# Patient Record
Sex: MALE | Race: WHITE | NOT HISPANIC OR LATINO | Employment: PART TIME | URBAN - NONMETROPOLITAN AREA
[De-identification: names, ages, dates, MRNs, and addresses within clinical notes are randomized per-mention and may not be internally consistent; named-entity substitution may affect disease eponyms.]

---

## 2020-02-10 ENCOUNTER — HOSPITAL ENCOUNTER (INPATIENT)
Facility: HOSPITAL | Age: 75
LOS: 2 days | Discharge: HOME | End: 2020-02-12
Attending: EMERGENCY MEDICINE | Admitting: INTERNAL MEDICINE

## 2020-02-10 ENCOUNTER — APPOINTMENT (OUTPATIENT)
Dept: GENERAL RADIOLOGY | Facility: HOSPITAL | Age: 75
End: 2020-02-10

## 2020-02-10 ENCOUNTER — APPOINTMENT (OUTPATIENT)
Dept: CT IMAGING | Facility: HOSPITAL | Age: 75
End: 2020-02-10

## 2020-02-10 DIAGNOSIS — R09.02 HYPOXIC: ICD-10-CM

## 2020-02-10 DIAGNOSIS — J18.9 PNEUMONIA OF RIGHT UPPER LOBE DUE TO INFECTIOUS ORGANISM: Primary | ICD-10-CM

## 2020-02-10 PROBLEM — J96.01 ACUTE RESPIRATORY FAILURE WITH HYPOXIA (HCC): Status: ACTIVE | Noted: 2020-02-10

## 2020-02-10 PROBLEM — Z87.891 FORMER TOBACCO USE: Status: ACTIVE | Noted: 2020-02-10

## 2020-02-10 PROBLEM — I10 ESSENTIAL HYPERTENSION: Status: ACTIVE | Noted: 2020-02-10

## 2020-02-10 PROBLEM — I73.9 PVD (PERIPHERAL VASCULAR DISEASE): Status: ACTIVE | Noted: 2020-02-10

## 2020-02-10 PROBLEM — R79.89 POSITIVE D DIMER: Status: ACTIVE | Noted: 2020-02-10

## 2020-02-10 LAB
ALBUMIN SERPL-MCNC: 4.4 G/DL (ref 3.5–5.2)
ALBUMIN/GLOB SERPL: 1.2 G/DL
ALP SERPL-CCNC: 132 U/L (ref 39–117)
ALT SERPL W P-5'-P-CCNC: 21 U/L (ref 1–41)
ANION GAP SERPL CALCULATED.3IONS-SCNC: 14 MMOL/L (ref 5–15)
APTT PPP: 60.6 SECONDS (ref 24.1–35)
ARTERIAL PATENCY WRIST A: POSITIVE
AST SERPL-CCNC: 24 U/L (ref 1–40)
ATMOSPHERIC PRESS: 752 MMHG
BASE EXCESS BLDA CALC-SCNC: -0.6 MMOL/L (ref 0–2)
BASOPHILS # BLD AUTO: 0.05 10*3/MM3 (ref 0–0.2)
BASOPHILS NFR BLD AUTO: 0.5 % (ref 0–1.5)
BDY SITE: ABNORMAL
BILIRUB SERPL-MCNC: 0.9 MG/DL (ref 0.2–1.2)
BODY TEMPERATURE: 37 C
BUN BLD-MCNC: 18 MG/DL (ref 8–23)
BUN/CREAT SERPL: 23.7 (ref 7–25)
CALCIUM SPEC-SCNC: 9.8 MG/DL (ref 8.6–10.5)
CHLORIDE SERPL-SCNC: 99 MMOL/L (ref 98–107)
CO2 SERPL-SCNC: 25 MMOL/L (ref 22–29)
CREAT BLD-MCNC: 0.76 MG/DL (ref 0.76–1.27)
D DIMER PPP FEU-MCNC: 0.86 MG/L (FEU) (ref 0–0.5)
D-LACTATE SERPL-SCNC: 1.2 MMOL/L (ref 0.5–2)
DEPRECATED RDW RBC AUTO: 49.7 FL (ref 37–54)
EOSINOPHIL # BLD AUTO: 0.01 10*3/MM3 (ref 0–0.4)
EOSINOPHIL NFR BLD AUTO: 0.1 % (ref 0.3–6.2)
ERYTHROCYTE [DISTWIDTH] IN BLOOD BY AUTOMATED COUNT: 14.3 % (ref 12.3–15.4)
FLUAV AG NPH QL: NEGATIVE
FLUBV AG NPH QL IA: NEGATIVE
GFR SERPL CREATININE-BSD FRML MDRD: 101 ML/MIN/1.73
GLOBULIN UR ELPH-MCNC: 3.7 GM/DL
GLUCOSE BLD-MCNC: 96 MG/DL (ref 65–99)
HBA1C MFR BLD: 5.8 % (ref 4.8–5.6)
HCO3 BLDA-SCNC: 22.1 MMOL/L (ref 20–26)
HCT VFR BLD AUTO: 54.2 % (ref 37.5–51)
HGB BLD-MCNC: 18.7 G/DL (ref 13–17.7)
IMM GRANULOCYTES # BLD AUTO: 0.05 10*3/MM3 (ref 0–0.05)
IMM GRANULOCYTES NFR BLD AUTO: 0.5 % (ref 0–0.5)
INR PPP: 0.98 (ref 0.91–1.09)
LYMPHOCYTES # BLD AUTO: 0.79 10*3/MM3 (ref 0.7–3.1)
LYMPHOCYTES NFR BLD AUTO: 8.3 % (ref 19.6–45.3)
Lab: ABNORMAL
Lab: ABNORMAL
MCH RBC QN AUTO: 33.1 PG (ref 26.6–33)
MCHC RBC AUTO-ENTMCNC: 34.5 G/DL (ref 31.5–35.7)
MCV RBC AUTO: 95.9 FL (ref 79–97)
MODALITY: ABNORMAL
MONOCYTES # BLD AUTO: 1.25 10*3/MM3 (ref 0.1–0.9)
MONOCYTES NFR BLD AUTO: 13.1 % (ref 5–12)
NEUTROPHILS # BLD AUTO: 7.41 10*3/MM3 (ref 1.7–7)
NEUTROPHILS NFR BLD AUTO: 77.5 % (ref 42.7–76)
NOTIFIED BY: ABNORMAL
NOTIFIED WHO: ABNORMAL
NRBC BLD AUTO-RTO: 0 /100 WBC (ref 0–0.2)
NT-PROBNP SERPL-MCNC: 365 PG/ML (ref 5–900)
PCO2 BLDA: 31.3 MM HG (ref 35–45)
PH BLDA: 7.46 PH UNITS (ref 7.35–7.45)
PLATELET # BLD AUTO: 204 10*3/MM3 (ref 140–450)
PMV BLD AUTO: 9.1 FL (ref 6–12)
PO2 BLDA: 52.5 MM HG (ref 83–108)
POTASSIUM BLD-SCNC: 3.9 MMOL/L (ref 3.5–5.2)
PROCALCITONIN SERPL-MCNC: 0.05 NG/ML (ref 0.1–0.25)
PROT SERPL-MCNC: 8.1 G/DL (ref 6–8.5)
PROTHROMBIN TIME: 13.3 SECONDS (ref 11.9–14.6)
RBC # BLD AUTO: 5.65 10*6/MM3 (ref 4.14–5.8)
SAO2 % BLDCOA: 88.2 % (ref 94–99)
SODIUM BLD-SCNC: 138 MMOL/L (ref 136–145)
TROPONIN T SERPL-MCNC: <0.01 NG/ML (ref 0–0.03)
VENTILATOR MODE: ABNORMAL
WBC NRBC COR # BLD: 9.56 10*3/MM3 (ref 3.4–10.8)

## 2020-02-10 PROCEDURE — 85379 FIBRIN DEGRADATION QUANT: CPT | Performed by: EMERGENCY MEDICINE

## 2020-02-10 PROCEDURE — 25010000002 CEFTRIAXONE PER 250 MG: Performed by: EMERGENCY MEDICINE

## 2020-02-10 PROCEDURE — 36600 WITHDRAWAL OF ARTERIAL BLOOD: CPT

## 2020-02-10 PROCEDURE — 71045 X-RAY EXAM CHEST 1 VIEW: CPT

## 2020-02-10 PROCEDURE — 85610 PROTHROMBIN TIME: CPT | Performed by: EMERGENCY MEDICINE

## 2020-02-10 PROCEDURE — 94799 UNLISTED PULMONARY SVC/PX: CPT

## 2020-02-10 PROCEDURE — 94640 AIRWAY INHALATION TREATMENT: CPT

## 2020-02-10 PROCEDURE — 83605 ASSAY OF LACTIC ACID: CPT | Performed by: EMERGENCY MEDICINE

## 2020-02-10 PROCEDURE — 25010000002 AZITHROMYCIN PER 500 MG: Performed by: EMERGENCY MEDICINE

## 2020-02-10 PROCEDURE — 87205 SMEAR GRAM STAIN: CPT | Performed by: NURSE PRACTITIONER

## 2020-02-10 PROCEDURE — 0 IOPAMIDOL PER 1 ML: Performed by: EMERGENCY MEDICINE

## 2020-02-10 PROCEDURE — 87070 CULTURE OTHR SPECIMN AEROBIC: CPT | Performed by: NURSE PRACTITIONER

## 2020-02-10 PROCEDURE — 93010 ELECTROCARDIOGRAM REPORT: CPT | Performed by: INTERNAL MEDICINE

## 2020-02-10 PROCEDURE — 83880 ASSAY OF NATRIURETIC PEPTIDE: CPT | Performed by: EMERGENCY MEDICINE

## 2020-02-10 PROCEDURE — 93005 ELECTROCARDIOGRAM TRACING: CPT | Performed by: EMERGENCY MEDICINE

## 2020-02-10 PROCEDURE — 99284 EMERGENCY DEPT VISIT MOD MDM: CPT

## 2020-02-10 PROCEDURE — 85730 THROMBOPLASTIN TIME PARTIAL: CPT | Performed by: EMERGENCY MEDICINE

## 2020-02-10 PROCEDURE — 87804 INFLUENZA ASSAY W/OPTIC: CPT | Performed by: EMERGENCY MEDICINE

## 2020-02-10 PROCEDURE — 84145 PROCALCITONIN (PCT): CPT | Performed by: EMERGENCY MEDICINE

## 2020-02-10 PROCEDURE — 84484 ASSAY OF TROPONIN QUANT: CPT | Performed by: EMERGENCY MEDICINE

## 2020-02-10 PROCEDURE — 83036 HEMOGLOBIN GLYCOSYLATED A1C: CPT | Performed by: NURSE PRACTITIONER

## 2020-02-10 PROCEDURE — 87040 BLOOD CULTURE FOR BACTERIA: CPT | Performed by: EMERGENCY MEDICINE

## 2020-02-10 PROCEDURE — 71275 CT ANGIOGRAPHY CHEST: CPT

## 2020-02-10 PROCEDURE — 85025 COMPLETE CBC W/AUTO DIFF WBC: CPT | Performed by: EMERGENCY MEDICINE

## 2020-02-10 PROCEDURE — 82803 BLOOD GASES ANY COMBINATION: CPT

## 2020-02-10 PROCEDURE — 25010000002 LORAZEPAM PER 2 MG: Performed by: INTERNAL MEDICINE

## 2020-02-10 PROCEDURE — 80053 COMPREHEN METABOLIC PANEL: CPT | Performed by: EMERGENCY MEDICINE

## 2020-02-10 RX ORDER — AMOXICILLIN 250 MG
2 CAPSULE ORAL 2 TIMES DAILY
Status: DISCONTINUED | OUTPATIENT
Start: 2020-02-10 | End: 2020-02-12 | Stop reason: HOSPADM

## 2020-02-10 RX ORDER — ACETAMINOPHEN 325 MG/1
650 TABLET ORAL EVERY 4 HOURS PRN
Status: DISCONTINUED | OUTPATIENT
Start: 2020-02-10 | End: 2020-02-12 | Stop reason: HOSPADM

## 2020-02-10 RX ORDER — IPRATROPIUM BROMIDE AND ALBUTEROL SULFATE 2.5; .5 MG/3ML; MG/3ML
3 SOLUTION RESPIRATORY (INHALATION)
Status: DISCONTINUED | OUTPATIENT
Start: 2020-02-10 | End: 2020-02-11

## 2020-02-10 RX ORDER — IBUPROFEN 800 MG/1
800 TABLET ORAL ONCE
Status: COMPLETED | OUTPATIENT
Start: 2020-02-10 | End: 2020-02-10

## 2020-02-10 RX ORDER — ALBUTEROL SULFATE 2.5 MG/3ML
2.5 SOLUTION RESPIRATORY (INHALATION)
Status: COMPLETED | OUTPATIENT
Start: 2020-02-10 | End: 2020-02-10

## 2020-02-10 RX ORDER — SODIUM CHLORIDE 0.9 % (FLUSH) 0.9 %
10 SYRINGE (ML) INJECTION AS NEEDED
Status: DISCONTINUED | OUTPATIENT
Start: 2020-02-10 | End: 2020-02-12 | Stop reason: HOSPADM

## 2020-02-10 RX ORDER — SODIUM CHLORIDE 0.9 % (FLUSH) 0.9 %
10 SYRINGE (ML) INJECTION EVERY 12 HOURS SCHEDULED
Status: DISCONTINUED | OUTPATIENT
Start: 2020-02-10 | End: 2020-02-12 | Stop reason: HOSPADM

## 2020-02-10 RX ORDER — IPRATROPIUM BROMIDE AND ALBUTEROL SULFATE 2.5; .5 MG/3ML; MG/3ML
3 SOLUTION RESPIRATORY (INHALATION) ONCE
Status: COMPLETED | OUTPATIENT
Start: 2020-02-10 | End: 2020-02-10

## 2020-02-10 RX ORDER — BENZONATATE 100 MG/1
100 CAPSULE ORAL 3 TIMES DAILY PRN
Status: DISCONTINUED | OUTPATIENT
Start: 2020-02-10 | End: 2020-02-12 | Stop reason: HOSPADM

## 2020-02-10 RX ORDER — GUAIFENESIN 600 MG/1
1200 TABLET, EXTENDED RELEASE ORAL EVERY 12 HOURS SCHEDULED
Status: DISCONTINUED | OUTPATIENT
Start: 2020-02-10 | End: 2020-02-12 | Stop reason: HOSPADM

## 2020-02-10 RX ORDER — IBUPROFEN 800 MG/1
800 TABLET ORAL EVERY 8 HOURS PRN
Status: DISCONTINUED | OUTPATIENT
Start: 2020-02-10 | End: 2020-02-12 | Stop reason: HOSPADM

## 2020-02-10 RX ORDER — ONDANSETRON 4 MG/1
4 TABLET, FILM COATED ORAL EVERY 6 HOURS PRN
Status: DISCONTINUED | OUTPATIENT
Start: 2020-02-10 | End: 2020-02-12 | Stop reason: HOSPADM

## 2020-02-10 RX ORDER — ONDANSETRON 2 MG/ML
4 INJECTION INTRAMUSCULAR; INTRAVENOUS EVERY 6 HOURS PRN
Status: DISCONTINUED | OUTPATIENT
Start: 2020-02-10 | End: 2020-02-12 | Stop reason: HOSPADM

## 2020-02-10 RX ORDER — LORAZEPAM 2 MG/ML
1 INJECTION INTRAMUSCULAR EVERY 4 HOURS PRN
Status: DISCONTINUED | OUTPATIENT
Start: 2020-02-10 | End: 2020-02-12 | Stop reason: HOSPADM

## 2020-02-10 RX ADMIN — IBUPROFEN 800 MG: 800 TABLET, FILM COATED ORAL at 15:57

## 2020-02-10 RX ADMIN — LORAZEPAM 1 MG: 2 INJECTION INTRAMUSCULAR; INTRAVENOUS at 23:20

## 2020-02-10 RX ADMIN — IOPAMIDOL 100 ML: 755 INJECTION, SOLUTION INTRAVENOUS at 14:45

## 2020-02-10 RX ADMIN — GUAIFENESIN 1200 MG: 600 TABLET, EXTENDED RELEASE ORAL at 20:23

## 2020-02-10 RX ADMIN — AZITHROMYCIN MONOHYDRATE 500 MG: 500 INJECTION, POWDER, LYOPHILIZED, FOR SOLUTION INTRAVENOUS at 16:27

## 2020-02-10 RX ADMIN — SODIUM CHLORIDE, PRESERVATIVE FREE 10 ML: 5 INJECTION INTRAVENOUS at 22:23

## 2020-02-10 RX ADMIN — IPRATROPIUM BROMIDE AND ALBUTEROL SULFATE 3 ML: 2.5; .5 SOLUTION RESPIRATORY (INHALATION) at 19:32

## 2020-02-10 RX ADMIN — ALBUTEROL SULFATE 2.5 MG: 2.5 SOLUTION RESPIRATORY (INHALATION) at 12:07

## 2020-02-10 RX ADMIN — IPRATROPIUM BROMIDE AND ALBUTEROL SULFATE 3 ML: 2.5; .5 SOLUTION RESPIRATORY (INHALATION) at 12:06

## 2020-02-10 RX ADMIN — ACETAMINOPHEN 650 MG: 325 TABLET, FILM COATED ORAL at 20:27

## 2020-02-10 RX ADMIN — IBUPROFEN 800 MG: 800 TABLET ORAL at 23:08

## 2020-02-10 RX ADMIN — ALBUTEROL SULFATE 2.5 MG: 2.5 SOLUTION RESPIRATORY (INHALATION) at 12:06

## 2020-02-10 RX ADMIN — CEFTRIAXONE SODIUM 1 G: 1 INJECTION, POWDER, FOR SOLUTION INTRAMUSCULAR; INTRAVENOUS at 15:48

## 2020-02-10 NOTE — ED PROVIDER NOTES
Subjective   Patient is  short of breath cough congestion for the past couple of days got worse subsequently came the ER for evaluation      Shortness of Breath   Severity:  Moderate  Onset quality:  Gradual  Timing:  Constant  Progression:  Worsening  Chronicity:  New  Context: not activity, not animal exposure, not emotional upset, not occupational exposure, not pollens and not URI    Relieved by:  Nothing  Worsened by:  Exertion  Ineffective treatments:  None tried  Associated symptoms: cough, diaphoresis and wheezing    Associated symptoms: no abdominal pain, no chest pain, no claudication, no ear pain, no fever, no headaches, no hemoptysis, no neck pain, no PND, no rash, no sore throat, no sputum production, no swollen glands and no vomiting    Risk factors: no recent alcohol use, no family hx of DVT, no hx of PE/DVT, no obesity, no recent surgery and no tobacco use        Review of Systems   Constitutional: Positive for diaphoresis. Negative for fever.   HENT: Negative.  Negative for ear pain and sore throat.    Respiratory: Positive for cough, shortness of breath and wheezing. Negative for hemoptysis and sputum production.    Cardiovascular: Negative for chest pain, claudication and PND.   Gastrointestinal: Negative.  Negative for abdominal distention, abdominal pain, nausea and vomiting.   Endocrine: Negative.    Genitourinary: Negative.    Musculoskeletal: Negative.  Negative for back pain and neck pain.   Skin: Negative for color change, pallor and rash.   Neurological: Negative.  Negative for syncope, weakness, light-headedness, numbness and headaches.   Hematological: Negative.  Does not bruise/bleed easily.   All other systems reviewed and are negative.      Past Medical History:   Diagnosis Date   • Arthritis    • Hypertension    • PVD (peripheral vascular disease) (CMS/HCC)        No Known Allergies    No past surgical history on file.    No family history on file.    Social History     Socioeconomic  History   • Marital status:      Spouse name: Not on file   • Number of children: Not on file   • Years of education: Not on file   • Highest education level: Not on file   Tobacco Use   • Smoking status: Former Smoker   • Smokeless tobacco: Never Used   • Tobacco comment: quit on Friday    Substance and Sexual Activity   • Alcohol use: Yes     Frequency: Never           Objective   Physical Exam   Constitutional: He is oriented to person, place, and time. He appears well-developed.  Non-toxic appearance.   HENT:   Head: Normocephalic and atraumatic.   Mouth/Throat: Uvula is midline and mucous membranes are normal.   Eyes: Pupils are equal, round, and reactive to light. Conjunctivae and lids are normal. Lids are everted and swept, no foreign bodies found.   Neck: Trachea normal, normal range of motion, full passive range of motion without pain and phonation normal. Neck supple. Normal carotid pulses and no JVD present. Carotid bruit is not present. No neck rigidity. No tracheal deviation present.   Cardiovascular: Regular rhythm, normal heart sounds, intact distal pulses and normal pulses. Tachycardia present. PMI is not displaced.   Pulmonary/Chest: No accessory muscle usage or stridor. Tachypnea noted. No apnea. He is in respiratory distress. He has decreased breath sounds in the right lower field and the left lower field. He has wheezes in the right middle field, the right lower field, the left middle field and the left lower field. He has no rhonchi. He has no rales.   Abdominal: Soft. Normal appearance, normal aorta and bowel sounds are normal. There is no hepatosplenomegaly. There is no tenderness.   Musculoskeletal: Normal range of motion.   Lower extremity exam bilaterally is unremarkable.  There is no right or left calf tenderness .  There is no palpable venous cord.  No obvious difference in the size of the legs.  No pitting edema.  The dorsalis pedis and posterior tibial femoral and popliteal  pulses are palpable and +2 bilaterally.  Homans sign is negative   Neurological: He is alert and oriented to person, place, and time. He has normal strength and normal reflexes. He displays normal reflexes. No cranial nerve deficit or sensory deficit. Gait normal. GCS eye subscore is 4. GCS verbal subscore is 5. GCS motor subscore is 6.   Skin: Skin is warm, dry and intact. No cyanosis. No pallor. Nails show no clubbing.   Psychiatric: He has a normal mood and affect. His speech is normal and behavior is normal.   Nursing note and vitals reviewed.      Procedures           ED Course  ED Course as of Feb 10 1512   Mon Feb 10, 2020   1509 Patient is tachypneic hypoxic CT of the chest is negative for PE has got a right upper lobe pneumonia will be admitted to the hospital service he feels better with the Vapotherm and IV antibiotics    [TS]      ED Course User Index  [TS] Randy Preston MD                                               MDM  Number of Diagnoses or Management Options  Diagnosis management comments: Differential Diagnosis:  I considered pulmonary etiology, asthma, chronic obstructive pulmonary disease, pneumonia, pulmonary embolism, adult respiratory distress syndrome, pneumothorax, pleural effusion, pulmonary fibrosis, cardiac etiology, congestive heart failure, myocardial infarction, metabolic etiology, diabetic ketoacidosis, uremia, acidosis, sepsis, anemia, drug related etiology, hyperventilation and CNS disease as a possible cause of dyspnea in this patient. This is a partial list of diagnoses considered.            Amount and/or Complexity of Data Reviewed  Clinical lab tests: ordered and reviewed  Tests in the radiology section of CPT®: ordered and reviewed  Tests in the medicine section of CPT®: reviewed and ordered    Risk of Complications, Morbidity, and/or Mortality  Presenting problems: moderate  Diagnostic procedures: moderate  Management options: moderate        Final diagnoses:   Pneumonia  of right upper lobe due to infectious organism (CMS/HCC)   Hypoxic            Randy Preston MD  02/10/20 1833

## 2020-02-10 NOTE — PROGRESS NOTES
Discharge Planning Assessment   [facility]     Patient Name: Kingsley Lerma  MRN: [id number]  Today's Date: 2/10/2020    Admit Date: 2/10/2020    Discharge Needs Assessment    No documentation.       Discharge Plan     Row Name 02/10/20 1249       Plan    Plan  Spoke with [facility] about [facility].  He does not want to go to [facility].  Will notify [facility] NOD upon admission.        Fatuma Hammond RN

## 2020-02-10 NOTE — PROGRESS NOTES
Discharge Planning Assessment   [facility]     Patient Name: Kingsley Lerma  MRN: [id number]  Today's Date: 2/10/2020    Admit Date: 2/10/2020    Discharge Needs Assessment    No documentation.       Discharge Plan     Row Name 02/10/20 5511       Plan    Plan  Faxed clinical to [facility] with note saying pt does not want to transfer there.      Row Name 02/10/20 1249        Fatuma Hammond RN

## 2020-02-10 NOTE — H&P
[facility] Medicine Services  HISTORY AND PHYSICAL    Date of Admission: 2/10/2020  Primary Care Physician: Provider, No Known    Subjective     Chief Complaint: shortness of breath     History of Present Illness  The patient is a 74 year old  male who presented to [facility] emergency department complaining of shortness of breath, cough, congestion since Saturday night.  The patient states that he has been short of breath for a couple days progressively getting worse.  The patient states that he has not had a fever that he knows of.  He states that quit smoking three days ago.  He states that he has more short of breath with laying down, exertion and coughing.  The patient does not have a primary care provider.  The patient states that he has a productive cough with brownish sputum.  The patient does not wear oxygen at home but is currently requiring vapotherm at this time in the emergency department.  The patient was taken for a CT scan of his chest and found to have right upper lobe pneumonia.  The patient will be admitted for further workup.     Review of Systems   Constitutional: Positive for activity change, chills and fatigue. Negative for appetite change and fever.   HENT: Negative for hearing loss, nosebleeds, tinnitus and trouble swallowing.    Eyes: Negative for visual disturbance.   Respiratory: Positive for cough and shortness of breath. Negative for chest tightness and wheezing.    Cardiovascular: Negative for chest pain, palpitations and leg swelling.   Gastrointestinal: Negative for abdominal distention, abdominal pain, blood in stool, constipation, diarrhea, nausea and vomiting.   Endocrine: Negative for cold intolerance, heat intolerance, polydipsia, polyphagia and polyuria.   Genitourinary: Negative for decreased urine volume, difficulty urinating, dysuria, flank pain, frequency and hematuria.   Musculoskeletal: Negative for arthralgias,  "joint swelling and myalgias.   Skin: Negative for rash.   Allergic/Immunologic: Negative for immunocompromised state.   Neurological: Positive for weakness. Negative for dizziness, syncope, light-headedness and headaches.   Hematological: Negative for adenopathy. Does not bruise/bleed easily.   Psychiatric/Behavioral: Negative for confusion and sleep disturbance. The patient is not nervous/anxious.       Otherwise complete ROS reviewed and negative except as mentioned in the HPI.    Past Medical History:   Past Medical History:   Diagnosis Date   • Arthritis    • Hypertension    • PVD (peripheral vascular disease) (CMS/HCC)      Past Surgical History:No past surgical history on file.  Social History:  reports that he has quit smoking. He has never used smokeless tobacco. He reports that he drinks alcohol.    Family History: family history includes Cancer in his father; Dementia in his mother; Pancreatic cancer in his father.      Allergies:  No Known Allergies  Medications:  Prior to Admission medications    Medication Sig Start Date End Date Taking? Authorizing Provider   amLODIPine Besylate (NORVASC PO) Take  by mouth.    Emergency, Nurse ANAND Vázquez   ATENOLOL PO Take  by mouth.    Emergency, Nurse Kathie RN   doxycycline (VIBRAMYCIN) 100 MG capsule Take 100 mg by mouth 2 (Two) Times a Day.    Emergency, Nurse Kathie RN   hydroCHLOROthiazide (HYDRODIURIL) 25 MG tablet Take 25 mg by mouth Daily.    Emergency, Nurse Epic, RN     Objective     Vital Signs: /81 (BP Location: Right arm, Patient Position: Sitting)   Pulse 84   Temp 97.3 °F (36.3 °C) (Oral)   Resp 24   Ht 172.7 cm (68\")   Wt 83.9 kg (185 lb)   SpO2 96%   BMI 28.13 kg/m²      Physical Exam   Constitutional: He is oriented to person, place, and time. He appears well-developed and well-nourished.   Sitting up in bed.  NAD.    HENT:   Head: Normocephalic and atraumatic.   Eyes: Pupils are equal, round, and reactive to light. Conjunctivae and EOM are " normal.   Neck: Neck supple. No JVD present.   Cardiovascular: Normal rate, regular rhythm, normal heart sounds and intact distal pulses. Exam reveals no gallop and no friction rub.   No murmur heard.  Sinus 90   Pulmonary/Chest: Effort normal and breath sounds normal. No respiratory distress. He has no wheezes. He has no rales. He exhibits no tenderness.   Abdominal: Soft. Bowel sounds are normal. He exhibits no distension. There is no tenderness. There is no rebound and no guarding.   Genitourinary:   Genitourinary Comments: Voiding.    Musculoskeletal: Normal range of motion. He exhibits no edema, tenderness or deformity.   Neurological: He is alert and oriented to person, place, and time.   Skin: Skin is warm and dry. No rash noted.   Psychiatric: He has a normal mood and affect. His behavior is normal. Judgment and thought content normal.   Nursing note and vitals reviewed.    Results Reviewed:  Lab Results (last 24 hours)     Procedure Component Value Units Date/Time    Blood Culture - Blood, Arm, Right [[id number]] Collected:  02/10/20 1210    Specimen:  Blood from Arm, Right Updated:  02/10/20 1245    Blood Culture - Blood, Arm, Left [[id number]] Collected:  02/10/20 1204    Specimen:  Blood from Arm, Left Updated:  02/10/20 1245    Procalcitonin [[id number]]  (Abnormal) Collected:  02/10/20 1204    Specimen:  Blood Updated:  02/10/20 1245     Procalcitonin 0.05 ng/mL     Narrative:       As a Marker for Sepsis (Non-Neonates):   1. <0.5 ng/mL represents a low risk of severe sepsis and/or septic shock.  1. >2 ng/mL represents a high risk of severe sepsis and/or septic shock.    As a Marker for Lower Respiratory Tract Infections that require antibiotic therapy:  PCT on Admission     Antibiotic Therapy             6-12 Hrs later  > 0.5                Strongly Recommended            >0.25 - <0.5         Recommended  0.1 - 0.25           Discouraged                   Remeasure/reassess PCT  <0.1                  "Strongly Discouraged          Remeasure/reassess PCT      As 28 day mortality risk marker: \"Change in Procalcitonin Result\" (> 80 % or <=80 %) if Day 0 (or Day 1) and Day 4 values are available. Refer to http://[email or web address]-pct-[email or web address]/   Change in PCT <=80 %   A decrease of PCT levels below or equal to 80 % defines a positive change in PCT test result representing a higher risk for 28-day all-cause mortality of patients diagnosed with severe sepsis or septic shock.  Change in PCT > 80 %   A decrease of PCT levels of more than 80 % defines a negative change in PCT result representing a lower risk for 28-day all-cause mortality of patients diagnosed with severe sepsis or septic shock.                Results may be falsely decreased if patient taking Biotin.     Influenza Antigen, Rapid - Swab, Nasopharynx [[id number]]  (Normal) Collected:  02/10/20 1211    Specimen:  Swab from Nasopharynx Updated:  02/10/20 1244     Influenza A Ag, EIA Negative     Influenza B Ag, EIA Negative    Narrative:       Recommend confirmation of negative results by viral culture or molecular assay.    Comprehensive Metabolic Panel [[id number]]  (Abnormal) Collected:  02/10/20 1204    Specimen:  Blood Updated:  02/10/20 1240     Glucose 96 mg/dL      BUN 18 mg/dL      Creatinine 0.76 mg/dL      Sodium 138 mmol/L      Potassium 3.9 mmol/L      Chloride 99 mmol/L      CO2 25.0 mmol/L      Calcium 9.8 mg/dL      Total Protein 8.1 g/dL      Albumin 4.40 g/dL      ALT (SGPT) 21 U/L      AST (SGOT) 24 U/L      Alkaline Phosphatase 132 U/L      Total Bilirubin 0.9 mg/dL      eGFR Non African Amer 101 mL/min/1.73      Globulin 3.7 gm/dL      A/G Ratio 1.2 g/dL      BUN/Creatinine Ratio 23.7     Anion Gap 14.0 mmol/L     Narrative:       GFR Normal >60  Chronic Kidney Disease <60  Kidney Failure <15      BNP [[id number]]  (Normal) Collected:  02/10/20 1204    Specimen:  Blood Updated:  02/10/20 1240     proBNP 365.0 pg/mL     Narrative:       Among " patients with dyspnea, NT-proBNP is highly sensitive for the detection of acute congestive heart failure. In addition NT-proBNP of <300 pg/ml effectively rules out acute congestive heart failure with 99% negative predictive value.    Results may be falsely decreased if patient taking Biotin.      Troponin [[id number]]  (Normal) Collected:  02/10/20 1204    Specimen:  Blood Updated:  02/10/20 1240     Troponin T <0.010 ng/mL     Narrative:       Troponin T Reference Range:  <= 0.03 ng/mL-   Negative for AMI  >0.03 ng/mL-     Abnormal for myocardial necrosis.  Clinicians would have to utilize clinical acumen, EKG, Troponin and serial changes to determine if it is an Acute Myocardial Infarction or myocardial injury due to an underlying chronic condition.       Results may be falsely decreased if patient taking Biotin.      Lactic Acid, Plasma [[id number]]  (Normal) Collected:  02/10/20 1204    Specimen:  Blood Updated:  02/10/20 1237     Lactate 1.2 mmol/L     Protime-INR [[id number]]  (Normal) Collected:  02/10/20 1204    Specimen:  Blood Updated:  02/10/20 1231     Protime 13.3 Seconds      INR 0.98    aPTT [[id number]]  (Abnormal) Collected:  02/10/20 1204    Specimen:  Blood Updated:  02/10/20 1231     PTT 60.6 seconds     D-dimer, Quantitative [[id number]]  (Abnormal) Collected:  02/10/20 1204    Specimen:  Blood Updated:  02/10/20 1231     D-Dimer, Quantitative 0.86 mg/L (FEU)     Narrative:       Reference Range is 0-0.50 mg/L FEU. However, results <0.50 mg/L FEU tends to rule out DVT or PE. Results >0.50 mg/L FEU are not useful in predicting absence or presence of DVT or PE.      CBC & Differential [[id number]] Collected:  02/10/20 1204    Specimen:  Blood Updated:  02/10/20 1221    Narrative:       The following orders were created for panel order CBC & Differential.  Procedure                               Abnormality         Status                     ---------                               -----------          ------                     CBC Auto Differential[[id number]]        Abnormal            Final result                 Please view results for these tests on the individual orders.    CBC Auto Differential [[id number]]  (Abnormal) Collected:  02/10/20 1204    Specimen:  Blood Updated:  02/10/20 1221     WBC 9.56 10*3/mm3      RBC 5.65 10*6/mm3      Hemoglobin 18.7 g/dL      Hematocrit 54.2 %      MCV 95.9 fL      MCH 33.1 pg      MCHC 34.5 g/dL      RDW 14.3 %      RDW-SD 49.7 fl      MPV 9.1 fL      Platelets 204 10*3/mm3      Neutrophil % 77.5 %      Lymphocyte % 8.3 %      Monocyte % 13.1 %      Eosinophil % 0.1 %      Basophil % 0.5 %      Immature Grans % 0.5 %      Neutrophils, Absolute 7.41 10*3/mm3      Lymphocytes, Absolute 0.79 10*3/mm3      Monocytes, Absolute 1.25 10*3/mm3      Eosinophils, Absolute 0.01 10*3/mm3      Basophils, Absolute 0.05 10*3/mm3      Immature Grans, Absolute 0.05 10*3/mm3      nRBC 0.0 /100 WBC     Blood Gas, Arterial [[id number]]  (Abnormal) Collected:  02/10/20 1207    Specimen:  Arterial Blood Updated:  02/10/20 1211     Site Right Radial     Nathaniel's Test Positive     pH, Arterial 7.457 pH units      Comment: 83 Value above reference range        pCO2, Arterial 31.3 mm Hg      Comment: 84 Value below reference range        pO2, Arterial 52.5 mm Hg      Comment: 85 Value below critical limit        HCO3, Arterial 22.1 mmol/L      Base Excess, Arterial -0.6 mmol/L      Comment: 84 Value below reference range        O2 Saturation, Arterial 88.2 %      Comment: 84 Value below reference range        Temperature 37.0 C      Barometric Pressure for Blood Gas 752 mmHg      Modality Room Air     Ventilator Mode NA     Notified Who DR AZUL     Notified By [id number]     Notified Time 02/10/2020 12:13     Collected by [id number]     Comment: Meter: [id number]     :  [id number]           Imaging Results (Last 24 Hours)     Procedure Component Value Units Date/Time    CT Angiogram Chest  [[id number]] Collected:  02/10/20 1449     Updated:  02/10/20 1453    Narrative:       EXAMINATION: CT ANGIOGRAM CHEST-      2/10/2020 2:31 PM CST     HISTORY: Chest pain, acute, PE suspected, intermed prob, negative  D-dimer     In order to have a CT radiation dose as low as reasonably achievable  Automated Exposure Control was utilized for adjustment of the mA and/or  KV according to patient size.     DLP in mGycm= 505.     CT angiography protocol.   CT imaging with bolus IV contrast injection.   Under concurrent supervision axial, sagittal, coronal, and  three-dimensional data sets were constructed.     Normal heart size.  Coronary artery calcification.  No thoracic aortic aneurysm or dissection.     Symmetric pulmonary arteries.  No pulmonary embolism.     There is a linear band of acute pneumonia within the base of the right  upper lobe.     No pneumothorax or pleural effusion.     The lungs are otherwise fully expanded and clear.     Summary:  1. Right upper lobe pneumonia.  2. No pulmonary embolism.                                   This report was finalized on 02/10/2020 14:50 by Dr. Alfred Hernández MD.    XR Chest 1 View [[id number]] Collected:  02/10/20 1214     Updated:  02/10/20 1219    Narrative:       EXAMINATION: XR CHEST 1 VW- 2/10/2020 12:14 PM CST     HISTORY: Shortness of breath.     REPORT:  One-view chest: Frontal view of the chest was obtained. The lungs are  clear and normally expanded. The cardiac and mediastinal silhouettes are  within normal limits. The visualized bony thorax and upper abdomen are  unremarkable.                                                                                                                                                       Impression:       No acute cardiopulmonary abnormality.     This report was finalized on 02/10/2020 12:16 by Dr. Napoleon Ortiz MD.        I have personally reviewed and interpreted the radiology studies and ECG obtained at time of  admission.     Assessment / Plan     Assessment:   Active Hospital Problems    Diagnosis   • **Pneumonia of right upper lobe due to infectious organism (CMS/HCC)   • Acute respiratory failure with hypoxia (CMS/HCC)   • Positive D dimer   • Essential hypertension   • Former tobacco use   • PVD (peripheral vascular disease) (CMS/[facility])     Plan:   We will admit the patient to the hospitalist service for work up and management.  The patient will be continued on IV antibiotics with Rocephin and Azithromycin.  The patient will also be placed on duo nebs, mucinex and incentive spirometry.      We will check urinary antigens (strep/legionella).  Sputum culture ordered.     Wean oxygen as tolerated.      2D echocardiogram    CBC and BMP in AM    Check hemoglobin A1C    Work up ongoing     Code Status: full     I discussed the patient's findings and my recommendations with the patient and Dr. Horan.     Estimated length of stay 2-4 days    Patient seen and examined by me on 2/10/2020 at 3:00 pm.    TAJ Guallpa   02/10/20   4:20 PM      I personally evaluated and examined the patient in conjunction with TAJ Brasher and agree with the assessment, treatment plan, and disposition of the patient as recorded by her. My history, exam, and further recommendations are:     Patient was seen and examined at bedside.  The patient has decreased breath sounds on the right side, and rhonchi of the right upper lobe.  The patient is on Vapotherm with an O2 sat in the mid to upper 90s.  The patient appears tachypneic.  But he is not in overt distress.  Patient indicates that he stopped smoking approximately 3 days ago, but until then he was smoking up to a pack to pack and a half a day.  Patient reports that he has had lower extremity edema for the past month.  He has 1+ pitting edema up to his knees.  Patient has not had an echocardiogram to my knowledge.    Jeff Horan MD  02/10/20  5:06 PM

## 2020-02-11 ENCOUNTER — APPOINTMENT (OUTPATIENT)
Dept: CARDIOLOGY | Facility: HOSPITAL | Age: 75
End: 2020-02-11

## 2020-02-11 LAB
ANION GAP SERPL CALCULATED.3IONS-SCNC: 12 MMOL/L (ref 5–15)
BASOPHILS # BLD AUTO: 0.03 10*3/MM3 (ref 0–0.2)
BASOPHILS NFR BLD AUTO: 0.5 % (ref 0–1.5)
BH CV ECHO MEAS - AO MAX PG (FULL): 6.2 MMHG
BH CV ECHO MEAS - AO MAX PG: 10.8 MMHG
BH CV ECHO MEAS - AO MEAN PG (FULL): 4 MMHG
BH CV ECHO MEAS - AO MEAN PG: 6 MMHG
BH CV ECHO MEAS - AO ROOT AREA (BSA CORRECTED): 1.4
BH CV ECHO MEAS - AO ROOT AREA: 5.7 CM^2
BH CV ECHO MEAS - AO ROOT DIAM: 2.7 CM
BH CV ECHO MEAS - AO V2 MAX: 164 CM/SEC
BH CV ECHO MEAS - AO V2 MEAN: 106 CM/SEC
BH CV ECHO MEAS - AO V2 VTI: 26.5 CM
BH CV ECHO MEAS - AVA(I,A): 2.7 CM^2
BH CV ECHO MEAS - AVA(I,D): 2.7 CM^2
BH CV ECHO MEAS - AVA(V,A): 2.5 CM^2
BH CV ECHO MEAS - AVA(V,D): 2.5 CM^2
BH CV ECHO MEAS - BSA(HAYCOCK): 2 M^2
BH CV ECHO MEAS - BSA: 2 M^2
BH CV ECHO MEAS - BZI_BMI: 28.1 KILOGRAMS/M^2
BH CV ECHO MEAS - BZI_METRIC_HEIGHT: 172.7 CM
BH CV ECHO MEAS - BZI_METRIC_WEIGHT: 83.9 KG
BH CV ECHO MEAS - EDV(CUBED): 99.9 ML
BH CV ECHO MEAS - EDV(MOD-SP4): 49.5 ML
BH CV ECHO MEAS - EDV(TEICH): 99.3 ML
BH CV ECHO MEAS - EF(CUBED): 66.9 %
BH CV ECHO MEAS - EF(MOD-SP4): 54.9 %
BH CV ECHO MEAS - EF(TEICH): 58.5 %
BH CV ECHO MEAS - ESV(CUBED): 33.1 ML
BH CV ECHO MEAS - ESV(MOD-SP4): 22.3 ML
BH CV ECHO MEAS - ESV(TEICH): 41.3 ML
BH CV ECHO MEAS - FS: 30.8 %
BH CV ECHO MEAS - IVS/LVPW: 0.78
BH CV ECHO MEAS - IVSD: 0.67 CM
BH CV ECHO MEAS - LA DIMENSION: 4.2 CM
BH CV ECHO MEAS - LA/AO: 1.6
BH CV ECHO MEAS - LAT PEAK E' VEL: 11.6 CM/SEC
BH CV ECHO MEAS - LV DIASTOLIC VOL/BSA (35-75): 25 ML/M^2
BH CV ECHO MEAS - LV MASS(C)D: 112.7 GRAMS
BH CV ECHO MEAS - LV MASS(C)DI: 57 GRAMS/M^2
BH CV ECHO MEAS - LV MAX PG: 4.6 MMHG
BH CV ECHO MEAS - LV MEAN PG: 2 MMHG
BH CV ECHO MEAS - LV SYSTOLIC VOL/BSA (12-30): 11.3 ML/M^2
BH CV ECHO MEAS - LV V1 MAX: 107 CM/SEC
BH CV ECHO MEAS - LV V1 MEAN: 71.1 CM/SEC
BH CV ECHO MEAS - LV V1 VTI: 19 CM
BH CV ECHO MEAS - LVIDD: 4.6 CM
BH CV ECHO MEAS - LVIDS: 3.2 CM
BH CV ECHO MEAS - LVLD AP4: 7.1 CM
BH CV ECHO MEAS - LVLS AP4: 5.7 CM
BH CV ECHO MEAS - LVOT AREA (M): 3.8 CM^2
BH CV ECHO MEAS - LVOT AREA: 3.8 CM^2
BH CV ECHO MEAS - LVOT DIAM: 2.2 CM
BH CV ECHO MEAS - LVPWD: 0.86 CM
BH CV ECHO MEAS - MED PEAK E' VEL: 9.79 CM/SEC
BH CV ECHO MEAS - MV DEC TIME: 0.17 SEC
BH CV ECHO MEAS - MV E MAX VEL: 124 CM/SEC
BH CV ECHO MEAS - SI(AO): 76.7 ML/M^2
BH CV ECHO MEAS - SI(CUBED): 33.8 ML/M^2
BH CV ECHO MEAS - SI(LVOT): 36.5 ML/M^2
BH CV ECHO MEAS - SI(MOD-SP4): 13.8 ML/M^2
BH CV ECHO MEAS - SI(TEICH): 29.4 ML/M^2
BH CV ECHO MEAS - SV(AO): 151.7 ML
BH CV ECHO MEAS - SV(CUBED): 66.8 ML
BH CV ECHO MEAS - SV(LVOT): 72.2 ML
BH CV ECHO MEAS - SV(MOD-SP4): 27.2 ML
BH CV ECHO MEAS - SV(TEICH): 58.1 ML
BH CV ECHO MEASUREMENTS AVERAGE E/E' RATIO: 11.59
BUN BLD-MCNC: 16 MG/DL (ref 8–23)
BUN/CREAT SERPL: 23.9 (ref 7–25)
CALCIUM SPEC-SCNC: 9.1 MG/DL (ref 8.6–10.5)
CHLORIDE SERPL-SCNC: 105 MMOL/L (ref 98–107)
CO2 SERPL-SCNC: 24 MMOL/L (ref 22–29)
CREAT BLD-MCNC: 0.67 MG/DL (ref 0.76–1.27)
DEPRECATED RDW RBC AUTO: 48.9 FL (ref 37–54)
EOSINOPHIL # BLD AUTO: 0.02 10*3/MM3 (ref 0–0.4)
EOSINOPHIL NFR BLD AUTO: 0.3 % (ref 0.3–6.2)
ERYTHROCYTE [DISTWIDTH] IN BLOOD BY AUTOMATED COUNT: 14 % (ref 12.3–15.4)
GFR SERPL CREATININE-BSD FRML MDRD: 117 ML/MIN/1.73
GLUCOSE BLD-MCNC: 88 MG/DL (ref 65–99)
HCT VFR BLD AUTO: 48.4 % (ref 37.5–51)
HGB BLD-MCNC: 17 G/DL (ref 13–17.7)
IMM GRANULOCYTES # BLD AUTO: 0.02 10*3/MM3 (ref 0–0.05)
IMM GRANULOCYTES NFR BLD AUTO: 0.3 % (ref 0–0.5)
L PNEUMO1 AG UR QL IA: NEGATIVE
LEFT ATRIUM VOLUME INDEX: 30.5 ML/M2
LEFT ATRIUM VOLUME: 60.3 CM3
LYMPHOCYTES # BLD AUTO: 0.92 10*3/MM3 (ref 0.7–3.1)
LYMPHOCYTES NFR BLD AUTO: 14.3 % (ref 19.6–45.3)
MAGNESIUM SERPL-MCNC: 2.1 MG/DL (ref 1.6–2.4)
MAXIMAL PREDICTED HEART RATE: 148 BPM
MCH RBC QN AUTO: 33.3 PG (ref 26.6–33)
MCHC RBC AUTO-ENTMCNC: 35.1 G/DL (ref 31.5–35.7)
MCV RBC AUTO: 94.7 FL (ref 79–97)
MONOCYTES # BLD AUTO: 0.91 10*3/MM3 (ref 0.1–0.9)
MONOCYTES NFR BLD AUTO: 14.1 % (ref 5–12)
NEUTROPHILS # BLD AUTO: 4.55 10*3/MM3 (ref 1.7–7)
NEUTROPHILS NFR BLD AUTO: 70.5 % (ref 42.7–76)
NRBC BLD AUTO-RTO: 0 /100 WBC (ref 0–0.2)
PLATELET # BLD AUTO: 163 10*3/MM3 (ref 140–450)
PMV BLD AUTO: 9 FL (ref 6–12)
POTASSIUM BLD-SCNC: 3.5 MMOL/L (ref 3.5–5.2)
RBC # BLD AUTO: 5.11 10*6/MM3 (ref 4.14–5.8)
S PNEUM AG SPEC QL LA: NEGATIVE
SODIUM BLD-SCNC: 141 MMOL/L (ref 136–145)
STRESS TARGET HR: 126 BPM
TROPONIN T SERPL-MCNC: <0.01 NG/ML (ref 0–0.03)
WBC NRBC COR # BLD: 6.45 10*3/MM3 (ref 3.4–10.8)

## 2020-02-11 PROCEDURE — 94799 UNLISTED PULMONARY SVC/PX: CPT

## 2020-02-11 PROCEDURE — 83735 ASSAY OF MAGNESIUM: CPT | Performed by: INTERNAL MEDICINE

## 2020-02-11 PROCEDURE — 93306 TTE W/DOPPLER COMPLETE: CPT | Performed by: INTERNAL MEDICINE

## 2020-02-11 PROCEDURE — 93306 TTE W/DOPPLER COMPLETE: CPT

## 2020-02-11 PROCEDURE — 93010 ELECTROCARDIOGRAM REPORT: CPT | Performed by: INTERNAL MEDICINE

## 2020-02-11 PROCEDURE — 84484 ASSAY OF TROPONIN QUANT: CPT | Performed by: INTERNAL MEDICINE

## 2020-02-11 PROCEDURE — 25010000002 AZITHROMYCIN PER 500 MG: Performed by: NURSE PRACTITIONER

## 2020-02-11 PROCEDURE — 87899 AGENT NOS ASSAY W/OPTIC: CPT | Performed by: NURSE PRACTITIONER

## 2020-02-11 PROCEDURE — 25010000002 CEFTRIAXONE PER 250 MG: Performed by: NURSE PRACTITIONER

## 2020-02-11 PROCEDURE — 85025 COMPLETE CBC W/AUTO DIFF WBC: CPT | Performed by: NURSE PRACTITIONER

## 2020-02-11 PROCEDURE — 25010000002 LORAZEPAM PER 2 MG: Performed by: INTERNAL MEDICINE

## 2020-02-11 PROCEDURE — 25010000002 METHYLPREDNISOLONE PER 40 MG: Performed by: NURSE PRACTITIONER

## 2020-02-11 PROCEDURE — 93005 ELECTROCARDIOGRAM TRACING: CPT | Performed by: INTERNAL MEDICINE

## 2020-02-11 PROCEDURE — 80048 BASIC METABOLIC PNL TOTAL CA: CPT | Performed by: NURSE PRACTITIONER

## 2020-02-11 RX ORDER — METHYLPREDNISOLONE SODIUM SUCCINATE 40 MG/ML
40 INJECTION, POWDER, LYOPHILIZED, FOR SOLUTION INTRAMUSCULAR; INTRAVENOUS EVERY 8 HOURS SCHEDULED
Status: DISCONTINUED | OUTPATIENT
Start: 2020-02-11 | End: 2020-02-12 | Stop reason: HOSPADM

## 2020-02-11 RX ORDER — SILDENAFIL 50 MG/1
50 TABLET, FILM COATED ORAL DAILY PRN
COMMUNITY
End: 2020-06-22 | Stop reason: HOSPADM

## 2020-02-11 RX ORDER — ASPIRIN 81 MG/1
81 TABLET ORAL DAILY
COMMUNITY
End: 2020-02-12 | Stop reason: HOSPADM

## 2020-02-11 RX ORDER — CILOSTAZOL 100 MG/1
100 TABLET ORAL 2 TIMES DAILY
COMMUNITY
End: 2020-05-14 | Stop reason: SDUPTHER

## 2020-02-11 RX ORDER — MELOXICAM 7.5 MG/1
7.5 TABLET ORAL DAILY
COMMUNITY
End: 2020-02-12 | Stop reason: HOSPADM

## 2020-02-11 RX ORDER — MULTIPLE VITAMINS W/ MINERALS TAB 9MG-400MCG
1 TAB ORAL DAILY
Status: ON HOLD | COMMUNITY
End: 2020-03-07 | Stop reason: SDDI

## 2020-02-11 RX ORDER — LEVALBUTEROL INHALATION SOLUTION 1.25 MG/3ML
SOLUTION RESPIRATORY (INHALATION) 3 TIMES DAILY
Status: DISCONTINUED | OUTPATIENT
Start: 2020-02-11 | End: 2020-02-12 | Stop reason: HOSPADM

## 2020-02-11 RX ADMIN — METOPROLOL TARTRATE 12.5 MG: 25 TABLET, FILM COATED ORAL at 07:58

## 2020-02-11 RX ADMIN — IPRATROPIUM BROMIDE AND ALBUTEROL SULFATE 3 ML: 2.5; .5 SOLUTION RESPIRATORY (INHALATION) at 10:33

## 2020-02-11 RX ADMIN — CEFTRIAXONE 1 G: 1 INJECTION, POWDER, FOR SOLUTION INTRAMUSCULAR; INTRAVENOUS at 17:31

## 2020-02-11 RX ADMIN — METHYLPREDNISOLONE SODIUM SUCCINATE 40 MG: 40 INJECTION, POWDER, FOR SOLUTION INTRAMUSCULAR; INTRAVENOUS at 13:09

## 2020-02-11 RX ADMIN — GUAIFENESIN 1200 MG: 600 TABLET, EXTENDED RELEASE ORAL at 10:31

## 2020-02-11 RX ADMIN — RIVAROXABAN 20 MG: 20 TABLET, FILM COATED ORAL at 17:33

## 2020-02-11 RX ADMIN — IBUPROFEN 800 MG: 800 TABLET ORAL at 18:11

## 2020-02-11 RX ADMIN — DILTIAZEM HYDROCHLORIDE 5 MG/HR: 5 INJECTION INTRAVENOUS at 03:38

## 2020-02-11 RX ADMIN — METOPROLOL TARTRATE 25 MG: 25 TABLET, FILM COATED ORAL at 21:36

## 2020-02-11 RX ADMIN — GUAIFENESIN 1200 MG: 600 TABLET, EXTENDED RELEASE ORAL at 21:36

## 2020-02-11 RX ADMIN — METHYLPREDNISOLONE SODIUM SUCCINATE 40 MG: 40 INJECTION, POWDER, FOR SOLUTION INTRAMUSCULAR; INTRAVENOUS at 21:36

## 2020-02-11 RX ADMIN — SENNOSIDES AND DOCUSATE SODIUM 2 TABLET: 8.6; 5 TABLET ORAL at 21:36

## 2020-02-11 RX ADMIN — AZITHROMYCIN MONOHYDRATE 500 MG: 500 INJECTION, POWDER, LYOPHILIZED, FOR SOLUTION INTRAVENOUS at 17:33

## 2020-02-11 RX ADMIN — IPRATROPIUM BROMIDE 0.5 MG: 0.5 SOLUTION RESPIRATORY (INHALATION) at 19:00

## 2020-02-11 RX ADMIN — DILTIAZEM HYDROCHLORIDE 5 MG/HR: 5 INJECTION INTRAVENOUS at 22:46

## 2020-02-11 RX ADMIN — SODIUM CHLORIDE, PRESERVATIVE FREE 10 ML: 5 INJECTION INTRAVENOUS at 21:36

## 2020-02-11 RX ADMIN — IPRATROPIUM BROMIDE AND ALBUTEROL SULFATE 3 ML: 2.5; .5 SOLUTION RESPIRATORY (INHALATION) at 07:05

## 2020-02-11 RX ADMIN — IBUPROFEN 800 MG: 800 TABLET ORAL at 10:32

## 2020-02-11 RX ADMIN — LORAZEPAM 1 MG: 2 INJECTION INTRAMUSCULAR; INTRAVENOUS at 22:35

## 2020-02-11 NOTE — PROGRESS NOTES
Discharge Planning Assessment  [facility]     Patient Name: Kingsley Lerma  MRN: [id number]  Today's Date: 2/11/2020    Admit Date: 2/10/2020    Discharge Needs Assessment     Row Name 02/11/20 1406       Living Environment    Lives With  alone    Current Living Arrangements  home/apartment/condo    Primary Care Provided by  self    Provides Primary Care For  no one    Family Caregiver if Needed  none    Quality of Family Relationships  supportive;involved;helpful    Able to Return to Prior Arrangements  yes       Resource/Environmental Concerns    Resource/Environmental Concerns  none    Transportation Concerns  car, none       Transition Planning    Patient/Family Anticipates Transition to  home    Patient/Family Anticipated Services at Transition  none    Transportation Anticipated  family or friend will provide       Discharge Needs Assessment    Readmission Within the Last 30 Days  no previous admission in last 30 days    Concerns to be Addressed  denies needs/concerns at this time    Equipment Currently Used at Home  cane, straight    Anticipated Changes Related to Illness  none    Equipment Needed After Discharge  none    Current Discharge Risk  lives alone        Discharge Plan     Row Name 02/11/20 1408       Plan    Plan  Home    Patient/Family in Agreement with Plan  yes    Plan Comments  Spoke with pt to assess for home needs. Pt lives at home alone and plans same. Pt says he still works full-time.  He says he is independent and denies home needs. He does attend the [employer] and gets meds through [facility].  He is not interested in being followed by [facility] at home.  Pt has RX coverage/PCP, will have a ride home at discharge. Will follow.         Destination      Coordination has not been started for this encounter.      Durable Medical Equipment      Coordination has not been started for this encounter.      Dialysis/Infusion      Coordination has not been started for this encounter.      Home Medical Care       Coordination has not been started for this encounter.      Therapy      Coordination has not been started for this encounter.      Community Resources      Coordination has not been started for this encounter.          Demographic Summary    No documentation.       Functional Status    No documentation.       Psychosocial    No documentation.       Abuse/Neglect    No documentation.       Legal    No documentation.       Substance Abuse    No documentation.       Patient Forms    No documentation.           SHREYAS Esparza

## 2020-02-11 NOTE — PLAN OF CARE
Problem: Patient Care Overview  Goal: Plan of Care Review  Outcome: Ongoing (interventions implemented as appropriate)  Flowsheets (Taken 2/10/2020 8693)  Progress: no change  Plan of Care Reviewed With: patient  Outcome Summary: recieved from ed on vapotherm, see assessment  Goal: Individualization and Mutuality  Outcome: Ongoing (interventions implemented as appropriate)  Goal: Discharge Needs Assessment  Outcome: Ongoing (interventions implemented as appropriate)  Goal: Interprofessional Rounds/Family Conf  Outcome: Ongoing (interventions implemented as appropriate)     Problem: Pain, Chronic (Adult)  Goal: Identify Related Risk Factors and Signs and Symptoms  Outcome: Ongoing (interventions implemented as appropriate)  Goal: Acceptable Pain/Comfort Level and Functional Ability  Outcome: Ongoing (interventions implemented as appropriate)

## 2020-02-11 NOTE — PLAN OF CARE
Problem: Patient Care Overview  Goal: Plan of Care Review  Flowsheets  Taken 2/11/2020 0411  Progress: no change  Outcome Summary: pt c/o pain, prn ibuprofen given. pt c/o anxiety, hx daily alchohol consumption, CIWA 12, prn IV ativan given, pt tolerating well. 02 2L nc, pt encouraged several times to keep on. tele nsr then switched to aflutter, ekg ordered, troponin, mag, and bmp drawn. trop and mag normal. IV cardizem initiated. vss. pt resting comfortably  Taken 2/10/2020 2015  Plan of Care Reviewed With: patient     Problem: Pain, Chronic (Adult)  Goal: Identify Related Risk Factors and Signs and Symptoms  Flowsheets (Taken 2/11/2020 0411)  Related Risk Factors (Chronic Pain): disease process  Signs and Symptoms (Chronic Pain): verbalization of pain descriptors     Problem: Pneumonia (Adult)  Goal: Signs and Symptoms of Listed Potential Problems Will be Absent, Minimized or Managed (Pneumonia)  Flowsheets (Taken 2/11/2020 0411)  Problems Assessed (Pneumonia): infection progression; respiratory compromise  Problems Present (Pneumonia): infection progression; respiratory compromise     Problem: Arrhythmia/Dysrhythmia (Symptomatic) (Adult)  Goal: Signs and Symptoms of Listed Potential Problems Will be Absent, Minimized or Managed (Arrhythmia/Dysrhythmia)  Flowsheets (Taken 2/11/2020 0411)  Problems Assessed (Arrhythmia/Dysrhythmia): electrophysiologic conduction defect  Problems Present (Dysrhythmia): electrophysiologic conduction defect

## 2020-02-11 NOTE — PLAN OF CARE
Problem: Patient Care Overview  Goal: Plan of Care Review  Outcome: Ongoing (interventions implemented as appropriate)  Flowsheets (Taken 2/11/2020 2666)  Progress: improving  Plan of Care Reviewed With: patient  Note:   Pt reports a good appetite, PO intake 88% avg of 2 meals. Pt states at home he typically eats 2 good meals daily and a larger snack. Eats out for lunch most days. No recent wt loss noted, will continue to follow.

## 2020-02-11 NOTE — PROGRESS NOTES
RT Nebulizer Protocol    Assessment tool to be used for patients with existing breathing treatments ordered by hospitalists                                                                  0  1  2  3  4      Respiratory History   No Smoking      Smoking History   x   1 Pack/Day      Pulmonary Disease      Exacerbation        Respiratory Rate   Normal      20-25   x   Dyspneic      Accessory Muscles      Severe Dyspnea        Breath Sounds   Clear      Crackles      Crackles/ Rhonchi      Wheezing   x   Absent/ Severe Wheezing        Chest   X-ray   Clear      1 Lobe Infiltration/ Consolidation/ PE   x   2 Lobe Same Lung Infiltration/ Consolidation/ PE       2 Lobe Infiltration/ Both Lungs/ Consolidation/ PE      Both Lungs/ More Than 1 Lobe/ Atelectasis/ Consolidation/  PE        Cough   Strong Non- Productive   x   Excessive Secretions/ Strong Cough      Excessive Secretions/ Weak Cough      Thick Bronchial Secretions/ Weak Cough      Thick Bronchial Secretions/ No Cough        Total Patient Score =   6  0-4=Q4 PRN  5-9=TID and Q4 PRN  10-14=QID and Q3 PRN  15-19=Q4 and Q2 PRN  20=Q3 and Q2 PRN    Bronchopulmonary Hygiene (CPT)   Q4 ATC Copious secretions, dyspnea, unable to sleep, mucus plug    QID & Q4 PRN Moderate secretions    TID Small amounts of secretions w/ poor cough and history of secretions    BID Unable to deep breathe and cough spontaneously       Lung Expansion Therapy (PEP)   Q4 & PRN at night Severe atelectasis, poor oxygenation    QID  High risk for persistent atelectasis, existence of same    TID At risk for developing atelectasis    BID Unable to deep breathe and cough spontaneously     Instruct, 1 follow up Patients able to perform well on their own          Change Nebs to TID with Q4 PRN.  Reevaluate in 24 hours.

## 2020-02-11 NOTE — PLAN OF CARE
Has a lot of pain in his heel with activity.  He uses a cane and has difficulty at work climbing stair.  He has a quite of anxiety about needles and shots. Refused Lovenox for VTE.  Currently has gtt for elevated HR with afib.

## 2020-02-11 NOTE — NURSING NOTE
"Pt given teaching on the risks of not taking lovenox four separate occasions, two times with other nurses present supplementing the teaching and offering confirmation of the risks. Pt given teaching emphasizing the risk with his current aflutter heart rhythm. Pt responded that he understood the risks well but still \"felt like he didn't need it\". MD aware of pt refusing lovenox.   "

## 2020-02-11 NOTE — PROGRESS NOTES
[facility] Medicine Services  INPATIENT PROGRESS NOTE    Patient Name: Kingsley Lerma  Date of Admission: 2/10/2020  Today's Date: 02/11/20  Length of Stay: 1  Primary Care Physician: Provider, No Known    Subjective   Chief Complaint: weakness    HPI   The patient is currently laying in bed.  He states that he is wheezing a lot today but is not as short of breath as he was when he came in yesterday to the ER.  No family currently present.  He is not having any issues with chest pain, fever or chills.  Discussed need for anticoagulation and stroke risk.     Review of Systems   Constitutional: Positive for activity change and fatigue. Negative for appetite change, chills and fever.   HENT: Negative for hearing loss, nosebleeds, tinnitus and trouble swallowing.    Eyes: Negative for visual disturbance.   Respiratory: Positive for cough, shortness of breath and wheezing. Negative for chest tightness.    Cardiovascular: Positive for palpitations. Negative for chest pain and leg swelling.   Gastrointestinal: Negative for abdominal distention, abdominal pain, blood in stool, constipation, diarrhea, nausea and vomiting.   Endocrine: Negative for cold intolerance, heat intolerance, polydipsia, polyphagia and polyuria.   Genitourinary: Negative for decreased urine volume, difficulty urinating, dysuria, flank pain, frequency and hematuria.   Musculoskeletal: Negative for arthralgias, joint swelling and myalgias.   Skin: Negative for rash.   Allergic/Immunologic: Negative for immunocompromised state.   Neurological: Positive for weakness. Negative for dizziness, syncope, light-headedness and headaches.   Hematological: Negative for adenopathy. Does not bruise/bleed easily.   Psychiatric/Behavioral: Negative for confusion and sleep disturbance. The patient is not nervous/anxious.       All pertinent negatives and positives are as above. All other systems have been reviewed and are negative  unless otherwise stated.     Objective    Temp:  [97.2 °F (36.2 °C)-98.3 °F (36.8 °C)] 97.2 °F (36.2 °C)  Heart Rate:  [] 96  Resp:  [16-26] 20  BP: (130-155)/(68-85) 151/85     Intake/Output Summary (Last 24 hours) at 2/11/2020 1123  Last data filed at 2/11/2020 1120  Gross per 24 hour   Intake 460 ml   Output --   Net 460 ml     Physical Exam   Constitutional: He is oriented to person, place, and time. He appears well-developed and well-nourished.   Sitting up in bed.  NAD.     HENT:   Head: Normocephalic and atraumatic.   Eyes: Pupils are equal, round, and reactive to light. Conjunctivae and EOM are normal.   Neck: Neck supple. No JVD present.   Cardiovascular: Normal heart sounds and intact distal pulses. An irregularly irregular rhythm present. Tachycardia present. Exam reveals no gallop and no friction rub.   No murmur heard.  Atrial flutter/NSR in and out currently flutter 71, has been up to 120-130's.   Pulmonary/Chest: Effort normal. No respiratory distress. He has wheezes. He has no rales. He exhibits no tenderness.   4 L nasal cannula    Abdominal: Soft. Bowel sounds are normal. He exhibits no distension. There is no tenderness. There is no rebound and no guarding.   Genitourinary:   Genitourinary Comments: Voiding.   Musculoskeletal: Normal range of motion. He exhibits no edema, tenderness or deformity.   Neurological: He is alert and oriented to person, place, and time.   Skin: Skin is warm and dry. No rash noted.   Psychiatric: He has a normal mood and affect. His behavior is normal. Judgment and thought content normal.   Nursing note and vitals reviewed.    Results Review:  I have reviewed the labs, radiology results, and diagnostic studies.    Laboratory Data:   Results from last 7 days   Lab Units 02/11/20  0249 02/10/20  1204   WBC 10*3/mm3 6.45 9.56   HEMOGLOBIN g/dL 17.0 18.7*   HEMATOCRIT % 48.4 54.2*   PLATELETS 10*3/mm3 163 204      Results from last 7 days   Lab Units 02/11/20  0249  02/10/20  1204   SODIUM mmol/L 141 138   POTASSIUM mmol/L 3.5 3.9   CHLORIDE mmol/L 105 99   CO2 mmol/L 24.0 25.0   BUN mg/dL 16 18   CREATININE mg/dL 0.67* 0.76   CALCIUM mg/dL 9.1 9.8   BILIRUBIN mg/dL  --  0.9   ALK PHOS U/L  --  132*   ALT (SGPT) U/L  --  21   AST (SGOT) U/L  --  24   GLUCOSE mg/dL 88 96     Culture Data:   Blood Culture   Date Value Ref Range Status   02/10/2020 No growth at less than 24 hours  Preliminary   02/10/2020 No growth at less than 24 hours  Preliminary     Radiology Data:   Imaging Results (Last 24 Hours)     Procedure Component Value Units Date/Time    CT Angiogram Chest [[id number]] Collected:  02/10/20 1449     Updated:  02/10/20 1453    Narrative:       EXAMINATION: CT ANGIOGRAM CHEST-      2/10/2020 2:31 PM CST     HISTORY: Chest pain, acute, PE suspected, intermed prob, negative  D-dimer     In order to have a CT radiation dose as low as reasonably achievable  Automated Exposure Control was utilized for adjustment of the mA and/or  KV according to patient size.     DLP in mGycm= 505.     CT angiography protocol.   CT imaging with bolus IV contrast injection.   Under concurrent supervision axial, sagittal, coronal, and  three-dimensional data sets were constructed.     Normal heart size.  Coronary artery calcification.  No thoracic aortic aneurysm or dissection.     Symmetric pulmonary arteries.  No pulmonary embolism.     There is a linear band of acute pneumonia within the base of the right  upper lobe.     No pneumothorax or pleural effusion.     The lungs are otherwise fully expanded and clear.     Summary:  1. Right upper lobe pneumonia.  2. No pulmonary embolism.                                   This report was finalized on 02/10/2020 14:50 by Dr. Alfred Hernández MD.    XR Chest 1 View [[id number]] Collected:  02/10/20 1214     Updated:  02/10/20 1219    Narrative:       EXAMINATION: XR CHEST 1 VW- 2/10/2020 12:14 PM CST     HISTORY: Shortness of breath.     REPORT:  One-view  chest: Frontal view of the chest was obtained. The lungs are  clear and normally expanded. The cardiac and mediastinal silhouettes are  within normal limits. The visualized bony thorax and upper abdomen are  unremarkable.                                                                                                                                                       Impression:       No acute cardiopulmonary abnormality.     This report was finalized on 02/10/2020 12:16 by Dr. Napoleon Ortiz MD.        I have reviewed the patient's current medications.     Assessment/Plan     Active Hospital Problems    Diagnosis   • **Pneumonia of right upper lobe due to infectious organism (CMS/[facility])   • Acute respiratory failure with hypoxia (CMS/[facility])   • Positive D dimer   • Essential hypertension   • Former tobacco use   • PVD (peripheral vascular disease) (CMS/[facility])     Plan:  The patient admitted with acute respiratory failure with hypoxia initially requiring Vapotherm now on 4 L nasal cannula.  Patient does not wear oxygen at home currently.      Continue antibiotics Azithromycin and Rocephin.    Went into atrial flutter last night, no history noted.  The patient states that he has not ever had anything wrong with his heart before.  The patient has been receiving duo nebs, will discontinue and change to Xopenex and atrovent.  Currently on Cardizem drip and started on oral beta blocker therapy.  Will continued to monitor telemetry and symptoms.     Continue to wean oxygen as tolerated.  Ambulate.     Echocardiogram done this morning, pending read.      CBC and BMP in am    Start solumedrol 40 mg IV every 8 hours.      Continue Mucinex, IS, OPEP.      Discontinue Lovenox and start Xarelto for anticoagulation.  [id number] 3.  (Age, HTN and vascular disease)    Results from last 7 days   Lab Units 02/10/20  1204   HEMOGLOBIN A1C % 5.80*     Urinary antigens pending.  Influenza negative.  Blood cultures no growth at less than  24 hours.  Sputum culture in process.     Discharge Planning: I expect the patient to be discharged to home in 1-3 days.    TAJ Guallpa   02/11/20   11:21 AM      I personally evaluated and examined the patient in conjunction with TAJ Brasher and agree with the assessment, treatment plan, and disposition of the patient as recorded by her. My history, exam, and further recommendations are:     Patient was seen and examined at bedside.  Patient overall doing well.  Patient is on nasal cannula, however when I enter the room, the nasal cannula is sitting on top of his forehead, and his O2 sat was 97%.  Patient is indicating that he feels much better.  Overnight, the patient went into atrial flutter, we have started him on anticoagulation and rate controlling medication.  His current sinus rhythm with frequent PVCs.    Jeff Horan MD  02/11/20  5:30 PM

## 2020-02-12 ENCOUNTER — NURSE TRIAGE (OUTPATIENT)
Dept: CALL CENTER | Facility: HOSPITAL | Age: 75
End: 2020-02-12

## 2020-02-12 VITALS
HEART RATE: 72 BPM | HEIGHT: 68 IN | DIASTOLIC BLOOD PRESSURE: 59 MMHG | WEIGHT: 184.97 LBS | RESPIRATION RATE: 18 BRPM | BODY MASS INDEX: 28.03 KG/M2 | SYSTOLIC BLOOD PRESSURE: 123 MMHG | TEMPERATURE: 98.3 F | OXYGEN SATURATION: 92 %

## 2020-02-12 LAB
ANION GAP SERPL CALCULATED.3IONS-SCNC: 13 MMOL/L (ref 5–15)
BASOPHILS # BLD AUTO: 0.01 10*3/MM3 (ref 0–0.2)
BASOPHILS NFR BLD AUTO: 0.2 % (ref 0–1.5)
BUN BLD-MCNC: 16 MG/DL (ref 8–23)
BUN/CREAT SERPL: 23.9 (ref 7–25)
CALCIUM SPEC-SCNC: 9.4 MG/DL (ref 8.6–10.5)
CHLORIDE SERPL-SCNC: 106 MMOL/L (ref 98–107)
CO2 SERPL-SCNC: 23 MMOL/L (ref 22–29)
CREAT BLD-MCNC: 0.67 MG/DL (ref 0.76–1.27)
DEPRECATED RDW RBC AUTO: 48.5 FL (ref 37–54)
EOSINOPHIL # BLD AUTO: 0 10*3/MM3 (ref 0–0.4)
EOSINOPHIL NFR BLD AUTO: 0 % (ref 0.3–6.2)
ERYTHROCYTE [DISTWIDTH] IN BLOOD BY AUTOMATED COUNT: 14 % (ref 12.3–15.4)
GFR SERPL CREATININE-BSD FRML MDRD: 117 ML/MIN/1.73
GLUCOSE BLD-MCNC: 233 MG/DL (ref 65–99)
HCT VFR BLD AUTO: 47.7 % (ref 37.5–51)
HGB BLD-MCNC: 16.4 G/DL (ref 13–17.7)
IMM GRANULOCYTES # BLD AUTO: 0.04 10*3/MM3 (ref 0–0.05)
IMM GRANULOCYTES NFR BLD AUTO: 0.7 % (ref 0–0.5)
LYMPHOCYTES # BLD AUTO: 0.45 10*3/MM3 (ref 0.7–3.1)
LYMPHOCYTES NFR BLD AUTO: 7.9 % (ref 19.6–45.3)
MCH RBC QN AUTO: 32.5 PG (ref 26.6–33)
MCHC RBC AUTO-ENTMCNC: 34.4 G/DL (ref 31.5–35.7)
MCV RBC AUTO: 94.5 FL (ref 79–97)
MONOCYTES # BLD AUTO: 0.13 10*3/MM3 (ref 0.1–0.9)
MONOCYTES NFR BLD AUTO: 2.3 % (ref 5–12)
NEUTROPHILS # BLD AUTO: 5.06 10*3/MM3 (ref 1.7–7)
NEUTROPHILS NFR BLD AUTO: 88.9 % (ref 42.7–76)
NRBC BLD AUTO-RTO: 0 /100 WBC (ref 0–0.2)
PLATELET # BLD AUTO: 178 10*3/MM3 (ref 140–450)
PMV BLD AUTO: 8.9 FL (ref 6–12)
POTASSIUM BLD-SCNC: 3.8 MMOL/L (ref 3.5–5.2)
RBC # BLD AUTO: 5.05 10*6/MM3 (ref 4.14–5.8)
SODIUM BLD-SCNC: 142 MMOL/L (ref 136–145)
WBC NRBC COR # BLD: 5.69 10*3/MM3 (ref 3.4–10.8)

## 2020-02-12 PROCEDURE — 80048 BASIC METABOLIC PNL TOTAL CA: CPT | Performed by: NURSE PRACTITIONER

## 2020-02-12 PROCEDURE — 85025 COMPLETE CBC W/AUTO DIFF WBC: CPT | Performed by: NURSE PRACTITIONER

## 2020-02-12 PROCEDURE — 25010000002 METHYLPREDNISOLONE PER 40 MG: Performed by: NURSE PRACTITIONER

## 2020-02-12 PROCEDURE — 99406 BEHAV CHNG SMOKING 3-10 MIN: CPT

## 2020-02-12 PROCEDURE — 94664 DEMO&/EVAL PT USE INHALER: CPT

## 2020-02-12 PROCEDURE — 25010000002 LORAZEPAM PER 2 MG: Performed by: INTERNAL MEDICINE

## 2020-02-12 PROCEDURE — 94799 UNLISTED PULMONARY SVC/PX: CPT

## 2020-02-12 RX ORDER — AZITHROMYCIN 500 MG/1
500 TABLET, FILM COATED ORAL DAILY
Qty: 3 TABLET | Refills: 0 | Status: ON HOLD | OUTPATIENT
Start: 2020-02-12 | End: 2020-02-16

## 2020-02-12 RX ORDER — DILTIAZEM HYDROCHLORIDE 120 MG/1
120 CAPSULE, COATED, EXTENDED RELEASE ORAL
Status: DISCONTINUED | OUTPATIENT
Start: 2020-02-12 | End: 2020-02-12

## 2020-02-12 RX ORDER — METHYLPREDNISOLONE 4 MG/1
TABLET ORAL
Qty: 21 EACH | Refills: 0 | Status: ON HOLD | OUTPATIENT
Start: 2020-02-12 | End: 2020-02-16

## 2020-02-12 RX ORDER — CEFDINIR 300 MG/1
300 CAPSULE ORAL 2 TIMES DAILY
Qty: 4 CAPSULE | Refills: 0 | Status: ON HOLD | OUTPATIENT
Start: 2020-02-12 | End: 2020-02-16

## 2020-02-12 RX ORDER — METOPROLOL TARTRATE 37.5 MG/1
37.5 TABLET, FILM COATED ORAL EVERY 12 HOURS SCHEDULED
Qty: 60 TABLET | Refills: 1 | Status: ON HOLD | OUTPATIENT
Start: 2020-02-12 | End: 2020-02-16

## 2020-02-12 RX ORDER — AZITHROMYCIN 250 MG/1
500 TABLET, FILM COATED ORAL DAILY
Qty: 4 TABLET | Refills: 0 | Status: SHIPPED | OUTPATIENT
Start: 2020-02-12 | End: 2020-02-16

## 2020-02-12 RX ORDER — CEFDINIR 300 MG/1
300 CAPSULE ORAL 2 TIMES DAILY
Qty: 14 CAPSULE | Refills: 0 | Status: ON HOLD | OUTPATIENT
Start: 2020-02-12 | End: 2020-02-16

## 2020-02-12 RX ORDER — GUAIFENESIN 600 MG/1
1200 TABLET, EXTENDED RELEASE ORAL EVERY 12 HOURS SCHEDULED
Status: ON HOLD
Start: 2020-02-12 | End: 2020-06-07

## 2020-02-12 RX ADMIN — LORAZEPAM 1 MG: 2 INJECTION INTRAMUSCULAR; INTRAVENOUS at 03:07

## 2020-02-12 RX ADMIN — SODIUM CHLORIDE, PRESERVATIVE FREE 10 ML: 5 INJECTION INTRAVENOUS at 08:12

## 2020-02-12 RX ADMIN — IBUPROFEN 800 MG: 800 TABLET ORAL at 11:01

## 2020-02-12 RX ADMIN — METHYLPREDNISOLONE SODIUM SUCCINATE 40 MG: 40 INJECTION, POWDER, FOR SOLUTION INTRAMUSCULAR; INTRAVENOUS at 06:12

## 2020-02-12 RX ADMIN — IBUPROFEN 800 MG: 800 TABLET ORAL at 03:08

## 2020-02-12 RX ADMIN — METOPROLOL TARTRATE 37.5 MG: 25 TABLET, FILM COATED ORAL at 08:11

## 2020-02-12 RX ADMIN — GUAIFENESIN 1200 MG: 600 TABLET, EXTENDED RELEASE ORAL at 08:11

## 2020-02-12 NOTE — TELEPHONE ENCOUNTER
"I told patient do not take doxycycline, as he is now on zithromax and omnicef. As for his amlodipine, it was marked off the list and d/c. Advised his other BP meds are on hold and he is now taking metoprolol, so he does have medication for his BP.      Reason for Disposition  • Caller requesting a NON-URGENT new prescription or refill and triager unable to refill per unit policy    Additional Information  • Negative: Drug overdose and nurse unable to answer question  • Negative: Caller requesting information not related to medicine  • Negative: Caller requesting a prescription for Strep throat and has a positive culture result  • Negative: Rash while taking a medication or within 3 days of stopping it  • Negative: Immunization reaction suspected  • Negative: [1] Asthma and [2] having symptoms of asthma (cough, wheezing, etc)  • Negative: MORE THAN A DOUBLE DOSE of a prescription or over-the-counter (OTC) drug  • Negative: [1] DOUBLE DOSE (an extra dose or lesser amount) of over-the-counter (OTC) drug AND [2] any symptoms (e.g., dizziness, nausea, pain, sleepiness)  • Negative: [1] DOUBLE DOSE (an extra dose or lesser amount) of prescription drug AND [2] any symptoms (e.g., dizziness, nausea, pain, sleepiness)  • Negative: Took another person's prescription drug  • Negative: [1] DOUBLE DOSE (an extra dose or lesser amount) of prescription drug AND [2] NO symptoms (Exception: a double dose of antibiotics)  • Negative: Diabetes drug error or overdose (e.g., insulin or extra dose)  • Negative: [1] Request for URGENT new prescription or refill of \"essential\" medication (i.e., likelihood of harm to patient if not taken) AND [2] triager unable to fill per unit policy  • Negative: [1] Prescription not at pharmacy AND [2] was prescribed today by PCP  • Negative: Pharmacy calling with prescription questions and triager unable to answer question  • Negative: Caller has URGENT medication question about med that PCP prescribed " "and triager unable to answer question  • Negative: Caller has NON-URGENT medication question about med that PCP prescribed and triager unable to answer question    Answer Assessment - Initial Assessment Questions  1. SYMPTOMS: \"Do you have any symptoms?\"      Discharge questions, has pneumonia  2. SEVERITY: If symptoms are present, ask \"Are they mild, moderate or severe?\"      NA    Protocols used: MEDICATION QUESTION CALL-ADULT-      "

## 2020-02-12 NOTE — DISCHARGE PLACEMENT REQUEST
"Kady Villasenor [phone]  Kingsley Servin (74 y.o. Male)     Date of Birth Social Security Number Address Home Phone MRN    1945  [address] [facility] [address] [address]  [id number]    Congregational Marital Status          Confucianism        Admission Date Admission Type Admitting Provider Attending Provider Department, Room/Bed    2/10/20 Emergency Jeff Horan MD Fleming, John Eric, MD [address], 479/1    Discharge Date Discharge Disposition Discharge Destination         Home or Self Care              Attending Provider:  Jeff Horan MD    Allergies:  No Known Allergies    Isolation:  None   Infection:  None   Code Status:  CPR    Ht:  172.7 cm (67.99\")   Wt:  83.9 kg (184 lb 15.5 oz)    Admission Cmt:  None   Principal Problem:  Pneumonia of right upper lobe due to infectious organism (CMS/Formerly [facility]) [J18.1]                 Active Insurance as of 2/10/2020     Primary Coverage     Payor Plan Insurance Group Employer/Plan Group    [facility] DEPT [id number]      Payor Plan Address Payor Plan Phone Number Payor Plan Fax Number Effective Dates    [employer] [phone]  2/10/2020 - None Entered    [address] [id number]       Subscriber Name Subscriber Birth Date Member ID       KINGSLEY SERVIN 1945 [id number]                 Emergency Contacts      (Rel.) Home Phone Work Phone Mobile Phone    LUIS DANIEL SERVIN (Son) -- -- [phone]    nattyboone bhakta (Daughter) -- -- [phone]               Discharge Summary      Soham Parker APRN at 02/12/20 0956                [facility] Medicine Services  DISCHARGE SUMMARY       Date of Admission: 2/10/2020  Date of Discharge:  2/12/2020  Primary Care Physician: Provider, No Known    Presenting Problem/History of Present Illness:  Shortness of breath     Final Discharge Diagnoses:  Patient Active Problem List   Diagnosis   • Pneumonia of right upper lobe due " to infectious organism (CMS/HCC)   • Essential hypertension   • Former tobacco use   • PVD (peripheral vascular disease) (CMS/HCC)   • Acute respiratory failure with hypoxia (CMS/HCC)   • Positive D dimer     Consults: None    Procedures Performed: None    Pertinent Test Results:   Imaging Results (Last 7 Days)     Procedure Component Value Units Date/Time    CT Angiogram Chest [[id number]] Collected:  02/10/20 1449     Updated:  02/10/20 1453    Narrative:       EXAMINATION: CT ANGIOGRAM CHEST-      2/10/2020 2:31 PM CST     HISTORY: Chest pain, acute, PE suspected, intermed prob, negative  D-dimer     In order to have a CT radiation dose as low as reasonably achievable  Automated Exposure Control was utilized for adjustment of the mA and/or  KV according to patient size.     DLP in mGycm= 505.     CT angiography protocol.   CT imaging with bolus IV contrast injection.   Under concurrent supervision axial, sagittal, coronal, and  three-dimensional data sets were constructed.     Normal heart size.  Coronary artery calcification.  No thoracic aortic aneurysm or dissection.     Symmetric pulmonary arteries.  No pulmonary embolism.     There is a linear band of acute pneumonia within the base of the right  upper lobe.     No pneumothorax or pleural effusion.     The lungs are otherwise fully expanded and clear.     Summary:  1. Right upper lobe pneumonia.  2. No pulmonary embolism.                                   This report was finalized on 02/10/2020 14:50 by Dr. Alfred Hernández MD.    XR Chest 1 View [[id number]] Collected:  02/10/20 1214     Updated:  02/10/20 1219    Narrative:       EXAMINATION: XR CHEST 1 VW- 2/10/2020 12:14 PM CST     HISTORY: Shortness of breath.     REPORT:  One-view chest: Frontal view of the chest was obtained. The lungs are  clear and normally expanded. The cardiac and mediastinal silhouettes are  within normal limits. The visualized bony thorax and upper abdomen are  unremarkable.                                                                                                                                                        Impression:       No acute cardiopulmonary abnormality.     This report was finalized on 02/10/2020 12:16 by Dr. Napoleon Ortiz MD.        Lab Results (last 7 days)     Procedure Component Value Units Date/Time    Basic Metabolic Panel [[id number]]  (Abnormal) Collected:  02/12/20 0425    Specimen:  Blood Updated:  02/12/20 0451     Glucose 233 mg/dL      BUN 16 mg/dL      Creatinine 0.67 mg/dL      Sodium 142 mmol/L      Potassium 3.8 mmol/L      Chloride 106 mmol/L      CO2 23.0 mmol/L      Calcium 9.4 mg/dL      eGFR Non African Amer 117 mL/min/1.73      BUN/Creatinine Ratio 23.9     Anion Gap 13.0 mmol/L     Narrative:       GFR Normal >60  Chronic Kidney Disease <60  Kidney Failure <15      CBC & Differential [[id number]] Collected:  02/12/20 0425    Specimen:  Blood Updated:  02/12/20 0434    Narrative:       The following orders were created for panel order CBC & Differential.  Procedure                               Abnormality         Status                     ---------                               -----------         ------                     CBC Auto Differential[[id number]]        Abnormal            Final result                 Please view results for these tests on the individual orders.    CBC Auto Differential [[id number]]  (Abnormal) Collected:  02/12/20 0425    Specimen:  Blood Updated:  02/12/20 0434     WBC 5.69 10*3/mm3      RBC 5.05 10*6/mm3      Hemoglobin 16.4 g/dL      Hematocrit 47.7 %      MCV 94.5 fL      MCH 32.5 pg      MCHC 34.4 g/dL      RDW 14.0 %      RDW-SD 48.5 fl      MPV 8.9 fL      Platelets 178 10*3/mm3      Neutrophil % 88.9 %      Lymphocyte % 7.9 %      Monocyte % 2.3 %      Eosinophil % 0.0 %      Basophil % 0.2 %      Immature Grans % 0.7 %      Neutrophils, Absolute 5.06 10*3/mm3      Lymphocytes, Absolute 0.45 10*3/mm3       Monocytes, Absolute 0.13 10*3/mm3      Eosinophils, Absolute 0.00 10*3/mm3      Basophils, Absolute 0.01 10*3/mm3      Immature Grans, Absolute 0.04 10*3/mm3      nRBC 0.0 /100 WBC     Legionella Antigen, Urine - Urine, Urine, Clean Catch [[id number]]  (Normal) Collected:  02/11/20 1318    Specimen:  Urine, Clean Catch Updated:  02/11/20 1419     LEGIONELLA ANTIGEN, URINE Negative    S. Pneumo Ag Urine or CSF - Urine, Urine, Clean Catch [[id number]]  (Normal) Collected:  02/11/20 1318    Specimen:  Urine, Clean Catch Updated:  02/11/20 1418     Strep Pneumo Ag Negative    Blood Culture - Blood, Arm, Right [[id number]] Collected:  02/10/20 1210    Specimen:  Blood from Arm, Right Updated:  02/11/20 1301     Blood Culture No growth at 24 hours    Blood Culture - Blood, Arm, Left [[id number]] Collected:  02/10/20 1204    Specimen:  Blood from Arm, Left Updated:  02/11/20 1301     Blood Culture No growth at 24 hours    Respiratory Culture - Sputum, Cough [[id number]] Collected:  02/10/20 2026    Specimen:  Sputum from Cough Updated:  02/11/20 0658     Gram Stain Greater than 25 WBCs per low power field      Few (2+) Epithelial cells seen      Few (2+) Mixed gram positive maria alejandra    Basic Metabolic Panel [[id number]]  (Abnormal) Collected:  02/11/20 0249    Specimen:  Blood Updated:  02/11/20 0330     Glucose 88 mg/dL      BUN 16 mg/dL      Creatinine 0.67 mg/dL      Sodium 141 mmol/L      Potassium 3.5 mmol/L      Chloride 105 mmol/L      CO2 24.0 mmol/L      Calcium 9.1 mg/dL      eGFR Non African Amer 117 mL/min/1.73      BUN/Creatinine Ratio 23.9     Anion Gap 12.0 mmol/L     Narrative:       GFR Normal >60  Chronic Kidney Disease <60  Kidney Failure <15      Troponin [[id number]]  (Normal) Collected:  02/11/20 0249    Specimen:  Blood Updated:  02/11/20 0330     Troponin T <0.010 ng/mL     Narrative:       Troponin T Reference Range:  <= 0.03 ng/mL-   Negative for AMI  >0.03 ng/mL-     Abnormal for myocardial necrosis.   Clinicians would have to utilize clinical acumen, EKG, Troponin and serial changes to determine if it is an Acute Myocardial Infarction or myocardial injury due to an underlying chronic condition.       Results may be falsely decreased if patient taking Biotin.      Magnesium [[id number]]  (Normal) Collected:  02/11/20 0249    Specimen:  Blood Updated:  02/11/20 0330     Magnesium 2.1 mg/dL     CBC & Differential [[id number]] Collected:  02/11/20 0249    Specimen:  Blood Updated:  02/11/20 0316    Narrative:       The following orders were created for panel order CBC & Differential.  Procedure                               Abnormality         Status                     ---------                               -----------         ------                     CBC Auto Differential[[id number]]        Abnormal            Final result                 Please view results for these tests on the individual orders.    CBC Auto Differential [[id number]]  (Abnormal) Collected:  02/11/20 0249    Specimen:  Blood Updated:  02/11/20 0316     WBC 6.45 10*3/mm3      RBC 5.11 10*6/mm3      Hemoglobin 17.0 g/dL      Hematocrit 48.4 %      MCV 94.7 fL      MCH 33.3 pg      MCHC 35.1 g/dL      RDW 14.0 %      RDW-SD 48.9 fl      MPV 9.0 fL      Platelets 163 10*3/mm3      Neutrophil % 70.5 %      Lymphocyte % 14.3 %      Monocyte % 14.1 %      Eosinophil % 0.3 %      Basophil % 0.5 %      Immature Grans % 0.3 %      Neutrophils, Absolute 4.55 10*3/mm3      Lymphocytes, Absolute 0.92 10*3/mm3      Monocytes, Absolute 0.91 10*3/mm3      Eosinophils, Absolute 0.02 10*3/mm3      Basophils, Absolute 0.03 10*3/mm3      Immature Grans, Absolute 0.02 10*3/mm3      nRBC 0.0 /100 WBC     Hemoglobin A1c [[id number]]  (Abnormal) Collected:  02/10/20 1204    Specimen:  Blood Updated:  02/10/20 1741     Hemoglobin A1C 5.80 %     Narrative:       Hemoglobin A1C Ranges:    Increased Risk for Diabetes  5.7% to 6.4%  Diabetes                     >=  "6.5%  Diabetic Goal                < 7.0%    Procalcitonin [[id number]]  (Abnormal) Collected:  02/10/20 1204    Specimen:  Blood Updated:  02/10/20 1245     Procalcitonin 0.05 ng/mL     Narrative:       As a Marker for Sepsis (Non-Neonates):   1. <0.5 ng/mL represents a low risk of severe sepsis and/or septic shock.  1. >2 ng/mL represents a high risk of severe sepsis and/or septic shock.    As a Marker for Lower Respiratory Tract Infections that require antibiotic therapy:  PCT on Admission     Antibiotic Therapy             6-12 Hrs later  > 0.5                Strongly Recommended            >0.25 - <0.5         Recommended  0.1 - 0.25           Discouraged                   Remeasure/reassess PCT  <0.1                 Strongly Discouraged          Remeasure/reassess PCT      As 28 day mortality risk marker: \"Change in Procalcitonin Result\" (> 80 % or <=80 %) if Day 0 (or Day 1) and Day 4 values are available. Refer to [email or web address]-pct-calculator.com/   Change in PCT <=80 %   A decrease of PCT levels below or equal to 80 % defines a positive change in PCT test result representing a higher risk for 28-day all-cause mortality of patients diagnosed with severe sepsis or septic shock.  Change in PCT > 80 %   A decrease of PCT levels of more than 80 % defines a negative change in PCT result representing a lower risk for 28-day all-cause mortality of patients diagnosed with severe sepsis or septic shock.                Results may be falsely decreased if patient taking Biotin.     Influenza Antigen, Rapid - Swab, Nasopharynx [[id number]]  (Normal) Collected:  02/10/20 1211    Specimen:  Swab from Nasopharynx Updated:  02/10/20 1244     Influenza A Ag, EIA Negative     Influenza B Ag, EIA Negative    Narrative:       Recommend confirmation of negative results by viral culture or molecular assay.    Comprehensive Metabolic Panel [[id number]]  (Abnormal) Collected:  02/10/20 1204    Specimen:  Blood Updated:  02/10/20 " 1240     Glucose 96 mg/dL      BUN 18 mg/dL      Creatinine 0.76 mg/dL      Sodium 138 mmol/L      Potassium 3.9 mmol/L      Chloride 99 mmol/L      CO2 25.0 mmol/L      Calcium 9.8 mg/dL      Total Protein 8.1 g/dL      Albumin 4.40 g/dL      ALT (SGPT) 21 U/L      AST (SGOT) 24 U/L      Alkaline Phosphatase 132 U/L      Total Bilirubin 0.9 mg/dL      eGFR Non African Amer 101 mL/min/1.73      Globulin 3.7 gm/dL      A/G Ratio 1.2 g/dL      BUN/Creatinine Ratio 23.7     Anion Gap 14.0 mmol/L     Narrative:       GFR Normal >60  Chronic Kidney Disease <60  Kidney Failure <15      BNP [[id number]]  (Normal) Collected:  02/10/20 1204    Specimen:  Blood Updated:  02/10/20 1240     proBNP 365.0 pg/mL     Narrative:       Among patients with dyspnea, NT-proBNP is highly sensitive for the detection of acute congestive heart failure. In addition NT-proBNP of <300 pg/ml effectively rules out acute congestive heart failure with 99% negative predictive value.    Results may be falsely decreased if patient taking Biotin.      Troponin [[id number]]  (Normal) Collected:  02/10/20 1204    Specimen:  Blood Updated:  02/10/20 1240     Troponin T <0.010 ng/mL     Narrative:       Troponin T Reference Range:  <= 0.03 ng/mL-   Negative for AMI  >0.03 ng/mL-     Abnormal for myocardial necrosis.  Clinicians would have to utilize clinical acumen, EKG, Troponin and serial changes to determine if it is an Acute Myocardial Infarction or myocardial injury due to an underlying chronic condition.       Results may be falsely decreased if patient taking Biotin.      Lactic Acid, Plasma [[id number]]  (Normal) Collected:  02/10/20 1204    Specimen:  Blood Updated:  02/10/20 1237     Lactate 1.2 mmol/L     Protime-INR [[id number]]  (Normal) Collected:  02/10/20 1204    Specimen:  Blood Updated:  02/10/20 1231     Protime 13.3 Seconds      INR 0.98    aPTT [[id number]]  (Abnormal) Collected:  02/10/20 1204    Specimen:  Blood Updated:  02/10/20 1231      PTT 60.6 seconds     D-dimer, Quantitative [[id number]]  (Abnormal) Collected:  02/10/20 1204    Specimen:  Blood Updated:  02/10/20 1231     D-Dimer, Quantitative 0.86 mg/L (FEU)     Narrative:       Reference Range is 0-0.50 mg/L FEU. However, results <0.50 mg/L FEU tends to rule out DVT or PE. Results >0.50 mg/L FEU are not useful in predicting absence or presence of DVT or PE.      CBC & Differential [[id number]] Collected:  02/10/20 1204    Specimen:  Blood Updated:  02/10/20 1221    Narrative:       The following orders were created for panel order CBC & Differential.  Procedure                               Abnormality         Status                     ---------                               -----------         ------                     CBC Auto Differential[[id number]]        Abnormal            Final result                 Please view results for these tests on the individual orders.    CBC Auto Differential [[id number]]  (Abnormal) Collected:  02/10/20 1204    Specimen:  Blood Updated:  02/10/20 1221     WBC 9.56 10*3/mm3      RBC 5.65 10*6/mm3      Hemoglobin 18.7 g/dL      Hematocrit 54.2 %      MCV 95.9 fL      MCH 33.1 pg      MCHC 34.5 g/dL      RDW 14.3 %      RDW-SD 49.7 fl      MPV 9.1 fL      Platelets 204 10*3/mm3      Neutrophil % 77.5 %      Lymphocyte % 8.3 %      Monocyte % 13.1 %      Eosinophil % 0.1 %      Basophil % 0.5 %      Immature Grans % 0.5 %      Neutrophils, Absolute 7.41 10*3/mm3      Lymphocytes, Absolute 0.79 10*3/mm3      Monocytes, Absolute 1.25 10*3/mm3      Eosinophils, Absolute 0.01 10*3/mm3      Basophils, Absolute 0.05 10*3/mm3      Immature Grans, Absolute 0.05 10*3/mm3      nRBC 0.0 /100 WBC     Blood Gas, Arterial [[id number]]  (Abnormal) Collected:  02/10/20 1207    Specimen:  Arterial Blood Updated:  02/10/20 1211     Site Right Radial     Nathaniel's Test Positive     pH, Arterial 7.457 pH units      Comment: 83 Value above reference range        pCO2, Arterial 31.3  mm Hg      Comment: 84 Value below reference range        pO2, Arterial 52.5 mm Hg      Comment: 85 Value below critical limit        HCO3, Arterial 22.1 mmol/L      Base Excess, Arterial -0.6 mmol/L      Comment: 84 Value below reference range        O2 Saturation, Arterial 88.2 %      Comment: 84 Value below reference range        Temperature 37.0 C      Barometric Pressure for Blood Gas 752 mmHg      Modality Room Air     Ventilator Mode NA     Notified Who DR AZUL     Notified By [id number]     Notified Time 02/10/2020 12:13     Collected by [id number]     Comment: Meter: [id number]     :  [id number]           Hospital Course:  The patient is a 74 y.o. male who presented to [facility] with shortness of breath.  The patient has a past medical history significant for hypertension, arthritis and peripheral vascular disease.  The patient presented to the emergency room complaining of shortness of breath, cough, congestion for approximately 2-3 days.  He denied any fever but was having enough issues that he had quit smoking for 3 days due to his symptoms and not feeling well.  The patient also complained of a productive cough with brown sputum.  The patient was evaluated in the emergency department and found to have a pO2 of 52.5 on room air.  He was placed on Vapotherm initially and was admitted for further evaluation and treatment.  The patient was found to have a right upper lobe pneumonia.  He was started on IV antibiotics with Azithromycin and Rocephin.  He was also placed on Duonebs, IS, mucinex.  However during the night the patient had went into atrial flutter.  The patient was started on Cardizem drip.  The patient had good response to the drip and it was able to be discontinued.  He was then started on Metoprolol.  He continues to have paroxysmal atrial flutter.  He is currently normal sinus at 74.  The patient is doing well.  He has had some confusion but has been reorietned easily.  He  "is a nightly drinker and it is felt that some of this is alcohol related.  He is also 74 and may just be having some issues with confusion secondary to being in an unfamiliar place with lots of sleep interruptions.  He is able to tell me everything I ask him in the room today.  The patient initially refused anticoagulation with Lovenox and was ok with the Xarelto.  The patient will be discharged home today in stable condition.  The patient will have prescriptions for antibiotics to complete his therapy for his pneumonia.  He will also have prescriptions for his metoprolol as well as Xarelto.  The patient will need [facility] to a cardiologist about his atrial flutter.  The patient understands this.  He is eager to be discharged home today.  Stable and cleared for discharge.     Physical Exam on Discharge:  /59   Pulse 72   Temp 98.3 °F (36.8 °C) (Oral)   Resp 18   Ht 172.7 cm (67.99\")   Wt 83.9 kg (184 lb 15.5 oz)   SpO2 92%   BMI 28.13 kg/m²    Physical Exam   Constitutional: He is oriented to person, place, and time. He appears well-developed and well-nourished.   Sitting up on the side of the bed.  NAD.   HENT:   Head: Normocephalic and atraumatic.   Eyes: Pupils are equal, round, and reactive to light. Conjunctivae and EOM are normal.   Neck: Neck supple. No JVD present.   Cardiovascular: Normal rate, regular rhythm, normal heart sounds and intact distal pulses. Exam reveals no gallop and no friction rub.   No murmur heard.  Pulmonary/Chest: Effort normal and breath sounds normal. No respiratory distress. He has no wheezes. He has no rales. He exhibits no tenderness.   Room air.  Diminished bilateral lower lobes.    Abdominal: Soft. Bowel sounds are normal. He exhibits no distension. There is no tenderness. There is no rebound and no guarding.   Musculoskeletal: Normal range of motion. He exhibits no edema, tenderness or deformity. "   Neurological: He is alert and oriented to person, place, and time.   Answers my questions appropriately    Skin: Skin is warm and dry. No rash noted.   Psychiatric: He has a normal mood and affect. His behavior is normal. Judgment and thought content normal.   Nursing note and vitals reviewed.    Condition on Discharge: Stable     Discharge Disposition:  Home or Self Care    Discharge Medications:     Discharge Medications      New Medications      Instructions Start Date   guaiFENesin 600 MG 12 hr tablet  Commonly known as:  MUCINEX   1,200 mg, Oral, Every 12 Hours Scheduled      methylPREDNISolone 4 MG tablet  Commonly known as:  MEDROL (BHARGAVI)   Take as directed on package instructions.      Metoprolol Tartrate 37.5 MG tablet   37.5 mg, Oral, Every 12 Hours Scheduled      rivaroxaban 20 MG tablet  Commonly known as:  XARELTO   20 mg, Oral, Daily With Dinner         Continue These Medications      Instructions Start Date   cilostazol 100 MG tablet  Commonly known as:  PLETAL   100 mg, Oral, 2 Times Daily      multivitamin with minerals tablet tablet   1 tablet, Oral, Daily      sildenafil 50 MG tablet  Commonly known as:  VIAGRA   50 mg, Oral, Daily PRN         Stop These Medications    aspirin 81 MG EC tablet     atenolol 100 MG tablet  Commonly known as:  TENORMIN     hydroCHLOROthiazide 25 MG tablet  Commonly known as:  HYDRODIURIL     meloxicam 7.5 MG tablet  Commonly known as:  MOBIC     NORVASC 10 MG tablet  Generic drug:  amLODIPine          Discharge Diet:   Diet Instructions     Diet: Regular      Discharge Diet:  Regular        Activity at Discharge:   Activity Instructions     Activity as Tolerated          Follow-up Appointments:   1.  Follow up with PCP in one week   No future appointments.    Test Results Pending at Discharge: None    TAJ Guallpa  02/12/20  10:43 AM    Time: 35 minutes.           Electronically signed by Soham Parker APRN at 02/12/20 [id number]

## 2020-02-12 NOTE — DISCHARGE SUMMARY
[facility] Medicine [facility]  DISCHARGE SUMMARY       Date of Admission: 2/10/2020  Date of Discharge:  2/12/2020  Primary Care Physician: Provider, No Known    Presenting Problem/History of Present Illness:  Shortness of breath     Final Discharge Diagnoses:  Patient Active Problem List   Diagnosis   • Pneumonia of right upper lobe due to infectious organism (CMS/HCC)   • Essential hypertension   • Former tobacco use   • PVD (peripheral vascular disease) (CMS/HCC)   • Acute respiratory failure with hypoxia (CMS/HCC)   • Positive D dimer     Consults: None    Procedures Performed: None    Pertinent Test Results:   Imaging Results (Last 7 Days)     Procedure Component Value Units Date/Time    CT Angiogram Chest [[id number]] Collected:  02/10/20 1449     Updated:  02/10/20 1453    Narrative:       EXAMINATION: CT ANGIOGRAM CHEST-      2/10/2020 2:31 PM CST     HISTORY: Chest pain, acute, PE suspected, intermed prob, negative  D-dimer     In order to have a CT radiation dose as low as reasonably achievable  Automated Exposure Control was utilized for adjustment of the mA and/or  KV according to patient size.     DLP in mGycm= 505.     CT angiography protocol.   CT imaging with bolus IV contrast injection.   Under concurrent supervision axial, sagittal, coronal, and  three-dimensional data sets were constructed.     Normal heart size.  Coronary artery calcification.  No thoracic aortic aneurysm or dissection.     Symmetric pulmonary arteries.  No pulmonary embolism.     There is a linear band of acute pneumonia within the base of the right  upper lobe.     No pneumothorax or pleural effusion.     The lungs are otherwise fully expanded and clear.     Summary:  1. Right upper lobe pneumonia.  2. No pulmonary embolism.                                   This report was finalized on 02/10/2020 14:50 by Dr. Alfred Hernández MD.    XR Chest 1 View [[id number]] Collected:  02/10/20 1214      Updated:  02/10/20 1219    Narrative:       EXAMINATION: XR CHEST 1 VW- 2/10/2020 12:14 PM CST     HISTORY: Shortness of breath.     REPORT:  One-view chest: Frontal view of the chest was obtained. The lungs are  clear and normally expanded. The cardiac and mediastinal silhouettes are  within normal limits. The visualized bony thorax and upper abdomen are  unremarkable.                                                                                                                                                       Impression:       No acute cardiopulmonary abnormality.     This report was finalized on 02/10/2020 12:16 by Dr. Napoleon Ortiz MD.        Lab Results (last 7 days)     Procedure Component Value Units Date/Time    Basic Metabolic Panel [[id number]]  (Abnormal) Collected:  02/12/20 0425    Specimen:  Blood Updated:  02/12/20 0451     Glucose 233 mg/dL      BUN 16 mg/dL      Creatinine 0.67 mg/dL      Sodium 142 mmol/L      Potassium 3.8 mmol/L      Chloride 106 mmol/L      CO2 23.0 mmol/L      Calcium 9.4 mg/dL      eGFR Non African Amer 117 mL/min/1.73      BUN/Creatinine Ratio 23.9     Anion Gap 13.0 mmol/L     Narrative:       GFR Normal >60  Chronic Kidney Disease <60  Kidney Failure <15      CBC & Differential [[id number]] Collected:  02/12/20 0425    Specimen:  Blood Updated:  02/12/20 0434    Narrative:       The following orders were created for panel order CBC & Differential.  Procedure                               Abnormality         Status                     ---------                               -----------         ------                     CBC Auto Differential[[id number]]        Abnormal            Final result                 Please view results for these tests on the individual orders.    CBC Auto Differential [[id number]]  (Abnormal) Collected:  02/12/20 0425    Specimen:  Blood Updated:  02/12/20 0434     WBC 5.69 10*3/mm3      RBC 5.05 10*6/mm3      Hemoglobin 16.4 g/dL       Hematocrit 47.7 %      MCV 94.5 fL      MCH 32.5 pg      MCHC 34.4 g/dL      RDW 14.0 %      RDW-SD 48.5 fl      MPV 8.9 fL      Platelets 178 10*3/mm3      Neutrophil % 88.9 %      Lymphocyte % 7.9 %      Monocyte % 2.3 %      Eosinophil % 0.0 %      Basophil % 0.2 %      Immature Grans % 0.7 %      Neutrophils, Absolute 5.06 10*3/mm3      Lymphocytes, Absolute 0.45 10*3/mm3      Monocytes, Absolute 0.13 10*3/mm3      Eosinophils, Absolute 0.00 10*3/mm3      Basophils, Absolute 0.01 10*3/mm3      Immature Grans, Absolute 0.04 10*3/mm3      nRBC 0.0 /100 WBC     Legionella Antigen, Urine - Urine, Urine, Clean Catch [[id number]]  (Normal) Collected:  02/11/20 1318    Specimen:  Urine, Clean Catch Updated:  02/11/20 1419     LEGIONELLA ANTIGEN, URINE Negative    S. Pneumo Ag Urine or CSF - Urine, Urine, Clean Catch [[id number]]  (Normal) Collected:  02/11/20 1318    Specimen:  Urine, Clean Catch Updated:  02/11/20 1418     Strep Pneumo Ag Negative    Blood Culture - Blood, Arm, Right [[id number]] Collected:  02/10/20 1210    Specimen:  Blood from Arm, Right Updated:  02/11/20 1301     Blood Culture No growth at 24 hours    Blood Culture - Blood, Arm, Left [[id number]] Collected:  02/10/20 1204    Specimen:  Blood from Arm, Left Updated:  02/11/20 1301     Blood Culture No growth at 24 hours    Respiratory Culture - Sputum, Cough [[id number]] Collected:  02/10/20 2026    Specimen:  Sputum from Cough Updated:  02/11/20 0658     Gram Stain Greater than 25 WBCs per low power field      Few (2+) Epithelial cells seen      Few (2+) Mixed gram positive maria alejandra    Basic Metabolic Panel [[id number]]  (Abnormal) Collected:  02/11/20 0249    Specimen:  Blood Updated:  02/11/20 0330     Glucose 88 mg/dL      BUN 16 mg/dL      Creatinine 0.67 mg/dL      Sodium 141 mmol/L      Potassium 3.5 mmol/L      Chloride 105 mmol/L      CO2 24.0 mmol/L      Calcium 9.1 mg/dL      eGFR Non African Amer 117 mL/min/1.73      BUN/Creatinine Ratio 23.9      Anion Gap 12.0 mmol/L     Narrative:       GFR Normal >60  Chronic Kidney Disease <60  Kidney Failure <15      Troponin [[id number]]  (Normal) Collected:  02/11/20 0249    Specimen:  Blood Updated:  02/11/20 0330     Troponin T <0.010 ng/mL     Narrative:       Troponin T Reference Range:  <= 0.03 ng/mL-   Negative for AMI  >0.03 ng/mL-     Abnormal for myocardial necrosis.  Clinicians would have to utilize clinical acumen, EKG, Troponin and serial changes to determine if it is an Acute Myocardial Infarction or myocardial injury due to an underlying chronic condition.       Results may be falsely decreased if patient taking Biotin.      Magnesium [[id number]]  (Normal) Collected:  02/11/20 0249    Specimen:  Blood Updated:  02/11/20 0330     Magnesium 2.1 mg/dL     CBC & Differential [[id number]] Collected:  02/11/20 0249    Specimen:  Blood Updated:  02/11/20 0316    Narrative:       The following orders were created for panel order CBC & Differential.  Procedure                               Abnormality         Status                     ---------                               -----------         ------                     CBC Auto Differential[[id number]]        Abnormal            Final result                 Please view results for these tests on the individual orders.    CBC Auto Differential [[id number]]  (Abnormal) Collected:  02/11/20 0249    Specimen:  Blood Updated:  02/11/20 0316     WBC 6.45 10*3/mm3      RBC 5.11 10*6/mm3      Hemoglobin 17.0 g/dL      Hematocrit 48.4 %      MCV 94.7 fL      MCH 33.3 pg      MCHC 35.1 g/dL      RDW 14.0 %      RDW-SD 48.9 fl      MPV 9.0 fL      Platelets 163 10*3/mm3      Neutrophil % 70.5 %      Lymphocyte % 14.3 %      Monocyte % 14.1 %      Eosinophil % 0.3 %      Basophil % 0.5 %      Immature Grans % 0.3 %      Neutrophils, Absolute 4.55 10*3/mm3      Lymphocytes, Absolute 0.92 10*3/mm3      Monocytes, Absolute 0.91 10*3/mm3      Eosinophils, Absolute 0.02  "10*3/mm3      Basophils, Absolute 0.03 10*3/mm3      Immature Grans, Absolute 0.02 10*3/mm3      nRBC 0.0 /100 WBC     Hemoglobin A1c [[id number]]  (Abnormal) Collected:  02/10/20 1204    Specimen:  Blood Updated:  02/10/20 1741     Hemoglobin A1C 5.80 %     Narrative:       Hemoglobin A1C Ranges:    Increased Risk for Diabetes  5.7% to 6.4%  Diabetes                     >= 6.5%  Diabetic Goal                < 7.0%    Procalcitonin [[id number]]  (Abnormal) Collected:  02/10/20 1204    Specimen:  Blood Updated:  02/10/20 1245     Procalcitonin 0.05 ng/mL     Narrative:       As a Marker for Sepsis (Non-Neonates):   1. <0.5 ng/mL represents a low risk of severe sepsis and/or septic shock.  1. >2 ng/mL represents a high risk of severe sepsis and/or septic shock.    As a Marker for Lower Respiratory Tract Infections that require antibiotic therapy:  PCT on Admission     Antibiotic Therapy             6-12 Hrs later  > 0.5                Strongly Recommended            >0.25 - <0.5         Recommended  0.1 - 0.25           Discouraged                   Remeasure/reassess PCT  <0.1                 Strongly Discouraged          Remeasure/reassess PCT      As 28 day mortality risk marker: \"Change in Procalcitonin Result\" (> 80 % or <=80 %) if Day 0 (or Day 1) and Day 4 values are available. Refer to http://[email or web address]-pct-[email or web address]/   Change in PCT <=80 %   A decrease of PCT levels below or equal to 80 % defines a positive change in PCT test result representing a higher risk for 28-day all-cause mortality of patients diagnosed with severe sepsis or septic shock.  Change in PCT > 80 %   A decrease of PCT levels of more than 80 % defines a negative change in PCT result representing a lower risk for 28-day all-cause mortality of patients diagnosed with severe sepsis or septic shock.                Results may be falsely decreased if patient taking Biotin.     Influenza Antigen, Rapid - Swab, Nasopharynx [[id number]]  (Normal) " Collected:  02/10/20 1211    Specimen:  Swab from Nasopharynx Updated:  02/10/20 1244     Influenza A Ag, EIA Negative     Influenza B Ag, EIA Negative    Narrative:       Recommend confirmation of negative results by viral culture or molecular assay.    Comprehensive Metabolic Panel [[id number]]  (Abnormal) Collected:  02/10/20 1204    Specimen:  Blood Updated:  02/10/20 1240     Glucose 96 mg/dL      BUN 18 mg/dL      Creatinine 0.76 mg/dL      Sodium 138 mmol/L      Potassium 3.9 mmol/L      Chloride 99 mmol/L      CO2 25.0 mmol/L      Calcium 9.8 mg/dL      Total Protein 8.1 g/dL      Albumin 4.40 g/dL      ALT (SGPT) 21 U/L      AST (SGOT) 24 U/L      Alkaline Phosphatase 132 U/L      Total Bilirubin 0.9 mg/dL      eGFR Non African Amer 101 mL/min/1.73      Globulin 3.7 gm/dL      A/G Ratio 1.2 g/dL      BUN/Creatinine Ratio 23.7     Anion Gap 14.0 mmol/L     Narrative:       GFR Normal >60  Chronic Kidney Disease <60  Kidney Failure <15      BNP [[id number]]  (Normal) Collected:  02/10/20 1204    Specimen:  Blood Updated:  02/10/20 1240     proBNP 365.0 pg/mL     Narrative:       Among patients with dyspnea, NT-proBNP is highly sensitive for the detection of acute congestive heart failure. In addition NT-proBNP of <300 pg/ml effectively rules out acute congestive heart failure with 99% negative predictive value.    Results may be falsely decreased if patient taking Biotin.      Troponin [[id number]]  (Normal) Collected:  02/10/20 1204    Specimen:  Blood Updated:  02/10/20 1240     Troponin T <0.010 ng/mL     Narrative:       Troponin T Reference Range:  <= 0.03 ng/mL-   Negative for AMI  >0.03 ng/mL-     Abnormal for myocardial necrosis.  Clinicians would have to utilize clinical acumen, EKG, Troponin and serial changes to determine if it is an Acute Myocardial Infarction or myocardial injury due to an underlying chronic condition.       Results may be falsely decreased if patient taking Biotin.      Lactic  Acid, Plasma [[id number]]  (Normal) Collected:  02/10/20 1204    Specimen:  Blood Updated:  02/10/20 1237     Lactate 1.2 mmol/L     Protime-INR [[id number]]  (Normal) Collected:  02/10/20 1204    Specimen:  Blood Updated:  02/10/20 1231     Protime 13.3 Seconds      INR 0.98    aPTT [[id number]]  (Abnormal) Collected:  02/10/20 1204    Specimen:  Blood Updated:  02/10/20 1231     PTT 60.6 seconds     D-dimer, Quantitative [[id number]]  (Abnormal) Collected:  02/10/20 1204    Specimen:  Blood Updated:  02/10/20 1231     D-Dimer, Quantitative 0.86 mg/L (FEU)     Narrative:       Reference Range is 0-0.50 mg/L FEU. However, results <0.50 mg/L FEU tends to rule out DVT or PE. Results >0.50 mg/L FEU are not useful in predicting absence or presence of DVT or PE.      CBC & Differential [[id number]] Collected:  02/10/20 1204    Specimen:  Blood Updated:  02/10/20 1221    Narrative:       The following orders were created for panel order CBC & Differential.  Procedure                               Abnormality         Status                     ---------                               -----------         ------                     CBC Auto Differential[[id number]]        Abnormal            Final result                 Please view results for these tests on the individual orders.    CBC Auto Differential [[id number]]  (Abnormal) Collected:  02/10/20 1204    Specimen:  Blood Updated:  02/10/20 1221     WBC 9.56 10*3/mm3      RBC 5.65 10*6/mm3      Hemoglobin 18.7 g/dL      Hematocrit 54.2 %      MCV 95.9 fL      MCH 33.1 pg      MCHC 34.5 g/dL      RDW 14.3 %      RDW-SD 49.7 fl      MPV 9.1 fL      Platelets 204 10*3/mm3      Neutrophil % 77.5 %      Lymphocyte % 8.3 %      Monocyte % 13.1 %      Eosinophil % 0.1 %      Basophil % 0.5 %      Immature Grans % 0.5 %      Neutrophils, Absolute 7.41 10*3/mm3      Lymphocytes, Absolute 0.79 10*3/mm3      Monocytes, Absolute 1.25 10*3/mm3      Eosinophils, Absolute 0.01 10*3/mm3       Basophils, Absolute 0.05 10*3/mm3      Immature Grans, Absolute 0.05 10*3/mm3      nRBC 0.0 /100 WBC     Blood Gas, Arterial [[id number]]  (Abnormal) Collected:  02/10/20 1207    Specimen:  Arterial Blood Updated:  02/10/20 1211     Site Right Radial     Nathaniel's Test Positive     pH, Arterial 7.457 pH units      Comment: 83 Value above reference range        pCO2, Arterial 31.3 mm Hg      Comment: 84 Value below reference range        pO2, Arterial 52.5 mm Hg      Comment: 85 Value below critical limit        HCO3, Arterial 22.1 mmol/L      Base Excess, Arterial -0.6 mmol/L      Comment: 84 Value below reference range        O2 Saturation, Arterial 88.2 %      Comment: 84 Value below reference range        Temperature 37.0 C      Barometric Pressure for Blood Gas 752 mmHg      Modality Room Air     Ventilator Mode NA     Notified Who DR AZUL     Notified By [id number]     Notified Time 02/10/2020 12:13     Collected by [id number]     Comment: Meter: [id number]     :  [id number]           Hospital Course:  The patient is a 74 y.o. male who presented to [facility] with shortness of breath.  The patient has a past medical history significant for hypertension, arthritis and peripheral vascular disease.  The patient presented to the emergency room complaining of shortness of breath, cough, congestion for approximately 2-3 days.  He denied any fever but was having enough issues that he had quit smoking for 3 days due to his symptoms and not feeling well.  The patient also complained of a productive cough with brown sputum.  The patient was evaluated in the emergency department and found to have a pO2 of 52.5 on room air.  He was placed on Vapotherm initially and was admitted for further evaluation and treatment.  The patient was found to have a right upper lobe pneumonia.  He was started on IV antibiotics with Azithromycin and Rocephin.  He was also placed on Duonebs, IS, mucinex.  However during the  "night the patient had went into atrial flutter.  The patient was started on Cardizem drip.  The patient had good response to the drip and it was able to be discontinued.  He was then started on Metoprolol.  He continues to have paroxysmal atrial flutter.  He is currently normal sinus at 74.  The patient is doing well.  He has had some confusion but has been reorietned easily.  He is a nightly drinker and it is felt that some of this is alcohol related.  He is also 74 and may just be having some issues with confusion secondary to being in an unfamiliar place with lots of sleep interruptions.  He is able to tell me everything I ask him in the room today.  The patient initially refused anticoagulation with Lovenox and was ok with the Xarelto.  The patient will be discharged home today in stable condition.  The patient will have prescriptions for antibiotics to complete his therapy for his pneumonia.  He will also have prescriptions for his metoprolol as well as Xarelto.  The patient will need to [facility] to a cardiologist about his atrial flutter.  The patient understands this.  He is eager to be discharged home today.  Stable and cleared for discharge.     Physical Exam on Discharge:  /59   Pulse 72   Temp 98.3 °F (36.8 °C) (Oral)   Resp 18   Ht 172.7 cm (67.99\")   Wt 83.9 kg (184 lb 15.5 oz)   SpO2 92%   BMI 28.13 kg/m²   Physical Exam   Constitutional: He is oriented to person, place, and time. He appears well-developed and well-nourished.   Sitting up on the side of the bed.  NAD.   HENT:   Head: Normocephalic and atraumatic.   Eyes: Pupils are equal, round, and reactive to light. Conjunctivae and EOM are normal.   Neck: Neck supple. No JVD present.   Cardiovascular: Normal rate, regular rhythm, normal heart sounds and intact distal pulses. Exam reveals no gallop and no friction rub.   No murmur heard.  Pulmonary/Chest: Effort normal and " breath sounds normal. No respiratory distress. He has no wheezes. He has no rales. He exhibits no tenderness.   Room air.  Diminished bilateral lower lobes.    Abdominal: Soft. Bowel sounds are normal. He exhibits no distension. There is no tenderness. There is no rebound and no guarding.   Musculoskeletal: Normal range of motion. He exhibits no edema, tenderness or deformity.   Neurological: He is alert and oriented to person, place, and time.   Answers my questions appropriately    Skin: Skin is warm and dry. No rash noted.   Psychiatric: He has a normal mood and affect. His behavior is normal. Judgment and thought content normal.   Nursing note and vitals reviewed.    Condition on Discharge: Stable     Discharge Disposition:  Home or Self Care    Discharge Medications:     Discharge Medications      New Medications      Instructions Start Date   azithromycin 500 MG tablet  Commonly known as:  ZITHROMAX   500 mg, Oral, Daily      cefdinir 300 MG capsule  Commonly known as:  OMNICEF   300 mg, Oral, 2 Times Daily      guaiFENesin 600 MG 12 hr tablet  Commonly known as:  MUCINEX   1,200 mg, Oral, Every 12 Hours Scheduled      methylPREDNISolone 4 MG tablet  Commonly known as:  MEDROL (BHARGAVI)   Take as directed on package instructions.      Metoprolol Tartrate 37.5 MG tablet   37.5 mg, Oral, Every 12 Hours Scheduled      rivaroxaban 20 MG tablet  Commonly known as:  XARELTO   20 mg, Oral, Daily With Dinner         Continue These Medications      Instructions Start Date   cilostazol 100 MG tablet  Commonly known as:  PLETAL   100 mg, Oral, 2 Times Daily      multivitamin with minerals tablet tablet   1 tablet, Oral, Daily      sildenafil 50 MG tablet  Commonly known as:  VIAGRA   50 mg, Oral, Daily PRN         Stop These Medications    aspirin 81 MG EC tablet     atenolol 100 MG tablet  Commonly known as:  TENORMIN     hydroCHLOROthiazide 25 MG tablet  Commonly known as:  HYDRODIURIL     meloxicam 7.5 MG tablet  Commonly  known as:  MOBIC     NORVASC 10 MG tablet  Generic drug:  amLODIPine          Discharge Diet:   Diet Instructions     Diet: Regular      Discharge Diet:  Regular        Activity at Discharge:   Activity Instructions     Activity as Tolerated          Follow-up Appointments:   1.  Follow up with PCP in one week   No future appointments.    Test Results Pending at Discharge: None    TAJ Guallpa  02/12/20  10:43 AM    Time: 35 minutes.       I personally evaluated and examined the patient in conjunction with TAJ Brasher and agree with the assessment, treatment plan, and disposition of the patient as recorded by her. My history, exam, and further recommendations are:     Patient seen and examined.  Appropriate for discharge.  Rate control.  Anticoagulation. Antibiotics for pneumonia.      Jeff Horan MD  02/12/20  12:14 PM

## 2020-02-12 NOTE — PLAN OF CARE
Problem: Patient Care Overview  Goal: Plan of Care Review  2/12/2020 0539 by Tammy Lyons RN  Outcome: Ongoing (interventions implemented as appropriate)  Flowsheets  Taken 2/12/2020 0539  Plan of Care Reviewed With: patient  Taken 2/12/2020 0530  Outcome Summary: pt c/o pain in left foot; pain med given with relief noted; pt spent most of night shift being confused; this is not the patient's baseline; reorienting the pt was fairly easy; however, his orientation did not last long; thought it may have been ETOH related and gave ativan. This had no effect at all; pt heart rate was up and down through out shift (50s to 140s); pt heart rhythm was also irregular; cardizem gtt still hanging this morning; other VSS; safety discussed and maintained; will continue to monitor

## 2020-02-12 NOTE — PROGRESS NOTES
Continued Stay Note  [facility]     Patient Name: Kingsley Lerma  MRN: [id number]  Today's Date: 2/12/2020    Admit Date: 2/10/2020    Discharge Plan     Row Name 02/12/20 1124       Plan    Plan  Home    Patient/Family in Agreement with Plan  yes    Final Discharge Disposition Code  01 - home or self-care    Final Note  Pt is being dishcarged home today. Faxed d/c summary to [facility] [phone].    Pt was provided 2 days worth of medications from retail pharmacy as he waits for shipment to get to his home from [facility].  This covered via Patient Assistance Fund.          Discharge Codes    No documentation.       Expected Discharge Date and Time     Expected Discharge Date Expected Discharge Time    Feb 12, 2020             SHREYAS Esparza

## 2020-02-13 ENCOUNTER — NURSE TRIAGE (OUTPATIENT)
Dept: CALL CENTER | Facility: HOSPITAL | Age: 75
End: 2020-02-13

## 2020-02-13 LAB
BACTERIA SPEC RESP CULT: NORMAL
GRAM STN SPEC: NORMAL

## 2020-02-13 NOTE — TELEPHONE ENCOUNTER
"  Reason for Disposition  • Health Information question, no triage required and triager able to answer question    Additional Information  • Negative: [1] Caller is not with the adult (patient) AND [2] reporting urgent symptoms  • Negative: Lab result questions  • Negative: Medication questions  • Negative: Caller can't be reached by phone  • Negative: Caller has already spoken to PCP or another triager  • Negative: RN needs further essential information from caller in order to complete triage  • Negative: Requesting regular office appointment  • Negative: [1] Caller requesting NON-URGENT health information AND [2] PCP's office is the best resource    Answer Assessment - Initial Assessment Questions  1. REASON FOR CALL or QUESTION: \"What is your reason for calling today?\" or \"How can I best help you?\" or \"What question do you have that I can help answer?\"      He states discharge papers say take one azithromycin tablet daily and RX bottle states to take 2 tablets daily. He read the bottle and those are 250mg each and he was instructed that taking 2 was correct. He then states that [facility] and [facility]. He will be out tomorrow. Will send message to case management for follow up.    Protocols used: INFORMATION ONLY CALL-ADULT-    "

## 2020-02-13 NOTE — TELEPHONE ENCOUNTER
"    Reason for Disposition  • Caller has medication question, adult has minor symptoms, caller declines triage, and triager answers question    Additional Information  • Negative: Drug overdose and nurse unable to answer question  • Negative: Caller requesting information not related to medicine  • Negative: Caller requesting a prescription for Strep throat and has a positive culture result  • Negative: Rash while taking a medication or within 3 days of stopping it  • Negative: Immunization reaction suspected  • Negative: [1] Asthma and [2] having symptoms of asthma (cough, wheezing, etc)  • Negative: MORE THAN A DOUBLE DOSE of a prescription or over-the-counter (OTC) drug  • Negative: [1] DOUBLE DOSE (an extra dose or lesser amount) of over-the-counter (OTC) drug AND [2] any symptoms (e.g., dizziness, nausea, pain, sleepiness)  • Negative: [1] DOUBLE DOSE (an extra dose or lesser amount) of prescription drug AND [2] any symptoms (e.g., dizziness, nausea, pain, sleepiness)  • Negative: Took another person's prescription drug  • Negative: [1] DOUBLE DOSE (an extra dose or lesser amount) of prescription drug AND [2] NO symptoms (Exception: a double dose of antibiotics)  • Negative: Diabetes drug error or overdose (e.g., insulin or extra dose)  • Negative: [1] Request for URGENT new prescription or refill of \"essential\" medication (i.e., likelihood of harm to patient if not taken) AND [2] triager unable to fill per unit policy  • Negative: [1] Prescription not at pharmacy AND [2] was prescribed today by PCP  • Negative: Pharmacy calling with prescription questions and triager unable to answer question  • Negative: Caller has URGENT medication question about med that PCP prescribed and triager unable to answer question  • Negative: Caller has NON-URGENT medication question about med that PCP prescribed and triager unable to answer question  • Negative: Caller requesting a NON-URGENT new prescription or refill and " "triager unable to refill per unit policy  • Negative: Caller has medication question about med not prescribed by PCP and triager unable to answer question (e.g., compatibility with other med, storage)  • Negative: [1] DOUBLE DOSE (an extra dose or lesser amount) of over-the-counter (OTC) drug AND [2] NO symptoms  • Negative: [1] DOUBLE DOSE (an extra dose or lesser amount) of antibiotic drug AND [2] NO symptoms  • Negative: Caller has medication question only, adult not sick, and triager answers question    Answer Assessment - Initial Assessment Questions  1. SYMPTOMS: \"Do you have any symptoms?\"      Clarify which medications to take today. Patient has AVS copy and is able to read off to me the list of medications. He has all meds filled and available to him.   2. SEVERITY: If symptoms are present, ask \"Are they mild, moderate or severe?\"      NA    Protocols used: MEDICATION QUESTION CALL-ADULT-      "

## 2020-02-13 NOTE — PAYOR COMM NOTE
"[place] [facility] 2-12-20      Kingsley Servin (74 y.o. Male)     Date of Birth Social Security Number Address Home Phone MRN    1945  [id number] [address]  [id number]    [place] Marital Status          Hinduism        Admission Date Admission Type Admitting Provider Attending Provider Department, Room/Bed    2/10/20 Emergency Jeff Horan MD  [facility] 4C, 479/1    Discharge Date Discharge Disposition Discharge Destination        2/12/2020 Home or Self Care Home             Attending Provider:  (none)   Allergies:  No Known Allergies    Isolation:  None   Infection:  None   Code Status:  Prior    Ht:  172.7 cm (67.99\")   Wt:  83.9 kg (184 lb 15.5 oz)    Admission Cmt:  None   Principal Problem:  Pneumonia of right upper lobe due to infectious organism (CMS/HCC) [J18.1]                 Active Insurance as of 2/10/2020     Primary Coverage     Payor Plan Insurance Group Employer/Plan Group    [facility] DEPT [id number]      Payor Plan Address Payor Plan Phone Number Payor Plan Fax Number Effective Dates    [employer] 04 [phone]  2/10/2020 - None Entered    [address] [facility] [id number]       Subscriber Name Subscriber Birth Date Member ID       KINGSLEY SERVIN [id number]                 Emergency Contacts      (Rel.) Home Phone Work Phone Mobile Phone    LUIS DANIEL SERVIN (Son) -- -- [phone]    nattyboone bhakta (Daughter) -- -- [phone]               Discharge Summary      Jeff Horan MD at 02/12/20 0956                [facility] Medicine Services  DISCHARGE SUMMARY       Date of Admission: 2/10/2020  Date of Discharge:  2/12/2020  Primary Care Physician: Provider, No Known    Presenting Problem/History of Present Illness:  Shortness of breath     Final Discharge Diagnoses:  Patient Active Problem List   Diagnosis   • Pneumonia of right upper lobe due to infectious organism (CMS/HCC) "   • Essential hypertension   • Former tobacco use   • PVD (peripheral vascular disease) (CMS/HCC)   • Acute respiratory failure with hypoxia (CMS/HCC)   • Positive D dimer     Consults: None    Procedures Performed: None    Pertinent Test Results:   Imaging Results (Last 7 Days)     Procedure Component Value Units Date/Time    CT Angiogram Chest [[id number]] Collected:  02/10/20 1449     Updated:  02/10/20 1453    Narrative:       EXAMINATION: CT ANGIOGRAM CHEST-      2/10/2020 2:31 PM CST     HISTORY: Chest pain, acute, PE suspected, intermed prob, negative  D-dimer     In order to have a CT radiation dose as low as reasonably achievable  Automated Exposure Control was utilized for adjustment of the mA and/or  KV according to patient size.     DLP in mGycm= 505.     CT angiography protocol.   CT imaging with bolus IV contrast injection.   Under concurrent supervision axial, sagittal, coronal, and  three-dimensional data sets were constructed.     Normal heart size.  Coronary artery calcification.  No thoracic aortic aneurysm or dissection.     Symmetric pulmonary arteries.  No pulmonary embolism.     There is a linear band of acute pneumonia within the base of the right  upper lobe.     No pneumothorax or pleural effusion.     The lungs are otherwise fully expanded and clear.     Summary:  1. Right upper lobe pneumonia.  2. No pulmonary embolism.                                   This report was finalized on 02/10/2020 14:50 by Dr. Alfred Hernández MD.    XR Chest 1 View [[id number]] Collected:  02/10/20 1214     Updated:  02/10/20 1219    Narrative:       EXAMINATION: XR CHEST 1 VW- 2/10/2020 12:14 PM CST     HISTORY: Shortness of breath.     REPORT:  One-view chest: Frontal view of the chest was obtained. The lungs are  clear and normally expanded. The cardiac and mediastinal silhouettes are  within normal limits. The visualized bony thorax and upper abdomen are  unremarkable.                                                                                                                                                        Impression:       No acute cardiopulmonary abnormality.     This report was finalized on 02/10/2020 12:16 by Dr. Napoleon Ortiz MD.        Lab Results (last 7 days)     Procedure Component Value Units Date/Time    Basic Metabolic Panel [[id number]]  (Abnormal) Collected:  02/12/20 0425    Specimen:  Blood Updated:  02/12/20 0451     Glucose 233 mg/dL      BUN 16 mg/dL      Creatinine 0.67 mg/dL      Sodium 142 mmol/L      Potassium 3.8 mmol/L      Chloride 106 mmol/L      CO2 23.0 mmol/L      Calcium 9.4 mg/dL      eGFR Non African Amer 117 mL/min/1.73      BUN/Creatinine Ratio 23.9     Anion Gap 13.0 mmol/L     Narrative:       GFR Normal >60  Chronic Kidney Disease <60  Kidney Failure <15      CBC & Differential [[id number]] Collected:  02/12/20 0425    Specimen:  Blood Updated:  02/12/20 0434    Narrative:       The following orders were created for panel order CBC & Differential.  Procedure                               Abnormality         Status                     ---------                               -----------         ------                     CBC Auto Differential[[id number]]        Abnormal            Final result                 Please view results for these tests on the individual orders.    CBC Auto Differential [[id number]]  (Abnormal) Collected:  02/12/20 0425    Specimen:  Blood Updated:  02/12/20 0434     WBC 5.69 10*3/mm3      RBC 5.05 10*6/mm3      Hemoglobin 16.4 g/dL      Hematocrit 47.7 %      MCV 94.5 fL      MCH 32.5 pg      MCHC 34.4 g/dL      RDW 14.0 %      RDW-SD 48.5 fl      MPV 8.9 fL      Platelets 178 10*3/mm3      Neutrophil % 88.9 %      Lymphocyte % 7.9 %      Monocyte % 2.3 %      Eosinophil % 0.0 %      Basophil % 0.2 %      Immature Grans % 0.7 %      Neutrophils, Absolute 5.06 10*3/mm3      Lymphocytes, Absolute 0.45 10*3/mm3      Monocytes, Absolute 0.13 10*3/mm3       Eosinophils, Absolute 0.00 10*3/mm3      Basophils, Absolute 0.01 10*3/mm3      Immature Grans, Absolute 0.04 10*3/mm3      nRBC 0.0 /100 WBC     Legionella Antigen, Urine - Urine, Urine, Clean Catch [[id number]]  (Normal) Collected:  02/11/20 1318    Specimen:  Urine, Clean Catch Updated:  02/11/20 1419     LEGIONELLA ANTIGEN, URINE Negative    S. Pneumo Ag Urine or CSF - Urine, Urine, Clean Catch [[id number]]  (Normal) Collected:  02/11/20 1318    Specimen:  Urine, Clean Catch Updated:  02/11/20 1418     Strep Pneumo Ag Negative    Blood Culture - Blood, Arm, Right [[id number]] Collected:  02/10/20 1210    Specimen:  Blood from Arm, Right Updated:  02/11/20 1301     Blood Culture No growth at 24 hours    Blood Culture - Blood, Arm, Left [[id number]] Collected:  02/10/20 1204    Specimen:  Blood from Arm, Left Updated:  02/11/20 1301     Blood Culture No growth at 24 hours    Respiratory Culture - Sputum, Cough [[id number]] Collected:  02/10/20 2026    Specimen:  Sputum from Cough Updated:  02/11/20 0658     Gram Stain Greater than 25 WBCs per low power field      Few (2+) Epithelial cells seen      Few (2+) Mixed gram positive maria alejandra    Basic Metabolic Panel [[id number]]  (Abnormal) Collected:  02/11/20 0249    Specimen:  Blood Updated:  02/11/20 0330     Glucose 88 mg/dL      BUN 16 mg/dL      Creatinine 0.67 mg/dL      Sodium 141 mmol/L      Potassium 3.5 mmol/L      Chloride 105 mmol/L      CO2 24.0 mmol/L      Calcium 9.1 mg/dL      eGFR Non African Amer 117 mL/min/1.73      BUN/Creatinine Ratio 23.9     Anion Gap 12.0 mmol/L     Narrative:       GFR Normal >60  Chronic Kidney Disease <60  Kidney Failure <15      Troponin [[id number]]  (Normal) Collected:  02/11/20 0249    Specimen:  Blood Updated:  02/11/20 0330     Troponin T <0.010 ng/mL     Narrative:       Troponin T Reference Range:  <= 0.03 ng/mL-   Negative for AMI  >0.03 ng/mL-     Abnormal for myocardial necrosis.  Clinicians would have to utilize clinical  acumen, EKG, Troponin and serial changes to determine if it is an Acute Myocardial Infarction or myocardial injury due to an underlying chronic condition.       Results may be falsely decreased if patient taking Biotin.      Magnesium [[id number]]  (Normal) Collected:  02/11/20 0249    Specimen:  Blood Updated:  02/11/20 0330     Magnesium 2.1 mg/dL     CBC & Differential [[id number]] Collected:  02/11/20 0249    Specimen:  Blood Updated:  02/11/20 0316    Narrative:       The following orders were created for panel order CBC & Differential.  Procedure                               Abnormality         Status                     ---------                               -----------         ------                     CBC Auto Differential[[id number]]        Abnormal            Final result                 Please view results for these tests on the individual orders.    CBC Auto Differential [[id number]]  (Abnormal) Collected:  02/11/20 0249    Specimen:  Blood Updated:  02/11/20 0316     WBC 6.45 10*3/mm3      RBC 5.11 10*6/mm3      Hemoglobin 17.0 g/dL      Hematocrit 48.4 %      MCV 94.7 fL      MCH 33.3 pg      MCHC 35.1 g/dL      RDW 14.0 %      RDW-SD 48.9 fl      MPV 9.0 fL      Platelets 163 10*3/mm3      Neutrophil % 70.5 %      Lymphocyte % 14.3 %      Monocyte % 14.1 %      Eosinophil % 0.3 %      Basophil % 0.5 %      Immature Grans % 0.3 %      Neutrophils, Absolute 4.55 10*3/mm3      Lymphocytes, Absolute 0.92 10*3/mm3      Monocytes, Absolute 0.91 10*3/mm3      Eosinophils, Absolute 0.02 10*3/mm3      Basophils, Absolute 0.03 10*3/mm3      Immature Grans, Absolute 0.02 10*3/mm3      nRBC 0.0 /100 WBC     Hemoglobin A1c [[id number]]  (Abnormal) Collected:  02/10/20 1204    Specimen:  Blood Updated:  02/10/20 1741     Hemoglobin A1C 5.80 %     Narrative:       Hemoglobin A1C Ranges:    Increased Risk for Diabetes  5.7% to 6.4%  Diabetes                     >= 6.5%  Diabetic Goal                < 7.0%     "Procalcitonin [[id number]]  (Abnormal) Collected:  02/10/20 1204    Specimen:  Blood Updated:  02/10/20 1245     Procalcitonin 0.05 ng/mL     Narrative:       As a Marker for Sepsis (Non-Neonates):   1. <0.5 ng/mL represents a low risk of severe sepsis and/or septic shock.  1. >2 ng/mL represents a high risk of severe sepsis and/or septic shock.    As a Marker for Lower Respiratory Tract Infections that require antibiotic therapy:  PCT on Admission     Antibiotic Therapy             6-12 Hrs later  > 0.5                Strongly Recommended            >0.25 - <0.5         Recommended  0.1 - 0.25           Discouraged                   Remeasure/reassess PCT  <0.1                 Strongly Discouraged          Remeasure/reassess PCT      As 28 day mortality risk marker: \"Change in Procalcitonin Result\" (> 80 % or <=80 %) if Day 0 (or Day 1) and Day 4 values are available. Refer to [email or web address]   Change in PCT <=80 %   A decrease of PCT levels below or equal to 80 % defines a positive change in PCT test result representing a higher risk for 28-day all-cause mortality of patients diagnosed with severe sepsis or septic shock.  Change in PCT > 80 %   A decrease of PCT levels of more than 80 % defines a negative change in PCT result representing a lower risk for 28-day all-cause mortality of patients diagnosed with severe sepsis or septic shock.                Results may be falsely decreased if patient taking Biotin.     Influenza Antigen, Rapid - Swab, Nasopharynx [[id number]]  (Normal) Collected:  02/10/20 1211    Specimen:  Swab from Nasopharynx Updated:  02/10/20 1244     Influenza A Ag, EIA Negative     Influenza B Ag, EIA Negative    Narrative:       Recommend confirmation of negative results by viral culture or molecular assay.    Comprehensive Metabolic Panel [[id number]]  (Abnormal) Collected:  02/10/20 1204    Specimen:  Blood Updated:  02/10/20 1240     Glucose 96 mg/dL      BUN 18 mg/dL  "     Creatinine 0.76 mg/dL      Sodium 138 mmol/L      Potassium 3.9 mmol/L      Chloride 99 mmol/L      CO2 25.0 mmol/L      Calcium 9.8 mg/dL      Total Protein 8.1 g/dL      Albumin 4.40 g/dL      ALT (SGPT) 21 U/L      AST (SGOT) 24 U/L      Alkaline Phosphatase 132 U/L      Total Bilirubin 0.9 mg/dL      eGFR Non African Amer 101 mL/min/1.73      Globulin 3.7 gm/dL      A/G Ratio 1.2 g/dL      BUN/Creatinine Ratio 23.7     Anion Gap 14.0 mmol/L     Narrative:       GFR Normal >60  Chronic Kidney Disease <60  Kidney Failure <15      BNP [[id number]]  (Normal) Collected:  02/10/20 1204    Specimen:  Blood Updated:  02/10/20 1240     proBNP 365.0 pg/mL     Narrative:       Among patients with dyspnea, NT-proBNP is highly sensitive for the detection of acute congestive heart failure. In addition NT-proBNP of <300 pg/ml effectively rules out acute congestive heart failure with 99% negative predictive value.    Results may be falsely decreased if patient taking Biotin.      Troponin [[id number]]  (Normal) Collected:  02/10/20 1204    Specimen:  Blood Updated:  02/10/20 1240     Troponin T <0.010 ng/mL     Narrative:       Troponin T Reference Range:  <= 0.03 ng/mL-   Negative for AMI  >0.03 ng/mL-     Abnormal for myocardial necrosis.  Clinicians would have to utilize clinical acumen, EKG, Troponin and serial changes to determine if it is an Acute Myocardial Infarction or myocardial injury due to an underlying chronic condition.       Results may be falsely decreased if patient taking Biotin.      Lactic Acid, Plasma [[id number]]  (Normal) Collected:  02/10/20 1204    Specimen:  Blood Updated:  02/10/20 1237     Lactate 1.2 mmol/L     Protime-INR [[id number]]  (Normal) Collected:  02/10/20 1204    Specimen:  Blood Updated:  02/10/20 1231     Protime 13.3 Seconds      INR 0.98    aPTT [[id number]]  (Abnormal) Collected:  02/10/20 1204    Specimen:  Blood Updated:  02/10/20 1231     PTT 60.6 seconds     D-dimer, Quantitative  [[id number]]  (Abnormal) Collected:  02/10/20 1204    Specimen:  Blood Updated:  02/10/20 1231     D-Dimer, Quantitative 0.86 mg/L (FEU)     Narrative:       Reference Range is 0-0.50 mg/L FEU. However, results <0.50 mg/L FEU tends to rule out DVT or PE. Results >0.50 mg/L FEU are not useful in predicting absence or presence of DVT or PE.      CBC & Differential [[id number]] Collected:  02/10/20 1204    Specimen:  Blood Updated:  02/10/20 1221    Narrative:       The following orders were created for panel order CBC & Differential.  Procedure                               Abnormality         Status                     ---------                               -----------         ------                     CBC Auto Differential[[id number]]        Abnormal            Final result                 Please view results for these tests on the individual orders.    CBC Auto Differential [[id number]]  (Abnormal) Collected:  02/10/20 1204    Specimen:  Blood Updated:  02/10/20 1221     WBC 9.56 10*3/mm3      RBC 5.65 10*6/mm3      Hemoglobin 18.7 g/dL      Hematocrit 54.2 %      MCV 95.9 fL      MCH 33.1 pg      MCHC 34.5 g/dL      RDW 14.3 %      RDW-SD 49.7 fl      MPV 9.1 fL      Platelets 204 10*3/mm3      Neutrophil % 77.5 %      Lymphocyte % 8.3 %      Monocyte % 13.1 %      Eosinophil % 0.1 %      Basophil % 0.5 %      Immature Grans % 0.5 %      Neutrophils, Absolute 7.41 10*3/mm3      Lymphocytes, Absolute 0.79 10*3/mm3      Monocytes, Absolute 1.25 10*3/mm3      Eosinophils, Absolute 0.01 10*3/mm3      Basophils, Absolute 0.05 10*3/mm3      Immature Grans, Absolute 0.05 10*3/mm3      nRBC 0.0 /100 WBC     Blood Gas, Arterial [[id number]]  (Abnormal) Collected:  02/10/20 1207    Specimen:  Arterial Blood Updated:  02/10/20 1211     Site Right Radial     Nathaniel's Test Positive     pH, Arterial 7.457 pH units      Comment: 83 Value above reference range        pCO2, Arterial 31.3 mm Hg      Comment: 84 Value below reference  range        pO2, Arterial 52.5 mm Hg      Comment: 85 Value below critical limit        HCO3, Arterial 22.1 mmol/L      Base Excess, Arterial -0.6 mmol/L      Comment: 84 Value below reference range        O2 Saturation, Arterial 88.2 %      Comment: 84 Value below reference range        Temperature 37.0 C      Barometric Pressure for Blood Gas 752 mmHg      Modality Room Air     Ventilator Mode NA     Notified Who DR AZUL     Notified By [id number]     Notified Time 02/10/2020 12:13     Collected by [id number]     Comment: Meter: [id number]     :  [id number]           Hospital Course:  The patient is a 74 y.o. male who presented to [facility] with shortness of breath.  The patient has a past medical history significant for hypertension, arthritis and peripheral vascular disease.  The patient presented to the emergency room complaining of shortness of breath, cough, congestion for approximately 2-3 days.  He denied any fever but was having enough issues that he had quit smoking for 3 days due to his symptoms and not feeling well.  The patient also complained of a productive cough with brown sputum.  The patient was evaluated in the emergency department and found to have a pO2 of 52.5 on room air.  He was placed on Vapotherm initially and was admitted for further evaluation and treatment.  The patient was found to have a right upper lobe pneumonia.  He was started on IV antibiotics with Azithromycin and Rocephin.  He was also placed on Duonebs, IS, mucinex.  However during the night the patient had went into atrial flutter.  The patient was started on Cardizem drip.  The patient had good response to the drip and it was able to be discontinued.  He was then started on Metoprolol.  He continues to have paroxysmal atrial flutter.  He is currently normal sinus at 74.  The patient is doing well.  He has had some confusion but has been reorietned easily.  He is a nightly drinker and it is felt that some  "of this is alcohol related.  He is also 74 and may just be having some issues with confusion secondary to being in an unfamiliar place with lots of sleep interruptions.  He is able to tell me everything I ask him in the room today.  The patient initially refused anticoagulation with Lovenox and was ok with the Xarelto.  The patient will be discharged home today in stable condition.  The patient will have prescriptions for antibiotics to complete his therapy for his pneumonia.  He will also have prescriptions for his metoprolol as well as Xarelto.  The patient will need [facility] to a cardiologist about his atrial flutter.  The patient understands this.  He is eager to be discharged home today.  Stable and cleared for discharge.     Physical Exam on Discharge:  /59   Pulse 72   Temp 98.3 °F (36.8 °C) (Oral)   Resp 18   Ht 172.7 cm (67.99\")   Wt 83.9 kg (184 lb 15.5 oz)   SpO2 92%   BMI 28.13 kg/m²    Physical Exam   Constitutional: He is oriented to person, place, and time. He appears well-developed and well-nourished.   Sitting up on the side of the bed.  NAD.   HENT:   Head: Normocephalic and atraumatic.   Eyes: Pupils are equal, round, and reactive to light. Conjunctivae and EOM are normal.   Neck: Neck supple. No JVD present.   Cardiovascular: Normal rate, regular rhythm, normal heart sounds and intact distal pulses. Exam reveals no gallop and no friction rub.   No murmur heard.  Pulmonary/Chest: Effort normal and breath sounds normal. No respiratory distress. He has no wheezes. He has no rales. He exhibits no tenderness.   Room air.  Diminished bilateral lower lobes.    Abdominal: Soft. Bowel sounds are normal. He exhibits no distension. There is no tenderness. There is no rebound and no guarding.   Musculoskeletal: Normal range of motion. He exhibits no edema, tenderness or deformity.   Neurological: He is alert and oriented to person, " place, and time.   Answers my questions appropriately    Skin: Skin is warm and dry. No rash noted.   Psychiatric: He has a normal mood and affect. His behavior is normal. Judgment and thought content normal.   Nursing note and vitals reviewed.    Condition on Discharge: Stable     Discharge Disposition:  Home or Self Care    Discharge Medications:     Discharge Medications      New Medications      Instructions Start Date   azithromycin 500 MG tablet  Commonly known as:  ZITHROMAX   500 mg, Oral, Daily      cefdinir 300 MG capsule  Commonly known as:  OMNICEF   300 mg, Oral, 2 Times Daily      guaiFENesin 600 MG 12 hr tablet  Commonly known as:  MUCINEX   1,200 mg, Oral, Every 12 Hours Scheduled      methylPREDNISolone 4 MG tablet  Commonly known as:  MEDROL (BHARGAVI)   Take as directed on package instructions.      Metoprolol Tartrate 37.5 MG tablet   37.5 mg, Oral, Every 12 Hours Scheduled      rivaroxaban 20 MG tablet  Commonly known as:  XARELTO   20 mg, Oral, Daily With Dinner         Continue These Medications      Instructions Start Date   cilostazol 100 MG tablet  Commonly known as:  PLETAL   100 mg, Oral, 2 Times Daily      multivitamin with minerals tablet tablet   1 tablet, Oral, Daily      sildenafil 50 MG tablet  Commonly known as:  VIAGRA   50 mg, Oral, Daily PRN         Stop These Medications    aspirin 81 MG EC tablet     atenolol 100 MG tablet  Commonly known as:  TENORMIN     hydroCHLOROthiazide 25 MG tablet  Commonly known as:  HYDRODIURIL     meloxicam 7.5 MG tablet  Commonly known as:  MOBIC     NORVASC 10 MG tablet  Generic drug:  amLODIPine          Discharge Diet:   Diet Instructions     Diet: Regular      Discharge Diet:  Regular        Activity at Discharge:   Activity Instructions     Activity as Tolerated          Follow-up Appointments:   1.  Follow up with PCP in one week   No future appointments.    Test Results Pending at Discharge: None    TAJ Guallpa  02/12/20  10:43  AM    Time: 35 minutes.       I personally evaluated and examined the patient in conjunction with TAJ Brasher and agree with the assessment, treatment plan, and disposition of the patient as recorded by her. My history, exam, and further recommendations are:     Patient seen and examined.  Appropriate for discharge.  Rate control.  Anticoagulation. Antibiotics for pneumonia.      Jeff Horan MD  02/12/20  12:14 PM        Electronically signed by Jeff Horan MD at 02/12/20 6384

## 2020-02-15 ENCOUNTER — APPOINTMENT (OUTPATIENT)
Dept: GENERAL RADIOLOGY | Facility: HOSPITAL | Age: 75
End: 2020-02-15

## 2020-02-15 ENCOUNTER — HOSPITAL ENCOUNTER (INPATIENT)
Facility: HOSPITAL | Age: 75
LOS: 2 days | Discharge: HOME | End: 2020-02-17
Attending: INTERNAL MEDICINE | Admitting: INTERNAL MEDICINE

## 2020-02-15 DIAGNOSIS — I48.92 ATRIAL FLUTTER, UNSPECIFIED TYPE (HCC): Primary | ICD-10-CM

## 2020-02-15 LAB
ALBUMIN SERPL-MCNC: 4.2 G/DL (ref 3.5–5.2)
ALBUMIN/GLOB SERPL: 1.3 G/DL
ALP SERPL-CCNC: 113 U/L (ref 39–117)
ALT SERPL W P-5'-P-CCNC: 33 U/L (ref 1–41)
ANION GAP SERPL CALCULATED.3IONS-SCNC: 16 MMOL/L (ref 5–15)
ARTERIAL PATENCY WRIST A: POSITIVE
AST SERPL-CCNC: 22 U/L (ref 1–40)
ATMOSPHERIC PRESS: 755 MMHG
BACTERIA SPEC AEROBE CULT: NORMAL
BACTERIA SPEC AEROBE CULT: NORMAL
BASE EXCESS BLDA CALC-SCNC: 2.2 MMOL/L (ref 0–2)
BASOPHILS # BLD AUTO: 0.03 10*3/MM3 (ref 0–0.2)
BASOPHILS NFR BLD AUTO: 0.3 % (ref 0–1.5)
BDY SITE: ABNORMAL
BILIRUB SERPL-MCNC: 1.2 MG/DL (ref 0.2–1.2)
BODY TEMPERATURE: 37 C
BUN BLD-MCNC: 25 MG/DL (ref 8–23)
BUN/CREAT SERPL: 32.1 (ref 7–25)
CALCIUM SPEC-SCNC: 9.7 MG/DL (ref 8.6–10.5)
CHLORIDE SERPL-SCNC: 103 MMOL/L (ref 98–107)
CO2 SERPL-SCNC: 26 MMOL/L (ref 22–29)
CREAT BLD-MCNC: 0.78 MG/DL (ref 0.76–1.27)
DEPRECATED RDW RBC AUTO: 49.1 FL (ref 37–54)
EOSINOPHIL # BLD AUTO: 0 10*3/MM3 (ref 0–0.4)
EOSINOPHIL NFR BLD AUTO: 0 % (ref 0.3–6.2)
ERYTHROCYTE [DISTWIDTH] IN BLOOD BY AUTOMATED COUNT: 13.7 % (ref 12.3–15.4)
GAS FLOW AIRWAY: 2 LPM
GFR SERPL CREATININE-BSD FRML MDRD: 98 ML/MIN/1.73
GLOBULIN UR ELPH-MCNC: 3.3 GM/DL
GLUCOSE BLD-MCNC: 108 MG/DL (ref 65–99)
HCO3 BLDA-SCNC: 24.4 MMOL/L (ref 20–26)
HCT VFR BLD AUTO: 56.4 % (ref 37.5–51)
HGB BLD-MCNC: 19.1 G/DL (ref 13–17.7)
HOLD SPECIMEN: NORMAL
IMM GRANULOCYTES # BLD AUTO: 0.04 10*3/MM3 (ref 0–0.05)
IMM GRANULOCYTES NFR BLD AUTO: 0.4 % (ref 0–0.5)
LYMPHOCYTES # BLD AUTO: 1.39 10*3/MM3 (ref 0.7–3.1)
LYMPHOCYTES NFR BLD AUTO: 15.1 % (ref 19.6–45.3)
Lab: ABNORMAL
MCH RBC QN AUTO: 32.9 PG (ref 26.6–33)
MCHC RBC AUTO-ENTMCNC: 33.9 G/DL (ref 31.5–35.7)
MCV RBC AUTO: 97.1 FL (ref 79–97)
MODALITY: ABNORMAL
MONOCYTES # BLD AUTO: 0.55 10*3/MM3 (ref 0.1–0.9)
MONOCYTES NFR BLD AUTO: 6 % (ref 5–12)
NEUTROPHILS # BLD AUTO: 7.21 10*3/MM3 (ref 1.7–7)
NEUTROPHILS NFR BLD AUTO: 78.2 % (ref 42.7–76)
NRBC BLD AUTO-RTO: 0 /100 WBC (ref 0–0.2)
NT-PROBNP SERPL-MCNC: 2062 PG/ML (ref 5–900)
PCO2 BLDA: 31.2 MM HG (ref 35–45)
PH BLDA: 7.5 PH UNITS (ref 7.35–7.45)
PLATELET # BLD AUTO: 215 10*3/MM3 (ref 140–450)
PMV BLD AUTO: 9 FL (ref 6–12)
PO2 BLDA: 68.2 MM HG (ref 83–108)
POTASSIUM BLD-SCNC: 3.8 MMOL/L (ref 3.5–5.2)
PROT SERPL-MCNC: 7.5 G/DL (ref 6–8.5)
RBC # BLD AUTO: 5.81 10*6/MM3 (ref 4.14–5.8)
SAO2 % BLDCOA: 94.6 % (ref 94–99)
SODIUM BLD-SCNC: 145 MMOL/L (ref 136–145)
T4 FREE SERPL-MCNC: 1.46 NG/DL (ref 0.93–1.7)
TROPONIN T SERPL-MCNC: <0.01 NG/ML (ref 0–0.03)
TSH SERPL DL<=0.05 MIU/L-ACNC: 1.95 UIU/ML (ref 0.27–4.2)
VENTILATOR MODE: ABNORMAL
WBC NRBC COR # BLD: 9.22 10*3/MM3 (ref 3.4–10.8)
WHOLE BLOOD HOLD SPECIMEN: NORMAL
WHOLE BLOOD HOLD SPECIMEN: NORMAL

## 2020-02-15 PROCEDURE — 85025 COMPLETE CBC W/AUTO DIFF WBC: CPT | Performed by: PHYSICIAN ASSISTANT

## 2020-02-15 PROCEDURE — 84439 ASSAY OF FREE THYROXINE: CPT | Performed by: PHYSICIAN ASSISTANT

## 2020-02-15 PROCEDURE — 82803 BLOOD GASES ANY COMBINATION: CPT

## 2020-02-15 PROCEDURE — 84481 FREE ASSAY (FT-3): CPT | Performed by: PHYSICIAN ASSISTANT

## 2020-02-15 PROCEDURE — 84484 ASSAY OF TROPONIN QUANT: CPT | Performed by: PHYSICIAN ASSISTANT

## 2020-02-15 PROCEDURE — 99284 EMERGENCY DEPT VISIT MOD MDM: CPT

## 2020-02-15 PROCEDURE — 83880 ASSAY OF NATRIURETIC PEPTIDE: CPT | Performed by: PHYSICIAN ASSISTANT

## 2020-02-15 PROCEDURE — 93005 ELECTROCARDIOGRAM TRACING: CPT | Performed by: PHYSICIAN ASSISTANT

## 2020-02-15 PROCEDURE — 93005 ELECTROCARDIOGRAM TRACING: CPT

## 2020-02-15 PROCEDURE — 80053 COMPREHEN METABOLIC PANEL: CPT | Performed by: PHYSICIAN ASSISTANT

## 2020-02-15 PROCEDURE — 25010000002 FUROSEMIDE PER 20 MG: Performed by: PHYSICIAN ASSISTANT

## 2020-02-15 PROCEDURE — 71045 X-RAY EXAM CHEST 1 VIEW: CPT

## 2020-02-15 PROCEDURE — 36600 WITHDRAWAL OF ARTERIAL BLOOD: CPT

## 2020-02-15 PROCEDURE — 93010 ELECTROCARDIOGRAM REPORT: CPT | Performed by: INTERNAL MEDICINE

## 2020-02-15 PROCEDURE — 84443 ASSAY THYROID STIM HORMONE: CPT | Performed by: PHYSICIAN ASSISTANT

## 2020-02-15 PROCEDURE — 93005 ELECTROCARDIOGRAM TRACING: CPT | Performed by: INTERNAL MEDICINE

## 2020-02-15 PROCEDURE — 25010000002 AMIODARONE PER 30 MG: Performed by: PHYSICIAN ASSISTANT

## 2020-02-15 RX ORDER — DILTIAZEM HYDROCHLORIDE 5 MG/ML
10 INJECTION INTRAVENOUS ONCE
Status: COMPLETED | OUTPATIENT
Start: 2020-02-15 | End: 2020-02-15

## 2020-02-15 RX ORDER — OXYCODONE AND ACETAMINOPHEN 7.5; 325 MG/1; MG/1
1 TABLET ORAL ONCE
Status: COMPLETED | OUTPATIENT
Start: 2020-02-15 | End: 2020-02-15

## 2020-02-15 RX ORDER — SODIUM CHLORIDE 0.9 % (FLUSH) 0.9 %
10 SYRINGE (ML) INJECTION AS NEEDED
Status: DISCONTINUED | OUTPATIENT
Start: 2020-02-15 | End: 2020-02-17 | Stop reason: HOSPADM

## 2020-02-15 RX ORDER — AMIODARONE HYDROCHLORIDE 50 MG/ML
150 INJECTION, SOLUTION INTRAVENOUS ONCE
Status: DISCONTINUED | OUTPATIENT
Start: 2020-02-15 | End: 2020-02-15

## 2020-02-15 RX ORDER — AMIODARONE HYDROCHLORIDE 50 MG/ML
150 INJECTION, SOLUTION INTRAVENOUS ONCE
Status: COMPLETED | OUTPATIENT
Start: 2020-02-15 | End: 2020-02-15

## 2020-02-15 RX ORDER — FUROSEMIDE 10 MG/ML
40 INJECTION INTRAMUSCULAR; INTRAVENOUS ONCE
Status: COMPLETED | OUTPATIENT
Start: 2020-02-15 | End: 2020-02-15

## 2020-02-15 RX ADMIN — AMIODARONE HYDROCHLORIDE 150 MG: 50 INJECTION, SOLUTION INTRAVENOUS at 21:45

## 2020-02-15 RX ADMIN — DILTIAZEM HYDROCHLORIDE 5 MG/HR: 5 INJECTION INTRAVENOUS at 20:12

## 2020-02-15 RX ADMIN — OXYCODONE HYDROCHLORIDE AND ACETAMINOPHEN 1 TABLET: 7.5; 325 TABLET ORAL at 22:02

## 2020-02-15 RX ADMIN — FUROSEMIDE 40 MG: 10 INJECTION, SOLUTION INTRAMUSCULAR; INTRAVENOUS at 22:03

## 2020-02-15 RX ADMIN — DILTIAZEM HYDROCHLORIDE 10 MG: 5 INJECTION INTRAVENOUS at 20:04

## 2020-02-16 PROBLEM — IMO0001 ALCOHOLISM /ALCOHOL ABUSE: Status: ACTIVE | Noted: 2020-02-16

## 2020-02-16 LAB
ALBUMIN SERPL-MCNC: 3.5 G/DL (ref 3.5–5.2)
ALBUMIN/GLOB SERPL: 1.4 G/DL
ALP SERPL-CCNC: 87 U/L (ref 39–117)
ALT SERPL W P-5'-P-CCNC: 25 U/L (ref 1–41)
ANION GAP SERPL CALCULATED.3IONS-SCNC: 11 MMOL/L (ref 5–15)
AST SERPL-CCNC: 17 U/L (ref 1–40)
BASOPHILS # BLD AUTO: 0.03 10*3/MM3 (ref 0–0.2)
BASOPHILS NFR BLD AUTO: 0.4 % (ref 0–1.5)
BILIRUB SERPL-MCNC: 1.1 MG/DL (ref 0.2–1.2)
BUN BLD-MCNC: 24 MG/DL (ref 8–23)
BUN/CREAT SERPL: 26.1 (ref 7–25)
CALCIUM SPEC-SCNC: 8.8 MG/DL (ref 8.6–10.5)
CHLORIDE SERPL-SCNC: 107 MMOL/L (ref 98–107)
CO2 SERPL-SCNC: 29 MMOL/L (ref 22–29)
CREAT BLD-MCNC: 0.92 MG/DL (ref 0.76–1.27)
DEPRECATED RDW RBC AUTO: 49 FL (ref 37–54)
EOSINOPHIL # BLD AUTO: 0.06 10*3/MM3 (ref 0–0.4)
EOSINOPHIL NFR BLD AUTO: 0.7 % (ref 0.3–6.2)
ERYTHROCYTE [DISTWIDTH] IN BLOOD BY AUTOMATED COUNT: 13.6 % (ref 12.3–15.4)
GFR SERPL CREATININE-BSD FRML MDRD: 81 ML/MIN/1.73
GLOBULIN UR ELPH-MCNC: 2.5 GM/DL
GLUCOSE BLD-MCNC: 96 MG/DL (ref 65–99)
HCT VFR BLD AUTO: 48.1 % (ref 37.5–51)
HGB BLD-MCNC: 16.2 G/DL (ref 13–17.7)
IMM GRANULOCYTES # BLD AUTO: 0.05 10*3/MM3 (ref 0–0.05)
IMM GRANULOCYTES NFR BLD AUTO: 0.6 % (ref 0–0.5)
LYMPHOCYTES # BLD AUTO: 2.08 10*3/MM3 (ref 0.7–3.1)
LYMPHOCYTES NFR BLD AUTO: 25.8 % (ref 19.6–45.3)
MAGNESIUM SERPL-MCNC: 2.3 MG/DL (ref 1.6–2.4)
MCH RBC QN AUTO: 32.7 PG (ref 26.6–33)
MCHC RBC AUTO-ENTMCNC: 33.7 G/DL (ref 31.5–35.7)
MCV RBC AUTO: 97.2 FL (ref 79–97)
MONOCYTES # BLD AUTO: 0.8 10*3/MM3 (ref 0.1–0.9)
MONOCYTES NFR BLD AUTO: 9.9 % (ref 5–12)
NEUTROPHILS # BLD AUTO: 5.05 10*3/MM3 (ref 1.7–7)
NEUTROPHILS NFR BLD AUTO: 62.6 % (ref 42.7–76)
NRBC BLD AUTO-RTO: 0 /100 WBC (ref 0–0.2)
PHOSPHATE SERPL-MCNC: 3.6 MG/DL (ref 2.5–4.5)
PLATELET # BLD AUTO: 198 10*3/MM3 (ref 140–450)
PMV BLD AUTO: 9.2 FL (ref 6–12)
POTASSIUM BLD-SCNC: 3.5 MMOL/L (ref 3.5–5.2)
PROT SERPL-MCNC: 6 G/DL (ref 6–8.5)
RBC # BLD AUTO: 4.95 10*6/MM3 (ref 4.14–5.8)
SODIUM BLD-SCNC: 147 MMOL/L (ref 136–145)
T3FREE SERPL-MCNC: 2.79 PG/ML (ref 2–4.4)
WBC NRBC COR # BLD: 8.07 10*3/MM3 (ref 3.4–10.8)

## 2020-02-16 PROCEDURE — 83735 ASSAY OF MAGNESIUM: CPT | Performed by: INTERNAL MEDICINE

## 2020-02-16 PROCEDURE — 85025 COMPLETE CBC W/AUTO DIFF WBC: CPT | Performed by: INTERNAL MEDICINE

## 2020-02-16 PROCEDURE — 94760 N-INVAS EAR/PLS OXIMETRY 1: CPT

## 2020-02-16 PROCEDURE — 80053 COMPREHEN METABOLIC PANEL: CPT | Performed by: INTERNAL MEDICINE

## 2020-02-16 PROCEDURE — 84100 ASSAY OF PHOSPHORUS: CPT | Performed by: INTERNAL MEDICINE

## 2020-02-16 PROCEDURE — 94799 UNLISTED PULMONARY SVC/PX: CPT

## 2020-02-16 PROCEDURE — 25010000002 PIPERACILLIN SOD-TAZOBACTAM PER 1 G: Performed by: INTERNAL MEDICINE

## 2020-02-16 PROCEDURE — 94640 AIRWAY INHALATION TREATMENT: CPT

## 2020-02-16 RX ORDER — HYDROXYZINE HYDROCHLORIDE 25 MG/1
25 TABLET, FILM COATED ORAL 3 TIMES DAILY PRN
Status: DISCONTINUED | OUTPATIENT
Start: 2020-02-16 | End: 2020-02-17 | Stop reason: HOSPADM

## 2020-02-16 RX ORDER — ACETAMINOPHEN 325 MG/1
650 TABLET ORAL EVERY 4 HOURS PRN
Status: DISCONTINUED | OUTPATIENT
Start: 2020-02-16 | End: 2020-02-17 | Stop reason: HOSPADM

## 2020-02-16 RX ORDER — ONDANSETRON 2 MG/ML
4 INJECTION INTRAMUSCULAR; INTRAVENOUS EVERY 6 HOURS PRN
Status: DISCONTINUED | OUTPATIENT
Start: 2020-02-16 | End: 2020-02-17 | Stop reason: HOSPADM

## 2020-02-16 RX ORDER — AMLODIPINE BESYLATE 10 MG/1
10 TABLET ORAL DAILY
COMMUNITY
End: 2020-02-17 | Stop reason: HOSPADM

## 2020-02-16 RX ORDER — CILOSTAZOL 100 MG/1
100 TABLET ORAL 2 TIMES DAILY
Status: DISCONTINUED | OUTPATIENT
Start: 2020-02-16 | End: 2020-02-17 | Stop reason: HOSPADM

## 2020-02-16 RX ORDER — ATENOLOL 100 MG/1
100 TABLET ORAL DAILY
COMMUNITY
End: 2020-02-17 | Stop reason: HOSPADM

## 2020-02-16 RX ORDER — CEPHALEXIN 500 MG/1
500 CAPSULE ORAL 4 TIMES DAILY
COMMUNITY
End: 2020-02-17 | Stop reason: HOSPADM

## 2020-02-16 RX ORDER — MULTIVIT,CALC,MINS/IRON/FOLIC 9MG-400MCG
1 TABLET ORAL DAILY
Status: DISCONTINUED | OUTPATIENT
Start: 2020-02-16 | End: 2020-02-17 | Stop reason: HOSPADM

## 2020-02-16 RX ORDER — AZITHROMYCIN 250 MG/1
250 TABLET, FILM COATED ORAL DAILY
COMMUNITY
Start: 2020-02-15 | End: 2020-02-17 | Stop reason: HOSPADM

## 2020-02-16 RX ORDER — HYDROXYZINE HYDROCHLORIDE 25 MG/1
25 TABLET, FILM COATED ORAL 3 TIMES DAILY PRN
Qty: 20 TABLET | Refills: 0 | Status: SHIPPED | OUTPATIENT
Start: 2020-02-16 | End: 2020-02-17 | Stop reason: SDUPTHER

## 2020-02-16 RX ORDER — GUAIFENESIN 600 MG/1
1200 TABLET, EXTENDED RELEASE ORAL EVERY 12 HOURS SCHEDULED
Status: DISCONTINUED | OUTPATIENT
Start: 2020-02-16 | End: 2020-02-17 | Stop reason: HOSPADM

## 2020-02-16 RX ORDER — HYDROCHLOROTHIAZIDE 25 MG/1
25 TABLET ORAL DAILY
COMMUNITY
End: 2020-02-17 | Stop reason: HOSPADM

## 2020-02-16 RX ORDER — SODIUM CHLORIDE 0.9 % (FLUSH) 0.9 %
10 SYRINGE (ML) INJECTION EVERY 12 HOURS SCHEDULED
Status: DISCONTINUED | OUTPATIENT
Start: 2020-02-16 | End: 2020-02-17 | Stop reason: HOSPADM

## 2020-02-16 RX ORDER — SODIUM CHLORIDE 0.9 % (FLUSH) 0.9 %
10 SYRINGE (ML) INJECTION AS NEEDED
Status: DISCONTINUED | OUTPATIENT
Start: 2020-02-16 | End: 2020-02-17 | Stop reason: HOSPADM

## 2020-02-16 RX ORDER — BUDESONIDE AND FORMOTEROL FUMARATE DIHYDRATE 160; 4.5 UG/1; UG/1
2 AEROSOL RESPIRATORY (INHALATION)
Status: DISCONTINUED | OUTPATIENT
Start: 2020-02-16 | End: 2020-02-17 | Stop reason: HOSPADM

## 2020-02-16 RX ADMIN — TAZOBACTAM SODIUM AND PIPERACILLIN SODIUM 3.38 G: 375; 3 INJECTION, SOLUTION INTRAVENOUS at 09:05

## 2020-02-16 RX ADMIN — TAZOBACTAM SODIUM AND PIPERACILLIN SODIUM 3.38 G: 375; 3 INJECTION, SOLUTION INTRAVENOUS at 02:27

## 2020-02-16 RX ADMIN — ACETAMINOPHEN 650 MG: 325 TABLET, FILM COATED ORAL at 13:43

## 2020-02-16 RX ADMIN — ALBUTEROL SULFATE 1.25 MG: 2.5 SOLUTION RESPIRATORY (INHALATION) at 20:01

## 2020-02-16 RX ADMIN — IPRATROPIUM BROMIDE 0.5 MG: 0.5 SOLUTION RESPIRATORY (INHALATION) at 10:46

## 2020-02-16 RX ADMIN — RIVAROXABAN 20 MG: 20 TABLET, FILM COATED ORAL at 17:24

## 2020-02-16 RX ADMIN — BUDESONIDE AND FORMOTEROL FUMARATE DIHYDRATE 2 PUFF: 160; 4.5 AEROSOL RESPIRATORY (INHALATION) at 20:01

## 2020-02-16 RX ADMIN — SODIUM CHLORIDE, PRESERVATIVE FREE 10 ML: 5 INJECTION INTRAVENOUS at 09:07

## 2020-02-16 RX ADMIN — METOPROLOL TARTRATE 25 MG: 25 TABLET, FILM COATED ORAL at 21:43

## 2020-02-16 RX ADMIN — SODIUM CHLORIDE, PRESERVATIVE FREE 10 ML: 5 INJECTION INTRAVENOUS at 02:27

## 2020-02-16 RX ADMIN — IPRATROPIUM BROMIDE 0.5 MG: 0.5 SOLUTION RESPIRATORY (INHALATION) at 20:01

## 2020-02-16 RX ADMIN — CILOSTAZOL 100 MG: 100 TABLET ORAL at 09:07

## 2020-02-16 RX ADMIN — IPRATROPIUM BROMIDE 0.5 MG: 0.5 SOLUTION RESPIRATORY (INHALATION) at 07:31

## 2020-02-16 RX ADMIN — Medication 1 TABLET: at 09:06

## 2020-02-16 RX ADMIN — ACETAMINOPHEN 650 MG: 325 TABLET, FILM COATED ORAL at 19:14

## 2020-02-16 RX ADMIN — HYDROXYZINE HYDROCHLORIDE 25 MG: 25 TABLET ORAL at 21:43

## 2020-02-16 RX ADMIN — IPRATROPIUM BROMIDE 0.5 MG: 0.5 SOLUTION RESPIRATORY (INHALATION) at 15:06

## 2020-02-16 RX ADMIN — ACETAMINOPHEN 650 MG: 325 TABLET, FILM COATED ORAL at 02:27

## 2020-02-16 RX ADMIN — ALBUTEROL SULFATE 1.25 MG: 2.5 SOLUTION RESPIRATORY (INHALATION) at 10:46

## 2020-02-16 RX ADMIN — CILOSTAZOL 100 MG: 100 TABLET ORAL at 21:43

## 2020-02-16 RX ADMIN — ALBUTEROL SULFATE 1.25 MG: 2.5 SOLUTION RESPIRATORY (INHALATION) at 15:07

## 2020-02-16 RX ADMIN — METOPROLOL TARTRATE 25 MG: 25 TABLET, FILM COATED ORAL at 09:06

## 2020-02-16 RX ADMIN — SODIUM CHLORIDE, PRESERVATIVE FREE 10 ML: 5 INJECTION INTRAVENOUS at 21:43

## 2020-02-16 RX ADMIN — ALBUTEROL SULFATE 1.25 MG: 2.5 SOLUTION RESPIRATORY (INHALATION) at 07:31

## 2020-02-16 RX ADMIN — GUAIFENESIN 1200 MG: 600 TABLET, EXTENDED RELEASE ORAL at 21:43

## 2020-02-16 RX ADMIN — GUAIFENESIN 1200 MG: 600 TABLET, EXTENDED RELEASE ORAL at 09:07

## 2020-02-16 NOTE — PLAN OF CARE
Problem: Patient Care Overview  Goal: Plan of Care Review  Outcome: Ongoing (interventions implemented as appropriate)  Flowsheets (Taken 2/16/2020 0538)  Progress: no change  Plan of Care Reviewed With: patient  Outcome Summary: Admitted to  from ER for Aflutter. Pt. recently discharged on 2/12 for PNA and also had some afib/flutter during that admission. Pt. has been sinus since arrival to floor. VSS. PRN Tylenol given for mild pain in L foot from wound. Plans for cardiology consult today. Sinus susie/Sinus 57-69 on tele. Will continue to monitor and await further MD orders.  Goal: Individualization and Mutuality  Outcome: Ongoing (interventions implemented as appropriate)  Goal: Discharge Needs Assessment  Outcome: Ongoing (interventions implemented as appropriate)  Goal: Interprofessional Rounds/Family Conf  Outcome: Ongoing (interventions implemented as appropriate)     Problem: Fall Risk (Adult)  Goal: Identify Related Risk Factors and Signs and Symptoms  Outcome: Ongoing (interventions implemented as appropriate)  Flowsheets (Taken 2/16/2020 0538)  Related Risk Factors (Fall Risk): gait/mobility problems; environment unfamiliar  Signs and Symptoms (Fall Risk): presence of risk factors  Goal: Absence of Fall  Outcome: Ongoing (interventions implemented as appropriate)  Flowsheets (Taken 2/16/2020 0538)  Absence of Fall: achieves outcome     Problem: Arrhythmia/Dysrhythmia (Symptomatic) (Adult)  Goal: Signs and Symptoms of Listed Potential Problems Will be Absent, Minimized or Managed (Arrhythmia/Dysrhythmia)  Outcome: Ongoing (interventions implemented as appropriate)  Flowsheets (Taken 2/16/2020 0538)  Problems Assessed (Arrhythmia/Dysrhythmia): all  Problems Present (Dysrhythmia): none

## 2020-02-16 NOTE — PLAN OF CARE
Problem: Patient Care Overview  Goal: Plan of Care Review  Outcome: Ongoing (interventions implemented as appropriate)  Flowsheets (Taken 2/16/2020 0647)  Progress: no change  Plan of Care Reviewed With: patient  Outcome Summary: VSS. NSR/SB 54-80 on tele. C/o left foot pain, medicated with PRN Tylenol with relief obtained. IV abx DC'd. Plans for pt to go home today but pt very anxious about it all. Atarax TID PRN started today. Left posterior heel has wounds x2. New pics uploaded to chart. Cleansed with sterile NS and gauze and Kerlix used to wrap it. Plans for poss DC tomorrow. Safety maintained. Will cont to monitor and call MD with any changes.

## 2020-02-16 NOTE — PROGRESS NOTES
[facility]  INPATIENT PROGRESS NOTE    Patient Name: Kingsley Lerma  Date of Admission: 2/15/2020  Today's Date: 02/16/20  Length of Stay: 1  Primary Care Physician: Provider, No Known    Subjective   Chief Complaint: anxious  HPI     Patient seen and examined at bedside.   [facility] and did not send him the rest of his antibiotic.  They switched him to atenolol.  When I discussed with the patient he is unsure why they changed the medications.  They also did not send him an appropriate supply of the medications.  Patient had incorrect medications as well as the wrong medications.  He is unsure the reasoning.    Was planning to discharge the patient today but he requested to stay another day as he feels very anxious about going home and would like to stay a night and try hydroxyzine and make sure his rate stays in rhythm.        Review of Systems   Constitutional: Negative for activity change, appetite change, chills, diaphoresis, fatigue and fever.   Respiratory: Negative for cough and shortness of breath.    Cardiovascular: Positive for palpitations. Negative for chest pain.   Gastrointestinal: Negative for abdominal distention, abdominal pain, constipation, diarrhea, nausea and vomiting.   Psychiatric/Behavioral: Positive for dysphoric mood. The patient is nervous/anxious.         All pertinent negatives and positives are as above. All other systems have been reviewed and are negative unless otherwise stated.     Objective    Temp:  [97.5 °F (36.4 °C)-98.4 °F (36.9 °C)] 98.4 °F (36.9 °C)  Heart Rate:  [] 67  Resp:  [14-18] 16  BP: (112-147)/(44-89) 147/59  Physical Exam   Constitutional: He is oriented to person, place, and time. No distress.   HENT:   Head: Normocephalic and atraumatic.   Eyes: Conjunctivae are normal. No scleral icterus.   Neck: Neck supple. No JVD present.   Cardiovascular:  Normal rate and regular rhythm.   Murmur heard.  Pulmonary/Chest: Effort normal and breath sounds normal. No stridor. No respiratory distress.   Abdominal: Soft. Bowel sounds are normal. He exhibits no distension. There is no tenderness. There is no guarding.   Musculoskeletal: He exhibits no edema.   Neurological: He is alert and oriented to person, place, and time.   Skin: Skin is warm and dry. He is not diaphoretic. No erythema.   Psychiatric: His behavior is normal. Thought content normal. His mood appears anxious.   Nursing note and vitals reviewed.          Results Review:  I have reviewed the labs, radiology results, and diagnostic studies.    Laboratory Data:   Results from last 7 days   Lab Units 02/16/20  0501 02/15/20  1948 02/12/20  0425   WBC 10*3/mm3 8.07 9.22 5.69   HEMOGLOBIN g/dL 16.2 19.1* 16.4   HEMATOCRIT % 48.1 56.4* 47.7   PLATELETS 10*3/mm3 198 215 178        Results from last 7 days   Lab Units 02/16/20  0501 02/15/20  1948 02/12/20  0425  02/10/20  1204   SODIUM mmol/L 147* 145 142   < > 138   POTASSIUM mmol/L 3.5 3.8 3.8   < > 3.9   CHLORIDE mmol/L 107 103 106   < > 99   CO2 mmol/L 29.0 26.0 23.0   < > 25.0   BUN mg/dL 24* 25* 16   < > 18   CREATININE mg/dL 0.92 0.78 0.67*   < > 0.76   CALCIUM mg/dL 8.8 9.7 9.4   < > 9.8   BILIRUBIN mg/dL 1.1 1.2  --   --  0.9   ALK PHOS U/L 87 113  --   --  132*   ALT (SGPT) U/L 25 33  --   --  21   AST (SGOT) U/L 17 22  --   --  24   GLUCOSE mg/dL 96 108* 233*   < > 96    < > = values in this interval not displayed.       Culture Data:   Blood Culture   Date Value Ref Range Status   02/10/2020 No growth at 5 days  Final   02/10/2020 No growth at 5 days  Final     Respiratory Culture   Date Value Ref Range Status   02/10/2020 Light growth (2+) Normal Respiratory Yudi  Final       Radiology Data:   Imaging Results (Last 24 Hours)     Procedure Component Value Units Date/Time    XR Chest 1 View [[id number]] Collected:  02/15/20 2036     Updated:  02/15/20  2040    Narrative:       EXAMINATION: XR CHEST 1 VW-     2/15/2020 8:00 PM CST     HISTORY: Tachycardia.     One view chest x-ray compared with 02/10/2020.     Heart size is normal.  The mediastinum is within normal limits.      The lungs are normally expanded with no pneumonia or pneumothorax.     No congestive failure changes.                                                                       Impression:       1. No acute disease.           This report was finalized on 02/15/2020 20:36 by Dr. Alfred Hernández MD.          I have reviewed the patient's current medications.     Assessment/Plan     Active Hospital Problems    Diagnosis   • Alcoholism /alcohol abuse (CMS/HCC)   • Atrial flutter (CMS/HCC)   • Essential hypertension   • Former tobacco use   • PVD (peripheral vascular disease) (CMS/HCC)       Plan:  1.  No indication for antibiotics currently  2.  Continue metoprolol   3.  Continue anticoagulation  4.  Recent echo reviewed  5.  Hydroxyzine PRN for anxiety  6.  Recommended patient consider psychotherapy  7.  Provided paper scripts for medications for family to get filled at [facility]               Discharge Planning: I expect the patient to be discharged to home in 1 days.    Jeff Horan MD   02/16/20   1:25 PM

## 2020-02-16 NOTE — ED NOTES
Spoke with Dr. Ruffin regarding pt's NSR hr. She states that I am able to downgrade him to a tele bed on the 4th floor and to stop the cardizem.      Nuris Erwin, RN  02/15/20 3342

## 2020-02-16 NOTE — ED NOTES
Pt clinicals faxed to [facility]. Spoke to Manuel LOVE, RN who gives okay for pt to be admitted to this facility per the pt's request.     Fide Kumar  02/15/20 2666

## 2020-02-16 NOTE — ED PROVIDER NOTES
Subjective   History of Present Illness    Patient is a pleasant 74-year-old male who presents to ED with family.  Chief complaint is tachycardia.  The patient reports that he was recently hospitalized due to pneumonia.  He was discharged with azithromycin, cefdinir, and a steroid pack.  The patient reports taking prednisone today and he felt an anxious feeling soon thereafter.  That had resolved he had taken his vitals measuring at 150.  The patient denies any known arrhythmias in the past although he is on amlodipine.  The patient denies any associated chest pain, pressure, or tightness.  He denies any shortness of breath.  He denies wearing oxygen at home.  He denies any fever.  He feels like his pneumonia is improving.  He admits to drinking nightly where he drinks bourbon.  He has not drank this evening.  He denies any change in leg pain.  He does have chronic left leg pain secondary to a rib fracture in the remote past as well as known wounds taken care of by his podiatrist.  This remains unchanged.  He is also on Xarelto.  Per epic, the patient did develop an episode of atrial flutter while in the hospital.  He does not recall this.  He denies any other arrhythmias prior to this.  Ports he feels fine otherwise.  Also of note, the patient's O2 sat was currently 90% on room air while in triage so he was placed on oxygen.  The patient does not feel short of breath.  He denies any worsening cough.    Review of Systems   Constitutional: Negative for activity change and appetite change.   HENT: Negative.    Eyes: Negative.    Respiratory: Positive for cough. Negative for chest tightness, shortness of breath, wheezing and stridor.    Cardiovascular: Negative.  Negative for chest pain, palpitations and leg swelling.   Gastrointestinal: Negative.    Genitourinary: Negative.    Musculoskeletal: Negative.    Skin: Negative.    Neurological: Negative.    Psychiatric/Behavioral: The patient is nervous/anxious.    All  other systems reviewed and are negative.      Past Medical History:   Diagnosis Date   • Arthritis    • Hypertension    • PVD (peripheral vascular disease) (CMS/HCC)        No Known Allergies    No past surgical history on file.    Family History   Problem Relation Age of Onset   • Dementia Mother    • Cancer Father    • Pancreatic cancer Father        Social History     Socioeconomic History   • Marital status:      Spouse name: Not on file   • Number of children: Not on file   • Years of education: Not on file   • Highest education level: Not on file   Tobacco Use   • Smoking status: Former Smoker   • Smokeless tobacco: Never Used   • Tobacco comment: quit on Friday    Substance and Sexual Activity   • Alcohol use: Yes     Frequency: Never       Prior to Admission medications    Medication Sig Start Date End Date Taking? Authorizing Provider   azithromycin (ZITHROMAX) 500 MG tablet Take 1 tablet by mouth Daily. 2/12/20   Soham Parker APRN   azithromycin (ZITHROMAX) 250 MG tablet Take 2 tablets by mouth for 2 days 2/12/20 2/14/20  Soham Parker APRN   cefdinir (OMNICEF) 300 MG capsule Take 1 capsule by mouth 2 (Two) Times a Day. 2/12/20   Soham Parker APRN   cefdinir (OMNICEF) 300 MG capsule Take 1 capsule by mouth 2 (Two) Times a Day. 2/12/20   Soham Parker APRN   cilostazol (PLETAL) 100 MG tablet Take 100 mg by mouth 2 (Two) Times a Day.    Provider, MD Jose   guaiFENesin (MUCINEX) 600 MG 12 hr tablet Take 2 tablets by mouth Every 12 (Twelve) Hours. 2/12/20   Soham Parker APRN   methylPREDNISolone (MEDROL, BHARGAVI,) 4 MG tablet Take as directed on package instructions. 2/12/20   Soham Parker APRN   metoprolol tartrate 37.5 MG tablet Take 37.5 mg by mouth Every 12 (Twelve) Hours for 30 days. 2/12/20 3/13/20  Soham Parker APRN   metoprolol tartrate (LOPRESSOR) 25 MG tablet Take 1 & 1/2 tablet by mouth 2 (Two) Times a Day. 2/12/20   Soham Parker APRN  "  Multiple Vitamins-Minerals (MULTIVITAMIN WITH MINERALS) tablet tablet Take 1 tablet by mouth Daily.    Provider, MD Jose   rivaroxaban (XARELTO) 20 MG tablet Take 1 tablet by mouth Daily With Dinner. Indications: Atrial Fibrillation 2/12/20   Soham Parker APRN   rivaroxaban (XARELTO) 20 MG tablet Take 1 tablet by mouth Daily. 2/12/20   Soham Parker APRN   sildenafil (VIAGRA) 50 MG tablet Take 50 mg by mouth Daily As Needed (Sexual Activity).    Provider, MD Jose       Medications   dilTIAZem (CARDIZEM) 125 mg in sodium chloride 0.9 % 125 mL (1 mg/mL) infusion (10 mg/hr Intravenous Rate/Dose Change 2/15/20 2049)   sodium chloride 0.9 % flush 10 mL (has no administration in time range)   furosemide (LASIX) injection 40 mg (has no administration in time range)   oxyCODONE-acetaminophen (PERCOCET) 7.5-325 MG per tablet 1 tablet (has no administration in time range)   dilTIAZem (CARDIZEM) injection 10 mg (10 mg Intravenous Given 2/15/20 2004)   amiodarone (CORDARONE) injection 150 mg (150 mg Intravenous Given 2/15/20 2145)       /89 (BP Location: Right arm, Patient Position: Sitting)   Pulse (!) 150   Temp 97.8 °F (36.6 °C) (Oral)   Resp 17   Ht 170.2 cm (67\")   Wt 78 kg (172 lb)   SpO2 92%   BMI 26.94 kg/m²       Objective   Physical Exam   Constitutional: He is oriented to person, place, and time. He appears well-developed and well-nourished.   HENT:   Head: Normocephalic and atraumatic.   Right Ear: External ear normal.   Left Ear: External ear normal.   Nose: Nose normal.   Mouth/Throat: Oropharynx is clear and moist.   Eyes: Pupils are equal, round, and reactive to light. Conjunctivae and EOM are normal.   Neck: Normal range of motion. Neck supple. No tracheal deviation present.   Cardiovascular: Normal rate, regular rhythm, normal heart sounds and intact distal pulses. Exam reveals no gallop and no friction rub.   No murmur heard.  Pulmonary/Chest: Effort normal and breath " sounds normal. No respiratory distress. He has no wheezes. He has no rales. He exhibits no tenderness.   Abdominal: Soft. Bowel sounds are normal. He exhibits no distension and no mass. There is no tenderness. There is no rebound and no guarding.   Musculoskeletal: Normal range of motion. He exhibits no edema or deformity.        Left lower leg: He exhibits tenderness.   Patient does have wounds to the left posterior heel inferior to the Achilles.  This is chronic.   Neurological: He is alert and oriented to person, place, and time. He has normal reflexes.   Skin: Skin is warm and dry. Capillary refill takes less than 2 seconds. No rash noted. No erythema. No pallor.   Psychiatric: He has a normal mood and affect. His behavior is normal. Judgment and thought content normal.   Vitals reviewed.      Procedures         Lab Results (last 24 hours)     Procedure Component Value Units Date/Time    [place] Blood Culture Bottle Set [[id number]] Collected:  02/15/20 1948    Specimen:  Blood from Arm, Right Updated:  02/15/20 2100     Extra Tube Hold for add-ons.     Comment: Auto resulted.       CBC & Differential [[id number]] Collected:  02/15/20 1948    Specimen:  Blood Updated:  02/15/20 2009    Narrative:       The following orders were created for panel order CBC & Differential.  Procedure                               Abnormality         Status                     ---------                               -----------         ------                     CBC Auto Differential[[id number]]        Abnormal            Final result                 Please view results for these tests on the individual orders.    Comprehensive Metabolic Panel [[id number]]  (Abnormal) Collected:  02/15/20 1948    Specimen:  Blood Updated:  02/15/20 2032     Glucose 108 mg/dL      BUN 25 mg/dL      Creatinine 0.78 mg/dL      Sodium 145 mmol/L      Potassium 3.8 mmol/L      Chloride 103 mmol/L      CO2 26.0 mmol/L      Calcium 9.7 mg/dL      Total  Protein 7.5 g/dL      Albumin 4.20 g/dL      ALT (SGPT) 33 U/L      AST (SGOT) 22 U/L      Alkaline Phosphatase 113 U/L      Total Bilirubin 1.2 mg/dL      eGFR Non African Amer 98 mL/min/1.73      Globulin 3.3 gm/dL      A/G Ratio 1.3 g/dL      BUN/Creatinine Ratio 32.1     Anion Gap 16.0 mmol/L     Narrative:       GFR Normal >60  Chronic Kidney Disease <60  Kidney Failure <15      Troponin [[id number]]  (Normal) Collected:  02/15/20 1948    Specimen:  Blood Updated:  02/15/20 2031     Troponin T <0.010 ng/mL     Narrative:       Troponin T Reference Range:  <= 0.03 ng/mL-   Negative for AMI  >0.03 ng/mL-     Abnormal for myocardial necrosis.  Clinicians would have to utilize clinical acumen, EKG, Troponin and serial changes to determine if it is an Acute Myocardial Infarction or myocardial injury due to an underlying chronic condition.       Results may be falsely decreased if patient taking Biotin.      BNP [[id number]]  (Abnormal) Collected:  02/15/20 1948    Specimen:  Blood Updated:  02/15/20 2031     proBNP 2,062.0 pg/mL     Narrative:       Among patients with dyspnea, NT-proBNP is highly sensitive for the detection of acute congestive heart failure. In addition NT-proBNP of <300 pg/ml effectively rules out acute congestive heart failure with 99% negative predictive value.    Results may be falsely decreased if patient taking Biotin.      TSH [[id number]]  (Normal) Collected:  02/15/20 1948    Specimen:  Blood Updated:  02/15/20 2031     TSH 1.950 uIU/mL     T4, Free [[id number]]  (Normal) Collected:  02/15/20 1948    Specimen:  Blood Updated:  02/15/20 2031     Free T4 1.46 ng/dL     Narrative:       Results may be falsely increased if patient taking Biotin.      CBC Auto Differential [[id number]]  (Abnormal) Collected:  02/15/20 1948    Specimen:  Blood Updated:  02/15/20 2009     WBC 9.22 10*3/mm3      RBC 5.81 10*6/mm3      Hemoglobin 19.1 g/dL      Hematocrit 56.4 %      MCV 97.1 fL      MCH 32.9 pg       MCHC 33.9 g/dL      RDW 13.7 %      RDW-SD 49.1 fl      MPV 9.0 fL      Platelets 215 10*3/mm3      Neutrophil % 78.2 %      Lymphocyte % 15.1 %      Monocyte % 6.0 %      Eosinophil % 0.0 %      Basophil % 0.3 %      Immature Grans % 0.4 %      Neutrophils, Absolute 7.21 10*3/mm3      Lymphocytes, Absolute 1.39 10*3/mm3      Monocytes, Absolute 0.55 10*3/mm3      Eosinophils, Absolute 0.00 10*3/mm3      Basophils, Absolute 0.03 10*3/mm3      Immature Grans, Absolute 0.04 10*3/mm3      nRBC 0.0 /100 WBC     Blood Gas, Arterial [[id number]]  (Abnormal) Collected:  02/15/20 2025    Specimen:  Arterial Blood Updated:  02/15/20 2031     Site Right Radial     Nathaniel's Test Positive     pH, Arterial 7.501 pH units      Comment: 83 Value above reference range        pCO2, Arterial 31.2 mm Hg      Comment: 84 Value below reference range        pO2, Arterial 68.2 mm Hg      Comment: 84 Value below reference range        HCO3, Arterial 24.4 mmol/L      Base Excess, Arterial 2.2 mmol/L      Comment: 83 Value above reference range        O2 Saturation, Arterial 94.6 %      Temperature 37.0 C      Barometric Pressure for Blood Gas 755 mmHg      Modality Nasal Cannula     Flow Rate 2.0 lpm      Ventilator Mode NA     Collected by [id number]     Comment: Meter: [id number]     :  [id number]             Xr Chest 1 View    Result Date: 2/15/2020  Narrative: EXAMINATION: XR CHEST 1 -  2/15/2020 8:00 PM CST  HISTORY: Tachycardia.  One view chest x-ray compared with 02/10/2020.  Heart size is normal. The mediastinum is within normal limits.  The lungs are normally expanded with no pneumonia or pneumothorax.  No congestive failure changes.                                                                      Impression: 1. No acute disease.    This report was finalized on 02/15/2020 20:36 by Dr. Alfred Hernández MD.    Xr Chest 1 View    Result Date: 2/10/2020  Narrative: EXAMINATION: XR CHEST 1 - 2/10/2020 12:14 PM CST  HISTORY:  Shortness of breath.  REPORT: One-view chest: Frontal view of the chest was obtained. The lungs are clear and normally expanded. The cardiac and mediastinal silhouettes are within normal limits. The visualized bony thorax and upper abdomen are unremarkable.                                                                                                                                                 Impression: No acute cardiopulmonary abnormality.  This report was finalized on 02/10/2020 12:16 by Dr. Napoleon Ortiz MD.    Ct Angiogram Chest    Result Date: 2/10/2020  Narrative: EXAMINATION: CT ANGIOGRAM CHEST-   2/10/2020 2:31 PM CST  HISTORY: Chest pain, acute, PE suspected, intermed prob, negative D-dimer  In order to have a CT radiation dose as low as reasonably achievable Automated Exposure Control was utilized for adjustment of the mA and/or KV according to patient size.  DLP in mGycm= 505.  CT angiography protocol. CT imaging with bolus IV contrast injection. Under concurrent supervision axial, sagittal, coronal, and three-dimensional data sets were constructed.  Normal heart size. Coronary artery calcification. No thoracic aortic aneurysm or dissection.  Symmetric pulmonary arteries. No pulmonary embolism.  There is a linear band of acute pneumonia within the base of the right upper lobe.  No pneumothorax or pleural effusion.  The lungs are otherwise fully expanded and clear.  Summary: 1. Right upper lobe pneumonia. 2. No pulmonary embolism.            This report was finalized on 02/10/2020 14:50 by Dr. Alfred Hernández MD.      ED Course  ED Course as of Feb 15 2155   Sat Feb 15, 2020   2122 Eventually after 10 mics of Cardizem, the patient's heart rate did improve to  but still in a flutter.  [facility].    [TK]   2142 [facility]  I have a call out to hospitalist for admission.     [TK]   2155  Patient's heart rate did improve to the 120s but done back up to 130s to 140s with amiodarone.  Patient does request some pain medicine and wound dressing for his left heel wound.  I spoken with Dr. Escalera, hospitalist on-call.  She would like the patient admitted to the unit.    [TK]      ED Course User Index  [TK] Carla Owen PA          [facility]    Final diagnoses:   Atrial flutter, unspecified type (CMS/HCC)          Carla Owen PA  02/15/20 7465

## 2020-02-16 NOTE — PROGRESS NOTES
Discharge Planning Assessment  [facility]     Patient Name: Kingsley Lerma  MRN: [id number]  Today's Date: 2/16/2020    Admit Date: 2/15/2020    Discharge Needs Assessment     Row Name 02/16/20 0940       Living Environment    Lives With  alone    Current Living Arrangements  home/apartment/condo    Primary Care Provided by  self    Provides Primary Care For  no one    Family Caregiver if Needed  child(velvet), adult    Quality of Family Relationships  helpful;involved;supportive    Able to Return to Prior Arrangements  yes       Resource/Environmental Concerns    Resource/Environmental Concerns  none    Transportation Concerns  car, none       Transition Planning    Patient/Family Anticipates Transition to  home with family    Patient/Family Anticipated Services at Transition  none    Transportation Anticipated  family or friend will provide       Discharge Needs Assessment    Readmission Within the Last 30 Days  no previous admission in last 30 days    Concerns to be Addressed  no discharge needs identified;denies needs/concerns at this time    Anticipated Changes Related to Illness  none    Equipment Needed After Discharge  none    Discharge Coordination/Progress  Pt has RX coverage and a PCP through [facility]. Pt gets his meds through [facility]. Pt was discharged on 02/12 and was admitted again 02/15. Pt is independent and still works full time. Pt denies any needs at this time. Discharge summary will need faxed to [facility] [phone] SW will follow and assist with any discharge needs that may arise.         Discharge Plan    No documentation.       Destination      Coordination has not been started for this encounter.      Durable Medical Equipment      Coordination has not been started for this encounter.      Dialysis/Infusion      Coordination has not been started for this encounter.      Home Medical Care      Coordination has not been started for this encounter.      Therapy      Coordination  has not been started for this encounter.      Community Resources      Coordination has not been started for this encounter.          Demographic Summary    No documentation.       Functional Status    No documentation.       Psychosocial    No documentation.       Abuse/Neglect    No documentation.       Legal    No documentation.       Substance Abuse    No documentation.       Patient Forms    No documentation.           Ev Bog[place]

## 2020-02-16 NOTE — H&P
[facility] Medicine Services  HISTORY AND PHYSICAL    Date of Admission: 2/15/2020  Primary Care Physician: Provider, No Known    Subjective     Chief Complaint: Rapid heartbeat    History of Present Illness  74-year-old male who presents to the emergency department for rapid heartbeat.  He states he has a vigilant daughter-in-law who got a monitor and checked him today.  For quite some time his heart rate stayed in the 150s.  He came to the emergency department, but states that he feels fine.  He has no chest pain, no shortness of breath.  He states that he has been worried about his heart and his foot.  He has a foot wound.  The left foot is bandaged.  He sees Dr. Laughlin in [place].  He states this is a combination of arthritis and sores on the back of his foot that might have to do with circulation.  He is also having Achilles tendon issues.  The patient was on a Cardizem drip on my evaluation, but ED provider stated that amiodarone was going to be attempted.  The [facility] and [facility].  The patient feels that the reason that he started having difficulty with his heart is due to the prednisone.  The patient was just discharged from our facility on 2/12/2020 for diagnosis of pneumonia of the right upper lobe.      Review of Systems   Rapid heartbeat    Otherwise complete ROS reviewed and negative except as mentioned in the HPI.    Past Medical History:   Past Medical History:   Diagnosis Date   • Arthritis    • Hypertension    • PVD (peripheral vascular disease) (CMS/Formerly [facility])      Past Surgical History:No past surgical history on file.  Social History:  reports that he has quit smoking. He has never used smokeless tobacco. He reports that he drinks alcohol.    Family History: family history includes Cancer in his father; Dementia in his mother; Pancreatic cancer in his father.       Allergies:  No Known  "Allergies  Medications:  Prior to Admission medications    Medication Sig Start Date End Date Taking? Authorizing Provider   azithromycin (ZITHROMAX) 500 MG tablet Take 1 tablet by mouth Daily. 2/12/20   Soham Parker APRN   azithromycin (ZITHROMAX) 250 MG tablet Take 2 tablets by mouth for 2 days 2/12/20 2/14/20  Soham Parker APRN   cefdinir (OMNICEF) 300 MG capsule Take 1 capsule by mouth 2 (Two) Times a Day. 2/12/20   Soham Parker APRN   cefdinir (OMNICEF) 300 MG capsule Take 1 capsule by mouth 2 (Two) Times a Day. 2/12/20   Soham Parker APRN   cilostazol (PLETAL) 100 MG tablet Take 100 mg by mouth 2 (Two) Times a Day.    ProviderJose MD   guaiFENesin (MUCINEX) 600 MG 12 hr tablet Take 2 tablets by mouth Every 12 (Twelve) Hours. 2/12/20   Soham Parker APRN   methylPREDNISolone (MEDROL, BHARGAVI,) 4 MG tablet Take as directed on package instructions. 2/12/20   Soham Parker APRN   metoprolol tartrate 37.5 MG tablet Take 37.5 mg by mouth Every 12 (Twelve) Hours for 30 days. 2/12/20 3/13/20  Soham Parker APRN   metoprolol tartrate (LOPRESSOR) 25 MG tablet Take 1 & 1/2 tablet by mouth 2 (Two) Times a Day. 2/12/20   Soham Parker APRN   Multiple Vitamins-Minerals (MULTIVITAMIN WITH MINERALS) tablet tablet Take 1 tablet by mouth Daily.    ProviderJose MD   rivaroxaban (XARELTO) 20 MG tablet Take 1 tablet by mouth Daily With Dinner. Indications: Atrial Fibrillation 2/12/20   Soham Parker APRN   rivaroxaban (XARELTO) 20 MG tablet Take 1 tablet by mouth Daily. 2/12/20   Soham Parker APRN   sildenafil (VIAGRA) 50 MG tablet Take 50 mg by mouth Daily As Needed (Sexual Activity).    ProviderJose MD     Objective     Vital Signs: /89 (BP Location: Right arm, Patient Position: Sitting)   Pulse (!) 150   Temp 97.8 °F (36.6 °C) (Oral)   Resp 17   Ht 170.2 cm (67\")   Wt 78 kg (172 lb)   SpO2 92%   BMI 26.94 kg/m²   Physical Exam   HENT: "   Head: Normocephalic and atraumatic.   Nose: Nose normal.   Mouth/Throat: Oropharynx is clear and moist.   Left nasal cyst   Eyes: Conjunctivae and EOM are normal.   Neck: Normal range of motion. Neck supple.   Cardiovascular: Normal heart sounds.   Irregularly irregular and tachycardic   Pulmonary/Chest: Effort normal. He has wheezes. He has rales.   Abdominal: Soft. Bowel sounds are normal.   Musculoskeletal: He exhibits no edema or tenderness.   Neurological: He is alert. No cranial nerve deficit.   Skin: Skin is warm and dry.   Left foot bandaged   Psychiatric: He has a normal mood and affect.   Vitals reviewed.          Results Reviewed:  Lab Results (last 24 hours)     Procedure Component Value Units Date/Time    T3, Free [[id number]] Collected:  02/15/20 1948    Specimen:  Blood Updated:  02/15/20 2213    [place] Draw [[id number]] Collected:  02/15/20 1948    Specimen:  Blood Updated:  02/15/20 2100    Narrative:       The following orders were created for panel order [place] Draw.  Procedure                               Abnormality         Status                     ---------                               -----------         ------                     Light Blue Top[[id number]]                                   Final result               Green Top (Gel)[[id number]]                                  Final result               Lavender Top[[id number]]                                     Final result               Red Top[[id number]]                                          Final result               [place] Blood Culture [place]...[[id number]]                      Final result                 Please view results for these tests on the individual orders.    [place] Blood Culture Bottle Set [[id number]] Collected:  02/15/20 1948    Specimen:  Blood from Arm, Right Updated:  02/15/20 2100     Extra Tube Hold for add-ons.     Comment: Auto resulted.       Light Blue Top [[id number]] Collected:  02/15/20 1948    Specimen:   Blood Updated:  02/15/20 2100     Extra Tube hold for add-on     Comment: Auto resulted       Green Top (Gel) [[id number]] Collected:  02/15/20 1948    Specimen:  Blood Updated:  02/15/20 2100     Extra Tube Hold for add-ons.     Comment: Auto resulted.       Lavender Top [[id number]] Collected:  02/15/20 1948    Specimen:  Blood Updated:  02/15/20 2100     Extra Tube hold for add-on     Comment: Auto resulted       Red Top [[id number]] Collected:  02/15/20 1948    Specimen:  Blood Updated:  02/15/20 2100     Extra Tube Hold for add-ons.     Comment: Auto resulted.       Comprehensive Metabolic Panel [[id number]]  (Abnormal) Collected:  02/15/20 1948    Specimen:  Blood Updated:  02/15/20 2032     Glucose 108 mg/dL      BUN 25 mg/dL      Creatinine 0.78 mg/dL      Sodium 145 mmol/L      Potassium 3.8 mmol/L      Chloride 103 mmol/L      CO2 26.0 mmol/L      Calcium 9.7 mg/dL      Total Protein 7.5 g/dL      Albumin 4.20 g/dL      ALT (SGPT) 33 U/L      AST (SGOT) 22 U/L      Alkaline Phosphatase 113 U/L      Total Bilirubin 1.2 mg/dL      eGFR Non African Amer 98 mL/min/1.73      Globulin 3.3 gm/dL      A/G Ratio 1.3 g/dL      BUN/Creatinine Ratio 32.1     Anion Gap 16.0 mmol/L     Narrative:       GFR Normal >60  Chronic Kidney Disease <60  Kidney Failure <15      Blood Gas, Arterial [[id number]]  (Abnormal) Collected:  02/15/20 2025    Specimen:  Arterial Blood Updated:  02/15/20 2031     Site Right Radial     Nathaniel's Test Positive     pH, Arterial 7.501 pH units      Comment: 83 Value above reference range        pCO2, Arterial 31.2 mm Hg      Comment: 84 Value below reference range        pO2, Arterial 68.2 mm Hg      Comment: 84 Value below reference range        HCO3, Arterial 24.4 mmol/L      Base Excess, Arterial 2.2 mmol/L      Comment: 83 Value above reference range        O2 Saturation, Arterial 94.6 %      Temperature 37.0 C      Barometric Pressure for Blood Gas 755 mmHg      Modality Nasal Cannula     Flow  Rate 2.0 lpm      Ventilator Mode NA     Collected by [id number]     Comment: Meter: [id number]     :  [id number]       Troponin [[id number]]  (Normal) Collected:  02/15/20 1948    Specimen:  Blood Updated:  02/15/20 2031     Troponin T <0.010 ng/mL     Narrative:       Troponin T Reference Range:  <= 0.03 ng/mL-   Negative for AMI  >0.03 ng/mL-     Abnormal for myocardial necrosis.  Clinicians would have to utilize clinical acumen, EKG, Troponin and serial changes to determine if it is an Acute Myocardial Infarction or myocardial injury due to an underlying chronic condition.       Results may be falsely decreased if patient taking Biotin.      BNP [[id number]]  (Abnormal) Collected:  02/15/20 1948    Specimen:  Blood Updated:  02/15/20 2031     proBNP 2,062.0 pg/mL     Narrative:       Among patients with dyspnea, NT-proBNP is highly sensitive for the detection of acute congestive heart failure. In addition NT-proBNP of <300 pg/ml effectively rules out acute congestive heart failure with 99% negative predictive value.    Results may be falsely decreased if patient taking Biotin.      TSH [[id number]]  (Normal) Collected:  02/15/20 1948    Specimen:  Blood Updated:  02/15/20 2031     TSH 1.950 uIU/mL     T4, Free [[id number]]  (Normal) Collected:  02/15/20 1948    Specimen:  Blood Updated:  02/15/20 2031     Free T4 1.46 ng/dL     Narrative:       Results may be falsely increased if patient taking Biotin.      CBC & Differential [[id number]] Collected:  02/15/20 1948    Specimen:  Blood Updated:  02/15/20 2009    Narrative:       The following orders were created for panel order CBC & Differential.  Procedure                               Abnormality         Status                     ---------                               -----------         ------                     CBC Auto Differential[[id number]]        Abnormal            Final result                 Please view results for these tests on the individual  orders.    CBC Auto Differential [[id number]]  (Abnormal) Collected:  02/15/20 1948    Specimen:  Blood Updated:  02/15/20 2009     WBC 9.22 10*3/mm3      RBC 5.81 10*6/mm3      Hemoglobin 19.1 g/dL      Hematocrit 56.4 %      MCV 97.1 fL      MCH 32.9 pg      MCHC 33.9 g/dL      RDW 13.7 %      RDW-SD 49.1 fl      MPV 9.0 fL      Platelets 215 10*3/mm3      Neutrophil % 78.2 %      Lymphocyte % 15.1 %      Monocyte % 6.0 %      Eosinophil % 0.0 %      Basophil % 0.3 %      Immature Grans % 0.4 %      Neutrophils, Absolute 7.21 10*3/mm3      Lymphocytes, Absolute 1.39 10*3/mm3      Monocytes, Absolute 0.55 10*3/mm3      Eosinophils, Absolute 0.00 10*3/mm3      Basophils, Absolute 0.03 10*3/mm3      Immature Grans, Absolute 0.04 10*3/mm3      nRBC 0.0 /100 WBC         Imaging Results (Last 24 Hours)     Procedure Component Value Units Date/Time    XR Chest 1 View [[id number]] Collected:  02/15/20 2036     Updated:  02/15/20 2040    Narrative:       EXAMINATION: XR CHEST 1 VW-     2/15/2020 8:00 PM CST     HISTORY: Tachycardia.     One view chest x-ray compared with 02/10/2020.     Heart size is normal.  The mediastinum is within normal limits.      The lungs are normally expanded with no pneumonia or pneumothorax.     No congestive failure changes.                                                                       Impression:       1. No acute disease.           This report was finalized on 02/15/2020 20:36 by Dr. Alfred Hernández MD.        I have personally reviewed and interpreted the radiology studies and ECG obtained at time of admission.     Assessment / Plan     Assessment:   Active Hospital Problems    Diagnosis   • Atrial flutter (CMS/HCC)     Impression:  1.  A. fib RVR  2.  Right upper lobe pneumonia  3.  Left foot wound  4.  Hypertension  5.  Peripheral vascular disease     Plan:   1.  Telemetry  2.  Cardizem  3.  Zosyn  4.  Half-strength duo nebs  5.  Symbicort   6.  Resume home medications   7.   Cardiology consult  8.  Wound care   9.  Labs in the morning      Code Status: Full     I discussed the patient's findings and my recommendations with the patient and family at bedside    Estimated length of stay 1 to 2 days    Patient seen and examined by me on 2/15/2020    Alexsander Rubio DO   02/15/20   10:50 PM

## 2020-02-17 VITALS
SYSTOLIC BLOOD PRESSURE: 161 MMHG | DIASTOLIC BLOOD PRESSURE: 50 MMHG | RESPIRATION RATE: 16 BRPM | TEMPERATURE: 97.5 F | OXYGEN SATURATION: 93 % | WEIGHT: 167.13 LBS | BODY MASS INDEX: 26.23 KG/M2 | HEART RATE: 85 BPM | HEIGHT: 67 IN

## 2020-02-17 PROCEDURE — 94799 UNLISTED PULMONARY SVC/PX: CPT

## 2020-02-17 RX ORDER — HYDROXYZINE HYDROCHLORIDE 25 MG/1
25 TABLET, FILM COATED ORAL 3 TIMES DAILY PRN
Qty: 20 TABLET | Refills: 0 | Status: SHIPPED | OUTPATIENT
Start: 2020-02-17 | End: 2020-04-16 | Stop reason: SDUPTHER

## 2020-02-17 RX ADMIN — ALBUTEROL SULFATE 1.25 MG: 2.5 SOLUTION RESPIRATORY (INHALATION) at 07:26

## 2020-02-17 RX ADMIN — SODIUM CHLORIDE, PRESERVATIVE FREE 10 ML: 5 INJECTION INTRAVENOUS at 08:35

## 2020-02-17 RX ADMIN — CILOSTAZOL 100 MG: 100 TABLET ORAL at 08:35

## 2020-02-17 RX ADMIN — IPRATROPIUM BROMIDE 0.5 MG: 0.5 SOLUTION RESPIRATORY (INHALATION) at 07:26

## 2020-02-17 RX ADMIN — GUAIFENESIN 1200 MG: 600 TABLET, EXTENDED RELEASE ORAL at 08:35

## 2020-02-17 RX ADMIN — Medication 1 TABLET: at 08:35

## 2020-02-17 RX ADMIN — METOPROLOL TARTRATE 25 MG: 25 TABLET, FILM COATED ORAL at 08:35

## 2020-02-17 RX ADMIN — BUDESONIDE AND FORMOTEROL FUMARATE DIHYDRATE 2 PUFF: 160; 4.5 AEROSOL RESPIRATORY (INHALATION) at 07:26

## 2020-02-17 NOTE — PLAN OF CARE
Problem: Patient Care Overview  Goal: Plan of Care Review  Outcome: Ongoing (interventions implemented as appropriate)  Flowsheets (Taken 2/17/2020 0621)  Progress: improving  Plan of Care Reviewed With: patient  Outcome Summary: VSS. No c/o pain. PRN Atarax given last night for anxiety. Plans to d/c home today. Sinus/Sinus susie 56-77 with PVC and couplets on tele. Will continue to monitor.  Goal: Individualization and Mutuality  Outcome: Ongoing (interventions implemented as appropriate)  Goal: Discharge Needs Assessment  Outcome: Ongoing (interventions implemented as appropriate)  Goal: Interprofessional Rounds/Family Conf  Outcome: Ongoing (interventions implemented as appropriate)     Problem: Fall Risk (Adult)  Goal: Identify Related Risk Factors and Signs and Symptoms  Outcome: Ongoing (interventions implemented as appropriate)  Goal: Absence of Fall  Outcome: Ongoing (interventions implemented as appropriate)     Problem: Arrhythmia/Dysrhythmia (Symptomatic) (Adult)  Goal: Signs and Symptoms of Listed Potential Problems Will be Absent, Minimized or Managed (Arrhythmia/Dysrhythmia)  Outcome: Ongoing (interventions implemented as appropriate)     Problem: Wound (Includes Pressure Injury) (Adult)  Goal: Signs and Symptoms of Listed Potential Problems Will be Absent, Minimized or Managed (Wound)  Outcome: Ongoing (interventions implemented as appropriate)

## 2020-02-17 NOTE — PROGRESS NOTES
Continued Stay Note  [facility]     Patient Name: Kingsley Lerma  MRN: [id number]  Today's Date: 2/17/2020    Admit Date: 2/15/2020    Discharge Plan     Row Name 02/17/20 1007       Plan    Plan  Home    Patient/Family in Agreement with Plan  yes    Final Discharge Disposition Code  01 - home or self-care    Final Note  Pt has [facility].  Pt needs mediations today so is filling at retail pharmacy.  RX totals will be $2 (using Xarelto coupon card savings).  BRIANA Bolaños.    Row Name 02/17/20 0915       Plan    Patient/Family in Agreement with Plan  yes    Final Discharge Disposition Code  01 - home or self-care        Discharge Codes    No documentation.       Expected Discharge Date and Time     Expected Discharge Date Expected Discharge Time    Feb 17, 2020             BRIANA Gómez

## 2020-02-17 NOTE — PAYOR COMM NOTE
"2/17/20 [facility] [phone]  [phone]    PATIENT ADMITTED FROM ER TO INPATIENT ADMISSION ATRIAL FLUTTER. ON 2/15/20.  FAXING CLINICAL.      Kingsley Servin (74 y.o. Male)     Date of Birth Social Security Number Address Home Phone MRN    1945  [address] [phone] [id number]    Yarsani Marital Status          Oriental orthodox        Admission Date Admission Type Admitting Provider Attending Provider Department, Room/Bed    2/15/20 Emergency Faizan Girard MD Thompson, Benjamin H, MD [facility] 4B, 442/1    Discharge Date Discharge Disposition Discharge Destination         Home or Self Care              Attending Provider:  Faizan Girard MD    Allergies:  No Known Allergies    Isolation:  None   Infection:  None   Code Status:  CPR    Ht:  170.2 cm (67\")   Wt:  75.8 kg (167 lb 2 oz)    Admission Cmt:  None   Principal Problem:  None                Active Insurance as of 2/15/2020     Primary Coverage     Payor Plan Insurance Group Employer/Plan Group    [employer] [id number]      Payor Plan Address Payor Plan Phone Number Payor Plan Fax Number Effective Dates    [employer] [phone]  2/10/2020 - None Entered    [address] [address]       Subscriber Name Subscriber Birth Date Member ID       KINGSLEY SERVIN 1945 [id number]                 Emergency Contacts      (Rel.) Home Phone Work Phone Mobile Phone    LUIS DANIEL SERVIN (Son) -- -- [phone]    [email or web address] (Daughter) -- -- [phone]        Alexsander Rubio DO   Physician   Medicine   H&P   Signed   Date of Service:  02/15/20 2250   Creation Time:  02/15/20 2250            Signed        Expand All Collapse All      Show:Clear all  [x]Manual[x]Template[]Copied    Added by:  [x]Alexsander Rubio DO    []Cristina for details       [facility] Medicine Services  HISTORY AND " PHYSICAL     Date of Admission: 2/15/2020  Primary Care Physician: Provider, No Known     Subjective      Chief Complaint: Rapid heartbeat     History of Present Illness  74-year-old male who presents to the emergency department for rapid heartbeat.  He states he has a vigilant daughter-in-law who got a monitor and checked him today.  For quite some time his heart rate stayed in the 150s.  He came to the emergency department, but states that he feels fine.  He has no chest pain, no shortness of breath.  He states that he has been worried about his heart and his foot.  He has a foot wound.  The left foot is bandaged.  He sees Dr. Laughlin in [place].  He states this is a combination of arthritis and sores on the back of his foot that might have to do with circulation.  He is also having Achilles tendon issues.  The patient was on a Cardizem drip on my evaluation, but ED provider stated that amiodarone was going to be attempted.  The [facility] and [facility].  The patient feels that the reason that he started having difficulty with his heart is due to the prednisone.  The patient was just discharged from our facility on 2/12/2020 for diagnosis of pneumonia of the right upper lobe.      Review of Systems   Rapid heartbeat     Otherwise complete ROS reviewed and negative except as mentioned in the HPI.     Past Medical History:   Medical History        Past Medical History:   Diagnosis Date   • Arthritis     • Hypertension     • PVD (peripheral vascular disease) (CMS/[facility])           Past Surgical History:  Surgical History   No past surgical history on file.     Social History:  reports that he has quit smoking. He has never used smokeless tobacco. He reports that he drinks alcohol.     Family History: family history includes Cancer in his father; Dementia in his mother; Pancreatic cancer in his father.        Allergies:  No Known Allergi  Vital Signs: BP  "142/89 (BP Location: Right arm, Patient Position: Sitting)   Pulse (!) 150   Temp 97.8 °F (36.6 °C) (Oral)   Resp 17   Ht 170.2 cm (67\")   Wt 78 kg (172 lb)   SpO2 92%   BMI 26.94 kg/m²   Physical Exam   HENT:   Head: Normocephalic and atraumatic.   Nose: Nose normal.   Mouth/Throat: Oropharynx is clear and moist.   Left nasal cyst   Eyes: Conjunctivae and EOM are normal.   Neck: Normal range of motion. Neck supple.   Cardiovascular: Normal heart sounds.   Irregularly irregular and tachycardic   Pulmonary/Chest: Effort normal. He has wheezes. He has rales.   Abdominal: Soft. Bowel sounds are normal.   Musculoskeletal: He exhibits no edema or tenderness.   Neurological: He is alert. No cranial nerve deficit.   Skin: Skin is warm and dry.   Left foot bandaged   Psychiatric: He has a normal mood and affect.   Vitals reviewed.     Metabolic Panel [[id number]]  (Abnormal) Collected:  02/15/20 1948      Specimen:  Blood Updated:  02/15/20 2032       Glucose 108 mg/dL         BUN 25 mg/dL         Creatinine 0.78 mg/dL         Sodium 145 mmol/L         Potassium 3.8 mmol/L         Chloride 103 mmol/L         CO2 26.0 mmol/L         Calcium 9.7 mg/dL         Total Protein 7.5 g/dL         Albumin 4.20 g/dL         ALT (SGPT) 33 U/L         AST (SGOT) 22 U/L         Alkaline Phosphatase 113 U/L         Total Bilirubin 1.2 mg/dL         eGFR Non African Amer 98 mL/min/1.73         Globulin 3.3 gm/dL         A/G Ratio 1.3 g/dL         BUN/Creatinine Ratio 32.1       Anion Gap 16.0 mmol/L       Narrative:        GFR Normal >60  Chronic Kidney Disease           Blood Gas, Arterial [[id number]]  (Abnormal) Collected:  02/15/20 2025     Specimen:  Arterial Blood Updated:  02/15/20 2031       Site Right Radial       Nathaniel's Test Positive       pH, Arterial 7.501 pH units         Comment: 83 Value above reference range          pCO2, Arterial 31.2 mm Hg         Comment: 84 Value below reference range          pO2, Arterial 68.2 " mm Hg         Comment: 84 Value below reference range          HCO3, Arterial 24.4 mmol/L         Base Excess, Arterial 2.2 mmol/L         Comment: 83 Value above reference range          O2 Saturation, Arterial 94.6 %         Temperature 37.0 C         Barometric Pressure for Blood Gas 755 mmHg         Modality Nasal Cannula       Flow Rate 2.0 lpm         Ventilator Mode NA       Collected by            BNP [[id number]]  (Abnormal) Collected:  02/15/20 1948     Specimen:  Blood Updated:  02/15/20 2031       proBNP 2,062.0 pg/mL       Narrative:         Updated:  02/15/20 2009        WBC 9.22 10*3/mm3         RBC 5.81 10*6/mm3         Hemoglobin 19.1 g/dL         Hematocrit 56.4 %         MCV 97.1 fL         MCH 32.9 pg         MCHC 33.9 g/dL         RDW 13.7 %         RDW-SD 49.1 fl         MPV 9.0 fL         Platelets 215 10*3/mm3         Neutrophil % 78.2 %         Lymphocyte % 15.1 %         Monocyte % 6.0 %         Eosinophil % 0.0 %         Basophil % 0.3 %         Immature Grans % 0.4 %         Neutrophils, Absolute 7.21 10*3/mm3         Lymphocytes, Absolute 1.39 10*3/mm3         Monocytes, Absolute 0.55 10*3/mm3         Eosinophils, Absolute 0.00 10*3/mm3         Basophils, Absolute 0.03 10*3/mm3         Immature Grans, Absolute 0.04 10*3/mm3         nRBC        Hospital Problems     Diagnosis   • Atrial flutter (CMS/HCC)      Impression:  1.  A. fib RVR  2.  Right upper lobe pneumonia  3.  Left foot wound  4.  Hypertension  5.  Peripheral vascular disease      Plan:   1.  Telemetry  2.  Cardizem  3.  Zosyn  4.  Half-strength duo nebs  5.  Symbicort   6.  Resume home medications   7.  Cardiology consult  8.  Wound care   9.  Labs in the morning        Code Status: Full     I discussed the patient's findings and my recommendations with the patient and family at bedside     Estimated length of stay 1 to 2 days     Patient seen and examined by me on 2/15/2020     Alexsander Rubio DO   02/15/20   10:50  PM     Jeff Horan MD   Physician   Medicine   Progress Notes   Signed   Date of Service:  02/16/20 1325   Creation Time:  02/16/20 1325            Signed             Show:Clear all  [x]Manual[x]Template[]Copied    Added by:  [x]Jeff Horan MD    []Cristina for details       [facility] Medicine Services  INPATIENT PROGRESS NOTE     Patient Name: Kinsgley Lerma  Date of Admission: 2/15/2020  Today's Date: 02/16/20  Length of Stay: 1  Primary Care Physician: Provider, No Known     Subjective   Chief Complaint: anxious  HPI      Patient seen and examined at bedside.   [facility] and did not send him the rest of his antibiotic.  They switched him to atenolol.  When I discussed with the patient he is unsure why they changed the medications.  They also did not send him an appropriate supply of the medications.  Patient had incorrect medications as well as the wrong medications.  He is unsure the reasoning.     Was planning to discharge the patient today but he requested to stay another day as he feels very anxious about going home and would like to stay a night and try hydroxyzine and make sure his rate stays in rhythm.      Review of Systems   Constitutional: Negative for activity change, appetite change, chills, diaphoresis, fatigue and fever.   Respiratory: Negative for cough and shortness of breath.    Cardiovascular: Positive for palpitations. Negative for chest pain.   Gastrointestinal: Negative for abdominal distention, abdominal pain, constipation, diarrhea, nausea and vomiting.   Psychiatric/Behavioral: Positive for dysphoric mood. The patient is nervous/anxious.          All pertinent negatives and positives are as above. All other systems have been reviewed and are negative unless otherwise stated.      Objective    Temp:  [97.5 °F (36.4 °C)-98.4 °F (36.9 °C)] 98.4 °F (36.9 °C)  Heart Rate:  []  67  Resp:  [14-18] 16  BP: (112-147)/(44-89) 147/59  Physical Exam   Constitutional: He is oriented to person, place, and time. No distress.   HENT:   Head: Normocephalic and atraumatic.   Eyes: Conjunctivae are normal. No scleral icterus.   Neck: Neck supple. No JVD present.   Cardiovascular: Normal rate and regular rhythm.   Murmur heard.  Pulmonary/Chest: Effort normal and breath sounds normal. No stridor. No respiratory distress.   Abdominal: Soft. Bowel sounds are normal. He exhibits no distension. There is no tenderness. There is no guarding.   Musculoskeletal: He exhibits no edema.   Neurological: He is alert and oriented to person, place, and time.   Skin: Skin is warm and dry. He is not diaphoretic. No erythema.   Psychiatric: His behavior is normal. Thought content normal. His mood appears anxious.   Nursing note and vitals reviewed.   Results Review:  I have reviewed the labs, radiology results, and diagnostic studies.     Laboratory Data:          Results from last 7 days   Lab Units 02/16/20  0501 02/15/20  1948 02/12/20  0425   WBC 10*3/mm3 8.07 9.22 5.69   HEMOGLOBIN g/dL 16.2 19.1* 16.4   HEMATOCRIT % 48.1 56.4* 47.7   PLATELETS 10*3/mm3 198 215 178                  Results from last 7 days   Lab Units 02/16/20  0501 02/15/20  1948 02/12/20  0425   02/10/20  1204   SODIUM mmol/L 147* 145 142   < > 138   POTASSIUM mmol/L 3.5 3.8 3.8   < > 3.9   CHLORIDE mmol/L 107 103 106   < > 99   CO2 mmol/L 29.0 26.0 23.0   < > 25.0   BUN mg/dL 24* 25* 16   < > 18   CREATININE mg/dL 0.92 0.78 0.67*   < > 0.76   CALCIUM mg/dL 8.8 9.7 9.4   < > 9.8   BILIRUBIN mg/dL 1.1 1.2  --   --  0.9   ALK PHOS U/L 87 113  --   --  132*   ALT (SGPT) U/L 25 33  --   --  21   AST (SGOT) U/L 17 22  --   --  24   GLUCOSE mg/dL 96 108* 233*   < > 96    < > = values in this interval not displayed.         Culture Data:         Blood Culture   Date Value Ref Range Status   02/10/2020 No growth at 5 days   Final   02/10/2020 No growth at  5 days   Final            Respiratory Culture       Active Hospital Problems     Diagnosis   • Alcoholism /alcohol abuse (CMS/HCC)   • Atrial flutter (CMS/HCC)   • Essential hypertension   • Former tobacco use   • PVD (peripheral vascular disease) (CMS/[facility])         Plan:  1.  No indication for antibiotics currently  2.  Continue metoprolol   3.  Continue anticoagulation  4.  Recent echo reviewed  5.  Hydroxyzine PRN for anxiety  6.  Recommended patient consider psychotherapy  7.  Provided paper scripts for medications for family to get filled at [facility]                     Discharge Planning: I expect the patient to be discharged to home in 1 days.     Jeff Horan MD   02/16/20   1:25 PM                                                  Current Facility-Administered Medications   Medication Dose Route Frequency Provider Last Rate Last Dose   • acetaminophen (TYLENOL) tablet 650 mg  650 mg Oral Q4H PRN Alexsander Rubio DO   650 mg at 02/16/20 1914   • albuterol (PROVENTIL) nebulizer solution 0.5% 2.5 mg/0.5mL  1.25 mg Nebulization 4x Daily - RT Alexsander Rubio DO   1.25 mg at 02/17/20 0726    And   • ipratropium (ATROVENT) nebulizer solution 0.5 mg  0.5 mg Nebulization 4x Daily - RT Alexsander Rubio DO   0.5 mg at 02/17/20 0726   • budesonide-formoterol (SYMBICORT) 160-4.5 MCG/ACT inhaler 2 puff  2 puff Inhalation BID - RT Alexsander Rubio DO   2 puff at 02/17/20 0726   • cilostazol (PLETAL) tablet 100 mg  100 mg Oral BID Alexsander Rubio DO   100 mg at 02/17/20 0835   • guaiFENesin (MUCINEX) 12 hr tablet 1,200 mg  1,200 mg Oral Q12H Alexsander Rubio DO   1,200 mg at 02/17/20 0835   • hydrOXYzine (ATARAX) tablet 25 mg  25 mg Oral TID PRN Jeff Horan MD   25 mg at 02/16/20 2143   • metoprolol tartrate (LOPRESSOR) tablet 25 mg  25 mg Oral BID Alexsander Rubio DO   25 mg at 02/17/20 0835   • multivitamin w/minerals tablet 1 tablet  1 tablet Oral Daily Alexsander Rubio,    1 tablet at  02/17/20 0835   • ondansetron (ZOFRAN) injection 4 mg  4 mg Intravenous Q6H PRN Alexsander Rubio DO       • rivaroxaban (XARELTO) tablet 20 mg  20 mg Oral Daily With Dinner Alexsander Rubio DO   20 mg at 02/16/20 1724   • sodium chloride 0.9 % flush 10 mL  10 mL Intravenous PRN Carla Owen PA       • sodium chloride 0.9 % flush 10 mL  10 mL Intravenous Q12H Alexsander Rubio DO   10 mL at 02/17/20 0835   • sodium chloride 0.9 % flush 10 mL  10 mL Intravenous PRN Alexsander Rubio DO

## 2020-02-17 NOTE — DISCHARGE SUMMARY
[facility] Medicine Services  DISCHARGE SUMMARY       Date of Admission: 2/15/2020  Date of Discharge:  2/17/2020  Primary Care Physician: Provider, No Known    Presenting Problem/History of Present Illness:  Rapid heartbeat    Final Discharge Diagnoses:  Patient Active Problem List   Diagnosis   • Pneumonia of right upper lobe due to infectious organism (CMS/HCC)   • Essential hypertension   • Former tobacco use   • PVD (peripheral vascular disease) (CMS/HCC)   • Acute respiratory failure with hypoxia (CMS/HCC)   • Positive D dimer   • Atrial flutter (CMS/HCC)   • Alcoholism /alcohol abuse (CMS/HCC)     Consults: None    Procedures Performed: None    Pertinent Test Results:   Imaging Results (Last 7 Days)     Procedure Component Value Units Date/Time    XR Chest 1 View [[id number]] Collected:  02/15/20 2036     Updated:  02/15/20 2040    Narrative:       EXAMINATION: XR CHEST 1 VW-     2/15/2020 8:00 PM CST     HISTORY: Tachycardia.     One view chest x-ray compared with 02/10/2020.     Heart size is normal.  The mediastinum is within normal limits.      The lungs are normally expanded with no pneumonia or pneumothorax.     No congestive failure changes.                                                                       Impression:       1. No acute disease.           This report was finalized on 02/15/2020 20:36 by Dr. Alfred Hernández MD.        Lab Results (last 7 days)     Procedure Component Value Units Date/Time    T3, Free [[id number]]  (Normal) Collected:  02/15/20 1948    Specimen:  Blood Updated:  02/16/20 1200     T3, Free 2.79 pg/mL     Narrative:       Results may be falsely increased if patient taking Biotin.      Comprehensive Metabolic Panel [[id number]]  (Abnormal) Collected:  02/16/20 0501    Specimen:  Blood Updated:  02/16/20 0544     Glucose 96 mg/dL      BUN 24 mg/dL      Creatinine 0.92 mg/dL      Sodium 147 mmol/L      Potassium 3.5 mmol/L      Chloride  107 mmol/L      CO2 29.0 mmol/L      Calcium 8.8 mg/dL      Total Protein 6.0 g/dL      Albumin 3.50 g/dL      ALT (SGPT) 25 U/L      AST (SGOT) 17 U/L      Alkaline Phosphatase 87 U/L      Total Bilirubin 1.1 mg/dL      eGFR Non African Amer 81 mL/min/1.73      Globulin 2.5 gm/dL      A/G Ratio 1.4 g/dL      BUN/Creatinine Ratio 26.1     Anion Gap 11.0 mmol/L     Narrative:       GFR Normal >60  Chronic Kidney Disease <60  Kidney Failure <15      Magnesium [[id number]]  (Normal) Collected:  02/16/20 0501    Specimen:  Blood Updated:  02/16/20 0543     Magnesium 2.3 mg/dL     Phosphorus [[id number]]  (Normal) Collected:  02/16/20 0501    Specimen:  Blood Updated:  02/16/20 0543     Phosphorus 3.6 mg/dL     CBC Auto Differential [[id number]]  (Abnormal) Collected:  02/16/20 0501    Specimen:  Blood Updated:  02/16/20 0533     WBC 8.07 10*3/mm3      RBC 4.95 10*6/mm3      Hemoglobin 16.2 g/dL      Hematocrit 48.1 %      MCV 97.2 fL      MCH 32.7 pg      MCHC 33.7 g/dL      RDW 13.6 %      RDW-SD 49.0 fl      MPV 9.2 fL      Platelets 198 10*3/mm3      Neutrophil % 62.6 %      Lymphocyte % 25.8 %      Monocyte % 9.9 %      Eosinophil % 0.7 %      Basophil % 0.4 %      Immature Grans % 0.6 %      Neutrophils, Absolute 5.05 10*3/mm3      Lymphocytes, Absolute 2.08 10*3/mm3      Monocytes, Absolute 0.80 10*3/mm3      Eosinophils, Absolute 0.06 10*3/mm3      Basophils, Absolute 0.03 10*3/mm3      Immature Grans, Absolute 0.05 10*3/mm3      nRBC 0.0 /100 WBC     [place] Draw [[id number]] Collected:  02/15/20 1948    Specimen:  Blood Updated:  02/15/20 2100    Narrative:       The following orders were created for panel order [place] Draw.  Procedure                               Abnormality         Status                     ---------                               -----------         ------                     Light Blue Top[[id number]]                                   Final result               Green Top (Gel)[[id number]]                                   Final result               Lavender Top[[id number]]                                     Final result               Red Top[[id number]]                                          Final result               [place] Blood Culture [place]...[[id number]]                      Final result                 Please view results for these tests on the individual orders.    [place] Blood Culture Bottle Set [[id number]] Collected:  02/15/20 1948    Specimen:  Blood from Arm, Right Updated:  02/15/20 2100     Extra Tube Hold for add-ons.     Comment: Auto resulted.       Light Blue Top [[id number]] Collected:  02/15/20 1948    Specimen:  Blood Updated:  02/15/20 2100     Extra Tube hold for add-on     Comment: Auto resulted       Green Top (Gel) [[id number]] Collected:  02/15/20 1948    Specimen:  Blood Updated:  02/15/20 2100     Extra Tube Hold for add-ons.     Comment: Auto resulted.       Lavender Top [[id number]] Collected:  02/15/20 1948    Specimen:  Blood Updated:  02/15/20 2100     Extra Tube hold for add-on     Comment: Auto resulted       Red Top [[id number]] Collected:  02/15/20 1948    Specimen:  Blood Updated:  02/15/20 2100     Extra Tube Hold for add-ons.     Comment: Auto resulted.       Comprehensive Metabolic Panel [[id number]]  (Abnormal) Collected:  02/15/20 1948    Specimen:  Blood Updated:  02/15/20 2032     Glucose 108 mg/dL      BUN 25 mg/dL      Creatinine 0.78 mg/dL      Sodium 145 mmol/L      Potassium 3.8 mmol/L      Chloride 103 mmol/L      CO2 26.0 mmol/L      Calcium 9.7 mg/dL      Total Protein 7.5 g/dL      Albumin 4.20 g/dL      ALT (SGPT) 33 U/L      AST (SGOT) 22 U/L      Alkaline Phosphatase 113 U/L      Total Bilirubin 1.2 mg/dL      eGFR Non African Amer 98 mL/min/1.73      Globulin 3.3 gm/dL      A/G Ratio 1.3 g/dL      BUN/Creatinine Ratio 32.1     Anion Gap 16.0 mmol/L     Narrative:       GFR Normal >60  Chronic Kidney Disease <60  Kidney Failure <15      Blood Gas, Arterial  [[id number]]  (Abnormal) Collected:  02/15/20 2025    Specimen:  Arterial Blood Updated:  02/15/20 2031     Site Right Radial     Nathaniel's Test Positive     pH, Arterial 7.501 pH units      Comment: 83 Value above reference range        pCO2, Arterial 31.2 mm Hg      Comment: 84 Value below reference range        pO2, Arterial 68.2 mm Hg      Comment: 84 Value below reference range        HCO3, Arterial 24.4 mmol/L      Base Excess, Arterial 2.2 mmol/L      Comment: 83 Value above reference range        O2 Saturation, Arterial 94.6 %      Temperature 37.0 C      Barometric Pressure for Blood Gas 755 mmHg      Modality Nasal Cannula     Flow Rate 2.0 lpm      Ventilator Mode NA     Collected by [id number]     Comment: Meter: [id number]     :  [id number]       Troponin [[id number]]  (Normal) Collected:  02/15/20 1948    Specimen:  Blood Updated:  02/15/20 2031     Troponin T <0.010 ng/mL     Narrative:       Troponin T Reference Range:  <= 0.03 ng/mL-   Negative for AMI  >0.03 ng/mL-     Abnormal for myocardial necrosis.  Clinicians would have to utilize clinical acumen, EKG, Troponin and serial changes to determine if it is an Acute Myocardial Infarction or myocardial injury due to an underlying chronic condition.       Results may be falsely decreased if patient taking Biotin.      BNP [[id number]]  (Abnormal) Collected:  02/15/20 1948    Specimen:  Blood Updated:  02/15/20 2031     proBNP 2,062.0 pg/mL     Narrative:       Among patients with dyspnea, NT-proBNP is highly sensitive for the detection of acute congestive heart failure. In addition NT-proBNP of <300 pg/ml effectively rules out acute congestive heart failure with 99% negative predictive value.    Results may be falsely decreased if patient taking Biotin.      TSH [[id number]]  (Normal) Collected:  02/15/20 1948    Specimen:  Blood Updated:  02/15/20 2031     TSH 1.950 uIU/mL     T4, Free [[id number]]  (Normal) Collected:  02/15/20 1948    Specimen:   Blood Updated:  02/15/20 2031     Free T4 1.46 ng/dL     Narrative:       Results may be falsely increased if patient taking Biotin.      CBC & Differential [[id number]] Collected:  02/15/20 1948    Specimen:  Blood Updated:  02/15/20 2009    Narrative:       The following orders were created for panel order CBC & Differential.  Procedure                               Abnormality         Status                     ---------                               -----------         ------                     CBC Auto Differential[[id number]]        Abnormal            Final result                 Please view results for these tests on the individual orders.    CBC Auto Differential [[id number]]  (Abnormal) Collected:  02/15/20 1948    Specimen:  Blood Updated:  02/15/20 2009     WBC 9.22 10*3/mm3      RBC 5.81 10*6/mm3      Hemoglobin 19.1 g/dL      Hematocrit 56.4 %      MCV 97.1 fL      MCH 32.9 pg      MCHC 33.9 g/dL      RDW 13.7 %      RDW-SD 49.1 fl      MPV 9.0 fL      Platelets 215 10*3/mm3      Neutrophil % 78.2 %      Lymphocyte % 15.1 %      Monocyte % 6.0 %      Eosinophil % 0.0 %      Basophil % 0.3 %      Immature Grans % 0.4 %      Neutrophils, Absolute 7.21 10*3/mm3      Lymphocytes, Absolute 1.39 10*3/mm3      Monocytes, Absolute 0.55 10*3/mm3      Eosinophils, Absolute 0.00 10*3/mm3      Basophils, Absolute 0.03 10*3/mm3      Immature Grans, Absolute 0.04 10*3/mm3      nRBC 0.0 /100 WBC         Hospital Course:  The patient is a 74 y.o. male who presented to [facility] with rapid heartbeat.  The patient has a past medical history significant for hypertension, peripheral vascular disease and paroxysmal atrial flutter.  Patient was recently admitted to our hospital on 2/10/2022-2/12/2020 for right upper lobe pneumonia.  During that hospitalization he converted into atrial flutter rapid ventricular response.  He was placed on metoprolol.  He then subsequently converted back into sinus rhythm.  He was  "discharged home on metoprolol.  However [facility] to atenolol.  They also gave him a prednisone taper as well.  The patient is also seeing Dr. Laughlin for arthritis and issues with his foot.  The patient was evaluated in the emergency department initially on a Cardizem drip.  He was admitted for further evaluation and treatment.  He was placed back on his metoprolol and Xarelto.  He was converted back into sinus rhythm.  He has had no issues since.  The patient has some alcohol abuse issues and psychotherapy has been recommended for this.  The patient is also been started on hydroxyzine as needed and has seemed to help.  The patient will get his metoprolol, Xarelto and hydroxyzine through our retail pharmacy today.  I have discussed this with the  to ensure that the patient could afford this.  Turns out that it is only going to be $2.  The patient will be discharged home today in stable condition [facility] in [facility] week or so.    Physical Exam on Discharge:  /50 (BP Location: Right arm, Patient Position: Sitting)   Pulse 85   Temp 97.5 °F (36.4 °C) (Oral)   Resp 16   Ht 170.2 cm (67\")   Wt 75.8 kg (167 lb 2 oz)   SpO2 93%   BMI 26.18 kg/m²   Physical Exam   Constitutional: He is oriented to person, place, and time. He appears well-developed and well-nourished.   Laying in bed.  Family at bedside.   HENT:   Head: Normocephalic and atraumatic.   Eyes: Pupils are equal, round, and reactive to light. Conjunctivae and EOM are normal.   Neck: Neck supple. No JVD present.   Cardiovascular: Normal rate, regular rhythm, normal heart sounds and intact distal pulses. Exam reveals no gallop and no friction rub.   No murmur heard.  Sinus 56-78   Pulmonary/Chest: Effort normal and breath sounds normal. No respiratory distress. He has no wheezes. He has no rales. He exhibits no tenderness.   Abdominal: Soft. Bowel sounds are " normal. He exhibits no distension. There is no tenderness. There is no rebound and no guarding.   Musculoskeletal: Normal range of motion. He exhibits no edema, tenderness or deformity.   Neurological: He is alert and oriented to person, place, and time.   Skin: Skin is warm and dry. No rash noted.   Psychiatric: He has a normal mood and affect. His behavior is normal. Judgment and thought content normal.   Nursing note and vitals reviewed.    Condition on Discharge: Stable    Discharge Disposition:  Home or Self Care    Discharge Medications:     Discharge Medications      New Medications      Instructions Start Date   hydrOXYzine 25 MG tablet  Commonly known as:  ATARAX   25 mg, Oral, 3 Times Daily PRN         Changes to Medications      Instructions Start Date   metoprolol tartrate 25 MG tablet  Commonly known as:  LOPRESSOR  What changed:  Another medication with the same name was removed. Continue taking this medication, and follow the directions you see here.   25 mg, Oral, 2 Times Daily         Continue These Medications      Instructions Start Date   cilostazol 100 MG tablet  Commonly known as:  PLETAL   100 mg, Oral, 2 Times Daily      guaiFENesin 600 MG 12 hr tablet  Commonly known as:  MUCINEX   1,200 mg, Oral, Every 12 Hours Scheduled      multivitamin with minerals tablet tablet   1 tablet, Oral, Daily      rivaroxaban 20 MG tablet  Commonly known as:  XARELTO   20 mg, Oral, Daily With Dinner      sildenafil 50 MG tablet  Commonly known as:  VIAGRA   50 mg, Oral, Daily PRN         Stop These Medications    amLODIPine 10 MG tablet  Commonly known as:  NORVASC     atenolol 100 MG tablet  Commonly known as:  TENORMIN     azithromycin 250 MG tablet  Commonly known as:  ZITHROMAX     azithromycin 500 MG tablet  Commonly known as:  ZITHROMAX     cefdinir 300 MG capsule  Commonly known as:  OMNICEF     cephalexin 500 MG capsule  Commonly known as:  KEFLEX     hydroCHLOROthiazide 25 MG tablet  Commonly known  as:  HYDRODIURIL     methylPREDNISolone 4 MG tablet  Commonly known as:  MEDROL (BHARGAVI)          Discharge Diet:   Diet Instructions     Diet: Regular      Discharge Diet:  Regular    Diet: Regular; Thin      Discharge Diet:  Regular    Fluid Consistency:  Thin        Activity at Discharge:   Activity Instructions     Activity as Tolerated      Activity as Tolerated          Follow-up Appointments:   No future appointments.    Test Results Pending at Discharge: None    TAJ Guallpa  02/17/20  9:09 AM    Time: 32 minutes.

## 2020-02-17 NOTE — PAYOR COMM NOTE
"2/17/20 [facility] [phone]  [phone]      Kingsley Servin (74 y.o. Male)     Date of Birth Social Security Number Address Home Phone MRN    1945  [address] [address] [phone] [id number]    Rastafarian Marital Status          Buddhism        Admission Date Admission Type Admitting Provider Attending Provider Department, Room/Bed    2/15/20 Emergency Faizan Girard MD  [facility] 4B, 442/1    Discharge Date Discharge Disposition Discharge Destination        2/17/2020 Home or Self Care Home             Attending Provider:  (none)   Allergies:  No Known Allergies    Isolation:  None   Infection:  None   Code Status:  Prior    Ht:  170.2 cm (67\")   Wt:  75.8 kg (167 lb 2 oz)    Admission Cmt:  None   Principal Problem:  None                Active Insurance as of 2/15/2020     Primary Coverage     Payor Plan Insurance Group Employer/Plan Group    [facility] [id number]      Payor Plan Address Payor Plan Phone Number Payor Plan Fax Number Effective Dates    [employer] [phone]  2/10/2020 - None Entered    [address] [id number]       Subscriber Name Subscriber Birth Date Member ID       KINGSLEY SERVIN 1945 [id number]                 Emergency Contacts      (Rel.) Home Phone Work Phone Mobile Phone    LUIS DANIEL SERVIN (Son) -- -- [phone]    nattyboone bhakta (Daughter) -- -- [phone]        Soham Parker APRN   Nurse Practitioner   Hospitalist   Discharge Summary   Cosign Needed   Date of Service:  02/17/20 0909   Creation Time:  02/17/20 0909            Cosign Needed             Show:Clear all  [x]Manual[x]Template[]Copied    Added by:  [x]Soham Parker APRN    []Cristina for details          [facility] Medicine Services  DISCHARGE SUMMARY         Date of Admission: 2/15/2020  Date of Discharge:  2/17/2020  Primary Care Physician: " Provider, No Known     Presenting Problem/History of Present Illness:  Rapid heartbeat     Final Discharge Diagnoses:      Patient Active Problem List   Diagnosis   • Pneumonia of right upper lobe due to infectious organism (CMS/HCC)   • Essential hypertension   • Former tobacco use   • PVD (peripheral vascular disease) (CMS/HCC)   • Acute respiratory failure with hypoxia (CMS/HCC)   • Positive D dimer   • Atrial flutter (CMS/HCC)   • Alcoholism /alcohol abuse (CMS/HCC)      Consults: None                 may be falsely increased if patient taking Biotin.         Comprehensive Metabolic Panel [[id number]]  (Abnormal) Collected:  02/16/20 0501     Specimen:  Blood Updated:  02/16/20 0544       Glucose 96 mg/dL         BUN 24 mg/dL         Creatinine 0.92 mg/dL         Sodium 147 mmol/L         Potassium 3.5 mmol/L         Chloride 107 mmol/L         CO2 29.0 mmol/L         Calcium 8.8 mg/dL         Total Protein 6.0 g/dL         Albumin 3.50 g/dL         ALT (SGPT) 25 U/L         AST (SGOT) 17 U/L         Alkaline Phosphatase 87 U/L         Total Bilirubin 1.1 mg/dL         eGFR Non African Amer 81 mL/min/1.73         Globulin 2.5 gm/dL         A/G Ratio 1.4 g/dL         BUN/Creatinine Ratio 26.1       Anion Gap 11.0 mmol/L       Narrative:        GFR Normal >60  Chronic Kidney Disease <60  Kidney Failure <15        Magnesium [[id number]]  (Normal) Collected:  02/16/20 0501     Specimen:  Blood Updated:  02/16/20 0543       Magnesium 2.3 mg/dL       Phosphorus [[id number]]  (Normal) Collected:  02/16/20 0501     Specimen:  Blood Updated:  02/16/20 0543       Phosphorus 3.6 mg/dL       CBC Auto Differential [[id number]]  (Abnormal) Collected:  02/16/20 0501     Specimen:  Blood Updated:  02/16/20 0533       WBC 8.07 10*3/mm3         RBC 4.95 10*6/mm3         Hemoglobin 16.2 g/dL         Hematocrit 48.1 %         MCV 97.2 fL         MCH 32.7 pg         MCHC 33.7 g/dL         RDW 13.6 %         RDW-SD 49.0 fl         MPV  9.2 fL         Platelets 198 10*3/mm3         Neutrophil % 62.6 %         Lymphocyte % 25.8 %         Monocyte % 9.9 %         Eosinophil % 0.7 %         Basophil % 0.4 %         Immature Grans % 0.6 %         Neutrophils, Absolute 5.05 10*3/mm3         Lymphocytes, Absolute 2.08 10*3/mm3         Monocytes, Absolute 0.80 10*3/mm3         Eosinophils, Absolute 0.06 10*3/mm3         Basophils, Absolute 0.03 10*3/mm3         Immature Grans, Absolute 0.05 10*3/mm3         nRBC 0.0 /100 WBC       [place] Draw [[id number]] Collected:  02/15/20 1948     Specimen:  Blood Updated:  02/15/20 2100     Narrative:        The following orders were created for panel order [place] Draw.  Procedure                               Abnormality         Status                     ---------                               -----------         ------                     Light Blue Top[[id number]]                                   Final result               Green Top (Gel)[[id number]]                                  Final result               Lavender Top[[id number]]                                     Final result               Red Top[[id number]]                                          Final result               [place] Blood Culture Osmani...[[id number]]                      Final result                  Please view results for these tests on the individual orders.     [place] Blood Culture Bottle Set [[id number]] Collected:  02/15/20 1948     Specimen:  Blood from Arm, Right Updated:  02/15/20 2100       Extra Tube Hold for add-ons.       Comment: Auto resulted.        Light Blue Top [[id number]]         Collected:  02/15/20 1948      Specimen:  Blood Updated:  02/15/20 2032       Glucose 108 mg/dL         BUN 25 mg/dL         Creatinine 0.78 mg/dL         Sodium 145 mmol/L         Potassium 3.8 mmol/L         Chloride 103 mmol/L         CO2 26.0 mmol/L         Calcium 9.7 mg/dL         Total Protein 7.5 g/dL         Albumin 4.20 g/dL         ALT  (SGPT) 33 U/L         AST (SGOT) 22 U/L         Alkaline Phosphatase 113 U/L         Total Bilirubin 1.2 mg/dL         eGFR Non African Amer 98 mL/min/1.73         Globulin 3.3 gm/dL         A/G Ratio 1.3 g/dL         BUN/Creatinine Ratio 32.1       Anion Gap 16.0 mmol/L       Narrative:        GFR Normal >60     Hospital Course:  The patient is a 74 y.o. male who presented to [facility] with rapid heartbeat.  The patient has a past medical history significant for hypertension, peripheral vascular disease and paroxysmal atrial flutter.  Patient was recently admitted to our hospital on 2/10/2022-2/12/2020 for right upper lobe pneumonia.  During that hospitalization he converted into atrial flutter rapid ventricular response.  He was placed on metoprolol.  He then subsequently converted back into sinus rhythm.  He was discharged home on metoprolol.  However [facility] to atenolol.  They also gave him a prednisone taper as well.  The patient is also seeing Dr. Laughlin for arthritis and issues with his foot.  The patient was evaluated in the emergency department initially on a Cardizem drip.  He was admitted for further evaluation and treatment.  He was placed back on his metoprolol and Xarelto.  He was converted back into sinus rhythm.  He has had no issues since.  The patient has some alcohol abuse issues and psychotherapy has been recommended for this.  The patient is also been started on hydroxyzine as needed and has seemed to help.  The patient will get his metoprolol, Xarelto and hydroxyzine through our retail pharmacy today.  I have discussed this with the  to ensure that the patient could afford this.  Turns out that it is only going to be $2.  The patient will be discharged home today in stable condition [facility] or so.     Physical Exam on Discharge:  /50 (BP Location: Right arm, Patient  "Position: Sitting)   Pulse 85   Temp 97.5 °F (36.4 °C) (Oral)   Resp 16   Ht 170.2 cm (67\")   Wt 75.8 kg (167 lb 2 oz)   SpO2 93%   BMI 26.18 kg/m²   Physical Exam   Constitutional: He is oriented to person, place, and time. He appears well-developed and well-nourished.   Laying in bed.  Family at bedside.   HENT:   Head: Normocephalic and atraumatic.   Eyes: Pupils are equal, round, and reactive to light. Conjunctivae and EOM are normal.   Neck: Neck supple. No JVD present.   Cardiovascular: Normal rate, regular rhythm, normal heart sounds and intact distal pulses. Exam reveals no gallop and no friction rub.   No murmur heard.  Sinus 56-78   Pulmonary/Chest: Effort normal and breath sounds normal. No respiratory distress. He has no wheezes. He has no rales. He exhibits no tenderness.   Abdominal: Soft. Bowel sounds are normal. He exhibits no distension. There is no tenderness. There is no rebound and no guarding.   Musculoskeletal: Normal range of motion. He exhibits no edema, tenderness or deformity.   Neurological: He is alert and oriented to person, place, and time.   Skin: Skin is warm and dry. No rash noted.   Psychiatric: He has a normal mood and affect. His behavior is normal. Judgment and thought content normal.   Nursing note and vitals reviewed.     Condition on Discharge: Stable     Discharge Disposition:  Home or Self Care     Discharge Medications:   Instructions Start Date   metoprolol tartrate 25 MG tablet  Commonly known as:  LOPRESSOR  What changed:  Another medication with the same name was removed. Continue taking this medication, and follow the directions you see here.    25 mg, Oral, 2 Times Daily                      Continue These Medications      Instructions Start Date   cilostazol 100 MG tablet  Commonly known as:  PLETAL    100 mg, Oral, 2 Times Daily        guaiFENesin 600 MG 12 hr tablet  Commonly known as:  MUCINEX    1,200 mg, Oral, Every 12 Hours Scheduled        multivitamin " with minerals tablet tablet    1 tablet, Oral, Daily        rivaroxaban 20 MG tablet  Commonly known as:  XARELTO    20 mg, Oral, Daily With Dinner        sildenafil 50 MG tablet  Commonly known as:  VIAGRA    50 mg, Oral, Daily PRN            Stop These Medications    amLODIPine 10 MG tablet  Commonly known as:  NORVASC      atenolol 100 MG tablet  Commonly known as:  TENORMIN      azithromycin 250 MG tablet  Commonly known as:  ZITHROMAX      azithromycin 500 MG tablet  Commonly known as:  ZITHROMAX      cefdinir 300 MG capsule  Commonly known as:  OMNICEF      cephalexin 500 MG capsule  Commonly known as:  KEFLEX      hydroCHLOROthiazide 25 MG tablet  Commonly known as:  HYDRODIURIL      methylPREDNISolone 4 MG tablet  Commonly known as:  MEDROL (BHARGAVI)             Discharge Diet:       Diet Instructions      Diet: Regular       Discharge Diet:  Regular     Diet: Regular; Thin       Discharge Diet:  Regular     Fluid Consistency:  Thin          Activity at Discharge:       Activity Instructions      Activity as Tolerated       Activity as Tolerated            Follow-up Appointments:   No future appointments.     Test Results Pending at Discharge: None     Soham Parker, TAJ  02/17/20  9:09 AM     Time: 32 minutes.                          ED to Hosp-Admission (Discharged) on 2/15/2020       Discharge Order (From admission, onward)     Start     Ordered    02/17/20 0905  Discharge patient  Once     Expected Discharge Date:  02/17/20    Discharge Disposition:  Home or Self Care    Physician of Record for Attribution - Please select from Treatment Team:  GUEVARA WEAVER [[id number]]    Review needed by CMO to determine Physician of Record:  No       Question Answer Comment   Physician of Record for Attribution - Please select from Treatment Team GUEVARA WEAVER    Review needed by CMO to determine Physician of Record No        02/17/20 0908    02/16/20 1036  Discharge patient  Once,   Status:  Canceled      Expected Discharge Date:  02/16/20    Discharge Disposition:  Home or Self Care    Physician of Record for Attribution - Please select from Treatment Team:  GUEVARA WEAVER [[id number]]    Review needed by CMO to determine Physician of Record:  No       Question Answer Comment   Physician of Record for Attribution - Please select from Treatment Team GUEVARA WEAVER    Review needed by CMO to determine Physician of Record No        02/16/20 1038

## 2020-02-18 ENCOUNTER — READMISSION MANAGEMENT (OUTPATIENT)
Dept: CALL CENTER | Facility: HOSPITAL | Age: 75
End: 2020-02-18

## 2020-02-18 NOTE — PAYOR COMM NOTE
"DC HOME 2-17-20      Kingsley Servin (74 y.o. Male)     Date of Birth Social Security Number Address Home Phone MRN    1945  [address] [place] [address] [phone] [id number]    Anabaptist Marital Status          Sabianist        Admission Date Admission Type Admitting Provider Attending Provider Department, Room/Bed    2/15/20 Emergency Faizan Girard MD  [facility] 4B, 442/1    Discharge Date Discharge Disposition Discharge Destination        2/17/2020 Home or Self Care Home             Attending Provider:  (none)   Allergies:  No Known Allergies    Isolation:  None   Infection:  None   Code Status:  Prior    Ht:  170.2 cm (67\")   Wt:  75.8 kg (167 lb 2 oz)    Admission Cmt:  None   Principal Problem:  None                Active Insurance as of 2/15/2020     Primary Coverage     Payor Plan Insurance Group Employer/Plan Group    [facility] [id number]      Payor Plan Address Payor Plan Phone Number Payor Plan Fax Number Effective Dates    [employer] [phone]  2/10/2020 - None Entered    [address] [facility] [id number]       Subscriber Name Subscriber Birth Date Member ID       KINGSLEY SERVIN 1945 [id number]                 Emergency Contacts      (Rel.) Home Phone Work Phone Mobile Phone    LUIS DANIEL SERVIN (Son) -- -- [phone]    [email or web address] [email or web address] (Daughter) -- -- [phone]               Discharge Summary      Soham Parker APRN at 02/17/20 0909     Attestation signed by Faizan Girard MD at 02/17/20 1421    I agree with the assessment, treatment plan, and disposition of the patient as recorded by Soham Parker.        Faizan Girard MD  02/17/20  2:21 PM                         [facility]  DISCHARGE SUMMARY       Date of Admission: 2/15/2020  Date of Discharge:  2/17/2020  Primary Care Physician: Provider, No Known    Presenting Problem/History of Present " Illness:  Rapid heartbeat    Final Discharge Diagnoses:  Patient Active Problem List   Diagnosis   • Pneumonia of right upper lobe due to infectious organism (CMS/HCC)   • Essential hypertension   • Former tobacco use   • PVD (peripheral vascular disease) (CMS/HCC)   • Acute respiratory failure with hypoxia (CMS/HCC)   • Positive D dimer   • Atrial flutter (CMS/HCC)   • Alcoholism /alcohol abuse (CMS/HCC)     Consults: None    Procedures Performed: None    Pertinent Test Results:   Imaging Results (Last 7 Days)     Procedure Component Value Units Date/Time    XR Chest 1 View [[id number]] Collected:  02/15/20 2036     Updated:  02/15/20 2040    Narrative:       EXAMINATION: XR CHEST 1 VW-     2/15/2020 8:00 PM CST     HISTORY: Tachycardia.     One view chest x-ray compared with 02/10/2020.     Heart size is normal.  The mediastinum is within normal limits.      The lungs are normally expanded with no pneumonia or pneumothorax.     No congestive failure changes.                                                                       Impression:       1. No acute disease.           This report was finalized on 02/15/2020 20:36 by Dr. Alfred Hernández MD.        Lab Results (last 7 days)     Procedure Component Value Units Date/Time    T3, Free [[id number]]  (Normal) Collected:  02/15/20 1948    Specimen:  Blood Updated:  02/16/20 1200     T3, Free 2.79 pg/mL     Narrative:       Results may be falsely increased if patient taking Biotin.      Comprehensive Metabolic Panel [[id number]]  (Abnormal) Collected:  02/16/20 0501    Specimen:  Blood Updated:  02/16/20 0544     Glucose 96 mg/dL      BUN 24 mg/dL      Creatinine 0.92 mg/dL      Sodium 147 mmol/L      Potassium 3.5 mmol/L      Chloride 107 mmol/L      CO2 29.0 mmol/L      Calcium 8.8 mg/dL      Total Protein 6.0 g/dL      Albumin 3.50 g/dL      ALT (SGPT) 25 U/L      AST (SGOT) 17 U/L      Alkaline Phosphatase 87 U/L      Total Bilirubin 1.1 mg/dL      eGFR Non   Amer 81 mL/min/1.73      Globulin 2.5 gm/dL      A/G Ratio 1.4 g/dL      BUN/Creatinine Ratio 26.1     Anion Gap 11.0 mmol/L     Narrative:       GFR Normal >60  Chronic Kidney Disease <60  Kidney Failure <15      Magnesium [[id number]]  (Normal) Collected:  02/16/20 0501    Specimen:  Blood Updated:  02/16/20 0543     Magnesium 2.3 mg/dL     Phosphorus [[id number]]  (Normal) Collected:  02/16/20 0501    Specimen:  Blood Updated:  02/16/20 0543     Phosphorus 3.6 mg/dL     CBC Auto Differential [[id number]]  (Abnormal) Collected:  02/16/20 0501    Specimen:  Blood Updated:  02/16/20 0533     WBC 8.07 10*3/mm3      RBC 4.95 10*6/mm3      Hemoglobin 16.2 g/dL      Hematocrit 48.1 %      MCV 97.2 fL      MCH 32.7 pg      MCHC 33.7 g/dL      RDW 13.6 %      RDW-SD 49.0 fl      MPV 9.2 fL      Platelets 198 10*3/mm3      Neutrophil % 62.6 %      Lymphocyte % 25.8 %      Monocyte % 9.9 %      Eosinophil % 0.7 %      Basophil % 0.4 %      Immature Grans % 0.6 %      Neutrophils, Absolute 5.05 10*3/mm3      Lymphocytes, Absolute 2.08 10*3/mm3      Monocytes, Absolute 0.80 10*3/mm3      Eosinophils, Absolute 0.06 10*3/mm3      Basophils, Absolute 0.03 10*3/mm3      Immature Grans, Absolute 0.05 10*3/mm3      nRBC 0.0 /100 WBC     [place] Draw [[id number]] Collected:  02/15/20 1948    Specimen:  Blood Updated:  02/15/20 2100    Narrative:       The following orders were created for panel order [place] Draw.  Procedure                               Abnormality         Status                     ---------                               -----------         ------                     Light Blue Top[[id number]]                                   Final result               Green Top (Gel)[[id number]]                                  Final result               Lavender Top[[id number]]                                     Final result               Red Top[[id number]]                                          Final result               [place]  Blood Culture [place]...[[id number]]                      Final result                 Please view results for these tests on the individual orders.    [place] Blood Culture Bottle Set [[id number]] Collected:  02/15/20 1948    Specimen:  Blood from Arm, Right Updated:  02/15/20 2100     Extra Tube Hold for add-ons.     Comment: Auto resulted.       Light Blue Top [[id number]] Collected:  02/15/20 1948    Specimen:  Blood Updated:  02/15/20 2100     Extra Tube hold for add-on     Comment: Auto resulted       Green Top (Gel) [[id number]] Collected:  02/15/20 1948    Specimen:  Blood Updated:  02/15/20 2100     Extra Tube Hold for add-ons.     Comment: Auto resulted.       Lavender Top [[id number]] Collected:  02/15/20 1948    Specimen:  Blood Updated:  02/15/20 2100     Extra Tube hold for add-on     Comment: Auto resulted       Red Top [[id number]] Collected:  02/15/20 1948    Specimen:  Blood Updated:  02/15/20 2100     Extra Tube Hold for add-ons.     Comment: Auto resulted.       Comprehensive Metabolic Panel [[id number]]  (Abnormal) Collected:  02/15/20 1948    Specimen:  Blood Updated:  02/15/20 2032     Glucose 108 mg/dL      BUN 25 mg/dL      Creatinine 0.78 mg/dL      Sodium 145 mmol/L      Potassium 3.8 mmol/L      Chloride 103 mmol/L      CO2 26.0 mmol/L      Calcium 9.7 mg/dL      Total Protein 7.5 g/dL      Albumin 4.20 g/dL      ALT (SGPT) 33 U/L      AST (SGOT) 22 U/L      Alkaline Phosphatase 113 U/L      Total Bilirubin 1.2 mg/dL      eGFR Non African Amer 98 mL/min/1.73      Globulin 3.3 gm/dL      A/G Ratio 1.3 g/dL      BUN/Creatinine Ratio 32.1     Anion Gap 16.0 mmol/L     Narrative:       GFR Normal >60  Chronic Kidney Disease <60  Kidney Failure <15      Blood Gas, Arterial [[id number]]  (Abnormal) Collected:  02/15/20 2025    Specimen:  Arterial Blood Updated:  02/15/20 2031     Site Right Radial     Nathaniel's Test Positive     pH, Arterial 7.501 pH units      Comment: 83 Value above reference range         pCO2, Arterial 31.2 mm Hg      Comment: 84 Value below reference range        pO2, Arterial 68.2 mm Hg      Comment: 84 Value below reference range        HCO3, Arterial 24.4 mmol/L      Base Excess, Arterial 2.2 mmol/L      Comment: 83 Value above reference range        O2 Saturation, Arterial 94.6 %      Temperature 37.0 C      Barometric Pressure for Blood Gas 755 mmHg      Modality Nasal Cannula     Flow Rate 2.0 lpm      Ventilator Mode NA     Collected by [id number]     Comment: Meter: [id number]     :  [id number]       Troponin [[id number]]  (Normal) Collected:  02/15/20 1948    Specimen:  Blood Updated:  02/15/20 2031     Troponin T <0.010 ng/mL     Narrative:       Troponin T Reference Range:  <= 0.03 ng/mL-   Negative for AMI  >0.03 ng/mL-     Abnormal for myocardial necrosis.  Clinicians would have to utilize clinical acumen, EKG, Troponin and serial changes to determine if it is an Acute Myocardial Infarction or myocardial injury due to an underlying chronic condition.       Results may be falsely decreased if patient taking Biotin.      BNP [[id number]]  (Abnormal) Collected:  02/15/20 1948    Specimen:  Blood Updated:  02/15/20 2031     proBNP 2,062.0 pg/mL     Narrative:       Among patients with dyspnea, NT-proBNP is highly sensitive for the detection of acute congestive heart failure. In addition NT-proBNP of <300 pg/ml effectively rules out acute congestive heart failure with 99% negative predictive value.    Results may be falsely decreased if patient taking Biotin.      TSH [[id number]]  (Normal) Collected:  02/15/20 1948    Specimen:  Blood Updated:  02/15/20 2031     TSH 1.950 uIU/mL     T4, Free [[id number]]  (Normal) Collected:  02/15/20 1948    Specimen:  Blood Updated:  02/15/20 2031     Free T4 1.46 ng/dL     Narrative:       Results may be falsely increased if patient taking Biotin.      CBC & Differential [[id number]] Collected:  02/15/20 1948    Specimen:  Blood Updated:  02/15/20  2009    Narrative:       The following orders were created for panel order CBC & Differential.  Procedure                               Abnormality         Status                     ---------                               -----------         ------                     CBC Auto Differential[[id number]]        Abnormal            Final result                 Please view results for these tests on the individual orders.    CBC Auto Differential [[id number]]  (Abnormal) Collected:  02/15/20 1948    Specimen:  Blood Updated:  02/15/20 2009     WBC 9.22 10*3/mm3      RBC 5.81 10*6/mm3      Hemoglobin 19.1 g/dL      Hematocrit 56.4 %      MCV 97.1 fL      MCH 32.9 pg      MCHC 33.9 g/dL      RDW 13.7 %      RDW-SD 49.1 fl      MPV 9.0 fL      Platelets 215 10*3/mm3      Neutrophil % 78.2 %      Lymphocyte % 15.1 %      Monocyte % 6.0 %      Eosinophil % 0.0 %      Basophil % 0.3 %      Immature Grans % 0.4 %      Neutrophils, Absolute 7.21 10*3/mm3      Lymphocytes, Absolute 1.39 10*3/mm3      Monocytes, Absolute 0.55 10*3/mm3      Eosinophils, Absolute 0.00 10*3/mm3      Basophils, Absolute 0.03 10*3/mm3      Immature Grans, Absolute 0.04 10*3/mm3      nRBC 0.0 /100 WBC         Hospital Course:  The patient is a 74 y.o. male who presented to [facility] with rapid heartbeat.  The patient has a past medical history significant for hypertension, peripheral vascular disease and paroxysmal atrial flutter.  Patient was recently admitted to our hospital on 2/10/2022-2/12/2020 for right upper lobe pneumonia.  During that hospitalization he converted into atrial flutter rapid ventricular response.  He was placed on metoprolol.  He then subsequently converted back into sinus rhythm.  He was discharged home on metoprolol.  However [facility] to atenolol.  They also gave him a prednisone taper as well.  The patient is also seeing Dr. Laughlin for arthritis and  "issues with his foot.  The patient was evaluated in the emergency department initially on a Cardizem drip.  He was admitted for further evaluation and treatment.  He was placed back on his metoprolol and Xarelto.  He was converted back into sinus rhythm.  He has had no issues since.  The patient has some alcohol abuse issues and psychotherapy has been recommended for this.  The patient is also been started on hydroxyzine as needed and has seemed to help.  The patient will get his metoprolol, Xarelto and hydroxyzine through our retail pharmacy today.  I have discussed this with the  to ensure that the patient could afford this.  Turns out that it is only going to be $2.  The patient will be discharged home today in stable condition to [facility] in the next week or so.    Physical Exam on Discharge:  /50 (BP Location: Right arm, Patient Position: Sitting)   Pulse 85   Temp 97.5 °F (36.4 °C) (Oral)   Resp 16   Ht 170.2 cm (67\")   Wt 75.8 kg (167 lb 2 oz)   SpO2 93%   BMI 26.18 kg/m²    Physical Exam   Constitutional: He is oriented to person, place, and time. He appears well-developed and well-nourished.   Laying in bed.  Family at bedside.   HENT:   Head: Normocephalic and atraumatic.   Eyes: Pupils are equal, round, and reactive to light. Conjunctivae and EOM are normal.   Neck: Neck supple. No JVD present.   Cardiovascular: Normal rate, regular rhythm, normal heart sounds and intact distal pulses. Exam reveals no gallop and no friction rub.   No murmur heard.  Sinus 56-78   Pulmonary/Chest: Effort normal and breath sounds normal. No respiratory distress. He has no wheezes. He has no rales. He exhibits no tenderness.   Abdominal: Soft. Bowel sounds are normal. He exhibits no distension. There is no tenderness. There is no rebound and no guarding.   Musculoskeletal: Normal range of motion. He exhibits no edema, tenderness or deformity.   Neurological: He is alert and oriented to " person, place, and time.   Skin: Skin is warm and dry. No rash noted.   Psychiatric: He has a normal mood and affect. His behavior is normal. Judgment and thought content normal.   Nursing note and vitals reviewed.    Condition on Discharge: Stable    Discharge Disposition:  Home or Self Care    Discharge Medications:     Discharge Medications      New Medications      Instructions Start Date   hydrOXYzine 25 MG tablet  Commonly known as:  ATARAX   25 mg, Oral, 3 Times Daily PRN         Changes to Medications      Instructions Start Date   metoprolol tartrate 25 MG tablet  Commonly known as:  LOPRESSOR  What changed:  Another medication with the same name was removed. Continue taking this medication, and follow the directions you see here.   25 mg, Oral, 2 Times Daily         Continue These Medications      Instructions Start Date   cilostazol 100 MG tablet  Commonly known as:  PLETAL   100 mg, Oral, 2 Times Daily      guaiFENesin 600 MG 12 hr tablet  Commonly known as:  MUCINEX   1,200 mg, Oral, Every 12 Hours Scheduled      multivitamin with minerals tablet tablet   1 tablet, Oral, Daily      rivaroxaban 20 MG tablet  Commonly known as:  XARELTO   20 mg, Oral, Daily With Dinner      sildenafil 50 MG tablet  Commonly known as:  VIAGRA   50 mg, Oral, Daily PRN         Stop These Medications    amLODIPine 10 MG tablet  Commonly known as:  NORVASC     atenolol 100 MG tablet  Commonly known as:  TENORMIN     azithromycin 250 MG tablet  Commonly known as:  ZITHROMAX     azithromycin 500 MG tablet  Commonly known as:  ZITHROMAX     cefdinir 300 MG capsule  Commonly known as:  OMNICEF     cephalexin 500 MG capsule  Commonly known as:  KEFLEX     hydroCHLOROthiazide 25 MG tablet  Commonly known as:  HYDRODIURIL     methylPREDNISolone 4 MG tablet  Commonly known as:  MEDROL (BHARGAVI)          Discharge Diet:   Diet Instructions     Diet: Regular      Discharge Diet:  Regular    Diet: Regular; Thin      Discharge Diet:  Regular     Fluid Consistency:  Thin        Activity at Discharge:   Activity Instructions     Activity as Tolerated      Activity as Tolerated          Follow-up Appointments:   No future appointments.    Test Results Pending at Discharge: None    TAJ Guallpa  02/17/20  9:09 AM    Time: 32 minutes.           Electronically signed by Faizan Girard MD at 02/17/20 6572

## 2020-02-18 NOTE — OUTREACH NOTE
Case Management Call Center Follow-up      Responses   [facility] Call Center Tracking Reason?  Medication Clarification   Has the Call Center Case Management Follow-up issue been resolved?  Yes   Follow-up Comments  Patient was readmitted to hospital on 2/15/19 and discharged home on 2/17/19. His medication list changed at time of discharge and patient utilized Meds to Beds Program for new presciptions at discharge.           Belkys Helms RN    2/18/2020, 3:01 PM

## 2020-02-18 NOTE — OUTREACH NOTE
Prep Survey      Responses   Facility patient discharged from?  [place]   Is patient eligible?  Yes   Discharge diagnosis   Essential HTN, PVD, Acute resp. failure with hypoxia, Pos. D-Dimer, aflutter, Alcoholism/alcohol abuse   Does the patient have one of the following disease processes/diagnoses(primary or secondary)?  Other   Does the patient have Home health ordered?  No   Is there a DME ordered?  No   Comments regarding appointments  See AVS   Prep survey completed?  Yes          Lucretia Watkins RN

## 2020-02-19 ENCOUNTER — READMISSION MANAGEMENT (OUTPATIENT)
Dept: CALL CENTER | Facility: HOSPITAL | Age: 75
End: 2020-02-19

## 2020-02-19 NOTE — OUTREACH NOTE
Medical Week 1 Survey      Responses   Facility patient discharged from?  [place]   Does the patient have one of the following disease processes/diagnoses(primary or secondary)?  Other   Is there a successful TCM telephone encounter documented?  No   Week 1 attempt successful?  Yes   Call start time  1518   Call end time  1526   Discharge diagnosis   Essential HTN, PVD, Acute resp. failure with hypoxia, Pos. D-Dimer, aflutter, Alcoholism/alcohol abuse   Is patient permission given to speak with other caregiver?  No   Meds reviewed with patient/caregiver?  Yes   Is the patient having any side effects they believe may be caused by any medication additions or changes?  No   Does the patient have all medications ordered at discharge?  Yes   Is the patient taking all medications as directed (includes completed medication regime)?  Yes   Does the patient have a primary care provider?   Yes   Does the patient have an appointment with their PCP within 7 days of discharge?  Greater than 7 days   What is preventing the patient from scheduling follow up appointments within 7 days of discharge?  Earlier appointment not available   Has the patient kept scheduled appointments due by today?  N/A   Has home health visited the patient within 72 hours of discharge?  N/A   Did the patient receive a copy of their discharge instructions?  Yes   Nursing interventions  Reviewed instructions with patient   What is the patient's perception of their health status since discharge?  Improving   Is the patient/caregiver able to teach back signs and symptoms related to disease process for when to call PCP?  Yes   Is the patient/caregiver able to teach back signs and symptoms related to disease process for when to call 911?  Yes   Is the patient/caregiver able to teach back the hierarchy of who to call/visit for symptoms/problems? PCP, Specialist, Home health nurse, Urgent Care, ED, 911  Yes   Week 1 call completed?  Yes          Marcy LUI  ANAND Davis

## 2020-02-26 ENCOUNTER — READMISSION MANAGEMENT (OUTPATIENT)
Dept: CALL CENTER | Facility: HOSPITAL | Age: 75
End: 2020-02-26

## 2020-02-26 NOTE — OUTREACH NOTE
Medical Week 2 Survey      Responses   Facility patient discharged from?  [place]   Does the patient have one of the following disease processes/diagnoses(primary or secondary)?  Other   Week 2 attempt successful?  No   Unsuccessful attempts  Attempt 1          Maricel Dye RN

## 2020-02-27 ENCOUNTER — READMISSION MANAGEMENT (OUTPATIENT)
Dept: CALL CENTER | Facility: HOSPITAL | Age: 75
End: 2020-02-27

## 2020-02-27 NOTE — OUTREACH NOTE
Medical Week 2 Survey      Responses   Facility patient discharged from?  [place]   Does the patient have one of the following disease processes/diagnoses(primary or secondary)?  Other   Week 2 attempt successful?  No   Unsuccessful attempts  Attempt 2          Carole Chapman RN

## 2020-03-03 ENCOUNTER — READMISSION MANAGEMENT (OUTPATIENT)
Dept: CALL CENTER | Facility: HOSPITAL | Age: 75
End: 2020-03-03

## 2020-03-03 NOTE — OUTREACH NOTE
Medical Week 3 Survey      Responses   [facility] patient discharged from?  [place]   Does the patient have one of the following disease processes/diagnoses(primary or secondary)?  Other   Week 3 attempt successful?  Yes   Call start time  1528   Revoke  Decline to participate [He states he has all he needs to take care of himself, and he is doing well, He is asking who to call for billing questions. Gave him the phone number. ]   Call end time  1532   Discharge diagnosis   Essential HTN, PVD, Acute resp. failure with hypoxia, Pos. D-Dimer, aflutter, Alcoholism/alcohol abuse          Belkys Cunha RN

## 2020-03-06 ENCOUNTER — NURSE TRIAGE (OUTPATIENT)
Dept: CALL CENTER | Facility: HOSPITAL | Age: 75
End: 2020-03-06

## 2020-03-06 ENCOUNTER — APPOINTMENT (OUTPATIENT)
Dept: GENERAL RADIOLOGY | Facility: HOSPITAL | Age: 75
End: 2020-03-06

## 2020-03-06 ENCOUNTER — HOSPITAL ENCOUNTER (INPATIENT)
Facility: HOSPITAL | Age: 75
LOS: 1 days | Discharge: HOME | End: 2020-03-07
Attending: FAMILY MEDICINE | Admitting: FAMILY MEDICINE

## 2020-03-06 DIAGNOSIS — IMO0001 ALCOHOLISM /ALCOHOL ABUSE: ICD-10-CM

## 2020-03-06 DIAGNOSIS — I48.92 ATRIAL FLUTTER WITH RAPID VENTRICULAR RESPONSE (HCC): ICD-10-CM

## 2020-03-06 DIAGNOSIS — I48.92 ATRIAL FLUTTER, UNSPECIFIED TYPE (HCC): Primary | ICD-10-CM

## 2020-03-06 LAB
ALBUMIN SERPL-MCNC: 3.8 G/DL (ref 3.5–5.2)
ALBUMIN/GLOB SERPL: 1.3 G/DL
ALP SERPL-CCNC: 116 U/L (ref 39–117)
ALT SERPL W P-5'-P-CCNC: 14 U/L (ref 1–41)
ANION GAP SERPL CALCULATED.3IONS-SCNC: 15 MMOL/L (ref 5–15)
APTT PPP: 79.2 SECONDS (ref 24.1–35)
AST SERPL-CCNC: 14 U/L (ref 1–40)
BASOPHILS # BLD AUTO: 0.03 10*3/MM3 (ref 0–0.2)
BASOPHILS NFR BLD AUTO: 0.4 % (ref 0–1.5)
BILIRUB SERPL-MCNC: 0.3 MG/DL (ref 0.2–1.2)
BUN BLD-MCNC: 11 MG/DL (ref 8–23)
BUN/CREAT SERPL: 15.1 (ref 7–25)
CALCIUM SPEC-SCNC: 9 MG/DL (ref 8.6–10.5)
CHLORIDE SERPL-SCNC: 108 MMOL/L (ref 98–107)
CO2 SERPL-SCNC: 21 MMOL/L (ref 22–29)
CREAT BLD-MCNC: 0.73 MG/DL (ref 0.76–1.27)
DEPRECATED RDW RBC AUTO: 51 FL (ref 37–54)
EOSINOPHIL # BLD AUTO: 0.21 10*3/MM3 (ref 0–0.4)
EOSINOPHIL NFR BLD AUTO: 2.6 % (ref 0.3–6.2)
ERYTHROCYTE [DISTWIDTH] IN BLOOD BY AUTOMATED COUNT: 14.7 % (ref 12.3–15.4)
GFR SERPL CREATININE-BSD FRML MDRD: 106 ML/MIN/1.73
GLOBULIN UR ELPH-MCNC: 3 GM/DL
GLUCOSE BLD-MCNC: 89 MG/DL (ref 65–99)
HCT VFR BLD AUTO: 45.8 % (ref 37.5–51)
HGB BLD-MCNC: 15.8 G/DL (ref 13–17.7)
IMM GRANULOCYTES # BLD AUTO: 0.02 10*3/MM3 (ref 0–0.05)
IMM GRANULOCYTES NFR BLD AUTO: 0.3 % (ref 0–0.5)
INR PPP: 1.23 (ref 0.91–1.09)
LYMPHOCYTES # BLD AUTO: 1.92 10*3/MM3 (ref 0.7–3.1)
LYMPHOCYTES NFR BLD AUTO: 24.1 % (ref 19.6–45.3)
MCH RBC QN AUTO: 32.9 PG (ref 26.6–33)
MCHC RBC AUTO-ENTMCNC: 34.5 G/DL (ref 31.5–35.7)
MCV RBC AUTO: 95.4 FL (ref 79–97)
MONOCYTES # BLD AUTO: 0.65 10*3/MM3 (ref 0.1–0.9)
MONOCYTES NFR BLD AUTO: 8.2 % (ref 5–12)
NEUTROPHILS # BLD AUTO: 5.13 10*3/MM3 (ref 1.7–7)
NEUTROPHILS NFR BLD AUTO: 64.4 % (ref 42.7–76)
NRBC BLD AUTO-RTO: 0 /100 WBC (ref 0–0.2)
NT-PROBNP SERPL-MCNC: 3214 PG/ML (ref 5–900)
PLATELET # BLD AUTO: 217 10*3/MM3 (ref 140–450)
PMV BLD AUTO: 8.8 FL (ref 6–12)
POTASSIUM BLD-SCNC: 3.7 MMOL/L (ref 3.5–5.2)
PROT SERPL-MCNC: 6.8 G/DL (ref 6–8.5)
PROTHROMBIN TIME: 15.4 SECONDS (ref 11.9–14.6)
RBC # BLD AUTO: 4.8 10*6/MM3 (ref 4.14–5.8)
SODIUM BLD-SCNC: 144 MMOL/L (ref 136–145)
TROPONIN T SERPL-MCNC: <0.01 NG/ML (ref 0–0.03)
WBC NRBC COR # BLD: 7.96 10*3/MM3 (ref 3.4–10.8)

## 2020-03-06 PROCEDURE — 94770: CPT

## 2020-03-06 PROCEDURE — 93005 ELECTROCARDIOGRAM TRACING: CPT | Performed by: INTERNAL MEDICINE

## 2020-03-06 PROCEDURE — 83880 ASSAY OF NATRIURETIC PEPTIDE: CPT | Performed by: FAMILY MEDICINE

## 2020-03-06 PROCEDURE — 85610 PROTHROMBIN TIME: CPT | Performed by: FAMILY MEDICINE

## 2020-03-06 PROCEDURE — 25010000002 AMIODARONE PER 30 MG: Performed by: FAMILY MEDICINE

## 2020-03-06 PROCEDURE — 84484 ASSAY OF TROPONIN QUANT: CPT | Performed by: FAMILY MEDICINE

## 2020-03-06 PROCEDURE — 85025 COMPLETE CBC W/AUTO DIFF WBC: CPT | Performed by: FAMILY MEDICINE

## 2020-03-06 PROCEDURE — 85730 THROMBOPLASTIN TIME PARTIAL: CPT | Performed by: FAMILY MEDICINE

## 2020-03-06 PROCEDURE — 99285 EMERGENCY DEPT VISIT HI MDM: CPT

## 2020-03-06 PROCEDURE — 93005 ELECTROCARDIOGRAM TRACING: CPT | Performed by: FAMILY MEDICINE

## 2020-03-06 PROCEDURE — 80053 COMPREHEN METABOLIC PANEL: CPT | Performed by: FAMILY MEDICINE

## 2020-03-06 PROCEDURE — 93010 ELECTROCARDIOGRAM REPORT: CPT | Performed by: INTERNAL MEDICINE

## 2020-03-06 PROCEDURE — 25010000002 MIDAZOLAM HCL (PF) 5 MG/5ML SOLUTION: Performed by: INTERNAL MEDICINE

## 2020-03-06 PROCEDURE — 71045 X-RAY EXAM CHEST 1 VIEW: CPT

## 2020-03-06 RX ORDER — MIDAZOLAM HYDROCHLORIDE 1 MG/ML
2 INJECTION INTRAMUSCULAR; INTRAVENOUS ONCE
Status: COMPLETED | OUTPATIENT
Start: 2020-03-06 | End: 2020-03-06

## 2020-03-06 RX ORDER — AMIODARONE HYDROCHLORIDE 50 MG/ML
150 INJECTION, SOLUTION INTRAVENOUS ONCE
Status: COMPLETED | OUTPATIENT
Start: 2020-03-06 | End: 2020-03-06

## 2020-03-06 RX ORDER — DILTIAZEM HYDROCHLORIDE 5 MG/ML
10 INJECTION INTRAVENOUS ONCE
Status: COMPLETED | OUTPATIENT
Start: 2020-03-06 | End: 2020-03-06

## 2020-03-06 RX ORDER — KETAMINE HYDROCHLORIDE 50 MG/ML
50 INJECTION, SOLUTION, CONCENTRATE INTRAMUSCULAR; INTRAVENOUS ONCE
Status: COMPLETED | OUTPATIENT
Start: 2020-03-06 | End: 2020-03-06

## 2020-03-06 RX ADMIN — AMIODARONE HYDROCHLORIDE 150 MG: 50 INJECTION, SOLUTION INTRAVENOUS at 21:38

## 2020-03-06 RX ADMIN — KETAMINE HYDROCHLORIDE 50 MG: 50 INJECTION, SOLUTION INTRAMUSCULAR; INTRAVENOUS at 22:55

## 2020-03-06 RX ADMIN — MIDAZOLAM HYDROCHLORIDE 2 MG: 1 INJECTION, SOLUTION INTRAMUSCULAR; INTRAVENOUS at 22:51

## 2020-03-06 RX ADMIN — DILTIAZEM HYDROCHLORIDE 5 MG/HR: 5 INJECTION INTRAVENOUS at 20:00

## 2020-03-06 RX ADMIN — DILTIAZEM HYDROCHLORIDE 10 MG: 5 INJECTION INTRAVENOUS at 19:38

## 2020-03-06 NOTE — TELEPHONE ENCOUNTER
"Caller states that he was discharged from [facility] 2/17 with aflutter.  He has been using a pulse ox today and his spo2 has been 95% and above.  His HR has been 40 to 130's.  He is not soa or having chest pain.  He cannot tell his hr is irregular but he could not tell his last admission that he was in aflutter.  When caller checked radial pulse he states that is regular at 40, recheck was 56.  Hands are warm.  Denies dizziness.  Recommend going to nearest ER for evaluation, he will call his daughter to come bring him.    Reason for Disposition  • Heart beating very slowly (e.g., < 50 / minute)  (Exception: athlete)    Additional Information  • Negative: Passed out (i.e., lost consciousness, collapsed and was not responding)  • Negative: Shock suspected (e.g., cold/pale/clammy skin, too weak to stand, low BP, rapid pulse)  • Negative: Difficult to awaken or acting confused (e.g., disoriented, slurred speech)  • Negative: Visible sweat on face or sweat dripping down face  • Negative: Unable to walk, or can only walk with assistance (e.g., requires support)  • Negative: [1] Received SHOCK from implantable cardiac defibrillator AND [2] persisting symptoms (i.e., palpitations, lightheadedness)  • Negative: Sounds like a life-threatening emergency to the triager  • Negative: Chest pain  • Negative: Difficulty breathing  • Negative: Dizziness, lightheadedness, or weakness  • Negative: [1] Heart beating very rapidly (e.g., > 140 / minute) AND [2] present now  (Exception: during exercise)  • Negative: New or worsened shortness of breath with activity (dyspnea on exertion)  • Negative: Patient sounds very sick or weak to the triager  • Negative: [1] Heart beating very rapidly (e.g., > 140 / minute) AND [2] not present now  (Exception: during exercise)    Answer Assessment - Initial Assessment Questions  1. DESCRIPTION: \"Please describe your heart rate or heart beat that you are having\" (e.g., fast/slow, regular/irregular, " "skipped or extra beats, \"palpitations\")      Now it is regular slow but has been up and down today  2. ONSET: \"When did it start?\" (Minutes, hours or days)       today  3. DURATION: \"How long does it last\" (e.g., seconds, minutes, hours)      Up and down all down per pulse ox  4. PATTERN \"Does it come and go, or has it been constant since it started?\"  \"Does it get worse with exertion?\"   \"Are you feeling it now?\"      Comes and goes  5. TAP: \"Using your hand, can you tap out what you are feeling on a chair or table in front of you, so that I can hear?\" (Note: not all patients can do this)        Regular at this time  6. HEART RATE: \"Can you tell me your heart rate?\" \"How many beats in 15 seconds?\"  (Note: not all patients can do this)         40 on recheck a few minutes later is 56  7. RECURRENT SYMPTOM: \"Have you ever had this before?\" If so, ask: \"When was the last time?\" and \"What happened that time?\"       yes  8. CAUSE: \"What do you think is causing the palpitations?\"      afib/aflutter  9. CARDIAC HISTORY: \"Do you have any history of heart disease?\" (e.g., heart attack, angina, bypass surgery, angioplasty, arrhythmia)       yes  10. OTHER SYMPTOMS: \"Do you have any other symptoms?\" (e.g., dizziness, chest pain, sweating, difficulty breathing)        no  11. PREGNANCY: \"Is there any chance you are pregnant?\" \"When was your last menstrual period?\"        no    Protocols used: HEART RATE AND HEARTBEAT QUESTIONS-ADULT-AH      "

## 2020-03-07 PROBLEM — S91.302A NON HEALING LEFT HEEL WOUND: Chronic | Status: ACTIVE | Noted: 2020-03-07

## 2020-03-07 PROBLEM — F17.210 CIGARETTE SMOKER: Chronic | Status: ACTIVE | Noted: 2020-02-10

## 2020-03-07 LAB
TROPONIN T SERPL-MCNC: <0.01 NG/ML (ref 0–0.03)
TROPONIN T SERPL-MCNC: <0.01 NG/ML (ref 0–0.03)
WHOLE BLOOD HOLD SPECIMEN: NORMAL

## 2020-03-07 PROCEDURE — 94640 AIRWAY INHALATION TREATMENT: CPT

## 2020-03-07 PROCEDURE — 25010000002 AMIODARONE IN DEXTROSE 5% 360-4.14 MG/200ML-% SOLUTION: Performed by: INTERNAL MEDICINE

## 2020-03-07 PROCEDURE — 93005 ELECTROCARDIOGRAM TRACING: CPT | Performed by: NURSE PRACTITIONER

## 2020-03-07 PROCEDURE — 99222 1ST HOSP IP/OBS MODERATE 55: CPT | Performed by: INTERNAL MEDICINE

## 2020-03-07 PROCEDURE — 25010000002 LORAZEPAM PER 2 MG: Performed by: NURSE PRACTITIONER

## 2020-03-07 PROCEDURE — 93010 ELECTROCARDIOGRAM REPORT: CPT | Performed by: INTERNAL MEDICINE

## 2020-03-07 PROCEDURE — 94799 UNLISTED PULMONARY SVC/PX: CPT

## 2020-03-07 PROCEDURE — 84484 ASSAY OF TROPONIN QUANT: CPT | Performed by: NURSE PRACTITIONER

## 2020-03-07 RX ORDER — GUAIFENESIN 600 MG/1
1200 TABLET, EXTENDED RELEASE ORAL EVERY 12 HOURS SCHEDULED
Status: DISCONTINUED | OUTPATIENT
Start: 2020-03-07 | End: 2020-03-07 | Stop reason: HOSPADM

## 2020-03-07 RX ORDER — PROPAFENONE HYDROCHLORIDE 150 MG/1
150 TABLET, COATED ORAL EVERY 8 HOURS SCHEDULED
Qty: 90 TABLET | Refills: 2 | Status: SHIPPED | OUTPATIENT
Start: 2020-03-07 | End: 2020-03-07

## 2020-03-07 RX ORDER — LORAZEPAM 2 MG/ML
0.5 INJECTION INTRAMUSCULAR EVERY 6 HOURS
Status: DISCONTINUED | OUTPATIENT
Start: 2020-03-07 | End: 2020-03-07 | Stop reason: HOSPADM

## 2020-03-07 RX ORDER — LORAZEPAM 1 MG/1
1 TABLET ORAL
Status: DISCONTINUED | OUTPATIENT
Start: 2020-03-07 | End: 2020-03-07 | Stop reason: HOSPADM

## 2020-03-07 RX ORDER — LEVALBUTEROL INHALATION SOLUTION 1.25 MG/3ML
1.25 SOLUTION RESPIRATORY (INHALATION)
Status: DISCONTINUED | OUTPATIENT
Start: 2020-03-07 | End: 2020-03-07 | Stop reason: HOSPADM

## 2020-03-07 RX ORDER — SODIUM CHLORIDE 0.9 % (FLUSH) 0.9 %
10 SYRINGE (ML) INJECTION AS NEEDED
Status: DISCONTINUED | OUTPATIENT
Start: 2020-03-07 | End: 2020-03-07 | Stop reason: HOSPADM

## 2020-03-07 RX ORDER — LORAZEPAM 2 MG/ML
1 INJECTION INTRAMUSCULAR
Status: DISCONTINUED | OUTPATIENT
Start: 2020-03-07 | End: 2020-03-07 | Stop reason: HOSPADM

## 2020-03-07 RX ORDER — LORAZEPAM 2 MG/ML
0.5 INJECTION INTRAMUSCULAR
Status: DISCONTINUED | OUTPATIENT
Start: 2020-03-07 | End: 2020-03-07 | Stop reason: HOSPADM

## 2020-03-07 RX ORDER — HYDROXYZINE HYDROCHLORIDE 25 MG/1
25 TABLET, FILM COATED ORAL 3 TIMES DAILY PRN
Status: DISCONTINUED | OUTPATIENT
Start: 2020-03-07 | End: 2020-03-07 | Stop reason: HOSPADM

## 2020-03-07 RX ORDER — SODIUM CHLORIDE 0.9 % (FLUSH) 0.9 %
10 SYRINGE (ML) INJECTION EVERY 12 HOURS SCHEDULED
Status: DISCONTINUED | OUTPATIENT
Start: 2020-03-07 | End: 2020-03-07 | Stop reason: HOSPADM

## 2020-03-07 RX ORDER — PROPAFENONE HYDROCHLORIDE 150 MG/1
150 TABLET, COATED ORAL EVERY 8 HOURS SCHEDULED
Qty: 90 TABLET | Refills: 2 | Status: SHIPPED | OUTPATIENT
Start: 2020-03-07 | End: 2020-03-26 | Stop reason: SDUPTHER

## 2020-03-07 RX ORDER — LORAZEPAM 2 MG/ML
0.5 INJECTION INTRAMUSCULAR EVERY 8 HOURS
Status: DISCONTINUED | OUTPATIENT
Start: 2020-03-08 | End: 2020-03-07 | Stop reason: HOSPADM

## 2020-03-07 RX ORDER — ONDANSETRON 4 MG/1
4 TABLET, FILM COATED ORAL EVERY 6 HOURS PRN
Status: DISCONTINUED | OUTPATIENT
Start: 2020-03-07 | End: 2020-03-07 | Stop reason: HOSPADM

## 2020-03-07 RX ORDER — LORAZEPAM 1 MG/1
2 TABLET ORAL
Status: DISCONTINUED | OUTPATIENT
Start: 2020-03-07 | End: 2020-03-07 | Stop reason: HOSPADM

## 2020-03-07 RX ORDER — CILOSTAZOL 100 MG/1
100 TABLET ORAL 2 TIMES DAILY
Status: DISCONTINUED | OUTPATIENT
Start: 2020-03-07 | End: 2020-03-07 | Stop reason: HOSPADM

## 2020-03-07 RX ORDER — PROPAFENONE HYDROCHLORIDE 150 MG/1
150 TABLET, COATED ORAL EVERY 8 HOURS SCHEDULED
Status: DISCONTINUED | OUTPATIENT
Start: 2020-03-07 | End: 2020-03-07 | Stop reason: HOSPADM

## 2020-03-07 RX ORDER — ONDANSETRON 2 MG/ML
4 INJECTION INTRAMUSCULAR; INTRAVENOUS EVERY 6 HOURS PRN
Status: DISCONTINUED | OUTPATIENT
Start: 2020-03-07 | End: 2020-03-07 | Stop reason: HOSPADM

## 2020-03-07 RX ORDER — LORAZEPAM 0.5 MG/1
0.5 TABLET ORAL
Status: DISCONTINUED | OUTPATIENT
Start: 2020-03-07 | End: 2020-03-07 | Stop reason: HOSPADM

## 2020-03-07 RX ORDER — LORAZEPAM 2 MG/ML
2 INJECTION INTRAMUSCULAR
Status: DISCONTINUED | OUTPATIENT
Start: 2020-03-07 | End: 2020-03-07 | Stop reason: HOSPADM

## 2020-03-07 RX ADMIN — LEVALBUTEROL HYDROCHLORIDE 1.25 MG: 1.25 SOLUTION RESPIRATORY (INHALATION) at 09:36

## 2020-03-07 RX ADMIN — LORAZEPAM 0.5 MG: 2 INJECTION INTRAMUSCULAR; INTRAVENOUS at 04:24

## 2020-03-07 RX ADMIN — METOPROLOL TARTRATE 25 MG: 25 TABLET, FILM COATED ORAL at 08:14

## 2020-03-07 RX ADMIN — AMIODARONE HYDROCHLORIDE 0.5 MG/MIN: 1.8 INJECTION, SOLUTION INTRAVENOUS at 06:20

## 2020-03-07 RX ADMIN — GUAIFENESIN 1200 MG: 600 TABLET, EXTENDED RELEASE ORAL at 08:14

## 2020-03-07 RX ADMIN — DILTIAZEM HYDROCHLORIDE 15 MG/HR: 5 INJECTION INTRAVENOUS at 04:01

## 2020-03-07 RX ADMIN — AMIODARONE HYDROCHLORIDE 1 MG/MIN: 1.8 INJECTION, SOLUTION INTRAVENOUS at 00:28

## 2020-03-07 RX ADMIN — CILOSTAZOL 100 MG: 100 TABLET ORAL at 08:14

## 2020-03-07 RX ADMIN — PROPAFENONE HYDROCHLORIDE 150 MG: 150 TABLET, FILM COATED ORAL at 13:26

## 2020-03-07 RX ADMIN — LORAZEPAM 0.5 MG: 2 INJECTION INTRAMUSCULAR; INTRAVENOUS at 11:12

## 2020-03-07 RX ADMIN — SODIUM CHLORIDE, PRESERVATIVE FREE 10 ML: 5 INJECTION INTRAVENOUS at 08:14

## 2020-03-07 NOTE — PROGRESS NOTES
"Discharge Planning Assessment  [facility]     Patient Name: Kingsley Lerma  MRN: [id number]  Today's Date: 3/6/2020    Admit Date: 3/6/2020    Discharge Needs Assessment    No documentation.       Discharge Plan     Row Name 03/06/20 2059       Plan    Plan  Spoke to Isabel Cruz about pt needing admission.  On Cardizem.  She approved admission here due to Cardizem and \"needing a higher LOC\" per Dr Glass.  Updated Dr Guevara.         Fatuma Hammond RN    "

## 2020-03-07 NOTE — PLAN OF CARE
Problem: Patient Care Overview  Goal: Plan of Care Review  Outcome: Ongoing (interventions implemented as appropriate)  Flowsheets  Taken 3/7/2020 0439 by Ashley Villatoro LPN  Progress: no change  Outcome Summary: Admit from ER with Aflutter. Cardioversion attempted x2 unsuccessful. Remains on Amio and Cardizem gtt. Pt has hx of ETOH and easily irritated. Ativan 0.5mg scheduled and also has CIWA protocol. Aflutter  hr up to 143 with activity. O2 2l/m. Daughter at BS. Safety maintained. Wound nurse to see for wound on Left heel. Pics taken.  Taken 3/7/2020 0300 by Rosa Escobar, RN  Plan of Care Reviewed With: patient

## 2020-03-07 NOTE — ED PROVIDER NOTES
Subjective   Mr. Kingsley Zuniga is a 74-year-old gentleman with a known history of paroxysmal atrial flutter.  He comes in tonight having noticed that his pulse was markedly elevated on a monitor that he has at home.  He denies any feelings of palpitation, no chest pain, no shortness of breath or any other symptoms.  He says he feels perfectly comfortable.          Review of Systems   All other systems reviewed and are negative.      Past Medical History:   Diagnosis Date   • Alcohol abuse    • Arthritis    • Atrial flutter (CMS/HCC)    • Circulation problem    • History of tobacco abuse    • Hypertension    • PVD (peripheral vascular disease) (CMS/HCC)        No Known Allergies    Past Surgical History:   Procedure Laterality Date   • ADENOIDECTOMY         Family History   Problem Relation Age of Onset   • Dementia Mother    • Cancer Father    • Pancreatic cancer Father        Social History     Socioeconomic History   • Marital status:      Spouse name: Not on file   • Number of children: Not on file   • Years of education: Not on file   • Highest education level: Not on file   Tobacco Use   • Smoking status: Current Every Day Smoker     Packs/day: 0.50     Types: Cigarettes, Cigars   • Smokeless tobacco: Never Used   Substance and Sexual Activity   • Alcohol use: Yes     Alcohol/week: 10.0 standard drinks     Types: 9 Shots of liquor, 1 Cans of beer per week     Frequency: Never   • Drug use: Never   • Sexual activity: Defer           Objective   Physical Exam   Cardiovascular: Regular rhythm and normal pulses. Tachycardia present.       Procedures           ED Course  ED Course as of Mar 25 0642   Fri Mar 06, 2020   7015 I called and discussed the case with Dr. Ugarte of cardiology who stated that since the patient was on anticoagulation and given the fact that 2 antiarrhythmics had not converted him back to sinus mechanism had recommended electrical cardioversion and then discharged on Rythmol 150 mg p.o.  3 times daily.  I discussed the risk and benefits of this approach with the patient with his daughter present in the room and he is agreeable to electrical cardioversion and discharge if successful.    [RW]   2337 After 2 attempts of cardioversion with conversion to sinus mechanism being short-lived and the patient converting back to atrial flutter in the 150s I called and discussed the case with Dr. Ugarte who agreed at this point time and amiodarone drip in conjunction with the Cardizem drip was in the patient's best interest along with admission to the hospital.  He asked the patient be admitted to the hospitalist service due to complex medical care and I discussed the case with Dr. Escalera of the hospital service who is agreeable to admission to the hospital.    [RW]      ED Course User Index  [RW] Salas Edwards MD                                           MDM  Number of Diagnoses or Management Options     Amount and/or Complexity of Data Reviewed  Clinical lab tests: reviewed and ordered  Tests in the radiology section of CPT®: reviewed and ordered  Tests in the medicine section of CPT®: reviewed and ordered  Decide to obtain previous medical records or to obtain history from someone other than the patient: yes    Critical Care  Total time providing critical care: < 30 minutes    Patient Progress  Patient progress: stable      Final diagnoses:   Atrial flutter, unspecified type (CMS/HCC)   Atrial flutter with rapid ventricular response (CMS/HCC)   Alcoholism /alcohol abuse (CMS/HCC)     The patient was in atrial flutter on the initial EKG.  Diltiazem bolus was given which was followed by diltiazem drip.  He also received a bolus of amiodarone.  The rate did begin to vary in got down to the 120s but it was still flutter just with a variable block.  The care of the patient was handed over to Dr. Edwards who kindly agreed to take care of the patient going forward.       Hugo Guevara MD  03/06/20  2158       Hugo Guevara MD  03/25/20 0642

## 2020-03-07 NOTE — ED PROVIDER NOTES
Subjective   History of Present Illness    Review of Systems    Past Medical History:   Diagnosis Date   • Alcohol abuse    • Arthritis    • Atrial flutter (CMS/HCC)    • History of tobacco abuse    • Hypertension    • PVD (peripheral vascular disease) (CMS/HCC)        No Known Allergies    Past Surgical History:   Procedure Laterality Date   • ADENOIDECTOMY         Family History   Problem Relation Age of Onset   • Dementia Mother    • Cancer Father    • Pancreatic cancer Father        Social History     Socioeconomic History   • Marital status:      Spouse name: Not on file   • Number of children: Not on file   • Years of education: Not on file   • Highest education level: Not on file   Tobacco Use   • Smoking status: Former Smoker   • Smokeless tobacco: Never Used   • Tobacco comment: quit on Friday    Substance and Sexual Activity   • Alcohol use: Yes     Frequency: Never           Objective   Physical Exam    Electrical Cardioversion  Date/Time: 3/6/2020 11:23 PM  Performed by: Salas Edwards MD  Authorized by: Salas Edwards MD     Consent:     Consent obtained:  Verbal and written    Consent given by:  Patient    Risks discussed:  Cutaneous burn, death, induced arrhythmia and pain    Alternatives discussed:  Rate-control medication, alternative treatment and delayed treatment  Pre-procedure details:     Cardioversion basis:  Elective    Rhythm:  Atrial flutter    Electrode placement:  Anterior-lateral  Attempt one:     Cardioversion mode:  Synchronous    Waveform:  Biphasic    Shock (Joules):  200    Shock outcome:  Conversion to normal sinus rhythm (The patient went into normal sinus rhythm with a rate in the 70s for about a minute or 2 and while we are setting up to do the EKG to confirm sinus rhythm the patient went back into atrial flutter at 1 45-1 50 as he was still under sedation we attempted th)  Attempt two:     Cardioversion mode:  Synchronous    Waveform:  Biphasic    Shock  (Joules):  200    Shock outcome:  Conversion to normal sinus rhythm (The patient again went into sinus rhythm at a rate of 70 for about a minute or 2 and then converted back to atrial flutter in the 140s to 150s)  Post-procedure details:     Patient status:  Alert    Patient tolerance of procedure:  Tolerated well, no immediate complications               ED Course  ED Course as of Mar 07 1701   Fri Mar 06, 2020   2233 I called and discussed the case with Dr. Ugarte of cardiology who stated that since the patient was on anticoagulation and given the fact that 2 antiarrhythmics had not converted him back to sinus mechanism had recommended electrical cardioversion and then discharged on Rythmol 150 mg p.o. 3 times daily.  I discussed the risk and benefits of this approach with the patient with his daughter present in the room and he is agreeable to electrical cardioversion and discharge if successful.    [RW]   2337 After 2 attempts of cardioversion with conversion to sinus mechanism being short-lived and the patient converting back to atrial flutter in the 150s I called and discussed the case with Dr. Ugarte who agreed at this point time and amiodarone drip in conjunction with the Cardizem drip was in the patient's best interest along with admission to the hospital.  He asked the patient be admitted to the hospitalist service due to complex medical care and I discussed the case with Dr. Escalera of the hospital service who is agreeable to admission to the hospital.    [RW]      ED Course User Index  [RW] Salas Edwards MD                                           [facility]    Final diagnoses:   Atrial flutter, unspecified type (CMS/HCC)   Atrial flutter with rapid ventricular response (CMS/HCC)   Alcoholism /alcohol abuse (CMS/HCC)            Salas Edwards MD  03/06/20 8982       Salas Edwards MD  03/07/20 1701

## 2020-03-07 NOTE — PLAN OF CARE
Problem: Patient Care Overview  Goal: Plan of Care Review  Outcome: Ongoing (interventions implemented as appropriate)  Flowsheets (Taken 3/7/2020 1326)  Progress: improving  Plan of Care Reviewed With: patient  Outcome Summary: Ntn assessment complete. Pt reports he has a good appetite. Cardiac diet, oral intake avg 62.5% of past two meals. Encouraged oral intake with meals, advised of alternate menu options. Cont to follow.

## 2020-03-07 NOTE — CONSULTS
[facility] CONSULT NOTE    Referring Provider: Alexsander Rubio DO    Reason for Consultation: Atrial Fibrillation/Flutter    Chief complaint: Fast HR on Monitor        History of present illness:   Kingsley Lerma is a 74 y.o. male with history of recent hospitalization in February of this year with pneumonia. During the hospitalization, he was found to have atrial flutter, but spontaneously converted to SR on BB. Patient presented to ER last night for fast HR noted on his pulse ox. He denied any palpitations, chest pain or SOB. Stated last time he checked his HR was approximately 2 weeks ago.     Work-up in the ER included EKG that demonstrated atrial fibrillation. Unsuccessful DCCV. Cardiac enzymes were negative. proBNP was elevated 2' to atrial fibrillation. CXR demonstrated clear lung fields without acute cardiopulmonary process. CBC and CMP were unremarkable. Patient was started on cardizem and amiodarone drip. Admitted to the hospitalist service.     Cardiology consulted for further management and recommendations of atrial fibrillation/flutter.       History  Past Medical History:   Diagnosis Date   • Alcohol abuse    • Arthritis    • Atrial flutter (CMS/HCC)    • History of tobacco abuse    • Hypertension    • PVD (peripheral vascular disease) (CMS/HCC)    ,   Past Surgical History:   Procedure Laterality Date   • ADENOIDECTOMY     ,   Family History   Problem Relation Age of Onset   • Dementia Mother    • Cancer Father    • Pancreatic cancer Father    ,   Social History     Tobacco Use   • Smoking status: Former Smoker   • Smokeless tobacco: Never Used   • Tobacco comment: quit on Friday    Substance Use Topics   • Alcohol use: Yes     Frequency: Never   • Drug use: Not on file   ,     Medications  Current Facility-Administered Medications   Medication Dose Route Frequency Provider Last Rate Last Dose   • amiodarone (NEXTERONE) 360 mg/200 mL (1.8 mg/mL) infusion  1 mg/min Intravenous  Titrated Salas Edwards MD 16.67 mL/hr at 03/07/20 0620 0.5 mg/min at 03/07/20 0620   • cilostazol (PLETAL) tablet 100 mg  100 mg Oral BID Fatuma Girard APRN       • dilTIAZem (CARDIZEM) 125 mg in sodium chloride 0.9 % 125 mL (1 mg/mL) infusion  5-15 mg/hr Intravenous Titrated Fatuma Girard APRN 15 mL/hr at 03/07/20 0401 15 mg/hr at 03/07/20 0401   • guaiFENesin (MUCINEX) 12 hr tablet 1,200 mg  1,200 mg Oral Q12H Fatuma Girard APRN       • hydrOXYzine (ATARAX) tablet 25 mg  25 mg Oral TID PRN Fatuma Girard APRN       • levalbuterol (XOPENEX) nebulizer solution 1.25 mg  1.25 mg Nebulization TID - RT Fatuma Girard APRN       • LORazepam (ATIVAN) injection 0.5 mg  0.5 mg Intravenous Q6H Fatuma Girard APRN   0.5 mg at 03/07/20 0424    Followed by   • [START ON 3/8/2020] LORazepam (ATIVAN) injection 0.5 mg  0.5 mg Intravenous Q8H Fatuma Girard APRN       • LORazepam (ATIVAN) tablet 0.5 mg  0.5 mg Oral Q2H PRN Fatuma Girard APRN        Or   • LORazepam (ATIVAN) injection 0.5 mg  0.5 mg Intravenous Q2H PRN Fatuma Girard APRN        Or   • LORazepam (ATIVAN) tablet 1 mg  1 mg Oral Q1H PRN Fatuma Girard APRN        Or   • LORazepam (ATIVAN) injection 1 mg  1 mg Intravenous Q1H PRN Fatuma Girard APRN        Or   • LORazepam (ATIVAN) injection 1 mg  1 mg Intravenous Q15 Min PRN Fatuma Girard APRN        Or   • LORazepam (ATIVAN) injection 1 mg  1 mg Intramuscular Q15 Min PRN Fatuma Girard APRN        Or   • LORazepam (ATIVAN) injection 2 mg  2 mg Intravenous Q1H PRN Fatuma Girard APRN        Or   • LORazepam (ATIVAN) tablet 2 mg  2 mg Oral Q1H PRN Fatuma Girard, APRN       • metoprolol tartrate (LOPRESSOR) tablet 25 mg  25 mg Oral BID Fatuma Girard, APRN       • ondansetron (ZOFRAN) tablet 4 mg  4 mg Oral Q6H PRN Fatuma Girard, APRN        Or   • ondansetron (ZOFRAN) injection 4 mg  4 mg Intravenous Q6H PRN Fatuma Girard, APRN       •  "rivaroxaban (XARELTO) tablet 20 mg  20 mg Oral Daily With Dinner Fatuma Girard APRN       • sodium chloride 0.9 % flush 10 mL  10 mL Intravenous Q12H Fatuma Girard APRN       • sodium chloride 0.9 % flush 10 mL  10 mL Intravenous PRN Fatuma Girard APRN           Allergies:  Patient has no known allergies.    REVIEW OF SYSTEMS:  Constitutional: Denies fever or chills. Denies change in energy level or malaise.  HEENT: Denies headaches or visual disturbances. Denies difficulty swallowing or sore throat.  Respiratory: Denies cough or hoarseness. Denies shortness of breath.   Cardiovascular: Denies chest pain or pressure. Denies palpitations. Denies presyncope/syncope. Denies orthopnea/PND. Denies lower extremity edema.   Gastrointestinal: Denies abdominal pain. Denies nausea/vomiting. Denies change in bowel habits or history of recent GI tract blood loss.   Genitourinary: Denies urinary urgency or frequency. Denies dysuria, hematuria.  Musculoskeletal: Denies pain or swelling in joints.  Neurological: Denies paresthesias. Denies headache. Denies seizure or stroke symptoms.  Behavioral/Psych: Denies problems with anxiety or depression.      All other systems are negative except where stated above.        Objective     Physical Exam:    /64 (BP Location: Right arm, Patient Position: Lying)   Pulse 72   Temp 98.1 °F (36.7 °C) (Oral)   Resp 20   Ht 172.7 cm (68\")   Wt 80.5 kg (177 lb 6.4 oz)   SpO2 92%   BMI 26.97 kg/m²        General Appearance:    Alert, cooperative, in no acute distress   Head:    Normocephalic. Atraumatic. SAVANA.   Neck:   No adenopathy, supple, trachea midline, no thyromegaly, no carotid bruit, no JVD   Back:     No kyphosis present, no scoliosis present, no skin lesions, erythema or scars, no tenderness to percussion or palpation, range of motion normal   Lungs:     Clear, but diminished throughout to auscultation, respirations regular, even and unlabored    Heart:    " Irregular rhythm and normal rate, normal S1 and S2, no murmur, no gallop, no rub, no click   Abdomen:     Normal bowel sounds, no masses, no organomegaly, soft non-tender, non-distended, no guarding, no rebound tenderness   Extremities:   Moves all extremities, no edema, no cyanosis, no redness. Left heel ulcer x 2.    Pulses:   Pulses palpable and equal bilaterally   Skin:   No bleeding, bruising or rash   Lymph nodes:   No palpable adenopathy   Neurologic:   Cranial nerves 2 - 12 grossly intact.              Results Review:   I reviewed the patient's new clinical results.    Lab Results (last 72 hours)     Procedure Component Value Units Date/Time    Extra Tubes [[id number]] Collected:  03/07/20 0430    Specimen:  Blood, Venous Line Updated:  03/07/20 0615    Narrative:       The following orders were created for panel order Extra Tubes.  Procedure                               Abnormality         Status                     ---------                               -----------         ------                     Lavender Top[[id number]]                                     Final result                 Please view results for these tests on the individual orders.    Lavender Top [[id number]] Collected:  03/07/20 0430    Specimen:  Blood Updated:  03/07/20 0615     Extra Tube hold for add-on     Comment: Auto resulted       Troponin [[id number]]  (Normal) Collected:  03/07/20 0430    Specimen:  Blood Updated:  03/07/20 0608     Troponin T <0.010 ng/mL     Narrative:       Troponin T Reference Range:  <= 0.03 ng/mL-   Negative for AMI  >0.03 ng/mL-     Abnormal for myocardial necrosis.  Clinicians would have to utilize clinical acumen, EKG, Troponin and serial changes to determine if it is an Acute Myocardial Infarction or myocardial injury due to an underlying chronic condition.       Results may be falsely decreased if patient taking Biotin.      Comprehensive Metabolic Panel [[id number]]  (Abnormal) Collected:  03/06/20  2000    Specimen:  Blood Updated:  03/06/20 2024     Glucose 89 mg/dL      BUN 11 mg/dL      Creatinine 0.73 mg/dL      Sodium 144 mmol/L      Potassium 3.7 mmol/L      Chloride 108 mmol/L      CO2 21.0 mmol/L      Calcium 9.0 mg/dL      Total Protein 6.8 g/dL      Albumin 3.80 g/dL      ALT (SGPT) 14 U/L      AST (SGOT) 14 U/L      Alkaline Phosphatase 116 U/L      Total Bilirubin 0.3 mg/dL      eGFR Non African Amer 106 mL/min/1.73      Globulin 3.0 gm/dL      A/G Ratio 1.3 g/dL      BUN/Creatinine Ratio 15.1     Anion Gap 15.0 mmol/L     Narrative:       GFR Normal >60  Chronic Kidney Disease <60  Kidney Failure <15      Troponin [[id number]]  (Normal) Collected:  03/06/20 2000    Specimen:  Blood Updated:  03/06/20 2023     Troponin T <0.010 ng/mL     Narrative:       Troponin T Reference Range:  <= 0.03 ng/mL-   Negative for AMI  >0.03 ng/mL-     Abnormal for myocardial necrosis.  Clinicians would have to utilize clinical acumen, EKG, Troponin and serial changes to determine if it is an Acute Myocardial Infarction or myocardial injury due to an underlying chronic condition.       Results may be falsely decreased if patient taking Biotin.      BNP [[id number]]  (Abnormal) Collected:  03/06/20 2000    Specimen:  Blood Updated:  03/06/20 2023     proBNP 3,214.0 pg/mL     Narrative:       Among patients with dyspnea, NT-proBNP is highly sensitive for the detection of acute congestive heart failure. In addition NT-proBNP of <300 pg/ml effectively rules out acute congestive heart failure with 99% negative predictive value.    Results may be falsely decreased if patient taking Biotin.      aPTT [[id number]]  (Abnormal) Collected:  03/06/20 1938    Specimen:  Blood Updated:  03/06/20 2000     PTT 79.2 seconds     Protime-INR [[id number]]  (Abnormal) Collected:  03/06/20 1938    Specimen:  Blood Updated:  03/06/20 2000     Protime 15.4 Seconds      INR 1.23    CBC & Differential [[id number]] Collected:  03/06/20 1938     Specimen:  Blood Updated:  03/06/20 1950    Narrative:       The following orders were created for panel order CBC & Differential.  Procedure                               Abnormality         Status                     ---------                               -----------         ------                     CBC Auto Differential[[id number]]        Normal              Final result                 Please view results for these tests on the individual orders.    CBC Auto Differential [[id number]]  (Normal) Collected:  03/06/20 1938    Specimen:  Blood Updated:  03/06/20 1950     WBC 7.96 10*3/mm3      RBC 4.80 10*6/mm3      Hemoglobin 15.8 g/dL      Hematocrit 45.8 %      MCV 95.4 fL      MCH 32.9 pg      MCHC 34.5 g/dL      RDW 14.7 %      RDW-SD 51.0 fl      MPV 8.8 fL      Platelets 217 10*3/mm3      Neutrophil % 64.4 %      Lymphocyte % 24.1 %      Monocyte % 8.2 %      Eosinophil % 2.6 %      Basophil % 0.4 %      Immature Grans % 0.3 %      Neutrophils, Absolute 5.13 10*3/mm3      Lymphocytes, Absolute 1.92 10*3/mm3      Monocytes, Absolute 0.65 10*3/mm3      Eosinophils, Absolute 0.21 10*3/mm3      Basophils, Absolute 0.03 10*3/mm3      Immature Grans, Absolute 0.02 10*3/mm3      nRBC 0.0 /100 WBC             Imaging Results (Last 72 Hours)     Procedure Component Value Units Date/Time    XR Chest 1 View [[id number]] Collected:  03/06/20 2041     Updated:  03/06/20 2044    Narrative:       EXAMINATION: XR CHEST 1 VW-. 3/6/2020 8:41 PM CST     CHEST, ONE VIEW:     HISTORY: Arrhythmia     COMPARISON: 2/15/2020 and 2/10/2020     A single frontal chest radiograph was obtained.     FINDINGS:     The lungs are clear without infiltrates.     The heart is normal in size, pulmonary circulation appropriate, without  heart failure.     The bony structures are intact without acute osseous abnormality.     Radiographically, the chest is unchanged.                                     Impression:       1. No acute cardiopulmonary  process.     This report was finalized on 03/06/2020 20:41 by Dr. Omer Hopkins MD.        2D Echocardiogram (02/10/2020):  · Left atrial cavity size is borderline dilated.  · Left ventricular systolic function is normal.  · Hyperdynamic right ventricular systolic function noted.     RHYTHM IS ATRIAL FIBRILLATION  BORDERLINE BI-ATRIAL ENLARGEMENT  ALL THE LV AND RV WALLS CAN'T BE SEEN WELL BUT THERE APPEARS TO BE HYPERKINESIS OF BOTH VENTRICLES  NO SIGNIFICANT VALVULAR DYSFUNCTION        Assessment/Plan       Atrial flutter/fibrillation (CMS/HCC) - Anticoagulated on Xarelto. Rate better controlled on Cardizem and Amiodarone drip. Unsuccessful DCCV in ER.     Essential hypertension - Controlled    Cigarette smoker - Patient stated he quit after last admission, but has restarted back.    PVD (peripheral vascular disease) (CMS/HCC) with Non healing left heel wound - Scheduled to see Dr. Laughlin at [facility]    Alcoholism /alcohol abuse (CMS/HCC) - No evidence of DT          Further orders per Dr. Ugarte.     Thank you for asking us to follow this patient with you. Please note that this documentation resulted in a face-to-face encounter provided by me.       TAJ Moore

## 2020-03-07 NOTE — DISCHARGE SUMMARY
[facility] Medicine Services  DISCHARGE SUMMARY       Date of Admission: 3/6/2020  Date of Discharge:  3/7/2020  Primary Care Physician: Provider, No Known    Discharge Diagnoses:  Active Hospital Problems    Diagnosis   • **Atrial flutter (CMS/HCC)   • Non healing left heel wound   • Alcoholism /alcohol abuse (CMS/HCC)   • Essential hypertension   • PVD (peripheral vascular disease) (CMS/HCC)         Presenting Problem/History of Present Illness:  Atrial flutter, unspecified type (CMS/HCC) [I48.92]  Atrial flutter, unspecified type (CMS/HCC) [I48.92]     Chief Complaint on Day of Discharge:   No complaints today    History of Present Illness on Day of Discharge:   The patient spontaneously converted to normal sinus rhythm this morning.  Amiodarone drip was discontinued as was diltiazem drip.  The patient remained in normal sinus rhythm throughout the remainder of his stay.  Cardiology evaluated the patient started him on Rythmol.  Patient is medically stable with resolution of the atrial fibrillation and no further shortness of breath.  Both services agreed that the patient is appropriate for discharge home.    Hospital Course   Kingsley Lerma is a 74-year-old male with a past medical history of paroxysmal atrial flutter, high arthritis, hypertension, PVD, chronic alcohol use, history tobacco use.  Patient presented to [facility] emergency department with complaints of elevated heart rate.  Patient had no complaints of chest pain, shortness of breathing with tachycardia..  ER evaluation revealed atrial flutter variable rate.  Patient was started on Cardizem drip, received amiodarone bolus.  Dr. Ugarte, cardiology was notified.  He recommended cardioversion.  Unfortunately, this was unsuccessful.  Patient is admitted for further evaluation and treatment.  Cardiology will see in a.m.  Due to chronic alcohol use he will be monitored and treated for potential DTs.   Condition is stable.  Plan:   1.  Admit as inpatient  2.  Continue Cardizem drip  3.  Cardiology to see in a.m.  4.  CIWA protocol  5.  Serial troponins and EKGs  6.  Home medications reviewed and restarted as appropriate  7.  Continue Xarelto for DVT prophylaxis  8.  Labs in a.m.      Consults:   Cardiology:  Assessment/Plan           Atrial flutter/fibrillation (CMS/[facility]) - Anticoagulated on Xarelto. Rate better controlled on Cardizem and Amiodarone drip. Unsuccessful DCCV in ER.     Essential hypertension - Controlled    Cigarette smoker - Patient stated he quit after last admission, but has restarted back.    PVD (peripheral vascular disease) (CMS/[facility]) with Non healing left heel wound - [place] [facility] [place]    Alcoholism /alcohol abuse (CMS/[facility]) - No evidence of DT            Further orders per Dr. Ugarte.      Thank you for asking us to follow this patient with you. Please note that this documentation resulted in a face-to-face encounter provided by me.         TAJ Moore    Pertinent Test Results:   Lab Results (last 7 days)     Procedure Component Value Units Date/Time    Troponin [[id number]]  (Normal) Collected:  03/07/20 1014    Specimen:  Blood Updated:  03/07/20 1042     Troponin T <0.010 ng/mL     Narrative:       Troponin T Reference Range:  <= 0.03 ng/mL-   Negative for AMI  >0.03 ng/mL-     Abnormal for myocardial necrosis.  Clinicians would have to utilize clinical acumen, EKG, Troponin and serial changes to determine if it is an Acute Myocardial Infarction or myocardial injury due to an underlying chronic condition.       Results may be falsely decreased if patient taking Biotin.      Extra Tubes [[id number]] Collected:  03/07/20 0430    Specimen:  Blood, Venous Line Updated:  03/07/20 0615    Narrative:       The following orders were created for panel order Extra Tubes.  Procedure                               Abnormality         Status                     ---------                                -----------         ------                     Ernestine Top[[id number]]                                     Final result                 Please view results for these tests on the individual orders.    Lavtita Top [[id number]] Collected:  03/07/20 0430    Specimen:  Blood Updated:  03/07/20 0615     Extra Tube hold for add-on     Comment: Auto resulted       Troponin [[id number]]  (Normal) Collected:  03/07/20 0430    Specimen:  Blood Updated:  03/07/20 0608     Troponin T <0.010 ng/mL     Narrative:       Troponin T Reference Range:  <= 0.03 ng/mL-   Negative for AMI  >0.03 ng/mL-     Abnormal for myocardial necrosis.  Clinicians would have to utilize clinical acumen, EKG, Troponin and serial changes to determine if it is an Acute Myocardial Infarction or myocardial injury due to an underlying chronic condition.       Results may be falsely decreased if patient taking Biotin.      Comprehensive Metabolic Panel [[id number]]  (Abnormal) Collected:  03/06/20 2000    Specimen:  Blood Updated:  03/06/20 2024     Glucose 89 mg/dL      BUN 11 mg/dL      Creatinine 0.73 mg/dL      Sodium 144 mmol/L      Potassium 3.7 mmol/L      Chloride 108 mmol/L      CO2 21.0 mmol/L      Calcium 9.0 mg/dL      Total Protein 6.8 g/dL      Albumin 3.80 g/dL      ALT (SGPT) 14 U/L      AST (SGOT) 14 U/L      Alkaline Phosphatase 116 U/L      Total Bilirubin 0.3 mg/dL      eGFR Non African Amer 106 mL/min/1.73      Globulin 3.0 gm/dL      A/G Ratio 1.3 g/dL      BUN/Creatinine Ratio 15.1     Anion Gap 15.0 mmol/L     Narrative:       GFR Normal >60  Chronic Kidney Disease <60  Kidney Failure <15      Troponin [[id number]]  (Normal) Collected:  03/06/20 2000    Specimen:  Blood Updated:  03/06/20 2023     Troponin T <0.010 ng/mL     Narrative:       Troponin T Reference Range:  <= 0.03 ng/mL-   Negative for AMI  >0.03 ng/mL-     Abnormal for myocardial necrosis.  Clinicians would have to utilize clinical acumen, EKG,  Troponin and serial changes to determine if it is an Acute Myocardial Infarction or myocardial injury due to an underlying chronic condition.       Results may be falsely decreased if patient taking Biotin.      BNP [[id number]]  (Abnormal) Collected:  03/06/20 2000    Specimen:  Blood Updated:  03/06/20 2023     proBNP 3,214.0 pg/mL     Narrative:       Among patients with dyspnea, NT-proBNP is highly sensitive for the detection of acute congestive heart failure. In addition NT-proBNP of <300 pg/ml effectively rules out acute congestive heart failure with 99% negative predictive value.    Results may be falsely decreased if patient taking Biotin.      aPTT [[id number]]  (Abnormal) Collected:  03/06/20 1938    Specimen:  Blood Updated:  03/06/20 2000     PTT 79.2 seconds     Protime-INR [[id number]]  (Abnormal) Collected:  03/06/20 1938    Specimen:  Blood Updated:  03/06/20 2000     Protime 15.4 Seconds      INR 1.23    CBC & Differential [[id number]] Collected:  03/06/20 1938    Specimen:  Blood Updated:  03/06/20 1950    Narrative:       The following orders were created for panel order CBC & Differential.  Procedure                               Abnormality         Status                     ---------                               -----------         ------                     CBC Auto Differential[[id number]]        Normal              Final result                 Please view results for these tests on the individual orders.    CBC Auto Differential [[id number]]  (Normal) Collected:  03/06/20 1938    Specimen:  Blood Updated:  03/06/20 1950     WBC 7.96 10*3/mm3      RBC 4.80 10*6/mm3      Hemoglobin 15.8 g/dL      Hematocrit 45.8 %      MCV 95.4 fL      MCH 32.9 pg      MCHC 34.5 g/dL      RDW 14.7 %      RDW-SD 51.0 fl      MPV 8.8 fL      Platelets 217 10*3/mm3      Neutrophil % 64.4 %      Lymphocyte % 24.1 %      Monocyte % 8.2 %      Eosinophil % 2.6 %      Basophil % 0.4 %      Immature Grans % 0.3 %       "Neutrophils, Absolute 5.13 10*3/mm3      Lymphocytes, Absolute 1.92 10*3/mm3      Monocytes, Absolute 0.65 10*3/mm3      Eosinophils, Absolute 0.21 10*3/mm3      Basophils, Absolute 0.03 10*3/mm3      Immature Grans, Absolute 0.02 10*3/mm3      nRBC 0.0 /100 WBC         Imaging Results (Last 7 Days)     Procedure Component Value Units Date/Time    XR Chest 1 View [[id number]] Collected:  03/06/20 2041     Updated:  03/06/20 2044    Narrative:       EXAMINATION: XR CHEST 1 VW-. 3/6/2020 8:41 PM CST     CHEST, ONE VIEW:     HISTORY: Arrhythmia     COMPARISON: 2/15/2020 and 2/10/2020     A single frontal chest radiograph was obtained.     FINDINGS:     The lungs are clear without infiltrates.     The heart is normal in size, pulmonary circulation appropriate, without  heart failure.     The bony structures are intact without acute osseous abnormality.     Radiographically, the chest is unchanged.                                     Impression:       1. No acute cardiopulmonary process.     This report was finalized on 03/06/2020 20:41 by Dr. Omer Hopkins MD.            Condition on Discharge:    Stable    Physical Exam on Discharge:  /67 (BP Location: Right arm, Patient Position: Lying)   Pulse 68   Temp 97.8 °F (36.6 °C) (Oral)   Resp 18   Ht 172.7 cm (68\")   Wt 80.5 kg (177 lb 6.4 oz)   SpO2 93%   BMI 26.97 kg/m²   Physical Exam  Constitutional: He is oriented to person, place, and time. He appears well-developed and well-nourished. No distress.   HENT:   Head: Normocephalic and atraumatic.   Poor dentition    Eyes: Pupils are equal, round, and reactive to light. Conjunctivae and EOM are normal. No scleral icterus.   Neck: Normal range of motion. Neck supple. No JVD present. No tracheal deviation present.   Cardiovascular: Normal heart sounds and intact distal pulses. Exam reveals no gallop. No murmur heard.  Heart rate regular.  Pulmonary/Chest: Effort normal. No respiratory distress. .   Abdominal: " Soft. Bowel sounds are normal. He exhibits no distension. There is no tenderness. There is no guarding.   Musculoskeletal: Normal range of motion. He exhibits edema (trace bilateral lower extremities).   Neurological: He is alert and oriented to person, place, and time.   Skin: Skin is warm and dry. No rash noted. He is not diaphoretic. No erythema. No pallor.   Psychiatric: He has a normal mood and affect. His behavior is normal.     Discharge Disposition:  Home or Self Care    Discharge Medications:     Discharge Medications      New Medications      Instructions Start Date   propafenone 150 MG tablet  Commonly known as:  RYTHMOL   150 mg, Oral, Every 8 Hours Scheduled         Continue These Medications      Instructions Start Date   cilostazol 100 MG tablet  Commonly known as:  PLETAL   100 mg, Oral, 2 Times Daily      guaiFENesin 600 MG 12 hr tablet  Commonly known as:  MUCINEX   1,200 mg, Oral, Every 12 Hours Scheduled      hydrOXYzine 25 MG tablet  Commonly known as:  ATARAX   25 mg, Oral, 3 Times Daily PRN      sildenafil 50 MG tablet  Commonly known as:  VIAGRA   50 mg, Oral, Daily PRN      XARELTO 20 MG tablet  Generic drug:  rivaroxaban   20 mg, Oral, Daily With Dinner         Stop These Medications    metoprolol tartrate 25 MG tablet  Commonly known as:  LOPRESSOR            Discharge Diet:   Diet Instructions     Diet: Regular; Thin      Discharge Diet:  Regular    Fluid Consistency:  Thin          Discharge Care Plan / Instructions:   Discharge home    Activity at Discharge:   Activity Instructions     Activity as Tolerated            Follow-up Appointments:  Follow-up with cardiology in 3 weeks       Stephan Marlow DO  03/07/20  2:10 PM    Time: Discharge less than 30 min    Please note that part of this note may be an electronic transcription/translation of spoken language to printed text using the Dragon Dictation System. Efforts were made to edit the dictations, but occasionally words are  mistranscribed.

## 2020-03-07 NOTE — PROGRESS NOTES
Discharge Planning Assessment  [facility]     Patient Name: Kingsley Lerma  MRN: [id number]  Today's Date: 3/6/2020    Admit Date: 3/6/2020    Discharge Needs Assessment    No documentation.       Discharge Plan     Row Name 03/06/20 2206       Plan    Plan  Faxed clinical to [facility] as they requested.     Row Name 03/06/20 9529   Fatuma Hammond RN

## 2020-03-07 NOTE — H&P
[facility] Medicine Services  HISTORY AND PHYSICAL    Date of Admission: 3/6/2020  Primary Care Physician: Provider, No Known    Subjective     Chief Complaint: Fast heart rate    History of Present Illness  Kingsley Lerma is a 74-year-old male with a past medical history of paroxysmal atrial flutter, high arthritis, hypertension, PVD, chronic alcohol use, history tobacco use.  Patient presented to [facility] emergency department with complaints of elevated heart rate.  Patient had no complaints of chest pain, shortness of breathing with tachycardia..  ER evaluation revealed atrial flutter variable rate.  Patient was started on Cardizem drip, received amiodarone bolus.  Dr. Ugarte, cardiology was notified.  He recommended cardioversion.  Unfortunately, this was unsuccessful.  Patient is admitted for further evaluation and treatment.  Cardiology will see in a.m.  Due to chronic alcohol use he will be monitored and treated for potential DTs.  Condition is stable.    Review of Systems   A 10 point review of systems was completed, all negative except for those discussed in HPI    Past Medical History:   Past Medical History:   Diagnosis Date   • Alcohol abuse    • Arthritis    • Atrial flutter (CMS/HCC)    • History of tobacco abuse    • Hypertension    • PVD (peripheral vascular disease) (CMS/HCC)        Past Surgical History:   Past Surgical History:   Procedure Laterality Date   • ADENOIDECTOMY         Family History: family history includes Cancer in his father; Dementia in his mother; Pancreatic cancer in his father.    Social History:  reports that he has quit smoking. He has never used smokeless tobacco. He reports that he drinks alcohol.    Code Status: Full, unable speak for himself his daughter will speak for him      Allergies:  No Known Allergies    Medications:  Prior to Admission medications    Medication Sig Start Date End Date Taking? Authorizing Provider  "  cilostazol (PLETAL) 100 MG tablet Take 100 mg by mouth 2 (Two) Times a Day.    Provider, MD Jose   guaiFENesin (MUCINEX) 600 MG 12 hr tablet Take 2 tablets by mouth Every 12 (Twelve) Hours. 2/12/20   Soham Parker APRN   hydrOXYzine (ATARAX) 25 MG tablet Take 1 tablet by mouth 3 (Three) Times a Day As Needed for Anxiety. 2/17/20   Soham Parker APRN   metoprolol tartrate (LOPRESSOR) 25 MG tablet Take 1 tablet by mouth 2 (Two) Times a Day. 2/17/20   Soham Parker APRN   Multiple Vitamins-Minerals (MULTIVITAMIN WITH MINERALS) tablet tablet Take 1 tablet by mouth Daily.    Provider, MD Jose   rivaroxaban (XARELTO) 20 MG tablet Take 1 tablet by mouth Daily With Dinner. Indications: Atrial Fibrillation 2/17/20   Soham Parker APRN   sildenafil (VIAGRA) 50 MG tablet Take 50 mg by mouth Daily As Needed for Erectile Dysfunction (Sexual Activity).    Provider, MD Jose       Objective     /67 (BP Location: Right arm, Patient Position: Sitting)   Pulse 108 Comment: a-flutter  Temp 97.2 °F (36.2 °C) (Oral)   Resp 20   Ht 172.7 cm (68\")   Wt 75.8 kg (167 lb)   SpO2 92%   BMI 25.39 kg/m²   Physical Exam   Constitutional: He is oriented to person, place, and time. He appears well-developed and well-nourished. No distress.   HENT:   Head: Normocephalic and atraumatic.   Poor dentition    Eyes: Pupils are equal, round, and reactive to light. Conjunctivae and EOM are normal. No scleral icterus.   Neck: Normal range of motion. Neck supple. No JVD present. No tracheal deviation present.   Cardiovascular: Normal heart sounds and intact distal pulses. Exam reveals no gallop.   No murmur heard.  Atrial fib/flutter 150   Pulmonary/Chest: Effort normal. No respiratory distress. He has wheezes (expiratory). He has no rales.   Abdominal: Soft. Bowel sounds are normal. He exhibits no distension. There is no tenderness. There is no guarding.   Musculoskeletal: Normal range of motion. He " exhibits edema (trace bilateral lower extremities).   Neurological: He is alert and oriented to person, place, and time.   Skin: Skin is warm and dry. No rash noted. He is not diaphoretic. No erythema. No pallor.   Psychiatric: He has a normal mood and affect. His behavior is normal.   Vitals reviewed.      Pertinent Data:   Lab Results (last 72 hours)     Procedure Component Value Units Date/Time    Comprehensive Metabolic Panel [[id number]]  (Abnormal) Collected:  03/06/20 2000    Specimen:  Blood Updated:  03/06/20 2024     Glucose 89 mg/dL      BUN 11 mg/dL      Creatinine 0.73 mg/dL      Sodium 144 mmol/L      Potassium 3.7 mmol/L      Chloride 108 mmol/L      CO2 21.0 mmol/L      Calcium 9.0 mg/dL      Total Protein 6.8 g/dL      Albumin 3.80 g/dL      ALT (SGPT) 14 U/L      AST (SGOT) 14 U/L      Alkaline Phosphatase 116 U/L      Total Bilirubin 0.3 mg/dL      eGFR Non African Amer 106 mL/min/1.73      Globulin 3.0 gm/dL      A/G Ratio 1.3 g/dL      BUN/Creatinine Ratio 15.1     Anion Gap 15.0 mmol/L     Troponin [[id number]]  (Normal) Collected:  03/06/20 2000    Specimen:  Blood Updated:  03/06/20 2023     Troponin T <0.010 ng/mL     BNP [[id number]]  (Abnormal) Collected:  03/06/20 2000    Specimen:  Blood Updated:  03/06/20 2023     proBNP 3,214.0 pg/mL     aPTT [[id number]]  (Abnormal) Collected:  03/06/20 1938    Specimen:  Blood Updated:  03/06/20 2000     PTT 79.2 seconds     Protime-INR [[id number]]  (Abnormal) Collected:  03/06/20 1938    Specimen:  Blood Updated:  03/06/20 2000     Protime 15.4 Seconds      INR 1.23    CBC Auto Differential [[id number]]  (Normal) Collected:  03/06/20 1938    Specimen:  Blood Updated:  03/06/20 1950     WBC 7.96 10*3/mm3      RBC 4.80 10*6/mm3      Hemoglobin 15.8 g/dL      Hematocrit 45.8 %      MCV 95.4 fL      MCH 32.9 pg      MCHC 34.5 g/dL      RDW 14.7 %      RDW-SD 51.0 fl      MPV 8.8 fL      Platelets 217 10*3/mm3      Neutrophil % 64.4 %      Lymphocyte % 24.1 %       Monocyte % 8.2 %      Eosinophil % 2.6 %      Basophil % 0.4 %      Immature Grans % 0.3 %      Neutrophils, Absolute 5.13 10*3/mm3      Lymphocytes, Absolute 1.92 10*3/mm3      Monocytes, Absolute 0.65 10*3/mm3      Eosinophils, Absolute 0.21 10*3/mm3      Basophils, Absolute 0.03 10*3/mm3      Immature Grans, Absolute 0.02 10*3/mm3      nRBC 0.0 /100 WBC         Imaging Results (Last 24 Hours)     Procedure Component Value Units Date/Time    XR Chest 1 View [[id number]] Collected:  03/06/20 2041     Updated:  03/06/20 2044    Narrative:       EXAMINATION: XR CHEST 1 VW-. 3/6/2020 8:41 PM CST     CHEST, ONE VIEW:     HISTORY: Arrhythmia     COMPARISON: 2/15/2020 and 2/10/2020     A single frontal chest radiograph was obtained.     FINDINGS:     The lungs are clear without infiltrates.     The heart is normal in size, pulmonary circulation appropriate, without  heart failure.     The bony structures are intact without acute osseous abnormality.     Radiographically, the chest is unchanged.                                     Impression:       1. No acute cardiopulmonary process.     This report was finalized on 03/06/2020 20:41 by Dr. Omer Hopkins MD.        2/11/2020 ECHO  Interpretation Summary        · Left atrial cavity size is borderline dilated.  · Left ventricular systolic function is normal.  · Hyperdynamic right ventricular systolic function noted.     RHYTHM IS ATRIAL FIBRILLATION  BORDERLINE BI-ATRIAL ENLARGEMENT  ALL THE LV AND RV WALLS CAN'T BE SEEN WELL BUT THERE APPEARS TO BE HYPERKINESIS OF BOTH VENTRICLES  NO SIGNIFICANT VALVULAR DYSFUNCTION       I have personally reviewed and interpreted the radiology studies and ECG obtained at time of admission.     Assessment / Plan     Assessment:   Active Hospital Problems    Diagnosis   • **Atrial flutter (CMS/HCC)   • Alcoholism /alcohol abuse (CMS/HCC)   • Former tobacco use   • Essential hypertension   • PVD (peripheral vascular disease) (CMS/HCC)        Plan:   1.  Admit as inpatient  2.  Continue Cardizem drip  3.  Cardiology to see in a.m.  4.  CIWA protocol  5.  Serial troponins and EKGs  6.  Home medications reviewed and restarted as appropriate  7.  Continue Xarelto for DVT prophylaxis  8.  Labs in a.m.    I discussed the patient's findings and my recommendations with: Alexsander Rubio DO  Time spent: 35 minutes    Plan discussed with NP, and agree.    Patient seen and examined by me on 3/6/2020 at 11:35 PM.    Fatuma Girard, TAJ  03/07/20   12:03 AM

## 2020-03-07 NOTE — ED NOTES
SPOKE WITH DR BARBOUR AT THIS TIME, PATIENT INFORMATION PROVIDED AND DR BARBOUR AGREES PATIENT CAN BE ADMITTED TO  TELEMETRY.      Jw Tellez, RN  03/07/20 0143

## 2020-03-08 ENCOUNTER — READMISSION MANAGEMENT (OUTPATIENT)
Dept: CALL CENTER | Facility: HOSPITAL | Age: 75
End: 2020-03-08

## 2020-03-08 NOTE — OUTREACH NOTE
Prep Survey      Responses   [facility] facility patient discharged from?  [place]   Is LACE score < 7 ?  No   Eligibility  Readm Mgmt   Discharge diagnosis  Atrial flutter    Does the patient have one of the following disease processes/diagnoses(primary or secondary)?  Other   Does the patient have Home health ordered?  No   Is there a DME ordered?  No   Prep survey completed?  Yes          Ariadna Meehan RN

## 2020-03-09 NOTE — PAYOR COMM NOTE
"3/9/20 [facility] [phone]  D/C.        Kingsley Servin (74 y.o. Male)     Date of Birth Social Security Number Address Home Phone MRN    1945  [address] [phone] [id number]    Hindu Marital Status          [employer]        Admission Date Admission Type Admitting Provider Attending Provider Department, Room/Bed    3/6/20 Emergency Stephan Marlow DO  [facility] 4B, 411/1    Discharge Date Discharge Disposition Discharge Destination        3/7/2020 Home or Self Care Home             Attending Provider:  (none)   Allergies:  No Known Allergies    Isolation:  None   Infection:  None   Code Status:  Prior    Ht:  172.7 cm (68\")   Wt:  80.5 kg (177 lb 6.4 oz)    Admission Cmt:  None   Principal Problem:  Atrial flutter (CMS/HCC) [I48.92]                 Active Insurance as of 3/6/2020     Primary Coverage     Payor Plan Insurance Group Employer/Plan Group    [facility] DEPT [id number]      Payor Plan Address Payor Plan Phone Number Payor Plan Fax Number Effective Dates    [employer] [phone]  2/10/2020 - None Entered    [address] [address]       Subscriber Name Subscriber Birth Date Member ID       KINGSLEY SERVIN 1945 [id number]                 Emergency Contacts      (Rel.) Home Phone Work Phone Mobile Phone    LUIS DANIEL SERVIN (Son) -- -- [phone]    boone tee (Daughter) -- -- [phone]    MARIANA TANG (Daughter) -- -- [phone]        Stephan Marlow DO   Physician   Medicine   Discharge Summary   Signed   Date of Service:  03/07/20 1410   Creation Time:  03/07/20 1410            Signed             Show:Clear all  [x]Manual[x]Template[x]Copied    Added by:  [x]Stephan Marlow DO    []Cristina for details       [facility]  DISCHARGE SUMMARY         Date of Admission: 3/6/2020  Date of Discharge:  " 3/7/2020  Primary Care Physician: Provider, No Known     Discharge Diagnoses:      Active Hospital Problems     Diagnosis   • **Atrial flutter (CMS/HCC)   • Non healing left heel wound   • Alcoholism /alcohol abuse (CMS/HCC)   • Essential hypertension   • PVD (peripheral vascular disease) (CMS/HCC)            Presenting Problem/History of Present Illness:  Atrial flutter, unspecified type (CMS/HCC) [I48.92]  Atrial flutter, unspecified type (CMS/HCC) [I48.92]         Chief Complaint on Day of Discharge:   No complaints today     History of Present Illness on Day of Discharge:   The patient spontaneously converted to normal sinus rhythm this morning.  Amiodarone drip was discontinued as was diltiazem drip.  The patient remained in normal sinus rhythm throughout the remainder of his stay.  Cardiology evaluated the patient started him on Rythmol.  Patient is medically stable with resolution of the atrial fibrillation and no further shortness of breath.  Both services agreed that the patient is appropriate for discharge home.     Hospital Course   Kingsley Lerma is a 74-year-old male with a past medical history of paroxysmal atrial flutter, high arthritis, hypertension, PVD, chronic alcohol use, history tobacco use.  Patient presented to [facility] emergency department with complaints of elevated heart rate.  Patient had no complaints of chest pain, shortness of breathing with tachycardia..  ER evaluation revealed atrial flutter variable rate.  Patient was started on Cardizem drip, received amiodarone bolus.  Dr. Ugarte, cardiology was notified.  He recommended cardioversion.  Unfortunately, this was unsuccessful.  Patient is admitted for further evaluation and treatment.  Cardiology will see in a.m.  Due to chronic alcohol use he will be monitored and treated for potential DTs.  Condition is stable.  Plan:   1.  Admit as inpatient  2.  Continue Cardizem drip  3.  Cardiology to see in a.m.  4.  MOSES  protocol  5.  Serial troponins and EKGs  6.  Home medications               Consults:   Cardiology:  Assessment/Plan           Atrial flutter/fibrillation (CMS/HCC) - Anticoagulated on Xarelto. Rate better controlled on Cardizem and Amiodarone drip. Unsuccessful DCCV in ER.     Essential hypertension - Controlled    Cigarette smoker - Patient stated he quit after last admission, but has restarted back.    PVD (peripheral vascular disease) (CMS/HCC) with Non healing left heel wound - Scheduled to see Dr. Laughlin at [facility]    Alcoholism /alcohol abuse (CMS/HCC) - No evidence of DT           Further orders per Dr. Ugarte.      Thank you for asking us to follow this patient with you. Please note that this documentation resulted in a face-to-face encounter provided by me.         TAJ Moore        Comprehensive Metabolic Panel [[id number]]  (Abnormal) Collected:  03/06/20 2000     Specimen:  Blood Updated:  03/06/20 2024       Glucose 89 mg/dL         BUN 11 mg/dL         Creatinine 0.73 mg/dL         Sodium 144 mmol/L         Potassium 3.7 mmol/L         Chloride 108 mmol/L         CO2 21.0 mmol/L         Calcium 9.0 mg/dL         Total Protein 6.8 g/dL         Albumin 3.80 g/dL         ALT (SGPT) 14 U/L         AST (SGOT) 14 U/L         Alkaline Phosphatase 116 U/L         Total Bilirubin 0.3 mg/dL         eGFR Non African Amer 106 mL/min/1.73         Globulin 3.0 gm/dL         A/G Ratio 1.3 g/dL         BUN/Creatinine Ratio 15.1       Anion Gap 15.0 mmol/L       Narrative:           may be falsely decreased if patient taking Biotin.         BNP [[id number]]  (Abnormal) Collected:  03/06/20 2000     Specimen:  Blood Updated:  03/06/20 2023       proBNP 3,214.0 pg/mL       Narrative:        Among patients with dyspnea, NT-proBNP is highly sensitive for the detection of acute congestive heart failure. In addition NT-proBNP of <300 pg/ml effectively rules out acute congestive heart failure with 99% negative  "predictive value.     Results may be falsely decreased if patient taking Biotin.        aPTT [[id number]]  (Abnormal) Collected:  03/06/20 1938     Specimen:  Blood Updated:  03/06/20 2000       PTT 79.2 seconds       Protime-INR [[id number]]  (Abnormal) Collected:  03/06/20 1938     Specimen:  Blood Updated:  03/06/20 2000       Protime 15.4 Seconds         INR 1.23     CBC & Differential [[id number]] Collected:  03/06/20 1938     Specimen:  Blood Updated:  03/06/20 1950     Narrative:        The following orders      Physical Exam on Discharge:  /67 (BP Location: Right arm, Patient Position: Lying)   Pulse 68   Temp 97.8 °F (36.6 °C) (Oral)   Resp 18   Ht 172.7 cm (68\")   Wt 80.5 kg (177 lb 6.4 oz)   SpO2 93%   BMI 26.97 kg/m²   Physical Exam  Constitutional: He is oriented to person, place, and time. He appears well-developed and well-nourished. No distress.   HENT:   Head: Normocephalic and atraumatic.   Poor dentition    Eyes: Pupils are equal, round, and reactive to light. Conjunctivae and EOM are normal. No scleral icterus.   Neck: Normal range of motion. Neck supple. No JVD present. No tracheal deviation present.   Cardiovascular: Normal heart sounds and intact distal pulses. Exam reveals no gallop. No murmur heard.  Heart rate regular.  Pulmonary/Chest: Effort normal. No respiratory distress. .   Abdominal: Soft. Bowel sounds are normal. He exhibits no distension. There is no tenderness. There is no guarding.   Musculoskeletal: Normal range of motion. He exhibits edema (trace bilateral lower extremities).   Neurological: He is alert and oriented to person, place, and time.   Skin: Skin is warm and dry. No rash noted. He is not diaphoretic. No erythema. No pallor.   Psychiatric: He has a normal mood and affect. His behavior is normal.      Discharge Disposition:  Home or Self Care     Discharge Medications:           Discharge Medications            New Medications      Instructions Start Date "   propafenone 150 MG tablet  Commonly known as:  RYTHMOL    150 mg, Oral, Every 8 Hours Scheduled                      Continue These Medications      Instructions Start Date   cilostazol 100 MG tablet  Commonly known as:  PLETAL    100 mg, Oral, 2 Times Daily        guaiFENesin 600 MG 12 hr tablet  Commonly known as:  MUCINEX    1,200 mg, Oral, Every 12 Hours Scheduled        hydrOXYzine 25 MG tablet  Commonly known as:  ATARAX    25 mg, Oral, 3 Times Daily PRN        sildenafil 50 MG tablet  Commonly known as:  VIAGRA    50 mg, Oral, Daily PRN        XARELTO 20 MG tablet  Generic drug:  rivaroxaban    20 mg, Oral, Daily With Dinner            Stop These Medications    metoprolol tartrate 25 MG tablet  Commonly known as:  LOPRESSOR                Discharge Diet:       Diet Instructions      Diet: Regular; Thin       Discharge Diet:  Regular     Fluid Consistency:  Thin             Discharge Care Plan / Instructions:   Discharge home     Activity at Discharge:       Activity Instructions      Activity as Tolerated               Follow-up Appointments:  Follow-up with cardiology in 3 weeks        Stephan Saldana DO  03/07/20  2:10 PM     Time: Discharge less than 30 min     Please note that part of this note may be an electronic transcription/translation of spoken language to printed text using the Dragon Dictation System. Efforts were made to edit the dictations, but occasionally words are mistranscribed.                            ED to Hosp-Admission (Discharged) on 3/6/2020           Discharge Order (From admission, onward)     Start     Ordered    03/07/20 1409  Discharge patient  Once     Expected Discharge Date:  03/07/20    Discharge Disposition:  Home or Self Care    Physician of Record for Attribution - Please select from Treatment Team:  STEPHAN SALDANA [[id number]]    Review needed by CMO to determine Physician of Record:  No       Question Answer Comment   Physician of Record for Attribution -  Please select from Treatment Team CAMMIE SALDANA S    Review needed by CMO to determine Physician of Record No        03/07/20 1749

## 2020-03-09 NOTE — PAYOR COMM NOTE
"3/9/20 [facility] [phone]  [phone]  PATIENT ADMITTED ON 3/6/20 ATRIAL FLUTTER.    FAXING CLINICAL.              Kingsley Servin (74 y.o. Male)     Date of Birth Social Security Number Address Home Phone MRN    1945  [address] [address] [phone] [id number]    Jewish Marital Status          Alevism        Admission Date Admission Type Admitting Provider Attending Provider Department, Room/Bed    3/6/20 Emergency Stephan Marlow DO  [facility] 4B, 411/1    Discharge Date Discharge Disposition Discharge Destination        3/7/2020 Home or Self Care Home             Attending Provider:  (none)   Allergies:  No Known Allergies    Isolation:  None   Infection:  None   Code Status:  Prior    Ht:  172.7 cm (68\")   Wt:  80.5 kg (177 lb 6.4 oz)    Admission Cmt:  None   Principal Problem:  Atrial flutter (CMS/[facility]) [I48.92]                 Active Insurance as of 3/6/2020     Primary Coverage     Payor Plan Insurance Group Employer/Plan Group    [facility] DEPT [id number]      Payor Plan Address Payor Plan Phone Number Payor Plan Fax Number Effective Dates    [employer] 04 [phone]  2/10/2020 - None Entered    [address] [place] [id number]       Subscriber Name Subscriber Birth Date Member ID       KINGSLEY SERVIN [id number]                 Emergency Contacts      (Rel.) Home Phone Work Phone Mobile Phone    LUIS DANIEL SERVIN (Son) -- -- [phone]    boone tee (Daughter) -- -- [phone]    KITTYAPRIL (Daughter) -- -- [phone]          Plan  Spoke to [facility] IVAN Cruz about pt needing admission.  On Cardizem.  She approved admission here due to Cardizem and \"needing a higher LOC\" per Dr Glass.  Updated Dr Guevara.           Fatuma Hammond, RN                    Alexsander Rubio, DO   Physician   Medicine   H&P   Signed   Date of Service:  03/06/20 2418   Creation Time:  03/06/20 " 2337            Signed        Expand All Collapse All      Show:Clear all  [x]Manual[x]Template[x]Copied    Added by:  [x]Alexsander Rubio DO[x]Fatuma Girard APRN    []Cristina for details       [facility] Medicine Services  HISTORY AND PHYSICAL     Date of Admission: 3/6/2020  Primary Care Physician: Provider, No Known     Subjective      Chief Complaint: Fast heart rate     History of Present Illness  Kingsley Lerma is a 74-year-old male with a past medical history of paroxysmal atrial flutter, high arthritis, hypertension, PVD, chronic alcohol use, history tobacco use.  Patient presented to [facility] emergency department with complaints of elevated heart rate.  Patient had no complaints of chest pain, shortness of breathing with tachycardia..  ER evaluation revealed atrial flutter variable rate.  Patient was started on Cardizem drip, received amiodarone bolus.  Dr. Ugarte, cardiology was notified.  He recommended cardioversion.  Unfortunately, this was unsuccessful.  Patient is admitted for further evaluation and treatment.  Cardiology will see in a.m.  Due to chronic alcohol use he will be monitored and treated for potential DTs.  Condition is stable.     Review of Systems   A 10 point review of systems was completed, all negative except for those discussed in HPI     Past Medical History:   Medical History        Past Medical History:   Diagnosis Date   • Alcohol abuse     • Arthritis     • Atrial flutter (CMS/HCC)     • History of tobacco abuse     • Hypertension     • PVD (peripheral vascular disease) (CMS/HCC)              Past Surgical History:   Surgical History         Past Surgical History:   Procedure Laterality Date   • ADENOIDECTOMY                Family History: family history includes Cancer in his father; Dementia in his mother; Pancreatic cancer in his father.     Social History:  reports that he has quit smoking. He has never used smokeless tobacco.  "He reports that he drinks alcohol.     Code Status: Full, unable speak for himself his daughter will speak for him        Allergies:  No Known Allergies     Medications:          Prior to Admission medications    Medication Sig Start Date End Date Taking? Authorizing Provider   cilostazol (PLETAL) 100 MG tablet Take 100 mg by mouth 2 (Two) Times a Day.       Provider, MD Jose   guaiFENesin (MUCINEX) 600 MG 12 hr tablet Take 2 tablets by mouth Every 12 (Twelve) Hours. 2/12/20     Soham Parker APRN   hydrOXYzine (ATARAX) 25 MG tablet Take 1 tablet by mouth 3 (Three) Times a Day As Needed for Anxiety. 2/17/20     Soham Parker APRN   metoprolol tartrate (LOPRESSOR) 25 MG tablet Take 1 tablet by mouth 2 (Two) Times a Day. 2/17/20     Soham Parker APRN   Multiple Vitamins-Minerals (MULTIVITAMIN WITH                       rivaroxaban (XARELTO) 20 MG tablet Take 1 tablet by mouth Daily With Dinner. Indications: Atrial Fibrillation 2/17/20     Soham Parker APRN   sildenafil (VIAGRA) 50 MG tablet Take 50 mg by mouth Daily As Needed for Erectile Dysfunction (Sexual Activity).       Provider, MD Jose         Objective      /67 (BP Location: Right arm, Patient Position: Sitting)   Pulse 108 Comment: a-flutter  Temp 97.2 °F (36.2 °C) (Oral)   Resp 20   Ht 172.7 cm (68\")   Wt 75.8 kg (167 lb)   SpO2 92%   BMI 25.39 kg/m²   Physical Exam   Constitutional: He is oriented to person, place, and time. He appears well-developed and well-nourished. No distress.   HENT:   Head: Normocephalic and atraumatic.   Poor dentition    Eyes: Pupils are equal, round, and reactive to light. Conjunctivae and EOM are normal. No scleral icterus.   Neck: Normal range of motion. Neck supple. No JVD present. No tracheal deviation present.   Cardiovascular: Normal heart sounds and intact distal pulses. Exam reveals no gallop.   No murmur heard.  Atrial fib/flutter 150   Pulmonary/Chest: Effort normal. No " respiratory distress. He has wheezes (expiratory). He has no rales.   Abdominal: Soft. Bowel sounds are normal. He exhibits no distension. There is no tenderness. There is no guarding.   Musculoskeletal: Normal range of motion. He exhibits edema (trace bilateral lower extremities).   Neurological: He is alert and oriented to person, place, and time.   Skin: Skin is warm and dry. No rash noted. He is not diaphoretic. No erythema. No pallor.   Psychiatric: He has a normal mood and affect. His behavior is normal.   Vitals reviewed.        Pertinent Data:            Lab Results (last 72 hours)      Procedure Component Value Units Date/Time     Comprehensive Metabolic Panel [[id number]]  (Abnormal) Collected:  03/06/20 2000     Specimen:  Blood Updated:  03/06/20 2024       Glucose 89 mg/dL         BUN 11 mg/dL         Creatinine 0.73 mg/dL         Sodium 144 mmol/L         Potassium 3.7 mmol/L         Chloride 108 mmol/L         CO2 21.0 mmol/L         Calcium 9.0 mg/dL         Total Protein 6.8 g/dL         Albumin        BUN/Creatinine Ratio 15.1         Anion Gap 15.0 mmol/L       Troponin [[id number]]  (Normal) Collected:  03/06/20 2000     Specimen:  Blood Updated:  03/06/20 2023       Troponin T <0.010 ng/mL       BNP [[id number]]  (Abnormal) Collected:  03/06/20 2000     Specimen:  Blood Updated:  03/06/20 2023       proBNP 3,214.0 pg/mL       aPTT [[id number]]  (Abnormal) Collected:  03/06/20 1938     Specimen:  Blood Updated:  03/06/20 2000       PTT 79.2 seconds       Protime-INR [[id number]]  (Abnormal) Collected:  03/06/20 1938     Specimen:  Blood Updated:  03/06/20 2000       Protime 15.4 Seconds         INR 1.23     CBC Auto Differential [[id number]]  (Normal) Collected:  03/06/20 1938     Specimen:  Blood Updated:  03/06/20 1950       WBC 7.96 10*3/mm3         RBC 4.80 10*6/mm3         Hemoglobin 15.8 g/dL         Hematocrit 45.8 %         MCV 95.4 fL         MCH 32.9 pg         MCHC 34.5 g/dL         RDW 14.7  %         RDW-SD 51.0 fl         MPV 8.8 fL         Platelets 217 10*3/mm3         Neutrophil % 64.4 %         Lymphocyte % 24.1 %         Monocyte % 8.2 %         Eosinophil % 2.6 %         Basophil % 0.4 %         Immature Grans % 0.3 %         Neutrophils, Absolute 5.13 10*3/mm3         Lymphocytes, Absolute 1.92 10*3/mm3         Monocytes, Absolute 0.65 10*3/mm3         Eosinophils, Absolute 0.21 10*3/mm3         Basophils, Absolute 0.03 10*3/mm3         Immature Grans, Absolute 0.02 10*3/mm3         nRBC 0.0 /100 WBC                     Imaging Results (Last 24 Hours)      Procedure Component Value Units Date/Time     XR Chest 1 View [[id number]] Collected:  03/06/20 2041       Updated:  03/06/20 2044     Narrative:        EXAMINATION: XR CHEST 1 VW-. 3/6/2020 8:41 PM CST     CHEST, ONE VIEW:     HISTORY: Arrhythmia     COMPARISON: 2/15/2020 and 2/10/2020     A single frontal chest radiograph was obtained.     FINDINGS:     The lungs are clear without infiltrates.     The heart is normal in size, pulmonary circulation appropriate, without  heart failure.     The bony structures are intact without acute osseous abnormality.     Radiographically, the chest is unchanged.                                      Impression:        1. No acute cardiopulmonary process.     This report was finalized on 03/06/2020 20:41 by Dr. Omer Hopkins MD.          2/11/2020 ECHO  Interpretation Summary         · Left atrial cavity size is borderline dilated.  · Left ventricular systolic function is normal.  · Hyperdynamic right ventricular systolic function noted.     RHYTHM IS ATRIAL FIBRILLATION  BORDERLINE BI-ATRIAL ENLARGEMENT  ALL THE LV AND RV WALLS CAN'T BE SEEN WELL BUT THERE APPEARS TO BE HYPERKINESIS OF BOTH VENTRICLES  NO SIGNIFICANT VALVULAR DYSFUNCTION         I have personally reviewed and interpreted the radiology studies and ECG obtained at time of admission.      Assessment:   Active Hospital Problems     Diagnosis    • **Atrial flutter (CMS/HCC)   • Alcoholism /alcohol abuse (CMS/HCC)   • Former tobacco use   • Essential hypertension   • PVD (peripheral vascular disease) (CMS/HCC)         Plan:   1.  Admit as inpatient  2.  Continue Cardizem drip  3.  Cardiology to see in a.m.  4.  CIWA protocol  5.  Serial troponins and EKGs  6.  Home medications reviewed and restarted as appropriate  7.  Continue Xarelto for DVT prophylaxis  8.  Labs in a.m.     I discussed the patient's findings and my recommendations with: Alexsander Rubio DO  Time spent: 35 minutes     Plan discussed with NP, and agree.     Patient seen and examined by me on 3/6/2020 at 11:35 PM.     TAJ Saucedo  03/07/20   12:03 AM  Stephan Marlow DO   Physician   Medicine   Discharge Summary   Signed   Date of Service:  03/07/20 1410   Creation Time:  03/07/20 1410            Signed             Show:Clear all  [x]Manual[x]Template[x]Copied    Added by:  [x]Stephan Marlow DO    []Cristina for details       [facility] Medicine Services  DISCHARGE SUMMARY         Date of Admission: 3/6/2020  Date of Discharge:  3/7/2020  Primary Care Physician: Provider, No Known     Discharge Diagnoses:      Active Hospital Problems     Diagnosis   • **Atrial flutter (CMS/HCC)   • Non healing left heel wound   • Alcoholism /alcohol abuse (CMS/HCC)   • Essential hypertension   • PVD (peripheral vascular disease) (CMS/HCC)            Presenting Problem/History of Present Illness:  Atrial flutter, unspecified type (CMS/HCC) [I48.92]  Atrial flutter, unspecified type (CMS/HCC) [I48.92]         Chief Complaint on Day of Discharge:   No complaints today            Hospital Course   Kingsley Lerma is a 74-year-old male with a past medical history of paroxysmal atrial flutter, high arthritis, hypertension, PVD, chronic alcohol use, history tobacco use.  Patient presented to [facility] emergency department with complaints of elevated heart  rate.  Patient had no complaints of chest pain, shortness of breathing with tachycardia..  ER evaluation revealed atrial flutter variable rate.  Patient was started on Cardizem drip, received amiodarone bolus.  Dr. Ugarte, cardiology was notified.  He recommended cardioversion.  Unfortunately, this was unsuccessful.  Patient is admitted for further evaluation and treatment.  Cardiology will see in a.m.  Due to chronic alcohol use he will be monitored and treated for potential DTs.  Condition is stable.  Plan:   1.  Admit as inpatient  2.  Continue Cardizem drip  3.  Cardiology to see in a.m.  4.  CIWA protocol  5.  Serial troponins and EKGs  6.  Home medications reviewed and restarted as appropriate  7.  Continue Xarelto for DVT prophylaxis  8.  Labs in a.m.        Consults:   Cardiology:  Assessment/Plan           Atrial flutter/fibrillation (CMS/HCC) - Anticoagulated on Xarelto. Rate better controlled on Cardizem and Amiodarone drip. Unsuccessful DCCV in ER.     Essential hypertension - Controlled    Cigarette smoker - Patient stated he quit after last admission, but has restarted back.    PVD (peripheral vascular disease) (CMS/HCC) with Non healing left heel wound - [place] [facility]    Alcoholism /alcohol abuse (CMS/HCC) - No evidence of DT  Further orders per Dr. Ugarte.      Thank you for asking us to follow this patient with you. Please note that this documentation resulted in a face-to-face encounter provided by me.         TAJ Moore     Pertinent Test Results:            Lab Results         , TAJ Antoine   Nurse Practitioner   Cardiology   Consults   Attested   Date of Service:  03/07/20 0737   Creation Time:  03/07/20 0737         Consult Orders   Inpatient Cardiology Consult [[id number]] ordered by Fatuma Girard APRN at 03/06/20 5583          Attested        Attestation signed by Jamey Ugarte MD at 03/07/20 7251   I have seen and examined the patient.  I have discussed the findings with the patient and the mid-level providers.     Subjective:  Patient relates that palpitations have resolved     Objective:     LUNGS: no rales or wheezes  CV: RRR. No m,g,r  EXT: no c,c,e        A/P:   PAF AFIB - now back to nsr. On DOAC ok. Change Amiodarone to Rythmol  ETOH'ism - need to stop has been discussed with pt and family. Also warned about potential for serious ETOH withdrawal and possible need to wean off of ETOH.  HTN - ok control     THANKS - COULD BE DISCHARGED FROM CARDIAC STANDPOINT AND KEEP PREVIOUS OFFICE RICHARD.     Jamey Ugarte MD   Referring Provider: Alexsander Rubio DO     Reason for Consultation: Atrial Fibrillation/Flutter     Chief complaint: Fast HR on Monitor           History of present illness:   Kingsley Lerma is a 74 y.o. male with history of recent hospitalization in February of this year with pneumonia. During the hospitalization, he was found to have atrial flutter, but spontaneously converted to SR on BB. Patient presented to ER last night for fast HR noted on his pulse ox. He denied any palpitations, chest pain or SOB. Stated last time he checked his HR was approximately 2 weeks ago.      Work-up in the ER included EKG that demonstrated atrial fibrillation. Unsuccessful DCCV. Cardiac enzymes were negative. proBNP was elevated 2' to atrial fibrillation. CXR demonstrated clear lung fields without acute cardiopulmonary process. CBC and CMP were unremarkable. Patient was started on cardizem and amiodarone drip. Admitted to the hospitalist service.      Cardiology consulted for further management and recommendations of atrial fibrillation/flutter.    Referring Provider: Alexsander Rubio DO     Reason for Consultation: Atrial Fibrillation/Flutter     Chief complaint: Fast HR on Monitor           History of present illness:   Kingsley Lerma is a 74 y.o. male with history of recent hospitalization in February of this year with pneumonia. During the  "hospitalization, he was found to have atrial flutter, but spontaneously converted to SR on BB. Patient presented to ER last night for fast HR noted on his pulse ox. He denied any palpitations, chest pain or SOB. Stated last time he checked his HR was approximately 2 weeks ago.      Work-up in the ER included EKG that demonstrated atrial fibrillation. Unsuccessful DCCV. Cardiac enzymes were negative. proBNP was elevated 2' to atrial fibrillation. CXR demonstrated clear lung fields without acute cardiopulmonary process. CBC and CMP were unremarkable. Patient was started on cardizem and amiodarone drip. Admitted to the hospitalist service.      Cardiology consulted for further management and recommendations of atrial fibrillation/flutter.             /64 (BP Location: Right arm, Patient Position: Lying)   Pulse 72   Temp 98.1 °F (36.7 °C) (Oral)   Resp 20   Ht 172.7 cm (68\")   Wt 80.5 kg (177 lb 6.4 oz)   SpO2 92%   BMI 26.97 kg/m²                 General Appearance:        Alert, cooperative, in no acute distress       Head:        Normocephalic. Atraumatic. SAVANA.       Neck:       No adenopathy, supple, trachea midline, no thyromegaly, no carotid bruit, no JVD       Back:         No kyphosis present, no scoliosis present, no skin lesions, erythema or scars, no tenderness to percussion or palpation, range of motion normal       Lungs:         Clear, but diminished throughout to auscultation, respirations regular, even and unlabored        Heart:        Irregular rhythm and normal rate, normal S1 and S2, no murmur, no gallop, no rub, no click       Abdomen:         Normal bowel sounds, no masses, no organomegaly, soft non-tender, non-distended, no guarding, no rebound tenderness       Extremities:       Moves all extremities, no edema, no cyanosis, no redness. Left heel ulcer x 2.        Pulses:       Pulses palpable and equal bilaterally       Skin:       No bleeding, bruising or rash       Lymph nodes: "       No palpable adenopathy       Neurologic:       Cranial nerves 2 - 12 grossly intact.                                 Results Review:                I reviewed the patient's new clinical results.                    Lab Results (last 72 hours)                    Procedure       Component       Value       Units       Date/Time               Extra Tubes [[id number]]     Collected:  03/07/20 0430             Specimen:  Blood, Venous Line     Updated:  03/07/20 0615             Narrative:                The following orders were created for panel order Extra Tubes.    Procedure                               Abnormality         Status                       ---------                               -----------         ------                       Lavender Top[[id number]]                                     Final result                      Please view results for these tests on the individual orders.             Lavender Top [[id number]]     Collected:  03/07/20 0430             Specimen:  Blood     Updated:  03/07/20 0615                   Extra Tube     hold for add-on                       Comment:     Auto resulted                  Troponin [[id number]]  (Normal)     Collected:  03/07/20 0430             Specimen:  Blood     Updated:  03/07/20 0608                   Troponin T     <0.010     ng/mL                   Narrative:                Troponin T Reference Range:    <= 0.03 ng/mL-   Negative for AMI    >0.03 ng/mL-     Abnormal for myocardial necrosis.  Clinicians would have to utilize clinical acumen, EKG, Troponin and serial changes to determine if it is an Acute Myocardial Infarction or myocardial injury due to an underlying chronic condition.               Results may be falsely decreased if patient taking Biotin.                  Comprehensive Metabolic Panel [[id number]]  (Abnormal)     Collected:  03/06/20 2000             Specimen:  Blood     Updated:  03/06/20 2024                   Glucose     89      mg/dL                         BUN     11     mg/dL                         Creatinine     0.73Low     mg/dL                         Sodium     144     mmol/L                         Potassium     3.7     mmol/L                         Chloride     108High     mmol/L                         CO2     21.0Low     mmol/L                         Calcium     9.0     mg/dL                         Total Protein     6.8     g/dL                         Albumin     3.80     g/dL                         ALT (SGPT)     14     U/L                         AST (SGOT)     14     U/L                         Alkaline Phosphatase     116     U/L                         Total Bilirubin     0.3     mg/dL                         eGFR Non  Amer     106     mL/min/1.73                         Globulin     3.0     gm/dL                         A/G Ratio     1.3     g/dL                         BUN/Creatinine Ratio     15.1                   Anion Gap     15.0     mmol/L                   Narrative:                GFR Normal >60    Chronic Kidney Disease <60    Kidney Failure <15                  Troponin [[id number]]  (Normal)     Collected:  03/06/20 2000             Specimen:  Blood     Updated:  03/06/20 2023                   Troponin T     <0.010     ng/mL                   Narrative:                Troponin T Reference Range:    <= 0.03 ng/mL-   Negative for AMI    >0.03 ng/mL-     Abnormal for myocardial necrosis.  Clinicians would have to utilize clinical acumen, EKG, Troponin and serial changes to determine if it is an Acute Myocardial Infarction or myocardial injury due to an underlying chronic condition.               Results may be falsely decreased if patient taking Biotin.                  BNP [[id number]]  (Abnormal)     Collected:  03/06/20 2000             Specimen:  Blood     Updated:  03/06/20 2023                   proBNP     3,214.0High     pg/mL                   Narrative:                Among patients with  dyspnea, NT-proBNP is highly sensitive for the detection of acute congestive heart failure. In addition NT-proBNP of <300 pg/ml effectively rules out acute congestive heart failure with 99% negative predictive value.         Results may be falsely decreased if patient taking Biotin.                  aPTT [[id number]]  (Abnormal)     Collected:  03/06/20 1938             Specimen:  Blood     Updated:  03/06/20 2000                   PTT     79.2High     seconds                   Protime-INR [[id number]]  (Abnormal)     Collected:  03/06/20 1938             Specimen:  Blood     Updated:  03/06/20 2000                   Protime     15.4High     Seconds                         INR     1.23High             CBC & Differential [[id number]]     Collected:  03/06/20 1938             Specimen:  Blood     Updated:  03/06/20 1950             Narrative:                The following orders were created for panel order CBC & Differential.    Procedure                               Abnormality         Status                       ---------                               -----------         ------                       CBC Auto Differential[[id number]]        Normal              Final result                      Please view results for these tests on the individual orders.             CBC Auto Differential [[id number]]  (Normal)     Collected:  03/06/20 1938             Specimen:  Blood     Updated:  03/06/20 1950                   WBC     7.96     10*3/mm3                         RBC     4.80     10*6/mm3                         Hemoglobin     15.8     g/dL                         Hematocrit     45.8     %                         MCV     95.4     fL                         MCH     32.9     pg                         MCHC     34.5     g/dL                         RDW     14.7     %                         RDW-SD     51.0     fl                         MPV     8.8     fL                         Platelets     217     10*3/mm3                          Neutrophil %     64.4     %                         Lymphocyte %     24.1     %                         Monocyte %     8.2     %                         Eosinophil %     2.6     %                         Basophil %     0.4     %                         Immature Grans %     0.3     %                         Neutrophils, Absolute     5.13     10*3/mm3                         Lymphocytes, Absolute     1.92     10*3/mm3                         Monocytes, Absolute     0.65     10*3/mm3                         Eosinophils, Absolute     0.21     10*3/mm3                         Basophils, Absolute     0.03     10*3/mm3                         Immature Grans, Absolute     0.02     10*3/mm3                         nRBC     0.0     /100 WBC                                                   Imaging Results (Last 72 Hours)                    Procedure       Component       Value       Units       Date/Time               XR Chest 1 View [[id number]]     Collected:  03/06/20 2041                   Updated:  03/06/20 2044             Narrative:                EXAMINATION: XR CHEST 1 VW-. 3/6/2020 8:41 PM CST         CHEST, ONE VIEW:         HISTORY: Arrhythmia         COMPARISON: 2/15/2020 and 2/10/2020         A single frontal chest radiograph was obtained.         FINDINGS:         The lungs are clear without infiltrates.         The heart is normal in size, pulmonary circulation appropriate, without    heart failure.         The bony structures are intact without acute osseous abnormality.         Radiographically, the chest is unchanged.                                                  Impression:                1. No acute cardiopulmonary process.         This report was finalized on 03/06/2020 20:41 by Dr. mOer Hopkins MD.                        2D Echocardiogram (02/10/2020):    •Left atrial cavity size is borderline dilated.      •Left ventricular systolic function is normal.      •Hyperdynamic right ventricular  systolic function noted.           RHYTHM IS ATRIAL FIBRILLATION    BORDERLINE BI-ATRIAL ENLARGEMENT    ALL THE LV AND RV WALLS CAN'T BE SEEN WELL BUT THERE APPEARS TO BE HYPERKINESIS OF BOTH VENTRICLES    NO SIGNIFICANT VALVULAR DYSFUNCTION                            Assessment/Plan                 Atrial flutter/fibrillation (CMS/HCC) - Anticoagulated on Xarelto. Rate better controlled on Cardizem and Amiodarone drip. Unsuccessful DCCV in ER.       Essential hypertension - Controlled      Cigarette smoker - Patient stated he quit after last admission, but has restarted back.      PVD (peripheral vascular disease) (CMS/HCC) with Non healing left heel wound - Scheduled to see Dr. Laughlin at [facility] [place]      Alcoholism /alcohol abuse (CMS/HCC) - No evidence of DT                    Further orders per Dr. Ugarte.          Thank you for asking us to follow this patient with you. Please note that this documentation resulted in a face-to-face encounter provided by me.               TAJ Moore                                   Cosigned by: Jamey Ugarte MD at 03/07/20 1451                        ED to Hosp-Admission (Discharged) on 3/6/2020                                Revision & Routing History                                Detailed Report                                                       No current facility-administered medications for this encounter.      Current Outpatient Medications   Medication Sig Dispense Refill   • cilostazol (PLETAL) 100 MG tablet Take 100 mg by mouth 2 (Two) Times a Day.     • guaiFENesin (MUCINEX) 600 MG 12 hr tablet Take 2 tablets by mouth Every 12 (Twelve) Hours.     • hydrOXYzine (ATARAX) 25 MG tablet Take 1 tablet by mouth 3 (Three) Times a Day As Needed for Anxiety. 20 tablet 0   • rivaroxaban (XARELTO) 20 MG tablet Take 1 tablet by mouth Daily With Dinner. Indications: Atrial Fibrillation 30 tablet 0   • sildenafil (VIAGRA) 50 MG tablet Take 50 mg by mouth Daily  As Needed for Erectile Dysfunction (Sexual Activity).     • propafenone (RYTHMOL) 150 MG tablet Take 1 tablet by mouth Every 8 (Eight) Hours. 90 tablet 2

## 2020-03-10 ENCOUNTER — OFFICE VISIT (OUTPATIENT)
Dept: INTERNAL MEDICINE | Facility: CLINIC | Age: 75
End: 2020-03-10

## 2020-03-10 ENCOUNTER — HOSPITAL ENCOUNTER (OUTPATIENT)
Dept: CARDIOLOGY | Facility: HOSPITAL | Age: 75
Discharge: HOME | End: 2020-03-10
Admitting: INTERNAL MEDICINE

## 2020-03-10 VITALS
DIASTOLIC BLOOD PRESSURE: 99 MMHG | HEIGHT: 68 IN | BODY MASS INDEX: 27.34 KG/M2 | HEART RATE: 108 BPM | RESPIRATION RATE: 16 BRPM | SYSTOLIC BLOOD PRESSURE: 168 MMHG | WEIGHT: 180.4 LBS | TEMPERATURE: 98 F

## 2020-03-10 DIAGNOSIS — I48.3 TYPICAL ATRIAL FLUTTER (HCC): ICD-10-CM

## 2020-03-10 DIAGNOSIS — I48.3 TYPICAL ATRIAL FLUTTER (HCC): Primary | ICD-10-CM

## 2020-03-10 DIAGNOSIS — I10 ESSENTIAL HYPERTENSION: ICD-10-CM

## 2020-03-10 DIAGNOSIS — I73.9 PVD (PERIPHERAL VASCULAR DISEASE) (HCC): ICD-10-CM

## 2020-03-10 DIAGNOSIS — S91.302A NON HEALING LEFT HEEL WOUND: Chronic | ICD-10-CM

## 2020-03-10 DIAGNOSIS — Z72.0 TOBACCO USE: ICD-10-CM

## 2020-03-10 DIAGNOSIS — IMO0001 ALCOHOLISM /ALCOHOL ABUSE: ICD-10-CM

## 2020-03-10 PROCEDURE — 99204 OFFICE O/P NEW MOD 45 MIN: CPT | Performed by: INTERNAL MEDICINE

## 2020-03-10 PROCEDURE — 0296T HC EXT ECG > 48HR TO 21 DAY RCRD W/CONECT INTL RCRD: CPT

## 2020-03-10 RX ORDER — LISINOPRIL 20 MG/1
20 TABLET ORAL DAILY
Qty: 30 TABLET | Refills: 2 | Status: ON HOLD | OUTPATIENT
Start: 2020-03-10 | End: 2020-06-07

## 2020-03-10 NOTE — PROGRESS NOTES
Chief Complaint   Patient presents with   • Establish Care         History:  Kingsley Lerma is a 74 y.o. male who presents today for evaluation of the above problems.    He is here to establish care.  So far this year he has had 3 admissions to the hospital for A. fib/flutter but he has not seen a primary care physician.  He is here with his son to try to get him a feeling better.  [facility] as [facility].  Most recently he was admitted to the hospital March 6, 2020 for a flutter.  Cardioversion was unsuccessful and he continues to be in a flutter.  He was placed on Rythmol.  He has appointment with Dr. Farias on March 24.  He denies any symptoms and he is on Xarelto.  He was on atenolol and amlodipine but these were both discontinued.  The patient reports his heart rate varying from 35-1 100s at home.    He is never had a colonoscopy    He sees Dr. Laughlin at [facility] pod[facility] for a nonhealing left pressure ulcer and he saw her yesterday.  The foot is wrapped and he prefers not to take the wrapping off today    [facility] but is [facility] switching his medical care to me.      He continues to smoke and is not ready to quit  HPI    ROS:  Review of Systems   Constitutional: Negative for activity change, appetite change, fatigue, fever and unexpected weight change.   HENT: Negative for nosebleeds, sore throat and trouble swallowing.    Eyes: Negative for pain and visual disturbance.   Respiratory: Negative for cough, chest tightness and shortness of breath.    Cardiovascular: Negative for chest pain, palpitations and leg swelling.        A. fib   Gastrointestinal: Negative for abdominal pain, constipation, diarrhea, nausea and vomiting.   Endocrine: Negative for cold intolerance and heat intolerance.   Genitourinary: Negative for hematuria.   Musculoskeletal: Positive for arthralgias (Chronic right hip). Negative for back pain, neck pain  and neck stiffness.   Skin: Negative for rash and wound.   Neurological: Negative for dizziness, syncope, weakness, light-headedness and headaches.   Hematological: Negative for adenopathy. Does not bruise/bleed easily.   Psychiatric/Behavioral: Negative for agitation, behavioral problems and confusion. The patient is nervous/anxious.        No Known Allergies  Past Medical History:   Diagnosis Date   • Alcohol abuse    • Arthritis    • Atrial flutter (CMS/HCC)    • Circulation problem    • History of tobacco abuse    • Hypertension    • PVD (peripheral vascular disease) (CMS/HCC)      Past Surgical History:   Procedure Laterality Date   • ADENOIDECTOMY       Family History   Problem Relation Age of Onset   • Dementia Mother    • Cancer Father    • Pancreatic cancer Father      Kingsley  reports that he has been smoking cigarettes and cigars. He has been smoking about 0.50 packs per day. He has never used smokeless tobacco. He reports that he drinks about 10.0 standard drinks of alcohol per week. He reports that he does not use drugs.    I have reviewed and updated the above documentation (if necessary) including but not limited to chief complaint, ROS, PFSH, allergies and medications        Current Outpatient Medications:   •  cilostazol (PLETAL) 100 MG tablet, Take 100 mg by mouth 2 (Two) Times a Day., Disp: , Rfl:   •  guaiFENesin (MUCINEX) 600 MG 12 hr tablet, Take 2 tablets by mouth Every 12 (Twelve) Hours., Disp: , Rfl:   •  hydrOXYzine (ATARAX) 25 MG tablet, Take 1 tablet by mouth 3 (Three) Times a Day As Needed for Anxiety., Disp: 20 tablet, Rfl: 0  •  propafenone (RYTHMOL) 150 MG tablet, Take 1 tablet by mouth Every 8 (Eight) Hours., Disp: 90 tablet, Rfl: 2  •  rivaroxaban (XARELTO) 20 MG tablet, Take 1 tablet by mouth Daily With Dinner. Indications: Atrial Fibrillation, Disp: 30 tablet, Rfl: 0  •  sildenafil (VIAGRA) 50 MG tablet, Take 50 mg by mouth Daily As Needed for Erectile Dysfunction (Sexual  "Activity)., Disp: , Rfl:   •  lisinopril (PRINIVIL,ZESTRIL) 20 MG tablet, Take 1 tablet by mouth Daily., Disp: 30 tablet, Rfl: 2      OBJECTIVE:  Visit Vitals  /99 (BP Location: Left arm, Patient Position: Sitting, Cuff Size: Adult)   Pulse 108   Temp 98 °F (36.7 °C) (Oral)   Resp 16   Ht 172.7 cm (67.99\")   Wt 81.8 kg (180 lb 6.4 oz)   BMI 27.44 kg/m²      Physical Exam   Constitutional: He is oriented to person, place, and time. He appears well-developed and well-nourished. No distress.   HENT:   Head: Normocephalic and atraumatic.   Mouth/Throat: Oropharynx is clear and moist. No oropharyngeal exudate.   Eyes: Pupils are equal, round, and reactive to light. Conjunctivae and EOM are normal. No scleral icterus.   Neck: Normal range of motion. Neck supple. No JVD present. No thyromegaly present.   Cardiovascular: Normal heart sounds. An irregularly irregular rhythm present. Tachycardia present. Exam reveals no gallop and no friction rub.   No murmur heard.  Pulmonary/Chest: Effort normal and breath sounds normal. No stridor. He has no wheezes. He has no rales.   Abdominal: Soft. Bowel sounds are normal. He exhibits no distension. There is no tenderness. There is no rebound and no guarding.   Musculoskeletal: He exhibits no edema or tenderness.   Lymphadenopathy:     He has no cervical adenopathy.   Neurological: He is alert and oriented to person, place, and time. No cranial nerve deficit.   Skin: Skin is warm and dry.   Left nasolabial fold sebaceous cyst about 2 x 2 cm   Psychiatric: He has a normal mood and affect. His behavior is normal.       MDM  Number of Diagnoses or Management Options  Alcoholism /alcohol abuse (CMS/HCC): new, no workup  Essential hypertension: new, needed workup  Non healing left heel wound: new, no workup  PVD (peripheral vascular disease) (CMS/HCC): new, no workup  Tobacco use: new, no workup  Typical atrial flutter (CMS/HCC): new, needed workup     Amount and/or Complexity of Data " Reviewed  Clinical lab tests: reviewed  Tests in the radiology section of CPT®: ordered and reviewed  Tests in the medicine section of CPT®: reviewed and ordered  Obtain history from someone other than the patient: yes  Review and summarize past medical records: yes    Risk of Complications, Morbidity, and/or Mortality  Presenting problems: moderate  Diagnostic procedures: moderate  Management options: moderate        Assessment/Plan    Kingsley was seen today for establish care.    Diagnoses and all orders for this visit:    Typical atrial flutter (CMS/HCC)  -     Holter Monitor - 72 Hour Up To 21 Days; Future    Essential hypertension  -     lisinopril (PRINIVIL,ZESTRIL) 20 MG tablet; Take 1 tablet by mouth Daily.    PVD (peripheral vascular disease) (CMS/HCC)    Alcoholism /alcohol abuse (CMS/HCC)    Tobacco use    Non healing left heel wound      The patient states his heart rate varies from as low as 35 up to 150.  It is unclear if this is a true reading so I like to check a Holter monitor for 7 days to see what his ranges.  He would benefit from a beta-blocker but I am hesitant to do this currently given the reported bradycardia.  He continues on Rythmol and Xarelto 20 mg with dinner and does not need any refills at this time    He takes Atarax for anxiety.    He is not ready to quit smoking unfortunately.    Continue to see Dr. Laughlin for nonhealing left heel wound    He has a left nasolabial fold sebaceous cyst but wants to hold off on referral to Dr. Mccartney until the next visit.    Follow-up in 3 months for Medicare wellness exam.  He will need referral for colonoscopy, AAA screening, hepatitis C screening and pneumococcal vaccine    Education materials and an After Visit Summary were printed and given to the patient at discharge.  Return in about 3 months (around 6/10/2020) for Medicare Wellness.         SARINA Urrutia MD   9:50 AM  3/10/2020

## 2020-03-11 ENCOUNTER — READMISSION MANAGEMENT (OUTPATIENT)
Dept: CALL CENTER | Facility: HOSPITAL | Age: 75
End: 2020-03-11

## 2020-03-11 NOTE — OUTREACH NOTE
Medical Week 1 Survey      Responses   [facility] patient discharged from?  [place]   Does the patient have one of the following disease processes/diagnoses(primary or secondary)?  Other   Is there a successful TCM telephone encounter documented?  No   Week 1 attempt successful?  Yes   Call start time  0822   Call end time  0826   Discharge diagnosis  Atrial flutter    Is patient permission given to speak with other caregiver?  No   Meds reviewed with patient/caregiver?  Yes   Is the patient having any side effects they believe may be caused by any medication additions or changes?  No   Does the patient have all medications ordered at discharge?  Yes   Is the patient taking all medications as directed (includes completed medication regime)?  Yes   Comments regarding appointments  See AVS   Does the patient have a primary care provider?   Yes   Does the patient have an appointment with their PCP within 7 days of discharge?  Greater than 7 days   What is preventing the patient from scheduling follow up appointments within 7 days of discharge?  Earlier appointment not available   Nursing Interventions  Verified appointment date/time/provider   Has the patient kept scheduled appointments due by today?  Yes   Has home health visited the patient within 72 hours of discharge?  N/A   Psychosocial issues?  No   Did the patient receive a copy of their discharge instructions?  Yes   Nursing interventions  Reviewed instructions with patient   What is the patient's perception of their health status since discharge?  Improving   Is the patient/caregiver able to teach back signs and symptoms related to disease process for when to call PCP?  Yes   Is the patient/caregiver able to teach back signs and symptoms related to disease process for when to call 911?  Yes   Is the patient/caregiver able to teach back the hierarchy of who to call/visit for symptoms/problems? PCP, Specialist, Home health nurse, Urgent Care, ED, 911  Yes    Week 1 call completed?  Yes   Revoked  No further contact(revokes)-requires comment [He states he has everything  he needs and he will call if he needs assist. ]          Belkys Cunha RN

## 2020-03-15 ENCOUNTER — NURSE TRIAGE (OUTPATIENT)
Dept: CALL CENTER | Facility: HOSPITAL | Age: 75
End: 2020-03-15

## 2020-03-15 NOTE — TELEPHONE ENCOUNTER
He has been dx with aflutter - His heart rate is 145 to 150- He is on Rythmol. It is not due to 12 noon. He feels good. He was recently in the hospital [facility]- 03/06/ to 03/07/2020. With a flutter

## 2020-03-15 NOTE — TELEPHONE ENCOUNTER
"  Reason for Disposition  • [1] Heart beating very rapidly (e.g., > 140 / minute) AND [2] present now  (Exception: during exercise)    Additional Information  • Negative: Passed out (i.e., lost consciousness, collapsed and was not responding)  • Negative: Shock suspected (e.g., cold/pale/clammy skin, too weak to stand, low BP, rapid pulse)  • Negative: Difficult to awaken or acting confused (e.g., disoriented, slurred speech)  • Negative: Visible sweat on face or sweat dripping down face  • Negative: Unable to walk, or can only walk with assistance (e.g., requires support)  • Negative: [1] Received SHOCK from implantable cardiac defibrillator AND [2] persisting symptoms (i.e., palpitations, lightheadedness)  • Negative: Sounds like a life-threatening emergency to the triager  • Negative: Chest pain  • Negative: Difficulty breathing  • Negative: Dizziness, lightheadedness, or weakness  • Negative: Heart beating very slowly (e.g., < 50 / minute)  (Exception: athlete)    Answer Assessment - Initial Assessment Questions  1. DESCRIPTION: \"Please describe your heart rate or heart beat that you are having\" (e.g., fast/slow, regular/irregular, skipped or extra beats, \"palpitations\")      It is fast   2. ONSET: \"When did it start?\" (Minutes, hours or days)       Today   3. DURATION: \"How long does it last\" (e.g., seconds, minutes, hours)      30 minutes   4. PATTERN \"Does it come and go, or has it been constant since it started?\"  \"Does it get worse with exertion?\"   \"Are you feeling it now?\"      It comes and goes   5. TAP: \"Using your hand, can you tap out what you are feeling on a chair or table in front of you, so that I can hear?\" (Note: not all patients can do this)        no  6. HEART RATE: \"Can you tell me your heart rate?\" \"How many beats in 15 seconds?\"  (Note: not all patients can do this)        150   7. RECURRENT SYMPTOM: \"Have you ever had this before?\" If so, ask: \"When was the last time?\" and \"What happened " "that time?\"       Yes he had this a few weeks ago.   8. CAUSE: \"What do you think is causing the palpitations?\"      He does not know.   9. CARDIAC HISTORY: \"Do you have any history of heart disease?\" (e.g., heart attack, angina, bypass surgery, angioplasty, arrhythmia)       n  10. OTHER SYMPTOMS: \"Do you have any other symptoms?\" (e.g., dizziness, chest pain, sweating, difficulty breathing)        none  11. PREGNANCY: \"Is there any chance you are pregnant?\" \"When was your last menstrual period?\"        n    Protocols used: HEART RATE AND HEARTBEAT QUESTIONS-ADULT-AH    "

## 2020-03-16 ENCOUNTER — TELEPHONE (OUTPATIENT)
Dept: INTERNAL MEDICINE | Facility: CLINIC | Age: 75
End: 2020-03-16

## 2020-03-16 NOTE — TELEPHONE ENCOUNTER
Left voicemail to call back. Was going to check and see how he was feeling today after being at the ER last night on 3/15/2020

## 2020-03-16 NOTE — TELEPHONE ENCOUNTER
"Pt states that the afib is \"very active\" but is feeling okay and has followed up with his heart Dr. Pt states he has been urinating a lot. [facility] Saw Dr. Laughlin today about ulcer on foot too invasive so he is going to r/s this for another date.   "

## 2020-03-24 PROCEDURE — 0298T HOLTER MONITOR - 72 HOUR UP TO 21 DAY: CPT | Performed by: INTERNAL MEDICINE

## 2020-03-26 ENCOUNTER — TELEPHONE (OUTPATIENT)
Dept: INTERNAL MEDICINE | Facility: CLINIC | Age: 75
End: 2020-03-26

## 2020-03-26 RX ORDER — PROPAFENONE HYDROCHLORIDE 150 MG/1
150 TABLET, COATED ORAL EVERY 8 HOURS SCHEDULED
Qty: 90 TABLET | Refills: 2 | Status: SHIPPED | OUTPATIENT
Start: 2020-03-26 | End: 2020-04-03 | Stop reason: SDUPTHER

## 2020-03-26 NOTE — TELEPHONE ENCOUNTER
PT CALLED REQUESTING HIS   propafenone (RYTHMOL) 150 MG tablet [facility] TO [facility].  FAX NUMBER [phone]    PT CALLBACK NUMBER: [phone]

## 2020-03-31 ENCOUNTER — TELEPHONE (OUTPATIENT)
Dept: INTERNAL MEDICINE | Facility: CLINIC | Age: 75
End: 2020-03-31

## 2020-03-31 DIAGNOSIS — I48.3 TYPICAL ATRIAL FLUTTER (HCC): Primary | ICD-10-CM

## 2020-03-31 RX ORDER — PROPAFENONE HYDROCHLORIDE 150 MG/1
150 TABLET, COATED ORAL EVERY 8 HOURS SCHEDULED
Qty: 90 TABLET | Refills: 2 | Status: CANCELLED | OUTPATIENT
Start: 2020-03-31

## 2020-03-31 NOTE — TELEPHONE ENCOUNTER
Patient called in requesting his prescription for propafenone (RYTHMOL) 150 MG tablet be re-faxed to the [facility]. Based off the chart, it looks like this RX was faxed over on 3/26/20, but patient states they have no record of this medication. He also stated they are needing his last office visit summary faxed over as well. Patient is requesting a call back once this information has been faxed. Phone number provided is [phone]    [facility]ax number provided for the [facility]: [phone]

## 2020-04-03 DIAGNOSIS — I48.3 TYPICAL ATRIAL FLUTTER (HCC): Primary | ICD-10-CM

## 2020-04-03 RX ORDER — PROPAFENONE HYDROCHLORIDE 150 MG/1
150 TABLET, COATED ORAL EVERY 8 HOURS SCHEDULED
Qty: 45 TABLET | Refills: 0 | Status: SHIPPED | OUTPATIENT
Start: 2020-04-03 | End: 2020-04-03 | Stop reason: SDUPTHER

## 2020-04-03 RX ORDER — PROPAFENONE HYDROCHLORIDE 150 MG/1
150 TABLET, COATED ORAL EVERY 8 HOURS SCHEDULED
Qty: 90 TABLET | Refills: 2 | Status: SHIPPED | OUTPATIENT
Start: 2020-04-03 | End: 2020-04-03 | Stop reason: SDUPTHER

## 2020-04-03 RX ORDER — PROPAFENONE HYDROCHLORIDE 150 MG/1
150 TABLET, COATED ORAL EVERY 8 HOURS SCHEDULED
Qty: 45 TABLET | Refills: 0 | Status: SHIPPED | OUTPATIENT
Start: 2020-04-03 | End: 2020-05-07 | Stop reason: HOSPADM

## 2020-04-16 ENCOUNTER — TELEPHONE (OUTPATIENT)
Dept: INTERNAL MEDICINE | Facility: CLINIC | Age: 75
End: 2020-04-16

## 2020-04-16 DIAGNOSIS — F41.9 ANXIETY: Primary | ICD-10-CM

## 2020-04-16 RX ORDER — HYDROXYZINE HYDROCHLORIDE 25 MG/1
25 TABLET, FILM COATED ORAL 3 TIMES DAILY PRN
Qty: 60 TABLET | Refills: 5 | Status: ON HOLD | OUTPATIENT
Start: 2020-04-16 | End: 2022-01-30

## 2020-04-16 NOTE — TELEPHONE ENCOUNTER
Patient called in requesting a refill on hydrOXYzine (ATARAX) 25 MG tablet.     Pharmacy confirmed.

## 2020-05-05 ENCOUNTER — HOSPITAL ENCOUNTER (INPATIENT)
Facility: HOSPITAL | Age: 75
LOS: 2 days | Discharge: HOME | End: 2020-05-07
Attending: EMERGENCY MEDICINE | Admitting: FAMILY MEDICINE

## 2020-05-05 ENCOUNTER — APPOINTMENT (OUTPATIENT)
Dept: GENERAL RADIOLOGY | Facility: HOSPITAL | Age: 75
End: 2020-05-05

## 2020-05-05 ENCOUNTER — APPOINTMENT (OUTPATIENT)
Dept: CT IMAGING | Facility: HOSPITAL | Age: 75
End: 2020-05-05

## 2020-05-05 DIAGNOSIS — I48.91 ATRIAL FIBRILLATION WITH RVR (HCC): Primary | ICD-10-CM

## 2020-05-05 DIAGNOSIS — I48.3 TYPICAL ATRIAL FLUTTER (HCC): ICD-10-CM

## 2020-05-05 DIAGNOSIS — S00.03XA HEMATOMA OF SCALP, INITIAL ENCOUNTER: ICD-10-CM

## 2020-05-05 DIAGNOSIS — I10 ESSENTIAL HYPERTENSION: ICD-10-CM

## 2020-05-05 DIAGNOSIS — R55 SYNCOPE AND COLLAPSE: ICD-10-CM

## 2020-05-05 DIAGNOSIS — M48.02 SPINAL STENOSIS OF CERVICAL REGION: ICD-10-CM

## 2020-05-05 LAB
ALBUMIN SERPL-MCNC: 3.9 G/DL (ref 3.5–5.2)
ALBUMIN/GLOB SERPL: 1.5 G/DL
ALP SERPL-CCNC: 101 U/L (ref 39–117)
ALT SERPL W P-5'-P-CCNC: 24 U/L (ref 1–41)
ANION GAP SERPL CALCULATED.3IONS-SCNC: 14 MMOL/L (ref 5–15)
AST SERPL-CCNC: 24 U/L (ref 1–40)
BASOPHILS # BLD AUTO: 0.03 10*3/MM3 (ref 0–0.2)
BASOPHILS NFR BLD AUTO: 0.3 % (ref 0–1.5)
BILIRUB SERPL-MCNC: 0.8 MG/DL (ref 0.2–1.2)
BUN BLD-MCNC: 15 MG/DL (ref 8–23)
BUN/CREAT SERPL: 13.8 (ref 7–25)
CALCIUM SPEC-SCNC: 9.4 MG/DL (ref 8.6–10.5)
CHLORIDE SERPL-SCNC: 104 MMOL/L (ref 98–107)
CO2 SERPL-SCNC: 23 MMOL/L (ref 22–29)
CREAT BLD-MCNC: 1.09 MG/DL (ref 0.76–1.27)
DEPRECATED RDW RBC AUTO: 50.8 FL (ref 37–54)
EOSINOPHIL # BLD AUTO: 0.05 10*3/MM3 (ref 0–0.4)
EOSINOPHIL NFR BLD AUTO: 0.6 % (ref 0.3–6.2)
ERYTHROCYTE [DISTWIDTH] IN BLOOD BY AUTOMATED COUNT: 14 % (ref 12.3–15.4)
GFR SERPL CREATININE-BSD FRML MDRD: 66 ML/MIN/1.73
GLOBULIN UR ELPH-MCNC: 2.6 GM/DL
GLUCOSE BLD-MCNC: 119 MG/DL (ref 65–99)
HCT VFR BLD AUTO: 45.8 % (ref 37.5–51)
HGB BLD-MCNC: 15.2 G/DL (ref 13–17.7)
HOLD SPECIMEN: NORMAL
HOLD SPECIMEN: NORMAL
IMM GRANULOCYTES # BLD AUTO: 0.03 10*3/MM3 (ref 0–0.05)
IMM GRANULOCYTES NFR BLD AUTO: 0.3 % (ref 0–0.5)
LYMPHOCYTES # BLD AUTO: 0.64 10*3/MM3 (ref 0.7–3.1)
LYMPHOCYTES NFR BLD AUTO: 7.1 % (ref 19.6–45.3)
MCH RBC QN AUTO: 32.5 PG (ref 26.6–33)
MCHC RBC AUTO-ENTMCNC: 33.2 G/DL (ref 31.5–35.7)
MCV RBC AUTO: 98.1 FL (ref 79–97)
MONOCYTES # BLD AUTO: 0.95 10*3/MM3 (ref 0.1–0.9)
MONOCYTES NFR BLD AUTO: 10.6 % (ref 5–12)
NEUTROPHILS # BLD AUTO: 7.26 10*3/MM3 (ref 1.7–7)
NEUTROPHILS NFR BLD AUTO: 81.1 % (ref 42.7–76)
NRBC BLD AUTO-RTO: 0 /100 WBC (ref 0–0.2)
PLATELET # BLD AUTO: 210 10*3/MM3 (ref 140–450)
PMV BLD AUTO: 9 FL (ref 6–12)
POTASSIUM BLD-SCNC: 3.8 MMOL/L (ref 3.5–5.2)
PROT SERPL-MCNC: 6.5 G/DL (ref 6–8.5)
RBC # BLD AUTO: 4.67 10*6/MM3 (ref 4.14–5.8)
SODIUM BLD-SCNC: 141 MMOL/L (ref 136–145)
TROPONIN T SERPL-MCNC: <0.01 NG/ML (ref 0–0.03)
TROPONIN T SERPL-MCNC: <0.01 NG/ML (ref 0–0.03)
TSH SERPL DL<=0.05 MIU/L-ACNC: 1.17 UIU/ML (ref 0.27–4.2)
WBC NRBC COR # BLD: 8.96 10*3/MM3 (ref 3.4–10.8)
WHOLE BLOOD HOLD SPECIMEN: NORMAL
WHOLE BLOOD HOLD SPECIMEN: NORMAL

## 2020-05-05 PROCEDURE — 84443 ASSAY THYROID STIM HORMONE: CPT | Performed by: FAMILY MEDICINE

## 2020-05-05 PROCEDURE — 71045 X-RAY EXAM CHEST 1 VIEW: CPT

## 2020-05-05 PROCEDURE — 72125 CT NECK SPINE W/O DYE: CPT

## 2020-05-05 PROCEDURE — 70450 CT HEAD/BRAIN W/O DYE: CPT

## 2020-05-05 PROCEDURE — 25010000002 DIGOXIN PER 500 MCG: Performed by: EMERGENCY MEDICINE

## 2020-05-05 PROCEDURE — 80053 COMPREHEN METABOLIC PANEL: CPT | Performed by: EMERGENCY MEDICINE

## 2020-05-05 PROCEDURE — 99285 EMERGENCY DEPT VISIT HI MDM: CPT

## 2020-05-05 PROCEDURE — 85025 COMPLETE CBC W/AUTO DIFF WBC: CPT | Performed by: EMERGENCY MEDICINE

## 2020-05-05 PROCEDURE — 84484 ASSAY OF TROPONIN QUANT: CPT | Performed by: EMERGENCY MEDICINE

## 2020-05-05 PROCEDURE — 80053 COMPREHEN METABOLIC PANEL: CPT | Performed by: FAMILY MEDICINE

## 2020-05-05 PROCEDURE — 93005 ELECTROCARDIOGRAM TRACING: CPT | Performed by: EMERGENCY MEDICINE

## 2020-05-05 PROCEDURE — 93010 ELECTROCARDIOGRAM REPORT: CPT | Performed by: INTERNAL MEDICINE

## 2020-05-05 PROCEDURE — 99222 1ST HOSP IP/OBS MODERATE 55: CPT | Performed by: INTERNAL MEDICINE

## 2020-05-05 RX ORDER — DOXYCYCLINE HYCLATE 100 MG/1
100 CAPSULE ORAL 2 TIMES DAILY
COMMUNITY
End: 2020-05-21

## 2020-05-05 RX ORDER — PANTOPRAZOLE SODIUM 20 MG/1
20 TABLET, DELAYED RELEASE ORAL
Status: DISCONTINUED | OUTPATIENT
Start: 2020-05-06 | End: 2020-05-07 | Stop reason: HOSPADM

## 2020-05-05 RX ORDER — METOPROLOL TARTRATE 5 MG/5ML
5 INJECTION INTRAVENOUS ONCE
Status: COMPLETED | OUTPATIENT
Start: 2020-05-05 | End: 2020-05-05

## 2020-05-05 RX ORDER — DILTIAZEM HYDROCHLORIDE 5 MG/ML
10 INJECTION INTRAVENOUS ONCE
Status: COMPLETED | OUTPATIENT
Start: 2020-05-05 | End: 2020-05-05

## 2020-05-05 RX ORDER — LISINOPRIL 20 MG/1
20 TABLET ORAL DAILY
Status: DISCONTINUED | OUTPATIENT
Start: 2020-05-05 | End: 2020-05-07 | Stop reason: HOSPADM

## 2020-05-05 RX ORDER — PANTOPRAZOLE SODIUM 20 MG/1
20 TABLET, DELAYED RELEASE ORAL DAILY
COMMUNITY
End: 2020-05-09

## 2020-05-05 RX ORDER — DOXYCYCLINE 100 MG/1
100 TABLET ORAL EVERY 12 HOURS SCHEDULED
Status: DISCONTINUED | OUTPATIENT
Start: 2020-05-05 | End: 2020-05-07 | Stop reason: HOSPADM

## 2020-05-05 RX ORDER — ASPIRIN 81 MG/1
324 TABLET, CHEWABLE ORAL ONCE
Status: DISCONTINUED | OUTPATIENT
Start: 2020-05-05 | End: 2020-05-05

## 2020-05-05 RX ORDER — SODIUM CHLORIDE 0.9 % (FLUSH) 0.9 %
10 SYRINGE (ML) INJECTION AS NEEDED
Status: DISCONTINUED | OUTPATIENT
Start: 2020-05-05 | End: 2020-05-07 | Stop reason: HOSPADM

## 2020-05-05 RX ORDER — GUAIFENESIN 600 MG/1
1200 TABLET, EXTENDED RELEASE ORAL EVERY 12 HOURS SCHEDULED
Status: DISCONTINUED | OUTPATIENT
Start: 2020-05-05 | End: 2020-05-07 | Stop reason: HOSPADM

## 2020-05-05 RX ORDER — DIGOXIN 0.25 MG/ML
250 INJECTION INTRAMUSCULAR; INTRAVENOUS ONCE
Status: COMPLETED | OUTPATIENT
Start: 2020-05-05 | End: 2020-05-05

## 2020-05-05 RX ORDER — HYDROXYZINE HYDROCHLORIDE 25 MG/1
25 TABLET, FILM COATED ORAL 3 TIMES DAILY PRN
Status: DISCONTINUED | OUTPATIENT
Start: 2020-05-05 | End: 2020-05-07 | Stop reason: HOSPADM

## 2020-05-05 RX ORDER — PROPAFENONE HYDROCHLORIDE 150 MG/1
150 TABLET, COATED ORAL EVERY 8 HOURS SCHEDULED
Status: DISCONTINUED | OUTPATIENT
Start: 2020-05-05 | End: 2020-05-07

## 2020-05-05 RX ORDER — CILOSTAZOL 100 MG/1
100 TABLET ORAL 2 TIMES DAILY
Status: DISCONTINUED | OUTPATIENT
Start: 2020-05-05 | End: 2020-05-07 | Stop reason: HOSPADM

## 2020-05-05 RX ADMIN — RIVAROXABAN 20 MG: 20 TABLET, FILM COATED ORAL at 17:11

## 2020-05-05 RX ADMIN — DILTIAZEM HYDROCHLORIDE 10 MG: 5 INJECTION INTRAVENOUS at 12:43

## 2020-05-05 RX ADMIN — GUAIFENESIN 1200 MG: 600 TABLET, EXTENDED RELEASE ORAL at 19:48

## 2020-05-05 RX ADMIN — DIGOXIN 250 MCG: 0.25 INJECTION INTRAMUSCULAR; INTRAVENOUS at 15:24

## 2020-05-05 RX ADMIN — DILTIAZEM HYDROCHLORIDE 15 MG/HR: 5 INJECTION INTRAVENOUS at 17:46

## 2020-05-05 RX ADMIN — METOPROLOL TARTRATE 5 MG: 5 INJECTION INTRAVENOUS at 11:53

## 2020-05-05 RX ADMIN — DILTIAZEM HYDROCHLORIDE 10 MG: 5 INJECTION INTRAVENOUS at 11:14

## 2020-05-05 RX ADMIN — DOXYCYCLINE 100 MG: 100 TABLET, FILM COATED ORAL at 19:48

## 2020-05-05 RX ADMIN — PROPAFENONE HYDROCHLORIDE 150 MG: 150 TABLET, FILM COATED ORAL at 21:28

## 2020-05-05 RX ADMIN — PROPAFENONE HYDROCHLORIDE 150 MG: 150 TABLET, FILM COATED ORAL at 17:11

## 2020-05-05 RX ADMIN — CILOSTAZOL 100 MG: 100 TABLET ORAL at 19:47

## 2020-05-05 RX ADMIN — DILTIAZEM HYDROCHLORIDE 5 MG/HR: 5 INJECTION INTRAVENOUS at 11:14

## 2020-05-05 NOTE — PLAN OF CARE
Problem: Patient Care Overview  Goal: Plan of Care Review  Outcome: Ongoing (interventions implemented as appropriate)  Flowsheets  Taken 5/5/2020 2584  Progress: no change  Taken 5/5/2020 1600  Plan of Care Reviewed With: patient  Goal: Individualization and Mutuality  Outcome: Ongoing (interventions implemented as appropriate)  Goal: Discharge Needs Assessment  Outcome: Ongoing (interventions implemented as appropriate)  Goal: Interprofessional Rounds/Family Conf  Outcome: Ongoing (interventions implemented as appropriate)     Problem: Arrhythmia/Dysrhythmia (Symptomatic) (Adult)  Goal: Signs and Symptoms of Listed Potential Problems Will be Absent, Minimized or Managed (Arrhythmia/Dysrhythmia)  Outcome: Ongoing (interventions implemented as appropriate)  Flowsheets (Taken 5/5/2020 7100)  Problems Assessed (Arrhythmia/Dysrhythmia): all  Problems Present (Dysrhythmia): electrophysiologic conduction defect     Problem: Fall Risk (Adult)  Goal: Identify Related Risk Factors and Signs and Symptoms  Outcome: Ongoing (interventions implemented as appropriate)  Flowsheets (Taken 5/5/2020 8530)  Related Risk Factors (Fall Risk): age-related changes; gait/mobility problems; environment unfamiliar  Signs and Symptoms (Fall Risk): presence of risk factors  Goal: Absence of Fall  Outcome: Ongoing (interventions implemented as appropriate)  Flowsheets (Taken 5/5/2020 7023)  Absence of Fall: making progress toward outcome

## 2020-05-05 NOTE — H&P
"    [facility] Medicine Services  HISTORY AND PHYSICAL    Date of Admission: 5/5/2020  Primary Care Physician: BARRY Urrutia MD    Subjective     Chief Complaint: \"I passed out.\"    History of Present Illness  Mr. Lerma is a 74 year old gentleman with a history of difficult to control atrial fibrillation.  He takes Rhythmol and is chronically anticoagulated with Xarelto.  He presents today after having passed out.  In the ER he is noted to be in atrial flutter with a rate in the 130's.  He says he was told his heart rate was low.  I discussed this with Dr. Dennis in the ED who states the patient has never been below 120.  He presently denies CP or SOA.          Review of Systems   Constitutional: Negative for fatigue and fever.   HENT: Negative for congestion and ear pain.    Eyes: Negative for redness and visual disturbance.   Respiratory: Negative for cough, shortness of breath and wheezing.    Cardiovascular: Negative for chest pain and palpitations.   Gastrointestinal: Negative for abdominal pain, diarrhea, nausea and vomiting.   Endocrine: Negative for cold intolerance and heat intolerance.   Genitourinary: Negative for dysuria and frequency.   Musculoskeletal: Negative for arthralgias and back pain.   Skin: Negative for rash and wound.   Neurological: Positive for syncope. Negative for dizziness and headaches.   Psychiatric/Behavioral: Negative for confusion. The patient is not nervous/anxious.           Otherwise complete ROS reviewed and negative except as mentioned in the HPI.    Past Medical History:   Past Medical History:   Diagnosis Date   • Alcohol abuse    • Arthritis    • Atrial flutter (CMS/HCC)    • Circulation problem    • History of tobacco abuse    • Hypertension    • PVD (peripheral vascular disease) (CMS/HCC)      Past Surgical History:  Past Surgical History:   Procedure Laterality Date   • ADENOIDECTOMY       Social History:  reports that he has been " "smoking cigarettes and cigars. He has been smoking about 0.50 packs per day. He has never used smokeless tobacco. He reports that he drinks about 10.0 standard drinks of alcohol per week. He reports that he does not use drugs.    Family History: family history includes Cancer in his father; Dementia in his mother; Pancreatic cancer in his father.         Allergies:  No Known Allergies  Medications:  Prior to Admission medications    Medication Sig Start Date End Date Taking? Authorizing Provider   cilostazol (PLETAL) 100 MG tablet Take 100 mg by mouth 2 (Two) Times a Day.    ProviderJose MD   guaiFENesin (MUCINEX) 600 MG 12 hr tablet Take 2 tablets by mouth Every 12 (Twelve) Hours. 2/12/20   Soham Parker APRN   hydrOXYzine (ATARAX) 25 MG tablet Take 1 tablet by mouth 3 (Three) Times a Day As Needed for Anxiety. 4/16/20   BARRY Urrutia MD   lisinopril (PRINIVIL,ZESTRIL) 20 MG tablet Take 1 tablet by mouth Daily. 3/10/20   BARRY Urrutia MD   propafenone (RYTHMOL) 150 MG tablet Take 1 tablet by mouth Every 8 (Eight) Hours. 4/3/20   BARRY Urrutia MD   rivaroxaban (XARELTO) 20 MG tablet Take 1 tablet by mouth Daily With Dinner. Indications: Atrial Fibrillation 4/3/20   BARRY Urrutia MD   sildenafil (VIAGRA) 50 MG tablet Take 50 mg by mouth Daily As Needed for Erectile Dysfunction (Sexual Activity).    Provider, MD Jose     Objective     Vital Signs: /88   Pulse (!) 131   Temp 98 °F (36.7 °C)   Resp 15   Ht 172.7 cm (68\")   Wt 74.8 kg (165 lb)   SpO2 95%   BMI 25.09 kg/m²   Physical Exam   Constitutional: He is oriented to person, place, and time. He appears well-developed and well-nourished.   HENT:   Head: Normocephalic and atraumatic.   Right Ear: External ear normal.   Left Ear: External ear normal.   Nose: Nose normal.   Mouth/Throat: Oropharynx is clear and moist.   Eyes: Pupils are equal, round, and reactive to light. Conjunctivae and EOM are normal. Right eye " exhibits no discharge. Left eye exhibits no discharge. No scleral icterus.   Neck: Normal range of motion. Neck supple. No tracheal deviation present. No thyromegaly present.   Cardiovascular: Normal heart sounds and intact distal pulses. A regularly irregular rhythm present. Tachycardia present. Exam reveals no gallop and no friction rub.   No murmur heard.  Pulmonary/Chest: Effort normal and breath sounds normal. No stridor. No respiratory distress. He has no wheezes. He has no rales. He exhibits no tenderness.   Abdominal: Soft. Bowel sounds are normal. He exhibits no distension and no mass. There is no tenderness. There is no rebound and no guarding. No hernia.   Musculoskeletal: Normal range of motion. He exhibits no edema or deformity.   Lymphadenopathy:     He has no cervical adenopathy.   Neurological: He is alert and oriented to person, place, and time. He has normal reflexes. He displays normal reflexes. No cranial nerve deficit. He exhibits normal muscle tone. Coordination normal.   Skin: Skin is warm and dry. No rash noted. No erythema. No pallor.   Psychiatric: He has a normal mood and affect. His behavior is normal. Judgment and thought content normal.   Vitals reviewed.          Results Reviewed:  Lab Results (last 24 hours)     Procedure Component Value Units Date/Time    Troponin [[id number]]  (Normal) Collected:  05/05/20 1413    Specimen:  Blood Updated:  05/05/20 1435     Troponin T <0.010 ng/mL     Narrative:       Troponin T Reference Range:  <= 0.03 ng/mL-   Negative for AMI  >0.03 ng/mL-     Abnormal for myocardial necrosis.  Clinicians would have to utilize clinical acumen, EKG, Troponin and serial changes to determine if it is an Acute Myocardial Infarction or myocardial injury due to an underlying chronic condition.       Results may be falsely decreased if patient taking Biotin.      [facility] Draw [[id number]] Collected:  05/05/20 1104    Specimen:  Blood Updated:  05/05/20 1216     Narrative:       The following orders were created for panel order [facility] Draw.  Procedure                               Abnormality         Status                     ---------                               -----------         ------                     Light Blue Top[[id number]]                                   Final result               Green Top (Gel)[[id number]]                                  Final result               Lavender Top[[id number]]                                     Final result               Red Top[[id number]]                                          Final result                 Please view results for these tests on the individual orders.    Red Top [[id number]] Collected:  05/05/20 1104    Specimen:  Blood Updated:  05/05/20 1216     Extra Tube Hold for add-ons.     Comment: Auto resulted.       Light Blue Top [[id number]] Collected:  05/05/20 1104    Specimen:  Blood Updated:  05/05/20 1216     Extra Tube hold for add-on     Comment: Auto resulted       Green Top (Gel) [[id number]] Collected:  05/05/20 1104    Specimen:  Blood Updated:  05/05/20 1216     Extra Tube Hold for add-ons.     Comment: Auto resulted.       Lavender Top [[id number]] Collected:  05/05/20 1104    Specimen:  Blood Updated:  05/05/20 1216     Extra Tube hold for add-on     Comment: Auto resulted       Comprehensive Metabolic Panel [[id number]]  (Abnormal) Collected:  05/05/20 1104    Specimen:  Blood Updated:  05/05/20 1145     Glucose 119 mg/dL      BUN 15 mg/dL      Creatinine 1.09 mg/dL      Sodium 141 mmol/L      Potassium 3.8 mmol/L      Chloride 104 mmol/L      CO2 23.0 mmol/L      Calcium 9.4 mg/dL      Total Protein 6.5 g/dL      Albumin 3.90 g/dL      ALT (SGPT) 24 U/L      AST (SGOT) 24 U/L      Alkaline Phosphatase 101 U/L      Total Bilirubin 0.8 mg/dL      eGFR Non African Amer 66 mL/min/1.73      Globulin 2.6 gm/dL      A/G Ratio 1.5 g/dL      BUN/Creatinine Ratio 13.8     Anion Gap 14.0 mmol/L     Narrative:        GFR Normal >60  Chronic Kidney Disease <60  Kidney Failure <15      Troponin [[id number]]  (Normal) Collected:  05/05/20 1104    Specimen:  Blood Updated:  05/05/20 1142     Troponin T <0.010 ng/mL     Narrative:       Troponin T Reference Range:  <= 0.03 ng/mL-   Negative for AMI  >0.03 ng/mL-     Abnormal for myocardial necrosis.  Clinicians would have to utilize clinical acumen, EKG, Troponin and serial changes to determine if it is an Acute Myocardial Infarction or myocardial injury due to an underlying chronic condition.       Results may be falsely decreased if patient taking Biotin.      CBC & Differential [[id number]] Collected:  05/05/20 1104    Specimen:  Blood Updated:  05/05/20 1125    Narrative:       The following orders were created for panel order CBC & Differential.  Procedure                               Abnormality         Status                     ---------                               -----------         ------                     CBC Auto Differential[[id number]]        Abnormal            Final result                 Please view results for these tests on the individual orders.    CBC Auto Differential [[id number]]  (Abnormal) Collected:  05/05/20 1104    Specimen:  Blood Updated:  05/05/20 1125     WBC 8.96 10*3/mm3      RBC 4.67 10*6/mm3      Hemoglobin 15.2 g/dL      Hematocrit 45.8 %      MCV 98.1 fL      MCH 32.5 pg      MCHC 33.2 g/dL      RDW 14.0 %      RDW-SD 50.8 fl      MPV 9.0 fL      Platelets 210 10*3/mm3      Neutrophil % 81.1 %      Lymphocyte % 7.1 %      Monocyte % 10.6 %      Eosinophil % 0.6 %      Basophil % 0.3 %      Immature Grans % 0.3 %      Neutrophils, Absolute 7.26 10*3/mm3      Lymphocytes, Absolute 0.64 10*3/mm3      Monocytes, Absolute 0.95 10*3/mm3      Eosinophils, Absolute 0.05 10*3/mm3      Basophils, Absolute 0.03 10*3/mm3      Immature Grans, Absolute 0.03 10*3/mm3      nRBC 0.0 /100 WBC         Imaging Results (Last 24 Hours)     Procedure Component  Value Units Date/Time    CT Cervical Spine Without Contrast [[id number]] Collected:  05/05/20 1415     Updated:  05/05/20 1421    Narrative:       CT CERVICAL SPINE WO CONTRAST- 5/5/2020 1:44 PM CDT     HISTORY: C-spine trauma, NEXUS/CCR positive, low risk      COMPARISON: None     DOSE LENGTH PRODUCT: 219 mGy cm. Automated exposure control was also  utilized to decrease patient radiation dose.     TECHNIQUE: Axial images of cervical spine obtained with sagittal coronal  reconstructions.     FINDINGS: Minimal retrolisthesis of C4 in reference to C3 measuring 2  mm. There is moderate to advanced degenerative disc change at C5/C6 and  C6/C7. There is uncinate process hypertrophy as well as moderate facet  osteoarthropathy. No acute cervical vertebral fracture identified.  Moderate cervical canal stenosis at C5/C6 and C6/C7. Scattered bilateral  neural foramen narrowing from the hypertrophic degenerative change most  notable at C5/C6 and C6/C7 as well as on the right at the C3/C4 level.     Diffuse vascular calcification involving the carotid and vertebral  arteries.       Impression:       1. Moderate to advanced degenerative changes of the cervical spine with  mild retrolisthesis of C4 likely due to right facet osteoarthropathy. No  acute cervical vertebral fracture. Moderate cervical canal stenosis and  scattered neural foramen narrowing as described above.        This report was finalized on 05/05/2020 14:18 by Dr. Tomeka Harvey MD.    CT Head Without Contrast [[id number]] Collected:  05/05/20 1411     Updated:  05/05/20 1418    Narrative:       CT HEAD WO CONTRAST- 5/5/2020 1:44 PM CDT     HISTORY: Head trauma, headache      COMPARISON: None     DOSE LENGTH PRODUCT: 692 mGy cm. Automated exposure control was also  utilized to decrease patient radiation dose.     TECHNIQUE: Axial images of brain obtained without contrast. Motion  artifact present superiorly.     FINDINGS:  Mild soft tissue swelling/hematoma of the  right  frontoparietal region suspected. No intracranial hemorrhage or mass  effect. Mild cerebral volume loss. No convincing acute signs of  ischemia. No extra-axial hematoma or subarachnoid hemorrhage identified.     No air-fluid levels in the visible paranasal sinuses. The mastoid air  cells are well-aerated. No depressed skull fracture identified.       Impression:       1. Study degraded by patient motion artifact. Soft tissue hematoma  suspected of the right frontoparietal region with no convincing  depressed skull fracture. No intracranial hemorrhage.  This report was finalized on 05/05/2020 14:15 by Dr. Tomeka Harvey MD.    XR Chest 1 View [[id number]] Collected:  05/05/20 1123     Updated:  05/05/20 1126    Narrative:       EXAMINATION:  XR CHEST 1 VW-  5/5/2020 11:02 AM CDT     HISTORY: Chest Pain protocol     COMPARISON: 3/6/2020.     FINDINGS:  The lungs are expanded bilaterally and clear. The pleural  spaces are clear. Heart size is normal. There are degenerative changes  of the shoulders and spine. No acute bony abnormality is seen.       Impression:       No active disease is seen.        This report was finalized on 05/05/2020 11:23 by Dr. Michael Pond MD.        I have personally reviewed and interpreted the radiology studies and ECG obtained at time of admission.     Assessment / Plan     Assessment:   Active Hospital Problems    Diagnosis   • Atrial fibrillation with RVR (CMS/HCC)     1.  Afib/flutter  -Propafenone  -Cardizem Drip  -IV Digoxin  -Consult Cardiology  -Xarelto    2.   Syncope  -likely due to fast arrhytmia  -Has had recent echo with no valvular dysfunction  -will check orthostatics    3.  PVD  -Pletal  -Xarelto    4.  HTN  -lisinopril           Code Status: full     I discussed the patient's findings and my recommendations with the patient    Estimated length of stay 1-2 days    Patient seen and examined by me on 5/5/2020 at     Marco A Katz MD   05/05/20   15:13

## 2020-05-05 NOTE — ED PROVIDER NOTES
Subjective   Patient has got a history of A. fib was recently admitted cardioverted with no success placed on Rythmol discharged home has been on Xarelto today was sitting down had episode of dizziness and had syncope no loss of consciousness no fall no trauma something about the ER by EMS.      Syncope   Episode history:  Single  Most recent episode:  Today  Progression:  Resolved  Chronicity:  New  Context: sitting down    Context: not blood draw, not dehydration, not exertion, not medication change, not with normal activity, not standing up and not urination    Relieved by:  Nothing  Worsened by:  Nothing  Ineffective treatments:  None tried  Associated symptoms: weakness    Associated symptoms: no anxiety, no chest pain, no confusion, no difficulty breathing, no dizziness, no headaches, no malaise/fatigue, no nausea, no palpitations, no recent fall, no seizures, no shortness of breath, no visual change and no vomiting    Risk factors: no congenital heart disease        Review of Systems   Constitutional: Negative for malaise/fatigue.   HENT: Negative.    Respiratory: Negative for shortness of breath.    Cardiovascular: Positive for syncope. Negative for chest pain and palpitations.   Gastrointestinal: Negative.  Negative for abdominal distention, abdominal pain, nausea and vomiting.   Endocrine: Negative.    Genitourinary: Negative.    Musculoskeletal: Negative.  Negative for back pain and neck pain.   Skin: Negative for color change and pallor.   Neurological: Positive for weakness. Negative for dizziness, seizures, syncope, light-headedness, numbness and headaches.   Hematological: Negative.  Does not bruise/bleed easily.   Psychiatric/Behavioral: Negative for confusion.   All other systems reviewed and are negative.      Past Medical History:   Diagnosis Date   • Alcohol abuse    • Arthritis    • Atrial flutter (CMS/HCC)    • Circulation problem    • History of tobacco abuse    • Hypertension    • PVD  (peripheral vascular disease) (CMS/HCC)        No Known Allergies    Past Surgical History:   Procedure Laterality Date   • ADENOIDECTOMY         Family History   Problem Relation Age of Onset   • Dementia Mother    • Cancer Father    • Pancreatic cancer Father        Social History     Socioeconomic History   • Marital status:      Spouse name: Not on file   • Number of children: Not on file   • Years of education: Not on file   • Highest education level: Not on file   Tobacco Use   • Smoking status: Current Every Day Smoker     Packs/day: 0.50     Types: Cigarettes, Cigars   • Smokeless tobacco: Never Used   Substance and Sexual Activity   • Alcohol use: Yes     Alcohol/week: 10.0 standard drinks     Types: 9 Shots of liquor, 1 Cans of beer per week     Frequency: Never   • Drug use: Never   • Sexual activity: Defer           Objective   Physical Exam   Constitutional: He is oriented to person, place, and time. He appears well-developed.  Non-toxic appearance.   HENT:   Head: Normocephalic and atraumatic.   Mouth/Throat: Uvula is midline and mucous membranes are normal.   Eyes: Pupils are equal, round, and reactive to light. Conjunctivae and lids are normal. Lids are everted and swept, no foreign bodies found.   Neck: Trachea normal, normal range of motion, full passive range of motion without pain and phonation normal. Neck supple. Normal carotid pulses and no JVD present. Carotid bruit is not present. No neck rigidity. No tracheal deviation present.   Cardiovascular: Normal rate, normal heart sounds, intact distal pulses and normal pulses. An irregularly irregular rhythm present. PMI is not displaced.   Pulmonary/Chest: Effort normal and breath sounds normal. No accessory muscle usage or stridor. No apnea and no tachypnea. He has no decreased breath sounds. He has no wheezes. He has no rhonchi. He has no rales.   Abdominal: Soft. Normal appearance, normal aorta and bowel sounds are normal. There is no  hepatosplenomegaly. There is no tenderness.   Musculoskeletal: Normal range of motion.   Lower extremity exam bilaterally is unremarkable.  There is no right or left calf tenderness .  There is no palpable venous cord.  No obvious difference in the size of the legs.  No pitting edema.  The dorsalis pedis and posterior tibial femoral and popliteal pulses are palpable and +2 bilaterally.  Homans sign is negative   Neurological: He is alert and oriented to person, place, and time. He has normal strength and normal reflexes. He displays normal reflexes. No cranial nerve deficit or sensory deficit. Gait normal. GCS eye subscore is 4. GCS verbal subscore is 5. GCS motor subscore is 6.   Scalp hematoma no cervical spine tenderness   Skin: Skin is warm, dry and intact. No cyanosis. No pallor. Nails show no clubbing.   Psychiatric: He has a normal mood and affect. His speech is normal and behavior is normal.   Nursing note and vitals reviewed.      Procedures           ED Course  ED Course as of May 05 1458   Tue May 05, 2020   1248 Patient on examination has got a scalp hematoma he fell and is telling us that he hit his head keeping consideration that he is on Xarelto he have to be scanned    [TS]   1425 Moderate to advanced degenerative changes of the cervical spine with  mild retrolisthesis of C4 likely due to right facet osteoarthropathy. No  acute cervical vertebral fracture. Moderate cervical canal stenosis and  scattered neural foramen narrowing as described above.  Discussed with the patient    [TS]   1425 1. Study degraded by patient motion artifact. Soft tissue hematoma  suspected of the right frontoparietal region with no convincing  depressed skull fracture. No intracranial hemorrhage.  This report was finalized on 05/05/2020 14:15 by Dr. Tomeka Harvey MD.    [TS]   1425 CT findings were discussed with the patient    [TS]   1443 Case discussed at length with the patient his CTs are negative is got some spinal  stenosis a moderate intensity no neurological deficit no weakness no paresthesias.  His heart rate still elevated waiting on cardiology call me back hospitalist will admit the patient hospital    [TS]   1457 Discussed with Dr. Norris will prescribe dig per his recommendation    [TS]      ED Course User Index  [TS] Randy Preston MD                                           MDM  Number of Diagnoses or Management Options  Diagnosis management comments: Palpitation with A. fib a flutter       Amount and/or Complexity of Data Reviewed  Clinical lab tests: ordered and reviewed  Tests in the radiology section of CPT®: ordered  Tests in the medicine section of CPT®: reviewed and ordered    Risk of Complications, Morbidity, and/or Mortality  Presenting problems: moderate  Diagnostic procedures: moderate  Management options: moderate        Final diagnoses:   Atrial fibrillation with RVR (CMS/HCC)   Typical atrial flutter (CMS/HCC)   Syncope and collapse   Hematoma of scalp, initial encounter   Spinal stenosis of cervical region            Randy Preston MD  05/05/20 1441       Randy Preston MD  05/05/20 [id number]

## 2020-05-05 NOTE — CONSULTS
Inpatient Cardiology Consult  Consult performed by: Hernandez Norris MD  Consult ordered by: Marco A Katz MD  Reason for consult: Atrial flutter        Chief Complaint   Patient presents with   • Rapid Heart Rate   • Syncope     HPI: This a 74-year-old male with a history of paroxysmal atrial flutter as well as atrial fibrillation, chronically anticoagulated, with a history of hypertension, tobacco abuse, alcohol abuse, presenting after a syncopal episode today.  The patient says that he was having some type of heartburn earlier today, which is not unusual for him.  However, he sat down on a stool at his house and felt lightheaded and dizzy and simply lost consciousness for a few seconds.  He says that he awoke with a bump on his head.  He notes no chest discomfort prior to the event, no shortness of breath.  He says that he has been compliant with all of his medications, including Rythmol as well as Xarelto.  He is taking Xarelto as prescribed.  The patient presented to the emergency department for further evaluation.  His CT scan of his head showed no intracranial hemorrhage but did show an extracranial hematoma.  The patient was also found to be in atrial flutter with elevated heart rates.  Patient denies any feelings of palpitations.    Records indicate this patient was hospitalized in February at which time he was found to have atrial flutter which spontaneously converted back to sinus rhythm and then presented once again here in March with atrial flutter.  There was an attempt at cardioversion in the emergency department however this did not restore sinus rhythm, therefore the patient was admitted and placed on an amiodarone infusion as well as Cardizem.  Ultimately, the patient converted back into sinus rhythm and was discharged home on Rythmol as well as therapeutic anticoagulation with Xarelto.  He then followed up with Dr. Urrutia and was placed in a cardiac monitor.  The results of this  are referenced below but this did show the patient was in both atrial fibrillation and flutter, by the appearance for the majority of the time that he was in the monitor.  Overall average heart rate at that time was 113 bpm.  Minimum heart rate only 70 bpm.    Of note, the patient does drink alcohol.  He is somewhat vague in how much he drinks but he admits to drinking a couple glasses of wine per day at least.    Past Medical History:   Diagnosis Date   • Alcohol abuse    • Arthritis    • Atrial flutter (CMS/HCC)    • Circulation problem    • History of tobacco abuse    • Hypertension    • PVD (peripheral vascular disease) (CMS/HCC)      Past Surgical History:   Procedure Laterality Date   • ADENOIDECTOMY       No current facility-administered medications on file prior to encounter.      Current Outpatient Medications on File Prior to Encounter   Medication Sig Dispense Refill   • cilostazol (PLETAL) 100 MG tablet Take 100 mg by mouth 2 (Two) Times a Day.     • guaiFENesin (MUCINEX) 600 MG 12 hr tablet Take 2 tablets by mouth Every 12 (Twelve) Hours.     • hydrOXYzine (ATARAX) 25 MG tablet Take 1 tablet by mouth 3 (Three) Times a Day As Needed for Anxiety. 60 tablet 5   • lisinopril (PRINIVIL,ZESTRIL) 20 MG tablet Take 1 tablet by mouth Daily. 30 tablet 2   • propafenone (RYTHMOL) 150 MG tablet Take 1 tablet by mouth Every 8 (Eight) Hours. 45 tablet 0   • rivaroxaban (XARELTO) 20 MG tablet Take 1 tablet by mouth Daily With Dinner. Indications: Atrial Fibrillation 30 tablet 0   • sildenafil (VIAGRA) 50 MG tablet Take 50 mg by mouth Daily As Needed for Erectile Dysfunction (Sexual Activity).       No Known Allergies    Social History     Tobacco Use   • Smoking status: Current Every Day Smoker     Packs/day: 0.50     Types: Cigarettes, Cigars   • Smokeless tobacco: Never Used   Substance Use Topics   • Alcohol use: Yes     Alcohol/week: 10.0 standard drinks     Types: 9 Shots of liquor, 1 Cans of beer per week      "Frequency: Never     Family History   Problem Relation Age of Onset   • Dementia Mother    • Cancer Father    • Pancreatic cancer Father      Review of Systems   Constitution: Positive for malaise/fatigue. Negative for chills, fever, night sweats and weight loss.   HENT: Negative for congestion and hearing loss.    Eyes: Negative for blurred vision and pain.   Cardiovascular: Positive for leg swelling and syncope. Negative for chest pain, claudication, dyspnea on exertion, orthopnea, palpitations and paroxysmal nocturnal dyspnea.   Respiratory: Negative for cough, hemoptysis, shortness of breath and wheezing.    Endocrine: Negative for cold intolerance and heat intolerance.   Hematologic/Lymphatic: Negative for adenopathy and bleeding problem. Does not bruise/bleed easily.   Skin: Negative for color change, poor wound healing and rash.   Musculoskeletal: Positive for arthritis. Negative for joint swelling, myalgias and neck pain.   Gastrointestinal: Negative for abdominal pain, change in bowel habit, constipation, diarrhea, heartburn, hematochezia, melena, nausea and vomiting.   Genitourinary: Negative for dysuria, frequency, hematuria and nocturia.   Neurological: Positive for dizziness, light-headedness and weakness. Negative for focal weakness, headaches, loss of balance and numbness.   Psychiatric/Behavioral: Negative for altered mental status, memory loss and substance abuse.   Allergic/Immunologic: Negative for hives and persistent infections.     Physical Exam:  /92   Pulse 113   Temp 98 °F (36.7 °C)   Resp 15   Ht 172.7 cm (68\")   Wt 74.8 kg (165 lb)   SpO2 99%   BMI 25.09 kg/m²   Temp:  [98 °F (36.7 °C)] 98 °F (36.7 °C)  Heart Rate:  [113-131] 113  Resp:  [15] 15  BP: (115-163)/(64-99) 135/92    Physical Exam   Constitutional: He is oriented to person, place, and time. He appears well-developed and well-nourished. He is cooperative.  Non-toxic appearance. No distress.   HENT:   Head: " Normocephalic and atraumatic.   Right Ear: External ear normal.   Left Ear: External ear normal.   Nose: Nose normal.   Mouth/Throat: Uvula is midline, oropharynx is clear and moist and mucous membranes are normal. Mucous membranes are not pale, not dry and not cyanotic. No oropharyngeal exudate.   Eyes: Pupils are equal, round, and reactive to light. EOM and lids are normal.   Neck: Normal range of motion. Neck supple. No hepatojugular reflux and no JVD present. Carotid bruit is not present. No tracheal deviation and no edema present. No thyroid mass and no thyromegaly present.   Cardiovascular: S1 normal, S2 normal, normal heart sounds, intact distal pulses and normal pulses. An irregularly irregular rhythm present.  No extrasystoles are present. Tachycardia present. PMI is not displaced. Exam reveals no gallop and no friction rub.   No murmur heard.  Pulses:       Radial pulses are 2+ on the right side, and 2+ on the left side.        Femoral pulses are 2+ on the right side, and 2+ on the left side.       Dorsalis pedis pulses are 2+ on the right side, and 2+ on the left side.        Posterior tibial pulses are 2+ on the right side, and 2+ on the left side.   Pulmonary/Chest: Effort normal. No accessory muscle usage. No respiratory distress. He has decreased breath sounds. He has no wheezes. He has no rhonchi. He has no rales. He exhibits no tenderness.   Abdominal: Soft. Normal appearance and bowel sounds are normal. He exhibits no distension, no abdominal bruit and no pulsatile midline mass. There is no hepatosplenomegaly. There is no tenderness.   Musculoskeletal: Normal range of motion. He exhibits edema. He exhibits no tenderness or deformity.   Lymphadenopathy:     He has no cervical adenopathy.   Neurological: He is oriented to person, place, and time. He has normal strength. No cranial nerve deficit.   Skin: Skin is warm, dry and intact. No rash noted. He is not diaphoretic. No cyanosis or erythema. Nails  show no clubbing.   Psychiatric: He has a normal mood and affect. His speech is normal and behavior is normal. Thought content normal.   Vitals reviewed.    Diagnostic Data:    Lab Results   Component Value Date    WBC 8.96 05/05/2020    HGB 15.2 05/05/2020    HCT 45.8 05/05/2020    MCV 98.1 (H) 05/05/2020     05/05/2020     Lab Results   Component Value Date    GLUCOSE 119 (H) 05/05/2020    CALCIUM 9.4 05/05/2020     05/05/2020    K 3.8 05/05/2020    CO2 23.0 05/05/2020     05/05/2020    BUN 15 05/05/2020    CREATININE 1.09 05/05/2020    EGFRIFNONA 66 05/05/2020    BCR 13.8 05/05/2020    ANIONGAP 14.0 05/05/2020     TSH - WNL    CT head today:  Study degraded by patient motion artifact. Soft tissue hematoma  suspected of the right frontoparietal region with no convincing  depressed skull fracture. No intracranial hemorrhage.    ECG today at 10:40 AM: Atrial flutter with 2:1 AV conduction, ventricular rate 128 bpm  ECG today at 2:08 PM: Atrial flutter with 2:1 AV conduction, ventricular rate 129 bpm  ECG on 3/7/2020 at 9:21 AM: Normal sinus rhythm with sinus arrhythmia, ventricular rate 63, left atrial enlargement, no acute ST changes    I reviewed the report of an echocardiogram from 2/11/2020 showing hyperdynamic left ventricular ejection fraction, no significant mitral valve abnormalities, borderline left atrial and right atrial sizes, hyperdynamic right ventricular function, insufficient TR velocity to estimate the RVSP.    I reviewed the report of an outpatient cardiac monitor from 3/10/2020 showing the predominant rhythm being atrial fibrillation/flutter, with both being observed with an overall 69% burden with average rates of 123 bpm.    ASSESSMENT/PLAN:    1.  Syncope  2.  Paroxysmal atrial flutter, also with history of paroxysmal atrial fibrillation, observed on outpatient cardiac monitor  3.  Alcohol abuse  4.  Essential hypertension  5.  Tobacco abuse  6.  Peripheral vascular  disease    -We are asked to see this patient with atrial flutter.  At this time, he is asymptomatic but his heart rate remains elevated.  He tells me that he has been fully anticoagulated and has not missed any doses.  -I think that it would be reasonable to attempt cardioversion again on this patient, however with his history of alcohol abuse, I think there is at least some concern about his ability to stay in a normal rhythm rhythm, even after restoration of sinus rhythm.  Also, he is known to have paroxysmal atrial fibrillation/flutter.  -Patient is currently on a Cardizem drip.  Recommend to continue this overnight and continue to monitor the patient's heart rate.  -It may be that amiodarone may be a better choice long-term for this patient, especially if he is failing to maintain sinus rhythm on Rythmol.  Patient is unclear if he has been on this as an outpatient before but he was on the drip at least here at some point in time.  -In regards the patient's syncope, I am not certain of exactly why the patient had a syncopal episode today.  Unfortunately, we do not know what his heart rate was at the time.  Recommend to check orthostatic blood pressures.  Obviously, continue cardiac monitoring.  -Continue therapeutic anticoagulation.  -Recommend leave the patient n.p.o. after midnight in case a repeat attempt at cardioversion is to be considered.  -I did discourage any abrupt change in alcohol consumption for this patient.  Certainly, alcohol abuse may lead to atrial arrhythmias but abrupt cessation of alcohol may also lead to difficulty with atrial arrhythmias.  -Thank you for this consultation.  We will continue to follow.

## 2020-05-06 ENCOUNTER — ANESTHESIA EVENT (OUTPATIENT)
Dept: CARDIOLOGY | Facility: HOSPITAL | Age: 75
End: 2020-05-06

## 2020-05-06 ENCOUNTER — ANESTHESIA (OUTPATIENT)
Dept: CARDIOLOGY | Facility: HOSPITAL | Age: 75
End: 2020-05-06

## 2020-05-06 ENCOUNTER — APPOINTMENT (OUTPATIENT)
Dept: CARDIOLOGY | Facility: HOSPITAL | Age: 75
End: 2020-05-06

## 2020-05-06 LAB
ALBUMIN SERPL-MCNC: 3.6 G/DL (ref 3.5–5.2)
ALBUMIN/GLOB SERPL: 1.4 G/DL
ALP SERPL-CCNC: 97 U/L (ref 39–117)
ALT SERPL W P-5'-P-CCNC: 23 U/L (ref 1–41)
ANION GAP SERPL CALCULATED.3IONS-SCNC: 15 MMOL/L (ref 5–15)
AST SERPL-CCNC: 22 U/L (ref 1–40)
BASOPHILS # BLD AUTO: 0.04 10*3/MM3 (ref 0–0.2)
BASOPHILS NFR BLD AUTO: 0.6 % (ref 0–1.5)
BILIRUB SERPL-MCNC: 0.6 MG/DL (ref 0.2–1.2)
BUN BLD-MCNC: 15 MG/DL (ref 8–23)
BUN/CREAT SERPL: 16 (ref 7–25)
CALCIUM SPEC-SCNC: 9.4 MG/DL (ref 8.6–10.5)
CHLORIDE SERPL-SCNC: 107 MMOL/L (ref 98–107)
CO2 SERPL-SCNC: 20 MMOL/L (ref 22–29)
CREAT BLD-MCNC: 0.94 MG/DL (ref 0.76–1.27)
DEPRECATED RDW RBC AUTO: 49.4 FL (ref 37–54)
EOSINOPHIL # BLD AUTO: 0.19 10*3/MM3 (ref 0–0.4)
EOSINOPHIL NFR BLD AUTO: 2.7 % (ref 0.3–6.2)
ERYTHROCYTE [DISTWIDTH] IN BLOOD BY AUTOMATED COUNT: 13.7 % (ref 12.3–15.4)
GFR SERPL CREATININE-BSD FRML MDRD: 79 ML/MIN/1.73
GLOBULIN UR ELPH-MCNC: 2.6 GM/DL
GLUCOSE BLD-MCNC: 88 MG/DL (ref 65–99)
HCT VFR BLD AUTO: 41.7 % (ref 37.5–51)
HGB BLD-MCNC: 14 G/DL (ref 13–17.7)
IMM GRANULOCYTES # BLD AUTO: 0.02 10*3/MM3 (ref 0–0.05)
IMM GRANULOCYTES NFR BLD AUTO: 0.3 % (ref 0–0.5)
LYMPHOCYTES # BLD AUTO: 1.25 10*3/MM3 (ref 0.7–3.1)
LYMPHOCYTES NFR BLD AUTO: 18 % (ref 19.6–45.3)
MCH RBC QN AUTO: 32.6 PG (ref 26.6–33)
MCHC RBC AUTO-ENTMCNC: 33.6 G/DL (ref 31.5–35.7)
MCV RBC AUTO: 97 FL (ref 79–97)
MONOCYTES # BLD AUTO: 0.91 10*3/MM3 (ref 0.1–0.9)
MONOCYTES NFR BLD AUTO: 13.1 % (ref 5–12)
NEUTROPHILS # BLD AUTO: 4.53 10*3/MM3 (ref 1.7–7)
NEUTROPHILS NFR BLD AUTO: 65.3 % (ref 42.7–76)
NRBC BLD AUTO-RTO: 0 /100 WBC (ref 0–0.2)
PLATELET # BLD AUTO: 200 10*3/MM3 (ref 140–450)
PMV BLD AUTO: 9.4 FL (ref 6–12)
POTASSIUM BLD-SCNC: 3.9 MMOL/L (ref 3.5–5.2)
PROT SERPL-MCNC: 6.2 G/DL (ref 6–8.5)
RBC # BLD AUTO: 4.3 10*6/MM3 (ref 4.14–5.8)
SODIUM BLD-SCNC: 142 MMOL/L (ref 136–145)
WBC NRBC COR # BLD: 6.94 10*3/MM3 (ref 3.4–10.8)

## 2020-05-06 PROCEDURE — 25010000002 PROPOFOL 10 MG/ML EMULSION: Performed by: NURSE ANESTHETIST, CERTIFIED REGISTERED

## 2020-05-06 PROCEDURE — 92960 CARDIOVERSION ELECTRIC EXT: CPT

## 2020-05-06 PROCEDURE — 93010 ELECTROCARDIOGRAM REPORT: CPT | Performed by: INTERNAL MEDICINE

## 2020-05-06 PROCEDURE — 92960 CARDIOVERSION ELECTRIC EXT: CPT | Performed by: INTERNAL MEDICINE

## 2020-05-06 PROCEDURE — 93005 ELECTROCARDIOGRAM TRACING: CPT | Performed by: INTERNAL MEDICINE

## 2020-05-06 PROCEDURE — 5A2204Z RESTORATION OF CARDIAC RHYTHM, SINGLE: ICD-10-PCS | Performed by: INTERNAL MEDICINE

## 2020-05-06 PROCEDURE — 85025 COMPLETE CBC W/AUTO DIFF WBC: CPT | Performed by: FAMILY MEDICINE

## 2020-05-06 RX ORDER — SODIUM CHLORIDE 9 MG/ML
INJECTION, SOLUTION INTRAVENOUS CONTINUOUS PRN
Status: DISCONTINUED | OUTPATIENT
Start: 2020-05-06 | End: 2020-05-06 | Stop reason: SURG

## 2020-05-06 RX ORDER — OXYCODONE HYDROCHLORIDE AND ACETAMINOPHEN 5; 325 MG/1; MG/1
1 TABLET ORAL ONCE
Status: COMPLETED | OUTPATIENT
Start: 2020-05-06 | End: 2020-05-06

## 2020-05-06 RX ORDER — PROPOFOL 10 MG/ML
VIAL (ML) INTRAVENOUS AS NEEDED
Status: DISCONTINUED | OUTPATIENT
Start: 2020-05-06 | End: 2020-05-06 | Stop reason: SURG

## 2020-05-06 RX ADMIN — DILTIAZEM HYDROCHLORIDE 10 MG/HR: 5 INJECTION INTRAVENOUS at 05:44

## 2020-05-06 RX ADMIN — PROPAFENONE HYDROCHLORIDE 150 MG: 150 TABLET, FILM COATED ORAL at 15:41

## 2020-05-06 RX ADMIN — CILOSTAZOL 100 MG: 100 TABLET ORAL at 08:57

## 2020-05-06 RX ADMIN — GUAIFENESIN 1200 MG: 600 TABLET, EXTENDED RELEASE ORAL at 20:13

## 2020-05-06 RX ADMIN — GUAIFENESIN 1200 MG: 600 TABLET, EXTENDED RELEASE ORAL at 08:57

## 2020-05-06 RX ADMIN — OXYCODONE HYDROCHLORIDE AND ACETAMINOPHEN 1 TABLET: 5; 325 TABLET ORAL at 00:09

## 2020-05-06 RX ADMIN — PROPAFENONE HYDROCHLORIDE 150 MG: 150 TABLET, FILM COATED ORAL at 21:22

## 2020-05-06 RX ADMIN — LISINOPRIL 20 MG: 20 TABLET ORAL at 08:56

## 2020-05-06 RX ADMIN — SODIUM CHLORIDE: 9 INJECTION, SOLUTION INTRAVENOUS at 14:29

## 2020-05-06 RX ADMIN — RIVAROXABAN 20 MG: 20 TABLET, FILM COATED ORAL at 17:24

## 2020-05-06 RX ADMIN — PANTOPRAZOLE SODIUM 20 MG: 20 TABLET, DELAYED RELEASE ORAL at 05:12

## 2020-05-06 RX ADMIN — CILOSTAZOL 100 MG: 100 TABLET ORAL at 20:14

## 2020-05-06 RX ADMIN — PROPOFOL 100 MG: 10 INJECTION, EMULSION INTRAVENOUS at 14:31

## 2020-05-06 RX ADMIN — DOXYCYCLINE 100 MG: 100 TABLET, FILM COATED ORAL at 20:14

## 2020-05-06 RX ADMIN — LIDOCAINE HYDROCHLORIDE 100 MG: 20 INJECTION, SOLUTION INTRAVENOUS at 14:31

## 2020-05-06 RX ADMIN — PROPAFENONE HYDROCHLORIDE 150 MG: 150 TABLET, FILM COATED ORAL at 05:12

## 2020-05-06 RX ADMIN — DOXYCYCLINE 100 MG: 100 TABLET, FILM COATED ORAL at 08:57

## 2020-05-06 NOTE — PLAN OF CARE
Problem: Patient Care Overview  Goal: Plan of Care Review  Outcome: Ongoing (interventions implemented as appropriate)  Flowsheets (Taken 5/6/2020 0917)  Progress: no change  Plan of Care Reviewed With: patient  Outcome Summary: Pt identified at nutrition risk r/t PI to L heel. He is currently NPO at this time for possible cardiac testing. Pt reports to this RD on the phone that his appetite is good and he is hungry right now. He reports he has had weight loss of ~20# in the last 3 months. He says his appetite has been fine, he just hasn't been eating as much. He can't pinpoint to this RD a reason for a decrease in intake. He says that he has been dealing with some anxiety and depression. He did eat 75% of his dinner meal last night. Advised pt of alternate selections if needed. Will follow to establish po intake and determine if pt may need nutrition supplementation. Will cont to follow and encourage intake.

## 2020-05-06 NOTE — PLAN OF CARE
Problem: Patient Care Overview  Goal: Plan of Care Review  Outcome: Ongoing (interventions implemented as appropriate)  Flowsheets (Taken 5/6/2020 1982)  Progress: improving  Plan of Care Reviewed With: patient  Outcome Summary: Patient had his cardioversion this early afternoon and was successful per Dr. Farias.  He is now in sinus rhythm in the 80s per tele. VSS. Card drip off. Left heel wound drsg changed. Consulted Wound RN and waiting for them to see pt for eval. Continue to monitor pt.

## 2020-05-06 NOTE — ANESTHESIA POSTPROCEDURE EVALUATION
Patient: Kingsley Lerma    Procedure Summary     Date:  05/06/20 Room / Location:  [facility] CATH LAB    Anesthesia Start:  1429 Anesthesia Stop:  1441    Procedure:  CARDIOVERSION EXTERNAL IN CARDIOLOGY DEPARTMENT Diagnosis:  (atrial flutter)    Scheduled Providers:   Provider:  Landen Santoro CRNA    Anesthesia Type:  MAC ASA Status:  3          Anesthesia Type: MAC    Vitals  No vitals data found for the desired time range.          Post Anesthesia Care and Evaluation    Patient location during evaluation: PHASE II  Patient participation: complete - patient participated  Level of consciousness: awake  Pain score: 1  Pain management: adequate  Airway patency: patent  Anesthetic complications: No anesthetic complications  PONV Status: none  Cardiovascular status: acceptable  Respiratory status: acceptable  Hydration status: acceptable

## 2020-05-06 NOTE — PROGRESS NOTES
[facility] Medicine Services  INPATIENT PROGRESS NOTE    Patient Name: Kingsley Lerma  Date of Admission: 5/5/2020  Today's Date: 05/06/20  Length of Stay: 1  Primary Care Physician: BARRY Urrutia MD    Subjective   Chief Complaint: follow up a-flutter  HPI   Doing ok.  No CP or SOA.  No palpitations  Cardiology planning cardioversion later today  No further syncope episodes.  No light-headedness or dizziness        Review of Systems   Constitutional: Negative for fatigue and fever.   HENT: Negative for congestion and ear pain.    Eyes: Negative for redness and visual disturbance.   Respiratory: Negative for cough, shortness of breath and wheezing.    Cardiovascular: Negative for chest pain and palpitations.   Gastrointestinal: Negative for abdominal pain, diarrhea, nausea and vomiting.   Endocrine: Negative for cold intolerance and heat intolerance.   Genitourinary: Negative for dysuria and frequency.   Musculoskeletal: Negative for arthralgias and back pain.   Skin: Negative for rash and wound.   Neurological: Negative for dizziness and headaches.   Psychiatric/Behavioral: Negative for confusion. The patient is not nervous/anxious.         All pertinent negatives and positives are as above. All other systems have been reviewed and are negative unless otherwise stated.     Objective    Temp:  [97.9 °F (36.6 °C)-98.6 °F (37 °C)] 98.1 °F (36.7 °C)  Heart Rate:  [] 116  Resp:  [14-16] 14  BP: (104-163)/(50-99) 143/74  Physical Exam   Constitutional: He is oriented to person, place, and time. He appears well-developed and well-nourished.   HENT:   Head: Normocephalic and atraumatic.   Right Ear: External ear normal.   Left Ear: External ear normal.   Nose: Nose normal.   Mouth/Throat: Oropharynx is clear and moist.   Eyes: Pupils are equal, round, and reactive to light. Conjunctivae and EOM are normal. Right eye exhibits no discharge. Left eye exhibits no discharge. No  scleral icterus.   Neck: Normal range of motion. Neck supple. No tracheal deviation present. No thyromegaly present.   Cardiovascular: Normal rate, regular rhythm, normal heart sounds and intact distal pulses. Exam reveals no gallop and no friction rub.   No murmur heard.  Pulmonary/Chest: Effort normal and breath sounds normal. No stridor. No respiratory distress. He has no wheezes. He has no rales. He exhibits no tenderness.   Abdominal: Soft. Bowel sounds are normal. He exhibits no distension and no mass. There is no tenderness. There is no rebound and no guarding. No hernia.   Musculoskeletal: Normal range of motion. He exhibits no edema or deformity.   Lymphadenopathy:     He has no cervical adenopathy.   Neurological: He is alert and oriented to person, place, and time. He has normal reflexes. He displays normal reflexes. No cranial nerve deficit. He exhibits normal muscle tone. Coordination normal.   Skin: Skin is warm and dry. No rash noted. No erythema. No pallor.   Psychiatric: He has a normal mood and affect. His behavior is normal. Judgment and thought content normal.   Vitals reviewed.          Results Review:  I have reviewed the labs, radiology results, and diagnostic studies.    Laboratory Data:   Results from last 7 days   Lab Units 05/06/20  0515 05/05/20  1104   WBC 10*3/mm3 6.94 8.96   HEMOGLOBIN g/dL 14.0 15.2   HEMATOCRIT % 41.7 45.8   PLATELETS 10*3/mm3 200 210        Results from last 7 days   Lab Units 05/05/20  1413 05/05/20  1104   SODIUM mmol/L 142 141   POTASSIUM mmol/L 3.9 3.8   CHLORIDE mmol/L 107 104   CO2 mmol/L 20.0* 23.0   BUN mg/dL 15 15   CREATININE mg/dL 0.94 1.09   CALCIUM mg/dL 9.4 9.4   BILIRUBIN mg/dL 0.6 0.8   ALK PHOS U/L 97 101   ALT (SGPT) U/L 23 24   AST (SGOT) U/L 22 24   GLUCOSE mg/dL 88 119*       Culture Data:   No results found for: BLOODCX, URINECX, WOUNDCX, MRSACX, RESPCX, STOOLCX    Radiology Data:   Imaging Results (Last 24 Hours)     Procedure Component Value  Units Date/Time    CT Cervical Spine Without Contrast [[id number]] Collected:  05/05/20 1415     Updated:  05/05/20 1421    Narrative:       CT CERVICAL SPINE WO CONTRAST- 5/5/2020 1:44 PM CDT     HISTORY: C-spine trauma, NEXUS/CCR positive, low risk      COMPARISON: None     DOSE LENGTH PRODUCT: 219 mGy cm. Automated exposure control was also  utilized to decrease patient radiation dose.     TECHNIQUE: Axial images of cervical spine obtained with sagittal coronal  reconstructions.     FINDINGS: Minimal retrolisthesis of C4 in reference to C3 measuring 2  mm. There is moderate to advanced degenerative disc change at C5/C6 and  C6/C7. There is uncinate process hypertrophy as well as moderate facet  osteoarthropathy. No acute cervical vertebral fracture identified.  Moderate cervical canal stenosis at C5/C6 and C6/C7. Scattered bilateral  neural foramen narrowing from the hypertrophic degenerative change most  notable at C5/C6 and C6/C7 as well as on the right at the C3/C4 level.     Diffuse vascular calcification involving the carotid and vertebral  arteries.       Impression:       1. Moderate to advanced degenerative changes of the cervical spine with  mild retrolisthesis of C4 likely due to right facet osteoarthropathy. No  acute cervical vertebral fracture. Moderate cervical canal stenosis and  scattered neural foramen narrowing as described above.        This report was finalized on 05/05/2020 14:18 by Dr. Tomeka Harvey MD.    CT Head Without Contrast [[id number]] Collected:  05/05/20 1411     Updated:  05/05/20 1418    Narrative:       CT HEAD WO CONTRAST- 5/5/2020 1:44 PM CDT     HISTORY: Head trauma, headache      COMPARISON: None     DOSE LENGTH PRODUCT: 692 mGy cm. Automated exposure control was also  utilized to decrease patient radiation dose.     TECHNIQUE: Axial images of brain obtained without contrast. Motion  artifact present superiorly.     FINDINGS:  Mild soft tissue swelling/hematoma of the  right  frontoparietal region suspected. No intracranial hemorrhage or mass  effect. Mild cerebral volume loss. No convincing acute signs of  ischemia. No extra-axial hematoma or subarachnoid hemorrhage identified.     No air-fluid levels in the visible paranasal sinuses. The mastoid air  cells are well-aerated. No depressed skull fracture identified.       Impression:       1. Study degraded by patient motion artifact. Soft tissue hematoma  suspected of the right frontoparietal region with no convincing  depressed skull fracture. No intracranial hemorrhage.  This report was finalized on 05/05/2020 14:15 by Dr. Tomeka Harvey MD.    XR Chest 1 View [[id number]] Collected:  05/05/20 1123     Updated:  05/05/20 1126    Narrative:       EXAMINATION:  XR CHEST 1 VW-  5/5/2020 11:02 AM CDT     HISTORY: Chest Pain protocol     COMPARISON: 3/6/2020.     FINDINGS:  The lungs are expanded bilaterally and clear. The pleural  spaces are clear. Heart size is normal. There are degenerative changes  of the shoulders and spine. No acute bony abnormality is seen.       Impression:       No active disease is seen.        This report was finalized on 05/05/2020 11:23 by Dr. Michael Pond MD.          I have reviewed the patient's current medications.     Assessment/Plan     Active Hospital Problems    Diagnosis   • Atrial fibrillation with RVR (CMS/HCC)     1.  Afib/flutter  -Propafenone  -Cardizem Drip  -IV Digoxin  -cardiology following  -Xarelto     2.   Syncope  -likely due to fast arrhytmia  -Has had recent echo with no valvular dysfunction  -will check orthostatics     3.  PVD  -Pletal  -Xarelto     4.  HTN  -lisinopril            Discharge Planning: I expect the patient to be discharged to home in ? days    Marco A Katz MD   05/06/20   11:03

## 2020-05-06 NOTE — ANESTHESIA PREPROCEDURE EVALUATION
Anesthesia Evaluation     Nursing notes reviewed   no history of anesthetic complications:  NPO Solid Status: > 8 hours  NPO Liquid Status: > 8 hours           Airway   Mallampati: I  TM distance: >3 FB  Neck ROM: full  No difficulty expected  Dental    (+) poor dentition    Pulmonary     breath sounds clear to auscultation  (+) a smoker Current,   Cardiovascular   Exercise tolerance: good (4-7 METS)    ECG reviewed  Rhythm: irregular  Rate: abnormal    (+) hypertension, dysrhythmias Atrial Fib, PVD,       Neuro/Psych  (+) psychiatric history,     GI/Hepatic/Renal/Endo - negative ROS     Musculoskeletal     Abdominal    Substance History   (+) alcohol use,      OB/GYN negative ob/gyn ROS         Other   arthritis,                      Anesthesia Plan    ASA 3     MAC     intravenous induction     Anesthetic plan, all risks, benefits, and alternatives have been provided, discussed and informed consent has been obtained with: patient.

## 2020-05-06 NOTE — PLAN OF CARE
Problem: Patient Care Overview  Goal: Plan of Care Review  Outcome: Ongoing (interventions implemented as appropriate)  Note:   VSS, heart rate from 80s-110s throughout shift; maintained a flutter/fib rhythm throughout shift; cardizem drip titrated from 15 to 10 mg/hr @ 1954; stable BP maintained and monitored; pt c/o left heel pain r/t wound, called MD, one time dose of percocet given with minimal relief per pt; once positioned in the chair, pt reported relief of heel pain; good urine output but tea-colored; will continue to monitor      Problem: Patient Care Overview  Goal: Individualization and Mutuality  Outcome: Ongoing (interventions implemented as appropriate)     Problem: Patient Care Overview  Goal: Discharge Needs Assessment  Outcome: Ongoing (interventions implemented as appropriate)     Problem: Patient Care Overview  Goal: Interprofessional Rounds/Family Conf  Outcome: Ongoing (interventions implemented as appropriate)     Problem: Arrhythmia/Dysrhythmia (Symptomatic) (Adult)  Goal: Signs and Symptoms of Listed Potential Problems Will be Absent, Minimized or Managed (Arrhythmia/Dysrhythmia)  Outcome: Ongoing (interventions implemented as appropriate)     Problem: Fall Risk (Adult)  Goal: Identify Related Risk Factors and Signs and Symptoms  Outcome: Ongoing (interventions implemented as appropriate)     Problem: Fall Risk (Adult)  Goal: Absence of Fall  Outcome: Ongoing (interventions implemented as appropriate)

## 2020-05-06 NOTE — PAYOR COMM NOTE
"[facility]  [place],  [phone]  OR  FAX  [phone]    Kingsley Servin (74 y.o. Male)     Date of Birth Social Security Number Address Home Phone MRN    1945  [address] [address] [phone] [id number]    Worship Marital Status          Restoration        Admission Date Admission Type Admitting Provider Attending Provider Department, Room/Bed    5/5/20 Emergency Marco A Katz MD Moore, Jonathan Scott, MD [facility] 4B, 445/1    Discharge Date Discharge Disposition Discharge Destination                       Attending Provider:  Marco A Katz MD    Allergies:  No Known Allergies    Isolation:  None   Infection:  None   Code Status:  CPR    Ht:  172.7 cm (68\")   Wt:  75.3 kg (166 lb)    Admission Cmt:  None   Principal Problem:  None                Active Insurance as of 5/5/2020     Primary Coverage     Payor Plan Insurance Group Employer/Plan Group    [employer] [facility]      Payor Plan Address Payor Plan Phone Number Payor Plan Fax Number Effective Dates     [address] [phone]  2/10/2020 - None Entered    [facility] [id number]       Subscriber Name Subscriber Birth Date Member ID       KINGSLEY SERVIN 1945 [id number]           Secondary Coverage     Payor Plan Insurance Group Employer/Plan Group    [employer] [facility] DEPT [id number]      Payor Plan Address Payor Plan Phone Number Payor Plan Fax Number Effective Dates    [employer] [phone]  2/10/2020 - None Entered    [address] [place] [id number]       Subscriber Name Subscriber Birth Date Member ID       KINGSLEY SERVIN 1945 [id number]                 Emergency Contacts      (Rel.) Home Phone Work Phone Mobile Phone    LUIS DANIEL SERVIN (Son) -- -- [phone]    boone tee (Daughter) -- -- [phone]    MARIANA TANG (Daughter) -- -- [phone]        /5/2020 Event Details User   10:32 Patient arrival  Evelyn Bedoya " "ANAND WOLF   10:32:19 Patient roomed in ED To room 04  Evelyn Bedoya RN   10:32:42 Chief Complaints Updated + Rapid Heart Rate   + Syncope  Evelyn Bedoya RN   10:32:42 Trigger for Triage Start  Evelyn Bedoya RN   10:32:42 Triage Started  Evelyn Bedoya RN      Novel Coronavirus?: No    Evelyn Bedoya RN     10:34 HPI HPI   Stated Reason for Visit: patient reports he was sitting at home on a bar stool, had a syncopal episode and fell backwards. patient woke up and called family and ems. patient hear rate initially 240 (poss vt on ekg) with ems, then dropped to 130's(a fib rvr), patient placed on new medication last week. patient has known history of afib and is a patient of dr payan, but states he has not ever seen him. patient denies chest pain and soa at this time.  Medications/Treatments Prior to Arrival: (asas 324 mg po per ems, ntg x 1 )    Evelyn Bedoya RN   10:36 Device Vitals Device Data   Heart Rate: 126Abnormal  (Device Time: 10:36:00) BP: 163/87 (Device Time: 10:36:30)    Gregoria Griffin RN   10:37:02 Assign Provider  Randy Preston MD   10:37:02 First Provider Evaluation of Patient  Randy Preston MD   10:37:02 Assign Attending Randy Preston MD assigned as Attending  Randy Preston MD   10:40 Vital Signs Vital Signs   Temp: 98 °F (36.7 °C) Heart Rate: 129Abnormal    Resp: 15 BP: 163/87   BMI (Calculated): 25.1    Height and Weight   Height: 172.7 cm (68\") Weight: 74.8 kg (165 lb)   Other flowsheet entries   Ideal Body Weight k.4     Evelyn Bedoya RN      Novel Coronavirus?: No    Evelyn Bedoya RN     10:34 HPI HPI   Stated Reason for Visit: patient reports he was sitting at home on a bar stool, had a syncopal episode and fell backwards. patient woke up and called family and ems. patient hear rate initially 240 (poss vt on ekg) with ems, then dropped to 130's(a fib rvr), patient placed on new medication last week. patient has known history of afib " "and is a patient of dr payan, but states he has not ever seen him. patient denies chest pain and soa at this time.  Medications/Treatments Prior to Arrival: (asas 324 mg po per ems, ntg x 1 )    Evelyn Bedoya RN   10:36 Device Vitals Device Data   Heart Rate: 126Abnormal  (Device Time: 10:36:00) BP: 163/87 (Device Time: 10:36:30)    Gregoria Griffin RN   10:37:02 Assign Provider  Randy Preston MD   10:37:02 First Provider Evaluation of Patient  Randy Preston MD   10:37:02 Assign Attending Randy Preston MD assigned as Attending  Randy Preston MD   10:40 Vital Signs Vital Signs   Temp: 98 °F (36.7 °C) Heart Rate: 129Abnormal    Resp: 15 BP: 163/87   BMI (Calculated): 25.1    Height and Weight   Height: 172.7 cm (68\") Weight: 74.8 kg (165 lb)   Other flowsheet entries   Ideal Body Weight k.4     Evelyn Bedoya RN       11:05 Syncope/Near Syncope Assessments (Adult) Syncope/Near Syncope Assessments   General Mobility: no overt deficits noted    Respiratory WDL   Respiratory WDL: WDL except; rhythm/pattern Rhythm/Pattern, Respiratory: shortness of breath (with exertion)   Breath Sounds   Breath Sounds: All Fields All Lung Fields Breath Sounds: Anterior:; wheezes, expiratory   Cardiac WDL   Cardiac WDL: WDL except; rhythm    ECG   Rhythm: atrial rhythm Atrial Rhythm: atrial fibrillation   Peripheral/Neurovascular WDL   Peripheral Neuro Vascular WDL: WDL    Cognitive/Neuro/Behavioral WDL   Cognitive/Neuro/Behavioral WDL: WDL; level of consciousness; orientation Level of Consciousness: Alert   Orientation: oriented x 4    Pupils   Pupil PERRLA: yes    Gastrointestinal WDL   Gastrointestinal WDL: WDL    Genitourinary WDL   Genitourinary WDL: WDL    Musculoskeletal WDL   General Mobility: no overt deficits noted    Safety WDL   Safety WDL: WDL     Gregoria Griffin RN     11:14 Medication Given dilTIAZem (CARDIZEM) injection 10 mg - Dose: 10 mg ; Route: Intravenous ; Line: Peripheral IV 20 1105 Left " Hand ; Scheduled Time: 1053  Gregoria Griffin RN   11:14 Medication New Bag dilTIAZem (CARDIZEM) 125 mg in sodium chloride 0.9 % 125 mL (1 mg/mL) infusion - Dose: 5 mg/hr ; Rate: 5 mL/hr ; Route: Intravenous ; Line: Peripheral IV 05/05/20 1105 Left Hand ; Scheduled Time: 1053  Gregoria Griffin RN   11:15 Device Vitals Device Data   Heart Rate: 130Abnormal  (Device Time: 11:15:00) SpO2: 98 % (Device Time: 11:15:00)   BP: 115/64 (Device Time: 11:15:30)     Gregoria Griffin RN     12:12 Device Vitals Device Data   Heart Rate: 126Abnormal  (Device Time: 12:12:00) SpO2: 97 % (Device Time: 12:12:00)   BP: 140/89 (Device Time: 12:12:30)     Gregoria Griffin RN   12:14 Device Vitals Device Data   Heart Rate: 127Abnormal  (Device Time: 12:14:00) SpO2: 95 % (Device Time: 12:14:00)    Gregoria Griffin RN   12:15 Medication Rate/Dose Change dilTIAZem (CARDIZEM) 125 mg in sodium chloride 0.9 % 125 mL (1 mg/mL) infusion - Dose: 15 mg/hr ; Rate: 15 mL/hr ; Route: Intravenous ; Line: Peripheral IV 05/05/20 1105 Left Hand ; Scheduled Time: 1215  Gregoria Griffin RN   12:15 Device Vitals Device Data   BP: 143/84 (Device Time: 12:15:30)     Gregoria Griffin RN     12:48 Free Text Patient on examination has got a scalp hematoma he fell and is telling us that he hit his head keeping consideration that he is on Xarelto he have to be scanned  Randy Preston MD   12:48:04 Orders Placed CT Head Without Contrast ; CT Cervical Spine Without Contrast  Randy Preston MD     14:25 Free Text Moderate to advanced degenerative changes of the cervical spine with   mild retrolisthesis of C4 likely due to right facet osteoarthropathy. No   acute cervical vertebral fracture. Moderate cervical canal stenosis and   scattered neural foramen narrowing as described above.  Discussed with the patient  Randy Preston MD   14:25 Free Text 1. Study degraded by patient motion artifact. Soft tissue hematoma   suspected of the right frontoparietal region  "with no convincing   depressed skull fracture. No intracranial hemorrhage.   This report was finalized on 05/05/2020 14:15 by Dr. Tomeka Harvey MD.  Randy Preston MD   14:25 Free Text CT findings were discussed with the patient  Randy Preston MD   14:30 Device Vitals Device Data   Heart Rate: 128Abnormal  (Device Time: 14:30:30) BP: 125/71 (Device Time: 14:30:30)    Gregoria Griffin RN     15:24 Medication Given digoxin (LANOXIN) injection 250 mcg - Dose: 250 mcg ; Route: Intravenous ; Line: Peripheral IV 05/05/20 1105 Left Hand ; Scheduled Time: 1500  Gregoria Griffin RN                  History & Physical      Katz, Marco A Martinez MD at 05/05/20 1513              [facility] Medicine Services  HISTORY AND PHYSICAL    Date of Admission: 5/5/2020  Primary Care Physician: BARRY Urrutia MD    Subjective     Chief Complaint: \"I passed out.\"    History of Present Illness  Mr. Lerma is a 74 year old gentleman with a history of difficult to control atrial fibrillation.  He takes Rhythmol and is chronically anticoagulated with Xarelto.  He presents today after having passed out.  In the ER he is noted to be in atrial flutter with a rate in the 130's.  He says he was told his heart rate was low.  I discussed this with Dr. Dennis in the ED who states the patient has never been below 120.  He presently denies CP or SOA.          Review of Systems   Constitutional: Negative for fatigue and fever.   HENT: Negative for congestion and ear pain.    Eyes: Negative for redness and visual disturbance.   Respiratory: Negative for cough, shortness of breath and wheezing.    Cardiovascular: Negative for chest pain and palpitations.   Gastrointestinal: Negative for abdominal pain, diarrhea, nausea and vomiting.   Endocrine: Negative for cold intolerance and heat intolerance.   Genitourinary: Negative for dysuria and frequency.   Musculoskeletal: Negative for arthralgias and back pain.   Skin: " Negative for rash and wound.   Neurological: Positive for syncope. Negative for dizziness and headaches.   Psychiatric/Behavioral: Negative for confusion. The patient is not nervous/anxious.           Otherwise complete ROS reviewed and negative except as mentioned in the HPI.    Past Medical History:   Past Medical History:   Diagnosis Date   • Alcohol abuse    • Arthritis    • Atrial flutter (CMS/HCC)    • Circulation problem    • History of tobacco abuse    • Hypertension    • PVD (peripheral vascular disease) (CMS/HCC)      Past Surgical History:  Past Surgical History:   Procedure Laterality Date   • ADENOIDECTOMY       Social History:  reports that he has been smoking cigarettes and cigars. He has been smoking about 0.50 packs per day. He has never used smokeless tobacco. He reports that he drinks about 10.0 standard drinks of alcohol per week. He reports that he does not use drugs.    Family History: family history includes Cancer in his father; Dementia in his mother; Pancreatic cancer in his father.         Allergies:  No Known Allergies  Medications:  Prior to Admission medications    Medication Sig Start Date End Date Taking? Authorizing Provider   cilostazol (PLETAL) 100 MG tablet Take 100 mg by mouth 2 (Two) Times a Day.    Provider, MD Jose   guaiFENesin (MUCINEX) 600 MG 12 hr tablet Take 2 tablets by mouth Every 12 (Twelve) Hours. 2/12/20   Soham Parker APRN   hydrOXYzine (ATARAX) 25 MG tablet Take 1 tablet by mouth 3 (Three) Times a Day As Needed for Anxiety. 4/16/20   BARRY Urrutia MD   lisinopril (PRINIVIL,ZESTRIL) 20 MG tablet Take 1 tablet by mouth Daily. 3/10/20   BARRY Urrutia MD   propafenone (RYTHMOL) 150 MG tablet Take 1 tablet by mouth Every 8 (Eight) Hours. 4/3/20   BARRY Urrutia MD   rivaroxaban (XARELTO) 20 MG tablet Take 1 tablet by mouth Daily With Dinner. Indications: Atrial Fibrillation 4/3/20   BARRY Urrutia MD   sildenafil (VIAGRA) 50 MG tablet Take  "50 mg by mouth Daily As Needed for Erectile Dysfunction (Sexual Activity).    Provider, MD Jose     Objective     Vital Signs: /88   Pulse (!) 131   Temp 98 °F (36.7 °C)   Resp 15   Ht 172.7 cm (68\")   Wt 74.8 kg (165 lb)   SpO2 95%   BMI 25.09 kg/m²    Physical Exam   Constitutional: He is oriented to person, place, and time. He appears well-developed and well-nourished.   HENT:   Head: Normocephalic and atraumatic.   Right Ear: External ear normal.   Left Ear: External ear normal.   Nose: Nose normal.   Mouth/Throat: Oropharynx is clear and moist.   Eyes: Pupils are equal, round, and reactive to light. Conjunctivae and EOM are normal. Right eye exhibits no discharge. Left eye exhibits no discharge. No scleral icterus.   Neck: Normal range of motion. Neck supple. No tracheal deviation present. No thyromegaly present.   Cardiovascular: Normal heart sounds and intact distal pulses. A regularly irregular rhythm present. Tachycardia present. Exam reveals no gallop and no friction rub.   No murmur heard.  Pulmonary/Chest: Effort normal and breath sounds normal. No stridor. No respiratory distress. He has no wheezes. He has no rales. He exhibits no tenderness.   Abdominal: Soft. Bowel sounds are normal. He exhibits no distension and no mass. There is no tenderness. There is no rebound and no guarding. No hernia.   Musculoskeletal: Normal range of motion. He exhibits no edema or deformity.   Lymphadenopathy:     He has no cervical adenopathy.   Neurological: He is alert and oriented to person, place, and time. He has normal reflexes. He displays normal reflexes. No cranial nerve deficit. He exhibits normal muscle tone. Coordination normal.   Skin: Skin is warm and dry. No rash noted. No erythema. No pallor.   Psychiatric: He has a normal mood and affect. His behavior is normal. Judgment and thought content normal.   Vitals reviewed.          Results Reviewed:  Lab Results (last 24 hours)     Procedure " Component Value Units Date/Time    Troponin [[id number]]  (Normal) Collected:  05/05/20 1413    Specimen:  Blood Updated:  05/05/20 1435     Troponin T <0.010 ng/mL     Narrative:       Troponin T Reference Range:  <= 0.03 ng/mL-   Negative for AMI  >0.03 ng/mL-     Abnormal for myocardial necrosis.  Clinicians would have to utilize clinical acumen, EKG, Troponin and serial changes to determine if it is an Acute Myocardial Infarction or myocardial injury due to an underlying chronic condition.       Results may be falsely decreased if patient taking Biotin.      [place] Draw [[id number]] Collected:  05/05/20 1104    Specimen:  Blood Updated:  05/05/20 1216    Narrative:       The following orders were created for panel order [place] Draw.  Procedure                               Abnormality         Status                     ---------                               -----------         ------                     Light Blue Top[[id number]]                                   Final result               Green Top (Gel)[[id number]]                                  Final result               Lavender Top[[id number]]                                     Final result               Red Top[[id number]]                                          Final result                 Please view results for these tests on the individual orders.    Red Top [[id number]] Collected:  05/05/20 1104    Specimen:  Blood Updated:  05/05/20 1216     Extra Tube Hold for add-ons.     Comment: Auto resulted.       Light Blue Top [[id number]] Collected:  05/05/20 1104    Specimen:  Blood Updated:  05/05/20 1216     Extra Tube hold for add-on     Comment: Auto resulted       Green Top (Gel) [[id number]] Collected:  05/05/20 1104    Specimen:  Blood Updated:  05/05/20 1216     Extra Tube Hold for add-ons.     Comment: Auto resulted.       Lavender Top [[id number]] Collected:  05/05/20 1104    Specimen:  Blood Updated:  05/05/20 1216     Extra Tube hold for add-on      Comment: Auto resulted       Comprehensive Metabolic Panel [[id number]]  (Abnormal) Collected:  05/05/20 1104    Specimen:  Blood Updated:  05/05/20 1145     Glucose 119 mg/dL      BUN 15 mg/dL      Creatinine 1.09 mg/dL      Sodium 141 mmol/L      Potassium 3.8 mmol/L      Chloride 104 mmol/L      CO2 23.0 mmol/L      Calcium 9.4 mg/dL      Total Protein 6.5 g/dL      Albumin 3.90 g/dL      ALT (SGPT) 24 U/L      AST (SGOT) 24 U/L      Alkaline Phosphatase 101 U/L      Total Bilirubin 0.8 mg/dL      eGFR Non African Amer 66 mL/min/1.73      Globulin 2.6 gm/dL      A/G Ratio 1.5 g/dL      BUN/Creatinine Ratio 13.8     Anion Gap 14.0 mmol/L     Narrative:       GFR Normal >60  Chronic Kidney Disease <60  Kidney Failure <15      Troponin [[id number]]  (Normal) Collected:  05/05/20 1104    Specimen:  Blood Updated:  05/05/20 1142     Troponin T <0.010 ng/mL     Narrative:       Troponin T Reference Range:  <= 0.03 ng/mL-   Negative for AMI  >0.03 ng/mL-     Abnormal for myocardial necrosis.  Clinicians would have to utilize clinical acumen, EKG, Troponin and serial changes to determine if it is an Acute Myocardial Infarction or myocardial injury due to an underlying chronic condition.       Results may be falsely decreased if patient taking Biotin.      CBC & Differential [[id number]] Collected:  05/05/20 1104    Specimen:  Blood Updated:  05/05/20 1125    Narrative:       The following orders were created for panel order CBC & Differential.  Procedure                               Abnormality         Status                     ---------                               -----------         ------                     CBC Auto Differential[[id number]]        Abnormal            Final result                 Please view results for these tests on the individual orders.    CBC Auto Differential [[id number]]  (Abnormal) Collected:  05/05/20 1104    Specimen:  Blood Updated:  05/05/20 1125     WBC 8.96 10*3/mm3      RBC 4.67 10*6/mm3       Hemoglobin 15.2 g/dL      Hematocrit 45.8 %      MCV 98.1 fL      MCH 32.5 pg      MCHC 33.2 g/dL      RDW 14.0 %      RDW-SD 50.8 fl      MPV 9.0 fL      Platelets 210 10*3/mm3      Neutrophil % 81.1 %      Lymphocyte % 7.1 %      Monocyte % 10.6 %      Eosinophil % 0.6 %      Basophil % 0.3 %      Immature Grans % 0.3 %      Neutrophils, Absolute 7.26 10*3/mm3      Lymphocytes, Absolute 0.64 10*3/mm3      Monocytes, Absolute 0.95 10*3/mm3      Eosinophils, Absolute 0.05 10*3/mm3      Basophils, Absolute 0.03 10*3/mm3      Immature Grans, Absolute 0.03 10*3/mm3      nRBC 0.0 /100 WBC         Imaging Results (Last 24 Hours)     Procedure Component Value Units Date/Time    CT Cervical Spine Without Contrast [[id number]] Collected:  05/05/20 1415     Updated:  05/05/20 1421    Narrative:       CT CERVICAL SPINE WO CONTRAST- 5/5/2020 1:44 PM CDT     HISTORY: C-spine trauma, NEXUS/CCR positive, low risk      COMPARISON: None     DOSE LENGTH PRODUCT: 219 mGy cm. Automated exposure control was also  utilized to decrease patient radiation dose.     TECHNIQUE: Axial images of cervical spine obtained with sagittal coronal  reconstructions.     FINDINGS: Minimal retrolisthesis of C4 in reference to C3 measuring 2  mm. There is moderate to advanced degenerative disc change at C5/C6 and  C6/C7. There is uncinate process hypertrophy as well as moderate facet  osteoarthropathy. No acute cervical vertebral fracture identified.  Moderate cervical canal stenosis at C5/C6 and C6/C7. Scattered bilateral  neural foramen narrowing from the hypertrophic degenerative change most  notable at C5/C6 and C6/C7 as well as on the right at the C3/C4 level.     Diffuse vascular calcification involving the carotid and vertebral  arteries.       Impression:       1. Moderate to advanced degenerative changes of the cervical spine with  mild retrolisthesis of C4 likely due to right facet osteoarthropathy. No  acute cervical vertebral fracture.  Moderate cervical canal stenosis and  scattered neural foramen narrowing as described above.        This report was finalized on 05/05/2020 14:18 by Dr. Tomeka Harvey MD.    CT Head Without Contrast [[id number]] Collected:  05/05/20 1411     Updated:  05/05/20 1418    Narrative:       CT HEAD WO CONTRAST- 5/5/2020 1:44 PM CDT     HISTORY: Head trauma, headache      COMPARISON: None     DOSE LENGTH PRODUCT: 692 mGy cm. Automated exposure control was also  utilized to decrease patient radiation dose.     TECHNIQUE: Axial images of brain obtained without contrast. Motion  artifact present superiorly.     FINDINGS:  Mild soft tissue swelling/hematoma of the right  frontoparietal region suspected. No intracranial hemorrhage or mass  effect. Mild cerebral volume loss. No convincing acute signs of  ischemia. No extra-axial hematoma or subarachnoid hemorrhage identified.     No air-fluid levels in the visible paranasal sinuses. The mastoid air  cells are well-aerated. No depressed skull fracture identified.       Impression:       1. Study degraded by patient motion artifact. Soft tissue hematoma  suspected of the right frontoparietal region with no convincing  depressed skull fracture. No intracranial hemorrhage.  This report was finalized on 05/05/2020 14:15 by Dr. Tomeka Harvey MD.    XR Chest 1 View [[id number]] Collected:  05/05/20 1123     Updated:  05/05/20 1126    Narrative:       EXAMINATION:  XR CHEST 1 VW-  5/5/2020 11:02 AM CDT     HISTORY: Chest Pain protocol     COMPARISON: 3/6/2020.     FINDINGS:  The lungs are expanded bilaterally and clear. The pleural  spaces are clear. Heart size is normal. There are degenerative changes  of the shoulders and spine. No acute bony abnormality is seen.       Impression:       No active disease is seen.        This report was finalized on 05/05/2020 11:23 by Dr. Michael Pond MD.        I have personally reviewed and interpreted the radiology studies and ECG obtained at  time of admission.     Assessment / Plan     Assessment:   Active Hospital Problems    Diagnosis   • Atrial fibrillation with RVR (CMS/HCC)     1.  Afib/flutter  -Propafenone  -Cardizem Drip  -IV Digoxin  -Consult Cardiology  -Xarelto    2.   Syncope  -likely due to fast arrhytmia  -Has had recent echo with no valvular dysfunction  -will check orthostatics    3.  PVD  -Pletal  -Xarelto    4.  HTN  -lisinopril           Code Status: full     I discussed the patient's findings and my recommendations with the patient    Estimated length of stay 1-2 days    Patient seen and examined by me on 5/5/2020 at     Marco A Katz MD   05/05/20   15:13              Electronically signed by Marco A Katz MD at 05/05/20 1524          Emergency Department Notes      Randy Preston MD at 05/05/20 1054          Subjective   Patient has got a history of A. fib was recently admitted cardioverted with no success placed on Rythmol discharged home has been on Xarelto today was sitting down had episode of dizziness and had syncope no loss of consciousness no fall no trauma something about the ER by EMS.      Syncope   Episode history:  Single  Most recent episode:  Today  Progression:  Resolved  Chronicity:  New  Context: sitting down    Context: not blood draw, not dehydration, not exertion, not medication change, not with normal activity, not standing up and not urination    Relieved by:  Nothing  Worsened by:  Nothing  Ineffective treatments:  None tried  Associated symptoms: weakness    Associated symptoms: no anxiety, no chest pain, no confusion, no difficulty breathing, no dizziness, no headaches, no malaise/fatigue, no nausea, no palpitations, no recent fall, no seizures, no shortness of breath, no visual change and no vomiting    Risk factors: no congenital heart disease        Review of Systems   Constitutional: Negative for malaise/fatigue.   HENT: Negative.    Respiratory: Negative for shortness of breath.     Cardiovascular: Positive for syncope. Negative for chest pain and palpitations.   Gastrointestinal: Negative.  Negative for abdominal distention, abdominal pain, nausea and vomiting.   Endocrine: Negative.    Genitourinary: Negative.    Musculoskeletal: Negative.  Negative for back pain and neck pain.   Skin: Negative for color change and pallor.   Neurological: Positive for weakness. Negative for dizziness, seizures, syncope, light-headedness, numbness and headaches.   Hematological: Negative.  Does not bruise/bleed easily.   Psychiatric/Behavioral: Negative for confusion.   All other systems reviewed and are negative.      Past Medical History:   Diagnosis Date   • Alcohol abuse    • Arthritis    • Atrial flutter (CMS/HCC)    • Circulation problem    • History of tobacco abuse    • Hypertension    • PVD (peripheral vascular disease) (CMS/HCC)        No Known Allergies    Past Surgical History:   Procedure Laterality Date   • ADENOIDECTOMY         Family History   Problem Relation Age of Onset   • Dementia Mother    • Cancer Father    • Pancreatic cancer Father        Social History     Socioeconomic History   • Marital status:      Spouse name: Not on file   • Number of children: Not on file   • Years of education: Not on file   • Highest education level: Not on file   Tobacco Use   • Smoking status: Current Every Day Smoker     Packs/day: 0.50     Types: Cigarettes, Cigars   • Smokeless tobacco: Never Used   Substance and Sexual Activity   • Alcohol use: Yes     Alcohol/week: 10.0 standard drinks     Types: 9 Shots of liquor, 1 Cans of beer per week     Frequency: Never   • Drug use: Never   • Sexual activity: Defer           Objective   Physical Exam   Constitutional: He is oriented to person, place, and time. He appears well-developed.  Non-toxic appearance.   HENT:   Head: Normocephalic and atraumatic.   Mouth/Throat: Uvula is midline and mucous membranes are normal.   Eyes: Pupils are equal, round,  and reactive to light. Conjunctivae and lids are normal. Lids are everted and swept, no foreign bodies found.   Neck: Trachea normal, normal range of motion, full passive range of motion without pain and phonation normal. Neck supple. Normal carotid pulses and no JVD present. Carotid bruit is not present. No neck rigidity. No tracheal deviation present.   Cardiovascular: Normal rate, normal heart sounds, intact distal pulses and normal pulses. An irregularly irregular rhythm present. PMI is not displaced.   Pulmonary/Chest: Effort normal and breath sounds normal. No accessory muscle usage or stridor. No apnea and no tachypnea. He has no decreased breath sounds. He has no wheezes. He has no rhonchi. He has no rales.   Abdominal: Soft. Normal appearance, normal aorta and bowel sounds are normal. There is no hepatosplenomegaly. There is no tenderness.   Musculoskeletal: Normal range of motion.   Lower extremity exam bilaterally is unremarkable.  There is no right or left calf tenderness .  There is no palpable venous cord.  No obvious difference in the size of the legs.  No pitting edema.  The dorsalis pedis and posterior tibial femoral and popliteal pulses are palpable and +2 bilaterally.  Homans sign is negative   Neurological: He is alert and oriented to person, place, and time. He has normal strength and normal reflexes. He displays normal reflexes. No cranial nerve deficit or sensory deficit. Gait normal. GCS eye subscore is 4. GCS verbal subscore is 5. GCS motor subscore is 6.   Scalp hematoma no cervical spine tenderness   Skin: Skin is warm, dry and intact. No cyanosis. No pallor. Nails show no clubbing.   Psychiatric: He has a normal mood and affect. His speech is normal and behavior is normal.   Nursing note and vitals reviewed.      Procedures          ED Course  ED Course as of May 05 1458   Tue May 05, 2020   1248 Patient on examination has got a scalp hematoma he fell and is telling us that he hit his  head keeping consideration that he is on Xarelto he have to be scanned    [TS]   1425 Moderate to advanced degenerative changes of the cervical spine with  mild retrolisthesis of C4 likely due to right facet osteoarthropathy. No  acute cervical vertebral fracture. Moderate cervical canal stenosis and  scattered neural foramen narrowing as described above.  Discussed with the patient    [TS]   1425 1. Study degraded by patient motion artifact. Soft tissue hematoma  suspected of the right frontoparietal region with no convincing  depressed skull fracture. No intracranial hemorrhage.  This report was finalized on 05/05/2020 14:15 by Dr. Tomeka Harvey MD.    [TS]   1425 CT findings were discussed with the patient    [TS]   1443 Case discussed at length with the patient his CTs are negative is got some spinal stenosis a moderate intensity no neurological deficit no weakness no paresthesias.  His heart rate still elevated waiting on cardiology call me back hospitalist will admit the patient [facility]    [TS]   1457 Discussed with Dr. Norris will prescribe dig per his recommendation    [TS]      ED Course User Index  [TS] Randy Preston MD                                           MDM  Number of Diagnoses or Management Options  Diagnosis management comments: Palpitation with A. fib a flutter       Amount and/or Complexity of Data Reviewed  Clinical lab tests: ordered and reviewed  Tests in the radiology section of CPT®:  ordered  Tests in the medicine section of CPT®:  reviewed and ordered    Risk of Complications, Morbidity, and/or Mortality  Presenting problems: moderate  Diagnostic procedures: moderate  Management options: moderate        Final diagnoses:   Atrial fibrillation with RVR (CMS/HCC)   Typical atrial flutter (CMS/HCC)   Syncope and collapse   Hematoma of scalp, initial encounter   Spinal stenosis of cervical region            Randy Preston MD  05/05/20 1446       Randy Preston MD  05/05/20  1458      Electronically signed by Randy Preston MD at 05/05/20 1458         Facility-Administered Medications as of 5/6/2020   Medication Dose Route Frequency Provider Last Rate Last Dose   • cilostazol (PLETAL) tablet 100 mg  100 mg Oral BID Marco A Katz MD   100 mg at 05/06/20 0857   • [COMPLETED] digoxin (LANOXIN) injection 250 mcg  250 mcg Intravenous Once Randy Preston MD   250 mcg at 05/05/20 1524   • dilTIAZem (CARDIZEM) 125 mg in sodium chloride 0.9 % 125 mL (1 mg/mL) infusion  5-15 mg/hr Intravenous Titrated Marco A Katz MD 5 mL/hr at 05/06/20 0730 5 mg/hr at 05/06/20 0730   • [COMPLETED] dilTIAZem (CARDIZEM) injection 10 mg  10 mg Intravenous Once Randy Preston MD   10 mg at 05/05/20 1114   • [COMPLETED] dilTIAZem (CARDIZEM) injection 10 mg  10 mg Intravenous Once Randy Preston MD   10 mg at 05/05/20 1243   • doxycycline (ADOXA) tablet 100 mg  100 mg Oral Q12H Marco A Katz MD   100 mg at 05/06/20 0857   • guaiFENesin (MUCINEX) 12 hr tablet 1,200 mg  1,200 mg Oral Q12H Marco A Katz MD   1,200 mg at 05/06/20 0857   • hydrOXYzine (ATARAX) tablet 25 mg  25 mg Oral TID PRN Marco A Katz MD       • lisinopril (PRINIVIL,ZESTRIL) tablet 20 mg  20 mg Oral Daily Marco A Katz MD   20 mg at 05/06/20 0856   • [COMPLETED] metoprolol tartrate (LOPRESSOR) injection 5 mg  5 mg Intravenous Once Randy Preston MD   5 mg at 05/05/20 1153   • [COMPLETED] oxyCODONE-acetaminophen (PERCOCET) 5-325 MG per tablet 1 tablet  1 tablet Oral Once Alexsander Rubio DO   1 tablet at 05/06/20 0009   • pantoprazole (PROTONIX) EC tablet 20 mg  20 mg Oral Q AM Marco A Katz MD   20 mg at 05/06/20 0512   • propafenone (RYTHMOL) tablet 150 mg  150 mg Oral Q8H Marco A Katz MD   150 mg at 05/06/20 0512   • rivaroxaban (XARELTO) tablet 20 mg  20 mg Oral Daily With Dinner Marco A Katz MD   20 mg at 05/05/20 1711   • sodium chloride 0.9 % flush 10 mL  10  mL Intravenous PRN Marco A Katz MD            Consult Notes (last 48 hours) (Notes from 05/04/20 0948 through 05/06/20 0948)      Hernandez Norris MD at 05/05/20 1616      Consult Orders    1. Inpatient Cardiology Consult [[id number]] ordered by aMrco A Katz MD at 05/05/20 1530                Inpatient Cardiology Consult  Consult performed by: Hernandez Norris MD  Consult ordered by: Marco A Katz MD  Reason for consult: Atrial flutter        Chief Complaint   Patient presents with   • Rapid Heart Rate   • Syncope     HPI: This a 74-year-old male with a history of paroxysmal atrial flutter as well as atrial fibrillation, chronically anticoagulated, with a history of hypertension, tobacco abuse, alcohol abuse, presenting after a syncopal episode today.  The patient says that he was having some type of heartburn earlier today, which is not unusual for him.  However, he sat down on a stool at his house and felt lightheaded and dizzy and simply lost consciousness for a few seconds.  He says that he awoke with a bump on his head.  He notes no chest discomfort prior to the event, no shortness of breath.  He says that he has been compliant with all of his medications, including Rythmol as well as Xarelto.  He is taking Xarelto as prescribed.  The patient presented to the emergency department for further evaluation.  His CT scan of his head showed no intracranial hemorrhage but did show an extracranial hematoma.  The patient was also found to be in atrial flutter with elevated heart rates.  Patient denies any feelings of palpitations.    Records indicate this patient was hospitalized in February at which time he was found to have atrial flutter which spontaneously converted back to sinus rhythm and then presented once again here in March with atrial flutter.  There was an attempt at cardioversion in the emergency department however this did not restore sinus rhythm, therefore the  patient was admitted and placed on an amiodarone infusion as well as Cardizem.  Ultimately, the patient converted back into sinus rhythm and was discharged home on Rythmol as well as therapeutic anticoagulation with Xarelto.  He then followed up with Dr. Urrutia and was placed in a cardiac monitor.  The results of this are referenced below but this did show the patient was in both atrial fibrillation and flutter, by the appearance for the majority of the time that he was in the monitor.  Overall average heart rate at that time was 113 bpm.  Minimum heart rate only 70 bpm.    Of note, the patient does drink alcohol.  He is somewhat vague in how much he drinks but he admits to drinking a couple glasses of wine per day at least.    Past Medical History:   Diagnosis Date   • Alcohol abuse    • Arthritis    • Atrial flutter (CMS/HCC)    • Circulation problem    • History of tobacco abuse    • Hypertension    • PVD (peripheral vascular disease) (CMS/HCC)      Past Surgical History:   Procedure Laterality Date   • ADENOIDECTOMY       No current facility-administered medications on file prior to encounter.      Current Outpatient Medications on File Prior to Encounter   Medication Sig Dispense Refill   • cilostazol (PLETAL) 100 MG tablet Take 100 mg by mouth 2 (Two) Times a Day.     • guaiFENesin (MUCINEX) 600 MG 12 hr tablet Take 2 tablets by mouth Every 12 (Twelve) Hours.     • hydrOXYzine (ATARAX) 25 MG tablet Take 1 tablet by mouth 3 (Three) Times a Day As Needed for Anxiety. 60 tablet 5   • lisinopril (PRINIVIL,ZESTRIL) 20 MG tablet Take 1 tablet by mouth Daily. 30 tablet 2   • propafenone (RYTHMOL) 150 MG tablet Take 1 tablet by mouth Every 8 (Eight) Hours. 45 tablet 0   • rivaroxaban (XARELTO) 20 MG tablet Take 1 tablet by mouth Daily With Dinner. Indications: Atrial Fibrillation 30 tablet 0   • sildenafil (VIAGRA) 50 MG tablet Take 50 mg by mouth Daily As Needed for Erectile Dysfunction (Sexual Activity).       No  "Known Allergies    Social History     Tobacco Use   • Smoking status: Current Every Day Smoker     Packs/day: 0.50     Types: Cigarettes, Cigars   • Smokeless tobacco: Never Used   Substance Use Topics   • Alcohol use: Yes     Alcohol/week: 10.0 standard drinks     Types: 9 Shots of liquor, 1 Cans of beer per week     Frequency: Never     Family History   Problem Relation Age of Onset   • Dementia Mother    • Cancer Father    • Pancreatic cancer Father      Review of Systems   Constitution: Positive for malaise/fatigue. Negative for chills, fever, night sweats and weight loss.   HENT: Negative for congestion and hearing loss.    Eyes: Negative for blurred vision and pain.   Cardiovascular: Positive for leg swelling and syncope. Negative for chest pain, claudication, dyspnea on exertion, orthopnea, palpitations and paroxysmal nocturnal dyspnea.   Respiratory: Negative for cough, hemoptysis, shortness of breath and wheezing.    Endocrine: Negative for cold intolerance and heat intolerance.   Hematologic/Lymphatic: Negative for adenopathy and bleeding problem. Does not bruise/bleed easily.   Skin: Negative for color change, poor wound healing and rash.   Musculoskeletal: Positive for arthritis. Negative for joint swelling, myalgias and neck pain.   Gastrointestinal: Negative for abdominal pain, change in bowel habit, constipation, diarrhea, heartburn, hematochezia, melena, nausea and vomiting.   Genitourinary: Negative for dysuria, frequency, hematuria and nocturia.   Neurological: Positive for dizziness, light-headedness and weakness. Negative for focal weakness, headaches, loss of balance and numbness.   Psychiatric/Behavioral: Negative for altered mental status, memory loss and substance abuse.   Allergic/Immunologic: Negative for hives and persistent infections.     Physical Exam:  /92   Pulse 113   Temp 98 °F (36.7 °C)   Resp 15   Ht 172.7 cm (68\")   Wt 74.8 kg (165 lb)   SpO2 99%   BMI 25.09 kg/m²  "   Temp:  [98 °F (36.7 °C)] 98 °F (36.7 °C)  Heart Rate:  [113-131] 113  Resp:  [15] 15  BP: (115-163)/(64-99) 135/92    Physical Exam   Constitutional: He is oriented to person, place, and time. He appears well-developed and well-nourished. He is cooperative.  Non-toxic appearance. No distress.   HENT:   Head: Normocephalic and atraumatic.   Right Ear: External ear normal.   Left Ear: External ear normal.   Nose: Nose normal.   Mouth/Throat: Uvula is midline, oropharynx is clear and moist and mucous membranes are normal. Mucous membranes are not pale, not dry and not cyanotic. No oropharyngeal exudate.   Eyes: Pupils are equal, round, and reactive to light. EOM and lids are normal.   Neck: Normal range of motion. Neck supple. No hepatojugular reflux and no JVD present. Carotid bruit is not present. No tracheal deviation and no edema present. No thyroid mass and no thyromegaly present.   Cardiovascular: S1 normal, S2 normal, normal heart sounds, intact distal pulses and normal pulses. An irregularly irregular rhythm present.  No extrasystoles are present. Tachycardia present. PMI is not displaced. Exam reveals no gallop and no friction rub.   No murmur heard.  Pulses:       Radial pulses are 2+ on the right side, and 2+ on the left side.        Femoral pulses are 2+ on the right side, and 2+ on the left side.       Dorsalis pedis pulses are 2+ on the right side, and 2+ on the left side.        Posterior tibial pulses are 2+ on the right side, and 2+ on the left side.   Pulmonary/Chest: Effort normal. No accessory muscle usage. No respiratory distress. He has decreased breath sounds. He has no wheezes. He has no rhonchi. He has no rales. He exhibits no tenderness.   Abdominal: Soft. Normal appearance and bowel sounds are normal. He exhibits no distension, no abdominal bruit and no pulsatile midline mass. There is no hepatosplenomegaly. There is no tenderness.   Musculoskeletal: Normal range of motion. He exhibits  edema. He exhibits no tenderness or deformity.   Lymphadenopathy:     He has no cervical adenopathy.   Neurological: He is oriented to person, place, and time. He has normal strength. No cranial nerve deficit.   Skin: Skin is warm, dry and intact. No rash noted. He is not diaphoretic. No cyanosis or erythema. Nails show no clubbing.   Psychiatric: He has a normal mood and affect. His speech is normal and behavior is normal. Thought content normal.   Vitals reviewed.    Diagnostic Data:    Lab Results   Component Value Date    WBC 8.96 05/05/2020    HGB 15.2 05/05/2020    HCT 45.8 05/05/2020    MCV 98.1 (H) 05/05/2020     05/05/2020     Lab Results   Component Value Date    GLUCOSE 119 (H) 05/05/2020    CALCIUM 9.4 05/05/2020     05/05/2020    K 3.8 05/05/2020    CO2 23.0 05/05/2020     05/05/2020    BUN 15 05/05/2020    CREATININE 1.09 05/05/2020    EGFRIFNONA 66 05/05/2020    BCR 13.8 05/05/2020    ANIONGAP 14.0 05/05/2020     TSH - WNL    CT head today:  Study degraded by patient motion artifact. Soft tissue hematoma  suspected of the right frontoparietal region with no convincing  depressed skull fracture. No intracranial hemorrhage.    ECG today at 10:40 AM: Atrial flutter with 2:1 AV conduction, ventricular rate 128 bpm  ECG today at 2:08 PM: Atrial flutter with 2:1 AV conduction, ventricular rate 129 bpm  ECG on 3/7/2020 at 9:21 AM: Normal sinus rhythm with sinus arrhythmia, ventricular rate 63, left atrial enlargement, no acute ST changes    I reviewed the report of an echocardiogram from 2/11/2020 showing hyperdynamic left ventricular ejection fraction, no significant mitral valve abnormalities, borderline left atrial and right atrial sizes, hyperdynamic right ventricular function, insufficient TR velocity to estimate the RVSP.    I reviewed the report of an outpatient cardiac monitor from 3/10/2020 showing the predominant rhythm being atrial fibrillation/flutter, with both being observed  with an overall 69% burden with average rates of 123 bpm.    ASSESSMENT/PLAN:    1.  Syncope  2.  Paroxysmal atrial flutter, also with history of paroxysmal atrial fibrillation, observed on outpatient cardiac monitor  3.  Alcohol abuse  4.  Essential hypertension  5.  Tobacco abuse  6.  Peripheral vascular disease    -We are asked to see this patient with atrial flutter.  At this time, he is asymptomatic but his heart rate remains elevated.  He tells me that he has been fully anticoagulated and has not missed any doses.  -I think that it would be reasonable to attempt cardioversion again on this patient, however with his history of alcohol abuse, I think there is at least some concern about his ability to stay in a normal rhythm rhythm, even after restoration of sinus rhythm.  Also, he is known to have paroxysmal atrial fibrillation/flutter.  -Patient is currently on a Cardizem drip.  Recommend to continue this overnight and continue to monitor the patient's heart rate.  -It may be that amiodarone may be a better choice long-term for this patient, especially if he is failing to maintain sinus rhythm on Rythmol.  Patient is unclear if he has been on this as an outpatient before but he was on the drip at least here at some point in time.  -In regards the patient's syncope, I am not certain of exactly why the patient had a syncopal episode today.  Unfortunately, we do not know what his heart rate was at the time.  Recommend to check orthostatic blood pressures.  Obviously, continue cardiac monitoring.  -Continue therapeutic anticoagulation.  -Recommend leave the patient n.p.o. after midnight in case a repeat attempt at cardioversion is to be considered.  -I did discourage any abrupt change in alcohol consumption for this patient.  Certainly, alcohol abuse may lead to atrial arrhythmias but abrupt cessation of alcohol may also lead to difficulty with atrial arrhythmias.  -Thank you for this consultation.  We will  continue to follow.    Electronically signed by Hernandez Norris MD at 05/05/20 3413

## 2020-05-06 NOTE — PROGRESS NOTES
Discharge Planning Assessment  [facility]     Patient Name: Kingsley Lerma  MRN: [id number]  Today's Date: 5/6/2020    Admit Date: 5/5/2020    Discharge Needs Assessment     Row Name 05/06/20 1006       Living Environment    Lives With  alone  (Pended)     Unique Family Situation  Pt states his children help him when needed.  (Pended)     Current Living Arrangements  home/apartment/condo  (Pended)     Primary Care Provided by  self  (Pended)     Provides Primary Care For  no one  (Pended)     Quality of Family Relationships  helpful;involved;supportive  (Pended)        Resource/Environmental Concerns    Resource/Environmental Concerns  none  (Pended)     Transportation Concerns  car, none  (Pended)        Transition Planning    Patient/Family Anticipates Transition to  home  (Pended)     Patient/Family Anticipated Services at Transition  none  (Pended)     Transportation Anticipated  family or friend will provide  (Pended)        Discharge Needs Assessment    Readmission Within the Last 30 Days  no previous admission in last 30 days  (Pended)     Concerns to be Addressed  discharge planning  (Pended)     Equipment Currently Used at Home  cane, straight  (Pended)     Equipment Needed After Discharge  none  (Pended)     Discharge Coordination/Progress  Pt [facility] Pt lived at home alone prior to admission and plans to return home at d/c. Pt denies any DME or service needs at this time. SW will follow and assist w/any d/c needs that may arise.   (Pended)         Discharge Plan    No documentation.       Destination      Coordination has not been started for this encounter.      Durable Medical Equipment      Coordination has not been started for this encounter.      Dialysis/Infusion      Coordination has not been started for this encounter.      Home Medical Care      Coordination has not been started for this encounter.      Therapy      Coordination has not been started for this  encounter.      Community Resources      Coordination has not been started for this encounter.          Demographic Summary    No documentation.       Functional Status    No documentation.       Psychosocial    No documentation.       Abuse/Neglect    No documentation.       Legal    No documentation.       Substance Abuse    No documentation.       Patient Forms    No documentation.           Geovanna Burtonr

## 2020-05-07 ENCOUNTER — READMISSION MANAGEMENT (OUTPATIENT)
Dept: CALL CENTER | Facility: HOSPITAL | Age: 75
End: 2020-05-07

## 2020-05-07 VITALS
TEMPERATURE: 97.9 F | DIASTOLIC BLOOD PRESSURE: 60 MMHG | SYSTOLIC BLOOD PRESSURE: 144 MMHG | RESPIRATION RATE: 16 BRPM | HEART RATE: 85 BPM | OXYGEN SATURATION: 97 % | HEIGHT: 68 IN | WEIGHT: 159.2 LBS | BODY MASS INDEX: 24.13 KG/M2

## 2020-05-07 PROBLEM — Z79.01 CURRENT USE OF LONG TERM ANTICOAGULATION: Status: ACTIVE | Noted: 2020-05-07

## 2020-05-07 PROBLEM — R55 SYNCOPE: Status: ACTIVE | Noted: 2020-05-07

## 2020-05-07 PROBLEM — I48.91 ATRIAL FIBRILLATION AND FLUTTER (HCC): Status: ACTIVE | Noted: 2020-02-15

## 2020-05-07 PROBLEM — Z72.0 TOBACCO USE: Status: ACTIVE | Noted: 2020-05-07

## 2020-05-07 LAB
ALBUMIN SERPL-MCNC: 4 G/DL (ref 3.5–5.2)
ALBUMIN/GLOB SERPL: 1.4 G/DL
ALP SERPL-CCNC: 99 U/L (ref 39–117)
ALT SERPL W P-5'-P-CCNC: 21 U/L (ref 1–41)
ANION GAP SERPL CALCULATED.3IONS-SCNC: 11 MMOL/L (ref 5–15)
AST SERPL-CCNC: 18 U/L (ref 1–40)
BACTERIA UR QL AUTO: ABNORMAL /HPF
BASOPHILS # BLD AUTO: 0.03 10*3/MM3 (ref 0–0.2)
BASOPHILS NFR BLD AUTO: 0.4 % (ref 0–1.5)
BILIRUB SERPL-MCNC: 0.8 MG/DL (ref 0.2–1.2)
BILIRUB UR QL STRIP: ABNORMAL
BUN BLD-MCNC: 17 MG/DL (ref 8–23)
BUN/CREAT SERPL: 21 (ref 7–25)
CALCIUM SPEC-SCNC: 9.5 MG/DL (ref 8.6–10.5)
CHLORIDE SERPL-SCNC: 106 MMOL/L (ref 98–107)
CLARITY UR: ABNORMAL
CO2 SERPL-SCNC: 24 MMOL/L (ref 22–29)
COLOR UR: ABNORMAL
CREAT BLD-MCNC: 0.81 MG/DL (ref 0.76–1.27)
DEPRECATED RDW RBC AUTO: 48.9 FL (ref 37–54)
EOSINOPHIL # BLD AUTO: 0.17 10*3/MM3 (ref 0–0.4)
EOSINOPHIL NFR BLD AUTO: 2.3 % (ref 0.3–6.2)
ERYTHROCYTE [DISTWIDTH] IN BLOOD BY AUTOMATED COUNT: 13.6 % (ref 12.3–15.4)
GFR SERPL CREATININE-BSD FRML MDRD: 94 ML/MIN/1.73
GLOBULIN UR ELPH-MCNC: 2.8 GM/DL
GLUCOSE BLD-MCNC: 100 MG/DL (ref 65–99)
GLUCOSE UR STRIP-MCNC: NEGATIVE MG/DL
HCT VFR BLD AUTO: 42.5 % (ref 37.5–51)
HGB BLD-MCNC: 14.4 G/DL (ref 13–17.7)
HGB UR QL STRIP.AUTO: ABNORMAL
IMM GRANULOCYTES # BLD AUTO: 0.01 10*3/MM3 (ref 0–0.05)
IMM GRANULOCYTES NFR BLD AUTO: 0.1 % (ref 0–0.5)
KETONES UR QL STRIP: ABNORMAL
LEUKOCYTE ESTERASE UR QL STRIP.AUTO: ABNORMAL
LYMPHOCYTES # BLD AUTO: 0.8 10*3/MM3 (ref 0.7–3.1)
LYMPHOCYTES NFR BLD AUTO: 10.8 % (ref 19.6–45.3)
MCH RBC QN AUTO: 33 PG (ref 26.6–33)
MCHC RBC AUTO-ENTMCNC: 33.9 G/DL (ref 31.5–35.7)
MCV RBC AUTO: 97.3 FL (ref 79–97)
MONOCYTES # BLD AUTO: 0.86 10*3/MM3 (ref 0.1–0.9)
MONOCYTES NFR BLD AUTO: 11.6 % (ref 5–12)
NEUTROPHILS # BLD AUTO: 5.54 10*3/MM3 (ref 1.7–7)
NEUTROPHILS NFR BLD AUTO: 74.8 % (ref 42.7–76)
NITRITE UR QL STRIP: POSITIVE
NRBC BLD AUTO-RTO: 0 /100 WBC (ref 0–0.2)
PH UR STRIP.AUTO: 6.5 [PH] (ref 5–8)
PLATELET # BLD AUTO: 226 10*3/MM3 (ref 140–450)
PMV BLD AUTO: 8.9 FL (ref 6–12)
POTASSIUM BLD-SCNC: 3.9 MMOL/L (ref 3.5–5.2)
PROT SERPL-MCNC: 6.8 G/DL (ref 6–8.5)
PROT UR QL STRIP: ABNORMAL
RBC # BLD AUTO: 4.37 10*6/MM3 (ref 4.14–5.8)
RBC # UR: ABNORMAL /HPF
REF LAB TEST METHOD: ABNORMAL
SODIUM BLD-SCNC: 141 MMOL/L (ref 136–145)
SP GR UR STRIP: >=1.03 (ref 1–1.03)
SQUAMOUS #/AREA URNS HPF: ABNORMAL /HPF
UROBILINOGEN UR QL STRIP: ABNORMAL
WBC NRBC COR # BLD: 7.41 10*3/MM3 (ref 3.4–10.8)
WBC UR QL AUTO: ABNORMAL /HPF

## 2020-05-07 PROCEDURE — 93010 ELECTROCARDIOGRAM REPORT: CPT | Performed by: INTERNAL MEDICINE

## 2020-05-07 PROCEDURE — 85025 COMPLETE CBC W/AUTO DIFF WBC: CPT | Performed by: FAMILY MEDICINE

## 2020-05-07 PROCEDURE — 99232 SBSQ HOSP IP/OBS MODERATE 35: CPT | Performed by: NURSE PRACTITIONER

## 2020-05-07 PROCEDURE — 80053 COMPREHEN METABOLIC PANEL: CPT | Performed by: FAMILY MEDICINE

## 2020-05-07 PROCEDURE — 93005 ELECTROCARDIOGRAM TRACING: CPT | Performed by: INTERNAL MEDICINE

## 2020-05-07 PROCEDURE — 81001 URINALYSIS AUTO W/SCOPE: CPT | Performed by: FAMILY MEDICINE

## 2020-05-07 PROCEDURE — 36415 COLL VENOUS BLD VENIPUNCTURE: CPT | Performed by: FAMILY MEDICINE

## 2020-05-07 RX ORDER — CEFDINIR 300 MG/1
300 CAPSULE ORAL 2 TIMES DAILY
Qty: 14 CAPSULE | Refills: 0 | Status: SHIPPED | OUTPATIENT
Start: 2020-05-07 | End: 2020-05-07 | Stop reason: SDUPTHER

## 2020-05-07 RX ORDER — CEFDINIR 300 MG/1
300 CAPSULE ORAL 2 TIMES DAILY
Qty: 14 CAPSULE | Refills: 0 | Status: SHIPPED | OUTPATIENT
Start: 2020-05-07 | End: 2020-05-21

## 2020-05-07 RX ORDER — AMIODARONE HYDROCHLORIDE 200 MG/1
200 TABLET ORAL
Status: DISCONTINUED | OUTPATIENT
Start: 2020-05-22 | End: 2020-05-07 | Stop reason: HOSPADM

## 2020-05-07 RX ORDER — AMIODARONE HYDROCHLORIDE 200 MG/1
200 TABLET ORAL
Qty: 90 TABLET | Refills: 3 | Status: SHIPPED | OUTPATIENT
Start: 2020-05-22 | End: 2020-05-14 | Stop reason: SDUPTHER

## 2020-05-07 RX ORDER — HYDROCODONE BITARTRATE AND ACETAMINOPHEN 5; 325 MG/1; MG/1
1 TABLET ORAL EVERY 6 HOURS PRN
Status: DISCONTINUED | OUTPATIENT
Start: 2020-05-07 | End: 2020-05-07 | Stop reason: HOSPADM

## 2020-05-07 RX ORDER — AMIODARONE HYDROCHLORIDE 400 MG/1
400 TABLET ORAL EVERY 12 HOURS SCHEDULED
Qty: 28 TABLET | Refills: 0 | Status: SHIPPED | OUTPATIENT
Start: 2020-05-07 | End: 2020-05-15 | Stop reason: DRUGHIGH

## 2020-05-07 RX ORDER — AMIODARONE HYDROCHLORIDE 200 MG/1
400 TABLET ORAL EVERY 12 HOURS SCHEDULED
Status: DISCONTINUED | OUTPATIENT
Start: 2020-05-07 | End: 2020-05-07 | Stop reason: HOSPADM

## 2020-05-07 RX ORDER — AMIODARONE HYDROCHLORIDE 200 MG/1
200 TABLET ORAL
Qty: 90 TABLET | Refills: 3 | Status: SHIPPED | OUTPATIENT
Start: 2020-05-22 | End: 2020-05-07

## 2020-05-07 RX ADMIN — PANTOPRAZOLE SODIUM 20 MG: 20 TABLET, DELAYED RELEASE ORAL at 04:52

## 2020-05-07 RX ADMIN — GUAIFENESIN 1200 MG: 600 TABLET, EXTENDED RELEASE ORAL at 09:11

## 2020-05-07 RX ADMIN — LISINOPRIL 20 MG: 20 TABLET ORAL at 09:11

## 2020-05-07 RX ADMIN — PROPAFENONE HYDROCHLORIDE 150 MG: 150 TABLET, FILM COATED ORAL at 13:58

## 2020-05-07 RX ADMIN — DOXYCYCLINE 100 MG: 100 TABLET, FILM COATED ORAL at 09:10

## 2020-05-07 RX ADMIN — HYDROCODONE BITARTRATE AND ACETAMINOPHEN 1 TABLET: 5; 325 TABLET ORAL at 12:06

## 2020-05-07 RX ADMIN — PROPAFENONE HYDROCHLORIDE 150 MG: 150 TABLET, FILM COATED ORAL at 04:52

## 2020-05-07 RX ADMIN — CILOSTAZOL 100 MG: 100 TABLET ORAL at 09:11

## 2020-05-07 RX ADMIN — HYDROCODONE BITARTRATE AND ACETAMINOPHEN 1 TABLET: 5; 325 TABLET ORAL at 05:20

## 2020-05-07 NOTE — DISCHARGE PLACEMENT REQUEST
Discharge Summary      Marco A Katz MD at 05/07/20 0956              [facility] Medicine Services  DISCHARGE SUMMARY       Date of Admission: 5/5/2020  Date of Discharge:  5/7/2020  Primary Care Physician: BARRY Urrutia MD    Presenting Problem/History of Present Illness:  Atrial fibrillation with RVR (CMS/[facility]) [I48.91]     Final Discharge Diagnoses:  Active Hospital Problems    Diagnosis   • **Atrial fibrillation status post cardioversion (CMS/[facility])   • Tobacco use   • Syncope   • Current use of long term anticoagulation   • Alcoholism /alcohol abuse (CMS/[facility])   • Atrial fibrillation and flutter (CMS/[facility])   • PVD (peripheral vascular disease) (CMS/[facility])   • Essential hypertension   1.  Afib/flutter  -Propafenone  -Cardizem Drip  -IV Digoxin  -cardiology following  -Xarelto     2.   Syncope  -likely due to fast arrhytmia  -Has had recent echo with no valvular dysfunction  -will check orthostatics     3.  PVD  -Pletal  -Xarelto     4.  HTN  -lisinopril    Consults: Cardiology    Procedures Performed: Cardioversioin    Pertinent Test Results: N/A    Chief Complaint on Day of Discharge: feeling better    History of Present Illness on Day of Discharge:   Doing well, no CP or palpitations or SOA.    No light-headedness or Dizziness.      Hospital Course:  The patient is a 74 y.o. male who presented to [facility] with syncope.  He was found to be in a-flutter with rapid ventricular response.  He was started on a Cardizem drip.  Cardiology was consulted.  The patient was taken to the cath lab for cardioversion.  He is currently in NSR with a heart rate in the 80's.  He is asymtpomatic.  He would like to go home.    He is comfortable with being discharged and with the discharge plan.  Hewas given the chance to ask questions and all questions were answered to his satisfaction.        Condition on Discharge:  Stable    Physical Exam on Discharge:  /60 (BP  "Location: Right arm, Patient Position: Sitting)   Pulse 85   Temp 97.9 °F (36.6 °C) (Oral)   Resp 16   Ht 172.7 cm (68\")   Wt 72.2 kg (159 lb 3.2 oz)   SpO2 97%   BMI 24.21 kg/m²    Physical Exam   Constitutional: He is oriented to person, place, and time. He appears well-developed and well-nourished.   HENT:   Head: Normocephalic and atraumatic.   Right Ear: External ear normal.   Left Ear: External ear normal.   Nose: Nose normal.   Mouth/Throat: Oropharynx is clear and moist.   Eyes: Pupils are equal, round, and reactive to light. Conjunctivae and EOM are normal. Right eye exhibits no discharge. Left eye exhibits no discharge. No scleral icterus.   Neck: Normal range of motion. Neck supple. No tracheal deviation present. No thyromegaly present.   Cardiovascular: Normal rate, regular rhythm, normal heart sounds and intact distal pulses. Exam reveals no gallop and no friction rub.   No murmur heard.  Pulmonary/Chest: Effort normal and breath sounds normal. No stridor. No respiratory distress. He has no wheezes. He has no rales. He exhibits no tenderness.   Abdominal: Soft. Bowel sounds are normal. He exhibits no distension and no mass. There is no tenderness. There is no rebound and no guarding. No hernia.   Musculoskeletal: Normal range of motion. He exhibits no edema or deformity.   Lymphadenopathy:     He has no cervical adenopathy.   Neurological: He is alert and oriented to person, place, and time. He has normal reflexes. He displays normal reflexes. No cranial nerve deficit. He exhibits normal muscle tone. Coordination normal.   Skin: Skin is warm and dry. No rash noted. No erythema. No pallor.   Psychiatric: He has a normal mood and affect. His behavior is normal. Judgment and thought content normal.   Vitals reviewed.        Discharge Disposition:  Home or Self CareHome    Discharge Medications:     Discharge Medications      New Medications      Instructions Start Date   amiodarone 400 MG " tablet  Commonly known as:  PACERONE   400 mg, Oral, Every 12 Hours Scheduled      amiodarone 200 MG tablet  Commonly known as:  PACERONE   200 mg, Oral, Every 24 Hours Scheduled   Start Date:  May 22, 2020     cefdinir 300 MG capsule  Commonly known as:  OMNICEF   300 mg, Oral, 2 Times Daily         Continue These Medications      Instructions Start Date   cilostazol 100 MG tablet  Commonly known as:  PLETAL   100 mg, Oral, 2 Times Daily      doxycycline 100 MG capsule  Commonly known as:  VIBRAMYCIN   100 mg, Oral, 2 Times Daily      guaiFENesin 600 MG 12 hr tablet  Commonly known as:  MUCINEX   1,200 mg, Oral, Every 12 Hours Scheduled      hydrOXYzine 25 MG tablet  Commonly known as:  ATARAX   25 mg, Oral, 3 Times Daily PRN      lisinopril 20 MG tablet  Commonly known as:  PRINIVIL,ZESTRIL   20 mg, Oral, Daily      pantoprazole 20 MG EC tablet  Commonly known as:  PROTONIX   20 mg, Oral, Daily      rivaroxaban 20 MG tablet  Commonly known as:  XARELTO   20 mg, Oral, Daily With Dinner      sildenafil 50 MG tablet  Commonly known as:  VIAGRA   50 mg, Oral, Daily PRN         Stop These Medications    propafenone 150 MG tablet  Commonly known as:  RYTHMOL            Discharge Diet: regular    Activity at Discharge: as tolerated    Discharge Care Plan/Instructions:   Go to ER for chest pain or soa, or passing out    Follow-up Appointments:   Future Appointments   Date Time Provider Department Center   5/14/2020  9:00 AM BARRY Urrutia MD [facility]   5/21/2020  8:30 AM [facility]   6/11/2020  9:00 AM BARRY Urrutia MD [facility]       Test Results Pending at Discharge: N/A    Marco A Katz MD  05/07/20  15:36    Time: 35 minutes        Electronically signed by Marco A Katz MD at 05/07/20 6365

## 2020-05-07 NOTE — PLAN OF CARE
Problem: Patient Care Overview  Goal: Plan of Care Review  Outcome: Ongoing (interventions implemented as appropriate)  Note:   VSS, no c/o pain; sinus/sinus arrhythmia  bpm throughout shift; no c/o SOA; rested well during shift, safety maintained, will continue to monitor      Problem: Patient Care Overview  Goal: Individualization and Mutuality  Outcome: Ongoing (interventions implemented as appropriate)     Problem: Patient Care Overview  Goal: Discharge Needs Assessment  Outcome: Ongoing (interventions implemented as appropriate)     Problem: Patient Care Overview  Goal: Interprofessional Rounds/Family Conf  Outcome: Ongoing (interventions implemented as appropriate)     Problem: Arrhythmia/Dysrhythmia (Symptomatic) (Adult)  Goal: Signs and Symptoms of Listed Potential Problems Will be Absent, Minimized or Managed (Arrhythmia/Dysrhythmia)  Outcome: Ongoing (interventions implemented as appropriate)     Problem: Fall Risk (Adult)  Goal: Absence of Fall  Outcome: Ongoing (interventions implemented as appropriate)     Problem: Skin Injury Risk (Adult)  Goal: Skin Health and Integrity  Outcome: Ongoing (interventions implemented as appropriate)

## 2020-05-07 NOTE — PROGRESS NOTES
[facility] -  Progress Note     LOS: 2 days   Patient Care Team:  BARRY Urrutia MD as PCP - General (Internal Medicine)    Chief Complaint: Syncope    Subjective     Interval History: No acute events overnight. Pt has maintained sinus rhythm since cardioversion yesterday on review of telemetry.  He denies any change in symptoms since cardioversion, however he reports being relatively asymptomatic prior to the cardioversion. Patient denies chest pain, shortness of breath, palpitations, dizziness, syncope, orthopnea, PND, edema or decreased stamina.  Patient denies any signs of bleeding.    Patient Complaints: none        Review of Systems:     Review of Systems   Constitutional: Negative for chills, fatigue and fever.   HENT: Negative.    Eyes: Negative.    Respiratory: Negative for cough, chest tightness, shortness of breath, wheezing and stridor.    Cardiovascular: Negative for chest pain, palpitations and leg swelling.   Gastrointestinal: Negative for abdominal distention, abdominal pain, blood in stool, constipation, diarrhea, nausea and vomiting.   Endocrine: Negative.    Genitourinary: Negative for difficulty urinating, dysuria, flank pain and hematuria.   Musculoskeletal: Negative.    Skin: Negative for rash and wound.   Allergic/Immunologic: Negative.    Neurological: Negative for dizziness, syncope, weakness, light-headedness and headaches.   Hematological: Does not bruise/bleed easily.   Psychiatric/Behavioral: Negative for agitation, behavioral problems, confusion, hallucinations, sleep disturbance and suicidal ideas. The patient is not nervous/anxious.      Objective     Vital Sign Min/Max for last 24 hours  Temp  Min: 97 °F (36.1 °C)  Max: 98.6 °F (37 °C)   BP  Min: 137/63  Max: 160/71   Pulse  Min: 85  Max: 91   Resp  Min: 16  Max: 16   SpO2  Min: 97 %  Max: 100 %   No data recorded   Weight  Min: 72.2 kg (159 lb 3.2 oz)  Max: 72.2 kg (159 lb 3.2 oz)     Telemetry: NSR 78-93          05/06/20 2024   Weight: 72.2 kg (159 lb 3.2 oz)       Physical Exam:    Physical Exam   Constitutional: He is oriented to person, place, and time. Vital signs are normal. He appears well-developed and well-nourished. No distress.   HENT:   Head: Normocephalic and atraumatic.   Right Ear: External ear normal.   Left Ear: External ear normal.   Eyes: Pupils are equal, round, and reactive to light. Conjunctivae are normal. Right eye exhibits no discharge. Left eye exhibits no discharge.   Neck: Normal range of motion. Neck supple. No JVD present. Carotid bruit is not present. No thyromegaly present.   Cardiovascular: Normal rate, regular rhythm, normal heart sounds and intact distal pulses. PMI is not displaced. Exam reveals no gallop, no friction rub and no decreased pulses.   No murmur heard.  Pulses:       Radial pulses are 2+ on the right side, and 2+ on the left side.        Dorsalis pedis pulses are 2+ on the right side, and 2+ on the left side.        Posterior tibial pulses are 2+ on the right side, and 2+ on the left side.   Pulmonary/Chest: Effort normal and breath sounds normal. No respiratory distress. He has no decreased breath sounds. He has no wheezes. He has no rhonchi. He has no rales. He exhibits no tenderness.   Abdominal: Soft. Bowel sounds are normal. He exhibits no distension. There is no tenderness.   Musculoskeletal: Normal range of motion. He exhibits no edema.   Neurological: He is alert and oriented to person, place, and time.   Skin: Skin is warm and dry. No rash noted. He is not diaphoretic. No erythema. No pallor.   Psychiatric: He has a normal mood and affect. His behavior is normal. Judgment and thought content normal.   Vitals reviewed.    Results Review:   Lab Results (last 72 hours)     Procedure Component Value Units Date/Time    Urinalysis With Microscopic If Indicated (No Culture) - Urine, Clean Catch [[id number]]  (Abnormal) Collected:  05/07/20 0933    Specimen:  Urine,  Clean Catch Updated:  05/07/20 0957     Color, UA Other     Comment: brown        Appearance, UA Turbid     pH, UA 6.5     Specific Gravity, UA >=1.030     Glucose, UA Negative     Ketones, UA 15 mg/dL (1+)     Bilirubin, UA Moderate (2+)     Blood, UA Large (3+)     Protein, UA >=300 mg/dL (3+)     Leuk Esterase, UA Small (1+)     Nitrite, UA Positive     Urobilinogen, UA 2.0 E.U./dL    Urinalysis, Microscopic Only - Urine, Clean Catch [[id number]]  (Abnormal) Collected:  05/07/20 0933    Specimen:  Urine, Clean Catch Updated:  05/07/20 0957     RBC, UA Too Numerous to Count /HPF      WBC, UA 13-20 /HPF      Bacteria, UA 1+ /HPF      Squamous Epithelial Cells, UA None Seen /HPF      Methodology Automated Microscopy    Narrative:       Dipstick results may be inaccurate due to color of urine.    Comprehensive Metabolic Panel [[id number]]  (Abnormal) Collected:  05/07/20 0603    Specimen:  Blood Updated:  05/07/20 0639     Glucose 100 mg/dL      BUN 17 mg/dL      Creatinine 0.81 mg/dL      Sodium 141 mmol/L      Potassium 3.9 mmol/L      Chloride 106 mmol/L      CO2 24.0 mmol/L      Calcium 9.5 mg/dL      Total Protein 6.8 g/dL      Albumin 4.00 g/dL      ALT (SGPT) 21 U/L      AST (SGOT) 18 U/L      Alkaline Phosphatase 99 U/L      Total Bilirubin 0.8 mg/dL      eGFR Non African Amer 94 mL/min/1.73      Globulin 2.8 gm/dL      A/G Ratio 1.4 g/dL      BUN/Creatinine Ratio 21.0     Anion Gap 11.0 mmol/L     Narrative:       GFR Normal >60  Chronic Kidney Disease <60  Kidney Failure <15      CBC & Differential [[id number]] Collected:  05/07/20 0603    Specimen:  Blood Updated:  05/07/20 0623    Narrative:       The following orders were created for panel order CBC & Differential.  Procedure                               Abnormality         Status                     ---------                               -----------         ------                     CBC Auto Differential[[id number]]        Abnormal            Final  result                 Please view results for these tests on the individual orders.    CBC Auto Differential [[id number]]  (Abnormal) Collected:  05/07/20 0603    Specimen:  Blood Updated:  05/07/20 0623     WBC 7.41 10*3/mm3      RBC 4.37 10*6/mm3      Hemoglobin 14.4 g/dL      Hematocrit 42.5 %      MCV 97.3 fL      MCH 33.0 pg      MCHC 33.9 g/dL      RDW 13.6 %      RDW-SD 48.9 fl      MPV 8.9 fL      Platelets 226 10*3/mm3      Neutrophil % 74.8 %      Lymphocyte % 10.8 %      Monocyte % 11.6 %      Eosinophil % 2.3 %      Basophil % 0.4 %      Immature Grans % 0.1 %      Neutrophils, Absolute 5.54 10*3/mm3      Lymphocytes, Absolute 0.80 10*3/mm3      Monocytes, Absolute 0.86 10*3/mm3      Eosinophils, Absolute 0.17 10*3/mm3      Basophils, Absolute 0.03 10*3/mm3      Immature Grans, Absolute 0.01 10*3/mm3      nRBC 0.0 /100 WBC     CBC & Differential [[id number]] Collected:  05/06/20 0515    Specimen:  Blood Updated:  05/06/20 0614    Narrative:       The following orders were created for panel order CBC & Differential.  Procedure                               Abnormality         Status                     ---------                               -----------         ------                     CBC Auto Differential[[id number]]        Abnormal            Final result                 Please view results for these tests on the individual orders.    CBC Auto Differential [[id number]]  (Abnormal) Collected:  05/06/20 0515    Specimen:  Blood Updated:  05/06/20 0614     WBC 6.94 10*3/mm3      RBC 4.30 10*6/mm3      Hemoglobin 14.0 g/dL      Hematocrit 41.7 %      MCV 97.0 fL      MCH 32.6 pg      MCHC 33.6 g/dL      RDW 13.7 %      RDW-SD 49.4 fl      MPV 9.4 fL      Platelets 200 10*3/mm3      Neutrophil % 65.3 %      Lymphocyte % 18.0 %      Monocyte % 13.1 %      Eosinophil % 2.7 %      Basophil % 0.6 %      Immature Grans % 0.3 %      Neutrophils, Absolute 4.53 10*3/mm3      Lymphocytes, Absolute 1.25 10*3/mm3       Monocytes, Absolute 0.91 10*3/mm3      Eosinophils, Absolute 0.19 10*3/mm3      Basophils, Absolute 0.04 10*3/mm3      Immature Grans, Absolute 0.02 10*3/mm3      nRBC 0.0 /100 WBC     Comprehensive Metabolic Panel [[id number]]  (Abnormal) Collected:  05/05/20 1413    Specimen:  Blood Updated:  05/06/20 0056     Glucose 88 mg/dL      BUN 15 mg/dL      Creatinine 0.94 mg/dL      Sodium 142 mmol/L      Potassium 3.9 mmol/L      Chloride 107 mmol/L      CO2 20.0 mmol/L      Calcium 9.4 mg/dL      Total Protein 6.2 g/dL      Albumin 3.60 g/dL      ALT (SGPT) 23 U/L      AST (SGOT) 22 U/L      Alkaline Phosphatase 97 U/L      Total Bilirubin 0.6 mg/dL      eGFR Non African Amer 79 mL/min/1.73      Globulin 2.6 gm/dL      A/G Ratio 1.4 g/dL      BUN/Creatinine Ratio 16.0     Anion Gap 15.0 mmol/L     Narrative:       GFR Normal >60  Chronic Kidney Disease <60  Kidney Failure <15      TSH [[id number]]  (Normal) Collected:  05/05/20 1413    Specimen:  Blood Updated:  05/05/20 1603     TSH 1.170 uIU/mL      Comment: TSH results may be falsely decreased if patient taking Biotin.       Troponin [[id number]]  (Normal) Collected:  05/05/20 1413    Specimen:  Blood Updated:  05/05/20 1435     Troponin T <0.010 ng/mL     Narrative:       Troponin T Reference Range:  <= 0.03 ng/mL-   Negative for AMI  >0.03 ng/mL-     Abnormal for myocardial necrosis.  Clinicians would have to utilize clinical acumen, EKG, Troponin and serial changes to determine if it is an Acute Myocardial Infarction or myocardial injury due to an underlying chronic condition.       Results may be falsely decreased if patient taking Biotin.      Comprehensive Metabolic Panel [[id number]]  (Abnormal) Collected:  05/05/20 1104    Specimen:  Blood Updated:  05/05/20 1145     Glucose 119 mg/dL      BUN 15 mg/dL      Creatinine 1.09 mg/dL      Sodium 141 mmol/L      Potassium 3.8 mmol/L      Chloride 104 mmol/L      CO2 23.0 mmol/L      Calcium 9.4 mg/dL      Total  Protein 6.5 g/dL      Albumin 3.90 g/dL      ALT (SGPT) 24 U/L      AST (SGOT) 24 U/L      Alkaline Phosphatase 101 U/L      Total Bilirubin 0.8 mg/dL      eGFR Non African Amer 66 mL/min/1.73      Globulin 2.6 gm/dL      A/G Ratio 1.5 g/dL      BUN/Creatinine Ratio 13.8     Anion Gap 14.0 mmol/L     Narrative:       GFR Normal >60  Chronic Kidney Disease <60  Kidney Failure <15      Troponin [[id number]]  (Normal) Collected:  05/05/20 1104    Specimen:  Blood Updated:  05/05/20 1142     Troponin T <0.010 ng/mL     Narrative:       Troponin T Reference Range:  <= 0.03 ng/mL-   Negative for AMI  >0.03 ng/mL-     Abnormal for myocardial necrosis.  Clinicians would have to utilize clinical acumen, EKG, Troponin and serial changes to determine if it is an Acute Myocardial Infarction or myocardial injury due to an underlying chronic condition.       Results may be falsely decreased if patient taking Biotin.      CBC & Differential [[id number]] Collected:  05/05/20 1104    Specimen:  Blood Updated:  05/05/20 1125    Narrative:       The following orders were created for panel order CBC & Differential.  Procedure                               Abnormality         Status                     ---------                               -----------         ------                     CBC Auto Differential[[id number]]        Abnormal            Final result                 Please view results for these tests on the individual orders.    CBC Auto Differential [[id number]]  (Abnormal) Collected:  05/05/20 1104    Specimen:  Blood Updated:  05/05/20 1125     WBC 8.96 10*3/mm3      RBC 4.67 10*6/mm3      Hemoglobin 15.2 g/dL      Hematocrit 45.8 %      MCV 98.1 fL      MCH 32.5 pg      MCHC 33.2 g/dL      RDW 14.0 %      RDW-SD 50.8 fl      MPV 9.0 fL      Platelets 210 10*3/mm3      Neutrophil % 81.1 %      Lymphocyte % 7.1 %      Monocyte % 10.6 %      Eosinophil % 0.6 %      Basophil % 0.3 %      Immature Grans % 0.3 %      Neutrophils,  Absolute 7.26 10*3/mm3      Lymphocytes, Absolute 0.64 10*3/mm3      Monocytes, Absolute 0.95 10*3/mm3      Eosinophils, Absolute 0.05 10*3/mm3      Basophils, Absolute 0.03 10*3/mm3      Immature Grans, Absolute 0.03 10*3/mm3      nRBC 0.0 /100 WBC           Medication Review: yes  Current Facility-Administered Medications   Medication Dose Route Frequency Provider Last Rate Last Dose   • [START ON 5/22/2020] amiodarone (PACERONE) tablet 200 mg  200 mg Oral Q24H Odalis Garcia APRN       • amiodarone (PACERONE) tablet 400 mg  400 mg Oral Q12H Odalis Garcia APRN       • cilostazol (PLETAL) tablet 100 mg  100 mg Oral BID Marco A Katz MD   100 mg at 05/07/20 0911   • dilTIAZem (CARDIZEM) 125 mg in sodium chloride 0.9 % 125 mL (1 mg/mL) infusion  5-15 mg/hr Intravenous Titrated Marco A Katz MD   Stopped at 05/06/20 1445   • doxycycline (ADOXA) tablet 100 mg  100 mg Oral Q12H Marco A Katz MD   100 mg at 05/07/20 0910   • guaiFENesin (MUCINEX) 12 hr tablet 1,200 mg  1,200 mg Oral Q12H Marco A Katz MD   1,200 mg at 05/07/20 0911   • HYDROcodone-acetaminophen (NORCO) 5-325 MG per tablet 1 tablet  1 tablet Oral Q6H PRN Dwain Angulo MD   1 tablet at 05/07/20 1206   • hydrOXYzine (ATARAX) tablet 25 mg  25 mg Oral TID PRN Marco A Katz MD       • lisinopril (PRINIVIL,ZESTRIL) tablet 20 mg  20 mg Oral Daily Marco A Katz MD   20 mg at 05/07/20 0911   • pantoprazole (PROTONIX) EC tablet 20 mg  20 mg Oral Q AM Marco A Katz MD   20 mg at 05/07/20 0452   • rivaroxaban (XARELTO) tablet 20 mg  20 mg Oral Daily With Dinner Marco A Katz MD   20 mg at 05/06/20 1724   • sodium chloride 0.9 % flush 10 mL  10 mL Intravenous PRN Marco A Katz MD             Assessment/Plan       Atrial fibrillation status post cardioversion (CMS/HCC)    Atrial fibrillation and flutter (CMS/HCC)    Syncope    Essential hypertension    PVD (peripheral vascular  disease) (CMS/[facility])    Current use of long term anticoagulation    Alcoholism /alcohol abuse (CMS/[facility])    Tobacco use      Plan    1. Atrial fibrillation status post cardioversion- patient has maintained sinus rhythm overnight and throughout the day following cardioversion yesterday.  Stable for discharge from cardiology standpoint.  Will need 2-week follow-up with nurse practitioner from the heart group for an office appointment with repeat EKG.  2.  Paroxysmal atrial fibrillation/flutter- stop Rythmol.  Start amiodarone 400 mg by mouth twice daily for 2 weeks then decrease dose to 200 mg daily starting on 5/22/2020.  Continue Xarelto.  3.  Syncope- no recurrence since admission.  4.  Essential hypertension- well-controlled.  Continue lisinopril.    Odalis Garcia, APRN  05/07/20  15:33

## 2020-05-07 NOTE — DISCHARGE SUMMARY
[facility] Medicine Services  DISCHARGE SUMMARY       Date of Admission: 5/5/2020  Date of Discharge:  5/7/2020  Primary Care Physician: BARRY Urrutia MD    Presenting Problem/History of Present Illness:  Atrial fibrillation with RVR (CMS/[facility]) [I48.91]     Final Discharge Diagnoses:  Active Hospital Problems    Diagnosis   • **Atrial fibrillation status post cardioversion (CMS/[facility])   • Tobacco use   • Syncope   • Current use of long term anticoagulation   • Alcoholism /alcohol abuse (CMS/[facility])   • Atrial fibrillation and flutter (CMS/[facility])   • PVD (peripheral vascular disease) (CMS/[facility])   • Essential hypertension   1.  Afib/flutter  -Propafenone  -Cardizem Drip  -IV Digoxin  -cardiology following  -Xarelto     2.   Syncope  -likely due to fast arrhytmia  -Has had recent echo with no valvular dysfunction  -will check orthostatics     3.  PVD  -Pletal  -Xarelto     4.  HTN  -lisinopril    Consults: Cardiology    Procedures Performed: Cardioversioin    Pertinent Test Results: N/A    Chief Complaint on Day of Discharge: feeling better    History of Present Illness on Day of Discharge:   Doing well, no CP or palpitations or SOA.    No light-headedness or Dizziness.      Hospital Course:  The patient is a 74 y.o. male who presented to [facility] with syncope.  He was found to be in a-flutter with rapid ventricular response.  He was started on a Cardizem drip.  Cardiology was consulted.  The patient was taken to the cath lab for cardioversion.  He is currently in NSR with a heart rate in the 80's.  He is asymtpomatic.  He would like to go home.    He is comfortable with being discharged and with the discharge plan.  Hewas given the chance to ask questions and all questions were answered to his satisfaction.        Condition on Discharge:  Stable    Physical Exam on Discharge:  /60 (BP Location: Right arm, Patient Position: Sitting)   Pulse 85   Temp 97.9 °F (36.6  "°C) (Oral)   Resp 16   Ht 172.7 cm (68\")   Wt 72.2 kg (159 lb 3.2 oz)   SpO2 97%   BMI 24.21 kg/m²   Physical Exam   Constitutional: He is oriented to person, place, and time. He appears well-developed and well-nourished.   HENT:   Head: Normocephalic and atraumatic.   Right Ear: External ear normal.   Left Ear: External ear normal.   Nose: Nose normal.   Mouth/Throat: Oropharynx is clear and moist.   Eyes: Pupils are equal, round, and reactive to light. Conjunctivae and EOM are normal. Right eye exhibits no discharge. Left eye exhibits no discharge. No scleral icterus.   Neck: Normal range of motion. Neck supple. No tracheal deviation present. No thyromegaly present.   Cardiovascular: Normal rate, regular rhythm, normal heart sounds and intact distal pulses. Exam reveals no gallop and no friction rub.   No murmur heard.  Pulmonary/Chest: Effort normal and breath sounds normal. No stridor. No respiratory distress. He has no wheezes. He has no rales. He exhibits no tenderness.   Abdominal: Soft. Bowel sounds are normal. He exhibits no distension and no mass. There is no tenderness. There is no rebound and no guarding. No hernia.   Musculoskeletal: Normal range of motion. He exhibits no edema or deformity.   Lymphadenopathy:     He has no cervical adenopathy.   Neurological: He is alert and oriented to person, place, and time. He has normal reflexes. He displays normal reflexes. No cranial nerve deficit. He exhibits normal muscle tone. Coordination normal.   Skin: Skin is warm and dry. No rash noted. No erythema. No pallor.   Psychiatric: He has a normal mood and affect. His behavior is normal. Judgment and thought content normal.   Vitals reviewed.        Discharge Disposition:  Home or Self CareHome    Discharge Medications:     Discharge Medications      New Medications      Instructions Start Date   amiodarone 400 MG tablet  Commonly known as:  PACERONE   400 mg, Oral, Every 12 Hours Scheduled      amiodarone " 200 MG tablet  Commonly known as:  PACERONE   200 mg, Oral, Every 24 Hours Scheduled   Start Date:  May 22, 2020     cefdinir 300 MG capsule  Commonly known as:  OMNICEF   300 mg, Oral, 2 Times Daily         Continue These Medications      Instructions Start Date   cilostazol 100 MG tablet  Commonly known as:  PLETAL   100 mg, Oral, 2 Times Daily      doxycycline 100 MG capsule  Commonly known as:  VIBRAMYCIN   100 mg, Oral, 2 Times Daily      guaiFENesin 600 MG 12 hr tablet  Commonly known as:  MUCINEX   1,200 mg, Oral, Every 12 Hours Scheduled      hydrOXYzine 25 MG tablet  Commonly known as:  ATARAX   25 mg, Oral, 3 Times Daily PRN      lisinopril 20 MG tablet  Commonly known as:  PRINIVIL,ZESTRIL   20 mg, Oral, Daily      pantoprazole 20 MG EC tablet  Commonly known as:  PROTONIX   20 mg, Oral, Daily      rivaroxaban 20 MG tablet  Commonly known as:  XARELTO   20 mg, Oral, Daily With Dinner      sildenafil 50 MG tablet  Commonly known as:  VIAGRA   50 mg, Oral, Daily PRN         Stop These Medications    propafenone 150 MG tablet  Commonly known as:  RYTHMOL            Discharge Diet: regular    Activity at Discharge: as tolerated    Discharge Care Plan/Instructions:   Go to ER for chest pain or soa, or passing out    Follow-up Appointments:   Future Appointments   Date Time Provider Department Center   5/14/2020  9:00 AM BARRY Urrutia MD [facility]   5/21/2020  8:30 AM [facility] NP [facility]   6/11/2020  9:00 AM BARRY Urrutia MD [facility] P[facility]       Test Results Pending at Discharge: N/A    Marco A Katz MD  05/07/20  15:36    Time: 35 minutes

## 2020-05-07 NOTE — PROGRESS NOTES
Continued Stay Note   [place]     Patient Name: Kingsley Lerma  MRN: [id number]  Today's Date: 5/7/2020    Admit Date: 5/5/2020    Discharge Plan     Row Name 05/07/20 1540       Plan    Plan  Home    Patient/Family in Agreement with Plan  yes    Final Discharge Disposition Code  01 - home or self-care    Final Note  Pt is being d/c'ed home today. Faxed d/c summary to the [facility] at [phone].        Discharge Codes    No documentation.       Expected Discharge Date and Time     Expected Discharge Date Expected Discharge Time    May 7, 2020             SHREYAS Somers

## 2020-05-08 ENCOUNTER — TRANSITIONAL CARE MANAGEMENT TELEPHONE ENCOUNTER (OUTPATIENT)
Dept: CALL CENTER | Facility: HOSPITAL | Age: 75
End: 2020-05-08

## 2020-05-08 NOTE — OUTREACH NOTE
Prep Survey      Responses   [facility] facility patient discharged from?  [place]   Is LACE score < 7 ?  No   Eligibility  [facility]   Date of Admission  05/05/20   Date of Discharge  05/07/20   Discharge Disposition  Home or Self Care   Discharge diagnosis  Atrial fibrillation with RVR cardioversion.   COVID-19 Test Status  Not tested   Does the patient have one of the following disease processes/diagnoses(primary or secondary)?  Other   Does the patient have Home health ordered?  Yes   What is the Home health agency?   [facility]    Is there a DME ordered?  No   Prep survey completed?  Yes          Lucretia Tamayo RN

## 2020-05-08 NOTE — DISCHARGE PLACEMENT REQUEST
Discharge Summary      Marco A Katz MD at 05/07/20 0956              [facility] Medicine Services  DISCHARGE SUMMARY       Date of Admission: 5/5/2020  Date of Discharge:  5/7/2020  Primary Care Physician: BARRY Urrutia MD    Presenting Problem/History of Present Illness:  Atrial fibrillation with RVR (CMS/[facility]) [I48.91]     Final Discharge Diagnoses:  Active Hospital Problems    Diagnosis   • **Atrial fibrillation status post cardioversion (CMS/[facility])   • Tobacco use   • Syncope   • Current use of long term anticoagulation   • Alcoholism /alcohol abuse (CMS/[facility])   • Atrial fibrillation and flutter (CMS/[facility])   • PVD (peripheral vascular disease) (CMS/[facility])   • Essential hypertension   1.  Afib/flutter  -Propafenone  -Cardizem Drip  -IV Digoxin  -cardiology following  -Xarelto     2.   Syncope  -likely due to fast arrhytmia  -Has had recent echo with no valvular dysfunction  -will check orthostatics     3.  PVD  -Pletal  -Xarelto     4.  HTN  -lisinopril    Consults: Cardiology    Procedures Performed: Cardioversioin    Pertinent Test Results: N/A    Chief Complaint on Day of Discharge: feeling better    History of Present Illness on Day of Discharge:   Doing well, no CP or palpitations or SOA.    No light-headedness or Dizziness.      Hospital Course:  The patient is a 74 y.o. male who presented to [facility] with syncope.  He was found to be in a-flutter with rapid ventricular response.  He was started on a Cardizem drip.  Cardiology was consulted.  The patient was taken to the cath lab for cardioversion.  He is currently in NSR with a heart rate in the 80's.  He is asymtpomatic.  He would like to go home.    He is comfortable with being discharged and with the discharge plan.  Hewas given the chance to ask questions and all questions were answered to his satisfaction.        Condition on Discharge:  Stable    Physical Exam on Discharge:  /60 (BP  "Location: Right arm, Patient Position: Sitting)   Pulse 85   Temp 97.9 °F (36.6 °C) (Oral)   Resp 16   Ht 172.7 cm (68\")   Wt 72.2 kg (159 lb 3.2 oz)   SpO2 97%   BMI 24.21 kg/m²    Physical Exam   Constitutional: He is oriented to person, place, and time. He appears well-developed and well-nourished.   HENT:   Head: Normocephalic and atraumatic.   Right Ear: External ear normal.   Left Ear: External ear normal.   Nose: Nose normal.   Mouth/Throat: Oropharynx is clear and moist.   Eyes: Pupils are equal, round, and reactive to light. Conjunctivae and EOM are normal. Right eye exhibits no discharge. Left eye exhibits no discharge. No scleral icterus.   Neck: Normal range of motion. Neck supple. No tracheal deviation present. No thyromegaly present.   Cardiovascular: Normal rate, regular rhythm, normal heart sounds and intact distal pulses. Exam reveals no gallop and no friction rub.   No murmur heard.  Pulmonary/Chest: Effort normal and breath sounds normal. No stridor. No respiratory distress. He has no wheezes. He has no rales. He exhibits no tenderness.   Abdominal: Soft. Bowel sounds are normal. He exhibits no distension and no mass. There is no tenderness. There is no rebound and no guarding. No hernia.   Musculoskeletal: Normal range of motion. He exhibits no edema or deformity.   Lymphadenopathy:     He has no cervical adenopathy.   Neurological: He is alert and oriented to person, place, and time. He has normal reflexes. He displays normal reflexes. No cranial nerve deficit. He exhibits normal muscle tone. Coordination normal.   Skin: Skin is warm and dry. No rash noted. No erythema. No pallor.   Psychiatric: He has a normal mood and affect. His behavior is normal. Judgment and thought content normal.   Vitals reviewed.        Discharge Disposition:  Home or Self CareHome    Discharge Medications:     Discharge Medications      New Medications      Instructions Start Date   amiodarone 400 MG " tablet  Commonly known as:  PACERONE   400 mg, Oral, Every 12 Hours Scheduled      amiodarone 200 MG tablet  Commonly known as:  PACERONE   200 mg, Oral, Every 24 Hours Scheduled   Start Date:  May 22, 2020     cefdinir 300 MG capsule  Commonly known as:  OMNICEF   300 mg, Oral, 2 Times Daily         Continue These Medications      Instructions Start Date   cilostazol 100 MG tablet  Commonly known as:  PLETAL   100 mg, Oral, 2 Times Daily      doxycycline 100 MG capsule  Commonly known as:  VIBRAMYCIN   100 mg, Oral, 2 Times Daily      guaiFENesin 600 MG 12 hr tablet  Commonly known as:  MUCINEX   1,200 mg, Oral, Every 12 Hours Scheduled      hydrOXYzine 25 MG tablet  Commonly known as:  ATARAX   25 mg, Oral, 3 Times Daily PRN      lisinopril 20 MG tablet  Commonly known as:  PRINIVIL,ZESTRIL   20 mg, Oral, Daily      pantoprazole 20 MG EC tablet  Commonly known as:  PROTONIX   20 mg, Oral, Daily      rivaroxaban 20 MG tablet  Commonly known as:  XARELTO   20 mg, Oral, Daily With Dinner      sildenafil 50 MG tablet  Commonly known as:  VIAGRA   50 mg, Oral, Daily PRN         Stop These Medications    propafenone 150 MG tablet  Commonly known as:  RYTHMOL            Discharge Diet: regular    Activity at Discharge: as tolerated    Discharge Care Plan/Instructions:   Go to ER for chest pain or soa, or passing out    Follow-up Appointments:   Future Appointments   Date Time Provider Department Center   5/14/2020  9:00 AM BARRY Urrutia MD [facility]AD   5/21/2020  8:30 AM [facility]ART [facility] NP [facility] CD PAD [facility]   6/11/2020  9:00 AM BARRY Urrutia MD [facility] IM PAD PAD       Test Results Pending at Discharge: N/A    Marco A Katz MD  05/07/20  15:36    Time: 35 minutes        Electronically signed by Marco A Katz MD at 05/07/20 0861

## 2020-05-08 NOTE — PAYOR COMM NOTE
"DC HOME 5-7-20    Kingsley Servin (74 y.o. Male)     Date of Birth Social Security Number Address Home Phone MRN    1945  [address] [address] [phone] [id number]    Tenriism Marital Status          Tenriism        Admission Date Admission Type Admitting Provider Attending Provider Department, Room/Bed    5/5/20 Emergency Marco A Katz MD  [facility] 4B, 445/1    Discharge Date Discharge Disposition Discharge Destination        5/7/2020 Home or Self Care              Attending Provider:  (none)   Allergies:  No Known Allergies    Isolation:  None   Infection:  None   Code Status:  Prior    Ht:  172.7 cm (68\")   Wt:  72.2 kg (159 lb 3.2 oz)    Admission Cmt:  None   Principal Problem:  Atrial fibrillation status post cardioversion (CMS/[facility]) [I48.91]                 Active Insurance as of 5/5/2020     Primary Coverage     Payor Plan Insurance Group Employer/Plan Group    [facility] DEPT [id number]      Payor Plan Address Payor Plan Phone Number Payor Plan Fax Number Effective Dates    [employer] [phone]  2/10/2020 - None Entered    [address] [id number]       Subscriber Name Subscriber Birth Date Member ID       KINGSLEY SERVIN 1945 [id number]                 Emergency Contacts      (Rel.) Home Phone Work Phone Mobile Phone    LUIS DANIEL SERVIN (Son) -- -- [phone]    nattyboone bhakta (Daughter) -- -- [phone]    MARIANA TANG (Daughter) -- -- [phone]               Discharge Summary      Marco A Katz MD at 05/07/20 0956              [facility] Medicine Services  DISCHARGE SUMMARY       Date of Admission: 5/5/2020  Date of Discharge:  5/7/2020  Primary Care Physician: BARRY Urrutia MD    Presenting Problem/History of Present Illness:  Atrial fibrillation with RVR (CMS/[facility]) [I48.91]     Final Discharge Diagnoses:  Active Hospital Problems    Diagnosis   • " "**Atrial fibrillation status post cardioversion (CMS/[facility])   • Tobacco use   • Syncope   • Current use of long term anticoagulation   • Alcoholism /alcohol abuse (CMS/[facility])   • Atrial fibrillation and flutter (CMS/[facility])   • PVD (peripheral vascular disease) (CMS/[facility])   • Essential hypertension   1.  Afib/flutter  -Propafenone  -Cardizem Drip  -IV Digoxin  -cardiology following  -Xarelto     2.   Syncope  -likely due to fast arrhytmia  -Has had recent echo with no valvular dysfunction  -will check orthostatics     3.  PVD  -Pletal  -Xarelto     4.  HTN  -lisinopril    Consults: Cardiology    Procedures Performed: Cardioversioin    Pertinent Test Results: N/A    Chief Complaint on Day of Discharge: feeling better    History of Present Illness on Day of Discharge:   Doing well, no CP or palpitations or SOA.    No light-headedness or Dizziness.      Hospital Course:  The patient is a 74 y.o. male who presented to [facility] with syncope.  He was found to be in a-flutter with rapid ventricular response.  He was started on a Cardizem drip.  Cardiology was consulted.  The patient was taken to the cath lab for cardioversion.  He is currently in NSR with a heart rate in the 80's.  He is asymtpomatic.  He would like to go home.    He is comfortable with being discharged and with the discharge plan.  Hewas given the chance to ask questions and all questions were answered to his satisfaction.        Condition on Discharge:  Stable    Physical Exam on Discharge:  /60 (BP Location: Right arm, Patient Position: Sitting)   Pulse 85   Temp 97.9 °F (36.6 °C) (Oral)   Resp 16   Ht 172.7 cm (68\")   Wt 72.2 kg (159 lb 3.2 oz)   SpO2 97%   BMI 24.21 kg/m²    Physical Exam   Constitutional: He is oriented to person, place, and time. He appears well-developed and well-nourished.   HENT:   Head: Normocephalic and atraumatic.   Right Ear: External ear normal.   Left Ear: External ear normal.   Nose: Nose normal. "   Mouth/Throat: Oropharynx is clear and moist.   Eyes: Pupils are equal, round, and reactive to light. Conjunctivae and EOM are normal. Right eye exhibits no discharge. Left eye exhibits no discharge. No scleral icterus.   Neck: Normal range of motion. Neck supple. No tracheal deviation present. No thyromegaly present.   Cardiovascular: Normal rate, regular rhythm, normal heart sounds and intact distal pulses. Exam reveals no gallop and no friction rub.   No murmur heard.  Pulmonary/Chest: Effort normal and breath sounds normal. No stridor. No respiratory distress. He has no wheezes. He has no rales. He exhibits no tenderness.   Abdominal: Soft. Bowel sounds are normal. He exhibits no distension and no mass. There is no tenderness. There is no rebound and no guarding. No hernia.   Musculoskeletal: Normal range of motion. He exhibits no edema or deformity.   Lymphadenopathy:     He has no cervical adenopathy.   Neurological: He is alert and oriented to person, place, and time. He has normal reflexes. He displays normal reflexes. No cranial nerve deficit. He exhibits normal muscle tone. Coordination normal.   Skin: Skin is warm and dry. No rash noted. No erythema. No pallor.   Psychiatric: He has a normal mood and affect. His behavior is normal. Judgment and thought content normal.   Vitals reviewed.        Discharge Disposition:  Home or Self CareHome    Discharge Medications:     Discharge Medications      New Medications      Instructions Start Date   amiodarone 400 MG tablet  Commonly known as:  PACERONE   400 mg, Oral, Every 12 Hours Scheduled      amiodarone 200 MG tablet  Commonly known as:  PACERONE   200 mg, Oral, Every 24 Hours Scheduled   Start Date:  May 22, 2020     cefdinir 300 MG capsule  Commonly known as:  OMNICEF   300 mg, Oral, 2 Times Daily         Continue These Medications      Instructions Start Date   cilostazol 100 MG tablet  Commonly known as:  PLETAL   100 mg, Oral, 2 Times Daily       doxycycline 100 MG capsule  Commonly known as:  VIBRAMYCIN   100 mg, Oral, 2 Times Daily      guaiFENesin 600 MG 12 hr tablet  Commonly known as:  MUCINEX   1,200 mg, Oral, Every 12 Hours Scheduled      hydrOXYzine 25 MG tablet  Commonly known as:  ATARAX   25 mg, Oral, 3 Times Daily PRN      lisinopril 20 MG tablet  Commonly known as:  PRINIVIL,ZESTRIL   20 mg, Oral, Daily      pantoprazole 20 MG EC tablet  Commonly known as:  PROTONIX   20 mg, Oral, Daily      rivaroxaban 20 MG tablet  Commonly known as:  XARELTO   20 mg, Oral, Daily With Dinner      sildenafil 50 MG tablet  Commonly known as:  VIAGRA   50 mg, Oral, Daily PRN         Stop These Medications    propafenone 150 MG tablet  Commonly known as:  RYTHMOL            Discharge Diet: regular    Activity at Discharge: as tolerated    Discharge Care Plan/Instructions:   Go to ER for chest pain or soa, or passing out    Follow-up Appointments:   Future Appointments   Date Time Provider Department Center   5/14/2020  9:00 AM BARRY Urrutia MD [facility] PAD PAD   5/21/2020  8:30 AM [facility] NP [facility] CD PAD [facility]   6/11/2020  9:00 AM BARRY Urrutia MD [facility] IM PAD PAD       Test Results Pending at Discharge: N/A    Marco A Katz MD  05/07/20  15:36    Time: 35 minutes        Electronically signed by Marco A Katz MD at 05/07/20 4356

## 2020-05-08 NOTE — OUTREACH NOTE
"Call Center TCM Note      Responses   List of hospitals in [place] patient discharged from?  [facility]   COVID-19 Test Status  Not tested   Does the patient have one of the following disease processes/diagnoses(primary or secondary)?  Other   TCM attempt successful?  Yes   Call start time  1310   Discharge diagnosis  Atrial fibrillation with RVR cardioversion.   Meds reviewed with patient/caregiver?  Yes   Is the patient having any side effects they believe may be caused by any medication additions or changes?  Yes   Side effects comments   [facility], he reports this is making his stomach feel odd \"anxious\" is the word he used. [facility]   Does the patient have all medications ordered at discharge?  Yes   Is the patient taking all medications as directed (includes completed medication regime)?  Yes   Does the patient have a primary care provider?   Yes   Does the patient have an appointment with their PCP within 7 days of discharge?  Yes   Has the patient kept scheduled appointments due by today?  N/A   Comments  has appts with Dr Hickman 5/14/2020 at 9, may need to change it, will know Monday & will call,  [facility] group 5/21/2020 at 830,  Dr Laughlin at [facility] for foot lesion   Did the patient receive a copy of their discharge instructions?  Yes   Nursing interventions  Reviewed instructions with patient   What is the patient's perception of their health status since discharge?  Improving   Is the patient/caregiver able to teach back signs and symptoms related to disease process for when to call PCP?  Yes   Is the patient/caregiver able to teach back signs and symptoms related to disease process for when to call 911?  Yes   Is the patient/caregiver able to teach back the hierarchy of who to call/visit for symptoms/problems? PCP, Specialist, Home health nurse, Urgent Care, ED, 911  Yes   TCM call completed?  Yes   Wrap up additional comments  he got concerned this morning, pulse ox was giving a low " reading, a friend who is a PT brought his over and reading was much better          Toña Tolliver RN    5/8/2020, 13:25

## 2020-05-09 ENCOUNTER — NURSE TRIAGE (OUTPATIENT)
Dept: CALL CENTER | Facility: HOSPITAL | Age: 75
End: 2020-05-09

## 2020-05-09 ENCOUNTER — HOSPITAL ENCOUNTER (EMERGENCY)
Facility: HOSPITAL | Age: 75
Discharge: HOME | End: 2020-05-09
Attending: EMERGENCY MEDICINE | Admitting: EMERGENCY MEDICINE

## 2020-05-09 VITALS
HEART RATE: 92 BPM | RESPIRATION RATE: 16 BRPM | HEIGHT: 68 IN | DIASTOLIC BLOOD PRESSURE: 65 MMHG | TEMPERATURE: 98.4 F | SYSTOLIC BLOOD PRESSURE: 136 MMHG | OXYGEN SATURATION: 96 % | WEIGHT: 167 LBS | BODY MASS INDEX: 25.31 KG/M2

## 2020-05-09 DIAGNOSIS — I48.92 ATRIAL FLUTTER, UNSPECIFIED TYPE (HCC): ICD-10-CM

## 2020-05-09 DIAGNOSIS — I48.91 ATRIAL FIBRILLATION WITH RAPID VENTRICULAR RESPONSE (HCC): Primary | ICD-10-CM

## 2020-05-09 LAB
ALBUMIN SERPL-MCNC: 3.4 G/DL (ref 3.5–5.2)
ALBUMIN/GLOB SERPL: 1 G/DL
ALP SERPL-CCNC: 92 U/L (ref 39–117)
ALT SERPL W P-5'-P-CCNC: 20 U/L (ref 1–41)
ANION GAP SERPL CALCULATED.3IONS-SCNC: 13 MMOL/L (ref 5–15)
AST SERPL-CCNC: 21 U/L (ref 1–40)
BASOPHILS # BLD AUTO: 0.06 10*3/MM3 (ref 0–0.2)
BASOPHILS NFR BLD AUTO: 0.8 % (ref 0–1.5)
BILIRUB SERPL-MCNC: 0.6 MG/DL (ref 0.2–1.2)
BUN BLD-MCNC: 18 MG/DL (ref 8–23)
BUN/CREAT SERPL: 27.3 (ref 7–25)
CALCIUM SPEC-SCNC: 9.5 MG/DL (ref 8.6–10.5)
CHLORIDE SERPL-SCNC: 106 MMOL/L (ref 98–107)
CO2 SERPL-SCNC: 23 MMOL/L (ref 22–29)
CREAT BLD-MCNC: 0.66 MG/DL (ref 0.76–1.27)
DEPRECATED RDW RBC AUTO: 48.2 FL (ref 37–54)
EOSINOPHIL # BLD AUTO: 0.11 10*3/MM3 (ref 0–0.4)
EOSINOPHIL NFR BLD AUTO: 1.4 % (ref 0.3–6.2)
ERYTHROCYTE [DISTWIDTH] IN BLOOD BY AUTOMATED COUNT: 13.6 % (ref 12.3–15.4)
GFR SERPL CREATININE-BSD FRML MDRD: 119 ML/MIN/1.73
GLOBULIN UR ELPH-MCNC: 3.4 GM/DL
GLUCOSE BLD-MCNC: 97 MG/DL (ref 65–99)
HCT VFR BLD AUTO: 44.4 % (ref 37.5–51)
HGB BLD-MCNC: 15.2 G/DL (ref 13–17.7)
HOLD SPECIMEN: NORMAL
HOLD SPECIMEN: NORMAL
IMM GRANULOCYTES # BLD AUTO: 0.03 10*3/MM3 (ref 0–0.05)
IMM GRANULOCYTES NFR BLD AUTO: 0.4 % (ref 0–0.5)
LYMPHOCYTES # BLD AUTO: 1.1 10*3/MM3 (ref 0.7–3.1)
LYMPHOCYTES NFR BLD AUTO: 14.5 % (ref 19.6–45.3)
MCH RBC QN AUTO: 33 PG (ref 26.6–33)
MCHC RBC AUTO-ENTMCNC: 34.2 G/DL (ref 31.5–35.7)
MCV RBC AUTO: 96.3 FL (ref 79–97)
MONOCYTES # BLD AUTO: 0.71 10*3/MM3 (ref 0.1–0.9)
MONOCYTES NFR BLD AUTO: 9.3 % (ref 5–12)
NEUTROPHILS # BLD AUTO: 5.59 10*3/MM3 (ref 1.7–7)
NEUTROPHILS NFR BLD AUTO: 73.6 % (ref 42.7–76)
NRBC BLD AUTO-RTO: 0 /100 WBC (ref 0–0.2)
PLATELET # BLD AUTO: 222 10*3/MM3 (ref 140–450)
PMV BLD AUTO: 8.8 FL (ref 6–12)
POTASSIUM BLD-SCNC: 4.3 MMOL/L (ref 3.5–5.2)
PROT SERPL-MCNC: 6.8 G/DL (ref 6–8.5)
RBC # BLD AUTO: 4.61 10*6/MM3 (ref 4.14–5.8)
SODIUM BLD-SCNC: 142 MMOL/L (ref 136–145)
TROPONIN T SERPL-MCNC: <0.01 NG/ML (ref 0–0.03)
WBC NRBC COR # BLD: 7.6 10*3/MM3 (ref 3.4–10.8)
WHOLE BLOOD HOLD SPECIMEN: NORMAL
WHOLE BLOOD HOLD SPECIMEN: NORMAL

## 2020-05-09 PROCEDURE — 85025 COMPLETE CBC W/AUTO DIFF WBC: CPT | Performed by: EMERGENCY MEDICINE

## 2020-05-09 PROCEDURE — 99284 EMERGENCY DEPT VISIT MOD MDM: CPT | Performed by: INTERNAL MEDICINE

## 2020-05-09 PROCEDURE — 96374 THER/PROPH/DIAG INJ IV PUSH: CPT

## 2020-05-09 PROCEDURE — 84484 ASSAY OF TROPONIN QUANT: CPT | Performed by: EMERGENCY MEDICINE

## 2020-05-09 PROCEDURE — 96375 TX/PRO/DX INJ NEW DRUG ADDON: CPT

## 2020-05-09 PROCEDURE — 93010 ELECTROCARDIOGRAM REPORT: CPT | Performed by: INTERNAL MEDICINE

## 2020-05-09 PROCEDURE — 80053 COMPREHEN METABOLIC PANEL: CPT | Performed by: EMERGENCY MEDICINE

## 2020-05-09 PROCEDURE — 99152 MOD SED SAME PHYS/QHP 5/>YRS: CPT

## 2020-05-09 PROCEDURE — 93005 ELECTROCARDIOGRAM TRACING: CPT

## 2020-05-09 PROCEDURE — 93005 ELECTROCARDIOGRAM TRACING: CPT | Performed by: EMERGENCY MEDICINE

## 2020-05-09 PROCEDURE — 99285 EMERGENCY DEPT VISIT HI MDM: CPT

## 2020-05-09 PROCEDURE — 92960 CARDIOVERSION ELECTRIC EXT: CPT

## 2020-05-09 RX ORDER — METOPROLOL TARTRATE 5 MG/5ML
5 INJECTION INTRAVENOUS ONCE
Status: COMPLETED | OUTPATIENT
Start: 2020-05-09 | End: 2020-05-09

## 2020-05-09 RX ORDER — DILTIAZEM HYDROCHLORIDE 120 MG/1
120 CAPSULE, COATED, EXTENDED RELEASE ORAL DAILY
Qty: 7 CAPSULE | Refills: 0 | Status: SHIPPED | OUTPATIENT
Start: 2020-05-09 | End: 2020-05-11 | Stop reason: SDUPTHER

## 2020-05-09 RX ORDER — ETOMIDATE 2 MG/ML
INJECTION INTRAVENOUS
Status: DISCONTINUED
Start: 2020-05-09 | End: 2020-05-09 | Stop reason: HOSPADM

## 2020-05-09 RX ORDER — ETOMIDATE 2 MG/ML
INJECTION INTRAVENOUS
Status: COMPLETED | OUTPATIENT
Start: 2020-05-09 | End: 2020-05-09

## 2020-05-09 RX ORDER — DILTIAZEM HYDROCHLORIDE 5 MG/ML
20 INJECTION INTRAVENOUS ONCE
Status: COMPLETED | OUTPATIENT
Start: 2020-05-09 | End: 2020-05-09

## 2020-05-09 RX ADMIN — DILTIAZEM HYDROCHLORIDE 20 MG: 5 INJECTION INTRAVENOUS at 13:30

## 2020-05-09 RX ADMIN — METOPROLOL TARTRATE 5 MG: 5 INJECTION INTRAVENOUS at 11:17

## 2020-05-09 RX ADMIN — ETOMIDATE 8 MG: 20 INJECTION, SOLUTION INTRAVENOUS at 11:06

## 2020-05-09 RX ADMIN — METOPROLOL TARTRATE 5 MG: 5 INJECTION INTRAVENOUS at 11:21

## 2020-05-09 NOTE — DISCHARGE INSTRUCTIONS
Atrial Fibrillation  Atrial fibrillation is a type of irregular or rapid heartbeat (arrhythmia). In atrial fibrillation, the top part of the heart (atria) quivers in a chaotic pattern. This makes the heart unable to pump blood normally. Having atrial fibrillation can increase your risk for other health problems, such as:  · Blood can pool in the atria and form clots. If a clot travels to the brain, it can cause a stroke.  · The heart muscle may weaken from the irregular blood flow. This can cause heart failure.  Atrial fibrillation may start suddenly and stop on its own, or it may become a long-lasting problem.  What are the causes?  This condition is caused by some heart-related conditions or procedures, including:  · High blood pressure. This is the most common cause.  · Heart failure.  · Heart valve conditions.  · Inflammation of the sac that surrounds the heart (pericarditis).  · Heart surgery.  · Coronary artery disease.  · Certain heart rhythm disorders, such as Neville-Parkinson-White syndrome.  Other causes include:  · Pneumonia.  · Obstructive sleep apnea.  · Lung cancer.  · Thyroid problems, especially if the thyroid is overactive (hyperthyroidism).  · Excessive alcohol or drug use.  Sometimes, the cause of this condition is not known.  What increases the risk?  This condition is more likely to develop in:  · Older people.  · People who smoke.  · People who have diabetes mellitus.  · People who are overweight (obese).  · Athletes who exercise vigorously.  · People who have a family history.  What are the signs or symptoms?  Symptoms of this condition include:  · A feeling that your heart is beating rapidly or irregularly.  · A feeling of discomfort or pain in your chest.  · Shortness of breath.  · Sudden light-headedness or weakness.  · Getting tired easily during exercise.  In some cases, there are no symptoms.  How is this diagnosed?  Your health care provider may be able to detect atrial fibrillation when  taking your pulse. If detected, this condition may be diagnosed with:  · Electrocardiogram (ECG).  · Ambulatory cardiac monitor. This device records your heartbeats for 24 hours or more.  · Transthoracic echocardiogram (TTE) to evaluate how blood flows through your heart.  · Transesophageal echocardiogram (HATTIE) to view more detailed images of your heart.  · A stress test.  · Imaging tests, such as a CT scan or chest X-ray.  · Blood tests.  How is this treated?  This condition may be treated with:  · Medicines to slow down the heart rate or bring the heart's rhythm back to normal.  · Medicines to prevent blood clots from forming.  · Electrical cardioversion. This delivers a low-energy shock to the heart to reset its rhythm.  · Ablation. This procedure destroys the part of the heart tissue that sends abnormal signals.  · Left atrial appendage occlusion/excision. This seals off a common place in the atria where blood clots can form (left atrial appendage).  The goal of treatment is to prevent blood clots from forming and to keep your heart beating at a normal rate and rhythm. Treatment depends on underlying medical conditions and how you feel when you are experiencing fibrillation.  Follow these instructions at home:  Medicines  · Take over-the counter and prescription medicines only as told by your health care provider.  · If your health care provider prescribed a blood-thinning medicine (anticoagulant), take it exactly as told. Taking too much blood-thinning medicine can cause bleeding. Taking too little can enable a blood clot to form and travel to the brain, causing a stroke.  Lifestyle         · Do not use any products that contain nicotine or tobacco, such as cigarettes and e-cigarettes. If you need help quitting, ask your health care provider.  · Do not drink beverages that contain caffeine, such as coffee, soda, and tea.  · Follow diet instructions as told by your health care provider.  · Exercise regularly as  "told by your health care provider.  · Do not drink alcohol.  General instructions  · If you have obstructive sleep apnea, manage your condition as told by your health care provider.  · Maintain a healthy weight. Do not use diet pills unless your health care provider approves. Diet pills may make heart problems worse.  · Keep all follow-up visits as told by your health care provider. This is important.  Contact a health care provider if you:  · Notice a change in the rate, rhythm, or strength of your heartbeat.  · Are taking an anticoagulant and you notice increased bruising.  · Tire more easily when you exercise or exert yourself.  · Have a sudden change in weight.  Get help right away if you have:    · Chest pain, abdominal pain, sweating, or weakness.  · Difficulty breathing.  · Blood in your vomit, stool (feces), or urine.  · Any symptoms of a stroke. \"BE FAST\" is an easy way to remember the main warning signs of a stroke:  ? B - Balance. Signs are dizziness, sudden trouble walking, or loss of balance.  ? E - Eyes. Signs are trouble seeing or a sudden change in vision.  ? F - Face. Signs are sudden weakness or numbness of the face, or the face or eyelid drooping on one side.  ? A - Arms. Signs are weakness or numbness in an arm. This happens suddenly and usually on one side of the body.  ? S - Speech. Signs are sudden trouble speaking, slurred speech, or trouble understanding what people say.  ? T - Time. Time to call emergency services. Write down what time symptoms started.  · Other signs of a stroke, such as:  ? A sudden, severe headache with no known cause.  ? Nausea or vomiting.  ? Seizure.  These symptoms may represent a serious problem that is an emergency. Do not wait to see if the symptoms will go away. Get medical help right away. Call your local emergency services (911 in the [place] Do not drive yourself to the hospital.  Summary  · Atrial fibrillation is a type of irregular or rapid heartbeat " (arrhythmia).  · Symptoms include a feeling that your heart is beating fast or irregularly. In some cases, you may not have symptoms.  · The condition is treated with medicines to slow down the heart rate or bring the heart's rhythm back to normal. You may also need blood-thinning medicines to prevent blood clots.  · Get help right away if you have symptoms or signs of a stroke.  This information is not intended to replace advice given to you by your health care provider. Make sure you discuss any questions you have with your health care provider.  Document Released: 12/18/2006 Document Revised: 02/08/2019 Document Reviewed: 02/08/2019  [employer] Interactive Patient Education © 2020 [employer]      Atrial Flutter    Atrial flutter is a type of abnormal heart rhythm (arrhythmia). The heart has an electrical system that tells the heart how to beat. In atrial flutter, the signals move rapidly in the top chambers of the heart (the atria). This makes your heart beat very fast. Atrial flutter can come and go, or it can be permanent.  If this condition is not treated it can cause serious complications, such as stroke or weakened heart muscle (cardiomyopathy).  What are the causes?  This condition may be caused by:  · A heart condition or problem, such as:  ? A heart attack.  ? Heart failure.  ? A heart valve problem.  ? Heart surgery.  · A lung problem, such as:  ? A blood clot in the lungs (pulmonary embolism, or PE).  ? Chronic obstructive pulmonary disease.  · Poorly controlled high blood pressure (hypertension).  · Overactive thyroid (hyperthyroidism).  · Caffeine.  · Some decongestant cold medicines.  · Low levels of minerals called electrolytes in the blood.  · Cocaine.  What increases the risk?  You are more likely to develop this condition if:  · You are an elderly adult.  · You are a man.  · You are obese.  · You have obstructive sleep apnea.  · You have a family history of atrial flutter.  · You have  diabetes.  What are the signs or symptoms?  Symptoms of this condition include:  · A feeling that your heart is pounding or racing (palpitations).  · Shortness of breath.  · Chest pain.  · Feeling light-headed.  · Dizziness.  · Fainting.  · Low blood pressure (hypotension).  · Fatigue.  Sometimes there are no symptoms associated with arrhythmia.  How is this diagnosed?  This condition may be diagnosed based on:  · An electrocardiogram (ECG). This is a test that records the electrical signals in the heart.  · Ambulatory cardiac monitoring. This is a small recording device that is connected by wires to flat, sticky disks (electrodes) that are attached to your chest.  · An echocardiogram. This is a test that uses sound waves to create pictures of your heart.  · A transesophageal echocardiogram (HATTIE). In this test, a device is placed down your esophagus. This device then uses sounds waves to create even closer pictures of your heart.  · Stress test. This test records your heartbeat while you exercise and checks to see if the heart muscle is receiving adequate blood supply.  How is this treated?  This condition may be treated with:  · Medicines to:  ? Make your heart beat more slowly.  ? Keep your heart in normal rhythm.  ? Prevent a stroke.  · Cardioversion. This uses medicines or an electrical shock to make the heart beat normally.  · Ablation. This destroys the heart tissue that is causing the problem.  In some cases, your health care provider will treat other underlying conditions.  Follow these instructions at home:  Medicines  · Take over-the-counter and prescription medicines only as told by your health care provider.  ? Make sure you take your medicines exactly as told by your health care provider.  ? Do not miss any doses.  · Do not take any new medicines without talking to your health care provider.  Lifestyle  · Eat heart-healthy foods. Talk with a dietitian to make an eating plan that is right for you.  · Do  "not use any products that contain nicotine or tobacco, such as cigarettes and e-cigarettes. If you need help quitting, ask your health care provider.  · Limit alcohol intake to no more than 1 drink per day for nonpregnant women and 2 drinks per day for men. One drink equals 12 oz of beer, 5 oz of wine, or 1½ oz of hard liquor.  · Try to reduce any stress. Stress can make your symptoms worse.  · Get screened for sleep apnea. If you have the condition, work with your health care provider to find a treatment that works for you.  · Do not use drugs.  · Avoid excessive caffeine.  General instructions  · Lose weight if your health care provider tells you to do that.  · Keep all follow-up visits as told by your health care provider. This is important.  Contact a health care provider if:  · Your symptoms get worse.  · You notice that your palpitations are increasing.  Get help right away if:  · You have any symptoms of a stroke. \"BE FAST\" is an easy way to remember the main warning signs of a stroke:  ? B - Balance. Signs are dizziness, sudden trouble walking, or loss of balance.  ? E - Eyes. Signs are trouble seeing or a sudden change in vision.  ? F - Face. Signs are sudden weakness or numbness of the face, or the face or eyelid drooping on one side.  ? A - Arms. Signs are weakness or numbness in an arm. This happens suddenly and usually on one side of the body.  ? S - Speech. Signs are sudden trouble speaking, slurred speech, or trouble understanding what people say.  ? T - Time. Time to call emergency services. Write down what time symptoms started.  · You have other signs of a stroke, such as:  ? A sudden, severe headache with no known cause.  ? Nausea or vomiting.  ? Seizure.  · You have additional symptoms, such as:  ? Fainting.  ? Shortness of breath.  ? Pain or pressure in your chest.  ? Suddenly feeling nauseous or suddenly vomiting.  ? Increased sweating with no known cause.  · These symptoms may represent a " serious problem that is an emergency. Do not wait to see if the symptoms will go away. Get medical help right away. Call your local emergency services (911 in the [place] Do not drive yourself to the hospital.  Summary  · Atrial flutter is an abnormal heart rhythm that can give you symptoms of palpitations, shortness of breath, or fatigue.  · Atrial flutter is often treated with medicines to keep your heart in a normal rhythm and to prevent a stroke.  · You should seek immediate help if you cannot catch your breath, have chest pain or pressure, or have weakness, especially on one side of your body.  This information is not intended to replace advice given to you by your health care provider. Make sure you discuss any questions you have with your health care provider.  Document Released: 05/06/2010 Document Revised: 09/06/2019 Document Reviewed: 09/20/2018  [employer] Interactive Patient Education © 2020 [employer]

## 2020-05-09 NOTE — CONSULTS
"  [facility] HEART GROUP CONSULT NOTE    Patient Care Team:  BARRY Urrutia MD as PCP - General (Internal Medicine)  No ref. provider found  REASON FOR REFERRAL: atrial arrhythmas  Chief complaint: dizzy, fast HR    Subjective     Patient is a 74 y.o. male with known atrial flutter and fibrillation, diagnosed in Feb '20 (been on Xarelto since) who was recently admitted with syncope.  Was in atrial flutter during that admission, though otherwise asymptomatic.  Underwent elective cardioversion 5/6/20, restoring NSR. He remained in NSR through time of discharge 5/7, and was d/c home with amiodarone Rx as a change in anti-arrhythmic since Rhythmol he'd been on was not effective at maintaining NSR. Of note, he did not report any symptomatic change at all after that cardioversion.      He was brought back to ER this morning by EMS.  He tells me he awoke feeling fine, but checked his pulse and noticed it was in 130s-140s.  Again, he denies any dizziness, palpitations, dyspnea, or angina with that. This persisted on all subsequent checks for 2-3 hours.  He then had a brief episode of dizziness, but was not near-syncopal. This lasted 5 minutes before self-resolving.  He denies palpitations during this spell.  Because of this, and the HR of 130s-140s, his family urged him to call 911 and seek treatment in the ER.  Upon arrival, he was in atrial flutter with 2:1 conduction, rate ~140bpm.  He denies symptoms upon arrival, but was sedated and cardioverted at 200J.  Per ED, this restored NSR for only a few seconds, before atrial flutter recurred.  During my evaluation, as per the \"telemetry reviewed\" below, he demonstrated atrial flutter with 2:1 conduction, AF with rates 90s-120s, and NSR in 70s (though this was short-lived each time it occurred).  Despite all these changes, he never voiced any change whatsoever in how he felt.    Review of Systems   Review of Systems   Constitutional: Negative for appetite " change, chills and fever.   HENT: Negative.    Eyes: Negative.    Respiratory: Negative for cough, shortness of breath and wheezing.    Cardiovascular: Negative for chest pain, palpitations and leg swelling.   Gastrointestinal: Negative for abdominal pain and blood in stool.   Endocrine: Negative.    Genitourinary: Negative for flank pain and hematuria.   Musculoskeletal: Negative.    Skin: Negative.    Allergic/Immunologic: Negative.    Neurological: Positive for dizziness.   Hematological: Negative for adenopathy. Does not bruise/bleed easily.   Psychiatric/Behavioral: Negative.        History  Past Medical History:   Diagnosis Date   • Alcohol abuse    • Arthritis    • Atrial flutter (CMS/HCC)    • Circulation problem    • History of tobacco abuse    • Hypertension    • PVD (peripheral vascular disease) (CMS/HCC)      Past Surgical History:   Procedure Laterality Date   • ADENOIDECTOMY       Family History   Problem Relation Age of Onset   • Dementia Mother    • Cancer Father    • Pancreatic cancer Father      Social History     Tobacco Use   • Smoking status: Current Every Day Smoker     Packs/day: 0.50     Types: Cigarettes, Cigars   • Smokeless tobacco: Never Used   Substance Use Topics   • Alcohol use: Yes     Alcohol/week: 10.0 standard drinks     Types: 9 Shots of liquor, 1 Cans of beer per week     Frequency: Never   • Drug use: Never       (Not in a hospital admission)  Allergies:  Patient has no known allergies.  No current facility-administered medications for this encounter.     Current Outpatient Medications:   •  amiodarone (PACERONE) 400 MG tablet, Take 1 tablet by mouth Every 12 (Twelve) Hours for 28 doses., Disp: 28 tablet, Rfl: 0  •  cefdinir (OMNICEF) 300 MG capsule, Take 1 capsule by mouth 2 (Two) Times a Day., Disp: 14 capsule, Rfl: 0  •  cilostazol (PLETAL) 100 MG tablet, Take 100 mg by mouth 2 (Two) Times a Day., Disp: , Rfl:   •  doxycycline (VIBRAMYCIN) 100 MG capsule, Take 100 mg by mouth  2 (Two) Times a Day., Disp: , Rfl:   •  guaiFENesin (MUCINEX) 600 MG 12 hr tablet, Take 2 tablets by mouth Every 12 (Twelve) Hours., Disp: , Rfl:   •  hydrOXYzine (ATARAX) 25 MG tablet, Take 1 tablet by mouth 3 (Three) Times a Day As Needed for Anxiety., Disp: 60 tablet, Rfl: 5  •  lisinopril (PRINIVIL,ZESTRIL) 20 MG tablet, Take 1 tablet by mouth Daily., Disp: 30 tablet, Rfl: 2  •  rivaroxaban (XARELTO) 20 MG tablet, Take 1 tablet by mouth Daily With Dinner. Indications: Atrial Fibrillation, Disp: 30 tablet, Rfl: 0  •  sildenafil (VIAGRA) 50 MG tablet, Take 50 mg by mouth Daily As Needed for Erectile Dysfunction (Sexual Activity)., Disp: , Rfl:   •  [START ON 5/22/2020] amiodarone (PACERONE) 200 MG tablet, Take 1 tablet by mouth Daily., Disp: 90 tablet, Rfl: 3  •  dilTIAZem CD (CARDIZEM CD) 120 MG 24 hr capsule, Take 1 capsule by mouth Daily., Disp: 7 capsule, Rfl: 0    Objective     Vital Signs  Temp:  [98.2 °F (36.8 °C)] 98.2 °F (36.8 °C)  Heart Rate:  [] 123  Resp:  [12-20] 16  BP: (119-181)/() 119/69    Physical Exam:  Physical Exam   Constitutional: No distress.   HENT:   Mouth/Throat: Oropharynx is clear. Pharynx is normal.   Neck: Normal range of motion and thyroid normal. Neck supple. No JVD present. No thyromegaly present.   Cardiovascular: Regular rhythm, S1 normal, S2 normal, normal heart sounds, intact distal pulses and normal pulses.  No extrasystoles are present. Tachycardia present. PMI is not displaced.   No murmur heard.  Pulmonary/Chest: Effort normal and breath sounds normal.   Abdominal: Soft. Bowel sounds are normal. He exhibits no distension. There is no splenomegaly or hepatomegaly. There is no tenderness.   Musculoskeletal: He exhibits no edema or tenderness.   Neurological: He is alert and oriented to person, place, and time.   Skin: Skin is warm and dry.     Results Review:   Lab Results (last 72 hours)     Procedure Component Value Units Date/Time    [place] Draw [[id number]]  Collected:  05/09/20 0948    Specimen:  Blood Updated:  05/09/20 1100    Narrative:       The following orders were created for panel order [place] Draw.  Procedure                               Abnormality         Status                     ---------                               -----------         ------                     Light Blue Top[[id number]]                                   Final result               Green Top (Gel)[[id number]]                                  Final result               Lavender Top[[id number]]                                     Final result               Red Top[[id number]]                                          Final result                 Please view results for these tests on the individual orders.    Green Top (Gel) [[id number]] Collected:  05/09/20 0948    Specimen:  Blood Updated:  05/09/20 1100     Extra Tube Hold for add-ons.     Comment: Auto resulted.       Red Top [[id number]] Collected:  05/09/20 0948    Specimen:  Blood Updated:  05/09/20 1100     Extra Tube Hold for add-ons.     Comment: Auto resulted.       Light Blue Top [[id number]] Collected:  05/09/20 0948    Specimen:  Blood Updated:  05/09/20 1100     Extra Tube hold for add-on     Comment: Auto resulted       Lavender Top [[id number]] Collected:  05/09/20 0948    Specimen:  Blood Updated:  05/09/20 1100     Extra Tube hold for add-on     Comment: Auto resulted       Comprehensive Metabolic Panel [[id number]]  (Abnormal) Collected:  05/09/20 0948    Specimen:  Blood Updated:  05/09/20 1045     Glucose 97 mg/dL      BUN 18 mg/dL      Creatinine 0.66 mg/dL      Sodium 142 mmol/L      Potassium 4.3 mmol/L      Chloride 106 mmol/L      CO2 23.0 mmol/L      Calcium 9.5 mg/dL      Total Protein 6.8 g/dL      Albumin 3.40 g/dL      ALT (SGPT) 20 U/L      AST (SGOT) 21 U/L      Comment: Specimen hemolyzed.  Results may be affected.        Alkaline Phosphatase 92 U/L      Total Bilirubin 0.6 mg/dL      eGFR Non  Amer 119  mL/min/1.73      Globulin 3.4 gm/dL      A/G Ratio 1.0 g/dL      BUN/Creatinine Ratio 27.3     Anion Gap 13.0 mmol/L     Narrative:       GFR Normal >60  Chronic Kidney Disease <60  Kidney Failure <15      Troponin [[id number]]  (Normal) Collected:  05/09/20 0948    Specimen:  Blood Updated:  05/09/20 1043     Troponin T <0.010 ng/mL     Narrative:       Troponin T Reference Range:  <= 0.03 ng/mL-   Negative for AMI  >0.03 ng/mL-     Abnormal for myocardial necrosis.  Clinicians would have to utilize clinical acumen, EKG, Troponin and serial changes to determine if it is an Acute Myocardial Infarction or myocardial injury due to an underlying chronic condition.       Results may be falsely decreased if patient taking Biotin.      CBC & Differential [[id number]] Collected:  05/09/20 0948    Specimen:  Blood Updated:  05/09/20 1030    Narrative:       The following orders were created for panel order CBC & Differential.  Procedure                               Abnormality         Status                     ---------                               -----------         ------                     CBC Auto Differential[[id number]]        Abnormal            Final result                 Please view results for these tests on the individual orders.    CBC Auto Differential [[id number]]  (Abnormal) Collected:  05/09/20 0948    Specimen:  Blood Updated:  05/09/20 1030     WBC 7.60 10*3/mm3      RBC 4.61 10*6/mm3      Hemoglobin 15.2 g/dL      Hematocrit 44.4 %      MCV 96.3 fL      MCH 33.0 pg      MCHC 34.2 g/dL      RDW 13.6 %      RDW-SD 48.2 fl      MPV 8.8 fL      Platelets 222 10*3/mm3      Neutrophil % 73.6 %      Lymphocyte % 14.5 %      Monocyte % 9.3 %      Eosinophil % 1.4 %      Basophil % 0.8 %      Immature Grans % 0.4 %      Neutrophils, Absolute 5.59 10*3/mm3      Lymphocytes, Absolute 1.10 10*3/mm3      Monocytes, Absolute 0.71 10*3/mm3      Eosinophils, Absolute 0.11 10*3/mm3      Basophils, Absolute 0.06 10*3/mm3       Immature Grans, Absolute 0.03 10*3/mm3      nRBC 0.0 /100 WBC           ECG reviewed, my interpretation:atrial flutter w/2:1 av conduction, rte 135 bpm    Tele interpreted: during my time in room with patient, he briefly converted spontaneously to NSR on his own with rate in 70s. Was then in AF with rates 90s-120s, and then went back into atrial flutter with 2:1 av conduction, V rate 135-140 bpm.  Prior to discharge (after given IV cardizem), AV conduction was again more variable, still in flutter but V rate in 90s      Results for orders placed during the hospital encounter of 02/10/20   Adult Transthoracic Echo Complete W/ Cont if Necessary Per Protocol    Narrative · Left atrial cavity size is borderline dilated.  · Left ventricular systolic function is normal.  · Hyperdynamic right ventricular systolic function noted.     RHYTHM IS ATRIAL FIBRILLATION  BORDERLINE BI-ATRIAL ENLARGEMENT  ALL THE LV AND RV WALLS CAN'T BE SEEN WELL BUT THERE APPEARS TO BE   HYPERKINESIS OF BOTH VENTRICLES  NO SIGNIFICANT VALVULAR DYSFUNCTION         Assessment/Plan       Atrial fibrillation and flutter (CMS/HCC) - stable; no symptoms regardless of rate    Atrial fibrillation status post cardioversion (CMS/HCC) - stable    Current use of long term anticoagulation - stable      Patient asymptomatic regardless of rhythm - during time in ER, he demonstrated multiple rhythms and widely ranging rates, as noted above, with no correlation with any symptoms.    -will add diltiazem 120mg daily for better rate control when he is in afib or flutter (I sent Rx to [facility] for 7-day supply; will need to fax Rx to [facility] office for full Rx).    -continue amiodarone 400mg po bid  -continue Xarelto 20mg daily with dinner    Ok to discharge home from my standpoint.  I discussed the patient's findings and my recommendations with patient and Dr. Preston.     Nolan Farias MD  05/09/20  13:46

## 2020-05-09 NOTE — TELEPHONE ENCOUNTER
"Daughter calling to verify what was said after speaking with her father. No further questions.    States he was treated by Dr. Farias and was discharged on Thursday. States he was started on a new medication to \"stabilize this heart rate\". States he took it for the first time yesterday with food. States this morning is different. States this morning is 144-146 bpm.     States he feels like it is irregular and \"I think it slipped back into A Fib\". States he had a little dizziness this morning but \"it passed\".     Reason for Disposition  • [1] Heart beating very rapidly (e.g., > 140 / minute) AND [2] present now  (Exception: during exercise)    Additional Information  • Negative: Passed out (i.e., lost consciousness, collapsed and was not responding)  • Negative: Shock suspected (e.g., cold/pale/clammy skin, too weak to stand, low BP, rapid pulse)  • Negative: Difficult to awaken or acting confused (e.g., disoriented, slurred speech)  • Negative: Visible sweat on face or sweat dripping down face  • Negative: Unable to walk, or can only walk with assistance (e.g., requires support)  • Negative: [1] Received SHOCK from implantable cardiac defibrillator AND [2] persisting symptoms (i.e., palpitations, lightheadedness)  • Negative: Sounds like a life-threatening emergency to the triager  • Negative: Chest pain  • Negative: Difficulty breathing  • Negative: Dizziness, lightheadedness, or weakness    Answer Assessment - Initial Assessment Questions  1. DESCRIPTION: \"Please describe your heart rate or heart beat that you are having\" (e.g., fast/slow, regular/irregular, skipped or extra beats, \"palpitations\")      irregular  2. ONSET: \"When did it start?\" (Minutes, hours or days)       See note  3. DURATION: \"How long does it last\" (e.g., seconds, minutes, hours)      See note  4. PATTERN \"Does it come and go, or has it been constant since it started?\"  \"Does it get worse with exertion?\"   \"Are you feeling it now?\"      " "Constant this AM  5. TAP: \"Using your hand, can you tap out what you are feeling on a chair or table in front of you, so that I can hear?\" (Note: not all patients can do this)        --  6. HEART RATE: \"Can you tell me your heart rate?\" \"How many beats in 15 seconds?\"  (Note: not all patients can do this)        130-146  7. RECURRENT SYMPTOM: \"Have you ever had this before?\" If so, ask: \"When was the last time?\" and \"What happened that time?\"       yes  8. CAUSE: \"What do you think is causing the palpitations?\"      no  9. CARDIAC HISTORY: \"Do you have any history of heart disease?\" (e.g., heart attack, angina, bypass surgery, angioplasty, arrhythmia)       yes  10. OTHER SYMPTOMS: \"Do you have any other symptoms?\" (e.g., dizziness, chest pain, sweating, difficulty breathing)        no  11. PREGNANCY: \"Is there any chance you are pregnant?\" \"When was your last menstrual period?\"        n/a    Protocols used: HEART RATE AND HEARTBEAT QUESTIONS-ADULT-AH      "

## 2020-05-09 NOTE — ED PROVIDER NOTES
Subjective   Patient with recurrent A. fib was cardioverted recently went back into A. fib again in the morning came to the ER I have called Dr. Nolan Mauricio waiting for him to call her back      Atrial Flutter   Location:  Proximal atrial fibrillation  Severity:  Moderate  Onset quality:  Sudden  Timing:  Intermittent  Progression:  Unchanged  Chronicity:  Recurrent  Associated symptoms: no abdominal pain, no chest pain, no congestion, no cough, no diarrhea, no ear pain, no fatigue, no fever, no headaches, no loss of consciousness, no myalgias, no nausea, no rash, no rhinorrhea, no shortness of breath, no sore throat, no vomiting and no wheezing        Review of Systems   Constitutional: Negative.  Negative for chills, fatigue and fever.   HENT: Negative.  Negative for congestion, ear pain, rhinorrhea and sore throat.    Respiratory: Negative.  Negative for cough, chest tightness, shortness of breath, wheezing and stridor.    Cardiovascular: Negative.  Negative for chest pain.   Gastrointestinal: Negative.  Negative for abdominal distention, abdominal pain, diarrhea, nausea and vomiting.   Endocrine: Negative.    Genitourinary: Negative.  Negative for difficulty urinating and flank pain.   Musculoskeletal: Negative.  Negative for myalgias.   Skin: Negative.  Negative for color change and rash.   Neurological: Negative.  Negative for dizziness, loss of consciousness and headaches.   All other systems reviewed and are negative.      Past Medical History:   Diagnosis Date   • Alcohol abuse    • Arthritis    • Atrial flutter (CMS/HCC)    • Circulation problem    • History of tobacco abuse    • Hypertension    • PVD (peripheral vascular disease) (CMS/HCC)        No Known Allergies    Past Surgical History:   Procedure Laterality Date   • ADENOIDECTOMY         Family History   Problem Relation Age of Onset   • Dementia Mother    • Cancer Father    • Pancreatic cancer Father        Social History     Socioeconomic  History   • Marital status:      Spouse name: Not on file   • Number of children: Not on file   • Years of education: Not on file   • Highest education level: Not on file   Tobacco Use   • Smoking status: Current Every Day Smoker     Packs/day: 0.50     Types: Cigarettes, Cigars   • Smokeless tobacco: Never Used   Substance and Sexual Activity   • Alcohol use: Yes     Alcohol/week: 10.0 standard drinks     Types: 9 Shots of liquor, 1 Cans of beer per week     Frequency: Never   • Drug use: Never   • Sexual activity: Defer           Objective   Physical Exam   Constitutional: He is oriented to person, place, and time. He appears well-developed.  Non-toxic appearance.   HENT:   Head: Normocephalic and atraumatic.   Mouth/Throat: Uvula is midline and mucous membranes are normal.   Eyes: Pupils are equal, round, and reactive to light. Conjunctivae and lids are normal. Lids are everted and swept, no foreign bodies found.   Neck: Trachea normal, normal range of motion, full passive range of motion without pain and phonation normal. Neck supple. Normal carotid pulses and no JVD present. Carotid bruit is not present. No neck rigidity. No tracheal deviation present.   Cardiovascular: Normal heart sounds, intact distal pulses and normal pulses. An irregularly irregular rhythm present. Tachycardia present. PMI is not displaced.   Pulmonary/Chest: Effort normal and breath sounds normal. No accessory muscle usage or stridor. No apnea and no tachypnea. He has no decreased breath sounds. He has no wheezes. He has no rhonchi. He has no rales.   Abdominal: Soft. Normal appearance, normal aorta and bowel sounds are normal. There is no hepatosplenomegaly. There is no tenderness.   Musculoskeletal: Normal range of motion.   Lower extremity exam bilaterally is unremarkable.  There is no right or left calf tenderness .  There is no palpable venous cord.  No obvious difference in the size of the legs.  No pitting edema.  The  dorsalis pedis and posterior tibial femoral and popliteal pulses are palpable and +2 bilaterally.  Homans sign is negative   Neurological: He is alert and oriented to person, place, and time. He has normal strength and normal reflexes. He displays normal reflexes. No cranial nerve deficit or sensory deficit. Gait normal. GCS eye subscore is 4. GCS verbal subscore is 5. GCS motor subscore is 6.   Skin: Skin is warm, dry and intact. No cyanosis. No pallor. Nails show no clubbing.   Psychiatric: He has a normal mood and affect. His speech is normal and behavior is normal.   Nursing note and vitals reviewed.      Procedural Sedation  Date/Time: 5/9/2020 11:13 AM  Performed by: Randy Preston MD  Authorized by: Randy Preston MD     Consent:     Consent obtained:  Written    Consent given by:  Patient    Risks discussed:  Allergic reaction, dysrhythmia, inadequate sedation, nausea, vomiting, respiratory compromise necessitating ventilatory assistance and intubation, prolonged sedation necessitating reversal and prolonged hypoxia resulting in organ damage    Alternatives discussed:  Analgesia without sedation  Indications:     Procedure performed:  Cardioversion    Procedure necessitating sedation performed by:  Physician performing sedation    Intended level of sedation:  Moderate (conscious sedation)  Pre-sedation assessment:     ASA classification: class 2 - patient with mild systemic disease      Neck mobility: normal      Mouth opening:  3 or more finger widths    Mallampati score:  II - soft palate, uvula, fauces visible    Pre-sedation assessments completed and reviewed: airway patency, cardiovascular function, hydration status, mental status, nausea/vomiting, pain level, respiratory function and temperature      History of difficult intubation: no    Immediate pre-procedure details:     Reviewed: vital signs, relevant labs/tests and NPO status      Verified: bag valve mask available, emergency equipment available,  intubation equipment available, IV patency confirmed, oxygen available, reversal medications available and suction available    Procedure details (see MAR for exact dosages):     Preoxygenation:  Nasal cannula    Sedation:  Etomidate    Intra-procedure monitoring:  Blood pressure monitoring, cardiac monitor, continuous pulse oximetry, continuous capnometry, frequent vital sign checks and frequent LOC assessments    Intra-procedure events: none      Total sedation time (minutes):  12  Post-procedure details:     Attendance: Constant attendance by certified staff until patient recovered      Recovery: Patient returned to pre-procedure baseline      Post-sedation assessments completed and reviewed: airway patency, cardiovascular function, hydration status, mental status, nausea/vomiting, pain level, respiratory function and temperature      Specimens recovered:  None    Patient is stable for discharge or admission: yes      Patient tolerance:  Tolerated well, no immediate complications  Electrical Cardioversion  Date/Time: 5/9/2020 11:13 AM  Performed by: Randy Preston MD  Authorized by: Randy Preston MD     Consent:     Consent obtained:  Written    Consent given by:  Patient    Risks discussed:  Cutaneous burn, death, induced arrhythmia and pain    Alternatives discussed:  Rate-control medication  Pre-procedure details:     Cardioversion basis:  Emergent    Rhythm:  Atrial fibrillation  Attempt one:     Cardioversion mode:  Synchronous    Waveform:  Biphasic    Shock (Joules):  200    Shock outcome:  Conversion to normal sinus rhythm  Post-procedure details:     Patient status:  Awake    Patient tolerance of procedure:  Tolerated well, no immediate complications  Comments:      Patient had a very short conversion to normal sinus rhythm and then went back to A. fib with rate 102 bpm               ED Course  ED Course as of May 09 1400   Sat May 09, 2020   1012 Waiting on Dr. Francisco to call me back to see whether we  can cardiovert him or not    [TS]   1243 Dr. Mauricio is seen the patient    [TS]   1357 Dr. Mauricio has seen the patient couple times and had extensive visit with him after reviewing his labs and his work-up and his treatment process he was going to place him on Cardizem and have him follow-up as an outpatient with him the patient is agreeable with this currently he is awake and alert does not any symptoms heart rate in the range of 90 to 102 but no symptoms    [TS]   1357 Risks and benefits of treatments given and alternative treatment options discussed with patient/family. I answered all the questions in simple, plain language, and there was voiced understanding and agreement with plan of care. There were no further questions. Differential diagnosis discussed. Patient/family was advised that the practice of medicine is not always an exact science, and sometimes tests, physical exam, or history may not show the underlying conditions with certainty. Additionally, the condition may change or show itself later after initial presentation. There was also expressed understanding and agreement with this limitation of emergency medicine practice. Patient/family was asked to return to ED if any problem or issues or if condition worsens or does not improved. Patient/family agreed to follow up with PCP/specialist as advised, or return to ED if unable to see a provider in a timely fashion for continued symptoms.     [TS]      ED Course User Index  [TS] Randy Preston MD                                           [facility]    Final diagnoses:   Atrial fibrillation with rapid ventricular response (CMS/HCC)   Atrial flutter, unspecified type (CMS/HCC)            Randy Preston MD  05/09/20 1400

## 2020-05-11 RX ORDER — DILTIAZEM HYDROCHLORIDE 120 MG/1
120 CAPSULE, COATED, EXTENDED RELEASE ORAL DAILY
Qty: 30 CAPSULE | Refills: 11 | Status: SHIPPED | OUTPATIENT
Start: 2020-05-11 | End: 2021-03-25 | Stop reason: SDUPTHER

## 2020-05-13 ENCOUNTER — TELEPHONE (OUTPATIENT)
Dept: CARDIOLOGY | Facility: CLINIC | Age: 75
End: 2020-05-13

## 2020-05-13 NOTE — TELEPHONE ENCOUNTER
[facility] with [facility] called and left a voicemail stating he needed to speak with someone regarding an RX that was faxed over to him for Diltiazem.  Please call when available.  Thank you!

## 2020-05-14 ENCOUNTER — DOCUMENTATION (OUTPATIENT)
Dept: CARDIOLOGY | Facility: CLINIC | Age: 75
End: 2020-05-14

## 2020-05-14 VITALS
SYSTOLIC BLOOD PRESSURE: 146 MMHG | OXYGEN SATURATION: 93 % | RESPIRATION RATE: 18 BRPM | HEART RATE: 68 BPM | BODY MASS INDEX: 26.89 KG/M2 | DIASTOLIC BLOOD PRESSURE: 67 MMHG | WEIGHT: 177.4 LBS | HEIGHT: 68 IN | TEMPERATURE: 97.8 F

## 2020-05-14 RX ORDER — AMIODARONE HYDROCHLORIDE 200 MG/1
200 TABLET ORAL
Qty: 90 TABLET | Refills: 3 | Status: SHIPPED | OUTPATIENT
Start: 2020-05-22 | End: 2020-05-15 | Stop reason: DRUGHIGH

## 2020-05-14 RX ORDER — CILOSTAZOL 50 MG/1
50 TABLET ORAL 2 TIMES DAILY
Qty: 180 TABLET | Refills: 0 | Status: SHIPPED | OUTPATIENT
Start: 2020-05-14 | End: 2020-12-28 | Stop reason: SDUPTHER

## 2020-05-14 NOTE — TELEPHONE ENCOUNTER
I don't know who prescribed it or why, but would agree - advise him to cut pletal dose in half and continue taking diltiazem

## 2020-05-14 NOTE — TELEPHONE ENCOUNTER
Called [facility] and talked with German. He needed a new prescription for pletal 50 mg bid.Sent prescription to Dr Farias to sign. Also asked German if he could overnight the Diltiazem so the patient would not run out and he said he could. Called patient and let him know to take 1 time daily and told him to let me know if he does not receive Diltiazem tomorrow so we can call him in some until he receives it [facility]. He verbalized understanding.

## 2020-05-14 NOTE — TELEPHONE ENCOUNTER
Sofia-patient called back and left a voicemail asking to speak with you.  This time it is about his Amiodarone.  Thank you!

## 2020-05-14 NOTE — TELEPHONE ENCOUNTER
The patient also called wanting to speak with you about his Diltiazem RX.  He can be reached at [phone].  Thank you!

## 2020-05-14 NOTE — TELEPHONE ENCOUNTER
German with [facility] called and said when he went to fill Diltiazem he got a flag saying that there was a drug interaction between diltiazem and Pletal. It suggested Pletal should be decreased in half if to continue diltiazem. Patient has been on pletal for many years and he does not know who prescribed for him. Please advise.

## 2020-05-14 NOTE — TELEPHONE ENCOUNTER
German with [facility] called back wanting to speak with you about an RX that was faxed in for Diltiazem.  He can be reached at [phone].

## 2020-05-15 ENCOUNTER — TELEPHONE (OUTPATIENT)
Dept: CARDIOLOGY | Facility: CLINIC | Age: 75
End: 2020-05-15

## 2020-05-15 ENCOUNTER — READMISSION MANAGEMENT (OUTPATIENT)
Dept: CALL CENTER | Facility: HOSPITAL | Age: 75
End: 2020-05-15

## 2020-05-15 RX ORDER — AMIODARONE HYDROCHLORIDE 200 MG/1
200 TABLET ORAL
Qty: 6 TABLET | Refills: 1 | Status: SHIPPED | OUTPATIENT
Start: 2020-05-22 | End: 2021-02-22 | Stop reason: SDUPTHER

## 2020-05-15 NOTE — TELEPHONE ENCOUNTER
Patient called and left a voicemail stating that he has not received the medication you all spoke about yesterday-which was supposed to be overnighted to him.  Please call him regarding this at [phone].  Thank you!

## 2020-05-15 NOTE — OUTREACH NOTE
Medical Week 2 Survey      Responses   [facility] patient discharged from?  [facility]   COVID-19 Test Status  Not tested   Does the patient have one of the following disease processes/diagnoses(primary or secondary)?  Other   Week 2 attempt successful?  Yes   Call start time  0849   Call end time  0854   Meds reviewed with patient/caregiver?  Yes   Is the patient taking all medications as directed (includes completed medication regime)?  Yes   Medication comments  Sofia at Dr. Farias office working on [facility]ication through [facility] today that they changed.    Has the patient kept scheduled appointments due by today?  Yes   Comments  Dr. Urrutia changed to June, Dr. Farias appt. is 5/21, has spoken to Dr. Farias and Dr. Urrutia offices at length.    Pulse Ox monitoring  Intermittent   Comments  checks own oxygen level at home, not on oxygen.    What is the patient's perception of their health status since discharge?  Improving   Week 2 Call Completed?  Yes   Wrap up additional comments  His oxygen sats running good, he checks it himself, in 90's no issues in a while, Frances Yuan is working on a charge of billing for him and Dr. Farias office is working on changing meds and having shipped overnight today [facility].           Shreya House RN

## 2020-05-18 ENCOUNTER — TELEPHONE (OUTPATIENT)
Dept: CARDIOLOGY | Facility: CLINIC | Age: 75
End: 2020-05-18

## 2020-05-18 NOTE — TELEPHONE ENCOUNTER
Patient said he received the Diltiazem Saturday but was just checking to see if the script for Amiodarone had been [facility] to [facility]. CAlled and left message at [facility] to give me a call back.

## 2020-05-18 NOTE — TELEPHONE ENCOUNTER
Patient called and left a voicemail requesting to speak with MUKUND Black about starting Amiodarone.  He states he has not received this medication yet.  Please call him regarding this when available.  Thank you!

## 2020-05-21 ENCOUNTER — READMISSION MANAGEMENT (OUTPATIENT)
Dept: CALL CENTER | Facility: HOSPITAL | Age: 75
End: 2020-05-21

## 2020-05-21 ENCOUNTER — OFFICE VISIT (OUTPATIENT)
Dept: CARDIOLOGY | Facility: CLINIC | Age: 75
End: 2020-05-21

## 2020-05-21 VITALS
HEART RATE: 67 BPM | WEIGHT: 162 LBS | DIASTOLIC BLOOD PRESSURE: 53 MMHG | BODY MASS INDEX: 24.55 KG/M2 | OXYGEN SATURATION: 99 % | HEIGHT: 68 IN | SYSTOLIC BLOOD PRESSURE: 130 MMHG

## 2020-05-21 DIAGNOSIS — Z72.0 TOBACCO USE: ICD-10-CM

## 2020-05-21 DIAGNOSIS — I48.91 ATRIAL FIBRILLATION AND FLUTTER (HCC): Primary | ICD-10-CM

## 2020-05-21 DIAGNOSIS — I48.92 ATRIAL FIBRILLATION AND FLUTTER (HCC): Primary | ICD-10-CM

## 2020-05-21 DIAGNOSIS — I10 ESSENTIAL HYPERTENSION: ICD-10-CM

## 2020-05-21 DIAGNOSIS — I73.9 PVD (PERIPHERAL VASCULAR DISEASE) (HCC): ICD-10-CM

## 2020-05-21 DIAGNOSIS — IMO0001 ALCOHOLISM /ALCOHOL ABUSE: ICD-10-CM

## 2020-05-21 PROCEDURE — 99406 BEHAV CHNG SMOKING 3-10 MIN: CPT | Performed by: NURSE PRACTITIONER

## 2020-05-21 PROCEDURE — 99214 OFFICE O/P EST MOD 30 MIN: CPT | Performed by: NURSE PRACTITIONER

## 2020-05-21 PROCEDURE — 93000 ELECTROCARDIOGRAM COMPLETE: CPT | Performed by: NURSE PRACTITIONER

## 2020-05-21 RX ORDER — CHOLECALCIFEROL (VITAMIN D3) 50 MCG
2000 TABLET ORAL 2 TIMES DAILY
COMMUNITY

## 2020-05-21 NOTE — PROGRESS NOTES
Subjective:     Encounter Date:05/21/2020      Patient ID: Kingsley Lerma is a 74 y.o. male.    Chief Complaint: follow up paroxysmal atrial fibrillation and flutter     The patient is followed for her paroxysmal atrial fibrillation and flutter, which was originally diagnosed 2-2020 (anticoagulation was initiated at that time). He was hospitalized following a syncopal spell earlier this month and was in atrial flutter (he was otherwise asymptomatic with this). He was cardioverted that admission on 5/6, without a change in symptoms when NSR was restored. He was discharged home on 5/7 on amiodarone (rhythmol was stopped at that time). He returned to ER 5/9 because he found his heart rate to be 130s-140s and he experienced a brief dizzy spell. He was cardioverted for atrial flutter 2:1 with a HR in the 140s. NSR was restored only for a few seconds. His rhythm then proceed to bounce around from atrial flutter to atrial fibrillation to NSR with various heart rates (70s-140s). He denied any symptoms regardless of what rhythm he was in. He was discharged home with the addition of diltiazem for better rate control when in AF/AFL, and amio and Xarelto were continued. Also of note, his Pletal dose was decreased by half due to interaction with diltiazem.   The patient also has a history of alcohol abuse. He tells me today he quit drinking at all x 3 weeks, but recently started to have 1 glass of wine or 1-2 beers on occasion.    Today he states he feels well overall. He occasionally gets a little dizzy. He denies chest pain, shortness of breath, edema, orthopnea, PND, palpitations or syncope. He tells me he believes the only way he really knew he was in AF or AFL previously was by checking his pulse rate with a pulse oximeter.       The following portions of the patient's history were reviewed and updated as appropriate: allergies, current medications, past family history, past medical history, past social history, past  "surgical history and problem list.  /53   Pulse 67   Ht 172.7 cm (68\")   Wt 73.5 kg (162 lb)   SpO2 99%   BMI 24.63 kg/m²   No Known Allergies    Current Outpatient Medications:   •  [START ON 5/22/2020] amiodarone (PACERONE) 200 MG tablet, Take 1 tablet by mouth Daily., Disp: 6 tablet, Rfl: 1  •  Cholecalciferol (VITAMIN D) 50 MCG (2000 UT) tablet, Take 2,000 Units by mouth Daily., Disp: , Rfl:   •  cilostazol (PLETAL) 50 MG tablet, Take 1 tablet by mouth 2 (Two) Times a Day., Disp: 180 tablet, Rfl: 0  •  dilTIAZem CD (CARDIZEM CD) 120 MG 24 hr capsule, Take 1 capsule by mouth Daily., Disp: 30 capsule, Rfl: 11  •  guaiFENesin (MUCINEX) 600 MG 12 hr tablet, Take 2 tablets by mouth Every 12 (Twelve) Hours., Disp: , Rfl:   •  hydrOXYzine (ATARAX) 25 MG tablet, Take 1 tablet by mouth 3 (Three) Times a Day As Needed for Anxiety., Disp: 60 tablet, Rfl: 5  •  lisinopril (PRINIVIL,ZESTRIL) 20 MG tablet, Take 1 tablet by mouth Daily., Disp: 30 tablet, Rfl: 2  •  rivaroxaban (XARELTO) 20 MG tablet, Take 1 tablet by mouth Daily With Dinner. Indications: Atrial Fibrillation, Disp: 30 tablet, Rfl: 0  •  sildenafil (VIAGRA) 50 MG tablet, Take 50 mg by mouth Daily As Needed for Erectile Dysfunction (Sexual Activity)., Disp: , Rfl:   Past Medical History:   Diagnosis Date   • Alcohol abuse    • Arthritis    • Atrial flutter (CMS/HCC)    • Circulation problem    • History of tobacco abuse    • Hypertension    • PVD (peripheral vascular disease) (CMS/HCC)      Past Surgical History:   Procedure Laterality Date   • ADENOIDECTOMY       Social History     Socioeconomic History   • Marital status:      Spouse name: Not on file   • Number of children: Not on file   • Years of education: Not on file   • Highest education level: Not on file   Tobacco Use   • Smoking status: Current Every Day Smoker     Packs/day: 0.50     Types: Cigarettes, Cigars   • Smokeless tobacco: Never Used   Substance and Sexual Activity   • " Alcohol use: Not Currently     Alcohol/week: 1.0 standard drinks     Types: 1 Glasses of wine per week     Frequency: Never   • Drug use: Never   • Sexual activity: Defer     Family History   Problem Relation Age of Onset   • Dementia Mother    • Cancer Father    • Pancreatic cancer Father        Review of Systems   Constitution: Negative for chills, diaphoresis and fever.   HENT: Negative for nosebleeds.    Eyes: Negative for visual disturbance.   Cardiovascular: Negative for chest pain, claudication, cyanosis, dyspnea on exertion, irregular heartbeat, leg swelling, near-syncope, orthopnea, palpitations, paroxysmal nocturnal dyspnea and syncope.   Respiratory: Negative for cough, hemoptysis, shortness of breath, sputum production and wheezing.    Hematologic/Lymphatic: Negative for bleeding problem.   Skin: Negative for color change and flushing.   Musculoskeletal: Negative for falls.   Gastrointestinal: Negative for bloating, abdominal pain, hematemesis, hematochezia, melena, nausea and vomiting.   Genitourinary: Negative for hematuria.   Neurological: Positive for dizziness. Negative for light-headedness and weakness.   Psychiatric/Behavioral: Negative for altered mental status.         ECG 12 Lead  Date/Time: 5/21/2020 12:57 PM  Performed by: Belkys Carty APRN  Authorized by: Belkys Carty APRN   Comparison: compared with previous ECG from 5/9/2020  Comparison to previous ECG: V3 lead not connected     NSR replaces atrial flutter   Rhythm: sinus rhythm  BPM: 66                 Objective:     Physical Exam   Constitutional: He is oriented to person, place, and time. He appears well-developed and well-nourished. No distress.   HENT:   Head: Normocephalic and atraumatic.   Eyes: Pupils are equal, round, and reactive to light.   Neck: Normal range of motion. Neck supple. No JVD present. No thyromegaly present.   Cardiovascular: Normal rate, regular rhythm, normal heart sounds and intact distal pulses. Exam reveals  no gallop and no friction rub.   No murmur heard.  Pulmonary/Chest: Effort normal and breath sounds normal. No respiratory distress. He has no wheezes. He has no rales. He exhibits no tenderness.   Abdominal: Soft. Bowel sounds are normal. He exhibits no distension. There is no tenderness.   Musculoskeletal: Normal range of motion. He exhibits no edema.   Neurological: He is alert and oriented to person, place, and time. No cranial nerve deficit.   Skin: Skin is warm and dry. He is not diaphoretic.   Psychiatric: He has a normal mood and affect. His behavior is normal.     2/2020 echo reviewed :  · Left atrial cavity size is borderline dilated.  · Left ventricular systolic function is normal.  · Hyperdynamic right ventricular systolic function noted.     RHYTHM IS ATRIAL FIBRILLATION  BORDERLINE BI-ATRIAL ENLARGEMENT  ALL THE LV AND RV WALLS CAN'T BE SEEN WELL BUT THERE APPEARS TO BE HYPERKINESIS OF BOTH VENTRICLES  NO SIGNIFICANT VALVULAR DYSFUNCTION  LVEF 70%       Assessment:          Diagnosis Plan   1. Atrial fibrillation and flutter (CMS/HCC)    See notes in HPI  Maintaining NSR per EKG today   Anticoagulated with Xarelto  On amiodarone for rhythm control (he is due to decrease the dose to 200 mg daily tomorrow).  On diltiazem for rate control when in AF/AFL. It appears he has been asymptomatic to his rapid atrial arrhythmias recently.       2. Essential hypertension    Controlled    3. PVD (peripheral vascular disease) (CMS/HCC)    He states he is in the process of being referred to a new vascular surgeon     4. Alcoholism /alcohol abuse (CMS/[facility])    We discussed alcohol consumption and how it relates to atrial arrhthymias. He reports he has cut back but still drinks occasionally. Cessation encouraged.     5. Tobacco use  Kingsley Lerma  reports that he has been smoking cigarettes and cigars. He has been smoking about 0.50 packs per day. He has never used smokeless tobacco.. I have educated him on the risk of  diseases from using tobacco products such as cancer, COPD and heart diease.     I advised him to quit and he is not willing to quit.    I spent 4 minutes counseling the patient.               Plan:       As noted above  Continue current medical therapy including Xarelto, amiodarone, and diltiazem cd  Pt requests to follow with Dr. Farias; will schedule appt in 3-6 months

## 2020-05-21 NOTE — OUTREACH NOTE
Medical Week 3 Survey      Responses   [facility] patient discharged from?  [facility]   COVID-19 Test Status  Not tested   Does the patient have one of the following disease processes/diagnoses(primary or secondary)?  Other   Week 3 attempt successful?  Yes   Call start time  1109   Call end time  1114   Discharge diagnosis  Atrial fibrillation with RVR cardioversion.   Meds reviewed with patient/caregiver?  Yes   Is the patient taking all medications as directed (includes completed medication regime)?  Yes   Comments regarding appointments  Appt with Dr. Farias 5/21/20   Has the patient kept scheduled appointments due by today?  Yes   Pulse Ox monitoring  Intermittent   Pulse Ox device source  Patient   Psychosocial issues?  No   What is the patient's perception of their health status since discharge?  Improving [Pt reports he has a knot on head from fall that landed him in ED. Knot is still there. The size has decreased however it's painful. Advised him to reach out to PCP for instructions on how to proceed with it.]   Week 3 Call Completed?  Yes          Asiya Chacko RN

## 2020-05-29 ENCOUNTER — READMISSION MANAGEMENT (OUTPATIENT)
Dept: CALL CENTER | Facility: HOSPITAL | Age: 75
End: 2020-05-29

## 2020-05-29 NOTE — OUTREACH NOTE
Medical Week 4 Survey      Responses   [facility] patient discharged from?  [place]   COVID-19 Test Status  Not tested   Does the patient have one of the following disease processes/diagnoses(primary or secondary)?  Other   Week 4 attempt successful?  No          Elyse Mchugh RN

## 2020-06-06 ENCOUNTER — HOSPITAL ENCOUNTER (OUTPATIENT)
Facility: HOSPITAL | Age: 75
Discharge: HOME | End: 2020-06-08
Attending: FAMILY MEDICINE | Admitting: INTERNAL MEDICINE

## 2020-06-06 DIAGNOSIS — I10 ESSENTIAL HYPERTENSION: ICD-10-CM

## 2020-06-06 DIAGNOSIS — R19.5 HEME + STOOL: ICD-10-CM

## 2020-06-06 DIAGNOSIS — K62.5 RECTAL BLEEDING: Primary | ICD-10-CM

## 2020-06-06 PROBLEM — K92.2 GI BLEED: Status: ACTIVE | Noted: 2020-06-06

## 2020-06-06 LAB
ALBUMIN SERPL-MCNC: 3.8 G/DL (ref 3.5–5.2)
ALBUMIN/GLOB SERPL: 1.6 G/DL
ALP SERPL-CCNC: 103 U/L (ref 39–117)
ALT SERPL W P-5'-P-CCNC: 18 U/L (ref 1–41)
ANION GAP SERPL CALCULATED.3IONS-SCNC: 10 MMOL/L (ref 5–15)
AST SERPL-CCNC: 18 U/L (ref 1–40)
BASOPHILS # BLD AUTO: 0.05 10*3/MM3 (ref 0–0.2)
BASOPHILS NFR BLD AUTO: 0.7 % (ref 0–1.5)
BILIRUB SERPL-MCNC: 0.3 MG/DL (ref 0.2–1.2)
BUN BLD-MCNC: 16 MG/DL (ref 8–23)
BUN/CREAT SERPL: 24.2 (ref 7–25)
CALCIUM SPEC-SCNC: 9 MG/DL (ref 8.6–10.5)
CHLORIDE SERPL-SCNC: 108 MMOL/L (ref 98–107)
CO2 SERPL-SCNC: 25 MMOL/L (ref 22–29)
CREAT BLD-MCNC: 0.66 MG/DL (ref 0.76–1.27)
DEPRECATED RDW RBC AUTO: 49.7 FL (ref 37–54)
EOSINOPHIL # BLD AUTO: 0.25 10*3/MM3 (ref 0–0.4)
EOSINOPHIL NFR BLD AUTO: 3.5 % (ref 0.3–6.2)
ERYTHROCYTE [DISTWIDTH] IN BLOOD BY AUTOMATED COUNT: 14 % (ref 12.3–15.4)
GFR SERPL CREATININE-BSD FRML MDRD: 119 ML/MIN/1.73
GLOBULIN UR ELPH-MCNC: 2.4 GM/DL
GLUCOSE BLD-MCNC: 80 MG/DL (ref 65–99)
HCT VFR BLD AUTO: 32.2 % (ref 37.5–51)
HCT VFR BLD AUTO: 35.5 % (ref 37.5–51)
HGB BLD-MCNC: 11 G/DL (ref 13–17.7)
HGB BLD-MCNC: 12 G/DL (ref 13–17.7)
IMM GRANULOCYTES # BLD AUTO: 0.02 10*3/MM3 (ref 0–0.05)
IMM GRANULOCYTES NFR BLD AUTO: 0.3 % (ref 0–0.5)
LYMPHOCYTES # BLD AUTO: 1.35 10*3/MM3 (ref 0.7–3.1)
LYMPHOCYTES NFR BLD AUTO: 19.1 % (ref 19.6–45.3)
MCH RBC QN AUTO: 33 PG (ref 26.6–33)
MCHC RBC AUTO-ENTMCNC: 33.8 G/DL (ref 31.5–35.7)
MCV RBC AUTO: 97.5 FL (ref 79–97)
MONOCYTES # BLD AUTO: 0.5 10*3/MM3 (ref 0.1–0.9)
MONOCYTES NFR BLD AUTO: 7.1 % (ref 5–12)
NEUTROPHILS # BLD AUTO: 4.91 10*3/MM3 (ref 1.7–7)
NEUTROPHILS NFR BLD AUTO: 69.3 % (ref 42.7–76)
NRBC BLD AUTO-RTO: 0 /100 WBC (ref 0–0.2)
PLATELET # BLD AUTO: 194 10*3/MM3 (ref 140–450)
PMV BLD AUTO: 8.4 FL (ref 6–12)
POTASSIUM BLD-SCNC: 4.2 MMOL/L (ref 3.5–5.2)
PROT SERPL-MCNC: 6.2 G/DL (ref 6–8.5)
RBC # BLD AUTO: 3.64 10*6/MM3 (ref 4.14–5.8)
SODIUM BLD-SCNC: 143 MMOL/L (ref 136–145)
WBC NRBC COR # BLD: 7.08 10*3/MM3 (ref 3.4–10.8)

## 2020-06-06 PROCEDURE — 85025 COMPLETE CBC W/AUTO DIFF WBC: CPT | Performed by: FAMILY MEDICINE

## 2020-06-06 PROCEDURE — 80053 COMPREHEN METABOLIC PANEL: CPT | Performed by: FAMILY MEDICINE

## 2020-06-06 PROCEDURE — G0378 HOSPITAL OBSERVATION PER HR: HCPCS

## 2020-06-06 PROCEDURE — 96366 THER/PROPH/DIAG IV INF ADDON: CPT

## 2020-06-06 PROCEDURE — 96365 THER/PROPH/DIAG IV INF INIT: CPT

## 2020-06-06 PROCEDURE — 85014 HEMATOCRIT: CPT | Performed by: FAMILY MEDICINE

## 2020-06-06 PROCEDURE — 85018 HEMOGLOBIN: CPT | Performed by: FAMILY MEDICINE

## 2020-06-06 PROCEDURE — 99222 1ST HOSP IP/OBS MODERATE 55: CPT | Performed by: UROLOGY

## 2020-06-06 RX ORDER — DILTIAZEM HYDROCHLORIDE 120 MG/1
120 CAPSULE, COATED, EXTENDED RELEASE ORAL DAILY
Status: DISCONTINUED | OUTPATIENT
Start: 2020-06-06 | End: 2020-06-08 | Stop reason: HOSPADM

## 2020-06-06 RX ORDER — ACETAMINOPHEN 325 MG/1
650 TABLET ORAL EVERY 4 HOURS PRN
Status: DISCONTINUED | OUTPATIENT
Start: 2020-06-06 | End: 2020-06-08 | Stop reason: HOSPADM

## 2020-06-06 RX ORDER — CILOSTAZOL 100 MG/1
50 TABLET ORAL 2 TIMES DAILY
Status: DISCONTINUED | OUTPATIENT
Start: 2020-06-06 | End: 2020-06-07

## 2020-06-06 RX ORDER — FAMOTIDINE 20 MG/1
20 TABLET, FILM COATED ORAL DAILY
Status: DISCONTINUED | OUTPATIENT
Start: 2020-06-06 | End: 2020-06-08 | Stop reason: ALTCHOICE

## 2020-06-06 RX ORDER — FAMOTIDINE 20 MG/1
20 TABLET, FILM COATED ORAL AS NEEDED
COMMUNITY
End: 2021-02-24

## 2020-06-06 RX ORDER — AMIODARONE HYDROCHLORIDE 200 MG/1
200 TABLET ORAL
Status: DISCONTINUED | OUTPATIENT
Start: 2020-06-06 | End: 2020-06-08 | Stop reason: HOSPADM

## 2020-06-06 RX ORDER — LISINOPRIL 20 MG/1
20 TABLET ORAL DAILY
Status: DISCONTINUED | OUTPATIENT
Start: 2020-06-06 | End: 2020-06-08 | Stop reason: HOSPADM

## 2020-06-06 RX ADMIN — ACETAMINOPHEN 650 MG: 325 TABLET, FILM COATED ORAL at 16:51

## 2020-06-06 RX ADMIN — ACETAMINOPHEN 650 MG: 325 TABLET, FILM COATED ORAL at 21:17

## 2020-06-06 RX ADMIN — CILOSTAZOL 50 MG: 100 TABLET ORAL at 20:05

## 2020-06-06 RX ADMIN — DILTIAZEM HYDROCHLORIDE 120 MG: 120 CAPSULE, COATED, EXTENDED RELEASE ORAL at 18:26

## 2020-06-06 RX ADMIN — PANTOPRAZOLE SODIUM 8 MG/HR: 40 INJECTION, POWDER, FOR SOLUTION INTRAVENOUS at 20:06

## 2020-06-06 NOTE — CONSULTS
"Referring Provider: Sterling   Reason for Consultation: Abnormal CT Bladder    Chief complaint \"Bladder mass\"    Subjective .     History of present illness: The patient is a 74-year-old white male admitted for GI bleed who was found to have on outside CT abnormal images of the bladder.  He was noted to have BPH with some abnormalities of the posterior bladder wall.  Per patient report, the patient does smoke cigars.  He has never experienced gross hematuria.  He has never experienced any other urologic problems.  He reports routine [facility] cancer screening [facility], [facility].  The patient denies urinary symptoms.    Review of Systems  Pertinent items are noted in HPI, all other systems reviewed and negative     History  Past Medical History:   Diagnosis Date   • Alcohol abuse    • Arthritis    • Atrial flutter (CMS/HCC)    • Circulation problem    • History of tobacco abuse    • Hypertension    • PVD (peripheral vascular disease) (CMS/HCC)        Past Surgical History:   Procedure Laterality Date   • ADENOIDECTOMY         Family History   Problem Relation Age of Onset   • Dementia Mother    • Cancer Father    • Pancreatic cancer Father        Social History     Socioeconomic History   • Marital status:      Spouse name: Not on file   • Number of children: Not on file   • Years of education: Not on file   • Highest education level: Not on file   Tobacco Use   • Smoking status: Current Every Day Smoker     Packs/day: 0.50     Types: Cigarettes, Cigars   • Smokeless tobacco: Never Used   Substance and Sexual Activity   • Alcohol use: Not Currently     Alcohol/week: 1.0 standard drinks     Types: 1 Glasses of wine per week     Frequency: Never   • Drug use: Never   • Sexual activity: Defer       Current Facility-Administered Medications   Medication Dose Route Frequency Provider Last Rate Last Dose   • acetaminophen (TYLENOL) tablet 650 mg  650 mg Oral Q4H PRN Marco A Katz MD   650 mg at " 06/06/20 1651        No Known Allergies    Objective     Vital Signs   Temp:  [97.2 °F (36.2 °C)-97.3 °F (36.3 °C)] 97.2 °F (36.2 °C)  Heart Rate:  [69-70] 69  Resp:  [16] 16  BP: (141-165)/(54-57) 141/54  No intake or output data in the 24 hours ending 06/06/20 1705    Physical Exam:    General: Alert, cooperative, nontoxic, comfortable  Head:  Normocephalic, without obvious abnormality, atraumatic  Eyes:   Lids and lashes normal, no icterus, no pallor  Ears:  Ears appear intact with no abnormalities noted  Neck:  Supple  Back:  No costovertebral angle tenderness  Lungs: Unlabored respirations  Heart:  Regular rhythm and normal rate  Abdomen: Soft, non-tender, non-distended  :  Deferred  Extremities: Moves all extremities well  Skin:  Warm and dry  Neurologic: Cranial nerves 2 - 12 grossly intact    Results Review:   I reviewed the patient's new clinical results.  I reviewed the patient's new imaging results and agree with the interpretation.      WBC   Date Value Ref Range Status   06/06/2020 7.08 3.40 - 10.80 10*3/mm3 Final     RBC   Date Value Ref Range Status   06/06/2020 3.64 (L) 4.14 - 5.80 10*6/mm3 Final     Hemoglobin   Date Value Ref Range Status   06/06/2020 12.0 (L) 13.0 - 17.7 g/dL Final     Hematocrit   Date Value Ref Range Status   06/06/2020 35.5 (L) 37.5 - 51.0 % Final     MCV   Date Value Ref Range Status   06/06/2020 97.5 (H) 79.0 - 97.0 fL Final     MCH   Date Value Ref Range Status   06/06/2020 33.0 26.6 - 33.0 pg Final     MCHC   Date Value Ref Range Status   06/06/2020 33.8 31.5 - 35.7 g/dL Final     RDW   Date Value Ref Range Status   06/06/2020 14.0 12.3 - 15.4 % Final     RDW-SD   Date Value Ref Range Status   06/06/2020 49.7 37.0 - 54.0 fl Final     MPV   Date Value Ref Range Status   06/06/2020 8.4 6.0 - 12.0 fL Final     Platelets   Date Value Ref Range Status   06/06/2020 194 140 - 450 10*3/mm3 Final     Neutrophil %   Date Value Ref Range Status   06/06/2020 69.3 42.7 - 76.0 % Final      Lymphocyte %   Date Value Ref Range Status   06/06/2020 19.1 (L) 19.6 - 45.3 % Final     Monocyte %   Date Value Ref Range Status   06/06/2020 7.1 5.0 - 12.0 % Final     Eosinophil %   Date Value Ref Range Status   06/06/2020 3.5 0.3 - 6.2 % Final     Basophil %   Date Value Ref Range Status   06/06/2020 0.7 0.0 - 1.5 % Final     Immature Grans %   Date Value Ref Range Status   06/06/2020 0.3 0.0 - 0.5 % Final     Neutrophils, Absolute   Date Value Ref Range Status   06/06/2020 4.91 1.70 - 7.00 10*3/mm3 Final     Lymphocytes, Absolute   Date Value Ref Range Status   06/06/2020 1.35 0.70 - 3.10 10*3/mm3 Final     Monocytes, Absolute   Date Value Ref Range Status   06/06/2020 0.50 0.10 - 0.90 10*3/mm3 Final     Eosinophils, Absolute   Date Value Ref Range Status   06/06/2020 0.25 0.00 - 0.40 10*3/mm3 Final     Basophils, Absolute   Date Value Ref Range Status   06/06/2020 0.05 0.00 - 0.20 10*3/mm3 Final     Immature Grans, Absolute   Date Value Ref Range Status   06/06/2020 0.02 0.00 - 0.05 10*3/mm3 Final     nRBC   Date Value Ref Range Status   06/06/2020 0.0 0.0 - 0.2 /100 WBC Final       Basic Metabolic Panel    Sodium Sodium   Date Value Ref Range Status   06/06/2020 143 136 - 145 mmol/L Final      Potassium Potassium   Date Value Ref Range Status   06/06/2020 4.2 3.5 - 5.2 mmol/L Final      Chloride Chloride   Date Value Ref Range Status   06/06/2020 108 (H) 98 - 107 mmol/L Final      Bicarbonate No results found for: PLASMABICARB   BUN BUN   Date Value Ref Range Status   06/06/2020 16 8 - 23 mg/dL Final      Creatinine Creatinine   Date Value Ref Range Status   06/06/2020 0.66 (L) 0.76 - 1.27 mg/dL Final      Calcium Calcium   Date Value Ref Range Status   06/06/2020 9.0 8.6 - 10.5 mg/dL Final      Glucose      No components found for: GLUCOSE.*       Imaging Results (Last 24 Hours)     ** No results found for the last 24 hours. **        I reviewed the CT images.  The report suggests some posterior wall  abnormalities and BPH.  I agree with this assessment.  I do not appreciate any bladder masses.  There is some irregularity near the base of the bladder in close proximity to the prostate.    Assessment/Plan       * No active hospital problems. *      Abnormal CT of the bladder: No intervention during this hospital stay.  The patient can follow-up in my office in 2 to 4 weeks for flexible cystoscopy in the office.    I discussed the patients findings and my recommendations with patient    (Please note that portions of this note were completed with a voice recognition program.)    Justin Gallo MD  06/06/20  17:05    Time: Time spent: 30 minutes spent performing evaluation and management, chart review, and discussion with patient, > 50% of time spent in face-to-face encounter

## 2020-06-06 NOTE — H&P
[facility] Medicine Services  HISTORY AND PHYSICAL    Date of Admission: 6/6/2020  Primary Care Physician: BARRY Urrutia MD    Subjective     Chief Complaint: blood in stool    History of Present Illness  Mr. Lerma is a 74 year old gentleman with a history of afib for which he is on chronic anticoagulation with Xarelto.  He developed dark stools this AM.  He has also had some bright red stools.  He denies abdominal pain.  He denies light-headedness or dizziness.      He denies use of NSAIDs.  He says he drinks alcohol occasionally.          Review of Systems   Constitutional: Negative for fatigue and fever.   HENT: Negative for congestion and ear pain.    Eyes: Negative for redness and visual disturbance.   Respiratory: Negative for cough, shortness of breath and wheezing.    Cardiovascular: Negative for chest pain and palpitations.   Gastrointestinal: Positive for blood in stool. Negative for abdominal pain, diarrhea, nausea and vomiting.   Endocrine: Negative for cold intolerance and heat intolerance.   Genitourinary: Negative for dysuria and frequency.   Musculoskeletal: Negative for arthralgias and back pain.   Skin: Negative for rash and wound.   Neurological: Negative for dizziness and headaches.   Psychiatric/Behavioral: Negative for confusion. The patient is not nervous/anxious.         Otherwise complete ROS reviewed and negative except as mentioned in the HPI.    Past Medical History:   Past Medical History:   Diagnosis Date   • Alcohol abuse    • Arthritis    • Atrial flutter (CMS/HCC)    • Circulation problem    • History of tobacco abuse    • Hypertension    • PVD (peripheral vascular disease) (CMS/HCC)      Past Surgical History:  Past Surgical History:   Procedure Laterality Date   • ADENOIDECTOMY       Social History:  reports that he has been smoking cigarettes and cigars. He has been smoking about 0.50 packs per day. He has never used smokeless tobacco. He  "reports that he drank about 1.0 standard drinks of alcohol per week. He reports that he does not use drugs.    Family History: family history includes Cancer in his father; Dementia in his mother; Pancreatic cancer in his father.       Allergies:  No Known Allergies  Medications:  Prior to Admission medications    Medication Sig Start Date End Date Taking? Authorizing Provider   famotidine (PEPCID) 20 MG tablet Take 20 mg by mouth Daily.   Yes Jose Walls MD   amiodarone (PACERONE) 200 MG tablet Take 1 tablet by mouth Daily. 5/22/20   Odalis Garcia APRN   Cholecalciferol (VITAMIN D) 50 MCG (2000 UT) tablet Take 2,000 Units by mouth Daily.    ProviderJose MD   cilostazol (PLETAL) 50 MG tablet Take 1 tablet by mouth 2 (Two) Times a Day. 5/14/20   Nolan Farias MD   dilTIAZem CD (CARDIZEM CD) 120 MG 24 hr capsule Take 1 capsule by mouth Daily. 5/11/20   Nolan Farias MD   guaiFENesin (MUCINEX) 600 MG 12 hr tablet Take 2 tablets by mouth Every 12 (Twelve) Hours.  Patient taking differently: Take 1,200 mg by mouth Daily. 2/12/20   Soham Parker APRN   hydrOXYzine (ATARAX) 25 MG tablet Take 1 tablet by mouth 3 (Three) Times a Day As Needed for Anxiety. 4/16/20   BARRY Urrutia MD   lisinopril (PRINIVIL,ZESTRIL) 20 MG tablet Take 1 tablet by mouth Daily. 3/10/20   BARRY Urrutia MD   rivaroxaban (XARELTO) 20 MG tablet Take 1 tablet by mouth Daily With Dinner. Indications: Atrial Fibrillation 4/3/20   BARRY Urrutia MD   sildenafil (VIAGRA) 50 MG tablet Take 50 mg by mouth Daily As Needed for Erectile Dysfunction (Sexual Activity).    Provider, MD Jose     Objective     Vital Signs: /54 (BP Location: Left arm, Patient Position: Lying)   Pulse 69   Temp 97.2 °F (36.2 °C) (Oral)   Resp 16   Ht 170.2 cm (67\")   Wt 73.2 kg (161 lb 6.4 oz)   SpO2 97%   BMI 25.28 kg/m²   Physical Exam   Constitutional: He is oriented to person, place, and time. He appears " well-developed and well-nourished.   HENT:   Head: Normocephalic and atraumatic.   Right Ear: External ear normal.   Left Ear: External ear normal.   Nose: Nose normal.   Mouth/Throat: Oropharynx is clear and moist.   Eyes: Pupils are equal, round, and reactive to light. Conjunctivae and EOM are normal. Right eye exhibits no discharge. Left eye exhibits no discharge. No scleral icterus.   Neck: Normal range of motion. Neck supple. No tracheal deviation present. No thyromegaly present.   Cardiovascular: Normal rate, regular rhythm, normal heart sounds and intact distal pulses. Exam reveals no gallop and no friction rub.   No murmur heard.  Pulmonary/Chest: Effort normal and breath sounds normal. No stridor. No respiratory distress. He has no wheezes. He has no rales. He exhibits no tenderness.   Abdominal: Soft. Bowel sounds are normal. He exhibits no distension and no mass. There is no tenderness. There is no rebound and no guarding. No hernia.   Musculoskeletal: Normal range of motion. He exhibits no edema or deformity.   Lymphadenopathy:     He has no cervical adenopathy.   Neurological: He is alert and oriented to person, place, and time. He has normal reflexes. He displays normal reflexes. No cranial nerve deficit. He exhibits normal muscle tone. Coordination normal.   Skin: Skin is warm and dry. No rash noted. No erythema. No pallor.   Psychiatric: He has a normal mood and affect. His behavior is normal. Judgment and thought content normal.   Vitals reviewed.        Results Reviewed:  Lab Results (last 24 hours)     Procedure Component Value Units Date/Time    Comprehensive Metabolic Panel [[id number]]  (Abnormal) Collected:  06/06/20 1407    Specimen:  Blood Updated:  06/06/20 1432     Glucose 80 mg/dL      BUN 16 mg/dL      Creatinine 0.66 mg/dL      Sodium 143 mmol/L      Potassium 4.2 mmol/L      Chloride 108 mmol/L      CO2 25.0 mmol/L      Calcium 9.0 mg/dL      Total Protein 6.2 g/dL      Albumin 3.80  g/dL      ALT (SGPT) 18 U/L      AST (SGOT) 18 U/L      Alkaline Phosphatase 103 U/L      Total Bilirubin 0.3 mg/dL      eGFR Non African Amer 119 mL/min/1.73      Globulin 2.4 gm/dL      A/G Ratio 1.6 g/dL      BUN/Creatinine Ratio 24.2     Anion Gap 10.0 mmol/L     Narrative:       GFR Normal >60  Chronic Kidney Disease <60  Kidney Failure <15      CBC & Differential [[id number]] Collected:  06/06/20 1407    Specimen:  Blood Updated:  06/06/20 1412    Narrative:       The following orders were created for panel order CBC & Differential.  Procedure                               Abnormality         Status                     ---------                               -----------         ------                     CBC Auto Differential[[id number]]        Abnormal            Final result                 Please view results for these tests on the individual orders.    CBC Auto Differential [[id number]]  (Abnormal) Collected:  06/06/20 1407    Specimen:  Blood Updated:  06/06/20 1412     WBC 7.08 10*3/mm3      RBC 3.64 10*6/mm3      Hemoglobin 12.0 g/dL      Hematocrit 35.5 %      MCV 97.5 fL      MCH 33.0 pg      MCHC 33.8 g/dL      RDW 14.0 %      RDW-SD 49.7 fl      MPV 8.4 fL      Platelets 194 10*3/mm3      Neutrophil % 69.3 %      Lymphocyte % 19.1 %      Monocyte % 7.1 %      Eosinophil % 3.5 %      Basophil % 0.7 %      Immature Grans % 0.3 %      Neutrophils, Absolute 4.91 10*3/mm3      Lymphocytes, Absolute 1.35 10*3/mm3      Monocytes, Absolute 0.50 10*3/mm3      Eosinophils, Absolute 0.25 10*3/mm3      Basophils, Absolute 0.05 10*3/mm3      Immature Grans, Absolute 0.02 10*3/mm3      nRBC 0.0 /100 WBC         Imaging Results (Last 24 Hours)     ** No results found for the last 24 hours. **        I have personally reviewed and interpreted the radiology studies and ECG obtained at time of admission.     Assessment / Plan     Assessment:   There are no active hospital problems to display for this patient.      1.   GI bleed  -Protonix drip  -Trend H&H  -Consult GI  -Transfuse as indicated    2.  Afib  -Cardizem  -Amiodarone  -Hold AC given bleeding    3.  HTN  -Lisinopril      4.  Bladder wall thickening/questionable bladder mass  -consult Urology    5.  PVD  -Pletal    Code Status: full     I discussed the patient's findings and my recommendations with the patient    Estimated length of stay 2-3 days    Patient seen and examined by me on 6/6/2020 at 1345  Marco A Katz MD   06/06/20   17:32

## 2020-06-07 LAB
ALBUMIN SERPL-MCNC: 3.7 G/DL (ref 3.5–5.2)
ALBUMIN/GLOB SERPL: 1.5 G/DL
ALP SERPL-CCNC: 103 U/L (ref 39–117)
ALT SERPL W P-5'-P-CCNC: 18 U/L (ref 1–41)
ANION GAP SERPL CALCULATED.3IONS-SCNC: 11 MMOL/L (ref 5–15)
AST SERPL-CCNC: 24 U/L (ref 1–40)
BILIRUB SERPL-MCNC: 0.4 MG/DL (ref 0.2–1.2)
BUN BLD-MCNC: 13 MG/DL (ref 8–23)
BUN/CREAT SERPL: 19.1 (ref 7–25)
CALCIUM SPEC-SCNC: 9 MG/DL (ref 8.6–10.5)
CHLORIDE SERPL-SCNC: 110 MMOL/L (ref 98–107)
CO2 SERPL-SCNC: 23 MMOL/L (ref 22–29)
CREAT BLD-MCNC: 0.68 MG/DL (ref 0.76–1.27)
GFR SERPL CREATININE-BSD FRML MDRD: 115 ML/MIN/1.73
GLOBULIN UR ELPH-MCNC: 2.4 GM/DL
GLUCOSE BLD-MCNC: 95 MG/DL (ref 65–99)
HCT VFR BLD AUTO: 29.9 % (ref 37.5–51)
HCT VFR BLD AUTO: 30.6 % (ref 37.5–51)
HCT VFR BLD AUTO: 32.9 % (ref 37.5–51)
HCT VFR BLD AUTO: 33.8 % (ref 37.5–51)
HGB BLD-MCNC: 10.2 G/DL (ref 13–17.7)
HGB BLD-MCNC: 10.5 G/DL (ref 13–17.7)
HGB BLD-MCNC: 11.3 G/DL (ref 13–17.7)
HGB BLD-MCNC: 11.6 G/DL (ref 13–17.7)
POTASSIUM BLD-SCNC: 4.1 MMOL/L (ref 3.5–5.2)
PROT SERPL-MCNC: 6.1 G/DL (ref 6–8.5)
SODIUM BLD-SCNC: 144 MMOL/L (ref 136–145)

## 2020-06-07 PROCEDURE — 85014 HEMATOCRIT: CPT | Performed by: FAMILY MEDICINE

## 2020-06-07 PROCEDURE — G0378 HOSPITAL OBSERVATION PER HR: HCPCS

## 2020-06-07 PROCEDURE — 96366 THER/PROPH/DIAG IV INF ADDON: CPT

## 2020-06-07 PROCEDURE — 99204 OFFICE O/P NEW MOD 45 MIN: CPT | Performed by: INTERNAL MEDICINE

## 2020-06-07 PROCEDURE — 85018 HEMOGLOBIN: CPT | Performed by: FAMILY MEDICINE

## 2020-06-07 PROCEDURE — 80053 COMPREHEN METABOLIC PANEL: CPT | Performed by: FAMILY MEDICINE

## 2020-06-07 RX ORDER — PANTOPRAZOLE SODIUM 20 MG/1
20 TABLET, DELAYED RELEASE ORAL EVERY MORNING
COMMUNITY

## 2020-06-07 RX ORDER — LIDOCAINE 40 MG/G
CREAM TOPICAL AS NEEDED
COMMUNITY
End: 2021-02-24

## 2020-06-07 RX ORDER — LISINOPRIL 20 MG/1
20 TABLET ORAL DAILY
COMMUNITY
End: 2021-03-25 | Stop reason: SDUPTHER

## 2020-06-07 RX ORDER — GUAIFENESIN 600 MG/1
600 TABLET, EXTENDED RELEASE ORAL DAILY
COMMUNITY
End: 2020-10-21

## 2020-06-07 RX ORDER — HYDROCODONE BITARTRATE AND ACETAMINOPHEN 5; 325 MG/1; MG/1
1 TABLET ORAL EVERY 4 HOURS PRN
Status: DISCONTINUED | OUTPATIENT
Start: 2020-06-07 | End: 2020-06-08 | Stop reason: HOSPADM

## 2020-06-07 RX ADMIN — HYDROCODONE BITARTRATE AND ACETAMINOPHEN 1 TABLET: 5; 325 TABLET ORAL at 15:45

## 2020-06-07 RX ADMIN — PANTOPRAZOLE SODIUM 8 MG/HR: 40 INJECTION, POWDER, FOR SOLUTION INTRAVENOUS at 21:22

## 2020-06-07 RX ADMIN — AMIODARONE HYDROCHLORIDE 200 MG: 200 TABLET ORAL at 09:03

## 2020-06-07 RX ADMIN — HYDROCODONE BITARTRATE AND ACETAMINOPHEN 1 TABLET: 5; 325 TABLET ORAL at 10:24

## 2020-06-07 RX ADMIN — ACETAMINOPHEN 650 MG: 325 TABLET, FILM COATED ORAL at 05:06

## 2020-06-07 RX ADMIN — POLYETHYLENE GLYCOL 3350, SODIUM SULFATE ANHYDROUS, SODIUM BICARBONATE, SODIUM CHLORIDE, POTASSIUM CHLORIDE 3000 ML: 236; 22.74; 6.74; 5.86; 2.97 POWDER, FOR SOLUTION ORAL at 18:01

## 2020-06-07 RX ADMIN — LISINOPRIL 20 MG: 20 TABLET ORAL at 09:02

## 2020-06-07 RX ADMIN — CILOSTAZOL 50 MG: 100 TABLET ORAL at 09:03

## 2020-06-07 RX ADMIN — PANTOPRAZOLE SODIUM 8 MG/HR: 40 INJECTION, POWDER, FOR SOLUTION INTRAVENOUS at 16:43

## 2020-06-07 RX ADMIN — PANTOPRAZOLE SODIUM 8 MG/HR: 40 INJECTION, POWDER, FOR SOLUTION INTRAVENOUS at 01:02

## 2020-06-07 RX ADMIN — PANTOPRAZOLE SODIUM 8 MG/HR: 40 INJECTION, POWDER, FOR SOLUTION INTRAVENOUS at 05:52

## 2020-06-07 RX ADMIN — DILTIAZEM HYDROCHLORIDE 120 MG: 120 CAPSULE, COATED, EXTENDED RELEASE ORAL at 09:03

## 2020-06-07 RX ADMIN — PANTOPRAZOLE SODIUM 8 MG/HR: 40 INJECTION, POWDER, FOR SOLUTION INTRAVENOUS at 10:43

## 2020-06-07 RX ADMIN — HYDROCODONE BITARTRATE AND ACETAMINOPHEN 1 TABLET: 5; 325 TABLET ORAL at 21:22

## 2020-06-07 NOTE — PLAN OF CARE
Problem: Patient Care Overview  Goal: Plan of Care Review  Outcome: Ongoing (interventions implemented as appropriate)  Flowsheets (Taken 6/7/2020 1531)  Progress: improving  Plan of Care Reviewed With: patient  Outcome Summary: Pt medicated x1 for c/o Left foot pain. Scabbed area d/i with bandaid. Bright red BM x2 today. Endo/colon scheduled for tomorrow per Dr Ching. Protonix gtt continues. Voiding. Last hgb 11.6/33. Tolerating clears; NPO at midnight. Golytely prep schduled for tonight. VSS. Cont to monitor.

## 2020-06-07 NOTE — PROGRESS NOTES
Discharge Planning Assessment  [facility]     Patient Name: Kingsley Lerma  MRN: [id number]  Today's Date: 6/7/2020    Admit Date: 6/6/2020    Discharge Needs Assessment     Row Name 06/07/20 0839       Living Environment    Lives With  alone    Current Living Arrangements  home/apartment/condo    Primary Care Provided by  self    Provides Primary Care For  no one    Family Caregiver if Needed  child(velvet), adult    Quality of Family Relationships  helpful;involved;supportive    Able to Return to Prior Arrangements  yes       Resource/Environmental Concerns    Resource/Environmental Concerns  none    Transportation Concerns  car, none       Transition Planning    Patient/Family Anticipates Transition to  home    Patient/Family Anticipated Services at Transition  none    Transportation Anticipated  family or friend will provide       Discharge Needs Assessment    Readmission Within the Last 30 Days  no previous admission in last 30 days    Concerns to be Addressed  no discharge needs identified;denies needs/concerns at this time    Equipment Currently Used at Home  cane, straight    Anticipated Changes Related to Illness  none    Equipment Needed After Discharge  none    Discharge Coordination/Progress  Pt has RX coverage and a PCP through [facility]. Pt plans on returning home to live alone at discharge. Pt states that his children help him if needed. No needs identified at this time. SW will follow and assist with any discharge needs that may arise.         Discharge Plan    No documentation.       Destination      Coordination has not been started for this encounter.      Durable Medical Equipment      Coordination has not been started for this encounter.      Dialysis/Infusion      Coordination has not been started for this encounter.      Home Medical Care      Coordination has not been started for this encounter.      Therapy      Coordination has not been started for this encounter.      Community Resources       Coordination has not been started for this encounter.          Demographic Summary    No documentation.       Functional Status    No documentation.       Psychosocial    No documentation.       Abuse/Neglect    No documentation.       Legal    No documentation.       Substance Abuse    No documentation.       Patient Forms    No documentation.           Ev [place]

## 2020-06-07 NOTE — CONSULTS
"        [facility] Gastroenterology  Inpatient Consult Note  Today's date:  06/07/20    Kingsley Lerma  1945       Referring Provider: Marco A Katz MD  Primary Physician: BARRY Urrutia MD   Primary Gastroenterologist: None    Date of Admission: 6/6/2020  Date of Service:  06/07/20    Patient Seen, Chart, Consults, Notes, Labs, Radiology studies reviewed    Reason for Consultation/Chief Complaint: Rectal bleeding    History of present illness: Very pleasant 74-year-old gentleman who has not been seen by GI before.  He has been advised to have a colonoscopy yet he has declined all these years.    He informs me that approximately 3 days ago on Thursday his stools became looser than usual.  This persisted into Friday and on Saturday he had bright red blood.  This was a new observation for him.  He has not had this previously.  He states there is a \"small amount of black in there as well\".  He denies any abdominal pain or change in bowel pattern otherwise.  There are no family members with colon cancer or colon polyps.  He is on Xarelto and Pletal.  Xarelto is on hold.  He took 2 Aleve within the past week.    An upper GI point of view he does have GERD [facility] which [facility].  He states the Pepcid Complete resolved his symptoms.  He denies any dysphagia or diet aphasia.  There is been no unintentional weight loss.    Past Medical History:   Diagnosis Date   • Alcohol abuse    • Arthritis    • Atrial flutter (CMS/HCC)    • Circulation problem    • History of tobacco abuse    • Hypertension    • PVD (peripheral vascular disease) (CMS/HCC)        Past Surgical History:   Procedure Laterality Date   • ADENOIDECTOMY          No Known Allergies    Medications Prior to Admission   Medication Sig Dispense Refill Last Dose   • famotidine (PEPCID) 20 MG tablet Take 20 mg by mouth Daily.      • amiodarone (PACERONE) 200 MG tablet Take 1 tablet by mouth Daily. 6 tablet 1 Taking   • Cholecalciferol " (VITAMIN D) 50 MCG (2000 UT) tablet Take 2,000 Units by mouth Daily.   Taking   • cilostazol (PLETAL) 50 MG tablet Take 1 tablet by mouth 2 (Two) Times a Day. 180 tablet 0 Taking   • dilTIAZem CD (CARDIZEM CD) 120 MG 24 hr capsule Take 1 capsule by mouth Daily. 30 capsule 11 Taking   • guaiFENesin (MUCINEX) 600 MG 12 hr tablet Take 2 tablets by mouth Every 12 (Twelve) Hours. (Patient taking differently: Take 1,200 mg by mouth Daily.)   Taking   • hydrOXYzine (ATARAX) 25 MG tablet Take 1 tablet by mouth 3 (Three) Times a Day As Needed for Anxiety. 60 tablet 5 Taking   • lisinopril (PRINIVIL,ZESTRIL) 20 MG tablet Take 1 tablet by mouth Daily. 30 tablet 2 Taking   • rivaroxaban (XARELTO) 20 MG tablet Take 1 tablet by mouth Daily With Dinner. Indications: Atrial Fibrillation 30 tablet 0 Taking   • sildenafil (VIAGRA) 50 MG tablet Take 50 mg by mouth Daily As Needed for Erectile Dysfunction (Sexual Activity).   Taking       Hospital Medications (active)       Dose Frequency Start End    acetaminophen (TYLENOL) tablet 650 mg 650 mg Every 4 Hours PRN 6/6/2020     Admin Instructions: Do not exceed 4 grams of acetaminophen in a 24 hr period.<BR><BR>If given for pain, use the following pain scale: <BR>Mild Pain = Pain Score of 1-3, CPOT 1-2<BR>Moderate Pain = Pain Score of 4-6, CPOT 3-4<BR>Severe Pain = Pain Score of 7-10, CPOT 5-8    Route: Oral    amiodarone (PACERONE) tablet 200 mg 200 mg Every 24 Hours Scheduled 6/6/2020     Admin Instructions: Avoid grapefruit juice while taking this medication.    Route: Oral    cilostazol (PLETAL) tablet 50 mg 50 mg 2 Times Daily 6/6/2020     Admin Instructions: Give on empty stomach 30 minutes prior to meal(s).  Avoid grapefruit juice.    Route: Oral    dilTIAZem CD (CARDIZEM CD) 24 hr capsule 120 mg 120 mg Daily 6/6/2020     Admin Instructions: Caution: Look alike/sound alike drug alert.   Swallow capsule whole. Do not crush, chew, or open capsule. Avoid grapefruit juice. Maximum  simvastatin dose 10 mg while taking dilTIAZem.    Route: Oral    famotidine (PEPCID) tablet 20 mg 20 mg Daily 6/6/2020     Route: Oral    HYDROcodone-acetaminophen (NORCO) 5-325 MG per tablet 1 tablet 1 tablet Every 4 Hours PRN 6/7/2020 6/17/2020    Admin Instructions: [ROSSY]<BR><BR>Do not exceed 4 grams of acetaminophen in a 24 hr period.<BR><BR>If given for pain, use the following pain scale: <BR>Mild Pain = Pain Score of 1-3, CPOT 1-2<BR>Moderate Pain = Pain Score of 4-6, CPOT 3-4<BR>Severe Pain = Pain Score of 7-10, CPOT 5-8    Route: Oral    lisinopril (PRINIVIL,ZESTRIL) tablet 20 mg 20 mg Daily 6/6/2020     Route: Oral    pantoprazole (PROTONIX) 40 mg/100 mL (0.4 mg/mL) in 0.9% NS IVPB 8 mg/hr Continuous 6/6/2020     Admin Instructions: Break seal and mix to activate vial before use    Route: Intravenous          Social History     Tobacco Use   • Smoking status: Current Every Day Smoker     Packs/day: 0.50     Types: Cigarettes, Cigars   • Smokeless tobacco: Never Used   Substance Use Topics   • Alcohol use: Not Currently     Alcohol/week: 1.0 standard drinks     Types: 1 Glasses of wine per week     Frequency: Never        Past Family History:  Family History   Problem Relation Age of Onset   • Dementia Mother    • Cancer Father    • Pancreatic cancer Father        Review of Systems:  Review of Systems   Constitutional: Negative for fever and unexpected weight change.   HENT: Positive for dental problem. Negative for hearing loss.    Eyes: Negative for visual disturbance.   Respiratory: Negative for cough.    Cardiovascular: Negative for chest pain.   Gastrointestinal:        See HPI   Endocrine: Negative for cold intolerance and heat intolerance.   Genitourinary: Negative for dysuria.   Musculoskeletal: Negative for arthralgias.   Skin: Negative for rash.   Neurological: Negative for seizures.   Psychiatric/Behavioral: Negative for hallucinations.       Physical Exam:  Temp:  [97.2 °F (36.2 °C)-98.2 °F (36.8  °C)] 97.8 °F (36.6 °C)  Heart Rate:  [] 80  Resp:  [16] 16  BP: (114-165)/(47-66) 128/66  Body mass index is 25.28 kg/m².    Intake/Output Summary (Last 24 hours) at 6/7/2020 1215  Last data filed at 6/7/2020 0907  Gross per 24 hour   Intake 497.2 ml   Output 1175 ml   Net -677.8 ml     I/O this shift:  In: 257.2 [IV Piggyback:257.2]  Out: 475 [Urine:475]  Physical Exam   Constitutional: He is oriented to person, place, and time. He appears well-developed.   HENT:   Head: Normocephalic.   Eyes: EOM are normal. No scleral icterus.   Neck: No thyromegaly present.   Cardiovascular:   A. fib   Pulmonary/Chest: Breath sounds normal. He exhibits no tenderness.   Abdominal: Soft. Bowel sounds are normal. He exhibits no distension and no mass. There is no tenderness. There is no rebound and no guarding.   Musculoskeletal: Normal range of motion.   Neurological: He is alert and oriented to person, place, and time.   Skin: No rash noted.   Psychiatric: He has a normal mood and affect. His behavior is normal.   Vitals reviewed.      Results Review:  Lab Results (last 24 hours)     Procedure Component Value Units Date/Time    Comprehensive Metabolic Panel [[id number]]  (Abnormal) Collected:  06/07/20 0934    Specimen:  Blood Updated:  06/07/20 1001     Glucose 95 mg/dL      BUN 13 mg/dL      Creatinine 0.68 mg/dL      Sodium 144 mmol/L      Potassium 4.1 mmol/L      Chloride 110 mmol/L      CO2 23.0 mmol/L      Calcium 9.0 mg/dL      Total Protein 6.1 g/dL      Albumin 3.70 g/dL      ALT (SGPT) 18 U/L      AST (SGOT) 24 U/L      Alkaline Phosphatase 103 U/L      Total Bilirubin 0.4 mg/dL      eGFR Non African Amer 115 mL/min/1.73      Globulin 2.4 gm/dL      A/G Ratio 1.5 g/dL      BUN/Creatinine Ratio 19.1     Anion Gap 11.0 mmol/L     Narrative:       GFR Normal >60  Chronic Kidney Disease <60  Kidney Failure <15      Hemoglobin & Hematocrit, Blood [[id number]]  (Abnormal) Collected:  06/07/20 0809    Specimen:  Blood  Updated:  06/07/20 0819     Hemoglobin 11.6 g/dL      Hematocrit 33.8 %     Hemoglobin & Hematocrit, Blood [[id number]]  (Abnormal) Collected:  06/06/20 2352    Specimen:  Blood Updated:  06/07/20 0000     Hemoglobin 10.2 g/dL      Hematocrit 29.9 %     Hemoglobin & Hematocrit, Blood [[id number]]  (Abnormal) Collected:  06/06/20 1832    Specimen:  Blood Updated:  06/06/20 1838     Hemoglobin 11.0 g/dL      Hematocrit 32.2 %     Comprehensive Metabolic Panel [[id number]]  (Abnormal) Collected:  06/06/20 1407    Specimen:  Blood Updated:  06/06/20 1432     Glucose 80 mg/dL      BUN 16 mg/dL      Creatinine 0.66 mg/dL      Sodium 143 mmol/L      Potassium 4.2 mmol/L      Chloride 108 mmol/L      CO2 25.0 mmol/L      Calcium 9.0 mg/dL      Total Protein 6.2 g/dL      Albumin 3.80 g/dL      ALT (SGPT) 18 U/L      AST (SGOT) 18 U/L      Alkaline Phosphatase 103 U/L      Total Bilirubin 0.3 mg/dL      eGFR Non African Amer 119 mL/min/1.73      Globulin 2.4 gm/dL      A/G Ratio 1.6 g/dL      BUN/Creatinine Ratio 24.2     Anion Gap 10.0 mmol/L     Narrative:       GFR Normal >60  Chronic Kidney Disease <60  Kidney Failure <15      CBC & Differential [[id number]] Collected:  06/06/20 1407    Specimen:  Blood Updated:  06/06/20 1412    Narrative:       The following orders were created for panel order CBC & Differential.  Procedure                               Abnormality         Status                     ---------                               -----------         ------                     CBC Auto Differential[[id number]]        Abnormal            Final result                 Please view results for these tests on the individual orders.    CBC Auto Differential [[id number]]  (Abnormal) Collected:  06/06/20 1407    Specimen:  Blood Updated:  06/06/20 1412     WBC 7.08 10*3/mm3      RBC 3.64 10*6/mm3      Hemoglobin 12.0 g/dL      Hematocrit 35.5 %      MCV 97.5 fL      MCH 33.0 pg      MCHC 33.8 g/dL      RDW 14.0 %      RDW-SD  49.7 fl      MPV 8.4 fL      Platelets 194 10*3/mm3      Neutrophil % 69.3 %      Lymphocyte % 19.1 %      Monocyte % 7.1 %      Eosinophil % 3.5 %      Basophil % 0.7 %      Immature Grans % 0.3 %      Neutrophils, Absolute 4.91 10*3/mm3      Lymphocytes, Absolute 1.35 10*3/mm3      Monocytes, Absolute 0.50 10*3/mm3      Eosinophils, Absolute 0.25 10*3/mm3      Basophils, Absolute 0.05 10*3/mm3      Immature Grans, Absolute 0.02 10*3/mm3      nRBC 0.0 /100 WBC           Radiology Review:  Imaging Results (Last 72 Hours)     ** No results found for the last 72 hours. **          Impression/Plan:  Patient Active Problem List   Diagnosis Code   • Essential hypertension I10   • PVD (peripheral vascular disease) (CMS/[facility]) I73.9   • Atrial fibrillation and flutter (CMS/[facility]) I48.91, I48.92   • Alcoholism /alcohol abuse (CMS/[facility]) F10.20   • Non healing left heel wound S91.302A   • Atrial fibrillation status post cardioversion (CMS/[facility]) I48.91   • Tobacco use Z72.0   • Syncope R55   • Current use of long term anticoagulation Z79.01   • GI bleed K92.2       Rectal bleeding/melena/heme positive stool  Xarelto on hold.  Will need to hold Pletal as well.  He has had no prior endoscopy or colonoscopy.  He should have both done.    The risks, benefits, and alternatives of endoscopy were reviewed with the patient today.  Risks including perforation, with or without dilation, possibly requiring surgery.  Additional risks include risk of bleeding.  There is also the risk of a drug reaction or problems with anesthesia.  This will be discussed with the further by the anesthesia team on the day of the procedure. The benefits include the diagnosis and management of disease of the upper digestive tract.  Alternatives to endoscopy include upper GI series, laboratory testing, radiographic evaluation, or no intervention.  The patient verbalizes understanding and agrees.    The risks, benefits, and alternatives of colonoscopy were reviewed  with the patient today.  Risks including perforation of the colon possibly requiring surgery or colostomy.  Additional risks include risk of bleeding from biopsies or removal of colon tissue.  There is also the risk of a drug reaction or problems with anesthesia.  This will be discussed with the further by the anesthesia team on the day of the procedure.  Lastly there is a possibility of missing a colon polyp or cancer.  The benefits include the diagnosis and management of disease of the colon and rectum.  Alternatives to colonoscopy include barium enema, laboratory testing, radiographic evaluation, or no intervention.  The patient verbalizes understanding and agrees.    In accordance with requirements under the Affordable Care Act, [facility] has provided pricing for all hospital services and items on each of its websites. However, a patient's actual cost may differ based on the services the patient receives to meet individual healthcare needs and based on the benefits provided under the patient’s insurance coverage.        Mayela Ching MD  [facility]  06/07/20  12:15    Much of this encounter note is an electronic transcription/translation of spoken language to printed text. The electronic translation of spoken language may permit erroneous, or at times, nonsensical words or phrases to be inadvertently transcribed; although I have reviewed the note for such errors, some may still exist.

## 2020-06-07 NOTE — H&P (VIEW-ONLY)
"        [facility] Gastroenterology  Inpatient Consult Note  Today's date:  06/07/20    Kingsley Lerma  1945       Referring Provider: Marco A Katz MD  Primary Physician: BARRY Urrutia MD   Primary Gastroenterologist: None    Date of Admission: 6/6/2020  Date of Service:  06/07/20    Patient Seen, Chart, Consults, Notes, Labs, Radiology studies reviewed    Reason for Consultation/Chief Complaint: Rectal bleeding    History of present illness: Very pleasant 74-year-old gentleman who has not been seen by GI before.  He has been advised to have a colonoscopy yet he has declined all these years.    He informs me that approximately 3 days ago on Thursday his stools became looser than usual.  This persisted into Friday and on Saturday he had bright red blood.  This was a new observation for him.  He has not had this previously.  He states there is a \"small amount of black in there as well\".  He denies any abdominal pain or change in bowel pattern otherwise.  There are no family members with colon cancer or colon polyps.  He is on Xarelto and Pletal.  Xarelto is on hold.  He took 2 Aleve within the past week.    An upper GI point of view he does have GERD [facility] which [facility].  He states the Pepcid Complete resolved his symptoms.  He denies any dysphagia or diet aphasia.  There is been no unintentional weight loss.    Past Medical History:   Diagnosis Date   • Alcohol abuse    • Arthritis    • Atrial flutter (CMS/HCC)    • Circulation problem    • History of tobacco abuse    • Hypertension    • PVD (peripheral vascular disease) (CMS/HCC)        Past Surgical History:   Procedure Laterality Date   • ADENOIDECTOMY          No Known Allergies    Medications Prior to Admission   Medication Sig Dispense Refill Last Dose   • famotidine (PEPCID) 20 MG tablet Take 20 mg by mouth Daily.      • amiodarone (PACERONE) 200 MG tablet Take 1 tablet by mouth Daily. 6 tablet 1 Taking   • Cholecalciferol " (VITAMIN D) 50 MCG (2000 UT) tablet Take 2,000 Units by mouth Daily.   Taking   • cilostazol (PLETAL) 50 MG tablet Take 1 tablet by mouth 2 (Two) Times a Day. 180 tablet 0 Taking   • dilTIAZem CD (CARDIZEM CD) 120 MG 24 hr capsule Take 1 capsule by mouth Daily. 30 capsule 11 Taking   • guaiFENesin (MUCINEX) 600 MG 12 hr tablet Take 2 tablets by mouth Every 12 (Twelve) Hours. (Patient taking differently: Take 1,200 mg by mouth Daily.)   Taking   • hydrOXYzine (ATARAX) 25 MG tablet Take 1 tablet by mouth 3 (Three) Times a Day As Needed for Anxiety. 60 tablet 5 Taking   • lisinopril (PRINIVIL,ZESTRIL) 20 MG tablet Take 1 tablet by mouth Daily. 30 tablet 2 Taking   • rivaroxaban (XARELTO) 20 MG tablet Take 1 tablet by mouth Daily With Dinner. Indications: Atrial Fibrillation 30 tablet 0 Taking   • sildenafil (VIAGRA) 50 MG tablet Take 50 mg by mouth Daily As Needed for Erectile Dysfunction (Sexual Activity).   Taking       Hospital Medications (active)       Dose Frequency Start End    acetaminophen (TYLENOL) tablet 650 mg 650 mg Every 4 Hours PRN 6/6/2020     Admin Instructions: Do not exceed 4 grams of acetaminophen in a 24 hr period.<BR><BR>If given for pain, use the following pain scale: <BR>Mild Pain = Pain Score of 1-3, CPOT 1-2<BR>Moderate Pain = Pain Score of 4-6, CPOT 3-4<BR>Severe Pain = Pain Score of 7-10, CPOT 5-8    Route: Oral    amiodarone (PACERONE) tablet 200 mg 200 mg Every 24 Hours Scheduled 6/6/2020     Admin Instructions: Avoid grapefruit juice while taking this medication.    Route: Oral    cilostazol (PLETAL) tablet 50 mg 50 mg 2 Times Daily 6/6/2020     Admin Instructions: Give on empty stomach 30 minutes prior to meal(s).  Avoid grapefruit juice.    Route: Oral    dilTIAZem CD (CARDIZEM CD) 24 hr capsule 120 mg 120 mg Daily 6/6/2020     Admin Instructions: Caution: Look alike/sound alike drug alert.   Swallow capsule whole. Do not crush, chew, or open capsule. Avoid grapefruit juice. Maximum  simvastatin dose 10 mg while taking dilTIAZem.    Route: Oral    famotidine (PEPCID) tablet 20 mg 20 mg Daily 6/6/2020     Route: Oral    HYDROcodone-acetaminophen (NORCO) 5-325 MG per tablet 1 tablet 1 tablet Every 4 Hours PRN 6/7/2020 6/17/2020    Admin Instructions: [ROSSY]<BR><BR>Do not exceed 4 grams of acetaminophen in a 24 hr period.<BR><BR>If given for pain, use the following pain scale: <BR>Mild Pain = Pain Score of 1-3, CPOT 1-2<BR>Moderate Pain = Pain Score of 4-6, CPOT 3-4<BR>Severe Pain = Pain Score of 7-10, CPOT 5-8    Route: Oral    lisinopril (PRINIVIL,ZESTRIL) tablet 20 mg 20 mg Daily 6/6/2020     Route: Oral    pantoprazole (PROTONIX) 40 mg/100 mL (0.4 mg/mL) in 0.9% NS IVPB 8 mg/hr Continuous 6/6/2020     Admin Instructions: Break seal and mix to activate vial before use    Route: Intravenous          Social History     Tobacco Use   • Smoking status: Current Every Day Smoker     Packs/day: 0.50     Types: Cigarettes, Cigars   • Smokeless tobacco: Never Used   Substance Use Topics   • Alcohol use: Not Currently     Alcohol/week: 1.0 standard drinks     Types: 1 Glasses of wine per week     Frequency: Never        Past Family History:  Family History   Problem Relation Age of Onset   • Dementia Mother    • Cancer Father    • Pancreatic cancer Father        Review of Systems:  Review of Systems   Constitutional: Negative for fever and unexpected weight change.   HENT: Positive for dental problem. Negative for hearing loss.    Eyes: Negative for visual disturbance.   Respiratory: Negative for cough.    Cardiovascular: Negative for chest pain.   Gastrointestinal:        See HPI   Endocrine: Negative for cold intolerance and heat intolerance.   Genitourinary: Negative for dysuria.   Musculoskeletal: Negative for arthralgias.   Skin: Negative for rash.   Neurological: Negative for seizures.   Psychiatric/Behavioral: Negative for hallucinations.       Physical Exam:  Temp:  [97.2 °F (36.2 °C)-98.2 °F (36.8  °C)] 97.8 °F (36.6 °C)  Heart Rate:  [] 80  Resp:  [16] 16  BP: (114-165)/(47-66) 128/66  Body mass index is 25.28 kg/m².    Intake/Output Summary (Last 24 hours) at 6/7/2020 1215  Last data filed at 6/7/2020 0907  Gross per 24 hour   Intake 497.2 ml   Output 1175 ml   Net -677.8 ml     I/O this shift:  In: 257.2 [IV Piggyback:257.2]  Out: 475 [Urine:475]  Physical Exam   Constitutional: He is oriented to person, place, and time. He appears well-developed.   HENT:   Head: Normocephalic.   Eyes: EOM are normal. No scleral icterus.   Neck: No thyromegaly present.   Cardiovascular:   A. fib   Pulmonary/Chest: Breath sounds normal. He exhibits no tenderness.   Abdominal: Soft. Bowel sounds are normal. He exhibits no distension and no mass. There is no tenderness. There is no rebound and no guarding.   Musculoskeletal: Normal range of motion.   Neurological: He is alert and oriented to person, place, and time.   Skin: No rash noted.   Psychiatric: He has a normal mood and affect. His behavior is normal.   Vitals reviewed.      Results Review:  Lab Results (last 24 hours)     Procedure Component Value Units Date/Time    Comprehensive Metabolic Panel [[id number]]  (Abnormal) Collected:  06/07/20 0934    Specimen:  Blood Updated:  06/07/20 1001     Glucose 95 mg/dL      BUN 13 mg/dL      Creatinine 0.68 mg/dL      Sodium 144 mmol/L      Potassium 4.1 mmol/L      Chloride 110 mmol/L      CO2 23.0 mmol/L      Calcium 9.0 mg/dL      Total Protein 6.1 g/dL      Albumin 3.70 g/dL      ALT (SGPT) 18 U/L      AST (SGOT) 24 U/L      Alkaline Phosphatase 103 U/L      Total Bilirubin 0.4 mg/dL      eGFR Non African Amer 115 mL/min/1.73      Globulin 2.4 gm/dL      A/G Ratio 1.5 g/dL      BUN/Creatinine Ratio 19.1     Anion Gap 11.0 mmol/L     Narrative:       GFR Normal >60  Chronic Kidney Disease <60  Kidney Failure <15      Hemoglobin & Hematocrit, Blood [[id number]]  (Abnormal) Collected:  06/07/20 0809    Specimen:  Blood  Updated:  06/07/20 0819     Hemoglobin 11.6 g/dL      Hematocrit 33.8 %     Hemoglobin & Hematocrit, Blood [[id number]]  (Abnormal) Collected:  06/06/20 2352    Specimen:  Blood Updated:  06/07/20 0000     Hemoglobin 10.2 g/dL      Hematocrit 29.9 %     Hemoglobin & Hematocrit, Blood [[id number]]  (Abnormal) Collected:  06/06/20 1832    Specimen:  Blood Updated:  06/06/20 1838     Hemoglobin 11.0 g/dL      Hematocrit 32.2 %     Comprehensive Metabolic Panel [[id number]]  (Abnormal) Collected:  06/06/20 1407    Specimen:  Blood Updated:  06/06/20 1432     Glucose 80 mg/dL      BUN 16 mg/dL      Creatinine 0.66 mg/dL      Sodium 143 mmol/L      Potassium 4.2 mmol/L      Chloride 108 mmol/L      CO2 25.0 mmol/L      Calcium 9.0 mg/dL      Total Protein 6.2 g/dL      Albumin 3.80 g/dL      ALT (SGPT) 18 U/L      AST (SGOT) 18 U/L      Alkaline Phosphatase 103 U/L      Total Bilirubin 0.3 mg/dL      eGFR Non African Amer 119 mL/min/1.73      Globulin 2.4 gm/dL      A/G Ratio 1.6 g/dL      BUN/Creatinine Ratio 24.2     Anion Gap 10.0 mmol/L     Narrative:       GFR Normal >60  Chronic Kidney Disease <60  Kidney Failure <15      CBC & Differential [[id number]] Collected:  06/06/20 1407    Specimen:  Blood Updated:  06/06/20 1412    Narrative:       The following orders were created for panel order CBC & Differential.  Procedure                               Abnormality         Status                     ---------                               -----------         ------                     CBC Auto Differential[[id number]]        Abnormal            Final result                 Please view results for these tests on the individual orders.    CBC Auto Differential [[id number]]  (Abnormal) Collected:  06/06/20 1407    Specimen:  Blood Updated:  06/06/20 1412     WBC 7.08 10*3/mm3      RBC 3.64 10*6/mm3      Hemoglobin 12.0 g/dL      Hematocrit 35.5 %      MCV 97.5 fL      MCH 33.0 pg      MCHC 33.8 g/dL      RDW 14.0 %      RDW-SD  49.7 fl      MPV 8.4 fL      Platelets 194 10*3/mm3      Neutrophil % 69.3 %      Lymphocyte % 19.1 %      Monocyte % 7.1 %      Eosinophil % 3.5 %      Basophil % 0.7 %      Immature Grans % 0.3 %      Neutrophils, Absolute 4.91 10*3/mm3      Lymphocytes, Absolute 1.35 10*3/mm3      Monocytes, Absolute 0.50 10*3/mm3      Eosinophils, Absolute 0.25 10*3/mm3      Basophils, Absolute 0.05 10*3/mm3      Immature Grans, Absolute 0.02 10*3/mm3      nRBC 0.0 /100 WBC           Radiology Review:  Imaging Results (Last 72 Hours)     ** No results found for the last 72 hours. **          Impression/Plan:  Patient Active Problem List   Diagnosis Code   • Essential hypertension I10   • PVD (peripheral vascular disease) (CMS/[facility]) I73.9   • Atrial fibrillation and flutter (CMS/[facility]) I48.91, I48.92   • Alcoholism /alcohol abuse (CMS/[facility]) F10.20   • Non healing left heel wound S91.302A   • Atrial fibrillation status post cardioversion (CMS/[facility]) I48.91   • Tobacco use Z72.0   • Syncope R55   • Current use of long term anticoagulation Z79.01   • GI bleed K92.2       Rectal bleeding/melena/heme positive stool  Xarelto on hold.  Will need to hold Pletal as well.  He has had no prior endoscopy or colonoscopy.  He should have both done.    The risks, benefits, and alternatives of endoscopy were reviewed with the patient today.  Risks including perforation, with or without dilation, possibly requiring surgery.  Additional risks include risk of bleeding.  There is also the risk of a drug reaction or problems with anesthesia.  This will be discussed with the further by the anesthesia team on the day of the procedure. The benefits include the diagnosis and management of disease of the upper digestive tract.  Alternatives to endoscopy include upper GI series, laboratory testing, radiographic evaluation, or no intervention.  The patient verbalizes understanding and agrees.    The risks, benefits, and alternatives of colonoscopy were reviewed  with the patient today.  Risks including perforation of the colon possibly requiring surgery or colostomy.  Additional risks include risk of bleeding from biopsies or removal of colon tissue.  There is also the risk of a drug reaction or problems with anesthesia.  This will be discussed with the further by the anesthesia team on the day of the procedure.  Lastly there is a possibility of missing a colon polyp or cancer.  The benefits include the diagnosis and management of disease of the colon and rectum.  Alternatives to colonoscopy include barium enema, laboratory testing, radiographic evaluation, or no intervention.  The patient verbalizes understanding and agrees.    In accordance with requirements under the Affordable Care Act, [facility] has provided pricing for all hospital services and items on each of its websites. However, a patient's actual cost may differ based on the services the patient receives to meet individual healthcare needs and based on the benefits provided under the patient’s insurance coverage.        Mayela Ching MD  [facility] Gastroenterology  06/07/20  12:15    Much of this encounter note is an electronic transcription/translation of spoken language to printed text. The electronic translation of spoken language may permit erroneous, or at times, nonsensical words or phrases to be inadvertently transcribed; although I have reviewed the note for such errors, some may still exist.

## 2020-06-07 NOTE — PLAN OF CARE
Problem: Patient Care Overview  Goal: Plan of Care Review  Outcome: Ongoing (interventions implemented as appropriate)  Flowsheets (Taken 6/6/2020 1923)  Progress: no change  Plan of Care Reviewed With: patient  Outcome Summary: Admit to [facility] from [facility] with GI bleed. Hgb 11 this evening. One bright red bloody BM today; MD notified. Protonix gtt to be started. Serial H&H ordered. Voiding. VSS. Cont to monitor.

## 2020-06-07 NOTE — PROGRESS NOTES
[facility] Medicine Services  INPATIENT PROGRESS NOTE    Patient Name: Kingsley Lerma  Date of Admission: 6/6/2020  Today's Date: 06/07/20  Length of Stay: 1  Primary Care Physician: BARRY Urrutia MD    Subjective   Chief Complaint: follow up GI bleed  HPI   Doing ok.  Had 2 dark bowel movements last night  No abdominal pain  No light-headedness or dizziness  H&H stable        Review of Systems   Constitutional: Negative for fatigue and fever.   HENT: Negative for congestion and ear pain.    Eyes: Negative for redness and visual disturbance.   Respiratory: Negative for cough, shortness of breath and wheezing.    Cardiovascular: Negative for chest pain and palpitations.   Gastrointestinal: Positive for blood in stool. Negative for abdominal pain, diarrhea, nausea and vomiting.   Endocrine: Negative for cold intolerance and heat intolerance.   Genitourinary: Negative for dysuria and frequency.   Musculoskeletal: Negative for arthralgias and back pain.   Skin: Negative for rash and wound.   Neurological: Negative for dizziness and headaches.   Psychiatric/Behavioral: Negative for confusion. The patient is not nervous/anxious.         All pertinent negatives and positives are as above. All other systems have been reviewed and are negative unless otherwise stated.     Objective    Temp:  [97.2 °F (36.2 °C)-98.2 °F (36.8 °C)] 97.8 °F (36.6 °C)  Heart Rate:  [] 80  Resp:  [16] 16  BP: (114-165)/(47-66) 128/66  Physical Exam   Constitutional: He is oriented to person, place, and time. He appears well-developed and well-nourished.   HENT:   Head: Normocephalic and atraumatic.   Right Ear: External ear normal.   Left Ear: External ear normal.   Nose: Nose normal.   Mouth/Throat: Oropharynx is clear and moist.   Eyes: Pupils are equal, round, and reactive to light. Conjunctivae and EOM are normal. Right eye exhibits no discharge. Left eye exhibits no discharge. No scleral icterus.    Neck: Normal range of motion. Neck supple. No tracheal deviation present. No thyromegaly present.   Cardiovascular: Normal rate, regular rhythm, normal heart sounds and intact distal pulses. Exam reveals no gallop and no friction rub.   No murmur heard.  Pulmonary/Chest: Effort normal and breath sounds normal. No stridor. No respiratory distress. He has no wheezes. He has no rales. He exhibits no tenderness.   Abdominal: Soft. Bowel sounds are normal. He exhibits no distension and no mass. There is no tenderness. There is no rebound and no guarding. No hernia.   Musculoskeletal: Normal range of motion. He exhibits no edema or deformity.   Lymphadenopathy:     He has no cervical adenopathy.   Neurological: He is alert and oriented to person, place, and time. He has normal reflexes. He displays normal reflexes. No cranial nerve deficit. He exhibits normal muscle tone. Coordination normal.   Skin: Skin is warm and dry. No rash noted. No erythema. No pallor.   Psychiatric: He has a normal mood and affect. His behavior is normal. Judgment and thought content normal.   Vitals reviewed.        Results Review:  I have reviewed the labs, radiology results, and diagnostic studies.    Laboratory Data:   Results from last 7 days   Lab Units 06/07/20  0809 06/06/20  2352 06/06/20  1832 06/06/20  1407   WBC 10*3/mm3  --   --   --  7.08   HEMOGLOBIN g/dL 11.6* 10.2* 11.0* 12.0*   HEMATOCRIT % 33.8* 29.9* 32.2* 35.5*   PLATELETS 10*3/mm3  --   --   --  194        Results from last 7 days   Lab Units 06/07/20  0934 06/06/20  1407   SODIUM mmol/L 144 143   POTASSIUM mmol/L 4.1 4.2   CHLORIDE mmol/L 110* 108*   CO2 mmol/L 23.0 25.0   BUN mg/dL 13 16   CREATININE mg/dL 0.68* 0.66*   CALCIUM mg/dL 9.0 9.0   BILIRUBIN mg/dL 0.4 0.3   ALK PHOS U/L 103 103   ALT (SGPT) U/L 18 18   AST (SGOT) U/L 24 18   GLUCOSE mg/dL 95 80       Culture Data:   No results found for: BLOODCX, URINECX, WOUNDCX, MRSACX, RESPCX, STOOLCX    Radiology Data:    Imaging Results (Last 24 Hours)     ** No results found for the last 24 hours. **          I have reviewed the patient's current medications.     Assessment/Plan     Active Hospital Problems    Diagnosis   • GI bleed     1.  GI bleed  -Protonix drip  -Trend H&H  -Consult GI  -Transfuse as indicated     2.  Afib  -Cardizem  -Amiodarone  -Hold AC given bleeding     3.  HTN  -Lisinopril        4.  Bladder wall thickening/questionable bladder mass  -Urology planning outpt workup     5.  PVD  -Pletal held          Discharge Planning: I expect the patient to be discharged to home in 1-2 days    Marco A Katz MD   06/07/20   12:07

## 2020-06-08 ENCOUNTER — ANESTHESIA (OUTPATIENT)
Dept: GASTROENTEROLOGY | Facility: HOSPITAL | Age: 75
End: 2020-06-08

## 2020-06-08 ENCOUNTER — NURSE TRIAGE (OUTPATIENT)
Dept: CALL CENTER | Facility: HOSPITAL | Age: 75
End: 2020-06-08

## 2020-06-08 ENCOUNTER — READMISSION MANAGEMENT (OUTPATIENT)
Dept: CALL CENTER | Facility: HOSPITAL | Age: 75
End: 2020-06-08

## 2020-06-08 ENCOUNTER — ANESTHESIA EVENT (OUTPATIENT)
Dept: GASTROENTEROLOGY | Facility: HOSPITAL | Age: 75
End: 2020-06-08

## 2020-06-08 VITALS
DIASTOLIC BLOOD PRESSURE: 66 MMHG | TEMPERATURE: 96.5 F | WEIGHT: 161.4 LBS | HEART RATE: 77 BPM | BODY MASS INDEX: 25.33 KG/M2 | RESPIRATION RATE: 18 BRPM | SYSTOLIC BLOOD PRESSURE: 176 MMHG | HEIGHT: 67 IN | OXYGEN SATURATION: 99 %

## 2020-06-08 PROBLEM — K62.5 RECTAL BLEEDING: Status: ACTIVE | Noted: 2020-06-06

## 2020-06-08 PROBLEM — R19.5 HEME + STOOL: Status: ACTIVE | Noted: 2020-06-06

## 2020-06-08 LAB
HCT VFR BLD AUTO: 33.9 % (ref 37.5–51)
HGB BLD-MCNC: 11.6 G/DL (ref 13–17.7)
SARS-COV-2 RNA PNL SPEC NAA+PROBE: NOT DETECTED

## 2020-06-08 PROCEDURE — 87635 SARS-COV-2 COVID-19 AMP PRB: CPT | Performed by: FAMILY MEDICINE

## 2020-06-08 PROCEDURE — 43235 EGD DIAGNOSTIC BRUSH WASH: CPT | Performed by: INTERNAL MEDICINE

## 2020-06-08 PROCEDURE — 96366 THER/PROPH/DIAG IV INF ADDON: CPT

## 2020-06-08 PROCEDURE — G0378 HOSPITAL OBSERVATION PER HR: HCPCS

## 2020-06-08 PROCEDURE — 45378 DIAGNOSTIC COLONOSCOPY: CPT | Performed by: INTERNAL MEDICINE

## 2020-06-08 PROCEDURE — 85018 HEMOGLOBIN: CPT | Performed by: FAMILY MEDICINE

## 2020-06-08 PROCEDURE — 85014 HEMATOCRIT: CPT | Performed by: FAMILY MEDICINE

## 2020-06-08 PROCEDURE — 25010000002 PROPOFOL 10 MG/ML EMULSION: Performed by: NURSE ANESTHETIST, CERTIFIED REGISTERED

## 2020-06-08 RX ORDER — LISINOPRIL 20 MG/1
20 TABLET ORAL DAILY
Qty: 30 TABLET | Refills: 2 | Status: ON HOLD | OUTPATIENT
Start: 2020-06-08 | End: 2020-06-15 | Stop reason: SDUPTHER

## 2020-06-08 RX ORDER — PROPOFOL 10 MG/ML
VIAL (ML) INTRAVENOUS AS NEEDED
Status: DISCONTINUED | OUTPATIENT
Start: 2020-06-08 | End: 2020-06-08 | Stop reason: SURG

## 2020-06-08 RX ORDER — SODIUM CHLORIDE 0.9 % (FLUSH) 0.9 %
10 SYRINGE (ML) INJECTION EVERY 12 HOURS SCHEDULED
Status: DISCONTINUED | OUTPATIENT
Start: 2020-06-08 | End: 2020-06-08 | Stop reason: HOSPADM

## 2020-06-08 RX ORDER — FAMOTIDINE 20 MG/1
20 TABLET, FILM COATED ORAL DAILY
Status: DISCONTINUED | OUTPATIENT
Start: 2020-06-08 | End: 2020-06-08 | Stop reason: HOSPADM

## 2020-06-08 RX ORDER — SODIUM CHLORIDE 0.9 % (FLUSH) 0.9 %
10 SYRINGE (ML) INJECTION AS NEEDED
Status: DISCONTINUED | OUTPATIENT
Start: 2020-06-08 | End: 2020-06-08 | Stop reason: HOSPADM

## 2020-06-08 RX ORDER — FERROUS SULFATE 325(65) MG
325 TABLET ORAL
Qty: 60 TABLET | Refills: 0 | Status: SHIPPED | OUTPATIENT
Start: 2020-06-08 | End: 2021-03-25

## 2020-06-08 RX ORDER — SODIUM CHLORIDE 9 MG/ML
100 INJECTION, SOLUTION INTRAVENOUS CONTINUOUS
Status: DISCONTINUED | OUTPATIENT
Start: 2020-06-08 | End: 2020-06-08 | Stop reason: HOSPADM

## 2020-06-08 RX ADMIN — AMIODARONE HYDROCHLORIDE 200 MG: 200 TABLET ORAL at 12:53

## 2020-06-08 RX ADMIN — DILTIAZEM HYDROCHLORIDE 120 MG: 120 CAPSULE, COATED, EXTENDED RELEASE ORAL at 12:53

## 2020-06-08 RX ADMIN — FAMOTIDINE 20 MG: 20 TABLET, FILM COATED ORAL at 12:53

## 2020-06-08 RX ADMIN — SODIUM CHLORIDE 100 ML/HR: 9 INJECTION, SOLUTION INTRAVENOUS at 10:08

## 2020-06-08 RX ADMIN — PANTOPRAZOLE SODIUM 8 MG/HR: 40 INJECTION, POWDER, FOR SOLUTION INTRAVENOUS at 01:54

## 2020-06-08 RX ADMIN — PROPOFOL 400 MG: 10 INJECTION, EMULSION INTRAVENOUS at 10:14

## 2020-06-08 RX ADMIN — LISINOPRIL 20 MG: 20 TABLET ORAL at 12:53

## 2020-06-08 RX ADMIN — PANTOPRAZOLE SODIUM 8 MG/HR: 40 INJECTION, POWDER, FOR SOLUTION INTRAVENOUS at 06:44

## 2020-06-08 RX ADMIN — POLYETHYLENE GLYCOL 3350, SODIUM SULFATE ANHYDROUS, SODIUM BICARBONATE, SODIUM CHLORIDE, POTASSIUM CHLORIDE 1000 ML: 236; 22.74; 6.74; 5.86; 2.97 POWDER, FOR SOLUTION ORAL at 05:05

## 2020-06-08 RX ADMIN — LIDOCAINE HYDROCHLORIDE 100 MG: 20 INJECTION, SOLUTION INTRAVENOUS at 10:14

## 2020-06-08 RX ADMIN — HYDROCODONE BITARTRATE AND ACETAMINOPHEN 1 TABLET: 5; 325 TABLET ORAL at 05:05

## 2020-06-08 NOTE — PLAN OF CARE
Problem: Patient Care Overview  Goal: Plan of Care Review  Outcome: Ongoing (interventions implemented as appropriate)  Flowsheets (Taken 6/8/2020 1539)  Progress: no change  Plan of Care Reviewed With: patient  Outcome Summary: Colon prep underway, stools clear w/ some bright red blood present. Colonoscopy and Endo for today. NPO since midnight. Pt has moderate c/o pain to LLE, PO pain med w/ effective results. Protonix gtt continues. Cont to monitor.

## 2020-06-08 NOTE — OUTREACH NOTE
Prep Survey      Responses   Church facility patient discharged from?  [place]   Is LACE score < 7 ?  No   Eligibility  [facility]   Date of Admission  06/06/20   Date of Discharge  06/08/20   Discharge Disposition  Home or Self Care   Discharge diagnosis  GI bleed   COVID-19 Test Status  Negative   Does the patient have one of the following disease processes/diagnoses(primary or secondary)?  Other   Does the patient have Home health ordered?  No   Is there a DME ordered?  No   Prep survey completed?  Yes          Jahaira Armstrong RN

## 2020-06-08 NOTE — ANESTHESIA PREPROCEDURE EVALUATION
Anesthesia Evaluation     Patient summary reviewed   no history of anesthetic complications:  NPO Solid Status: > 8 hours             Airway   Mallampati: II  TM distance: >3 FB  Neck ROM: full  Dental    (+) poor dentition    Pulmonary    (-) COPD, asthma, sleep apnea, not a smoker  Cardiovascular   Exercise tolerance: good (4-7 METS)    PT is on anticoagulation therapy    (+) hypertension, dysrhythmias Atrial Fib, PVD,   (-) pacemaker, past MI, angina, cardiac stents      Neuro/Psych  (-) seizures, TIA, CVA  GI/Hepatic/Renal/Endo    (+)  GI bleeding ,   (-) GERD, liver disease, no renal disease, diabetes    Musculoskeletal     Abdominal    Substance History      OB/GYN          Other                        Anesthesia Plan    ASA 3 - emergent     MAC       Anesthetic plan, all risks, benefits, and alternatives have been provided, discussed and informed consent has been obtained with: patient.

## 2020-06-08 NOTE — ANESTHESIA POSTPROCEDURE EVALUATION
Patient: Kingsley Lerma    Procedure Summary     Date:  06/08/20 Room / Location:  [facility] ENDOSCOPY 4 / [facility] END[facility]    Anesthesia Start:  1009 Anesthesia Stop:  1048    Procedures:       ESOPHAGOGASTRODUODENOSCOPY WITH ANESTHESIA (N/A )      COLONOSCOPY WITH ANESTHESIA (N/A ) Diagnosis:       Rectal bleeding      Heme + stool      (Rectal bleeding [K62.5])      (Heme + stool [R19.5])    Surgeon:  Mayela Ching MD Provider:  Duke Mancuso CRNA    Anesthesia Type:  MAC ASA Status:  3 - Emergent          Anesthesia Type: MAC    Vitals  Vitals Value Taken Time   /60 6/8/2020 11:15 AM   Temp     Pulse 72 6/8/2020 11:16 AM   Resp 18 6/8/2020 11:15 AM   SpO2 100 % 6/8/2020 11:16 AM   Vitals shown include unvalidated device data.        Post Anesthesia Care and Evaluation    Patient location during evaluation: PHASE II  Patient participation: complete - patient participated  Level of consciousness: awake  Pain management: adequate  Airway patency: patent  Anesthetic complications: No anesthetic complications  Respiratory status: acceptable  Hydration status: acceptable

## 2020-06-08 NOTE — DISCHARGE SUMMARY
"    [facility]  DISCHARGE SUMMARY       Date of Admission: 6/6/2020  Date of Discharge:  6/8/2020  Primary Care Physician: BARRY Urrutia MD    Presenting Problem/History of Present Illness:  GI bleed [K92.2]     Final Discharge Diagnoses:  Active Hospital Problems    Diagnosis   • GI bleed   • Rectal bleeding     Added automatically from request for surgery [id number]     • Heme + stool     Added automatically from request for surgery [id number]     1.  GI bleed  -Protonix drip  -Trend H&H  -Consult GI  -Transfuse as indicated     2.  Afib  -Cardizem  -Amiodarone  -Hold AC given bleeding     3.  HTN  -Lisinopril        4.  Bladder wall thickening/questionable bladder mass  -Urology planning outpt workup     5.  PVD  -Pletal held       Consults:   Urology--Dr. Gore  Gastroenterology--Dr. Ching    Procedures Performed:   EGD/Colonoscopy    Pertinent Test Results:   EGD unremarkable  Colonoscopy showed multiple angiodysplastic lesions    CT abdomen and pelvis showed bladder wall thickening more on the right than the left.    COVID-19 Negataive    Chief Complaint on Day of Discharge:   \"I want to go home.\"    History of Present Illness on Day of Discharge:   Doing ok, no black or bloody stools  Wants to go home    Hospital Course:  The patient is a 74 y.o. male who presented to [facility] with black and bloody stools.  He was placed on a protonix drip.  GI was consulted.  He underwent EGD and colonoscopy.  EGD was unremarkable.  Colonoscopy showed angiodysplastic lesions.  Dr. Ching recommended monitoring of H&H and periodic transfusions as well as Iron supplementation.      Due to irregular bladder wall thickening on CT abdomen and pelvis at the outside facility, Urology was consulted.  They are planning for an outpatient cystoscopy.      He Is comfortable with being discharged and with the discharge plan.  Hewas given the chance to ask questions and all " "questions were answered to his satisfaction.        Condition on Discharge: stable    Physical Exam on Discharge:  /66 (BP Location: Right arm, Patient Position: Lying)   Pulse 77   Temp 96.5 °F (35.8 °C) (Oral)   Resp 18   Ht 170.2 cm (67\")   Wt 73.2 kg (161 lb 6.4 oz)   SpO2 99%   BMI 25.28 kg/m²   Physical Exam   Constitutional: He is oriented to person, place, and time. He appears well-developed and well-nourished. No distress.   HENT:   Head: Normocephalic and atraumatic.   Right Ear: External ear normal.   Left Ear: External ear normal.   Eyes: Conjunctivae and EOM are normal. Right eye exhibits no discharge. Left eye exhibits no discharge. No scleral icterus.   Neck: No JVD present. No tracheal deviation present. No thyromegaly present.   Pulmonary/Chest: No respiratory distress.   Neurological: He is alert and oriented to person, place, and time.   Skin: Skin is warm and dry. No rash noted. He is not diaphoretic. No erythema.   Psychiatric: He has a normal mood and affect. His behavior is normal. Judgment and thought content normal.       Discharge Disposition:  Home or Self Care    Discharge Medications:     Discharge Medications      New Medications      Instructions Start Date   ferrous sulfate 325 (65 FE) MG tablet   325 mg, Oral, Daily With Breakfast         Continue These Medications      Instructions Start Date   amiodarone 200 MG tablet  Commonly known as:  PACERONE   200 mg, Oral, Every 24 Hours Scheduled      cilostazol 50 MG tablet  Commonly known as:  PLETAL   50 mg, Oral, 2 Times Daily      dilTIAZem  MG 24 hr capsule  Commonly known as:  CARDIZEM CD   120 mg, Oral, Daily      famotidine 20 MG tablet  Commonly known as:  PEPCID   20 mg, Oral, Daily      guaiFENesin 600 MG 12 hr tablet  Commonly known as:  MUCINEX   1,200 mg, Oral, Daily      hydrOXYzine 25 MG tablet  Commonly known as:  ATARAX   25 mg, Oral, 3 Times Daily PRN      lidocaine 4 % cream  Commonly known as:  LMX   " Topical, As Needed, Apply to left heel.       lisinopril 40 MG tablet  Commonly known as:  PRINIVIL,ZESTRIL   20 mg, Oral, Daily      pantoprazole 20 MG EC tablet  Commonly known as:  PROTONIX   20 mg, Oral, Daily      rivaroxaban 20 MG tablet  Commonly known as:  XARELTO   20 mg, Oral, Daily With Dinner      sildenafil 50 MG tablet  Commonly known as:  VIAGRA   50 mg, Oral, Daily PRN      Vitamin D 50 MCG (2000 UT) tablet   2,000 Units, Oral, Daily             Discharge Diet: Regular    Activity at Discharge: as tolerated    Discharge Care Plan/Instructions:  Go to ER for worsening of rectal bleeding  Follow up with GI for monitoring of Hemoglobin    Follow-up Appointments:   Future Appointments   Date Time Provider Department Center   6/11/2020  9:00 AM BARRY Urrutia MD [facility]   9/23/2020  3:30 PM Nolan Farias MD [facility]       Test Results Pending at Discharge: N/A    Marco A Katz MD  06/08/20  14:30    Time: 35 minutes

## 2020-06-08 NOTE — TELEPHONE ENCOUNTER
"He usually takes his Cardizem at 6PM ,was wondering when it was given today? I told  Him was at 12:53, he was asking if ok to take tonight again, told him no. That he can take it a little later tomorrow and then next day put back on his schedule,do not double take it today was 24 hours pill.     Reason for Disposition  • Caller has medication question only, adult not sick, and triager answers question    Additional Information  • Negative: Drug overdose and nurse unable to answer question  • Negative: Caller requesting information not related to medicine  • Negative: Caller requesting a prescription for Strep throat and has a positive culture result  • Negative: Rash while taking a medication or within 3 days of stopping it  • Negative: Immunization reaction suspected  • Negative: [1] Asthma and [2] having symptoms of asthma (cough, wheezing, etc)  • Negative: MORE THAN A DOUBLE DOSE of a prescription or over-the-counter (OTC) drug  • Negative: [1] DOUBLE DOSE (an extra dose or lesser amount) of over-the-counter (OTC) drug AND [2] any symptoms (e.g., dizziness, nausea, pain, sleepiness)  • Negative: [1] DOUBLE DOSE (an extra dose or lesser amount) of prescription drug AND [2] any symptoms (e.g., dizziness, nausea, pain, sleepiness)  • Negative: Took another person's prescription drug  • Negative: [1] DOUBLE DOSE (an extra dose or lesser amount) of prescription drug AND [2] NO symptoms (Exception: a double dose of antibiotics)  • Negative: Diabetes drug error or overdose (e.g., insulin or extra dose)  • Negative: [1] Request for URGENT new prescription or refill of \"essential\" medication (i.e., likelihood of harm to patient if not taken) AND [2] triager unable to fill per unit policy  • Negative: [1] Prescription not at pharmacy AND [2] was prescribed today by PCP  • Negative: Pharmacy calling with prescription questions and triager unable to answer question  • Negative: Caller has URGENT medication question about " "med that PCP prescribed and triager unable to answer question  • Negative: Caller has NON-URGENT medication question about med that PCP prescribed and triager unable to answer question  • Negative: Caller requesting a NON-URGENT new prescription or refill and triager unable to refill per unit policy  • Negative: Caller has medication question about med not prescribed by PCP and triager unable to answer question (e.g., compatibility with other med, storage)  • Negative: [1] DOUBLE DOSE (an extra dose or lesser amount) of over-the-counter (OTC) drug AND [2] NO symptoms  • Negative: [1] DOUBLE DOSE (an extra dose or lesser amount) of antibiotic drug AND [2] NO symptoms  • Negative: Caller has medication question, adult has minor symptoms, caller declines triage, and triager answers question  • Negative: Caller requesting information about medication during pregnancy; adult is not ill and triager answers question  • Negative: Caller requesting information about medication use with breastfeeding; neither adult nor infant is ill, and triager answers question    Answer Assessment - Initial Assessment Questions  1. SYMPTOMS: \"Do you have any symptoms?\"      no  2. SEVERITY: If symptoms are present, ask \"Are they mild, moderate or severe?\"      no    Protocols used: MEDICATION QUESTION CALL-ADULT-      "

## 2020-06-08 NOTE — PLAN OF CARE
Problem: Patient Care Overview  Goal: Plan of Care Review  Outcome: Ongoing (interventions implemented as appropriate)  Flowsheets (Taken 6/8/2020 0910)  Progress: no change  Plan of Care Reviewed With: other (see comments)  Outcome Summary: Pt is NPO for colonoscopy and endoscopy today. Pt has a stage 2 PI to L heel. Will follow for nutrition needs.

## 2020-06-09 ENCOUNTER — TRANSITIONAL CARE MANAGEMENT TELEPHONE ENCOUNTER (OUTPATIENT)
Dept: CALL CENTER | Facility: HOSPITAL | Age: 75
End: 2020-06-09

## 2020-06-09 NOTE — OUTREACH NOTE
Call Center TCM Note      Responses   [facility] patient discharged from?  [place]   COVID-19 Test Status  Negative   Does the patient have one of the following disease processes/diagnoses(primary or secondary)?  Other   TCM attempt successful?  Yes   Call start time  1333   Call end time  1336   Discharge diagnosis  GI bleed   Meds reviewed with patient/caregiver?  Yes   Is the patient having any side effects they believe may be caused by any medication additions or changes?  No   Does the patient have all medications ordered at discharge?  Yes   Prescription comments  Only new med is OTC Iron   Is the patient taking all medications as directed (includes completed medication regime)?  Yes   Does the patient have a primary care provider?   Yes   Does the patient have an appointment with their PCP within 7 days of discharge?  Greater than 7 days   Comments regarding PCP  Dr Urrutia   What is preventing the patient from scheduling follow up appointments within 7 days of discharge?  Earlier appointment not available   Has home health visited the patient within 72 hours of discharge?  N/A   Pulse Ox monitoring  Intermittent   Did the patient receive a copy of their discharge instructions?  Yes   Nursing interventions  Reviewed instructions with patient   What is the patient's perception of their health status since discharge?  Improving   Is the patient/caregiver able to teach back signs and symptoms related to disease process for when to call PCP?  Yes   Is the patient/caregiver able to teach back signs and symptoms related to disease process for when to call 911?  Yes   Is the patient/caregiver able to teach back the hierarchy of who to call/visit for symptoms/problems? PCP, Specialist, Home health nurse, Urgent Care, ED, 911  Yes   TCM call completed?  Yes   Wrap up additional comments  Pt doing well, just states he is exhausted from hospital stay. Slept 12 hours overnight and has had a nap today. Pt is sched  for [facility] with PCP on 06/19/2020, declines additional or sooner appt.           Lucretia Sandhu MA    6/9/2020, 13:39

## 2020-06-13 ENCOUNTER — HOSPITAL ENCOUNTER (INPATIENT)
Facility: HOSPITAL | Age: 75
LOS: 5 days | Discharge: HOME | End: 2020-06-18
Attending: FAMILY MEDICINE | Admitting: FAMILY MEDICINE

## 2020-06-13 ENCOUNTER — NURSE TRIAGE (OUTPATIENT)
Dept: CALL CENTER | Facility: HOSPITAL | Age: 75
End: 2020-06-13

## 2020-06-13 DIAGNOSIS — Z74.09 IMPAIRED MOBILITY: ICD-10-CM

## 2020-06-13 DIAGNOSIS — Z78.9 IMPAIRED MOBILITY AND ADLS: ICD-10-CM

## 2020-06-13 DIAGNOSIS — Z74.09 IMPAIRED MOBILITY AND ADLS: ICD-10-CM

## 2020-06-13 DIAGNOSIS — K92.2 GASTROINTESTINAL HEMORRHAGE, UNSPECIFIED GASTROINTESTINAL HEMORRHAGE TYPE: Primary | ICD-10-CM

## 2020-06-13 PROBLEM — D62 ACUTE BLOOD LOSS ANEMIA: Status: ACTIVE | Noted: 2020-06-13

## 2020-06-13 LAB
ABO GROUP BLD: NORMAL
ALBUMIN SERPL-MCNC: 3.2 G/DL (ref 3.5–5.2)
ALBUMIN/GLOB SERPL: 1.5 G/DL
ALP SERPL-CCNC: 76 U/L (ref 39–117)
ALT SERPL W P-5'-P-CCNC: 16 U/L (ref 1–41)
ANION GAP SERPL CALCULATED.3IONS-SCNC: 12 MMOL/L (ref 5–15)
APTT PPP: 67.7 SECONDS (ref 24.1–35)
AST SERPL-CCNC: 17 U/L (ref 1–40)
BASOPHILS # BLD AUTO: 0.05 10*3/MM3 (ref 0–0.2)
BASOPHILS NFR BLD AUTO: 0.7 % (ref 0–1.5)
BILIRUB SERPL-MCNC: 0.2 MG/DL (ref 0.2–1.2)
BLD GP AB SCN SERPL QL: NEGATIVE
BUN BLD-MCNC: 28 MG/DL (ref 8–23)
BUN/CREAT SERPL: 32.2 (ref 7–25)
CALCIUM SPEC-SCNC: 8.5 MG/DL (ref 8.6–10.5)
CHLORIDE SERPL-SCNC: 112 MMOL/L (ref 98–107)
CO2 SERPL-SCNC: 21 MMOL/L (ref 22–29)
CREAT BLD-MCNC: 0.87 MG/DL (ref 0.76–1.27)
D-LACTATE SERPL-SCNC: 1.4 MMOL/L (ref 0.5–2)
DEPRECATED RDW RBC AUTO: 51.3 FL (ref 37–54)
EOSINOPHIL # BLD AUTO: 0.23 10*3/MM3 (ref 0–0.4)
EOSINOPHIL NFR BLD AUTO: 3.4 % (ref 0.3–6.2)
ERYTHROCYTE [DISTWIDTH] IN BLOOD BY AUTOMATED COUNT: 14.7 % (ref 12.3–15.4)
GFR SERPL CREATININE-BSD FRML MDRD: 86 ML/MIN/1.73
GLOBULIN UR ELPH-MCNC: 2.2 GM/DL
GLUCOSE BLD-MCNC: 101 MG/DL (ref 65–99)
HCT VFR BLD AUTO: 18.4 % (ref 37.5–51)
HGB BLD-MCNC: 6.2 G/DL (ref 13–17.7)
HOLD SPECIMEN: NORMAL
HOLD SPECIMEN: NORMAL
IMM GRANULOCYTES # BLD AUTO: 0.03 10*3/MM3 (ref 0–0.05)
IMM GRANULOCYTES NFR BLD AUTO: 0.4 % (ref 0–0.5)
INR PPP: 1.8 (ref 0.91–1.09)
LYMPHOCYTES # BLD AUTO: 1.24 10*3/MM3 (ref 0.7–3.1)
LYMPHOCYTES NFR BLD AUTO: 18.1 % (ref 19.6–45.3)
MCH RBC QN AUTO: 33 PG (ref 26.6–33)
MCHC RBC AUTO-ENTMCNC: 33.7 G/DL (ref 31.5–35.7)
MCV RBC AUTO: 97.9 FL (ref 79–97)
MONOCYTES # BLD AUTO: 0.53 10*3/MM3 (ref 0.1–0.9)
MONOCYTES NFR BLD AUTO: 7.7 % (ref 5–12)
NEUTROPHILS # BLD AUTO: 4.77 10*3/MM3 (ref 1.7–7)
NEUTROPHILS NFR BLD AUTO: 69.7 % (ref 42.7–76)
NRBC BLD AUTO-RTO: 0 /100 WBC (ref 0–0.2)
PLATELET # BLD AUTO: 208 10*3/MM3 (ref 140–450)
PMV BLD AUTO: 8.9 FL (ref 6–12)
POTASSIUM BLD-SCNC: 4 MMOL/L (ref 3.5–5.2)
PROT SERPL-MCNC: 5.4 G/DL (ref 6–8.5)
PROTHROMBIN TIME: 20.6 SECONDS (ref 11.9–14.6)
RBC # BLD AUTO: 1.88 10*6/MM3 (ref 4.14–5.8)
RH BLD: POSITIVE
SARS-COV-2 RDRP RESP QL NAA+PROBE: NOT DETECTED
SODIUM BLD-SCNC: 145 MMOL/L (ref 136–145)
T&S EXPIRATION DATE: NORMAL
WBC NRBC COR # BLD: 6.85 10*3/MM3 (ref 3.4–10.8)
WHOLE BLOOD HOLD SPECIMEN: NORMAL
WHOLE BLOOD HOLD SPECIMEN: NORMAL

## 2020-06-13 PROCEDURE — 36430 TRANSFUSION BLD/BLD COMPNT: CPT

## 2020-06-13 PROCEDURE — 36415 COLL VENOUS BLD VENIPUNCTURE: CPT

## 2020-06-13 PROCEDURE — P9016 RBC LEUKOCYTES REDUCED: HCPCS

## 2020-06-13 PROCEDURE — 85730 THROMBOPLASTIN TIME PARTIAL: CPT | Performed by: NURSE PRACTITIONER

## 2020-06-13 PROCEDURE — 93005 ELECTROCARDIOGRAM TRACING: CPT | Performed by: FAMILY MEDICINE

## 2020-06-13 PROCEDURE — 85025 COMPLETE CBC W/AUTO DIFF WBC: CPT | Performed by: NURSE PRACTITIONER

## 2020-06-13 PROCEDURE — 83605 ASSAY OF LACTIC ACID: CPT | Performed by: NURSE PRACTITIONER

## 2020-06-13 PROCEDURE — 93005 ELECTROCARDIOGRAM TRACING: CPT | Performed by: NURSE PRACTITIONER

## 2020-06-13 PROCEDURE — 86920 COMPATIBILITY TEST SPIN: CPT

## 2020-06-13 PROCEDURE — 86900 BLOOD TYPING SEROLOGIC ABO: CPT

## 2020-06-13 PROCEDURE — 80053 COMPREHEN METABOLIC PANEL: CPT | Performed by: NURSE PRACTITIONER

## 2020-06-13 PROCEDURE — 86900 BLOOD TYPING SEROLOGIC ABO: CPT | Performed by: NURSE PRACTITIONER

## 2020-06-13 PROCEDURE — 86901 BLOOD TYPING SEROLOGIC RH(D): CPT | Performed by: NURSE PRACTITIONER

## 2020-06-13 PROCEDURE — 99285 EMERGENCY DEPT VISIT HI MDM: CPT

## 2020-06-13 PROCEDURE — 87635 SARS-COV-2 COVID-19 AMP PRB: CPT | Performed by: NURSE PRACTITIONER

## 2020-06-13 PROCEDURE — 93010 ELECTROCARDIOGRAM REPORT: CPT | Performed by: INTERNAL MEDICINE

## 2020-06-13 PROCEDURE — 86850 RBC ANTIBODY SCREEN: CPT | Performed by: NURSE PRACTITIONER

## 2020-06-13 PROCEDURE — 86901 BLOOD TYPING SEROLOGIC RH(D): CPT

## 2020-06-13 PROCEDURE — 85610 PROTHROMBIN TIME: CPT | Performed by: NURSE PRACTITIONER

## 2020-06-13 RX ORDER — SODIUM CHLORIDE 0.9 % (FLUSH) 0.9 %
10 SYRINGE (ML) INJECTION EVERY 12 HOURS SCHEDULED
Status: DISCONTINUED | OUTPATIENT
Start: 2020-06-14 | End: 2020-06-18 | Stop reason: HOSPADM

## 2020-06-13 RX ORDER — DILTIAZEM HYDROCHLORIDE 60 MG/1
60 TABLET, FILM COATED ORAL EVERY 6 HOURS SCHEDULED
Status: DISCONTINUED | OUTPATIENT
Start: 2020-06-14 | End: 2020-06-18 | Stop reason: HOSPADM

## 2020-06-13 RX ORDER — SODIUM CHLORIDE 0.9 % (FLUSH) 0.9 %
10 SYRINGE (ML) INJECTION AS NEEDED
Status: DISCONTINUED | OUTPATIENT
Start: 2020-06-13 | End: 2020-06-18 | Stop reason: HOSPADM

## 2020-06-13 RX ORDER — PANTOPRAZOLE SODIUM 40 MG/10ML
40 INJECTION, POWDER, LYOPHILIZED, FOR SOLUTION INTRAVENOUS
Status: DISCONTINUED | OUTPATIENT
Start: 2020-06-14 | End: 2020-06-14

## 2020-06-13 RX ORDER — ACETAMINOPHEN 325 MG/1
650 TABLET ORAL EVERY 4 HOURS PRN
Status: DISCONTINUED | OUTPATIENT
Start: 2020-06-13 | End: 2020-06-18 | Stop reason: HOSPADM

## 2020-06-13 RX ORDER — SODIUM CHLORIDE 9 MG/ML
75 INJECTION, SOLUTION INTRAVENOUS CONTINUOUS
Status: DISCONTINUED | OUTPATIENT
Start: 2020-06-14 | End: 2020-06-16

## 2020-06-13 RX ORDER — ONDANSETRON 2 MG/ML
4 INJECTION INTRAMUSCULAR; INTRAVENOUS EVERY 6 HOURS PRN
Status: DISCONTINUED | OUTPATIENT
Start: 2020-06-13 | End: 2020-06-18 | Stop reason: HOSPADM

## 2020-06-13 RX ORDER — MULTIVITAMIN/IRON/FOLIC ACID 18MG-0.4MG
1 TABLET ORAL DAILY
COMMUNITY
End: 2020-06-22 | Stop reason: HOSPADM

## 2020-06-13 RX ADMIN — SODIUM CHLORIDE 500 ML: 9 INJECTION, SOLUTION INTRAVENOUS at 19:37

## 2020-06-13 NOTE — TELEPHONE ENCOUNTER
"Colonoscpopy and EGD on Monday Dr. Ching.  He has talked with the [facility] this am and was told if he felt like he needed to go to go to a local ER.  He takes Xarelto for a fib and was instructed to continue blood thinner. He feels lightheaded when up and feels his heart beating while sitting in chair.    Reason for Disposition  • Taking Coumadin (warfarin) or other strong blood thinner, or known bleeding disorder (e.g., thrombocytopenia)    Additional Information  • Negative: Shock suspected (e.g., cold/pale/clammy skin, too weak to stand, low BP, rapid pulse)  • Negative: Difficult to awaken or acting confused (e.g., disoriented, slurred speech)  • Negative: Passed out (i.e., lost consciousness, collapsed and was not responding)  • Negative: [1] Vomiting AND [2] contains red blood or black (\"coffee ground\") material  (Exception: few red streaks in vomit that only happened once)  • Negative: Sounds like a life-threatening emergency to the triager  • Negative: Diarrhea is main symptom  • Negative: Stool color other than brown or tan is main concern  (no bleeding and no melena)  • Negative: SEVERE rectal bleeding (large blood clots; on and off, or constant bleeding)  • Negative: SEVERE dizziness (e.g., unable to stand, requires support to walk, feels like passing out now)  • Negative: [1] MODERATE rectal bleeding (small blood clots, passing blood without stool, or toilet water turns red) AND [2] more than once a day  • Negative: Pale skin (pallor) of new onset or worsening  • Negative: Tarry or jet black-colored stool (not dark green)  • Negative: [1] Constant abdominal pain AND [2] present > 2 hours  • Negative: Rectal foreign body (i.e., now or within past week;  inserted or swallowed)  • Negative: High-risk adult (e.g., prior surgery on aorta, abdominal aortic aneurysm)    Answer Assessment - Initial Assessment Questions  1. APPEARANCE of BLOOD: \"What color is it?\" \"Is it passed separately, on the surface of the " "stool, or mixed in with the stool?\"    bright red blood passed today  2. AMOUNT: \"How much blood was passed?\"   1/2-1 cup  Bright red and some dark  3. FREQUENCY: \"How many times has blood been passed with the stools?\"     4  4. ONSET: \"When was the blood first seen in the stools?\" (Days or weeks)       A few days  5. DIARRHEA: \"Is there also some diarrhea?\" If so, ask: \"How many diarrhea stools were passed in past 24 hours?\"   More liquid    6. CONSTIPATION: \"Do you have constipation?\" If so, \"How bad is it?\"  denies  7. RECURRENT SYMPTOMS: \"Have you had blood in your stools before?\" If so, ask: \"When was the last time?\" and \"What happened that time?\"     yes  8. BLOOD THINNERS: \"Do you take any blood thinners?\" (e.g., Coumadin/warfarin, Pradaxa/dabigatran, aspirin)      Xarelto  9. OTHER SYMPTOMS: \"Do you have any other symptoms?\"  (e.g., abdominal pain, vomiting, dizziness, fever)  Dizziness with activity feels his heart pounding while sitting still.  10. PREGNANCY: \"Is there any chance you are pregnant?\" \"When was your last menstrual period?\"       na    Protocols used: RECTAL BLEEDING-ADULT-AH      "

## 2020-06-14 ENCOUNTER — READMISSION MANAGEMENT (OUTPATIENT)
Dept: CALL CENTER | Facility: HOSPITAL | Age: 75
End: 2020-06-14

## 2020-06-14 ENCOUNTER — APPOINTMENT (OUTPATIENT)
Dept: CT IMAGING | Facility: HOSPITAL | Age: 75
End: 2020-06-14

## 2020-06-14 LAB
ANION GAP SERPL CALCULATED.3IONS-SCNC: 8 MMOL/L (ref 5–15)
BASOPHILS # BLD AUTO: 0.04 10*3/MM3 (ref 0–0.2)
BASOPHILS NFR BLD AUTO: 0.6 % (ref 0–1.5)
BILIRUB UR QL STRIP: NEGATIVE
BUN BLD-MCNC: 25 MG/DL (ref 8–23)
BUN/CREAT SERPL: 30.5 (ref 7–25)
CALCIUM SPEC-SCNC: 8.2 MG/DL (ref 8.6–10.5)
CHLORIDE SERPL-SCNC: 113 MMOL/L (ref 98–107)
CLARITY UR: CLEAR
CO2 SERPL-SCNC: 23 MMOL/L (ref 22–29)
COLOR UR: YELLOW
CREAT BLD-MCNC: 0.82 MG/DL (ref 0.76–1.27)
DEPRECATED RDW RBC AUTO: 58 FL (ref 37–54)
EOSINOPHIL # BLD AUTO: 0.25 10*3/MM3 (ref 0–0.4)
EOSINOPHIL NFR BLD AUTO: 3.6 % (ref 0.3–6.2)
ERYTHROCYTE [DISTWIDTH] IN BLOOD BY AUTOMATED COUNT: 17.2 % (ref 12.3–15.4)
GFR SERPL CREATININE-BSD FRML MDRD: 92 ML/MIN/1.73
GLUCOSE BLD-MCNC: 88 MG/DL (ref 65–99)
GLUCOSE UR STRIP-MCNC: NEGATIVE MG/DL
HCT VFR BLD AUTO: 18.8 % (ref 37.5–51)
HCT VFR BLD AUTO: 22.4 % (ref 37.5–51)
HCT VFR BLD AUTO: 22.9 % (ref 37.5–51)
HGB BLD-MCNC: 6.3 G/DL (ref 13–17.7)
HGB BLD-MCNC: 7.7 G/DL (ref 13–17.7)
HGB BLD-MCNC: 8 G/DL (ref 13–17.7)
HGB UR QL STRIP.AUTO: NEGATIVE
IMM GRANULOCYTES # BLD AUTO: 0.02 10*3/MM3 (ref 0–0.05)
IMM GRANULOCYTES NFR BLD AUTO: 0.3 % (ref 0–0.5)
KETONES UR QL STRIP: NEGATIVE
LEUKOCYTE ESTERASE UR QL STRIP.AUTO: NEGATIVE
LYMPHOCYTES # BLD AUTO: 1.37 10*3/MM3 (ref 0.7–3.1)
LYMPHOCYTES NFR BLD AUTO: 19.7 % (ref 19.6–45.3)
MCH RBC QN AUTO: 31.8 PG (ref 26.6–33)
MCHC RBC AUTO-ENTMCNC: 33.5 G/DL (ref 31.5–35.7)
MCV RBC AUTO: 94.9 FL (ref 79–97)
MONOCYTES # BLD AUTO: 0.48 10*3/MM3 (ref 0.1–0.9)
MONOCYTES NFR BLD AUTO: 6.9 % (ref 5–12)
NEUTROPHILS # BLD AUTO: 4.78 10*3/MM3 (ref 1.7–7)
NEUTROPHILS NFR BLD AUTO: 68.9 % (ref 42.7–76)
NITRITE UR QL STRIP: NEGATIVE
NRBC BLD AUTO-RTO: 0 /100 WBC (ref 0–0.2)
PH UR STRIP.AUTO: <=5 [PH] (ref 5–8)
PLATELET # BLD AUTO: 184 10*3/MM3 (ref 140–450)
PMV BLD AUTO: 9.3 FL (ref 6–12)
POTASSIUM BLD-SCNC: 4.1 MMOL/L (ref 3.5–5.2)
PROT UR QL STRIP: NEGATIVE
RBC # BLD AUTO: 1.98 10*6/MM3 (ref 4.14–5.8)
SODIUM BLD-SCNC: 144 MMOL/L (ref 136–145)
SP GR UR STRIP: 1.02 (ref 1–1.03)
UROBILINOGEN UR QL STRIP: NORMAL
WBC NRBC COR # BLD: 6.94 10*3/MM3 (ref 3.4–10.8)

## 2020-06-14 PROCEDURE — 86900 BLOOD TYPING SEROLOGIC ABO: CPT

## 2020-06-14 PROCEDURE — P9017 PLASMA 1 DONOR FRZ W/IN 8 HR: HCPCS

## 2020-06-14 PROCEDURE — 85025 COMPLETE CBC W/AUTO DIFF WBC: CPT | Performed by: FAMILY MEDICINE

## 2020-06-14 PROCEDURE — 0 IOPAMIDOL PER 1 ML: Performed by: FAMILY MEDICINE

## 2020-06-14 PROCEDURE — 86927 PLASMA FRESH FROZEN: CPT

## 2020-06-14 PROCEDURE — 85018 HEMOGLOBIN: CPT | Performed by: FAMILY MEDICINE

## 2020-06-14 PROCEDURE — 81003 URINALYSIS AUTO W/O SCOPE: CPT | Performed by: NURSE PRACTITIONER

## 2020-06-14 PROCEDURE — 36430 TRANSFUSION BLD/BLD COMPNT: CPT

## 2020-06-14 PROCEDURE — 80048 BASIC METABOLIC PNL TOTAL CA: CPT | Performed by: FAMILY MEDICINE

## 2020-06-14 PROCEDURE — 85014 HEMATOCRIT: CPT | Performed by: FAMILY MEDICINE

## 2020-06-14 PROCEDURE — P9016 RBC LEUKOCYTES REDUCED: HCPCS

## 2020-06-14 PROCEDURE — 99232 SBSQ HOSP IP/OBS MODERATE 35: CPT | Performed by: INTERNAL MEDICINE

## 2020-06-14 PROCEDURE — 74177 CT ABD & PELVIS W/CONTRAST: CPT

## 2020-06-14 RX ORDER — HYDROCODONE BITARTRATE AND ACETAMINOPHEN 5; 325 MG/1; MG/1
1 TABLET ORAL EVERY 6 HOURS PRN
Status: DISCONTINUED | OUTPATIENT
Start: 2020-06-14 | End: 2020-06-16

## 2020-06-14 RX ORDER — PANTOPRAZOLE SODIUM 40 MG/10ML
40 INJECTION, POWDER, LYOPHILIZED, FOR SOLUTION INTRAVENOUS EVERY 12 HOURS SCHEDULED
Status: DISCONTINUED | OUTPATIENT
Start: 2020-06-14 | End: 2020-06-18 | Stop reason: CLARIF

## 2020-06-14 RX ADMIN — DILTIAZEM HYDROCHLORIDE 60 MG: 60 TABLET, FILM COATED ORAL at 05:20

## 2020-06-14 RX ADMIN — DILTIAZEM HYDROCHLORIDE 60 MG: 60 TABLET, FILM COATED ORAL at 00:31

## 2020-06-14 RX ADMIN — IOPAMIDOL 125 ML: 755 INJECTION, SOLUTION INTRAVENOUS at 14:10

## 2020-06-14 RX ADMIN — SODIUM CHLORIDE 75 ML/HR: 9 INJECTION, SOLUTION INTRAVENOUS at 18:01

## 2020-06-14 RX ADMIN — SODIUM CHLORIDE 75 ML/HR: 9 INJECTION, SOLUTION INTRAVENOUS at 00:09

## 2020-06-14 RX ADMIN — ACETAMINOPHEN 650 MG: 325 TABLET, FILM COATED ORAL at 00:13

## 2020-06-14 RX ADMIN — DILTIAZEM HYDROCHLORIDE 60 MG: 60 TABLET, FILM COATED ORAL at 17:02

## 2020-06-14 RX ADMIN — DILTIAZEM HYDROCHLORIDE 60 MG: 60 TABLET, FILM COATED ORAL at 23:21

## 2020-06-14 RX ADMIN — DILTIAZEM HYDROCHLORIDE 60 MG: 60 TABLET, FILM COATED ORAL at 11:05

## 2020-06-14 RX ADMIN — HYDROCODONE BITARTRATE AND ACETAMINOPHEN 1 TABLET: 5; 325 TABLET ORAL at 04:02

## 2020-06-14 RX ADMIN — HYDROCODONE BITARTRATE AND ACETAMINOPHEN 1 TABLET: 5; 325 TABLET ORAL at 21:03

## 2020-06-14 RX ADMIN — PANTOPRAZOLE SODIUM 40 MG: 40 INJECTION, POWDER, FOR SOLUTION INTRAVENOUS at 07:17

## 2020-06-14 RX ADMIN — PANTOPRAZOLE SODIUM 40 MG: 40 INJECTION, POWDER, FOR SOLUTION INTRAVENOUS at 21:04

## 2020-06-14 NOTE — H&P (VIEW-ONLY)
[facility] Gastroenterology  Inpatient Consult Note      Referring Provider: Aman Gonzalez,*  Primary Physician: BARRY Calderón MD     Date of Admission: 6/13/2020  Date of Service:  06/14/20    Chief Complaint   Patient presents with   • Black or Bloody Stool   • Weakness - Generalized       Subjective     Kingsley Lerma is a 74 y.o. male we are asked to see for GI bleeding and anemia.  The patient was just admitted to the hospital underwent endoscopy and colonoscopy revealing AVMs in the colon.  No other source of bleeding noted.  He is on chronic anticoagulation because of peripheral vascular disease.  He developed bright red blood per rectum and presented to the emergency room where he was admitted with a hemoglobin of 6    Past Medical History:   Diagnosis Date   • Alcohol abuse    • Arthritis    • Atrial flutter (CMS/HCC)    • Circulation problem    • History of tobacco abuse    • Hypertension    • PVD (peripheral vascular disease) (CMS/HCC)        Past Surgical History:   Procedure Laterality Date   • ADENOIDECTOMY     • COLONOSCOPY N/A 6/8/2020    Procedure: COLONOSCOPY WITH ANESTHESIA;  Surgeon: Mayela Ching MD;  Location: [facility] ENDOSCOPY;  Service: Gastroenterology;  Laterality: N/A;  preop; GI bleed   postop; [facility]   PCP rachel calderón    • ENDOSCOPY N/A 6/8/2020    Procedure: ESOPHAGOGASTRODUODENOSCOPY WITH ANESTHESIA;  Surgeon: Mayela Ching MD;  Location: [facility] ENDOSCOPY;  Service: Gastroenterology;  Laterality: N/A;  proep; GI BLeed   postop; normal   PCP rachel calderón           Current Facility-Administered Medications:   •  acetaminophen (TYLENOL) tablet 650 mg, 650 mg, Oral, Q4H PRN, Aman Gonzalez MD, 650 mg at 06/14/20 0013  •  dilTIAZem (CARDIZEM) tablet 60 mg, 60 mg, Oral, Q6H, Aman Gonzalez MD, 60 mg at 06/14/20 0520  •  HYDROcodone-acetaminophen (NORCO) 5-325 MG per tablet 1 tablet, 1 tablet, Oral, Q6H PRN, Aman Gonzalez MD, 1 tablet at  "06/14/20 0402  •  ondansetron (ZOFRAN) injection 4 mg, 4 mg, Intravenous, Q6H PRN, Aman Gonzalez MD  •  pantoprazole (PROTONIX) injection 40 mg, 40 mg, Intravenous, Q AM, Aman Gonzalez MD, 40 mg at 06/14/20 0717  •  [COMPLETED] Insert peripheral IV, , , Once **AND** sodium chloride 0.9 % flush 10 mL, 10 mL, Intravenous, PRN, Brandee Valdes APRN  •  sodium chloride 0.9 % flush 10 mL, 10 mL, Intravenous, Q12H, Aman Gonzalez MD  •  sodium chloride 0.9 % flush 10 mL, 10 mL, Intravenous, PRN, Aman Gonzalez MD  •  sodium chloride 0.9 % infusion, 75 mL/hr, Intravenous, Continuous, Aman Gonzalez MD, Last Rate: 75 mL/hr at 06/14/20 0009, 75 mL/hr at 06/14/20 0009    No Known Allergies    Social History     Socioeconomic History   • Marital status:      Spouse name: Not on file   • Number of children: Not on file   • Years of education: Not on file   • Highest education level: Not on file   Tobacco Use   • Smoking status: Current Every Day Smoker     Packs/day: 0.50     Types: Cigarettes, Cigars   • Smokeless tobacco: Never Used   Substance and Sexual Activity   • Alcohol use: Not Currently     Alcohol/week: 2.0 standard drinks     Types: 2 Glasses of wine per week     Frequency: Never   • Drug use: Never   • Sexual activity: Defer       Family History   Problem Relation Age of Onset   • Dementia Mother    • Cancer Father    • Pancreatic cancer Father        Review of Systems   Respiratory: Negative.    Cardiovascular: Negative.    Gastrointestinal: Positive for blood in stool.       Objective     /47 (BP Location: Right arm, Patient Position: Lying)   Pulse 69   Temp 98.2 °F (36.8 °C) (Oral)   Resp 18   Ht 170.2 cm (67\")   Wt 71 kg (156 lb 9.6 oz)   SpO2 97%   BMI 24.53 kg/m²     Physical Exam   Cardiovascular: Normal rate.   Pulmonary/Chest: Effort normal.   Abdominal: Soft.   Neurological: He is alert.   Skin: Skin is warm.       Lab Results "   Component Value Date    WBC 6.94 06/14/2020    HGB 7.7 (L) 06/14/2020    HCT 22.4 (L) 06/14/2020     06/14/2020        Lab Results   Component Value Date     06/14/2020    K 4.1 06/14/2020     (H) 06/14/2020    CO2 23.0 06/14/2020    CO2 21.0 (L) 06/13/2020    CO2 23.0 06/07/2020    BUN 25 (H) 06/14/2020    CREATININE 0.82 06/14/2020    BILITOT 0.2 06/13/2020    ALKPHOS 76 06/13/2020    ALT 16 06/13/2020    AST 17 06/13/2020    GLUCOSE 88 06/14/2020       Lab Results   Component Value Date    INR 1.80 (H) 06/13/2020       IMPRESSION/PLAN:     Patient Active Problem List   Diagnosis   • Essential hypertension   • PVD (peripheral vascular disease) (CMS/HCC)   • Atrial fibrillation and flutter (CMS/HCC)   • Alcoholism /alcohol abuse (CMS/HCC)   • Non healing left heel wound   • Atrial fibrillation status post cardioversion (CMS/HCC)   • Tobacco use   • Syncope   • Current use of long term anticoagulation   • GI bleed   • Rectal bleeding   • Heme + stool   • Acute blood loss anemia         Recurrent GI bleed.  On chronic anticoagulants because of peripheral vascular disease.  Will get a CT enterography today to evaluate the small bowel.  Potential endoscopy tomorrow given his elevated BUN.  Will need to be transfused to appropriate levels based on his disease.  Defer to the admitting service.    EMR Dragon/transcription disclaimer: Much of this encounter note is an electronic transcription/translation of spoken language to printed text.  The electronic translation of spoken language may permit erroneous, or at times, nonsensical words or phrases to be inadvertently transcribed.  Although I have reviewed the note for such errors, some may still exist.      Carmine Banuelos MD  06/14/20  09:04

## 2020-06-14 NOTE — PROGRESS NOTES
Discharge Planning Assessment  [facility]     Patient Name: Kingsley Lerma  MRN: [id number]  Today's Date: 6/14/2020    Admit Date: 6/13/2020    Discharge Needs Assessment     Row Name 06/14/20 1011       Living Environment    Lives With  alone    Current Living Arrangements  home/apartment/condo    Primary Care Provided by  self    Provides Primary Care For  no one    Family Caregiver if Needed  child(velvet), adult    Quality of Family Relationships  helpful;involved;supportive    Able to Return to Prior Arrangements  yes       Resource/Environmental Concerns    Resource/Environmental Concerns  none    Transportation Concerns  car, none       Transition Planning    Patient/Family Anticipates Transition to  home    Patient/Family Anticipated Services at Transition  none    Transportation Anticipated  family or friend will provide       Discharge Needs Assessment    Readmission Within the Last 30 Days  no previous admission in last 30 days    Concerns to be Addressed  no discharge needs identified;denies needs/concerns at this time    Equipment Currently Used at Home  none    Anticipated Changes Related to Illness  none    Equipment Needed After Discharge  none    Discharge Coordination/Progress  Pt has RX coverage and a PCP through [facility]. Pt lives alone and plans on returning at discharge. No needs identified at this time. SW will follow and assist with any discharge needs that may arise.         Discharge Plan    No documentation.       Destination      Coordination has not been started for this encounter.      Durable Medical Equipment      Coordination has not been started for this encounter.      Dialysis/Infusion      Coordination has not been started for this encounter.      Home Medical Care      Coordination has not been started for this encounter.      Therapy      Coordination has not been started for this encounter.      Community Resources      Coordination has not been started for this encounter.           Demographic Summary    No documentation.       Functional Status    No documentation.       Psychosocial    No documentation.       Abuse/Neglect    No documentation.       Legal    No documentation.       Substance Abuse    No documentation.       Patient Forms    No documentation.           Ev Boggs

## 2020-06-14 NOTE — H&P
[facility] Medicine Services  HISTORY AND PHYSICAL    Date of Admission: 6/13/2020  Primary Care Physician: BARRY Calderón MD    Subjective     Chief Complaint: Rectal bleed    History of Present Illness  74 year old male with PMH of Afib, PVD, HTN, Alcohol dependence that presents with complaints of passing large amounts of blood per rectum several times since the morning. This lead to dizziness and weakness and he decided to return for evaluation.    He had recently hospitalized for GIB and underwent EGD and colonoscopy. Was discharged home and restarted on Xarelto. Hemoglobin on discharge was 11.6 g/dl on 6/8/2020 and tonight is 6.2 g/dl.    Review of Systems   Otherwise complete ROS reviewed and negative except as mentioned in the HPI.    Past Medical History:   Past Medical History:   Diagnosis Date   • Alcohol abuse    • Arthritis    • Atrial flutter (CMS/HCC)    • Circulation problem    • History of tobacco abuse    • Hypertension    • PVD (peripheral vascular disease) (CMS/HCC)      Past Surgical History:  Past Surgical History:   Procedure Laterality Date   • ADENOIDECTOMY     • COLONOSCOPY N/A 6/8/2020    Procedure: COLONOSCOPY WITH ANESTHESIA;  Surgeon: Mayela Ching MD;  Location: [facility] ENDOSCOPY;  Service: Gastroenterology;  Laterality: N/A;  preop; GI bleed   postop; [facility]   PCP rachel calderón    • ENDOSCOPY N/A 6/8/2020    Procedure: ESOPHAGOGASTRODUODENOSCOPY WITH ANESTHESIA;  Surgeon: Mayela Ching MD;  Location: [facility] ENDOSCOPY;  Service: Gastroenterology;  Laterality: N/A;  proep; GI BLeed   postop; normal   PCP rachel calderón      Social History:  reports that he has been smoking cigarettes and cigars. He has been smoking about 0.50 packs per day. He has never used smokeless tobacco. He reports that he drank about 1.0 standard drinks of alcohol per week. He reports that he does not use drugs.    Family History: family history includes Cancer in his  father; Dementia in his mother; Pancreatic cancer in his father.       Allergies:  No Known Allergies     Medications:  Prior to Admission medications    Medication Sig Start Date End Date Taking? Authorizing Provider   amiodarone (PACERONE) 200 MG tablet Take 1 tablet by mouth Daily. 5/22/20   Odalis Garcia APRN   Cholecalciferol (VITAMIN D) 50 MCG (2000 UT) tablet Take 2,000 Units by mouth Daily.    Jose Walls MD   cilostazol (PLETAL) 50 MG tablet Take 1 tablet by mouth 2 (Two) Times a Day. 5/14/20   Nolan Farias MD   dilTIAZem CD (CARDIZEM CD) 120 MG 24 hr capsule Take 1 capsule by mouth Daily. 5/11/20   Nolan Farias MD   famotidine (PEPCID) 20 MG tablet Take 20 mg by mouth Daily.    Jose Walls MD   ferrous sulfate 325 (65 FE) MG tablet Take 1 tablet by mouth Daily With Breakfast. 6/8/20   Marco A Katz MD   guaiFENesin (MUCINEX) 600 MG 12 hr tablet Take 1,200 mg by mouth Daily.    Jose Walls MD   hydrOXYzine (ATARAX) 25 MG tablet Take 1 tablet by mouth 3 (Three) Times a Day As Needed for Anxiety. 4/16/20   BARRY Urrutia MD   lidocaine (LMX) 4 % cream Apply  topically to the appropriate area as directed As Needed for Mild Pain . Apply to left heel.    Jose Walls MD   lisinopril (PRINIVIL,ZESTRIL) 20 MG tablet Take 1 tablet by mouth Daily. 6/8/20   Marco A Katz MD   lisinopril (PRINIVIL,ZESTRIL) 40 MG tablet Take 20 mg by mouth Daily.    Jose Walls MD   pantoprazole (PROTONIX) 20 MG EC tablet Take 20 mg by mouth Daily.    Jose Walls MD   rivaroxaban (XARELTO) 20 MG tablet Take 1 tablet by mouth Daily With Dinner. Indications: Atrial Fibrillation 4/3/20   BARRY Urrutia MD   sildenafil (VIAGRA) 50 MG tablet Take 50 mg by mouth Daily As Needed for Erectile Dysfunction (Sexual Activity).    Jose Walls MD     Objective     Vital Signs: /61   Pulse 85   Temp 98.4 °F (36.9 °C) (Oral)   Resp 16    "Ht 170.2 cm (67\")   Wt 74.4 kg (164 lb)   SpO2 100%   BMI 25.69 kg/m²   Physical Exam   Constitutional: He is oriented to person, place, and time. He appears well-developed and well-nourished.   HENT:   Head: Normocephalic and atraumatic.   Right Ear: External ear normal.   Left Ear: External ear normal.   Eyes: Pupils are equal, round, and reactive to light. EOM are normal. Right eye exhibits no discharge. Left eye exhibits no discharge. No scleral icterus.   Neck: Normal range of motion. Neck supple. No JVD present. No thyromegaly present.   Cardiovascular: Normal rate, regular rhythm and normal heart sounds.   No murmur heard.  Pulmonary/Chest: Effort normal and breath sounds normal. No stridor. No respiratory distress. He has no wheezes. He has no rales.   Abdominal: Soft. Bowel sounds are normal. He exhibits no distension and no mass. There is no tenderness. There is no guarding.   Musculoskeletal: Normal range of motion. He exhibits no edema or deformity.   Neurological: He is alert and oriented to person, place, and time. He displays normal reflexes. No cranial nerve deficit. He exhibits normal muscle tone. Coordination normal.   Skin: Skin is dry. No rash noted. He is not diaphoretic. No erythema. There is pallor.   Psychiatric: He has a normal mood and affect.     Results Reviewed:  Lab Results (last 24 hours)     Procedure Component Value Units Date/Time    COVID-19, [facility] IN-HOUSE,NP Swab (NO TRANSPORT MEDIA) 2 HR TAT - Swab, Nasopharynx [[id number]]  (Normal) Collected:  06/13/20 1928    Specimen:  Swab from Nasopharynx Updated:  06/13/20 2038     COVID19 Not Detected    Lactic Acid, Plasma [[id number]]  (Normal) Collected:  06/13/20 1925    Specimen:  Blood Updated:  06/13/20 1952     Lactate 1.4 mmol/L     [place] Draw [[id number]] Collected:  06/13/20 1829    Specimen:  Blood Updated:  06/13/20 1930    Narrative:       The following orders were created for panel order [place] Draw.  Procedure         "                       Abnormality         Status                     ---------                               -----------         ------                     Light Blue Top[[id number]]                                   Final result               Green Top (Gel)[[id number]]                                  Final result               Lavender Top[[id number]]                                     Final result               Red Top[[id number]]                                          Final result                 Please view results for these tests on the individual orders.    Light Blue Top [[id number]] Collected:  06/13/20 1829    Specimen:  Blood Updated:  06/13/20 1930     Extra Tube hold for add-on     Comment: Auto resulted       Green Top (Gel) [[id number]] Collected:  06/13/20 1829    Specimen:  Blood Updated:  06/13/20 1930     Extra Tube Hold for add-ons.     Comment: Auto resulted.       Lavender Top [[id number]] Collected:  06/13/20 1829    Specimen:  Blood Updated:  06/13/20 1930     Extra Tube hold for add-on     Comment: Auto resulted       Red Top [[id number]] Collected:  06/13/20 1829    Specimen:  Blood Updated:  06/13/20 1930     Extra Tube Hold for add-ons.     Comment: Auto resulted.       Comprehensive Metabolic Panel [[id number]]  (Abnormal) Collected:  06/13/20 1829    Specimen:  Blood Updated:  06/13/20 1902     Glucose 101 mg/dL      BUN 28 mg/dL      Creatinine 0.87 mg/dL      Sodium 145 mmol/L      Potassium 4.0 mmol/L      Chloride 112 mmol/L      CO2 21.0 mmol/L      Calcium 8.5 mg/dL      Total Protein 5.4 g/dL      Albumin 3.20 g/dL      ALT (SGPT) 16 U/L      AST (SGOT) 17 U/L      Alkaline Phosphatase 76 U/L      Total Bilirubin 0.2 mg/dL      eGFR Non African Amer 86 mL/min/1.73      Globulin 2.2 gm/dL      A/G Ratio 1.5 g/dL      BUN/Creatinine Ratio 32.2     Anion Gap 12.0 mmol/L     Narrative:       GFR Normal >60  Chronic Kidney Disease <60  Kidney Failure <15      CBC & Differential [[id number]]  Collected:  06/13/20 1829    Specimen:  Blood Updated:  06/13/20 1855    Narrative:       The following orders were created for panel order CBC & Differential.  Procedure                               Abnormality         Status                     ---------                               -----------         ------                     CBC Auto Differential[[id number]]        Abnormal            Final result                 Please view results for these tests on the individual orders.    CBC Auto Differential [[id number]]  (Abnormal) Collected:  06/13/20 1829    Specimen:  Blood Updated:  06/13/20 1855     WBC 6.85 10*3/mm3      RBC 1.88 10*6/mm3      Hemoglobin 6.2 g/dL      Hematocrit 18.4 %      MCV 97.9 fL      MCH 33.0 pg      MCHC 33.7 g/dL      RDW 14.7 %      RDW-SD 51.3 fl      MPV 8.9 fL      Platelets 208 10*3/mm3      Neutrophil % 69.7 %      Lymphocyte % 18.1 %      Monocyte % 7.7 %      Eosinophil % 3.4 %      Basophil % 0.7 %      Immature Grans % 0.4 %      Neutrophils, Absolute 4.77 10*3/mm3      Lymphocytes, Absolute 1.24 10*3/mm3      Monocytes, Absolute 0.53 10*3/mm3      Eosinophils, Absolute 0.23 10*3/mm3      Basophils, Absolute 0.05 10*3/mm3      Immature Grans, Absolute 0.03 10*3/mm3      nRBC 0.0 /100 WBC     aPTT [[id number]]  (Abnormal) Collected:  06/13/20 1829    Specimen:  Blood Updated:  06/13/20 1853     PTT 67.7 seconds     Protime-INR [[id number]]  (Abnormal) Collected:  06/13/20 1829    Specimen:  Blood Updated:  06/13/20 1853     Protime 20.6 Seconds      INR 1.80        Imaging Results (Last 24 Hours)     ** No results found for the last 24 hours. **        I have personally reviewed and interpreted the radiology studies and ECG obtained at time of admission.     Assessment / Plan     Assessment:   Active Hospital Problems    Diagnosis   • **GI bleed   • Acute blood loss anemia   • Rectal bleeding     Added automatically from request for surgery [id number]     • Current use of long term  anticoagulation   • Alcoholism /alcohol abuse (CMS/HCC)   • Atrial fibrillation and flutter (CMS/HCC)   • Essential hypertension   • PVD (peripheral vascular disease) (CMS/[facility])     Plan:   Admit to medical floor with remote telemetry.   Vitals every 4 hours. Up with assistance only.   NPO except ice chips or medications.    Transfuse 2 units of PRBC.   IVF NS 75 cc/hour     Stop Xarelto for now. No AC in Afib due to active bleeding and anemia.  GI consult in AM ? Repeat colonoscopy, RBC scan or other    Home medications reconciled. Replacing Cardizem  mg for Cardizem 60 mg q6h oral in order to prevent supraventricular tachycardia and control blood pressure.    No pain reported.  DVT prophylaxis > SCD      Code Status: Full     I discussed the patient's findings and my recommendations with the patient    Estimated length of stay over 2 midnights. I do not recommend early discharge for this patient due to risk of recurrent bleeding and anemia.    Patient seen and examined by me on see below.    Amna Gonzalez MD   06/13/20   20:53

## 2020-06-14 NOTE — PROGRESS NOTES
[facility] Medicine Services  INPATIENT PROGRESS NOTE    Length of Stay: 1  Date of Admission: 6/13/2020  Primary Care Physician: BARRY Urrutia MD    Subjective   Chief Complaint: GI bleed.  Atrial fib.    HPI   Patient had a bowel movement earlier today with shown black tarry/some bright red blood per patient.  Patient denies any chest pain or shortness of breath.  Patient denies any abdomen pain.    Review of Systems   Constitutional: Positive for activity change, appetite change and fatigue. Negative for chills and fever.   HENT: Negative for hearing loss, nosebleeds, tinnitus and trouble swallowing.    Eyes: Negative for visual disturbance.   Respiratory: Negative for cough, chest tightness, shortness of breath and wheezing.    Cardiovascular: Negative for chest pain, palpitations and leg swelling.   Gastrointestinal: Positive for anal bleeding and nausea. Negative for abdominal distention, abdominal pain, blood in stool, constipation, diarrhea and vomiting.   Endocrine: Negative for cold intolerance, heat intolerance, polydipsia, polyphagia and polyuria.   Genitourinary: Negative for decreased urine volume, difficulty urinating, dysuria, flank pain, frequency and hematuria.   Musculoskeletal: Positive for arthralgias, gait problem and myalgias. Negative for joint swelling.   Skin: Negative for rash.   Allergic/Immunologic: Negative for immunocompromised state.   Neurological: Positive for weakness. Negative for dizziness, syncope, light-headedness and headaches.   Hematological: Negative for adenopathy. Does not bruise/bleed easily.   Psychiatric/Behavioral: Negative for confusion and sleep disturbance. The patient is not nervous/anxious.           All pertinent negatives and positives are as above. All other systems have been reviewed and are negative unless otherwise stated.     Objective    Temp:  [97.7 °F (36.5 °C)-98.4 °F (36.9 °C)] 98.3 °F (36.8 °C)  Heart Rate:   [65-92] 65  Resp:  [16-20] 18  BP: (110-170)/(46-97) 110/50    Intake/Output Summary (Last 24 hours) at 6/14/2020 1333  Last data filed at 6/14/2020 0950  Gross per 24 hour   Intake 1100 ml   Output 750 ml   Net 350 ml     Physical Exam   Constitutional: He is oriented to person, place, and time. He appears well-developed.   HENT:   Head: Normocephalic.   Eyes: Pupils are equal, round, and reactive to light. Conjunctivae are normal.   Neck: Neck supple. No JVD present.   Cardiovascular: Normal rate, regular rhythm, normal heart sounds and intact distal pulses. Exam reveals no gallop and no friction rub.   No murmur heard.  Currently sinus rhythm rate is 71.   Pulmonary/Chest: No respiratory distress. He has no wheezes. He has no rales. He exhibits no tenderness.   Slight wheezing bilateral, good air movement.   Abdominal: Soft. Bowel sounds are normal. He exhibits no distension. There is no tenderness. There is no rebound and no guarding.   Musculoskeletal: He exhibits no edema, tenderness or deformity.   Neurological: He is alert and oriented to person, place, and time. He displays normal reflexes. No cranial nerve deficit. He exhibits abnormal muscle tone. Coordination abnormal.   Skin: Skin is warm and dry. No rash noted.   Psychiatric: He has a normal mood and affect. His behavior is normal. Judgment and thought content normal.   Nursing note and vitals reviewed.      Results Review:  Lab Results (last 24 hours)     Procedure Component Value Units Date/Time    Hemoglobin & Hematocrit, Blood [[id number]]  (Abnormal) Collected:  06/14/20 0846    Specimen:  Blood Updated:  06/14/20 0854     Hemoglobin 7.7 g/dL      Hematocrit 22.4 %     Urinalysis With Culture If Indicated - Urine, Clean Catch [[id number]]  (Normal) Collected:  06/14/20 0404    Specimen:  Urine, Clean Catch Updated:  06/14/20 0414     Color, UA Yellow     Appearance, UA Clear     pH, UA <=5.0     Specific Gravity, UA 1.018     Glucose, UA Negative      Ketones, UA Negative     Bilirubin, UA Negative     Blood, UA Negative     Protein, UA Negative     Leuk Esterase, UA Negative     Nitrite, UA Negative     Urobilinogen, UA 0.2 E.U./dL    Narrative:       Urine microscopic not indicated.    Basic Metabolic Panel [[id number]]  (Abnormal) Collected:  06/14/20 0229    Specimen:  Blood Updated:  06/14/20 0301     Glucose 88 mg/dL      BUN 25 mg/dL      Creatinine 0.82 mg/dL      Sodium 144 mmol/L      Potassium 4.1 mmol/L      Chloride 113 mmol/L      CO2 23.0 mmol/L      Calcium 8.2 mg/dL      eGFR Non African Amer 92 mL/min/1.73      BUN/Creatinine Ratio 30.5     Anion Gap 8.0 mmol/L     Narrative:       GFR Normal >60  Chronic Kidney Disease <60  Kidney Failure <15      CBC & Differential [[id number]] Collected:  06/14/20 0229    Specimen:  Blood Updated:  06/14/20 0247    Narrative:       The following orders were created for panel order CBC & Differential.  Procedure                               Abnormality         Status                     ---------                               -----------         ------                     CBC Auto Differential[[id number]]        Abnormal            Final result                 Please view results for these tests on the individual orders.    CBC Auto Differential [[id number]]  (Abnormal) Collected:  06/14/20 0229    Specimen:  Blood Updated:  06/14/20 0247     WBC 6.94 10*3/mm3      RBC 1.98 10*6/mm3      Hemoglobin 6.3 g/dL      Hematocrit 18.8 %      MCV 94.9 fL      MCH 31.8 pg      MCHC 33.5 g/dL      RDW 17.2 %      RDW-SD 58.0 fl      MPV 9.3 fL      Platelets 184 10*3/mm3      Neutrophil % 68.9 %      Lymphocyte % 19.7 %      Monocyte % 6.9 %      Eosinophil % 3.6 %      Basophil % 0.6 %      Immature Grans % 0.3 %      Neutrophils, Absolute 4.78 10*3/mm3      Lymphocytes, Absolute 1.37 10*3/mm3      Monocytes, Absolute 0.48 10*3/mm3      Eosinophils, Absolute 0.25 10*3/mm3      Basophils, Absolute 0.04 10*3/mm3       Immature Grans, Absolute 0.02 10*3/mm3      nRBC 0.0 /100 WBC     COVID-19, [facility] IN-HOUSE,NP Swab (NO TRANSPORT MEDIA) 2 HR TAT - Swab, Nasopharynx [[id number]]  (Normal) Collected:  06/13/20 1928    Specimen:  Swab from Nasopharynx Updated:  06/13/20 2038     COVID19 Not Detected    Lactic Acid, Plasma [[id number]]  (Normal) Collected:  06/13/20 1925    Specimen:  Blood Updated:  06/13/20 1952     Lactate 1.4 mmol/L     [place] Draw [[id number]] Collected:  06/13/20 1829    Specimen:  Blood Updated:  06/13/20 1930    Narrative:       The following orders were created for panel order [place] Draw.  Procedure                               Abnormality         Status                     ---------                               -----------         ------                     Light Blue Top[[id number]]                                   Final result               Green Top (Gel)[[id number]]                                  Final result               Lavender Top[[id number]]                                     Final result               Red Top[[id number]]                                          Final result                 Please view results for these tests on the individual orders.    Light Blue Top [[id number]] Collected:  06/13/20 1829    Specimen:  Blood Updated:  06/13/20 1930     Extra Tube hold for add-on     Comment: Auto resulted       Green Top (Gel) [[id number]] Collected:  06/13/20 1829    Specimen:  Blood Updated:  06/13/20 1930     Extra Tube Hold for add-ons.     Comment: Auto resulted.       Lavender Top [[id number]] Collected:  06/13/20 1829    Specimen:  Blood Updated:  06/13/20 1930     Extra Tube hold for add-on     Comment: Auto resulted       Red Top [[id number]] Collected:  06/13/20 1829    Specimen:  Blood Updated:  06/13/20 1930     Extra Tube Hold for add-ons.     Comment: Auto resulted.       Comprehensive Metabolic Panel [[id number]]  (Abnormal) Collected:  06/13/20 1829    Specimen:  Blood Updated:   06/13/20 1902     Glucose 101 mg/dL      BUN 28 mg/dL      Creatinine 0.87 mg/dL      Sodium 145 mmol/L      Potassium 4.0 mmol/L      Chloride 112 mmol/L      CO2 21.0 mmol/L      Calcium 8.5 mg/dL      Total Protein 5.4 g/dL      Albumin 3.20 g/dL      ALT (SGPT) 16 U/L      AST (SGOT) 17 U/L      Alkaline Phosphatase 76 U/L      Total Bilirubin 0.2 mg/dL      eGFR Non African Amer 86 mL/min/1.73      Globulin 2.2 gm/dL      A/G Ratio 1.5 g/dL      BUN/Creatinine Ratio 32.2     Anion Gap 12.0 mmol/L     Narrative:       GFR Normal >60  Chronic Kidney Disease <60  Kidney Failure <15      CBC & Differential [[id number]] Collected:  06/13/20 1829    Specimen:  Blood Updated:  06/13/20 1855    Narrative:       The following orders were created for panel order CBC & Differential.  Procedure                               Abnormality         Status                     ---------                               -----------         ------                     CBC Auto Differential[[id number]]        Abnormal            Final result                 Please view results for these tests on the individual orders.    CBC Auto Differential [[id number]]  (Abnormal) Collected:  06/13/20 1829    Specimen:  Blood Updated:  06/13/20 1855     WBC 6.85 10*3/mm3      RBC 1.88 10*6/mm3      Hemoglobin 6.2 g/dL      Hematocrit 18.4 %      MCV 97.9 fL      MCH 33.0 pg      MCHC 33.7 g/dL      RDW 14.7 %      RDW-SD 51.3 fl      MPV 8.9 fL      Platelets 208 10*3/mm3      Neutrophil % 69.7 %      Lymphocyte % 18.1 %      Monocyte % 7.7 %      Eosinophil % 3.4 %      Basophil % 0.7 %      Immature Grans % 0.4 %      Neutrophils, Absolute 4.77 10*3/mm3      Lymphocytes, Absolute 1.24 10*3/mm3      Monocytes, Absolute 0.53 10*3/mm3      Eosinophils, Absolute 0.23 10*3/mm3      Basophils, Absolute 0.05 10*3/mm3      Immature Grans, Absolute 0.03 10*3/mm3      nRBC 0.0 /100 WBC     aPTT [[id number]]  (Abnormal) Collected:  06/13/20 1829    Specimen:   Blood Updated:  20     PTT 67.7 seconds     Protime-INR [[id number]]  (Abnormal) Collected:  20 182    Specimen:  Blood Updated:  20     Protime 20.6 Seconds      INR 1.80           Cultures:  No results found for: BLOODCX, URINECX, WOUNDCX, MRSACX, RESPCX, STOOLCX    Radiology Data:    Imaging Results (Last 24 Hours)     ** No results found for the last 24 hours. **          No Known Allergies    Scheduled meds:     dilTIAZem 60 mg Oral Q6H   pantoprazole 40 mg Intravenous Q AM   sodium chloride 10 mL Intravenous Q12H       PRN meds:  •  acetaminophen  •  HYDROcodone-acetaminophen  •  ondansetron  •  [COMPLETED] Insert peripheral IV **AND** sodium chloride  •  sodium chloride    Assessment/Plan       GI bleed    Essential hypertension    PVD (peripheral vascular disease) (CMS/[facility])    Atrial fibrillation and flutter (CMS/[facility])    Alcoholism /alcohol abuse (CMS/[facility])    Current use of long term anticoagulation    Rectal bleeding    Acute blood loss anemia      Plan:  GI bleed.  Hemoglobin 6.2.  Previous hemoglobin upon discharge 2020 was 11.6.  GI consult.  Serial hemoglobin.  CT enterography abdomen and pelvic with contrast.    Anemia due to blood loss.  IV fluids.  Status post 2 units of blood transfusion.    Atrial fibrillation/hypertension.  Patient is currently in sinus rhythm with a rate of 71.  Hold Xarelto.  Continue Cardizem.    Alcohol abuse/alcoholism.  Patient quit drinking 6 months ago.  CIWA was 0.    Peripheral vascular disease.    Reflux/nausea.  Protonix.  Zofran PRN.    Chronic pain.  Maybee PRN.    Smoking.  Patient been cutting back.  Only a couple cigarettes a day per patient.  Patient refused nicotine patch.  Patient will continue to cut back and stop.    SCD.    N.p.o.  Clear liquid diet.    COVID-19 not detected.    Discharge Plannin-4 days.    Jevon Peralta MD   20   13:33

## 2020-06-14 NOTE — ED PROVIDER NOTES
Subjective   Patient is a 74-year-old male that presents to the ER today with complaint of rectal bleeding.  The reports that 9 days ago he began to have bright red blood and maroon-colored blood in his stool.  He went to [facility] emergency department and was transferred to this facility see a GI physician.  The patient was admitted to this facility and had a colonoscopy performed on June 8, 2020.  He also had a EGD performed at that time.  The results are below.    Colonoscopy 6/8/2020:   Multiple non-bleeding colonic angiodysplastic lesions.  - Non-bleeding internal hemorrhoids.  - The examination was otherwise normal.  - No specimens collected.    EGD 6/8/2020:   Normal esophagus.  - Normal stomach.  - Normal examined duodenum.  - No specimens collected.      The patient reports that he has been doing well since that time.  He denies any fever or abdominal pain.  He states the EGD and colonoscopy he had no further episodes of blood in his stool.  He was started back on Xarelto which he takes for atrial flutter.  The patient states that this morning he had a bowel movement and that there was a very large amount of blood in the toilet.  He states that since then he has felt dizzy and weak.  Due to the symptoms he decided to return to the ER for further evaluation.      History provided by:  Patient   used: No    Rectal Bleeding   Quality:  Bright red and maroon  Amount:  Moderate  Duration:  1 day  Timing:  Constant  Chronicity:  New  Context: not anal fissures, not anal penetration, not constipation, not defecation, not diarrhea, not foreign body, not hemorrhoids, not rectal injury, not rectal pain and not spontaneously    Similar prior episodes: no    Relieved by:  Nothing  Worsened by:  Nothing  Ineffective treatments:  None tried  Associated symptoms: dizziness    Associated symptoms: no abdominal pain, no epistaxis, no fever, no hematemesis, no light-headedness, no loss of  consciousness, no recent illness and no vomiting    Risk factors: anticoagulant use    Risk factors: no hx of colorectal cancer, no hx of colorectal surgery, no hx of IBD, no liver disease, no NSAID use and no steroid use        Review of Systems   Constitutional: Negative for fever.   HENT: Negative for nosebleeds.    Gastrointestinal: Positive for hematochezia. Negative for abdominal pain, hematemesis and vomiting.   Neurological: Positive for dizziness. Negative for loss of consciousness and light-headedness.   All other systems reviewed and are negative.      Past Medical History:   Diagnosis Date   • Alcohol abuse    • Arthritis    • Atrial flutter (CMS/HCC)    • Circulation problem    • History of tobacco abuse    • Hypertension    • PVD (peripheral vascular disease) (CMS/HCC)        No Known Allergies    Past Surgical History:   Procedure Laterality Date   • ADENOIDECTOMY     • COLONOSCOPY N/A 6/8/2020    Procedure: COLONOSCOPY WITH ANESTHESIA;  Surgeon: Mayela Ching MD;  Location: [facility] ENDOSCOPY;  Service: Gastroenterology;  Laterality: N/A;  preop; GI bleed   postop; avm's   PCP rachel calderón    • ENDOSCOPY N/A 6/8/2020    Procedure: ESOPHAGOGASTRODUODENOSCOPY WITH ANESTHESIA;  Surgeon: Mayela Ching MD;  Location: [facility] ENDOSCOPY;  Service: Gastroenterology;  Laterality: N/A;  proep; GI BLeed   postop; normal   PCP rachel calderón        Family History   Problem Relation Age of Onset   • Dementia Mother    • Cancer Father    • Pancreatic cancer Father        Social History     Socioeconomic History   • Marital status:      Spouse name: Not on file   • Number of children: Not on file   • Years of education: Not on file   • Highest education level: Not on file   Tobacco Use   • Smoking status: Current Every Day Smoker     Packs/day: 0.50     Types: Cigarettes, Cigars   • Smokeless tobacco: Never Used   Substance and Sexual Activity   • Alcohol use: Not Currently     Alcohol/week: 1.0 standard  drinks     Types: 1 Glasses of wine per week     Frequency: Never   • Drug use: Never   • Sexual activity: Defer           Objective   Physical Exam   Constitutional: He is oriented to person, place, and time. He appears well-developed and well-nourished.   HENT:   Head: Normocephalic and atraumatic.   Eyes: Conjunctivae are normal.   Cardiovascular: Normal rate, regular rhythm and normal heart sounds.   Pulmonary/Chest: Effort normal and breath sounds normal.   Abdominal: Soft. Bowel sounds are normal.   Neurological: He is alert and oriented to person, place, and time.   Skin: Skin is warm and dry.   Psychiatric: He has a normal mood and affect.   Nursing note and vitals reviewed.      Procedures           ED Course  ED Course as of Jun 13 2057   Sat Jun 13, 2020 2042 The patient is a 74-year-old male that presented to the ER today with complaint of rectal bleeding.  The patient's low been a 6.2, hematocrit of 18.4.  When I reviewed the patient's old labs, 5 days ago his hemoglobin was 11.6 and 33.9.  Patient is dizzy and weak with this.  I have ordered a unit of blood.  The patient is okay with having a blood transfusion.  He has declined a rectal exam today for Hemoccult stool.  I placed a call to the hospitalist to admit the patient.    [LF]   2045 GALI Varela [facility] did [facility] for the pt to stay at this facility. Pt advised of all findings and plan of care and is agreeable with admission.     [LF]   2055 I have discussed the patient's case with Dr. Ayla Guerra.  He is agreeable with admission.  The patient be admitted at this time in stable condition.    [LF]   2056 Patient declined a Hemoccult stool today.    [LF]      ED Course User Index  [LF] Brandee Valdes, APRN                                 No orders to display     Labs Reviewed   COMPREHENSIVE METABOLIC PANEL - Abnormal; Notable for the following components:       Result Value    Glucose 101  (*)     BUN 28 (*)     Chloride 112 (*)     CO2 21.0 (*)     Calcium 8.5 (*)     Total Protein 5.4 (*)     Albumin 3.20 (*)     BUN/Creatinine Ratio 32.2 (*)     All other components within normal limits    Narrative:     GFR Normal >60  Chronic Kidney Disease <60  Kidney Failure <15     PROTIME-INR - Abnormal; Notable for the following components:    Protime 20.6 (*)     INR 1.80 (*)     All other components within normal limits   APTT - Abnormal; Notable for the following components:    PTT 67.7 (*)     All other components within normal limits   CBC WITH AUTO DIFFERENTIAL - Abnormal; Notable for the following components:    RBC 1.88 (*)     Hemoglobin 6.2 (*)     Hematocrit 18.4 (*)     MCV 97.9 (*)     Lymphocyte % 18.1 (*)     All other components within normal limits   COVID-19,ABBOTT IN-HOUSE,NP SWAB (NO TRANSPORT MEDIA) 2 HR TAT - Normal   LACTIC ACID, PLASMA - Normal   RAINBOW DRAW    Narrative:     The following orders were created for panel order [place] Draw.  Procedure                               Abnormality         Status                     ---------                               -----------         ------                     Light Blue Top[[id number]]                                   Final result               Green Top (Gel)[[id number]]                                  Final result               Lavender Top[[id number]]                                     Final result               Red Top[[id number]]                                          Final result                 Please view results for these tests on the individual orders.   URINALYSIS W/ CULTURE IF INDICATED   POCT OCCULT BLOOD STOOL   PREPARE RBC   TYPE AND SCREEN   LIGHT BLUE TOP   GREEN TOP   LAVENDER TOP   RED TOP   CBC AND DIFFERENTIAL    Narrative:     The following orders were created for panel order CBC & Differential.  Procedure                               Abnormality         Status                     ---------                                -----------         ------                     CBC Auto Differential[[id number]]        Abnormal            Final result                 Please view results for these tests on the individual orders.               MDM  Number of Diagnoses or Management Options  Gastrointestinal hemorrhage, unspecified gastrointestinal hemorrhage type: new and requires workup     Amount and/or Complexity of Data Reviewed  Clinical lab tests: ordered and reviewed  Tests in the radiology section of CPT®: ordered and reviewed  Tests in the medicine section of CPT®: ordered and reviewed  Decide to obtain previous medical records or to obtain history from someone other than the patient: yes  Discuss the patient with other providers: yes    Patient Progress  Patient progress: stable      Final diagnoses:   Gastrointestinal hemorrhage, unspecified gastrointestinal hemorrhage type            Brandee Valdes, TAJ  06/13/20 2057       Brandee Valdes, APRSENDY  06/13/20 2057

## 2020-06-14 NOTE — ED NOTES
[facility]'S UNIT # 1 CONTINUED UPON TRANSPORT TO [facility]     Yevgeniy Arroyo RN  06/13/20 2129

## 2020-06-14 NOTE — OUTREACH NOTE
Medical Week 2 Survey      Responses   [facility] [place] patient discharged from?  [place]   COVID-19 Test Status  Negative   Does the patient have one of the following disease processes/diagnoses(primary or secondary)?  Other   Week 2 attempt successful?  No   Revoke  Readmitted          Rubina Maier RN

## 2020-06-14 NOTE — PLAN OF CARE
"  Problem: Patient Care Overview  Goal: Plan of Care Review  Outcome: Ongoing (interventions implemented as appropriate)  Note:   Pt arrived to the floor from ED with first unit of PRB infusion. Pt effectively did not rise and was ordered another unit. Pt c/o of pain (8/10) of his L heel (small stage II present prior to admission). He reports pain in his heel to be acute and denies any pain management at home or during admission process of this encounter.     Pt states that his nutrition has been poor and that he has lost greater than 10# over the past month or so and attributes it to his, \"stopping drinking about 6 months ago.\" Pt reports only having a \"glass of wine or two occasionally\" at this time.   Goal: Individualization and Mutuality  Outcome: Ongoing (interventions implemented as appropriate)  Goal: Discharge Needs Assessment  Outcome: Ongoing (interventions implemented as appropriate)  Goal: Interprofessional Rounds/Family Conf  Outcome: Ongoing (interventions implemented as appropriate)     Problem: Fall Risk (Adult)  Goal: Identify Related Risk Factors and Signs and Symptoms  Description  Related risk factors and signs and symptoms are identified upon initiation of Human Response Clinical Practice Guideline (CPG).  Outcome: Ongoing (interventions implemented as appropriate)  Goal: Absence of Fall  Description  Patient will demonstrate the desired outcomes by discharge/transition of care.  Outcome: Ongoing (interventions implemented as appropriate)     Problem: Gastrointestinal Bleeding (Adult)  Goal: Signs and Symptoms of Listed Potential Problems Will be Absent, Minimized or Managed (Gastrointestinal Bleeding)  Description  Signs and symptoms of listed potential problems will be absent, minimized or managed by discharge/transition of care (reference Gastrointestinal Bleeding (Adult) CPG).  Outcome: Ongoing (interventions implemented as appropriate)     Problem: Pain, Acute (Adult)  Goal: Identify " Related Risk Factors and Signs and Symptoms  Description  Related risk factors and signs and symptoms are identified upon initiation of Human Response Clinical Practice Guideline (CPG).  Outcome: Ongoing (interventions implemented as appropriate)  Goal: Acceptable Pain Control/Comfort Level  Description  Patient will demonstrate the desired outcomes by discharge/transition of care.  Outcome: Ongoing (interventions implemented as appropriate)     Problem: Anemia (Adult)  Goal: Identify Related Risk Factors and Signs and Symptoms  Description  Related risk factors and signs and symptoms are identified upon initiation of Human Response Clinical Practice Guideline (CPG).  Outcome: Ongoing (interventions implemented as appropriate)  Goal: Symptom Improvement  Description  Patient will demonstrate the desired outcomes by discharge/transition of care.  Outcome: Ongoing (interventions implemented as appropriate)     Problem: Nutrition, Imbalanced: Inadequate Oral Intake (Adult)  Goal: Identify Related Risk Factors and Signs and Symptoms  Description  Related risk factors and signs and symptoms are identified upon initiation of Human Response Clinical Practice Guideline (CPG).  Outcome: Ongoing (interventions implemented as appropriate)  Goal: Improved Oral Intake  Description  Patient will demonstrate the desired outcomes by discharge/transition of care.  Outcome: Ongoing (interventions implemented as appropriate)  Goal: Prevent Further Weight Loss  Description  Patient will demonstrate the desired outcomes by discharge/transition of care.  Outcome: Ongoing (interventions implemented as appropriate)     Problem: Alcohol Withdrawal Acute, Risk/Actual (Adult)  Goal: Signs and Symptoms of Listed Potential Problems Will be Absent, Minimized or Managed (Alcohol Withdrawal Acute, Risk/Actual)  Description  Signs and symptoms of listed potential problems will be absent, minimized or managed by discharge/transition of care  (reference Alcohol Withdrawal Acute, Risk/Actual (Adult) CPG).  Outcome: Ongoing (interventions implemented as appropriate)

## 2020-06-14 NOTE — CONSULTS
[facility]  Inpatient Consult Note      Referring Provider: Aman Gonzalez,*  Primary Physician: BARRY Calderón MD     Date of Admission: 6/13/2020  Date of Service:  06/14/20    Chief Complaint   Patient presents with   • Black or Bloody Stool   • Weakness - Generalized       Subjective     Kingsley Lerma is a 74 y.o. male we are asked to see for GI bleeding and anemia.  The patient was just admitted to the hospital underwent endoscopy and colonoscopy revealing AVMs in the colon.  No other source of bleeding noted.  He is on chronic anticoagulation because of peripheral vascular disease.  He developed bright red blood per rectum and presented to the emergency room where he was admitted with a hemoglobin of 6    Past Medical History:   Diagnosis Date   • Alcohol abuse    • Arthritis    • Atrial flutter (CMS/HCC)    • Circulation problem    • History of tobacco abuse    • Hypertension    • PVD (peripheral vascular disease) (CMS/HCC)        Past Surgical History:   Procedure Laterality Date   • ADENOIDECTOMY     • COLONOSCOPY N/A 6/8/2020    Procedure: COLONOSCOPY WITH ANESTHESIA;  Surgeon: Mayela Ching MD;  Location: [facility] ENDOSCOPY;  Service: Gastroenterology;  Laterality: N/A;  preop; GI bleed   postop; avm's   PCP rachel calderón    • ENDOSCOPY N/A 6/8/2020    Procedure: ESOPHAGOGASTRODUODENOSCOPY WITH ANESTHESIA;  Surgeon: Mayela Ching MD;  Location: [facility] ENDOSCOPY;  Service: Gastroenterology;  Laterality: N/A;  proep; GI BLeed   postop; normal   PCP rachel calderón           Current Facility-Administered Medications:   •  acetaminophen (TYLENOL) tablet 650 mg, 650 mg, Oral, Q4H PRN, Aman Gonzalez MD, 650 mg at 06/14/20 0013  •  dilTIAZem (CARDIZEM) tablet 60 mg, 60 mg, Oral, Q6H, Aman Gonzalez MD, 60 mg at 06/14/20 0520  •  HYDROcodone-acetaminophen (NORCO) 5-325 MG per tablet 1 tablet, 1 tablet, Oral, Q6H PRN, Aman Gonzalez MD, 1 tablet at  "06/14/20 0402  •  ondansetron (ZOFRAN) injection 4 mg, 4 mg, Intravenous, Q6H PRN, Aman Gonzalez MD  •  pantoprazole (PROTONIX) injection 40 mg, 40 mg, Intravenous, Q AM, Aman Gonzalez MD, 40 mg at 06/14/20 0717  •  [COMPLETED] Insert peripheral IV, , , Once **AND** sodium chloride 0.9 % flush 10 mL, 10 mL, Intravenous, PRN, Brandee Valdes APRN  •  sodium chloride 0.9 % flush 10 mL, 10 mL, Intravenous, Q12H, Aman Gonzalez MD  •  sodium chloride 0.9 % flush 10 mL, 10 mL, Intravenous, PRN, Aman Gonzalez MD  •  sodium chloride 0.9 % infusion, 75 mL/hr, Intravenous, Continuous, Aman Gonzalez MD, Last Rate: 75 mL/hr at 06/14/20 0009, 75 mL/hr at 06/14/20 0009    No Known Allergies    Social History     Socioeconomic History   • Marital status:      Spouse name: Not on file   • Number of children: Not on file   • Years of education: Not on file   • Highest education level: Not on file   Tobacco Use   • Smoking status: Current Every Day Smoker     Packs/day: 0.50     Types: Cigarettes, Cigars   • Smokeless tobacco: Never Used   Substance and Sexual Activity   • Alcohol use: Not Currently     Alcohol/week: 2.0 standard drinks     Types: 2 Glasses of wine per week     Frequency: Never   • Drug use: Never   • Sexual activity: Defer       Family History   Problem Relation Age of Onset   • Dementia Mother    • Cancer Father    • Pancreatic cancer Father        Review of Systems   Respiratory: Negative.    Cardiovascular: Negative.    Gastrointestinal: Positive for blood in stool.       Objective     /47 (BP Location: Right arm, Patient Position: Lying)   Pulse 69   Temp 98.2 °F (36.8 °C) (Oral)   Resp 18   Ht 170.2 cm (67\")   Wt 71 kg (156 lb 9.6 oz)   SpO2 97%   BMI 24.53 kg/m²     Physical Exam   Cardiovascular: Normal rate.   Pulmonary/Chest: Effort normal.   Abdominal: Soft.   Neurological: He is alert.   Skin: Skin is warm.       Lab Results "   Component Value Date    WBC 6.94 06/14/2020    HGB 7.7 (L) 06/14/2020    HCT 22.4 (L) 06/14/2020     06/14/2020        Lab Results   Component Value Date     06/14/2020    K 4.1 06/14/2020     (H) 06/14/2020    CO2 23.0 06/14/2020    CO2 21.0 (L) 06/13/2020    CO2 23.0 06/07/2020    BUN 25 (H) 06/14/2020    CREATININE 0.82 06/14/2020    BILITOT 0.2 06/13/2020    ALKPHOS 76 06/13/2020    ALT 16 06/13/2020    AST 17 06/13/2020    GLUCOSE 88 06/14/2020       Lab Results   Component Value Date    INR 1.80 (H) 06/13/2020       IMPRESSION/PLAN:     Patient Active Problem List   Diagnosis   • Essential hypertension   • PVD (peripheral vascular disease) (CMS/HCC)   • Atrial fibrillation and flutter (CMS/HCC)   • Alcoholism /alcohol abuse (CMS/HCC)   • Non healing left heel wound   • Atrial fibrillation status post cardioversion (CMS/HCC)   • Tobacco use   • Syncope   • Current use of long term anticoagulation   • GI bleed   • Rectal bleeding   • Heme + stool   • Acute blood loss anemia         Recurrent GI bleed.  On chronic anticoagulants because of peripheral vascular disease.  Will get a CT enterography today to evaluate the small bowel.  Potential endoscopy tomorrow given his elevated BUN.  Will need to be transfused to appropriate levels based on his disease.  Defer to the admitting service.    EMR Dragon/transcription disclaimer: Much of this encounter note is an electronic transcription/translation of spoken language to printed text.  The electronic translation of spoken language may permit erroneous, or at times, nonsensical words or phrases to be inadvertently transcribed.  Although I have reviewed the note for such errors, some may still exist.      Carmine Banuelos MD  06/14/20  09:04

## 2020-06-14 NOTE — PLAN OF CARE
Problem: Patient Care Overview  Goal: Plan of Care Review  Outcome: Ongoing (interventions implemented as appropriate)  Flowsheets (Taken 6/14/2020 1554)  Progress: no change  Plan of Care Reviewed With: patient  Outcome Summary: Pt denies pain and nausea, ct today, cont ivf, russell clears, ambulating in room, will cont to monitor

## 2020-06-15 ENCOUNTER — ANESTHESIA (OUTPATIENT)
Dept: GASTROENTEROLOGY | Facility: HOSPITAL | Age: 75
End: 2020-06-15

## 2020-06-15 ENCOUNTER — ANESTHESIA EVENT (OUTPATIENT)
Dept: GASTROENTEROLOGY | Facility: HOSPITAL | Age: 75
End: 2020-06-15

## 2020-06-15 PROBLEM — K92.2 GASTROINTESTINAL HEMORRHAGE: Status: ACTIVE | Noted: 2020-06-13

## 2020-06-15 LAB
ANION GAP SERPL CALCULATED.3IONS-SCNC: 10 MMOL/L (ref 5–15)
BASOPHILS # BLD AUTO: 0.03 10*3/MM3 (ref 0–0.2)
BASOPHILS NFR BLD AUTO: 0.4 % (ref 0–1.5)
BH BB BLOOD EXPIRATION DATE: NORMAL
BH BB BLOOD EXPIRATION DATE: NORMAL
BH BB BLOOD TYPE BARCODE: 6200
BH BB BLOOD TYPE BARCODE: 6200
BH BB DISPENSE STATUS: NORMAL
BH BB DISPENSE STATUS: NORMAL
BH BB PRODUCT CODE: NORMAL
BH BB PRODUCT CODE: NORMAL
BH BB UNIT NUMBER: NORMAL
BH BB UNIT NUMBER: NORMAL
BUN BLD-MCNC: 8 MG/DL (ref 8–23)
BUN/CREAT SERPL: 13.3 (ref 7–25)
CALCIUM SPEC-SCNC: 8.6 MG/DL (ref 8.6–10.5)
CHLORIDE SERPL-SCNC: 109 MMOL/L (ref 98–107)
CO2 SERPL-SCNC: 24 MMOL/L (ref 22–29)
CREAT BLD-MCNC: 0.6 MG/DL (ref 0.76–1.27)
CROSSMATCH INTERPRETATION: NORMAL
CROSSMATCH INTERPRETATION: NORMAL
DEPRECATED RDW RBC AUTO: 58.2 FL (ref 37–54)
EOSINOPHIL # BLD AUTO: 0.24 10*3/MM3 (ref 0–0.4)
EOSINOPHIL NFR BLD AUTO: 3 % (ref 0.3–6.2)
ERYTHROCYTE [DISTWIDTH] IN BLOOD BY AUTOMATED COUNT: 17.6 % (ref 12.3–15.4)
GFR SERPL CREATININE-BSD FRML MDRD: 132 ML/MIN/1.73
GLUCOSE BLD-MCNC: 80 MG/DL (ref 65–99)
HCT VFR BLD AUTO: 20.4 % (ref 37.5–51)
HCT VFR BLD AUTO: 21.9 % (ref 37.5–51)
HCT VFR BLD AUTO: 22.9 % (ref 37.5–51)
HGB BLD-MCNC: 7 G/DL (ref 13–17.7)
HGB BLD-MCNC: 7.6 G/DL (ref 13–17.7)
HGB BLD-MCNC: 7.6 G/DL (ref 13–17.7)
IMM GRANULOCYTES # BLD AUTO: 0.02 10*3/MM3 (ref 0–0.05)
IMM GRANULOCYTES NFR BLD AUTO: 0.3 % (ref 0–0.5)
LYMPHOCYTES # BLD AUTO: 0.91 10*3/MM3 (ref 0.7–3.1)
LYMPHOCYTES NFR BLD AUTO: 11.6 % (ref 19.6–45.3)
MCH RBC QN AUTO: 31.3 PG (ref 26.6–33)
MCHC RBC AUTO-ENTMCNC: 33.2 G/DL (ref 31.5–35.7)
MCV RBC AUTO: 94.2 FL (ref 79–97)
MONOCYTES # BLD AUTO: 0.59 10*3/MM3 (ref 0.1–0.9)
MONOCYTES NFR BLD AUTO: 7.5 % (ref 5–12)
NEUTROPHILS # BLD AUTO: 6.08 10*3/MM3 (ref 1.7–7)
NEUTROPHILS NFR BLD AUTO: 77.2 % (ref 42.7–76)
NRBC BLD AUTO-RTO: 0 /100 WBC (ref 0–0.2)
PLATELET # BLD AUTO: 198 10*3/MM3 (ref 140–450)
PMV BLD AUTO: 9.2 FL (ref 6–12)
POTASSIUM BLD-SCNC: 3.9 MMOL/L (ref 3.5–5.2)
RBC # BLD AUTO: 2.43 10*6/MM3 (ref 4.14–5.8)
SODIUM BLD-SCNC: 143 MMOL/L (ref 136–145)
UNIT  ABO: NORMAL
UNIT  ABO: NORMAL
UNIT  RH: NORMAL
UNIT  RH: NORMAL
WBC NRBC COR # BLD: 7.87 10*3/MM3 (ref 3.4–10.8)

## 2020-06-15 PROCEDURE — 99221 1ST HOSP IP/OBS SF/LOW 40: CPT | Performed by: NURSE PRACTITIONER

## 2020-06-15 PROCEDURE — 80048 BASIC METABOLIC PNL TOTAL CA: CPT | Performed by: FAMILY MEDICINE

## 2020-06-15 PROCEDURE — 0DJ08ZZ INSPECTION OF UPPER INTESTINAL TRACT, VIA NATURAL OR ARTIFICIAL OPENING ENDOSCOPIC: ICD-10-PCS | Performed by: INTERNAL MEDICINE

## 2020-06-15 PROCEDURE — 25010000002 PROPOFOL 10 MG/ML EMULSION: Performed by: NURSE ANESTHETIST, CERTIFIED REGISTERED

## 2020-06-15 PROCEDURE — 85025 COMPLETE CBC W/AUTO DIFF WBC: CPT | Performed by: FAMILY MEDICINE

## 2020-06-15 PROCEDURE — 85014 HEMATOCRIT: CPT | Performed by: INTERNAL MEDICINE

## 2020-06-15 PROCEDURE — 43235 EGD DIAGNOSTIC BRUSH WASH: CPT | Performed by: INTERNAL MEDICINE

## 2020-06-15 PROCEDURE — 85018 HEMOGLOBIN: CPT | Performed by: INTERNAL MEDICINE

## 2020-06-15 RX ORDER — LIDOCAINE HYDROCHLORIDE 10 MG/ML
0.5 INJECTION, SOLUTION EPIDURAL; INFILTRATION; INTRACAUDAL; PERINEURAL ONCE AS NEEDED
Status: DISCONTINUED | OUTPATIENT
Start: 2020-06-15 | End: 2020-06-15 | Stop reason: HOSPADM

## 2020-06-15 RX ORDER — PROPOFOL 10 MG/ML
VIAL (ML) INTRAVENOUS AS NEEDED
Status: DISCONTINUED | OUTPATIENT
Start: 2020-06-15 | End: 2020-06-15 | Stop reason: SURG

## 2020-06-15 RX ORDER — SODIUM CHLORIDE 9 MG/ML
500 INJECTION, SOLUTION INTRAVENOUS CONTINUOUS PRN
Status: DISCONTINUED | OUTPATIENT
Start: 2020-06-15 | End: 2020-06-15 | Stop reason: HOSPADM

## 2020-06-15 RX ORDER — SODIUM CHLORIDE 0.9 % (FLUSH) 0.9 %
10 SYRINGE (ML) INJECTION AS NEEDED
Status: DISCONTINUED | OUTPATIENT
Start: 2020-06-15 | End: 2020-06-15 | Stop reason: HOSPADM

## 2020-06-15 RX ADMIN — SODIUM CHLORIDE, PRESERVATIVE FREE 10 ML: 5 INJECTION INTRAVENOUS at 09:02

## 2020-06-15 RX ADMIN — DILTIAZEM HYDROCHLORIDE 60 MG: 60 TABLET, FILM COATED ORAL at 17:44

## 2020-06-15 RX ADMIN — DILTIAZEM HYDROCHLORIDE 60 MG: 60 TABLET, FILM COATED ORAL at 23:00

## 2020-06-15 RX ADMIN — HYDROCODONE BITARTRATE AND ACETAMINOPHEN 1 TABLET: 5; 325 TABLET ORAL at 07:51

## 2020-06-15 RX ADMIN — SODIUM CHLORIDE 75 ML/HR: 9 INJECTION, SOLUTION INTRAVENOUS at 20:43

## 2020-06-15 RX ADMIN — SODIUM CHLORIDE 500 ML: 9 INJECTION, SOLUTION INTRAVENOUS at 11:28

## 2020-06-15 RX ADMIN — HYDROCODONE BITARTRATE AND ACETAMINOPHEN 1 TABLET: 5; 325 TABLET ORAL at 20:43

## 2020-06-15 RX ADMIN — SODIUM CHLORIDE, PRESERVATIVE FREE 10 ML: 5 INJECTION INTRAVENOUS at 20:44

## 2020-06-15 RX ADMIN — DILTIAZEM HYDROCHLORIDE 60 MG: 60 TABLET, FILM COATED ORAL at 13:23

## 2020-06-15 RX ADMIN — PANTOPRAZOLE SODIUM 40 MG: 40 INJECTION, POWDER, FOR SOLUTION INTRAVENOUS at 20:43

## 2020-06-15 RX ADMIN — LIDOCAINE HYDROCHLORIDE 100 MG: 20 INJECTION, SOLUTION INTRAVENOUS at 11:41

## 2020-06-15 RX ADMIN — DILTIAZEM HYDROCHLORIDE 60 MG: 60 TABLET, FILM COATED ORAL at 05:23

## 2020-06-15 RX ADMIN — HYDROCODONE BITARTRATE AND ACETAMINOPHEN 1 TABLET: 5; 325 TABLET ORAL at 14:18

## 2020-06-15 RX ADMIN — PANTOPRAZOLE SODIUM 40 MG: 40 INJECTION, POWDER, FOR SOLUTION INTRAVENOUS at 09:02

## 2020-06-15 RX ADMIN — PROPOFOL 200 MG: 10 INJECTION, EMULSION INTRAVENOUS at 11:41

## 2020-06-15 NOTE — CONSULTS
"Kingsley Lerma  [id number]  [id number]  360/1  No att. providers found  6/13/2020    Chief Complaint   Patient presents with   • Black or Bloody Stool   • Weakness - Generalized       HPI: Kingsley Lerma is a 74 y.o. male who has a past medical history of atrial fibrillation, peripheral vascular disease, hypertension, and alcohol dependence.  He was admitted on 613 with GI bleed and has underwent a endoscopy and colonoscopy.  Upon arrival this admission, his hemoglobin was 6.2.  His Xarelto and Pletal have been held.  Currently his hemoglobin is 9.1.  He does report general fatigue and weakness.  He did have a's CT enterography of the abdomen and pelvis showing significant vascular disease.  He states [facility] noted [facility] take \" multiple surgeries to fix his problems\".  He does report complaints of claudication to his lower extremities.  He does report most of his symptoms are to his left lower extremity.    Past Medical History:   Diagnosis Date   • Alcohol abuse    • Arthritis    • Atrial flutter (CMS/HCC)    • Circulation problem    • History of tobacco abuse    • Hypertension    • PVD (peripheral vascular disease) (CMS/HCC)        Past Surgical History:   Procedure Laterality Date   • ADENOIDECTOMY     • COLONOSCOPY N/A 6/8/2020    Procedure: COLONOSCOPY WITH ANESTHESIA;  Surgeon: Mayela Ching MD;  Location: [facility] ENDOSCOPY;  Service: Gastroenterology;  Laterality: N/A;  preop; GI bleed   postop; avm's   PCP rachel calderón    • ENDOSCOPY N/A 6/8/2020    Procedure: ESOPHAGOGASTRODUODENOSCOPY WITH ANESTHESIA;  Surgeon: Mayela Ching MD;  Location: [facility] ENDOSCOPY;  Service: Gastroenterology;  Laterality: N/A;  proep; GI BLeed   postop; normal   PCP rachel calderón    • ENDOSCOPY N/A 6/15/2020    Procedure: ESOPHAGOGASTRODUODENOSCOPY WITH ANESTHESIA;  Surgeon: Carmine Banuelos MD;  Location: [facility] ENDOSCOPY;  Service: Gastroenterology;  Laterality: N/A;  pre gi bleed, " anemia  post normal  dr rachel calderón       Family History   Problem Relation Age of Onset   • Dementia Mother    • Cancer Father    • Pancreatic cancer Father        Social History     Socioeconomic History   • Marital status:      Spouse name: Not on file   • Number of children: Not on file   • Years of education: Not on file   • Highest education level: Not on file   Tobacco Use   • Smoking status: Current Every Day Smoker     Packs/day: 0.50     Types: Cigarettes, Cigars   • Smokeless tobacco: Never Used   Substance and Sexual Activity   • Alcohol use: Not Currently     Alcohol/week: 2.0 standard drinks     Types: 2 Glasses of wine per week     Frequency: Never   • Drug use: Never   • Sexual activity: Defer       No Known Allergies    Hospital Medications (active)       Dose Frequency Start End    acetaminophen (TYLENOL) tablet 650 mg 650 mg Every 4 Hours PRN 6/13/2020     Admin Instructions: Do not exceed 4 grams of acetaminophen in a 24 hr period.<BR><BR>If given for pain, use the following pain scale: <BR>Mild Pain = Pain Score of 1-3, CPOT 1-2<BR>Moderate Pain = Pain Score of 4-6, CPOT 3-4<BR>Severe Pain = Pain Score of 7-10, CPOT 5-8    Route: Oral    dilTIAZem (CARDIZEM) tablet 60 mg 60 mg Every 6 Hours Scheduled 6/14/2020     Admin Instructions: Caution: Look alike/sound alike drug alert.  Take on empty stomach 1 hr before or 2 hrs after meals. Avoid grapefruit juice. Maximum simvastatin dose 10 mg    Route: Oral    HYDROcodone-acetaminophen (NORCO) 5-325 MG per tablet 1 tablet 1 tablet Every 6 Hours PRN 6/14/2020 6/24/2020    Admin Instructions: [ROSSY]<BR><BR>Do not exceed 4 grams of acetaminophen in a 24 hr period.<BR><BR>If given for pain, use the following pain scale: <BR>Mild Pain = Pain Score of 1-3, CPOT 1-2<BR>Moderate Pain = Pain Score of 4-6, CPOT 3-4<BR>Severe Pain = Pain Score of 7-10, CPOT 5-8    Route: Oral    ondansetron (ZOFRAN) injection 4 mg 4 mg Every 6 Hours PRN 6/13/2020      "Admin Instructions: If BOTH ondansetron (ZOFRAN) and promethazine (PHENERGAN) are ordered use ondansetron first and THEN promethazine IF ondansetron is ineffective.    Route: Intravenous    pantoprazole (PROTONIX) injection 40 mg 40 mg Every 12 Hours Scheduled 6/14/2020     Admin Instructions: Dilute with 10 mL of 0.9% NaCl and give IV push over 2 minutes.    Route: Intravenous    sodium chloride 0.9 % flush 10 mL 10 mL As Needed 6/13/2020     Route: Intravenous    Cosign for Ordering: Accepted by Hugo Guevara MD on 6/14/2020 12:19 PM    Linked Group 1:  \"And\" Linked Group Details        sodium chloride 0.9 % flush 10 mL 10 mL Every 12 Hours Scheduled 6/14/2020     Route: Intravenous    sodium chloride 0.9 % flush 10 mL 10 mL As Needed 6/13/2020     Route: Intravenous    sodium chloride 0.9 % infusion 75 mL/hr Continuous 6/14/2020     Route: Intravenous          Review of Systems   Constitutional: Positive for fatigue.   HENT: Negative.    Eyes: Negative.    Respiratory: Negative.    Cardiovascular: Negative.         Claudication   Gastrointestinal: Negative for anal bleeding.   Endocrine: Negative.    Genitourinary: Negative.         Blood in stool   Musculoskeletal: Negative.    Skin: Negative.    Allergic/Immunologic: Negative.    Neurological: Positive for weakness.   Hematological: Negative.    Psychiatric/Behavioral: Negative.    All other systems reviewed and are negative.      /47 (BP Location: Left arm, Patient Position: Lying)   Pulse 52   Temp 97.9 °F (36.6 °C) (Oral)   Resp 16   Ht 170.2 cm (67\")   Wt 71 kg (156 lb 9.6 oz)   SpO2 96%   BMI 24.53 kg/m²    Physical Exam   Constitutional: He is oriented to person, place, and time. He appears well-developed and well-nourished.   Generalized weakness   HENT:   Head: Normocephalic and atraumatic.   Eyes: Pupils are equal, round, and reactive to light. No scleral icterus.   Neck: Normal range of motion. Neck supple. No thyromegaly " present.   Cardiovascular: Normal rate, regular rhythm and normal heart sounds.   Pulses:       Femoral pulses are 0 on the right side.  Right: doppler DP/PT/peroneal  Left: faint DP ankle, weakly palpable left femoral   Pulmonary/Chest: Effort normal and breath sounds normal.   Abdominal: Soft. Bowel sounds are normal.   Musculoskeletal: Normal range of motion.   Neurological: He is alert and oriented to person, place, and time.   Skin: Skin is warm and dry.   Psychiatric: He has a normal mood and affect. His behavior is normal. Judgment and thought content normal.   Nursing note and vitals reviewed.        Ct Abdomen Pelvis With Contrast    Result Date: 6/21/2020  Narrative: EXAMINATION: CT ABDOMEN PELVIS W CONTRAST- 6/21/2020 4:51 PM CDT  HISTORY: GI bleeding  DOSE: 219 mGycm (Automatic exposure control technique was implemented in an effort to keep the radiation dose as low as possible without compromising image quality)  REPORT: Spiral CT of the abdomen and pelvis was performed after administration of intravenous contrast from the lung bases through the pubic symphysis. Reconstructed coronal and sagittal images are also reviewed.  COMPARISON: CT abdomen pelvis with contrast 6/14/2020.  The lung bases are clear. The liver and spleen are homogeneous and appear unremarkable. The gallbladder is partially contracted. The stomach is decompressed. There is mild thickening of the antral wall, which is slightly indistinct and difficult to separate from the anterior surface of the pancreas. The pancreas and adrenal glands are unremarkable. The kidneys enhance normally, no renal mass or hydronephrosis is identified. There is a small cyst in the posterior cortex of the left kidney, measuring 5 mm. There 2 small cysts at the upper pole the right kidney, both measuring less than 1 cm. The ureters are decompressed. The bladder is unremarkable. There is moderate prostate enlargement. Moderate stool volume is present within the  distal colon and rectum. No bowel dilation or obstruction is identified. There is no obvious bowel wall thickening or mass. No free fluid or free air is identified. There is heavy calcified plaque within the aorta and its branches. The aorta has normal caliber. There appears to be high-grade stenosis involving both common iliac arteries. Review of bone windows demonstrates severe osteoarthritis of the right hip, with complete loss of the joint space, large subchondral geodes within the acetabulum and femoral head, with partial collapse of the femoral head.      Impression: 1. Mild thickening of the gastric antral wall which appears somewhat indistinct, with poor definition of fat planes between the stomach and head of the pancreas. This may be related to peptic ulcer disease or less likely neoplasm, no perforation is identified. Consider follow-up with EGD. 2. Moderate constipation, without evidence of bowel obstruction. 3. Severe atherosclerotic disease of the aorta and iliac arteries as well as the renal arteries and proximal SMA. There appears to be severe stenosis of the proximal common iliac arteries, this could be better assessed with CT angiography. 4. Severe osteoarthritis of the right hip as detailed above, with a small right hip effusion. 5. Small bilateral benign appearing nephrogenic cysts.   This report was finalized on 06/21/2020 17:07 by Dr. Napoleon Ortiz MD.    Ct Enterography Abdomen Pelvis W Contrast    Result Date: 6/14/2020  Narrative: CT ABDOMEN PELVIS W CONTRAST ENTEROGRAPHY-  Indication: GI bleed; K92.2-Gastrointestinal hemorrhage, unspecified  Comparison: None  DOSE LENGTH PRODUCT: 269 mGy cm. Automated exposure control was also utilized to decrease patient radiation dose.  Findings:  No suspicious finding in the lung bases.  No focal liver lesion. No gallstones or gallbladder wall thickening. No biliary ductal dilatation. Pancreas, spleen, and adrenal glands appear unremarkable. No solid  renal mass. Tiny LEFT renal cyst. No urolithiasis or hydronephrosis. No ureteral stones. No focal urinary bladder wall thickening. Prostate is enlarged measuring 5.9 cm transverse dimension.  Distal esophagus, stomach, and duodenum appear unremarkable. No evidence of active small bowel inflammation. Liquid contents layering throughout the colon. No abnormal colonic wall thickening or area of chronic inflammatory change. Normal appendix.  No ascites or free pelvic fluid. No pelvic mass. Nonaneurysmal abdominal aorta with heavy atherosclerotic plaque and calcification. Near complete occlusion of the RIGHT external iliac artery and severe stenosis of the LEFT external iliac artery. Celiac trunk appears widely patent. SMA appears widely patent. Heavy atherosclerotic calcification is present at the origin of both renal arteries.  No enlarged retroperitoneal, mesenteric, pelvic, or inguinal lymph nodes. No acute osseous findings. Severe arthritic change in the RIGHT hip with bone-on-bone appearance and extensive subchondral cystic change as well as a RIGHT hip joint effusion. Destructive change/appearance suggests amyloid arthropathy. Mild diffuse body wall soft tissue edema.      Impression: Impression:  1.  No evidence of active bowel inflammation or focal bowel wall thickening. No GI bleed identified. 2.  Fluid-filled colon. This may be related to oral contrast but correlate clinically for diarrheal illness. 3.  Enlarged prostate. 4.  Severe atherosclerotic plaque and calcification throughout the normal caliber abdominal aorta. Near complete occlusion of the RIGHT external iliac artery and high-grade stenosis of the LEFT external iliac artery. Heavy atherosclerotic calcification of both renal artery origins. 5.  Severe arthritic change in the RIGHT hip joint with bone-on-bone appearance and extensive subchondral cystic change/bone resorption. Moderate-sized RIGHT hip joint effusion. Imaging appearance suggestive of  amyloid arthropathy. This report was finalized on 06/14/2020 14:35 by Dr. Juan Ugarte MD.       Laboratory Data:  Results from last 7 days   Lab Units 06/22/20  0733 06/21/20  2351 06/21/20  1459 06/18/20  0539  06/17/20  0521   WBC 10*3/mm3  --   --  12.79* 7.47  --  7.66   HEMOGLOBIN g/dL 9.4* 9.0* 10.1* 9.1*   < > 7.8*   HEMATOCRIT % 28.7* 28.0* 30.6* 27.2*   < > 23.1*   PLATELETS 10*3/mm3  --   --  380 241  --  242    < > = values in this interval not displayed.       Results from last 7 days   Lab Units 06/21/20  1459 06/18/20  0539 06/17/20  0521   SODIUM mmol/L 145 141 142   POTASSIUM mmol/L 4.1 3.8 3.8   CHLORIDE mmol/L 110* 105 107   CO2 mmol/L 22.0 26.0 24.0   BUN mg/dL 24* 9 7*   CREATININE mg/dL 0.73* 0.62* 0.65*   CALCIUM mg/dL 9.4 8.7 8.7   BILIRUBIN mg/dL 0.5  --   --    ALK PHOS U/L 104  --   --    ALT (SGPT) U/L 20  --   --    AST (SGOT) U/L 27  --   --    GLUCOSE mg/dL 84 96 84     Results from last 7 days   Lab Units 06/21/20  1459   INR  1.09   APTT seconds 44.6*         Diagnostic Data:  CT ABDOMEN PELVIS W CONTRAST ENTEROGRAPHY-     Indication: GI bleed; K92.2-Gastrointestinal hemorrhage, unspecified     Comparison: None     DOSE LENGTH PRODUCT: 269 mGy cm. Automated exposure control was also  utilized to decrease patient radiation dose.     Findings:     No suspicious finding in the lung bases.     No focal liver lesion. No gallstones or gallbladder wall thickening. No  biliary ductal dilatation. Pancreas, spleen, and adrenal glands appear  unremarkable. No solid renal mass. Tiny LEFT renal cyst. No urolithiasis  or hydronephrosis. No ureteral stones. No focal urinary bladder wall  thickening. Prostate is enlarged measuring 5.9 cm transverse dimension.     Distal esophagus, stomach, and duodenum appear unremarkable. No evidence  of active small bowel inflammation. Liquid contents layering throughout  the colon. No abnormal colonic wall thickening or area of chronic  inflammatory change.  Normal appendix.     No ascites or free pelvic fluid. No pelvic mass. Nonaneurysmal abdominal  aorta with heavy atherosclerotic plaque and calcification. Near complete  occlusion of the RIGHT external iliac artery and severe stenosis of the  LEFT external iliac artery. Celiac trunk appears widely patent. SMA  appears widely patent. Heavy atherosclerotic calcification is present at  the origin of both renal arteries.     No enlarged retroperitoneal, mesenteric, pelvic, or inguinal lymph  nodes. No acute osseous findings. Severe arthritic change in the RIGHT  hip with bone-on-bone appearance and extensive subchondral cystic change  as well as a RIGHT hip joint effusion. Destructive change/appearance  suggests amyloid arthropathy. Mild diffuse body wall soft tissue edema.     IMPRESSION:  Impression:     1.  No evidence of active bowel inflammation or focal bowel wall  thickening. No GI bleed identified.  2.  Fluid-filled colon. This may be related to oral contrast but  correlate clinically for diarrheal illness.  3.  Enlarged prostate.  4.  Severe atherosclerotic plaque and calcification throughout the  normal caliber abdominal aorta. Near complete occlusion of the RIGHT  external iliac artery and high-grade stenosis of the LEFT external iliac  artery. Heavy atherosclerotic calcification of both renal artery  origins.  5.  Severe arthritic change in the RIGHT hip joint with bone-on-bone  appearance and extensive subchondral cystic change/bone resorption.  Moderate-sized RIGHT hip joint effusion. Imaging appearance suggestive  of amyloid arthropathy.  This report was finalized on 06/14/2020 14:35 by Dr. Juan Ugarte MD.    Impression:    GI bleed    Essential hypertension    PVD (peripheral vascular disease) (CMS/HCC)    Atrial fibrillation and flutter (CMS/HCC)    Alcoholism /alcohol abuse (CMS/HCC)    Current use of long term anticoagulation    Rectal bleeding    Acute blood loss anemia    Gastrointestinal  hemorrhage      Plan: After thoroughly evaluating Kingsley Lerma, I believe the best course of action is to remain conservative from vascular surgery standpoint at this time.  He does have significant peripheral vascular disease.  This is a chronic problem that has been present for a long time according to the patient.  [facility] and did not particularly care for him.  He would like to discuss intervention to his lower extremities but we can do this on an outpatient basis.  He can follow-up in a few weeks in our office.  This was all discussed in full with complete understanding.  Thank you for allowing me to participate in the care of your patient.  Please do not hesitate to call with any questions or concerns.    Luis Enrique Donaldson, DO   Vascular Surgery  [phone]

## 2020-06-15 NOTE — PAYOR COMM NOTE
"6/15/20 [facility] [phone]  [phone]    PATIENT ADMITTED ON 6/13/20. INPATIENT ADMISSION.     PATIENT HAS MEDICARE A+B PRIMARY.    [facility]   FAXING CLINICAL.      Kingsley Servin (74 y.o. Male)     Date of Birth Social Security Number Address Home Phone MRN    1945  [id number] [facility] [address] [facility] [phone] [id number]    Mandaen Marital Status          [place]        Admission Date Admission Type Admitting Provider Attending Provider Department, Room/Bed    6/13/20 Emergency Marco A Katz MD Moore, Jonathan Scott, MD [facility] 3C, 360/1    Discharge Date Discharge Disposition Discharge Destination                       Attending Provider:  Marco A Katz MD    Allergies:  No Known Allergies    Isolation:  None   Infection:  None   Code Status:  CPR    Ht:  170.2 cm (67\")   Wt:  71 kg (156 lb 9.6 oz)    Admission Cmt:  None   Principal Problem:  GI bleed [K92.2]                 Active Insurance as of 6/13/2020     Primary Coverage     Payor Plan Insurance Group Employer/Plan Group    MEDICARE MEDICARE A & B      Payor Plan Address Payor Plan Phone Number Payor Plan Fax Number Effective Dates    [address] [phone]  9/1/2012 - None Entered    [facility] [address]       Subscriber Name Subscriber Birth Date Member ID       KINGSLEY SERVIN 1945 [id number]           Secondary Coverage     Payor Plan Insurance Group Employer/Plan Group    [facility] [employer]      Payor Plan Address Payor Plan Phone Number Payor Plan Fax Number Effective Dates    [address] [phone]  2/10/2020 - None Entered    [facility] [address]       Subscriber Name Subscriber Birth Date Member ID       KINGSLEY SERVIN 1945 [id number]           Tertiary Coverage     Payor Plan Insurance Group Employer/Plan Group    [employer] [id number]      Payor Plan Address Payor Plan Phone Number Payor Plan Fax Number Effective " Dates    [facility] OFFICE OF COMMUNITY CARE [phone]  2/10/2020 - None Entered    [address] [address]       [facility] [address]       Subscriber Name Subscriber Birth Date Member ID       JOSE LUIS SERVIN 1945 [id number]           Other Coverage     Payor Plan Insurance Group Employer/Plan Group    [facility] [employer] OPTUM      Payor Plan Address Payor Plan Phone Number Payor Plan Fax Number Effective Dates    [address] [phone]  3/6/2020 - None Entered    [facility] [id number]       Subscriber Name Subscriber Birth Date Member ID       JOSE LUIS SERVIN 1945 [id number]                 Emergency Contacts      (Rel.) Home Phone Work Phone Mobile Phone    LUIS DANIEL SERVIN (Son) -- -- [phone]    boone tee (Daughter) -- -- [phone]    MARIANA TANG (Daughter) -- -- [phone]    MariiAnaya (Daughter) -- -- --    marii Stacy (Daughter) -- -- --        Aman Gonzalez MD   Physician   Medicine   H&P   Signed   Date of Service:  06/13/20 2053   Creation Time:  06/13/20 2053            Signed        Expand All Collapse All      Show:Clear all  [x]Manual[x]Template[]Copied    Added by:  [x]Aman Gonzalez MD    [][facility] for details       [facility] Medicine Services  HISTORY AND PHYSICAL     Date of Admission: 6/13/2020  Primary Care Physician: BARRY Urrutia MD     Subjective      Chief Complaint: Rectal bleed     History of Present Illness  74 year old male with PMH of Afib, PVD, HTN, Alcohol dependence that presents with complaints of passing large amounts of blood per rectum several times since the morning. This lead to dizziness and weakness and he decided to return for evaluation.     He had recently hospitalized for GIB and underwent EGD and colonoscopy. Was discharged home and restarted on Xarelto. Hemoglobin on discharge was 11.6 g/dl on 6/8/2020 and tonight is 6.2 g/dl.     Review of Systems   Otherwise complete ROS reviewed and  "negative except as mentioned in the HPI.     Past Medical History:   Medical History        Past Medical History:   Diagnosis Date   •                      Medical History        Past Medical History:   Diagnosis Date   • Alcohol abuse     • Arthritis     • Atrial flutter (CMS/HCC)     • Circulation problem     • History of tobacco abuse     • Hypertension     • PVD (peripheral vascular disease) (CMS/HCC)           Vital Signs: /61   Pulse 85   Temp 98.4 °F (36.9 °C) (Oral)   Resp 16   Ht 170.2 cm (67\")   Wt 74.4 kg (164 lb)   SpO2 100%   BMI 25.69 kg/m²   Physical Exam   Constitutional: He is oriented to person, place, and time. He appears well-developed and well-nourished.   HENT:   Head: Normocephalic and atraumatic.   Right Ear: External ear normal.   Left Ear: External ear normal.   Eyes: Pupils are equal, round, and reactive to light. EOM are normal. Right eye exhibits no discharge. Left eye exhibits no discharge. No scleral icterus.   Neck: Normal range of motion. Neck supple. No JVD present. No thyromegaly present.   Cardiovascular: Normal rate, regular rhythm and normal heart sounds.   No murmur heard.  Pulmonary/Chest: Effort normal and breath sounds normal. No stridor. No respiratory distress. He has no wheezes. He has no rales.   Abdominal: Soft. Bowel sounds are normal. He exhibits no distension and no mass. There is no tenderness. There is no guarding.   Musculoskeletal: Normal range of motion. He exhibits no edema or deformity.   Neurological: He is alert and oriented to person, place, and time. He displays normal reflexes. No cranial nerve deficit. He exhibits normal muscle tone. Coordination normal.   Skin: Skin is dry. No rash noted. He is not diaphoretic. No erythema. There is pallor.   Psychiatric: He has a normal mood and affect.      Results Reviewed:                Comprehensive Metabolic Panel [[id number]]  (Abnormal) Collected:  06/13/20 9578      Specimen:  Blood Updated:  " 06/13/20 1902       Glucose 101 mg/dL         BUN 28 mg/dL         Creatinine 0.87 mg/dL         Sodium 145 mmol/L         Potassium 4.0 mmol/L         Chloride 112 mmol/L         CO2 21.0 mmol/L         Calcium 8.5 mg/dL         Total Protein 5.4 g/dL         Albumin 3.20 g/dL         ALT (SGPT) 16 U/L         AST (SGOT) 17 U/L         Alkaline Phosphatase 76 U/L         Total Bilirubin 0.2 mg/dL         eGFR Non African Amer 86 mL/min/1.73         Globulin 2.2 gm/dL         A/G Ratio 1.5 g/dL         BUN/Creatinine Ratio 32.2       Anion Gap 12.0 mmol/L       Narrative:        GFR Normal      results for these tests on the individual orders.      CBC Auto Differential [[id number]]  (Abnormal) Collected:  06/13/20 1829     Specimen:  Blood Updated:  06/13/20 1855       WBC 6.85 10*3/mm3         RBC 1.88 10*6/mm3         Hemoglobin 6.2 g/dL         Hematocrit 18.4 %         MCV 97.9 fL         MCH 33.0 pg         MCHC 33.7 g/dL         RDW 14.7 %         RDW-SD 51.3 fl         MPV 8.9 fL         Platelets 208 10*3/mm3         Neutrophil % 69.7 %         Lymphocyte % 18.1 %         Monocyte % 7.7 %         Eosinophil % 3.4 %         Basophil % 0.7 %         Immature Grans % 0.4 %         Neutrophils, Absolute 4.77 10*3/mm3         Lymphocytes, Absolute 1.24 10*3/mm3         Monocytes, Absolute 0.53 10*3/mm3         Eosinophils, Absolute 0.23 10*3/mm3         Basophils, Absolute 0.05 10*3/mm3         Immature Grans, Absolute 0.03 10*3/mm3         nRBC 0.0 /100 WBC       aPTT [[id number]]  (Abnormal) Collected:  06/13/20 1829     Specimen:  Blood Updated:  06/13/20 1853       PTT 67.7 seconds       Protime-INR [[id number]]  (Abnormal) Collected:  06/13/20 1829     Specimen:  Blood Updated:  06/13/20 1853       Protime 20.6 Seconds         INR 1.80              Imaging Results (Last 24 Hours)      ** No results found for the last 24 hours. **          I have personally reviewed and interpreted the radiology studies and ECG  obtained at time of admission.      Assessment / Plan      Assessment:        Active Hospital Problems     Diagnosis   • **GI bleed   • Acute blood loss anemia   • Rectal bleeding       Added automatically from request for surgery [id number]       Added automatically from request for surgery [id number]       • Current use of long term anticoagulation   • Alcoholism /alcohol abuse (CMS/[facility])   • Atrial fibrillation and flutter (CMS/[facility])   • Essential hypertension   • PVD (peripheral vascular disease) (CMS/[facility])      Plan:   Admit to medical floor with remote telemetry.   Vitals every 4 hours. Up with assistance only.   NPO except ice chips or medications.     Transfuse 2 units of PRBC.   IVF NS 75 cc/hour      Stop Xarelto for now. No AC in Afib due to active bleeding and anemia.  GI consult in AM ? Repeat colonoscopy, RBC scan or other     Home medications reconciled. Replacing Cardizem  mg for Cardizem 60 mg q6h oral in order to prevent supraventricular tachycardia and control blood pressure.     No pain reported.  DVT prophylaxis > SCD     • PVD (peripheral vascular disease) (CMS/[facility])      Plan:   Admit to medical floor with remote telemetry.   Vitals every 4 hours. Up with assistance only.   NPO except ice chips or medications.     Transfuse 2 units of PRBC.   IVF NS 75 cc/hour      Stop Xarelto for now. No AC in Afib due to active bleeding and anemia.  GI consult in AM ? Repeat colonoscopy, RBC scan or other     Home medications reconciled. Replacing Cardizem  mg for Cardizem 60 mg q6h oral in order to prevent supraventricular tachycardia and control blood pressure.     No pain reported.  DVT prophylaxis > SCD        Code Status: Full     I discussed the patient's findings and my recommendations with the patient     Estimated length of stay over 2 midnights. I do not recommend early discharge for this patient due to risk of recurrent bleeding and anemia.     Patient seen and examined by me on see  below.     Aman Gonzalez MD   06/13/20   20:53        Jevon Peralta MD   Physician   Medicine   Progress Notes   Signed   Date of Service:  06/14/20 1333   Creation Time:  06/14/20 1333            Signed             Show:Clear all  [x]Manual[x]Template[]Copied    Added by:  [x]Jevon Peralta MD    [][facility] for details       [facility] Medicine Services  INPATIENT PROGRESS NOTE     Length of Stay: 1  Date of Admission: 6/13/2020  Primary Care Physician: BARRY Urrutia MD     Subjective   Chief Complaint: GI bleed.  Atrial fib.     HPI   Patient had a bowel movement earlier today with shown black tarry/some bright red blood per patient.  Patient denies any chest pain or shortness of breath.  Patient denies any abdomen pain.     Review of Systems   Constitutional: Positive for activity change, appetite change and fatigue. Negative for chills and fever.   HENT: Negative for hearing loss, nosebleeds, tinnitus and trouble swallowing.    Eyes: Negative for visual disturbance.   Respiratory: Negative for cough, chest tightness, shortness of breath and wheezing.    Cardiovascular: Negative for chest pain, palpitations and leg swelling.   Gastrointestinal: Positive for anal bleeding and nausea. Negative for abdominal distention, abdominal pain, blood in stool, constipation, diarrhea and vomiting.   Endocrine: Negative for cold intolerance, heat intolerance, polydipsia, polyphagia and polyuria.               polyuria.   Genitourinary: Negative for decreased urine volume, difficulty urinating, dysuria, flank pain, frequency and hematuria.   Musculoskeletal: Positive for arthralgias, gait problem and myalgias. Negative for joint swelling.   Skin: Negative for rash.   Allergic/Immunologic: Negative for immunocompromised state.   Neurological: Positive for weakness. Negative for dizziness, syncope, light-headedness and headaches.   Hematological: Negative for adenopathy. Does not  bruise/bleed easily.   Psychiatric/Behavioral: Negative for confusion and sleep disturbance. The patient is not nervous/anxious.             All pertinent negatives and positives are as above. All other systems have been reviewed and are negative unless otherwise stated.     Temp:  [97.7 °F (36.5 °C)-98.4 °F (36.9 °C)] 98.3 °F (36.8 °C)  Heart Rate:  [65-92] 65  Resp:  [16-20] 18  BP: (110-170)/(46-97) 110/50     Intake/Output Summary (Last 24 hours) at 6/14/2020 1333  Last data filed at 6/14/2020 0950  Gross per 24 hour   Intake 1100 ml   Output 750 ml   Net 350 ml      Physical Exam   Constitutional: He is oriented to person, place, and time. He appears well-developed.   HENT:   Head: Normocephalic.   Eyes: Pupils are equal, round, and reactive to light. Conjunctivae are normal.   Neck: Neck supple. No JVD present.   Cardiovascular: Normal rate, regular rhythm, normal heart sounds and intact distal pulses. Exam reveals no gallop and no friction rub.   No murmur heard.  Currently sinus rhythm rate is 71.   Pulmonary/Chest: No respiratory distress. He has no wheezes. He has no rales. He exhibits no tenderness.   Slight wheezing bilateral, good air movement.   Abdominal: Soft. Bowel sounds are normal. He exhibits no distension. There is no tenderness. There is no rebound and no guarding.   Musculoskeletal: He exhibits no edema, tenderness or deformity.   Neurological: He is alert and oriented to person, place, and time. He displays normal reflexes. No cranial nerve deficit. He exhibits abnormal muscle tone. Coordination abnormal.   Skin: Skin is warm and dry. No rash noted.   Psychiatric: He has a normal mood and affect. His behavior is normal. Judgment and thought content normal.   Nursing note and vitals reviewed.    GI bleed    Essential hypertension    PVD (peripheral vascular disease) (CMS/[facility])    Atrial fibrillation and flutter (CMS/[facility])    Alcoholism /alcohol abuse (CMS/[facility])    Current use of long term  anticoagulation    Rectal bleeding    Acute blood loss anemia        Plan:  GI bleed.  Hemoglobin 6.2.  Previous hemoglobin upon discharge 2020 was 11.6.  GI consult.  Serial hemoglobin.  CT enterography abdomen and pelvic with contrast.     Anemia due to blood loss.  IV fluids.  Status post 2 units of blood transfusion.     Atrial fibrillation/hypertension.  Patient is currently in sinus rhythm with a rate of 71.  Hold Xarelto.  Continue Cardizem.     Alcohol abuse/alcoholism.  Patient quit drinking 6 months ago.  CIWA was 0.     Peripheral vascular disease.     Reflux/nausea.  Protonix.  Zofran PRN.     Chronic pain.  [place] PRN.     Smoking.  Patient been cutting back.  Only a couple cigarettes a day per patient.  Patient refused nicotine patch.  Patient will continue to cut back and stop.     SCD.     N.p.o.  Clear liquid diet.     COVID-19 not detected.     Discharge Plannin-4 days.     Jevon Peralta MD   20   13:33         Current Facility-Administered Medications   Medication Dose Route Frequency Provider Last Rate Last Dose   • acetaminophen (TYLENOL) tablet 650 mg  650 mg Oral Q4H PRN Aman Gonzalez MD   650 mg at 20 0013   • dilTIAZem (CARDIZEM) tablet 60 mg  60 mg Oral Q6H Aman Gonzalez MD   60 mg at 06/15/20 0523   • HYDROcodone-acetaminophen (NORCO) 5-325 MG per tablet 1 tablet  1 tablet Oral Q6H PRN Aman Gonzalez MD   1 tablet at 06/15/20 0751   • ondansetron (ZOFRAN) injection 4 mg  4 mg Intravenous Q6H PRN Aman Gonzalez MD       • pantoprazole (PROTONIX) injection 40 mg  40 mg Intravenous Q12H Jevon Peralta MD   40 mg at 06/15/20 0902   • sodium chloride 0.9 % flush 10 mL  10 mL Intravenous PRN Brandee Valdes APRN       • sodium chloride 0.9 % flush 10 mL  10 mL Intravenous Q12H Aman Gonzalez MD   10 mL at 06/15/20 0902   • sodium chloride 0.9 % flush 10 mL  10 mL Intravenous PRN Aman Gonzalez MD       •  sodium chloride 0.9 % infusion  75 mL/hr Intravenous Continuous Aman Gonzalez MD   Stopped at 06/14/20 4439

## 2020-06-15 NOTE — PROGRESS NOTES
[facility] Medicine Services  INPATIENT PROGRESS NOTE    Patient Name: Kingsley Lerma  Date of Admission: 6/13/2020  Today's Date: 06/15/20  Length of Stay: 2  Primary Care Physician: BARRY Urrutia MD    Subjective   Chief Complaint: bloody stools  HPI   Doing ok.  Feels better.  No black or bloody stools today  Doesn't feel light-headed or dizzy  No abdominal pain        Review of Systems   Constitutional: Negative for fatigue and fever.   HENT: Negative for congestion and ear pain.    Eyes: Negative for redness and visual disturbance.   Respiratory: Negative for cough, shortness of breath and wheezing.    Cardiovascular: Negative for chest pain and palpitations.   Gastrointestinal: Positive for blood in stool. Negative for abdominal pain, diarrhea, nausea and vomiting.   Endocrine: Negative for cold intolerance and heat intolerance.   Genitourinary: Negative for dysuria and frequency.   Musculoskeletal: Negative for arthralgias and back pain.   Skin: Negative for rash and wound.   Neurological: Negative for dizziness and headaches.   Psychiatric/Behavioral: Negative for confusion. The patient is not nervous/anxious.         All pertinent negatives and positives are as above. All other systems have been reviewed and are negative unless otherwise stated.     Objective    Temp:  [97.3 °F (36.3 °C)-98.3 °F (36.8 °C)] 97.7 °F (36.5 °C)  Heart Rate:  [59-72] 59  Resp:  [12-20] 16  BP: (110-143)/(47-77) 131/56  Physical Exam   Constitutional: He is oriented to person, place, and time. He appears well-developed and well-nourished.   HENT:   Head: Normocephalic and atraumatic.   Right Ear: External ear normal.   Left Ear: External ear normal.   Nose: Nose normal.   Mouth/Throat: Oropharynx is clear and moist.   Eyes: Pupils are equal, round, and reactive to light. Conjunctivae and EOM are normal. Right eye exhibits no discharge. Left eye exhibits no discharge. No scleral icterus.    Neck: Normal range of motion. Neck supple. No tracheal deviation present. No thyromegaly present.   Cardiovascular: Normal rate, regular rhythm, normal heart sounds and intact distal pulses. Exam reveals no gallop and no friction rub.   No murmur heard.  Pulmonary/Chest: Effort normal and breath sounds normal. No stridor. No respiratory distress. He has no wheezes. He has no rales. He exhibits no tenderness.   Abdominal: Soft. Bowel sounds are normal. He exhibits no distension and no mass. There is no tenderness. There is no rebound and no guarding. No hernia.   Musculoskeletal: Normal range of motion. He exhibits no edema or deformity.   Lymphadenopathy:     He has no cervical adenopathy.   Neurological: He is alert and oriented to person, place, and time. He has normal reflexes. He displays normal reflexes. No cranial nerve deficit. He exhibits normal muscle tone. Coordination normal.   Skin: Skin is warm and dry. No rash noted. No erythema. No pallor.   Psychiatric: He has a normal mood and affect. His behavior is normal. Judgment and thought content normal.   Vitals reviewed.          Results Review:  I have reviewed the labs, radiology results, and diagnostic studies.    Laboratory Data:   Results from last 7 days   Lab Units 06/15/20  0614 06/14/20  2330 06/14/20  1531  06/14/20 0229 06/13/20  1829   WBC 10*3/mm3 7.87  --   --   --  6.94 6.85   HEMOGLOBIN g/dL 7.6* 7.0* 8.0*   < > 6.3* 6.2*   HEMATOCRIT % 22.9* 20.4* 22.9*   < > 18.8* 18.4*   PLATELETS 10*3/mm3 198  --   --   --  184 208    < > = values in this interval not displayed.        Results from last 7 days   Lab Units 06/15/20  0614 06/14/20 0229 06/13/20  1829   SODIUM mmol/L 143 144 145   POTASSIUM mmol/L 3.9 4.1 4.0   CHLORIDE mmol/L 109* 113* 112*   CO2 mmol/L 24.0 23.0 21.0*   BUN mg/dL 8 25* 28*   CREATININE mg/dL 0.60* 0.82 0.87   CALCIUM mg/dL 8.6 8.2* 8.5*   BILIRUBIN mg/dL  --   --  0.2   ALK PHOS U/L  --   --  76   ALT (SGPT) U/L  --    --  16   AST (SGOT) U/L  --   --  17   GLUCOSE mg/dL 80 88 101*       Culture Data:   No results found for: BLOODCX, URINECX, WOUNDCX, MRSACX, RESPCX, STOOLCX    Radiology Data:   Imaging Results (Last 24 Hours)     Procedure Component Value Units Date/Time    CT Enterography Abdomen Pelvis w Contrast [[id number]] Collected:  06/14/20 1426     Updated:  06/14/20 1439    Narrative:       CT ABDOMEN PELVIS W CONTRAST ENTEROGRAPHY-     Indication: GI bleed; K92.2-Gastrointestinal hemorrhage, unspecified     Comparison: None     DOSE LENGTH PRODUCT: 269 mGy cm. Automated exposure control was also  utilized to decrease patient radiation dose.     Findings:     No suspicious finding in the lung bases.     No focal liver lesion. No gallstones or gallbladder wall thickening. No  biliary ductal dilatation. Pancreas, spleen, and adrenal glands appear  unremarkable. No solid renal mass. Tiny LEFT renal cyst. No urolithiasis  or hydronephrosis. No ureteral stones. No focal urinary bladder wall  thickening. Prostate is enlarged measuring 5.9 cm transverse dimension.     Distal esophagus, stomach, and duodenum appear unremarkable. No evidence  of active small bowel inflammation. Liquid contents layering throughout  the colon. No abnormal colonic wall thickening or area of chronic  inflammatory change. Normal appendix.     No ascites or free pelvic fluid. No pelvic mass. Nonaneurysmal abdominal  aorta with heavy atherosclerotic plaque and calcification. Near complete  occlusion of the RIGHT external iliac artery and severe stenosis of the  LEFT external iliac artery. Celiac trunk appears widely patent. SMA  appears widely patent. Heavy atherosclerotic calcification is present at  the origin of both renal arteries.     No enlarged retroperitoneal, mesenteric, pelvic, or inguinal lymph  nodes. No acute osseous findings. Severe arthritic change in the RIGHT  hip with bone-on-bone appearance and extensive subchondral cystic change  as  well as a RIGHT hip joint effusion. Destructive change/appearance  suggests amyloid arthropathy. Mild diffuse body wall soft tissue edema.       Impression:       Impression:     1.  No evidence of active bowel inflammation or focal bowel wall  thickening. No GI bleed identified.  2.  Fluid-filled colon. This may be related to oral contrast but  correlate clinically for diarrheal illness.  3.  Enlarged prostate.  4.  Severe atherosclerotic plaque and calcification throughout the  normal caliber abdominal aorta. Near complete occlusion of the RIGHT  external iliac artery and high-grade stenosis of the LEFT external iliac  artery. Heavy atherosclerotic calcification of both renal artery  origins.  5.  Severe arthritic change in the RIGHT hip joint with bone-on-bone  appearance and extensive subchondral cystic change/bone resorption.  Moderate-sized RIGHT hip joint effusion. Imaging appearance suggestive  of amyloid arthropathy.  This report was finalized on 06/14/2020 14:35 by Dr. Juan Ugarte MD.          I have reviewed the patient's current medications.     Assessment/Plan     Active Hospital Problems    Diagnosis   • **GI bleed   • Acute blood loss anemia   • Gastrointestinal hemorrhage     Added automatically from request for surgery [id number]     • Rectal bleeding     Added automatically from request for surgery [id number]     • Current use of long term anticoagulation   • Alcoholism /alcohol abuse (CMS/HCC)   • Atrial fibrillation and flutter (CMS/HCC)   • Essential hypertension   • PVD (peripheral vascular disease) (CMS/HCC)       1.  GI bleed  -Protonix drip  -Trend H&H  -GI following, EGD today  -Transfuse as indicated     2.  Afib  -Cardizem  -Amiodarone  -Hold AC given bleeding     3.  HTN  -Lisinopril        4.  Bladder wall thickening/questionable bladder mass  -Urology planning outpt workup     5.  PVD  -Pletal held             Discharge Planning: I expect the patient to be discharged to home in ?  days    Marco A Katz MD   06/15/20   12:46

## 2020-06-15 NOTE — PLAN OF CARE
"  Problem: Patient Care Overview  Goal: Plan of Care Review  Outcome: Ongoing (interventions implemented as appropriate)  Flowsheets (Taken 6/15/2020 1711)  Progress: improving  Note:   No PO intake documented at this time. Per chart review, 21# loss over the last 3 months. Pt states he recently \"quit drinking\" and thinks this attributed to his wt loss. He eats 3 meals daily at home and reports a good appetite. Advised him that if his wt does not taper he may need to consider eating more nutrient dense foods or starting on Boost/Ensure to prevent further wt loss. Encouraged PO intake, will continue to follow.      "

## 2020-06-15 NOTE — ANESTHESIA PREPROCEDURE EVALUATION
Anesthesia Evaluation     Patient summary reviewed and Nursing notes reviewed   no history of anesthetic complications:  NPO Solid Status: > 8 hours  NPO Liquid Status: > 8 hours           Airway   Mallampati: II  TM distance: >3 FB  Neck ROM: full  Dental    (+) poor dentition    Pulmonary    (+) a smoker Current,   (-) COPD, asthma, sleep apnea  Cardiovascular   Exercise tolerance: poor (<4 METS)    PT is on anticoagulation therapy    (+) hypertension, dysrhythmias Atrial Fib, PVD,   (-) pacemaker, past MI, angina, cardiac stents      Neuro/Psych  (-) seizures, TIA, CVA  GI/Hepatic/Renal/Endo    (+)  GI bleeding ,   (-) GERD, liver disease, no renal disease, diabetes    ROS Comment: Admitted with dizziness, anemia, passing blood rectally    Musculoskeletal     Abdominal    Substance History   (+) alcohol use,      OB/GYN          Other   arthritis,                        Anesthesia Plan    ASA 3 - emergent     MAC       Anesthetic plan, all risks, benefits, and alternatives have been provided, discussed and informed consent has been obtained with: patient.

## 2020-06-15 NOTE — PLAN OF CARE
Problem: Patient Care Overview  Goal: Plan of Care Review  Outcome: Ongoing (interventions implemented as appropriate)  Flowsheets  Taken 6/15/2020 1518 by Lissa Gilman, RN  Progress: improving  Outcome Summary: Pt had an endo which was WNL and no biopsy taken. Started on regular diet but pt took only few bites. States he is afraid he will start bleeding again. No stools this shift. Vascular consult ordered.  Taken 6/15/2020 1236 by Minerva Brandt, RN  Plan of Care Reviewed With: patient

## 2020-06-15 NOTE — ANESTHESIA POSTPROCEDURE EVALUATION
"Patient: Kingsley Lerma    Procedure Summary     Date:  06/15/20 Room / Location:  [facility] ENDOSCOPY 4 / [facility] ENDOSCOPY    Anesthesia Start:  1139 Anesthesia Stop:  1151    Procedure:  ESOPHAGOGASTRODUODENOSCOPY WITH ANESTHESIA (N/A ) Diagnosis:       Gastrointestinal hemorrhage, unspecified gastrointestinal hemorrhage type      (Gastrointestinal hemorrhage, unspecified gastrointestinal hemorrhage type [K92.2])    Surgeon:  Carmine Banuelos MD Provider:  Jen Low CRNA    Anesthesia Type:  MAC ASA Status:  3 - Emergent          Anesthesia Type: MAC    Vitals  Vitals Value Taken Time   /44 6/15/2020 11:55 AM   Temp     Pulse 57 6/15/2020 11:57 AM   Resp 12 6/15/2020 11:51 AM   SpO2 99 % 6/15/2020 11:57 AM   Vitals shown include unvalidated device data.        Post Anesthesia Care and Evaluation    Patient location during evaluation: PACU  Patient participation: complete - patient participated  Level of consciousness: awake and alert  Pain management: adequate  Airway patency: patent  Anesthetic complications: No anesthetic complications    Cardiovascular status: acceptable  Respiratory status: acceptable  Hydration status: acceptable    Comments: Blood pressure 110/47, pulse 59, temperature 97.7 °F (36.5 °C), temperature source Oral, resp. rate 12, height 170.2 cm (67\"), weight 71 kg (156 lb 9.6 oz), SpO2 99 %.    Pt discharged from PACU based on drea score >8      "

## 2020-06-15 NOTE — ANESTHESIA PROCEDURE NOTES
Peripheral IV    Patient location during procedure: OR  Line placed for Fluids/Medication Admin.  Performed By   CRNA: Jen Low CRNA  Preanesthetic Checklist  Completed: patient identified, site marked, surgical consent, pre-op evaluation, timeout performed, IV checked, risks and benefits discussed and monitors and equipment checked  Peripheral IV Prep   Prep: ChloraPrep  Peripheral IV Procedure   Laterality:right  Location:  Hand  Catheter size: 20 G         Post Assessment   Dressing Type: transparent and tape.    IV Dressing/Site: clean, dry and intact

## 2020-06-15 NOTE — PLAN OF CARE
Problem: Patient Care Overview  Goal: Plan of Care Review  Outcome: Ongoing (interventions implemented as appropriate)  Note:   Pt had an uneventful evening. He was restless and appeared to sleep off and on throughout the night. He received 1 unit of FFP. He also had a critical hematocrit of 20.4. Pt did not have any more bowel movements this shift. He has been NPO since midnight.     CARDIAC RHYTHM: sinus/sinus susie 54-79bpm    Goal: Individualization and Mutuality  Outcome: Ongoing (interventions implemented as appropriate)  Goal: Discharge Needs Assessment  Outcome: Ongoing (interventions implemented as appropriate)  Goal: Interprofessional Rounds/Family Conf  Outcome: Ongoing (interventions implemented as appropriate)     Problem: Fall Risk (Adult)  Goal: Identify Related Risk Factors and Signs and Symptoms  Description  Related risk factors and signs and symptoms are identified upon initiation of Human Response Clinical Practice Guideline (CPG).  Outcome: Ongoing (interventions implemented as appropriate)  Goal: Absence of Fall  Description  Patient will demonstrate the desired outcomes by discharge/transition of care.  Outcome: Ongoing (interventions implemented as appropriate)     Problem: Gastrointestinal Bleeding (Adult)  Goal: Signs and Symptoms of Listed Potential Problems Will be Absent, Minimized or Managed (Gastrointestinal Bleeding)  Description  Signs and symptoms of listed potential problems will be absent, minimized or managed by discharge/transition of care (reference Gastrointestinal Bleeding (Adult) CPG).  Outcome: Ongoing (interventions implemented as appropriate)     Problem: Pain, Acute (Adult)  Goal: Identify Related Risk Factors and Signs and Symptoms  Description  Related risk factors and signs and symptoms are identified upon initiation of Human Response Clinical Practice Guideline (CPG).  Outcome: Ongoing (interventions implemented as appropriate)  Goal: Acceptable Pain  Control/Comfort Level  Description  Patient will demonstrate the desired outcomes by discharge/transition of care.  Outcome: Ongoing (interventions implemented as appropriate)     Problem: Anemia (Adult)  Goal: Identify Related Risk Factors and Signs and Symptoms  Description  Related risk factors and signs and symptoms are identified upon initiation of Human Response Clinical Practice Guideline (CPG).  Outcome: Ongoing (interventions implemented as appropriate)  Goal: Symptom Improvement  Description  Patient will demonstrate the desired outcomes by discharge/transition of care.  Outcome: Ongoing (interventions implemented as appropriate)     Problem: Nutrition, Imbalanced: Inadequate Oral Intake (Adult)  Goal: Identify Related Risk Factors and Signs and Symptoms  Description  Related risk factors and signs and symptoms are identified upon initiation of Human Response Clinical Practice Guideline (CPG).  Outcome: Ongoing (interventions implemented as appropriate)  Goal: Improved Oral Intake  Description  Patient will demonstrate the desired outcomes by discharge/transition of care.  Outcome: Ongoing (interventions implemented as appropriate)  Goal: Prevent Further Weight Loss  Description  Patient will demonstrate the desired outcomes by discharge/transition of care.  Outcome: Ongoing (interventions implemented as appropriate)     Problem: Alcohol Withdrawal Acute, Risk/Actual (Adult)  Goal: Signs and Symptoms of Listed Potential Problems Will be Absent, Minimized or Managed (Alcohol Withdrawal Acute, Risk/Actual)  Description  Signs and symptoms of listed potential problems will be absent, minimized or managed by discharge/transition of care (reference Alcohol Withdrawal Acute, Risk/Actual (Adult) CPG).  Outcome: Ongoing (interventions implemented as appropriate)

## 2020-06-16 LAB
ANION GAP SERPL CALCULATED.3IONS-SCNC: 10 MMOL/L (ref 5–15)
BASOPHILS # BLD AUTO: 0.03 10*3/MM3 (ref 0–0.2)
BASOPHILS NFR BLD AUTO: 0.5 % (ref 0–1.5)
BH BB BLOOD EXPIRATION DATE: NORMAL
BH BB BLOOD TYPE BARCODE: 6200
BH BB DISPENSE STATUS: NORMAL
BH BB PRODUCT CODE: NORMAL
BH BB UNIT NUMBER: NORMAL
BUN BLD-MCNC: 8 MG/DL (ref 8–23)
BUN/CREAT SERPL: 11.8 (ref 7–25)
CALCIUM SPEC-SCNC: 8.3 MG/DL (ref 8.6–10.5)
CHLORIDE SERPL-SCNC: 108 MMOL/L (ref 98–107)
CO2 SERPL-SCNC: 24 MMOL/L (ref 22–29)
CREAT BLD-MCNC: 0.68 MG/DL (ref 0.76–1.27)
DEPRECATED RDW RBC AUTO: 57.1 FL (ref 37–54)
EOSINOPHIL # BLD AUTO: 0.22 10*3/MM3 (ref 0–0.4)
EOSINOPHIL NFR BLD AUTO: 3.5 % (ref 0.3–6.2)
ERYTHROCYTE [DISTWIDTH] IN BLOOD BY AUTOMATED COUNT: 17.3 % (ref 12.3–15.4)
GFR SERPL CREATININE-BSD FRML MDRD: 115 ML/MIN/1.73
GLUCOSE BLD-MCNC: 91 MG/DL (ref 65–99)
HCT VFR BLD AUTO: 21.4 % (ref 37.5–51)
HCT VFR BLD AUTO: 23.9 % (ref 37.5–51)
HCT VFR BLD AUTO: 27.4 % (ref 37.5–51)
HGB BLD-MCNC: 7.5 G/DL (ref 13–17.7)
HGB BLD-MCNC: 8 G/DL (ref 13–17.7)
HGB BLD-MCNC: 9.1 G/DL (ref 13–17.7)
IMM GRANULOCYTES # BLD AUTO: 0.03 10*3/MM3 (ref 0–0.05)
IMM GRANULOCYTES NFR BLD AUTO: 0.5 % (ref 0–0.5)
LYMPHOCYTES # BLD AUTO: 1.14 10*3/MM3 (ref 0.7–3.1)
LYMPHOCYTES NFR BLD AUTO: 18.1 % (ref 19.6–45.3)
MCH RBC QN AUTO: 33 PG (ref 26.6–33)
MCHC RBC AUTO-ENTMCNC: 35 G/DL (ref 31.5–35.7)
MCV RBC AUTO: 94.3 FL (ref 79–97)
MONOCYTES # BLD AUTO: 0.52 10*3/MM3 (ref 0.1–0.9)
MONOCYTES NFR BLD AUTO: 8.2 % (ref 5–12)
NEUTROPHILS # BLD AUTO: 4.37 10*3/MM3 (ref 1.7–7)
NEUTROPHILS NFR BLD AUTO: 69.2 % (ref 42.7–76)
NRBC BLD AUTO-RTO: 0 /100 WBC (ref 0–0.2)
PLATELET # BLD AUTO: 187 10*3/MM3 (ref 140–450)
PMV BLD AUTO: 8.9 FL (ref 6–12)
POTASSIUM BLD-SCNC: 3.6 MMOL/L (ref 3.5–5.2)
RBC # BLD AUTO: 2.27 10*6/MM3 (ref 4.14–5.8)
SODIUM BLD-SCNC: 142 MMOL/L (ref 136–145)
UNIT  ABO: NORMAL
UNIT  RH: NORMAL
WBC NRBC COR # BLD: 6.31 10*3/MM3 (ref 3.4–10.8)

## 2020-06-16 PROCEDURE — 85018 HEMOGLOBIN: CPT | Performed by: INTERNAL MEDICINE

## 2020-06-16 PROCEDURE — 85025 COMPLETE CBC W/AUTO DIFF WBC: CPT | Performed by: INTERNAL MEDICINE

## 2020-06-16 PROCEDURE — 25010000002 ONDANSETRON PER 1 MG: Performed by: INTERNAL MEDICINE

## 2020-06-16 PROCEDURE — 80048 BASIC METABOLIC PNL TOTAL CA: CPT | Performed by: INTERNAL MEDICINE

## 2020-06-16 PROCEDURE — 85014 HEMATOCRIT: CPT | Performed by: INTERNAL MEDICINE

## 2020-06-16 PROCEDURE — 99231 SBSQ HOSP IP/OBS SF/LOW 25: CPT | Performed by: CLINICAL NURSE SPECIALIST

## 2020-06-16 RX ORDER — OXYCODONE AND ACETAMINOPHEN 10; 325 MG/1; MG/1
1 TABLET ORAL EVERY 4 HOURS PRN
Status: DISCONTINUED | OUTPATIENT
Start: 2020-06-16 | End: 2020-06-18 | Stop reason: HOSPADM

## 2020-06-16 RX ADMIN — DILTIAZEM HYDROCHLORIDE 60 MG: 60 TABLET, FILM COATED ORAL at 23:17

## 2020-06-16 RX ADMIN — PANTOPRAZOLE SODIUM 40 MG: 40 INJECTION, POWDER, FOR SOLUTION INTRAVENOUS at 08:59

## 2020-06-16 RX ADMIN — OXYCODONE HYDROCHLORIDE AND ACETAMINOPHEN 1 TABLET: 10; 325 TABLET ORAL at 14:49

## 2020-06-16 RX ADMIN — SODIUM CHLORIDE 75 ML/HR: 9 INJECTION, SOLUTION INTRAVENOUS at 11:40

## 2020-06-16 RX ADMIN — DILTIAZEM HYDROCHLORIDE 60 MG: 60 TABLET, FILM COATED ORAL at 06:19

## 2020-06-16 RX ADMIN — PANTOPRAZOLE SODIUM 40 MG: 40 INJECTION, POWDER, FOR SOLUTION INTRAVENOUS at 21:00

## 2020-06-16 RX ADMIN — OXYCODONE HYDROCHLORIDE AND ACETAMINOPHEN 1 TABLET: 10; 325 TABLET ORAL at 10:35

## 2020-06-16 RX ADMIN — OXYCODONE HYDROCHLORIDE AND ACETAMINOPHEN 1 TABLET: 10; 325 TABLET ORAL at 21:03

## 2020-06-16 RX ADMIN — SODIUM CHLORIDE, PRESERVATIVE FREE 10 ML: 5 INJECTION INTRAVENOUS at 21:00

## 2020-06-16 RX ADMIN — ONDANSETRON HYDROCHLORIDE 4 MG: 2 SOLUTION INTRAMUSCULAR; INTRAVENOUS at 19:24

## 2020-06-16 RX ADMIN — DILTIAZEM HYDROCHLORIDE 60 MG: 60 TABLET, FILM COATED ORAL at 17:10

## 2020-06-16 RX ADMIN — DILTIAZEM HYDROCHLORIDE 60 MG: 60 TABLET, FILM COATED ORAL at 11:40

## 2020-06-16 RX ADMIN — HYDROCODONE BITARTRATE AND ACETAMINOPHEN 1 TABLET: 5; 325 TABLET ORAL at 06:19

## 2020-06-16 NOTE — PLAN OF CARE
Problem: Patient Care Overview  Goal: Plan of Care Review  Outcome: Ongoing (interventions implemented as appropriate)  Flowsheets (Taken 6/16/2020 0311)  Progress: no change  Plan of Care Reviewed With: patient  Outcome Summary: Pt c/o pain x1 so far this shift; voiding per urinal; IVF continue; no stools so far this shift; will continue to monitor.

## 2020-06-16 NOTE — PROGRESS NOTES
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   HENT: Negative for congestion and ear pain.    Eyes: Negative for redness and visual disturbance.   Respiratory: Negative for cough, shortness of breath and wheezing.    Cardiovascular: Negative for chest pain and palpitations.   Gastrointestinal: Positive for blood in stool. Negative for abdominal pain, diarrhea, nausea and vomiting.   Endocrine: Negative for cold intolerance and heat intolerance.   Genitourinary: Negative for dysuria and frequency.   Musculoskeletal: Negative for arthralgias and back pain.   Skin: Negative for rash and wound.   Neurological: Negative for dizziness and headaches.   Psychiatric/Behavioral: Negative for confusion. The patient is not nervous/anxious.         All pertinent negatives and positives are as above. All other systems have been reviewed and are negative unless otherwise stated.     Objective    Temp:  [97.7 °F (36.5 °C)-98 °F (36.7 °C)] 98 °F (36.7 °C)  Heart Rate:  [54-86] 54  Resp:  [16] 16  BP: (130-135)/(48-72) 135/48  Physical Exam   Constitutional: He is oriented to person, place, and time. He appears well-developed and well-nourished.   HENT:   Head: Normocephalic and atraumatic.   Right Ear: External ear normal.   Left Ear: External ear normal.   Nose: Nose normal.   Mouth/Throat: Oropharynx is clear and moist.   Eyes: Pupils are equal, round, and  reactive to light. Conjunctivae and EOM are normal. Right eye exhibits no discharge. Left eye exhibits no discharge. No scleral icterus.   Neck: Normal range of motion. Neck supple. No tracheal deviation present. No thyromegaly present.   Cardiovascular: Normal rate, regular rhythm, normal heart sounds and intact distal pulses. Exam reveals no gallop and no friction rub.   No murmur heard.  Pulmonary/Chest: Effort normal and breath sounds normal. No stridor. No respiratory distress. He has no wheezes. He has no rales. He exhibits no tenderness.   Abdominal: Soft. Bowel sounds are normal. He exhibits no distension and no mass. There is no tenderness. There is no rebound and no guarding. No hernia.   Musculoskeletal: Normal range of motion. He exhibits no edema or deformity.   Lymphadenopathy:     He has no cervical adenopathy.   Neurological: He is alert and oriented to person, place, and time. He has normal reflexes. He displays normal reflexes. No cranial nerve deficit. He exhibits normal muscle tone. Coordination normal.   Skin: Skin is warm and dry. No rash noted. No erythema. No pallor.   Psychiatric: He has a normal mood and affect. His behavior is normal. Judgment and thought content normal.   Vitals reviewed.          Results Review:  I have reviewed the labs, radiology results, and diagnostic studies.    Laboratory Data:   Results from last 7 days   Lab Units 06/16/20  1520 06/16/20  0845 06/16/20  0005  06/15/20  0614  06/14/20  0229   WBC 10*3/mm3  --   --  6.31  --  7.87  --  6.94   HEMOGLOBIN g/dL 9.1* 8.0* 7.5*   < > 7.6*   < > 6.3*   HEMATOCRIT % 27.4* 23.9* 21.4*   < > 22.9*   < > 18.8*   PLATELETS 10*3/mm3  --   --  187  --  198  --  184    < > = values in this interval not displayed.        Results from last 7 days   Lab Units 06/16/20  0005 06/15/20  0614 06/14/20 0229 06/13/20  1829   SODIUM mmol/L 142 143 144 145   POTASSIUM mmol/L 3.6 3.9 4.1 4.0   CHLORIDE mmol/L 108* 109* 113* 112*   CO2  mmol/L 24.0 24.0 23.0 21.0*   BUN mg/dL 8 8 25* 28*   CREATININE mg/dL 0.68* 0.60* 0.82 0.87   CALCIUM mg/dL 8.3* 8.6 8.2* 8.5*   BILIRUBIN mg/dL  --   --   --  0.2   ALK PHOS U/L  --   --   --  76   ALT (SGPT) U/L  --   --   --  16   AST (SGOT) U/L  --   --   --  17   GLUCOSE mg/dL 91 80 88 101*       Culture Data:   No results found for: BLOODCX, URINECX, WOUNDCX, MRSACX, RESPCX, STOOLCX    Radiology Data:   Imaging Results (Last 24 Hours)     ** No results found for the last 24 hours. **          I have reviewed the patient's current medications.     Assessment/Plan     Active Hospital Problems    Diagnosis   • **GI bleed   • Acute blood loss anemia   • Gastrointestinal hemorrhage     Added automatically from request for surgery [id number]     • Rectal bleeding     Added automatically from request for surgery [id number]     • Current use of long term anticoagulation   • Alcoholism /alcohol abuse (CMS/HCC)   • Atrial fibrillation and flutter (CMS/HCC)   • Essential hypertension   • PVD (peripheral vascular disease) (CMS/HCC)       1.  GI bleed  -Protonix to PO  -Trend H&H  -GI following, EGD today  -Transfuse as indicated     2.  Afib  -Cardizem  -Amiodarone  -Hold AC given bleeding     3.  HTN  -Lisinopril        4.  Bladder wall thickening/questionable bladder mass  -Urology planning outpt workup     5.  PVD  -Pletal held  -Vascular surgery following             Discharge Planning: I expect the patient to be discharged to home in ? days    Marco A Katz MD   06/16/20   16:31

## 2020-06-16 NOTE — PLAN OF CARE
VSS, up ad dasia, pain medication changed to percocet today with good results, tolerating regular diet, voiding, most recent Hg 8.0 - asymptomatic, safety maintained, will continue to monitor    Problem: Patient Care Overview  Goal: Plan of Care Review  Outcome: Ongoing (interventions implemented as appropriate)  Flowsheets  Taken 6/16/2020 5158  Progress: improving  Taken 6/16/2020 0850  Plan of Care Reviewed With: patient

## 2020-06-16 NOTE — PROGRESS NOTES
[facility] Gastroenterology  Inpatient Progress Note  Kingsley Lerma  1945 6/16/2020  Reason for Follow Up:  GI bleed    Subjective     Subjective:   Pt is AAOx3 NO signs for active GI bleeding today. Hgb 8.0.     Current Facility-Administered Medications:   •  acetaminophen (TYLENOL) tablet 650 mg, 650 mg, Oral, Q4H PRN, Carmine Banuelos MD, 650 mg at 06/14/20 0013  •  dilTIAZem (CARDIZEM) tablet 60 mg, 60 mg, Oral, Q6H, Carmine Banuelos MD, 60 mg at 06/16/20 0619  •  HYDROcodone-acetaminophen (NORCO) 5-325 MG per tablet 1 tablet, 1 tablet, Oral, Q6H PRN, Carmine Banuelos MD, 1 tablet at 06/16/20 0619  •  ondansetron (ZOFRAN) injection 4 mg, 4 mg, Intravenous, Q6H PRN, Carmine Banuelos MD  •  pantoprazole (PROTONIX) injection 40 mg, 40 mg, Intravenous, Q12H, Carmine Banuelos MD, 40 mg at 06/16/20 0859  •  [COMPLETED] Insert peripheral IV, , , Once **AND** sodium chloride 0.9 % flush 10 mL, 10 mL, Intravenous, PRN, Carmine Banuelos MD  •  sodium chloride 0.9 % flush 10 mL, 10 mL, Intravenous, Q12H, Carmine Banuelos MD, 10 mL at 06/15/20 2044  •  sodium chloride 0.9 % flush 10 mL, 10 mL, Intravenous, PRN, Carmine Banuelos MD  •  sodium chloride 0.9 % infusion, 75 mL/hr, Intravenous, Continuous, Carmine Banuelos MD, Last Rate: 75 mL/hr at 06/15/20 2043, 75 mL/hr at 06/15/20 2043    Review of Systems:    Review of Systems   Constitutional: Negative for activity change, appetite change, chills, diaphoresis, fatigue, fever and unexpected weight change.   HENT: Negative for ear pain, hearing loss, mouth sores, sore throat, trouble swallowing and voice change.    Eyes: Negative.    Respiratory: Negative for cough, choking, shortness of breath and wheezing.    Cardiovascular: Negative for chest pain and palpitations.   Gastrointestinal: Negative for abdominal pain, blood in stool, constipation, diarrhea, nausea and vomiting.   Endocrine: Negative for cold intolerance and heat intolerance.   Genitourinary: Negative  for decreased urine volume, dysuria, frequency, hematuria and urgency.   Musculoskeletal: Negative for back pain, gait problem and myalgias.   Skin: Negative for color change, pallor and rash.   Allergic/Immunologic: Negative for food allergies and immunocompromised state.   Neurological: Negative for dizziness, tremors, seizures, syncope, weakness, light-headedness, numbness and headaches.   Hematological: Negative for adenopathy. Does not bruise/bleed easily.   Psychiatric/Behavioral: Negative for agitation and confusion. The patient is not nervous/anxious.    All other systems reviewed and are negative.       Objective     Vital Signs  Temp:  [97.7 °F (36.5 °C)-97.9 °F (36.6 °C)] 97.9 °F (36.6 °C)  Heart Rate:  [56-86] 56  Resp:  [12-16] 16  BP: (110-135)/(47-77) 130/49  Body mass index is 24.53 kg/m².    Intake/Output Summary (Last 24 hours) at 6/16/2020 0923  Last data filed at 6/16/2020 0618  Gross per 24 hour   Intake --   Output 1595 ml   Net -1595 ml     No intake/output data recorded.       Physical Exam:   General: patient awake, alert and cooperative, no acute distress   Eyes: Normal lids and lashes, no scleral icterus   Neck: supple, no JVD   Skin: warm and dry   Cardiovascular: regular rhythm and rate, no murmurs auscultated   Pulm: clear to auscultation bilaterally, regular and unlabored   Abdomen: soft, nontender, nondistended; normal bowel sounds   Psychiatric: Normal mood and behavior; converses appropriately     Results Review:    I have reviewed all of the patients current test results    Results from last 7 days   Lab Units 06/16/20  0845 06/16/20  0005 06/15/20  1616 06/15/20  0614  06/14/20  0229   WBC 10*3/mm3  --  6.31  --  7.87  --  6.94   HEMOGLOBIN g/dL 8.0* 7.5* 7.6* 7.6*   < > 6.3*   HEMATOCRIT % 23.9* 21.4* 21.9* 22.9*   < > 18.8*   PLATELETS 10*3/mm3  --  187  --  198  --  184    < > = values in this interval not displayed.       Results from last 7 days   Lab Units 06/16/20  0005  06/15/20  0614 06/14/20  0229 06/13/20  1829   SODIUM mmol/L 142 143 144 145   POTASSIUM mmol/L 3.6 3.9 4.1 4.0   CHLORIDE mmol/L 108* 109* 113* 112*   CO2 mmol/L 24.0 24.0 23.0 21.0*   BUN mg/dL 8 8 25* 28*   CREATININE mg/dL 0.68* 0.60* 0.82 0.87   CALCIUM mg/dL 8.3* 8.6 8.2* 8.5*   BILIRUBIN mg/dL  --   --   --  0.2   ALK PHOS U/L  --   --   --  76   ALT (SGPT) U/L  --   --   --  16   AST (SGOT) U/L  --   --   --  17   GLUCOSE mg/dL 91 80 88 101*       Results from last 7 days   Lab Units 06/13/20  1829   INR  1.80*       No results found for: LIPASE    Radiology:    Imaging Results (Last 24 Hours)     ** No results found for the last 24 hours. **            Assessment/Plan     Patient Active Problem List   Diagnosis Code   • Essential hypertension I10   • PVD (peripheral vascular disease) (CMS/[facility]) I73.9   • Atrial fibrillation and flutter (CMS/[facility]) I48.91, I48.92   • Alcoholism /alcohol abuse (CMS/[facility]) F10.20   • Non healing left heel wound S91.302A   • Atrial fibrillation status post cardioversion (CMS/[facility]) I48.91   • Tobacco use Z72.0   • Syncope R55   • Current use of long term anticoagulation Z79.01   • GI bleed K92.2   • Rectal bleeding K62.5   • Heme + stool R19.5   • Acute blood loss anemia D62   • Gastrointestinal hemorrhage K92.2       1. GI bleed s/p Endo 6/15/20 showing normal   2. Blood loss anemia     Plan: follow H/H transfuse as needed, vascular to see.  Discontinue PPI gtt and move to oral medication.     EMR Dragon/transcription disclaimer: Much of this encounter note is electronic transcription/translation of spoken language to printed text. The electronic translation of spoken language may be erroneous, or at times, nonsensical words or phrases may be inadvertently transcribed. Although I have reviewed the note for such errors, some may still exist.    Tomeka Godwin, APRN  06/16/20  09:23      No bleeding  EMR Dragon/transcription disclaimer: Much of this encounter note is an electronic  transcription/translation of spoken language to printed text.  The electronic translation of spoken language may permit erroneous, or at times, nonsensical words or phrases to be inadvertently transcribed.  Although I have reviewed the note for such errors, some may still exist.      Carmine Banuelos MD  13:33  6/16/2020

## 2020-06-17 LAB
ABO GROUP BLD: NORMAL
ANION GAP SERPL CALCULATED.3IONS-SCNC: 11 MMOL/L (ref 5–15)
BASOPHILS # BLD AUTO: 0.02 10*3/MM3 (ref 0–0.2)
BASOPHILS NFR BLD AUTO: 0.3 % (ref 0–1.5)
BLD GP AB SCN SERPL QL: NEGATIVE
BUN BLD-MCNC: 7 MG/DL (ref 8–23)
BUN/CREAT SERPL: 10.8 (ref 7–25)
CALCIUM SPEC-SCNC: 8.7 MG/DL (ref 8.6–10.5)
CHLORIDE SERPL-SCNC: 107 MMOL/L (ref 98–107)
CO2 SERPL-SCNC: 24 MMOL/L (ref 22–29)
CREAT BLD-MCNC: 0.65 MG/DL (ref 0.76–1.27)
DEPRECATED RDW RBC AUTO: 56.1 FL (ref 37–54)
EOSINOPHIL # BLD AUTO: 0.18 10*3/MM3 (ref 0–0.4)
EOSINOPHIL NFR BLD AUTO: 2.3 % (ref 0.3–6.2)
ERYTHROCYTE [DISTWIDTH] IN BLOOD BY AUTOMATED COUNT: 17 % (ref 12.3–15.4)
GFR SERPL CREATININE-BSD FRML MDRD: 121 ML/MIN/1.73
GLUCOSE BLD-MCNC: 84 MG/DL (ref 65–99)
HCT VFR BLD AUTO: 23 % (ref 37.5–51)
HCT VFR BLD AUTO: 23.1 % (ref 37.5–51)
HCT VFR BLD AUTO: 24.3 % (ref 37.5–51)
HCT VFR BLD AUTO: 28 % (ref 37.5–51)
HGB BLD-MCNC: 7.8 G/DL (ref 13–17.7)
HGB BLD-MCNC: 7.8 G/DL (ref 13–17.7)
HGB BLD-MCNC: 8.3 G/DL (ref 13–17.7)
HGB BLD-MCNC: 9.4 G/DL (ref 13–17.7)
IMM GRANULOCYTES # BLD AUTO: 0.03 10*3/MM3 (ref 0–0.05)
IMM GRANULOCYTES NFR BLD AUTO: 0.4 % (ref 0–0.5)
LYMPHOCYTES # BLD AUTO: 1.07 10*3/MM3 (ref 0.7–3.1)
LYMPHOCYTES NFR BLD AUTO: 14 % (ref 19.6–45.3)
MCH RBC QN AUTO: 32.4 PG (ref 26.6–33)
MCHC RBC AUTO-ENTMCNC: 33.8 G/DL (ref 31.5–35.7)
MCV RBC AUTO: 95.9 FL (ref 79–97)
MONOCYTES # BLD AUTO: 0.65 10*3/MM3 (ref 0.1–0.9)
MONOCYTES NFR BLD AUTO: 8.5 % (ref 5–12)
NEUTROPHILS # BLD AUTO: 5.71 10*3/MM3 (ref 1.7–7)
NEUTROPHILS NFR BLD AUTO: 74.5 % (ref 42.7–76)
NRBC BLD AUTO-RTO: 0 /100 WBC (ref 0–0.2)
PLATELET # BLD AUTO: 242 10*3/MM3 (ref 140–450)
PMV BLD AUTO: 9.2 FL (ref 6–12)
POTASSIUM BLD-SCNC: 3.8 MMOL/L (ref 3.5–5.2)
RBC # BLD AUTO: 2.41 10*6/MM3 (ref 4.14–5.8)
RH BLD: POSITIVE
SODIUM BLD-SCNC: 142 MMOL/L (ref 136–145)
T&S EXPIRATION DATE: NORMAL
WBC NRBC COR # BLD: 7.66 10*3/MM3 (ref 3.4–10.8)

## 2020-06-17 PROCEDURE — 85025 COMPLETE CBC W/AUTO DIFF WBC: CPT | Performed by: INTERNAL MEDICINE

## 2020-06-17 PROCEDURE — 86901 BLOOD TYPING SEROLOGIC RH(D): CPT | Performed by: FAMILY MEDICINE

## 2020-06-17 PROCEDURE — 86850 RBC ANTIBODY SCREEN: CPT | Performed by: FAMILY MEDICINE

## 2020-06-17 PROCEDURE — 97166 OT EVAL MOD COMPLEX 45 MIN: CPT

## 2020-06-17 PROCEDURE — 99231 SBSQ HOSP IP/OBS SF/LOW 25: CPT | Performed by: CLINICAL NURSE SPECIALIST

## 2020-06-17 PROCEDURE — 25010000002 ONDANSETRON PER 1 MG: Performed by: INTERNAL MEDICINE

## 2020-06-17 PROCEDURE — 80048 BASIC METABOLIC PNL TOTAL CA: CPT | Performed by: INTERNAL MEDICINE

## 2020-06-17 PROCEDURE — 86900 BLOOD TYPING SEROLOGIC ABO: CPT | Performed by: FAMILY MEDICINE

## 2020-06-17 PROCEDURE — 86900 BLOOD TYPING SEROLOGIC ABO: CPT

## 2020-06-17 PROCEDURE — 86923 COMPATIBILITY TEST ELECTRIC: CPT

## 2020-06-17 PROCEDURE — 25010000002 ONDANSETRON PER 1 MG: Performed by: FAMILY MEDICINE

## 2020-06-17 PROCEDURE — 36430 TRANSFUSION BLD/BLD COMPNT: CPT

## 2020-06-17 PROCEDURE — P9016 RBC LEUKOCYTES REDUCED: HCPCS

## 2020-06-17 PROCEDURE — 85018 HEMOGLOBIN: CPT | Performed by: INTERNAL MEDICINE

## 2020-06-17 PROCEDURE — 85014 HEMATOCRIT: CPT | Performed by: INTERNAL MEDICINE

## 2020-06-17 RX ORDER — ONDANSETRON 2 MG/ML
4 INJECTION INTRAMUSCULAR; INTRAVENOUS ONCE
Status: COMPLETED | OUTPATIENT
Start: 2020-06-17 | End: 2020-06-17

## 2020-06-17 RX ADMIN — ONDANSETRON HYDROCHLORIDE 4 MG: 2 SOLUTION INTRAMUSCULAR; INTRAVENOUS at 08:10

## 2020-06-17 RX ADMIN — SODIUM CHLORIDE, PRESERVATIVE FREE 10 ML: 5 INJECTION INTRAVENOUS at 20:44

## 2020-06-17 RX ADMIN — DILTIAZEM HYDROCHLORIDE 60 MG: 60 TABLET, FILM COATED ORAL at 06:22

## 2020-06-17 RX ADMIN — DILTIAZEM HYDROCHLORIDE 60 MG: 60 TABLET, FILM COATED ORAL at 12:11

## 2020-06-17 RX ADMIN — OXYCODONE HYDROCHLORIDE AND ACETAMINOPHEN 1 TABLET: 10; 325 TABLET ORAL at 03:49

## 2020-06-17 RX ADMIN — DILTIAZEM HYDROCHLORIDE 60 MG: 60 TABLET, FILM COATED ORAL at 18:00

## 2020-06-17 RX ADMIN — SODIUM CHLORIDE, PRESERVATIVE FREE 10 ML: 5 INJECTION INTRAVENOUS at 08:10

## 2020-06-17 RX ADMIN — PANTOPRAZOLE SODIUM 40 MG: 40 INJECTION, POWDER, FOR SOLUTION INTRAVENOUS at 08:09

## 2020-06-17 RX ADMIN — PANTOPRAZOLE SODIUM 40 MG: 40 INJECTION, POWDER, FOR SOLUTION INTRAVENOUS at 20:44

## 2020-06-17 RX ADMIN — SODIUM CHLORIDE, PRESERVATIVE FREE 10 ML: 5 INJECTION INTRAVENOUS at 03:49

## 2020-06-17 RX ADMIN — ACETAMINOPHEN 650 MG: 325 TABLET, FILM COATED ORAL at 21:17

## 2020-06-17 RX ADMIN — ONDANSETRON HYDROCHLORIDE 4 MG: 2 SOLUTION INTRAMUSCULAR; INTRAVENOUS at 03:49

## 2020-06-17 NOTE — PLAN OF CARE
Problem: Patient Care Overview  Goal: Plan of Care Review  Outcome: Ongoing (interventions implemented as appropriate)  Flowsheets (Taken 6/17/2020 8938)  Progress: no change  Plan of Care Reviewed With: patient  Outcome Summary: Pt c/o pain x1; up ad dasia to BSC; voiding per urinal; nausea x1; no BM so far this shift; IID; no other issues; will monitor.

## 2020-06-17 NOTE — PROGRESS NOTES
[facility]ology  Inpatient Progress Note  Kingsley Lerma  1945 6/17/2020  Reason for Follow Up:  GI bleed    Subjective     Subjective:   Pt is AAOx3 no signs for active GI bleeding. Hgb 7.8 last PM therefore being transfused most recent Hgb 8.3 He did have a bloody stool yesterday afternoon he says and we were not notified.   Vascular plans to follow up with him as outpatient for possible lower extremity intervention.     Current Facility-Administered Medications:   •  acetaminophen (TYLENOL) tablet 650 mg, 650 mg, Oral, Q4H PRN, Carmine Banuelos MD, 650 mg at 06/14/20 0013  •  dilTIAZem (CARDIZEM) tablet 60 mg, 60 mg, Oral, Q6H, Carmine Banuelos MD, 60 mg at 06/17/20 0622  •  ondansetron (ZOFRAN) injection 4 mg, 4 mg, Intravenous, Q6H PRN, Carmine Banuelos MD, 4 mg at 06/17/20 0349  •  oxyCODONE-acetaminophen (PERCOCET)  MG per tablet 1 tablet, 1 tablet, Oral, Q4H PRN, Marco A Katz MD, 1 tablet at 06/17/20 0349  •  pantoprazole (PROTONIX) injection 40 mg, 40 mg, Intravenous, Q12H, Carmine Banuelos MD, 40 mg at 06/17/20 0809  •  [COMPLETED] Insert peripheral IV, , , Once **AND** sodium chloride 0.9 % flush 10 mL, 10 mL, Intravenous, PRN, Carmine Banuelos MD  •  sodium chloride 0.9 % flush 10 mL, 10 mL, Intravenous, Q12H, Carmine Banuelos MD, 10 mL at 06/17/20 0810  •  sodium chloride 0.9 % flush 10 mL, 10 mL, Intravenous, PRN, Carmine Banuelos MD, 10 mL at 06/17/20 0349    Review of Systems:    Review of Systems   Constitutional: Negative for activity change, appetite change, chills, diaphoresis, fatigue, fever and unexpected weight change.   HENT: Negative for ear pain, hearing loss, mouth sores, sore throat, trouble swallowing and voice change.    Eyes: Negative.    Respiratory: Negative for cough, choking, shortness of breath and wheezing.    Cardiovascular: Negative for chest pain and palpitations.   Gastrointestinal: Negative for abdominal pain, blood in stool, constipation,  diarrhea, nausea and vomiting.   Endocrine: Negative for cold intolerance and heat intolerance.   Genitourinary: Negative for decreased urine volume, dysuria, frequency, hematuria and urgency.   Musculoskeletal: Negative for back pain, gait problem and myalgias.   Skin: Negative for color change, pallor and rash.   Allergic/Immunologic: Negative for food allergies and immunocompromised state.   Neurological: Negative for dizziness, tremors, seizures, syncope, weakness, light-headedness, numbness and headaches.   Hematological: Negative for adenopathy. Does not bruise/bleed easily.   Psychiatric/Behavioral: Negative for agitation and confusion. The patient is not nervous/anxious.    All other systems reviewed and are negative.       Objective     Vital Signs  Temp:  [97.7 °F (36.5 °C)-98.5 °F (36.9 °C)] 98 °F (36.7 °C)  Heart Rate:  [52-71] 52  Resp:  [16] 16  BP: (135-160)/(48-57) 140/51  Body mass index is 24.53 kg/m².    Intake/Output Summary (Last 24 hours) at 6/17/2020 0941  Last data filed at 6/17/2020 0813  Gross per 24 hour   Intake 1341.75 ml   Output 900 ml   Net 441.75 ml     I/O this shift:  In: -   Out: 150 [Urine:150]       Physical Exam:   General: patient awake, alert and cooperative, no acute distress   Eyes: Normal lids and lashes, no scleral icterus   Neck: supple, no JVD   Skin: warm and dry   Cardiovascular: regular rhythm and rate, no murmurs auscultated   Pulm: clear to auscultation bilaterally, regular and unlabored   Abdomen: soft, nontender, nondistended; normal bowel sounds   Psychiatric: Normal mood and behavior; converses appropriately     Results Review:    I have reviewed all of the patients current test results    Results from last 7 days   Lab Units 06/17/20  0800 06/17/20  0521 06/16/20  2346  06/16/20  0005  06/15/20  0614   WBC 10*3/mm3  --  7.66  --   --  6.31  --  7.87   HEMOGLOBIN g/dL 8.3* 7.8* 7.8*   < > 7.5*   < > 7.6*   HEMATOCRIT % 24.3* 23.1* 23.0*   < > 21.4*   < > 22.9*    PLATELETS 10*3/mm3  --  242  --   --  187  --  198    < > = values in this interval not displayed.       Results from last 7 days   Lab Units 06/17/20  0521 06/16/20  0005 06/15/20  0614  06/13/20  1829   SODIUM mmol/L 142 142 143   < > 145   POTASSIUM mmol/L 3.8 3.6 3.9   < > 4.0   CHLORIDE mmol/L 107 108* 109*   < > 112*   CO2 mmol/L 24.0 24.0 24.0   < > 21.0*   BUN mg/dL 7* 8 8   < > 28*   CREATININE mg/dL 0.65* 0.68* 0.60*   < > 0.87   CALCIUM mg/dL 8.7 8.3* 8.6   < > 8.5*   BILIRUBIN mg/dL  --   --   --   --  0.2   ALK PHOS U/L  --   --   --   --  76   ALT (SGPT) U/L  --   --   --   --  16   AST (SGOT) U/L  --   --   --   --  17   GLUCOSE mg/dL 84 91 80   < > 101*    < > = values in this interval not displayed.       Results from last 7 days   Lab Units 06/13/20  1829   INR  1.80*       No results found for: LIPASE    Radiology:    Imaging Results (Last 24 Hours)     ** No results found for the last 24 hours. **            Assessment/Plan     Patient Active Problem List   Diagnosis Code   • Essential hypertension I10   • PVD (peripheral vascular disease) (CMS/[facility]) I73.9   • Atrial fibrillation and flutter (CMS/[facility]) I48.91, I48.92   • Alcoholism /alcohol abuse (CMS/[facility]) F10.20   • Non healing left heel wound S91.302A   • Atrial fibrillation status post cardioversion (CMS/[facility]) I48.91   • Tobacco use Z72.0   • Syncope R55   • Current use of long term anticoagulation Z79.01   • GI bleed K92.2   • Rectal bleeding K62.5   • Heme + stool R19.5   • Acute blood loss anemia D62   • Gastrointestinal hemorrhage K92.2       1. GI bleed s/p Endoscopy on 6/15 normal.  2. Blood loss anemia  3. PVD CT enterography showed near complete occlusion of the right external iliac and high grade stenosis of the left iliac arteries.     Workup thus far has included Endo/colon (multiple non bleeding AVMs, hemorrhoids), CT enterography. We need to be made aware of active bleeding episodes so that we can order a tagged RBC scan if  indicated.   Follow H/H transfuse if needed.   Follow up with vascular as outpatient    EMR Dragon/transcription disclaimer: Much of this encounter note is electronic transcription/translation of spoken language to printed text. The electronic translation of spoken language may be erroneous, or at times, nonsensical words or phrases may be inadvertently transcribed. Although I have reviewed the note for such errors, some may still exist.    Tomeka Godwin, APRN  06/17/20  09:41    Had some dark stool yesterday.  None today.  If recurrent bleeding is noted I will suggest to get a tagged RBC scan.  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

## 2020-06-17 NOTE — PROGRESS NOTES
[facility] Services  INPATIENT PROGRESS NOTE    Patient Name: Kingsley Lerma  Date of Admission: 6/13/2020  Today's Date: 06/17/20  Length of Stay: 4  Primary Care Physician: BARRY Urrutia MD    Subjective   Chief Complaint: dizziness  HPI   Got dizzy today.  He was nauseated this AM  No black or bloody stools      Review of Systems   Constitutional: Positive for fatigue. Negative for fever.   HENT: Negative for congestion and ear pain.    Eyes: Negative for redness and visual disturbance.   Respiratory: Negative for cough, shortness of breath and wheezing.    Cardiovascular: Negative for chest pain and palpitations.   Gastrointestinal: Positive for nausea. Negative for abdominal pain, diarrhea and vomiting.   Endocrine: Negative for cold intolerance and heat intolerance.   Genitourinary: Negative for dysuria and frequency.   Musculoskeletal: Negative for arthralgias and back pain.   Skin: Negative for rash and wound.   Neurological: Positive for dizziness. Negative for headaches.   Psychiatric/Behavioral: Negative for confusion. The patient is not nervous/anxious.         All pertinent negatives and positives are as above. All other systems have been reviewed and are negative unless otherwise stated.     Objective    Temp:  [97.7 °F (36.5 °C)-98.5 °F (36.9 °C)] 98.1 °F (36.7 °C)  Heart Rate:  [52-71] 55  Resp:  [16] 16  BP: (136-160)/(49-57) 151/52  Physical Exam   Constitutional: He is oriented to person, place, and time. He appears well-developed and well-nourished.   HENT:   Head: Normocephalic and atraumatic.   Right Ear: External ear normal.   Left Ear: External ear normal.   Nose: Nose normal.   Mouth/Throat: Oropharynx is clear and moist.   Eyes: Pupils are equal, round, and reactive to light. Conjunctivae and EOM are normal. Right eye exhibits no discharge. Left eye exhibits no discharge. No scleral icterus.   Neck: Normal range of motion. Neck supple. No  tracheal deviation present. No thyromegaly present.   Cardiovascular: Normal rate, regular rhythm, normal heart sounds and intact distal pulses. Exam reveals no gallop and no friction rub.   No murmur heard.  Pulmonary/Chest: Effort normal and breath sounds normal. No stridor. No respiratory distress. He has no wheezes. He has no rales. He exhibits no tenderness.   Abdominal: Soft. Bowel sounds are normal. He exhibits no distension and no mass. There is no tenderness. There is no rebound and no guarding. No hernia.   Musculoskeletal: Normal range of motion. He exhibits no edema or deformity.   Lymphadenopathy:     He has no cervical adenopathy.   Neurological: He is alert and oriented to person, place, and time. He has normal reflexes. He displays normal reflexes. No cranial nerve deficit. He exhibits normal muscle tone. Coordination normal.   Skin: Skin is warm and dry. No rash noted. No erythema. No pallor.   Psychiatric: He has a normal mood and affect. His behavior is normal. Judgment and thought content normal.   Vitals reviewed.        Results Review:  I have reviewed the labs, radiology results, and diagnostic studies.    Laboratory Data:   Results from last 7 days   Lab Units 06/17/20  0800 06/17/20  0521 06/16/20  2346  06/16/20  0005  06/15/20  0614   WBC 10*3/mm3  --  7.66  --   --  6.31  --  7.87   HEMOGLOBIN g/dL 8.3* 7.8* 7.8*   < > 7.5*   < > 7.6*   HEMATOCRIT % 24.3* 23.1* 23.0*   < > 21.4*   < > 22.9*   PLATELETS 10*3/mm3  --  242  --   --  187  --  198    < > = values in this interval not displayed.        Results from last 7 days   Lab Units 06/17/20  0521 06/16/20  0005 06/15/20  0614  06/13/20  1829   SODIUM mmol/L 142 142 143   < > 145   POTASSIUM mmol/L 3.8 3.6 3.9   < > 4.0   CHLORIDE mmol/L 107 108* 109*   < > 112*   CO2 mmol/L 24.0 24.0 24.0   < > 21.0*   BUN mg/dL 7* 8 8   < > 28*   CREATININE mg/dL 0.65* 0.68* 0.60*   < > 0.87   CALCIUM mg/dL 8.7 8.3* 8.6   < > 8.5*   BILIRUBIN mg/dL  --    --   --   --  0.2   ALK PHOS U/L  --   --   --   --  76   ALT (SGPT) U/L  --   --   --   --  16   AST (SGOT) U/L  --   --   --   --  17   GLUCOSE mg/dL 84 91 80   < > 101*    < > = values in this interval not displayed.       Culture Data:   No results found for: BLOODCX, URINECX, WOUNDCX, MRSACX, RESPCX, STOOLCX    Radiology Data:   Imaging Results (Last 24 Hours)     ** No results found for the last 24 hours. **          I have reviewed the patient's current medications.     Assessment/Plan     Active Hospital Problems    Diagnosis   • **GI bleed   • Acute blood loss anemia   • Gastrointestinal hemorrhage     Added automatically from request for surgery [id number]     • Rectal bleeding     Added automatically from request for surgery [id number]     • Current use of long term anticoagulation   • Alcoholism /alcohol abuse (CMS/[facility])   • Atrial fibrillation and flutter (CMS/[facility])   • Essential hypertension   • PVD (peripheral vascular disease) (CMS/[facility])     1.  GI bleed  -Protonix to PO  -Trend H&H  -GI following  -transfuse 1 unit PRBC's     2.  Afib  -Cardizem  -Amiodarone  -Hold AC given bleeding     3.  HTN  -Lisinopril     4.  Bladder wall thickening/questionable bladder mass  -Urology planning outpt workup     5.  PVD  -Pletal held  -Vascular surgery following                Discharge Planning: I expect the patient to be discharged to home in AM if H&H stable  Marco A Katz MD   06/17/20   16:02

## 2020-06-17 NOTE — PLAN OF CARE
Problem: Patient Care Overview  Goal: Plan of Care Review  Outcome: Ongoing (interventions implemented as appropriate)  Flowsheets (Taken 6/17/2020 3715)  Progress: no change  Plan of Care Reviewed With: patient  Outcome Summary: OT eval completed. Pt alert and oriented x4, c/o pain in LLE and dizziness. S for all bed mobility. Serg to stand, very clumy coming into standing and does not follow vc for transfer training for increased safety. Takes a couple steps away from bed with CGA, x2 LOB requiring Serg to correct. Serg and vc required to maintain balance with dynamic reaching task, very minimal reach out of ROXANNA and pt loses balance with 75% of trials in all planes. Pt is at a significantly increased risk for falls 2' dizziness, decreased balance, impaired strength and endurance, as well as insight to deficits and awareness of need for assist. OT indicated to address stated deficits to increase independence and safety. Recommend d/c SNF.

## 2020-06-17 NOTE — PROGRESS NOTES
Continued Stay Note  TEMI Elliott     Patient Name: Kingsley Lerma  MRN: [id number]  Today's Date: 6/17/2020    Admit Date: 6/13/2020    Discharge Plan     Row Name 06/17/20 0954       Plan    Plan  Home    Plan Comments  Continuing to follow pt. He has planned to return home at d/c. Will notify [facility] at d[facility]. Did talk with pt about rehab and he is considering. He asked to check back with him tomorrow.        Discharge Codes    No documentation.             SHREYAS Somers

## 2020-06-17 NOTE — THERAPY EVALUATION
Acute Care - Occupational Therapy Initial Evaluation  [facility]     Patient Name: Kingsley Lerma  : 1945  MRN: [id number]  Today's Date: 2020  Onset of Illness/Injury or Date of Surgery: 20  Date of Referral to OT: 20  Referring Physician: Dr. Katz    Admit Date: 2020       ICD-10-CM ICD-9-CM   1. Gastrointestinal hemorrhage, unspecified gastrointestinal hemorrhage type K92.2 578.9   2. Impaired mobility and ADLs Z74.09 V49.89    Z78.9      Patient Active Problem List   Diagnosis   • Essential hypertension   • PVD (peripheral vascular disease) (CMS/HCC)   • Atrial fibrillation and flutter (CMS/HCC)   • Alcoholism /alcohol abuse (CMS/HCC)   • Non healing left heel wound   • Atrial fibrillation status post cardioversion (CMS/HCC)   • Tobacco use   • Syncope   • Current use of long term anticoagulation   • GI bleed   • Rectal bleeding   • Heme + stool   • Acute blood loss anemia   • Gastrointestinal hemorrhage     Past Medical History:   Diagnosis Date   • Alcohol abuse    • Arthritis    • Atrial flutter (CMS/HCC)    • Circulation problem    • History of tobacco abuse    • Hypertension    • PVD (peripheral vascular disease) (CMS/HCC)      Past Surgical History:   Procedure Laterality Date   • ADENOIDECTOMY     • COLONOSCOPY N/A 2020    Procedure: COLONOSCOPY WITH ANESTHESIA;  Surgeon: Mayela Ching MD;  Location: [facility] ENDOSCOPY;  Service: Gastroenterology;  Laterality: N/A;  preop; GI bleed   postop; avm's   PCP rachel calderón    • ENDOSCOPY N/A 2020    Procedure: ESOPHAGOGASTRODUODENOSCOPY WITH ANESTHESIA;  Surgeon: Mayela Ching MD;  Location: [facility] ENDOSCOPY;  Service: Gastroenterology;  Laterality: N/A;  proep; GI BLeed   postop; normal   PCP rachel calderón    • ENDOSCOPY N/A 6/15/2020    Procedure: ESOPHAGOGASTRODUODENOSCOPY WITH ANESTHESIA;  Surgeon: Carmine Banuelos MD;  Location: [facility] ENDOSCOPY;  Service: Gastroenterology;  Laterality: N/A;  pre gi bleed,  anemia  post normal  dr rachel calderón          OT ASSESSMENT FLOWSHEET (last 12 hours)      Occupational Therapy Evaluation     Row Name 06/17/20 1339 06/17/20 1031                OT Evaluation Time/Intention    Subjective Information  complains of;pain;dizziness;numbness;fatigue numbness in L foot  -MW  --       Document Type  evaluation  -MW  --       Mode of Treatment  occupational therapy  -MW  --       Comment: Evaluation Not Performed  --  Pt receiving blood, will check back  -MW          General Information    Patient Profile Reviewed?  yes  -MW  --       Onset of Illness/Injury or Date of Surgery  06/13/20  -MW  --       Referring Physician  Dr. 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  -MW  --       Existing Precautions/Restrictions  fall  -MW  --          Relationship/Environment    Lives With  alone  -MW  --          Resource/Environmental Concerns    Current Living Arrangements  home/apartment/condo  -MW  --          Home Main Entrance    Number of Stairs, Main Entrance  one  -MW  --       Stair Railings, Main Entrance  none  -MW  --          Cognitive Assessment/Interventions    Additional Documentation  Cognitive Assessment/Intervention (Group)  -MW  --          Cognitive Assessment/Intervention- PT/OT    Orientation Status (Cognition)  oriented x 4  -MW  --       Personal Safety Interventions  fall prevention program  maintained;gait belt;muscle strengthening facilitated;nonskid shoes/slippers when out of bed;supervised activity  -MW  --          Safety Issues, Functional Mobility    Impairments Affecting Function (Mobility)  balance;endurance/activity tolerance;strength;postural/trunk control  -MW  --          Bed Mobility Assessment/Treatment    Bed Mobility Assessment/Treatment  supine-sit;sit-supine  -MW  --       Supine-Sit [place] (Bed Mobility)  supervision  -MW  --       Sit-Supine [place] (Bed Mobility)  supervision  -MW  --       Assistive Device (Bed Mobility)  head of bed elevated  -MW  --          Functional Mobility    Functional Mobility- Ind. Level  minimum assist (75% patient effort)  -MW  --       Functional Mobility- Device  rolling walker  -MW  --       Functional Mobility- Comment  took a couple steps away from bed, seated rest, a couple steps toward HOB  -MW  --          Transfer Assessment/Treatment    Transfer Assessment/Treatment  sit-stand transfer;stand-sit transfer  -MW  --       Comment (Transfers)  very awakward transfer, difficulty following commands for safety  -MW  --          Sit-Stand Transfer    Sit-Stand [place] (Transfers)  minimum assist (75% patient effort);verbal cues  -MW  --       Assistive Device (Sit-Stand Transfers)  walker, front-wheeled  -MW  --          Stand-Sit Transfer    Stand-Sit [place] (Transfers)  minimum assist (75% patient effort);verbal cues  -MW  --       Assistive Device (Stand-Sit Transfers)  walker, front-wheeled  -MW  --          ADL Assessment/Intervention    BADL Assessment/Intervention  lower body dressing  -MW  --          Lower Body Dressing Assessment/Training    Lower Body Dressing [place] Level  don;socks;supervision  -MW  --       Lower Body Dressing Position  edge of bed sitting  -MW  --          BADL Safety/Performance    Impairments, BADL Safety/Performance  balance;endurance/activity tolerance;strength;trunk/postural control   -MW  --          General ROM    GENERAL ROM COMMENTS  AROM WFL BUE  -MW  --          MMT (Manual Muscle Testing)    General MMT Comments  BUE grossly 4-/5  -MW  --          Motor Assessment/Interventions    Additional Documentation  Balance (Group)  -MW  --          Balance    Balance  static sitting balance;static standing balance;dynamic sitting balance;dynamic standing balance  -MW  --          Static Sitting Balance    Level of [place] (Unsupported Sitting, Static Balance)  independent  -MW  --       Sitting Position (Unsupported Sitting, Static Balance)  sitting on edge of bed  -MW  --          Dynamic Sitting Balance    Level of [place], Reaches Outside Midline (Sitting, Dynamic Balance)  supervision  -MW  --       Sitting Position, Reaches Outside Midline (Sitting, Dynamic Balance)  sitting on edge of bed  -MW  --          Static Standing Balance    Level of [place] (Supported Standing, Static Balance)  contact guard assist;minimal assist, 75% patient effort  -MW  --       Assistive Device Utilized (Supported Standing, Static Balance)  walker, rolling  -MW  --          Dynamic Standing Balance    Level of [place], Reaches Outside Midline (Standing, Dynamic Balance)  minimal assist, 75% patient effort;moderate assist, 50 to 74% patient effort  -MW  --       Assistive Device Utilized (Supported Standing, Dynamic Balance)  walker, rolling  -MW  --       Comment, Reaches Outside Midline (Standing, Dynamic Balance)  dynamic reaching in all planes minimally out of [place] with LOB with 75% of trials  -MW  --          Sensory Assessment/Intervention    Sensory General Assessment  no sensation deficits identified  -MW  --          Positioning and Restraints    Pre-Treatment Position  in bed  -MW  --       Post Treatment Position  bed  -MW  --       In Bed  sitting EOB;call light within reach;encouraged to call for assist encouraged sitting up in chair, pt refused  -MW  --          Pain Assessment     Additional Documentation  Pain Scale: Numbers Pre/Post-Treatment (Group)  -MW  --          Pain Scale: Numbers Pre/Post-Treatment    Pain Scale: Numbers, Pretreatment  7/10  -MW  --       Pain Scale: Numbers, Post-Treatment  7/10  -MW  --       Pain Location - Side  Left  -MW  --       Pain Location  foot  -MW  --       Pain Intervention(s)  Repositioned;Ambulation/increased activity  -MW  --          Wound 02/11/20 1200 Left heel Pressure Injury    Wound - Properties Group Date first assessed: 02/11/20  -MWA Time first assessed: 1200  -MWA Present on Hospital Admission: Y  -MWA Side: Left  -MWA Location: heel  -MWA Primary Wound Type: Pressure inj  -MWA Stage, Pressure Injury: Stage 2  -MWA       Plan of Care Review    Plan of Care Reviewed With  patient  -MW  --       Progress  no change  -MW  --       Outcome Summary  OT eval completed. Pt alert and oriented x4, c/o pain in LLE and dizziness. S for all bed mobility. Serg to stand, very clumy coming into standing and does not follow vc for transfer training for increased safety. Takes a couple steps away from bed with CGA, x2 LOB requiring Serg to correct. Serg and vc with dynamic reaching task, very minimal reach barely out of [place] and pt loses balance with 75% of trials in all planes. Pt is at a significantly increased risk for falls 2' dizziness, decreased balance, impaired strength and endurance, as well as insight to deficits and awareness of need for assist. OT indicated to address stated deficits to increase independence and safety. Recommend d/c SNF.   -MW  --          Clinical Impression (OT)    Date of Referral to OT  06/16/20  -MW  --       OT Diagnosis  decreased ADL  -MW  --       Prognosis (OT Eval)  good  -MW  --       Patient/Family Goals Statement (OT Eval)  return home  -MW  --       Criteria for Skilled Therapeutic Interventions Met (OT Eval)  yes;treatment indicated  -MW  --       Rehab Potential (OT Eval)  good, to achieve stated therapy goals   -MW  --       Therapy Frequency (OT Eval)  5 times/wk  -MW  --       Predicted Duration of Therapy Intervention (Therapy Eval)  until d/c  -MW  --       Care Plan Review (OT)  evaluation/treatment results reviewed;care plan/treatment goals reviewed;risks/benefits reviewed;current/potential barriers reviewed;patient/other agree to care plan  -MW  --       Anticipated Discharge Disposition (OT)  skilled nursing facility;transitional care  -MW  --          Planned OT Interventions    Planned Therapy Interventions (OT Eval)  activity tolerance training;BADL retraining;functional balance retraining;occupation/activity based interventions;patient/caregiver education/training;strengthening exercise;transfer/mobility retraining  -MW  --          OT Goals    Transfer Goal Selection (OT)  transfer, OT goal 1  -MW  --       Toileting Goal Selection (OT)  toileting, OT goal 1  -MW  --       Balance Goal Selection (OT)  balance, OT goal 1  -MW  --       Additional Documentation  Balance Goal Selection (OT) (Row)  -MW  --          Transfer Goal 1 (OT)    Activity/Assistive Device (Transfer Goal 1, OT)  sit-to-stand/stand-to-sit;bed-to-chair/chair-to-bed;toilet;shower chair  -MW  --       [place] Level/Cues Needed (Transfer Goal 1, OT)  contact guard assist  -MW  --       Time Frame (Transfer Goal 1, OT)  long term goal (LTG);by discharge  -MW  --       Progress/Outcome (Transfer Goal 1, OT)  goal ongoing  -MW  --          Toileting Goal 1 (OT)    Activity/Device (Toileting Goal 1, OT)  toileting skills, all;commode  -MW  --       [place] Level/Cues Needed (Toileting Goal 1, OT)  supervision required  -MW  --       Time Frame (Toileting Goal 1, OT)  long term goal (LTG);by discharge  -MW  --       Progress/Outcome (Toileting Goal 1, OT)  goal ongoing  -MW  --          Balance Goal 1 (OT)    Activity/Assistive Device (Balance Goal 1, OT)  sitting, dynamic;standing, dynamic  -MW  --       [place] Level/Cues Needed  (Balance Goal 1, OT)  contact guard assist  -  --       Time Frame (Balance Goal 1, OT)  long term goal (LTG);by discharge  -  --       Progress/Outcomes (Balance Goal 1, OT)  goal ongoing  -  --          Living Environment    Home Accessibility  stairs to enter home;tub/shower is not walk in  -  --         User Key  (r) = Recorded By, (t) = Taken By, (c) = Cosigned By    Initials Name Effective Dates    [facility] Valeria Teixeira RN 08/02/16 -      Jen Bravo OTR/MEL 08/28/18 -          Occupational Therapy Education                 Title: PT OT SLP Therapies (Done)     Topic: Occupational Therapy (Done)     Point: ADL training (Done)     Description:   Instruct learner(s) on proper safety adaptation and remediation techniques during self care or transfers.   Instruct in proper use of assistive devices.              Learning Progress Summary           Patient Acceptance, E, VU by  at 6/17/2020 1419                   Point: Home exercise program (Done)     Description:   Instruct learner(s) on appropriate technique for monitoring, assisting and/or progressing therapeutic exercises/activities.              Learning Progress Summary           Patient Acceptance, E, VU by  at 6/17/2020 1419                               User Key     Initials Effective Dates Name Provider Type Discipline     08/28/18 -  Jen Bravo, OTR/L Occupational Therapist OT                  OT Recommendation and Plan  Outcome Summary/Treatment Plan (OT)  Anticipated Discharge Disposition (OT): skilled nursing facility, transitional care  Planned Therapy Interventions (OT Eval): activity tolerance training, BADL retraining, functional balance retraining, occupation/activity based interventions, patient/caregiver education/training, strengthening exercise, transfer/mobility retraining  Therapy Frequency (OT Eval): 5 times/wk  Plan of Care Review  Plan of Care Reviewed With: patient  Plan of Care Reviewed With:  patient  Outcome Summary: OT eval completed. Pt alert and oriented x4, c/o pain in LLE and dizziness. S for all bed mobility. Serg to stand, very clumy coming into standing and does not follow vc for transfer training for increased safety. Takes a couple steps away from bed with CGA, x2 LOB requiring Serg to correct. Serg and vc with dynamic reaching task, very minimal reach barely out of [place] and pt loses balance with 75% of trials in all planes. Pt is at a significantly increased risk for falls 2' dizziness, decreased balance, impaired strength and endurance, as well as insight to deficits and awareness of need for assist. OT indicated to address stated deficits to increase independence and safety. Recommend d/c SNF.     Outcome Measures     Row Name 06/17/20 1400             How much help from another is currently needed...    Putting on and taking off regular lower body clothing?  3  -MW      Bathing (including washing, rinsing, and drying)  3  -MW      Toileting (which includes using toilet bed pan or urinal)  3  -MW      Putting on and taking off regular upper body clothing  4  -MW      Taking care of personal grooming (such as brushing teeth)  4  -MW      Eating meals  4  -MW      AM-PAC 6 Clicks Score (OT)  21  -MW         Functional Assessment    Outcome Measure Options  AM-PAC 6 Clicks Daily Activity (OT)  -MW        User Key  (r) = Recorded By, (t) = Taken By, (c) = Cosigned By    Initials Name Provider Type    Jen Bell, OTR/L Occupational Therapist          Time Calculation:   Time Calculation- OT     Row Name 06/17/20 1419             Time Calculation- OT    OT Start Time  1320 +8 min chart review   -MW      OT Stop Time  1408  -MW      OT Time Calculation (min)  48 min  -MW      OT Received On  06/17/20  -MW      OT Goal Re-Cert Due Date  06/27/20  -MW        User Key  (r) = Recorded By, (t) = Taken By, (c) = Cosigned By    Initials Name Provider Type    Jen Bell, OTR/L  Occupational Therapist        Therapy Charges for Today     Code Description Service Date Service Provider Modifiers Qty    32953422106 HC OT EVAL MOD COMPLEXITY 4 6/17/2020 Jen Bravo, OTR/L GO 1               CYNTHIA Richard/MEL  6/17/2020

## 2020-06-17 NOTE — PLAN OF CARE
VSS, patient complaints of nausea and dizziness this AM - medicated with zofran, 1U RBC given for Hg 7.8 this AM, Hg currently at 9.4, tolerated transfusion well, up with SBA, safety maintained, will continue to monitor    Problem: Patient Care Overview  Goal: Plan of Care Review  Outcome: Ongoing (interventions implemented as appropriate)  Flowsheets (Taken 6/17/2020 6914 by Jen Bravo, OTR/L)  Plan of Care Reviewed With: patient

## 2020-06-18 ENCOUNTER — READMISSION MANAGEMENT (OUTPATIENT)
Dept: CALL CENTER | Facility: HOSPITAL | Age: 75
End: 2020-06-18

## 2020-06-18 VITALS
TEMPERATURE: 97.9 F | WEIGHT: 156.6 LBS | HEIGHT: 67 IN | OXYGEN SATURATION: 96 % | DIASTOLIC BLOOD PRESSURE: 47 MMHG | RESPIRATION RATE: 16 BRPM | HEART RATE: 52 BPM | SYSTOLIC BLOOD PRESSURE: 130 MMHG | BODY MASS INDEX: 24.58 KG/M2

## 2020-06-18 LAB
ANION GAP SERPL CALCULATED.3IONS-SCNC: 10 MMOL/L (ref 5–15)
BASOPHILS # BLD AUTO: 0.03 10*3/MM3 (ref 0–0.2)
BASOPHILS NFR BLD AUTO: 0.4 % (ref 0–1.5)
BH BB BLOOD EXPIRATION DATE: NORMAL
BH BB BLOOD TYPE BARCODE: 6200
BH BB DISPENSE STATUS: NORMAL
BH BB PRODUCT CODE: NORMAL
BH BB UNIT NUMBER: NORMAL
BUN BLD-MCNC: 9 MG/DL (ref 8–23)
BUN/CREAT SERPL: 14.5 (ref 7–25)
CALCIUM SPEC-SCNC: 8.7 MG/DL (ref 8.6–10.5)
CHLORIDE SERPL-SCNC: 105 MMOL/L (ref 98–107)
CO2 SERPL-SCNC: 26 MMOL/L (ref 22–29)
CREAT BLD-MCNC: 0.62 MG/DL (ref 0.76–1.27)
CROSSMATCH INTERPRETATION: NORMAL
DEPRECATED RDW RBC AUTO: 57 FL (ref 37–54)
EOSINOPHIL # BLD AUTO: 0.19 10*3/MM3 (ref 0–0.4)
EOSINOPHIL NFR BLD AUTO: 2.5 % (ref 0.3–6.2)
ERYTHROCYTE [DISTWIDTH] IN BLOOD BY AUTOMATED COUNT: 17.5 % (ref 12.3–15.4)
GFR SERPL CREATININE-BSD FRML MDRD: 128 ML/MIN/1.73
GLUCOSE BLD-MCNC: 96 MG/DL (ref 65–99)
HCT VFR BLD AUTO: 27 % (ref 37.5–51)
HCT VFR BLD AUTO: 27.2 % (ref 37.5–51)
HGB BLD-MCNC: 9 G/DL (ref 13–17.7)
HGB BLD-MCNC: 9.1 G/DL (ref 13–17.7)
IMM GRANULOCYTES # BLD AUTO: 0.02 10*3/MM3 (ref 0–0.05)
IMM GRANULOCYTES NFR BLD AUTO: 0.3 % (ref 0–0.5)
LYMPHOCYTES # BLD AUTO: 0.82 10*3/MM3 (ref 0.7–3.1)
LYMPHOCYTES NFR BLD AUTO: 11 % (ref 19.6–45.3)
MCH RBC QN AUTO: 31.4 PG (ref 26.6–33)
MCHC RBC AUTO-ENTMCNC: 33.5 G/DL (ref 31.5–35.7)
MCV RBC AUTO: 93.8 FL (ref 79–97)
MONOCYTES # BLD AUTO: 0.75 10*3/MM3 (ref 0.1–0.9)
MONOCYTES NFR BLD AUTO: 10 % (ref 5–12)
NEUTROPHILS # BLD AUTO: 5.66 10*3/MM3 (ref 1.7–7)
NEUTROPHILS NFR BLD AUTO: 75.8 % (ref 42.7–76)
NRBC BLD AUTO-RTO: 0 /100 WBC (ref 0–0.2)
PLATELET # BLD AUTO: 241 10*3/MM3 (ref 140–450)
PMV BLD AUTO: 8.8 FL (ref 6–12)
POTASSIUM BLD-SCNC: 3.8 MMOL/L (ref 3.5–5.2)
RBC # BLD AUTO: 2.9 10*6/MM3 (ref 4.14–5.8)
SODIUM BLD-SCNC: 141 MMOL/L (ref 136–145)
UNIT  ABO: NORMAL
UNIT  RH: NORMAL
WBC NRBC COR # BLD: 7.47 10*3/MM3 (ref 3.4–10.8)

## 2020-06-18 PROCEDURE — 85025 COMPLETE CBC W/AUTO DIFF WBC: CPT | Performed by: INTERNAL MEDICINE

## 2020-06-18 PROCEDURE — 97535 SELF CARE MNGMENT TRAINING: CPT | Performed by: OCCUPATIONAL THERAPIST

## 2020-06-18 PROCEDURE — 97161 PT EVAL LOW COMPLEX 20 MIN: CPT | Performed by: PHYSICAL THERAPIST

## 2020-06-18 PROCEDURE — 80048 BASIC METABOLIC PNL TOTAL CA: CPT | Performed by: INTERNAL MEDICINE

## 2020-06-18 PROCEDURE — 99231 SBSQ HOSP IP/OBS SF/LOW 25: CPT | Performed by: CLINICAL NURSE SPECIALIST

## 2020-06-18 RX ORDER — ASPIRIN 81 MG/1
81 TABLET ORAL DAILY
Qty: 30 TABLET | Refills: 11 | Status: SHIPPED | OUTPATIENT
Start: 2020-06-18

## 2020-06-18 RX ORDER — PANTOPRAZOLE SODIUM 40 MG/1
40 TABLET, DELAYED RELEASE ORAL
Status: DISCONTINUED | OUTPATIENT
Start: 2020-06-18 | End: 2020-06-18 | Stop reason: HOSPADM

## 2020-06-18 RX ADMIN — PANTOPRAZOLE SODIUM 40 MG: 40 INJECTION, POWDER, FOR SOLUTION INTRAVENOUS at 08:12

## 2020-06-18 RX ADMIN — DILTIAZEM HYDROCHLORIDE 60 MG: 60 TABLET, FILM COATED ORAL at 12:28

## 2020-06-18 RX ADMIN — DILTIAZEM HYDROCHLORIDE 60 MG: 60 TABLET, FILM COATED ORAL at 00:06

## 2020-06-18 RX ADMIN — SODIUM CHLORIDE, PRESERVATIVE FREE 10 ML: 5 INJECTION INTRAVENOUS at 08:12

## 2020-06-18 RX ADMIN — ACETAMINOPHEN 650 MG: 325 TABLET, FILM COATED ORAL at 10:04

## 2020-06-18 RX ADMIN — DILTIAZEM HYDROCHLORIDE 60 MG: 60 TABLET, FILM COATED ORAL at 05:04

## 2020-06-18 NOTE — PLAN OF CARE
Problem: Patient Care Overview  Goal: Plan of Care Review  6/18/2020 0330 by Gail Rojas RN  Outcome: Ongoing (interventions implemented as appropriate)  Flowsheets  Taken 6/18/2020 0330  Progress: no change  Plan of Care Reviewed With: patient  Taken 6/18/2020 0317  Outcome Summary: Most recent hgb 9.0. Pain mild to mod in LLE. Pt refused narcotic, tylenol w/ some relief. Up ad dasia, voiding. No bloody stools so far this shift. No c/o nausea. Tele, sinus susie. No signs of distress noted, cont to monitor.

## 2020-06-18 NOTE — PROGRESS NOTES
Automatic IV to PO Pharmacy Note - General    Kingsley Lerma is a 74 y.o. male who meets the following criteria for IV to PO therapy conversion :     Tolerating oral fluids or 40ml/hour of enteral nutrition and oral route not otherwise compromised  Receiving other oral medications on a scheduled basis    Assessment/Plan  Based on this criteria, Protonix 40 mg IV Q12H has been changed to Protonix 40 mg PO BIDac per the directives and guidelines established by [facility] and Therapeutics Committee and [facility] Medical Executive Committee .       Juan Workman, PharmD  06/18/2011:59

## 2020-06-18 NOTE — THERAPY EVALUATION
Patient Name: Kingsley Lerma  : 1945    MRN: [id number]                              Today's Date: 2020       Admit Date: 2020    Visit Dx:     ICD-10-CM ICD-9-CM   1. Gastrointestinal hemorrhage, unspecified gastrointestinal hemorrhage type K92.2 578.9   2. Impaired mobility and ADLs Z74.09 V49.89    Z78.9    3. Impaired mobility Z74.09 799.89     Patient Active Problem List   Diagnosis   • Essential hypertension   • PVD (peripheral vascular disease) (CMS/HCC)   • Atrial fibrillation and flutter (CMS/HCC)   • Alcoholism /alcohol abuse (CMS/HCC)   • Non healing left heel wound   • Atrial fibrillation status post cardioversion (CMS/HCC)   • Tobacco use   • Syncope   • Current use of long term anticoagulation   • GI bleed   • Rectal bleeding   • Heme + stool   • Acute blood loss anemia   • Gastrointestinal hemorrhage     Past Medical History:   Diagnosis Date   • Alcohol abuse    • Arthritis    • Atrial flutter (CMS/HCC)    • Circulation problem    • History of tobacco abuse    • Hypertension    • PVD (peripheral vascular disease) (CMS/HCC)      Past Surgical History:   Procedure Laterality Date   • ADENOIDECTOMY     • COLONOSCOPY N/A 2020    Procedure: COLONOSCOPY WITH ANESTHESIA;  Surgeon: Mayela Ching MD;  Location: [facility] ENDOSCOPY;  Service: Gastroenterology;  Laterality: N/A;  preop; GI bleed   postop; avm's   PCP rachel calderón    • ENDOSCOPY N/A 2020    Procedure: ESOPHAGOGASTRODUODENOSCOPY WITH ANESTHESIA;  Surgeon: Mayela Ching MD;  Location: [facility] ENDOSCOPY;  Service: Gastroenterology;  Laterality: N/A;  proep; GI BLeed   postop; normal   KENNY calderón    • ENDOSCOPY N/A 6/15/2020    Procedure: ESOPHAGOGASTRODUODENOSCOPY WITH ANESTHESIA;  Surgeon: Carmine Banuelos MD;  Location: [facility] ENDOSCOPY;  Service: Gastroenterology;  Laterality: N/A;  pre gi bleed, anemia  post normal  dr rachel calderón     General Information     Row Name 20 0759          PT Evaluation  Time/Intention    Document Type  evaluation dx: GI bleed, anemia, 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  yes  -SB     Prior Level of Function  independent:;all household mobility;ADL's;driving;cooking  -SB     Existing Precautions/Restrictions  fall  -SB     Barriers to Rehab  none identified  -SB     Row Name 06/18/20 0759          Relationship/Environment    Lives With  alone  -SB     Row Name 06/18/20 0759          Resource/Environmental Concerns    Current Living Arrangements  home/apartment/condo step over tub, SC   -SB     Row Name 06/18/20 0759          Home Main Entrance    Number of Stairs, Main Entrance  two  -SB     Stair Railings, Main Entrance  none  -SB     Row Name 06/18/20 0759          Stairs Within Home, Primary    Number of Stairs, Within Home, Primary  none  -SB     Row Name 06/18/20 0759          Cognitive Assessment/Intervention- PT/OT    Orientation Status (Cognition)  oriented x 4;verbal cues/prompts needed for orientation  -SB     Personal Safety Interventions  gait belt;nonskid shoes/slippers when out of bed;fall prevention program maintained;muscle strengthening facilitated;supervised activity  -SB     Row Name 06/18/20 0759          Safety Issues, Functional Mobility    Safety Issues Affecting Function (Mobility)  safety precaution awareness;insight into deficits/self awareness  -SB     Impairments Affecting Function (Mobility)  balance;endurance/activity tolerance;strength;range of motion (ROM);sensation/sensory awareness;pain  -SB       User Key  (r) = Recorded By, (t) = Taken By, (c) = Cosigned By    Initials Name Provider Type    SB Andreia Charles, PREETI DPT Physical Therapist        Mobility     Row Name 06/18/20 0759          Bed Mobility Assessment/Treatment    Sit-Supine  [place] (Bed Mobility)  supervision  -SB     Assistive Device (Bed Mobility)  head of bed elevated  -SB     Row Name 06/18/20 0759          Sit-Stand Transfer    Sit-Stand [place] (Transfers)  supervision  -SB     Assistive Device (Sit-Stand Transfers)  cane, straight  -SB     Row Name 06/18/20 0759          Gait/Stairs Assessment/Training    Gait/Stairs Assessment/Training  gait/ambulation independence  -SB     [place] Level (Gait)  verbal cues;supervision  -SB     Assistive Device (Gait)  cane, straight  -SB     Distance in Feet (Gait)  10  -SB     Pattern (Gait)  3-point  -SB     Deviations/Abnormal Patterns (Gait)  gait speed decreased;aylin decreased  -SB     Left Sided Gait Deviations  weight shift ability decreased;heel strike decreased  -SB     Comment (Gait/Stairs)  pt ambulated from BR to bed but refused further mobility due to L foot pain  -SB       User Key  (r) = Recorded By, (t) = Taken By, (c) = Cosigned By    Initials Name Provider Type    Andreia Sterling, PT DPT Physical Therapist        Obj/Interventions     Row Name 06/18/20 0759          General ROM    GENERAL ROM COMMENTS  BLE WFL except L ankle imp 50%  -SB     Row Name 06/18/20 0759          MMT (Manual Muscle Testing)    General MMT Comments  RLE 4/5; LLE hip and knee 4/5, ankle 3-/5  -SB     Row Name 06/18/20 0759          Static Sitting Balance    Level of [place] (Unsupported Sitting, Static Balance)  independent  -SB     Sitting Position (Unsupported Sitting, Static Balance)  sitting on edge of bed  -SB     Row Name 06/18/20 0759          Static Standing Balance    Level of [place] (Supported Standing, Static Balance)  supervision  -SB     Assistive Device Utilized (Supported Standing, Static Balance)  cane, straight  -SB     Row Name 06/18/20 0758          Sensory Assessment/Intervention    Sensory General Assessment  -- mild deficits in L foot  -SB       User Key  (r) = Recorded By, (t) = Taken By, (c) =  Cosigned By    Initials Name Provider Type    Andreia Sterling, PT DPT Physical Therapist        Goals/Plan     Row Name 06/18/20 0759          Bed Mobility Goal 1 (PT)    Activity/Assistive Device (Bed Mobility Goal 1, PT)  bed mobility activities, all  -SB     [place] Level/Cues Needed (Bed Mobility Goal 1, PT)  independent  -SB     Time Frame (Bed Mobility Goal 1, PT)  long term goal (LTG)  -SB     Progress/Outcomes (Bed Mobility Goal 1, PT)  goal ongoing  -SB     Row Name 06/18/20 0759          Transfer Goal 1 (PT)    Activity/Assistive Device (Transfer Goal 1, PT)  sit-to-stand/stand-to-sit;bed-to-chair/chair-to-bed;walker, rolling  -SB     [place] Level/Cues Needed (Transfer Goal 1, PT)  conditional independence  -SB     Time Frame (Transfer Goal 1, PT)  long term goal (LTG)  -SB     Progress/Outcome (Transfer Goal 1, PT)  goal ongoing  -SB     Row Name 06/18/20 0759          Gait Training Goal 1 (PT)    Activity/Assistive Device (Gait Training Goal 1, PT)  gait (walking locomotion);improve balance and speed;increase endurance/gait distance;increase energy conservation;decrease fall risk;diminish gait deviation;decrease asymmetrical patterns;assistive device use;walker, rolling  -SB     [place] Level (Gait Training Goal 1, PT)  supervision required  -SB     Distance (Gait Goal 1, PT)  80  -SB     Time Frame (Gait Training Goal 1, PT)  long term goal (LTG)  -SB     Progress/Outcome (Gait Training Goal 1, PT)  goal ongoing  -SB       User Key  (r) = Recorded By, (t) = Taken By, (c) = Cosigned By    Initials Name Provider Type    Andreia Sterling, PT DPT Physical Therapist        Clinical Impression     Row Name 06/18/20 0759          Pain Assessment    Additional Documentation  Pain Scale: Numbers Pre/Post-Treatment (Group)  -SB     Row Name 06/18/20 0759          Pain Scale: Numbers Pre/Post-Treatment    Pain Scale: Numbers, Pretreatment  4/10  -SB     Pain Location - Side  Left  -SB     Pain  Location  foot  -SB     Pain Intervention(s)  Medication (See MAR);Ambulation/increased activity;Repositioned  -SB     Row Name 06/18/20 0759          Plan of Care Review    Plan of Care Reviewed With  patient  -SB     Progress  no change  -SB     Outcome Summary  PT eval completed. Pt alert and oriented x4 with cues and with c/o pain in L heel. Pt up in BR upon arrival with only one sock on. Pt ambulated to bed with SC and sup, and displays dec wt shifting onto LLE and unsteady gait pattern. Pt performed sit to sup with sup and HOB elevated. Pt edu on home safety and energy conservation, as well as benefits of using RW. Pt will benefit from skilled PT to improve functional mobility and safety. Recommend d/c to rehab facility, but patient is planning to return home and would benefit from RW and HH with assist from family if so due to safety concerns.   -SB     Row Name 06/18/20 0759          Physical Therapy Clinical Impression    Patient/Family Goals Statement (PT Clinical Impression)  go home  -SB     Criteria for Skilled Interventions Met (PT Clinical Impression)  yes;treatment indicated  -SB     Rehab Potential (PT Clinical Summary)  good, to achieve stated therapy goals  -SB     Predicted Duration of Therapy (PT)  until d/c  -SB     Row Name 06/18/20 0759          Positioning and Restraints    Pre-Treatment Position  bathroom  -SB     Post Treatment Position  bed  -SB     In Bed  call light within reach;encouraged to call for assist;sherry  -SB       User Key  (r) = Recorded By, (t) = Taken By, (c) = Cosigned By    Initials Name Provider Type    Andreia Sterling, PT DPT Physical Therapist        Outcome Measures     Row Name 06/18/20 0759          How much help from another person do you currently need...    Turning from your back to your side while in flat bed without using bedrails?  4  -SB     Moving from lying on back to sitting on the side of a flat bed without bedrails?  4  -SB     Moving to and from a  bed to a chair (including a wheelchair)?  3  -SB     Standing up from a chair using your arms (e.g., wheelchair, bedside chair)?  3  -SB     Climbing 3-5 steps with a railing?  3  -SB     To walk in hospital room?  3  -SB     AM-PAC 6 Clicks Score (PT)  20  -SB     Row Name 06/18/20 0759          Functional Assessment    Outcome Measure Options  AM-PAC 6 Clicks Basic Mobility (PT)  -SB       User Key  (r) = Recorded By, (t) = Taken By, (c) = Cosigned By    Initials Name Provider Type    Andreia Sterling, PT DPT Physical Therapist        Physical Therapy Education                 Title: PT OT SLP Therapies (Done)     Topic: Physical Therapy (Done)     Point: Mobility training (Done)     Description:   Instruct learner(s) on safety and technique for assisting patient out of bed, chair or wheelchair.  Instruct in the proper use of assistive devices, such as walker, crutches, cane or brace.              Patient Friendly Description:   It's important to get you on your feet again, but we need to do so in a way that is safe for you. Falling has serious consequences, and your personal safety is the most important thing of all.        When it's time to get out of bed, one of us or a family member will sit next to you on the bed to give you support.     If your doctor or nurse tells you to use a walker, crutches, a cane, or a brace, be sure you use it every time you get out of bed, even if you think you don't need it.    Learning Progress Summary           Patient Acceptance, E, VU,NR by SB at 6/18/2020 0938    Comment:  pt edu on home safety, energy conservation, use of RW, POC, d/c plans                   Point: Home exercise program (Done)     Description:   Instruct learner(s) on appropriate technique for monitoring, assisting and/or progressing patient with therapeutic exercises and activities.              Learning Progress Summary           Patient Acceptance, E, VU,NR by SB at 6/18/2020 0938    Comment:  pt edu on  home safety, energy conservation, use of RW, POC, d/c plans                   Point: Body mechanics (Done)     Description:   Instruct learner(s) on proper positioning and spine alignment for patient and/or caregiver during mobility tasks and/or exercises.              Learning Progress Summary           Patient Acceptance, E, VU,NR by SB at 6/18/2020 0938    Comment:  pt edu on home safety, energy conservation, use of RW, POC, d/c plans                   Point: Precautions (Done)     Description:   Instruct learner(s) on prescribed precautions during mobility and gait tasks              Learning Progress Summary           Patient Acceptance, E, VU,NR by SB at 6/18/2020 0938    Comment:  pt edu on home safety, energy conservation, use of RW, POC, d/c plans                               User Key     Initials Effective Dates Name Provider Type Discipline    SB 10/31/19 -  Andreia Charles, PT DPT Physical Therapist PT              PT Recommendation and Plan  Planned Therapy Interventions (PT Eval): balance training, bed mobility training, gait training, home exercise program, patient/family education, transfer training, strengthening, ROM (range of motion), stair training  Outcome Summary/Treatment Plan (PT)  Anticipated Equipment Needs at Discharge (PT): front wheeled walker  Anticipated Discharge Disposition (PT): skilled nursing facility, home with assist, home with home health  Plan of Care Reviewed With: patient  Progress: no change  Outcome Summary: PT eval completed. Pt alert and oriented x4 with cues and with c/o pain in L heel. Pt up in BR upon arrival with only one sock on. Pt ambulated to bed with SC and sup, and displays dec wt shifting onto LLE and unsteady gait pattern. Pt performed sit to sup with sup and HOB elevated. Pt edu on home safety and energy conservation, as well as benefits of using RW. Pt will benefit from skilled PT to improve functional mobility and safety. Recommend d/c to rehab facility,  but patient is planning to return home and would benefit from RW and HH with assist from family if so due to safety concerns.      Time Calculation:   PT Charges     Row Name 06/18/20 0941             Time Calculation    Start Time  0816 8 min chart review for a total of 40 min  -SB      Stop Time  0848  -SB      Time Calculation (min)  32 min  -SB      PT Received On  06/18/20  -SB      PT Goal Re-Cert Due Date  06/28/20  -SB        User Key  (r) = Recorded By, (t) = Taken By, (c) = Cosigned By    Initials Name Provider Type    Andreia Sterling, PT DPT Physical Therapist        Therapy Charges for Today     Code Description Service Date Service Provider Modifiers Qty    [id number] HC PT EVAL LOW COMPLEXITY 3 6/18/2020 Andreia Charles PT DPT GP 1          PT G-Codes  Outcome Measure Options: AM-PAC 6 Clicks Basic Mobility (PT)  AM-PAC 6 Clicks Score (PT): 20  AM-PAC 6 Clicks Score (OT): 21    Andreia Charles PT DPT  6/18/2020

## 2020-06-18 NOTE — PLAN OF CARE
Problem: Patient Care Overview  Goal: Plan of Care Review  Outcome: Ongoing (interventions implemented as appropriate)  Flowsheets  Taken 6/18/2020 0925  Progress: no change  Taken 6/18/2020 7803  Plan of Care Reviewed With: patient  Outcome Summary: PT eval completed. Pt alert and oriented x4 with cues and with c/o pain in L heel. Pt up in BR upon arrival with only one sock on. Pt ambulated to bed with SC and sup, and displays dec wt shifting onto LLE and unsteady gait pattern. Pt performed sit to sup with sup and HOB elevated. Pt edu on home safety and energy conservation, as well as benefits of using RW. Pt will benefit from skilled PT to improve functional mobility and safety. Recommend d/c to rehab facility, but patient is planning to return home and would benefit from RW and HH with assist from family if so due to safety concerns.

## 2020-06-18 NOTE — OUTREACH NOTE
Prep Survey      Responses   [facility] facility patient discharged from?  [facility]   Is LACE score < 7 ?  No   Eligibility  [facility]   Date of Admission  06/13/20   Date of Discharge  06/18/20   Discharge Disposition  Home or Self Care   Discharge diagnosis  GI bleed  Rectal bleeding  Alcoholism /alcohol abuse    COVID-19 Test Status  Negative   Does the patient have one of the following disease processes/diagnoses(primary or secondary)?  Other   Does the patient have Home health ordered?  No   Is there a DME ordered?  No   Prep survey completed?  Yes          Lucretia Tamayo RN

## 2020-06-18 NOTE — PROGRESS NOTES
Continued Stay Note  [facility]     Patient Name: Kingsley Lerma  MRN: [id number]  Today's Date: 6/18/2020    Admit Date: 6/13/2020    Discharge Plan     Row Name 06/18/20 1151       Plan    Plan  Home    Patient/Family in Agreement with Plan  yes    Final Discharge Disposition Code  01 - home or self-care    Final Note  Pt is being d/c'ed today. Spoke with him to update the d/c plan. He says he does not want rehab and he does not want home health. Pt denies needs at this time. Will fax d/c summary to the [facility] when it is completed.         Discharge Codes    No documentation.       Expected Discharge Date and Time     Expected Discharge Date Expected Discharge Time    Jun 18, 2020             SHREYAS Somers

## 2020-06-18 NOTE — DISCHARGE SUMMARY
[facility] Medicine Services  DISCHARGE SUMMARY       Date of Admission: 6/13/2020  Date of Discharge:  6/18/2020  Primary Care Physician: BARRY Urrutia MD    Presenting Problem/History of Present Illness:  Gastrointestinal hemorrhage, unspecified gastrointestinal hemorrhage type [K92.2]     Final Discharge Diagnoses:  Active Hospital Problems    Diagnosis   • **GI bleed   • Acute blood loss anemia   • Gastrointestinal hemorrhage     Added automatically from request for surgery [id number]     • Rectal bleeding     Added automatically from request for surgery [id number]     • Current use of long term anticoagulation   • Alcoholism /alcohol abuse (CMS/[facility])   • Atrial fibrillation and flutter (CMS/[facility])   • Essential hypertension   • PVD (peripheral vascular disease) (CMS/[facility])      1.  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  Afib  -Cardizem  -Amiodarone  -Hold AC given bleeding     3.  HTN  -Lisinopril     4.  Bladder wall thickening/questionable bladder mass  -Urology planning outpt workup     5.  PVD  -Pletal held  -Vascular surgery following       Consults:   1.  GI Dr. Banuelos  2.  Vascular Surgery:  Dr. Donaldson    Procedures Performed:   EGD  CT Engerography    Pertinent Test Results:   EGD unremarkable    CT Enterography:  1.  No evidence of active bowel inflammation or focal bowel wall  thickening. No GI bleed identified.  2.  Fluid-filled colon. This may be related to oral contrast but  correlate clinically for diarrheal illness.  3.  Enlarged prostate.  4.  Severe atherosclerotic plaque and calcification throughout the  normal caliber abdominal aorta. Near complete occlusion of the RIGHT  external iliac artery and high-grade stenosis of the LEFT external iliac  artery. Heavy atherosclerotic calcification of both renal artery  origins.  5.  Severe arthritic change in the RIGHT hip joint with  "bone-on-bone  appearance and extensive subchondral cystic change/bone resorption.  Moderate-sized RIGHT hip joint effusion. Imaging appearance suggestive  of amyloid arthropathy.    Had Colonoscopy on previous admission that Showed AVM's      Chief Complaint on Day of Discharge:   Feeling better    History of Present Illness on Day of Discharge:   Doing well, no further bleeding.  No leg pain.  Wants to go home    Hospital Course:  The patient is a 74 y.o. male who presented to [facility] with rectal bleeding.  GI was consulted.  He underwent EGD Which was unremarkable.      He also underwent CT Enterography which did not show bleeding.  It did show severe PVD.  Vascular surgery was consulted.  They plan to see him as an outpatient to discuss treatment options.       During the course of the hospitalization, he required 3 units of PRBC transfusion.  His H&H is presently stable.  As this is his 2nd admission in a week for GI bleeding, we discussed stopping his anticoagulation.  His HAS BLED score is 4.  We will have him follow up with his Cardiologist, Dr. Farias to discuss other options for stroke risk reduction.  We will start Aspirin for now.      He has had an extensive workup for his GI bleeding across two hospitalization.  He has AVM's.  These will bleed periodically and require periodic blood transfusion.  We ask that his PCP monitor his hemoglobin frequently.  He can have outpatient transfusions.  He does not require admission for blood transfusion.      He is comfortable with being discharged and with the discharge plan.  He was given the chance to ask questions and all questions were answered to his satisfaction.         Condition on Discharge:  stable    Physical Exam on Discharge:  /47 (BP Location: Left arm, Patient Position: Lying)   Pulse 52   Temp 97.9 °F (36.6 °C) (Oral)   Resp 16   Ht 170.2 cm (67\")   Wt 71 kg (156 lb 9.6 oz)   SpO2 96%   BMI 24.53 kg/m²   Physical Exam "   Constitutional: He is oriented to person, place, and time. He appears well-developed and well-nourished.   HENT:   Head: Normocephalic and atraumatic.   Right Ear: External ear normal.   Left Ear: External ear normal.   Nose: Nose normal.   Mouth/Throat: Oropharynx is clear and moist.   Eyes: Pupils are equal, round, and reactive to light. Conjunctivae and EOM are normal. Right eye exhibits no discharge. Left eye exhibits no discharge. No scleral icterus.   Neck: Normal range of motion. Neck supple. No tracheal deviation present. No thyromegaly present.   Cardiovascular: Normal rate, regular rhythm, normal heart sounds and intact distal pulses. Exam reveals no gallop and no friction rub.   No murmur heard.  Pulmonary/Chest: Effort normal and breath sounds normal. No stridor. No respiratory distress. He has no wheezes. He has no rales. He exhibits no tenderness.   Abdominal: Soft. Bowel sounds are normal. He exhibits no distension and no mass. There is no tenderness. There is no rebound and no guarding. No hernia.   Musculoskeletal: Normal range of motion. He exhibits no edema or deformity.   Lymphadenopathy:     He has no cervical adenopathy.   Neurological: He is alert and oriented to person, place, and time. He has normal reflexes. He displays normal reflexes. No cranial nerve deficit. He exhibits normal muscle tone. Coordination normal.   Skin: Skin is warm and dry. No rash noted. No erythema. No pallor.   Psychiatric: He has a normal mood and affect. His behavior is normal. Judgment and thought content normal.   Vitals reviewed.        Discharge Disposition:  Home or Self Care    Discharge Medications:     Discharge Medications      New Medications      Instructions Start Date   aspirin 81 MG EC tablet   81 mg, Oral, Daily         Continue These Medications      Instructions Start Date   amiodarone 200 MG tablet  Commonly known as:  PACERONE   200 mg, Oral, Every 24 Hours Scheduled      Centrum Adults tablet    1 tablet, Oral, Daily      cilostazol 50 MG tablet  Commonly known as:  PLETAL   50 mg, Oral, 2 Times Daily      dilTIAZem  MG 24 hr capsule  Commonly known as:  CARDIZEM CD   120 mg, Oral, Daily      famotidine 20 MG tablet  Commonly known as:  PEPCID   20 mg, Oral, Daily      ferrous sulfate 325 (65 FE) MG tablet   325 mg, Oral, Daily With Breakfast      guaiFENesin 600 MG 12 hr tablet  Commonly known as:  MUCINEX   600 mg, Oral, 2 Times Daily      hydrOXYzine 25 MG tablet  Commonly known as:  ATARAX   25 mg, Oral, 3 Times Daily PRN      lidocaine 4 % cream  Commonly known as:  LMX   Topical, As Needed, Apply to left heel.       lisinopril 40 MG tablet  Commonly known as:  PRINIVIL,ZESTRIL   20 mg, Oral, Daily      pantoprazole 20 MG EC tablet  Commonly known as:  PROTONIX   20 mg, Oral, Daily      sildenafil 50 MG tablet  Commonly known as:  VIAGRA   50 mg, Oral, Daily PRN      Vitamin D 50 MCG (2000 UT) tablet   2,000 Units, Oral, Daily         Stop These Medications    rivaroxaban 20 MG tablet  Commonly known as:  XARELTO            Discharge Diet: Regular    Activity at Discharge: as tolerated    Discharge Care Plan/Instructions:   Go to ER for black or bloody stools    Follow-up Appointments:   Future Appointments   Date Time Provider Department Center   7/1/2020  1:50 PM Justin Gallo MD [facility] None   7/7/2020 10:15 AM BARRY Urrutia MD [facility]   7/7/2020 11:30 AM Luis Enrique Donaldson DO [facility] None   7/9/2020  1:00 PM [facility] NP2 [facility]   9/23/2020  3:30 PM Nolan Farias MD [facility]       Test Results Pending at Discharge: N/A  Marco A Katz MD  06/18/20  16:43    Time: 40 minutes

## 2020-06-18 NOTE — PROGRESS NOTES
[facility]  Inpatient Progress Note  Kingsley Lerma  1945 6/18/2020  Reason for Follow Up:  GI bleed    Subjective     Subjective:   Pt is awake alert oriented states feeling much better. No signs of GI bleeding had a brown BM small amount this am. No nausea or vomiting. Tolerated a regular diet this am.     Current Facility-Administered Medications:   •  acetaminophen (TYLENOL) tablet 650 mg, 650 mg, Oral, Q4H PRN, Carmine Banuelos MD, 650 mg at 06/18/20 1004  •  dilTIAZem (CARDIZEM) tablet 60 mg, 60 mg, Oral, Q6H, Carmine Banuelos MD, 60 mg at 06/18/20 0504  •  ondansetron (ZOFRAN) injection 4 mg, 4 mg, Intravenous, Q6H PRN, Carmine Banuelos MD, 4 mg at 06/17/20 0349  •  oxyCODONE-acetaminophen (PERCOCET)  MG per tablet 1 tablet, 1 tablet, Oral, Q4H PRN, Marco A Katz MD, 1 tablet at 06/17/20 0349  •  pantoprazole (PROTONIX) injection 40 mg, 40 mg, Intravenous, Q12H, Carmine Banuelos MD, 40 mg at 06/18/20 0812  •  [COMPLETED] Insert peripheral IV, , , Once **AND** sodium chloride 0.9 % flush 10 mL, 10 mL, Intravenous, PRN, Carmine Banuelos MD  •  sodium chloride 0.9 % flush 10 mL, 10 mL, Intravenous, Q12H, Carmine Banuelos MD, 10 mL at 06/18/20 0812  •  sodium chloride 0.9 % flush 10 mL, 10 mL, Intravenous, PRN, Carmine Banuelos MD, 10 mL at 06/17/20 0349    Review of Systems:    Review of Systems   Constitutional: Negative for activity change, appetite change, chills, diaphoresis, fatigue, fever and unexpected weight change.   HENT: Negative for ear pain, hearing loss, mouth sores, sore throat, trouble swallowing and voice change.    Eyes: Negative.    Respiratory: Negative for cough, choking, shortness of breath and wheezing.    Cardiovascular: Negative for chest pain and palpitations.   Gastrointestinal: Negative for abdominal pain, blood in stool, constipation, diarrhea, nausea and vomiting.   Endocrine: Negative for cold intolerance and heat intolerance.    Genitourinary: Negative for decreased urine volume, dysuria, frequency, hematuria and urgency.   Musculoskeletal: Negative for back pain, gait problem and myalgias.   Skin: Negative for color change, pallor and rash.   Allergic/Immunologic: Negative for food allergies and immunocompromised state.   Neurological: Negative for dizziness, tremors, seizures, syncope, weakness, light-headedness, numbness and headaches.   Hematological: Negative for adenopathy. Does not bruise/bleed easily.   Psychiatric/Behavioral: Negative for agitation and confusion. The patient is not nervous/anxious.    All other systems reviewed and are negative.       Objective     Vital Signs  Temp:  [97.9 °F (36.6 °C)-98.6 °F (37 °C)] 97.9 °F (36.6 °C)  Heart Rate:  [52-59] 52  Resp:  [16] 16  BP: (130-151)/(47-79) 130/47  Body mass index is 24.53 kg/m².    Intake/Output Summary (Last 24 hours) at 6/18/2020 1018  Last data filed at 6/18/2020 0403  Gross per 24 hour   Intake 300 ml   Output 875 ml   Net -575 ml     No intake/output data recorded.       Physical Exam:   General: patient awake, alert and cooperative, no acute distress   Eyes: Normal lids and lashes, no scleral icterus   Neck: supple, no JVD   Skin: warm and dry   Cardiovascular: regular rhythm and rate, no murmurs auscultated   Pulm: clear to auscultation bilaterally, regular and unlabored   Abdomen: soft, nontender, nondistended; normal bowel sounds   Psychiatric: Normal mood and behavior; converses appropriately     Results Review:    I have reviewed all of the patients current test results    Results from last 7 days   Lab Units 06/18/20  0539 06/17/20  2346 06/17/20  1608  06/17/20  0521  06/16/20  0005   WBC 10*3/mm3 7.47  --   --   --  7.66  --  6.31   HEMOGLOBIN g/dL 9.1* 9.0* 9.4*   < > 7.8*   < > 7.5*   HEMATOCRIT % 27.2* 27.0* 28.0*   < > 23.1*   < > 21.4*   PLATELETS 10*3/mm3 241  --   --   --  242  --  187    < > = values in this interval not displayed.       Results  from last 7 days   Lab Units 06/18/20  0539 06/17/20  0521 06/16/20  0005  06/13/20  1829   SODIUM mmol/L 141 142 142   < > 145   POTASSIUM mmol/L 3.8 3.8 3.6   < > 4.0   CHLORIDE mmol/L 105 107 108*   < > 112*   CO2 mmol/L 26.0 24.0 24.0   < > 21.0*   BUN mg/dL 9 7* 8   < > 28*   CREATININE mg/dL 0.62* 0.65* 0.68*   < > 0.87   CALCIUM mg/dL 8.7 8.7 8.3*   < > 8.5*   BILIRUBIN mg/dL  --   --   --   --  0.2   ALK PHOS U/L  --   --   --   --  76   ALT (SGPT) U/L  --   --   --   --  16   AST (SGOT) U/L  --   --   --   --  17   GLUCOSE mg/dL 96 84 91   < > 101*    < > = values in this interval not displayed.       Results from last 7 days   Lab Units 06/13/20  1829   INR  1.80*       No results found for: LIPASE    Radiology:    Imaging Results (Last 24 Hours)     ** No results found for the last 24 hours. **            Assessment/Plan     Patient Active Problem List   Diagnosis Code   • Essential hypertension I10   • PVD (peripheral vascular disease) (CMS/[facility]) I73.9   • Atrial fibrillation and flutter (CMS/[facility]) I48.91, I48.92   • Alcoholism /alcohol abuse (CMS/[facility]) F10.20   • Non healing left heel wound S91.302A   • Atrial fibrillation status post cardioversion (CMS/[facility]) I48.91   • Tobacco use Z72.0   • Syncope R55   • Current use of long term anticoagulation Z79.01   • GI bleed K92.2   • Rectal bleeding K62.5   • Heme + stool R19.5   • Acute blood loss anemia D62   • Gastrointestinal hemorrhage K92.2       1. GI bleed  2. Blood loss anemia  3. PVD    Pt with multiple AVMs at risk for bleeding again however at this time he appears stable. No current signs of bleeding and Hgb is stable at this time. Tolerating regular diet.   To follow up with vascular at discharge.     EMR Dragon/transcription disclaimer: Much of this encounter note is electronic transcription/translation of spoken language to printed text. The electronic translation of spoken language may be erroneous, or at times, nonsensical words or phrases may be  inadvertently transcribed. Although I have reviewed the note for such errors, some may still exist.    Tomeka Godwin, APRN  06/18/20  10:18    No further bleeding.  Okay to discharge from GI standpoint.  Follow-up as needed.  Will sign off

## 2020-06-18 NOTE — PLAN OF CARE
Problem: Patient Care Overview  Goal: Plan of Care Review  Outcome: Ongoing (interventions implemented as appropriate)  Flowsheets  Taken 6/18/2020 0940 by Andreia Charles, PT DPT  Progress: no change  Taken 6/18/2020 0808 by Peter Rosario, OTR/L  Plan of Care Reviewed With: patient  Outcome Summary: OT tx completed. Pt alert and oriented x4, but with decreased safety awareness and need for assist noted. pt reports pain 4/10 in L heel. Pt up ad dasia in BR when OT/PT arrived with straight cane and only one sock on. Pt has noted decreased balance and safety awareness. pt ws supervision for functional mobility and sit to supine. pt discusses need for RW at home, although does not use it once ambulating in room. Pt independent for toileting. Pt heavily educated on home safety, ADL adaptation and AE/AD recommendations. Pt plans to d/c home this date but would benefit from d/c to rehab for further therapy. If d/c home recommend HH with assist and RW and tub bench.

## 2020-06-18 NOTE — THERAPY TREATMENT NOTE
Acute Care - Occupational Therapy Treatment Note  [facility]     Patient Name: Kingsley Lerma  : 1945  MRN: [id number]  Today's Date: 2020  Onset of Illness/Injury or Date of Surgery: 20  Date of Referral to OT: 20  Referring Physician: Dr. Katz    Admit Date: 2020       ICD-10-CM ICD-9-CM   1. Gastrointestinal hemorrhage, unspecified gastrointestinal hemorrhage type K92.2 578.9   2. Impaired mobility and ADLs Z74.09 V49.89    Z78.9    3. Impaired mobility Z74.09 799.89     Patient Active Problem List   Diagnosis   • Essential hypertension   • PVD (peripheral vascular disease) (CMS/HCC)   • Atrial fibrillation and flutter (CMS/HCC)   • Alcoholism /alcohol abuse (CMS/HCC)   • Non healing left heel wound   • Atrial fibrillation status post cardioversion (CMS/HCC)   • Tobacco use   • Syncope   • Current use of long term anticoagulation   • GI bleed   • Rectal bleeding   • Heme + stool   • Acute blood loss anemia   • Gastrointestinal hemorrhage     Past Medical History:   Diagnosis Date   • Alcohol abuse    • Arthritis    • Atrial flutter (CMS/HCC)    • Circulation problem    • History of tobacco abuse    • Hypertension    • PVD (peripheral vascular disease) (CMS/HCC)      Past Surgical History:   Procedure Laterality Date   • ADENOIDECTOMY     • COLONOSCOPY N/A 2020    Procedure: COLONOSCOPY WITH ANESTHESIA;  Surgeon: Mayela Ching MD;  Location: [facility] ENDOSCOPY;  Service: Gastroenterology;  Laterality: N/A;  preop; GI bleed   postop; avm's   PCP rachel calderón    • ENDOSCOPY N/A 2020    Procedure: ESOPHAGOGASTRODUODENOSCOPY WITH ANESTHESIA;  Surgeon: Mayela Ching MD;  Location: [facility] ENDOSCOPY;  Service: Gastroenterology;  Laterality: N/A;  proep; GI BLeed   postop; normal   PCP rachel calderón    • ENDOSCOPY N/A 6/15/2020    Procedure: ESOPHAGOGASTRODUODENOSCOPY WITH ANESTHESIA;  Surgeon: Carmine Banuelos MD;  Location: [facility] ENDOSCOPY;  Service: Gastroenterology;   Laterality: N/A;  pre gi bleed, anemia  post normal  dr rachel calderón       Therapy Treatment    Rehabilitation Treatment Summary     Row Name 06/18/20 0808             Treatment Time/Intention    Discipline  occupational therapist  -MM      Document Type  therapy note (daily note)  -MM      Subjective Information  complains of;weakness;fatigue;pain  -MM      Mode of Treatment  occupational therapy  -MM      Patient/Family Observations  no family present, up in BR with straight cane  -MM      Care Plan Review  care plan/treatment goals reviewed;risks/benefits reviewed;current/potential barriers reviewed;patient/other agree to care plan;evaluation/treatment results reviewed  -MM      Existing Precautions/Restrictions  fall  -MM      Recorded by [MM] Peter Rosario OTR/L 06/18/20 1632      Row Name 06/18/20 0808             Safety Issues, Functional Mobility    Safety Issues Affecting Function (Mobility)  safety precaution awareness;insight into deficits/self awareness  -MM      Impairments Affecting Function (Mobility)  balance;endurance/activity tolerance;strength;range of motion (ROM);sensation/sensory awareness;pain  -MM      Recorded by [MM] Peter Rosario OTR/L 06/18/20 1632      Row Name 06/18/20 0808             Bed Mobility Assessment/Treatment    Bed Mobility Assessment/Treatment  sit-supine  -MM      Sit-Supine [place] (Bed Mobility)  supervision  -MM      Assistive Device (Bed Mobility)  head of bed elevated  -MM      Recorded by [MM] Peter Rosario OTR/L 06/18/20 1632      Row Name 06/18/20 0808             Functional Mobility    Functional Mobility- Ind. Level  supervision required;conditional independence  -MM      Functional Mobility- Device  straight cane  -MM      Functional Mobility- Comment  up ad dasia in BR, decreased safety and balance noted  -MM      Recorded by [MM] Peter Rosario OTR/L 06/18/20 1632      Row Name 06/18/20 0808             Transfer Assessment/Treatment     Transfer Assessment/Treatment  sit-stand transfer;stand-sit transfer  -MM      Recorded by [MM] Peter Rosario, OTR/L 06/18/20 1632      Row Name 06/18/20 0808             Sit-Stand Transfer    Sit-Stand [place] (Transfers)  supervision  -MM      Assistive Device (Sit-Stand Transfers)  cane, straight  -MM      Recorded by [MM] Peter Rosario, OTR/L 06/18/20 1632      Row Name 06/18/20 0808             Stand-Sit Transfer    Stand-Sit [place] (Transfers)  supervision  -MM      Assistive Device (Stand-Sit Transfers)  cane, straight  -MM      Recorded by [MM] Peter Rosario, OTR/L 06/18/20 1632      Row Name 06/18/20 0808             ADL Assessment/Intervention    BADL Assessment/Intervention  toileting;lower body dressing  -MM      Recorded by [MM] Peter Rosario, OTR/L 06/18/20 1632      Row Name 06/18/20 0808             Lower Body Dressing Assessment/Training    Comment (Lower Body Dressing)  Educated on home safety, adl adaptations, AE/AD recommendations.   -MM      Recorded by [MM] Peter Rosario, OTR/L 06/18/20 1632      Row Name 06/18/20 0808             Toileting Assessment/Training    [place] Level (Toileting)  toileting skills;independent  -MM      Toileting Position  unsupported standing  -MM      Recorded by [MM] Peter Rosario, OTR/L 06/18/20 1632      Row Name 06/18/20 0808             BADL Safety/Performance    Impairments, BADL Safety/Performance  balance;endurance/activity tolerance;strength;trunk/postural control  -MM      Recorded by [MM] Peter Rosario, OTR/L 06/18/20 1632      Row Name 06/18/20 0808             Positioning and Restraints    Pre-Treatment Position  bathroom  -MM      Post Treatment Position  bed  -MM      In Bed  call light within reach;encouraged to call for assist;fowlers  -MM      Recorded by [MM] Peter Rosario, OTR/L 06/18/20 1632      Row Name 06/18/20 0808             Pain Scale: Numbers Pre/Post-Treatment    Pain Scale: Numbers,  Pretreatment  4/10  -MM      Pain Scale: Numbers, Post-Treatment  -- no reported change  -MM      Pain Location - Side  Left  -MM      Pain Location  foot  -MM      Pain Intervention(s)  Medication (See MAR);Ambulation/increased activity;Repositioned  -MM      Recorded by [MM] Peter Rosario, OTR/L 06/18/20 1632      Row Name                Wound 02/11/20 1200 Left heel Pressure Injury    Wound - Properties Group Date first assessed: 02/11/20 [MW] Time first assessed: 1200 [MW2] Present on Hospital Admission: Y [MW] Side: Left [MW] Location: heel [MW] Primary Wound Type: Pressure inj [MW2] Stage, Pressure Injury: Stage 2 [MW] Recorded by:  [MW] Valeria Teixeira RN 02/11/20 1918 [MW2] Valeria Teixeira RN 02/11/20 1920    Row Name 06/18/20 0808             Plan of Care Review    Plan of Care Reviewed With  patient  -MM      Progress  no change  -MM      Outcome Summary  OT tx completed. Pt alert and oriented x4, but with decreased safety awareness and need for assist noted. pt reports pain 4/10 in L heel. Pt up ad dasia in BR when OT/PT arrived with straight cane and only one sock on. Pt has noted decreased balance and safety awareness. pt ws supervision for functional mobility and sit to supine. pt discusses need for RW at home, although does not use it once ambulating in room. Pt independent for toileting. Pt heavily educated on home safety, ADL adaptation and AE/AD recommendations. Pt plans to d/c home this date but would benefit from d/c to rehab for further therapy. If d/c home recommend HH with assist and RW and tub bench.  -MM      Recorded by [MM] Peter Rosario, OTR/L 06/18/20 1632      Row Name 06/18/20 0808             Outcome Summary/Treatment Plan (OT)    Daily Summary of Progress (OT)  progress towards functional goals is fair  -MM      Barriers to Overall Progress (OT)  decreased safety awareness  -MM      Plan for Continued Treatment (OT)  cont OT POC  -MM      Anticipated Discharge  Disposition (OT)  skilled nursing facility  -MM      Recorded by [MM] Peter Rosario, OTR/L 06/18/20 1332        User Key  (r) = Recorded By, (t) = Taken By, (c) = Cosigned By    Initials Name Effective Dates Discipline    Valeria Hinojosa RN 08/02/16 -  Nurse    MM Peter Rosario, OTR/L 04/03/18 -  OT        Wound 02/11/20 1200 Left heel Pressure Injury (Active)   Dressing Appearance dry;intact;no drainage 6/18/2020  8:00 AM   Closure [place] 6/18/2020  8:00 AM   Base dressing in place, unable to visualize 6/18/2020  8:00 AM   Periwound intact;dry 6/17/2020  8:48 PM   Periwound Temperature warm 6/17/2020  8:48 PM   Periwound Skin Turgor firm 6/17/2020  8:48 PM   Drainage Amount none 6/18/2020  8:00 AM   Dressing Care, Wound other (see comments) 6/18/2020  8:00 AM     Rehab Goal Summary     Row Name 06/18/20 0759             Bed Mobility Goal 1 (PT)    Activity/Assistive Device (Bed Mobility Goal 1, PT)  bed mobility activities, all  -SB      [place] Level/Cues Needed (Bed Mobility Goal 1, PT)  independent  -SB      Time Frame (Bed Mobility Goal 1, PT)  long term goal (LTG)  -SB      Progress/Outcomes (Bed Mobility Goal 1, PT)  goal ongoing  -SB         Transfer Goal 1 (PT)    Activity/Assistive Device (Transfer Goal 1, PT)  sit-to-stand/stand-to-sit;bed-to-chair/chair-to-bed;walker, rolling  -SB      [place] Level/Cues Needed (Transfer Goal 1, PT)  conditional independence  -SB      Time Frame (Transfer Goal 1, PT)  long term goal (LTG)  -SB      Progress/Outcome (Transfer Goal 1, PT)  goal ongoing  -SB         Gait Training Goal 1 (PT)    Activity/Assistive Device (Gait Training Goal 1, PT)  gait (walking locomotion);improve balance and speed;increase endurance/gait distance;increase energy conservation;decrease fall risk;diminish gait deviation;decrease asymmetrical patterns;assistive device use;walker, rolling  -SB      [place] Level (Gait Training Goal 1, PT)  supervision required  -SB       Distance (Gait Goal 1, PT)  80  -SB      Time Frame (Gait Training Goal 1, PT)  long term goal (LTG)  -SB      Progress/Outcome (Gait Training Goal 1, PT)  goal ongoing  -SB        User Key  (r) = Recorded By, (t) = Taken By, (c) = Cosigned By    Initials Name Provider Type Discipline    Andreia Sterling, PT DPT Physical Therapist PT        Occupational Therapy Education                 Title: PT OT SLP Therapies (In Progress)     Topic: Occupational Therapy (In Progress)     Point: ADL training (In Progress)     Description:   Instruct learner(s) on proper safety adaptation and remediation techniques during self care or transfers.   Instruct in proper use of assistive devices.              Learning Progress Summary           Patient Acceptance, E, NR by  at 6/18/2020 1632    Comment:  benefits of acitivity, home safety, d/c recommendations, adl adaptations and AE/AD recommendations.    Acceptance, E, VU by  at 6/17/2020 1419                   Point: Home exercise program (Done)     Description:   Instruct learner(s) on appropriate technique for monitoring, assisting and/or progressing therapeutic exercises/activities.              Learning Progress Summary           Patient Acceptance, E, VU by  at 6/17/2020 1419                               User Key     Initials Effective Dates Name Provider Type Discipline     04/03/18 -  Peetr Rosario, OTR/L Occupational Therapist OT     08/28/18 -  Jen Bravo, OTR/L Occupational Therapist OT                OT Recommendation and Plan  Outcome Summary/Treatment Plan (OT)  Daily Summary of Progress (OT): progress towards functional goals is fair  Barriers to Overall Progress (OT): decreased safety awareness  Plan for Continued Treatment (OT): cont OT POC  Anticipated Discharge Disposition (OT): skilled nursing facility  Daily Summary of Progress (OT): progress towards functional goals is fair  Plan of Care Review  Plan of Care Reviewed With: patient  Plan  of Care Reviewed With: patient  Outcome Summary: OT tx completed. Pt alert and oriented x4, but with decreased safety awareness and need for assist noted. pt reports pain 4/10 in L heel. Pt up ad dasia in BR when OT/PT arrived with straight cane and only one sock on. Pt has noted decreased balance and safety awareness. pt ws supervision for functional mobility and sit to supine. pt discusses need for RW at home, although does not use it once ambulating in room. Pt independent for toileting. Pt heavily educated on home safety, ADL adaptation and AE/AD recommendations. Pt plans to d/c home this date but would benefit from d/c to rehab for further therapy. If d/c home recommend HH with assist and RW and tub bench.  Outcome Measures     Row Name 06/18/20 1600 06/17/20 1400          How much help from another is currently needed...    Putting on and taking off regular lower body clothing?  3  -MM  3  -MW     Bathing (including washing, rinsing, and drying)  3  -MM  3  -MW     Toileting (which includes using toilet bed pan or urinal)  3  -MM  3  -MW     Putting on and taking off regular upper body clothing  4  -MM  4  -MW     Taking care of personal grooming (such as brushing teeth)  4  -MM  4  -MW     Eating meals  4  -MM  4  -MW     AM-PAC 6 Clicks Score (OT)  21  -MM  21  -MW        Functional Assessment    Outcome Measure Options  AM-PAC 6 Clicks Daily Activity (OT)  -MM  AM-PAC 6 Clicks Daily Activity (OT)  -MW       User Key  (r) = Recorded By, (t) = Taken By, (c) = Cosigned By    Initials Name Provider Type    MM Peter Rosario, OTR/L Occupational Therapist    Jen Bell, OTR/L Occupational Therapist           Time Calculation:   Time Calculation- OT     Row Name 06/18/20 0808             Time Calculation- OT    OT Start Time  0808  -MM      OT Stop Time  0847  -MM      OT Time Calculation (min)  39 min  -MM      Total Timed Code Minutes- OT  39 minute(s)  -MM      OT Received On  06/18/20  -MM          Timed Charges    93758 - OT Self Care/Mgmt Minutes  39  -MM        User Key  (r) = Recorded By, (t) = Taken By, (c) = Cosigned By    Initials Name Provider Type    Peter Andres, OTR/L Occupational Therapist        Therapy Charges for Today     Code Description Service Date Service Provider Modifiers Qty    10523059260 HC OT SELF CARE/MGMT/TRAIN EA 15 MIN 6/18/2020 Peter Rosario, OTR/L GO 3               Peter Rosario OTR/L  6/18/2020

## 2020-06-19 ENCOUNTER — TRANSITIONAL CARE MANAGEMENT TELEPHONE ENCOUNTER (OUTPATIENT)
Dept: CALL CENTER | Facility: HOSPITAL | Age: 75
End: 2020-06-19

## 2020-06-19 NOTE — OUTREACH NOTE
Call Center TCM Note      Responses   [facility]   Does the patient have one of the following disease processes/diagnoses(primary or secondary)?  Other   TCM attempt successful?  No   Unsuccessful attempts  Attempt 2          Toña Tolliver RN    6/19/2020, 14:03

## 2020-06-19 NOTE — THERAPY DISCHARGE NOTE
Acute Care - Physical Therapy Discharge Summary  [facility]       Patient Name: Kingsley Lerma  : 1945  MRN: [id number]    Today's Date: 2020  Onset of Illness/Injury or Date of Surgery: 20       Referring Physician: Dr. Katz      Admit Date: 2020      PT Recommendation and Plan    Visit Dx:    ICD-10-CM ICD-9-CM   1. Gastrointestinal hemorrhage, unspecified gastrointestinal hemorrhage type K92.2 578.9   2. Impaired mobility and ADLs Z74.09 V49.89    Z78.9    3. Impaired mobility Z74.09 799.89       Outcome Measures     Row Name 20 1600 20 1400          How much help from another is currently needed...    Putting on and taking off regular lower body clothing?  3  -MM  3  -MW     Bathing (including washing, rinsing, and drying)  3  -MM  3  -MW     Toileting (which includes using toilet bed pan or urinal)  3  -MM  3  -MW     Putting on and taking off regular upper body clothing  4  -MM  4  -MW     Taking care of personal grooming (such as brushing teeth)  4  -MM  4  -MW     Eating meals  4  -MM  4  -MW     AM-PAC 6 Clicks Score (OT)  21  -MM  21  -MW        Functional Assessment    Outcome Measure Options  AM-PAC 6 Clicks Daily Activity (OT)  -MM  AM-PAC 6 Clicks Daily Activity (OT)  -MW       User Key  (r) = Recorded By, (t) = Taken By, (c) = Cosigned By    Initials Name Provider Type    MM Peter Rosario, OTR/L Occupational Therapist     Jen Bravo, OTR/L Occupational Therapist              Rehab Goal Summary     Row Name 20 0733             Bed Mobility Goal 1 (PT)    Activity/Assistive Device (Bed Mobility Goal 1, PT)  bed mobility activities, all  -AH      [place] Level/Cues Needed (Bed Mobility Goal 1, PT)  independent  -AH      Time Frame (Bed Mobility Goal 1, PT)  long term goal (LTG)  -      Progress/Outcomes (Bed Mobility Goal 1, PT)  goal not met  -AH         Transfer Goal 1 (PT)    Activity/Assistive Device (Transfer Goal 1, PT)   sit-to-stand/stand-to-sit;bed-to-chair/chair-to-bed;walker, rolling  -AH      [place] Level/Cues Needed (Transfer Goal 1, PT)  conditional independence  -AH      Time Frame (Transfer Goal 1, PT)  long term goal (LTG)  -AH      Progress/Outcome (Transfer Goal 1, PT)  goal not met  -AH         Gait Training Goal 1 (PT)    Activity/Assistive Device (Gait Training Goal 1, PT)  gait (walking locomotion);improve balance and speed;increase endurance/gait distance;increase energy conservation;decrease fall risk;diminish gait deviation;decrease asymmetrical patterns;assistive device use;walker, rolling  -AH      [place] Level (Gait Training Goal 1, PT)  supervision required  -AH      Distance (Gait Goal 1, PT)  80  -AH      Time Frame (Gait Training Goal 1, PT)  long term goal (LTG)  -AH      Progress/Outcome (Gait Training Goal 1, PT)  goal not met  -        User Key  (r) = Recorded By, (t) = Taken By, (c) = Cosigned By    Initials Name Provider Type Discipline    Jessica Belcher PTA Physical Therapy Assistant PT              PT Discharge Summary  Reason for Discharge: Discharge from facility  Outcomes Achieved: Refer to plan of care for updates on goals achieved  Discharge Destination: Home      Jessica Nair PTA   6/19/2020

## 2020-06-19 NOTE — OUTREACH NOTE
Call Center TCM Note      Responses   [facility]  [facility]   Does the patient have one of the following disease processes/diagnoses(primary or secondary)?  Other   TCM attempt successful?  No   Unsuccessful attempts  Attempt 1          Toña Tolliver RN    6/19/2020, 09:34

## 2020-06-21 ENCOUNTER — NURSE TRIAGE (OUTPATIENT)
Dept: CALL CENTER | Facility: HOSPITAL | Age: 75
End: 2020-06-21

## 2020-06-21 ENCOUNTER — APPOINTMENT (OUTPATIENT)
Dept: CT IMAGING | Facility: HOSPITAL | Age: 75
End: 2020-06-21

## 2020-06-21 ENCOUNTER — READMISSION MANAGEMENT (OUTPATIENT)
Dept: CALL CENTER | Facility: HOSPITAL | Age: 75
End: 2020-06-21

## 2020-06-21 ENCOUNTER — HOSPITAL ENCOUNTER (OUTPATIENT)
Facility: HOSPITAL | Age: 75
Setting detail: OBSERVATION
Discharge: HOME | End: 2020-06-22
Attending: EMERGENCY MEDICINE | Admitting: FAMILY MEDICINE

## 2020-06-21 DIAGNOSIS — K92.2 GASTROINTESTINAL HEMORRHAGE, UNSPECIFIED GASTROINTESTINAL HEMORRHAGE TYPE: Primary | ICD-10-CM

## 2020-06-21 LAB
ABO GROUP BLD: NORMAL
ALBUMIN SERPL-MCNC: 3.7 G/DL (ref 3.5–5.2)
ALBUMIN/GLOB SERPL: 1.2 G/DL
ALP SERPL-CCNC: 104 U/L (ref 39–117)
ALT SERPL W P-5'-P-CCNC: 20 U/L (ref 1–41)
AMMONIA BLD-SCNC: 11 UMOL/L (ref 16–60)
ANION GAP SERPL CALCULATED.3IONS-SCNC: 13 MMOL/L (ref 5–15)
APTT PPP: 44.6 SECONDS (ref 24.1–35)
AST SERPL-CCNC: 27 U/L (ref 1–40)
BASOPHILS # BLD AUTO: 0.06 10*3/MM3 (ref 0–0.2)
BASOPHILS NFR BLD AUTO: 0.5 % (ref 0–1.5)
BILIRUB SERPL-MCNC: 0.5 MG/DL (ref 0.2–1.2)
BLD GP AB SCN SERPL QL: NEGATIVE
BUN BLD-MCNC: 24 MG/DL (ref 8–23)
BUN/CREAT SERPL: 32.9 (ref 7–25)
CALCIUM SPEC-SCNC: 9.4 MG/DL (ref 8.6–10.5)
CHLORIDE SERPL-SCNC: 110 MMOL/L (ref 98–107)
CO2 SERPL-SCNC: 22 MMOL/L (ref 22–29)
CREAT BLD-MCNC: 0.73 MG/DL (ref 0.76–1.27)
DEPRECATED RDW RBC AUTO: 58.3 FL (ref 37–54)
EOSINOPHIL # BLD AUTO: 0.16 10*3/MM3 (ref 0–0.4)
EOSINOPHIL NFR BLD AUTO: 1.3 % (ref 0.3–6.2)
ERYTHROCYTE [DISTWIDTH] IN BLOOD BY AUTOMATED COUNT: 17.1 % (ref 12.3–15.4)
GFR SERPL CREATININE-BSD FRML MDRD: 106 ML/MIN/1.73
GLOBULIN UR ELPH-MCNC: 3 GM/DL
GLUCOSE BLD-MCNC: 84 MG/DL (ref 65–99)
HCT VFR BLD AUTO: 30.6 % (ref 37.5–51)
HGB BLD-MCNC: 10.1 G/DL (ref 13–17.7)
HOLD SPECIMEN: NORMAL
IMM GRANULOCYTES # BLD AUTO: 0.07 10*3/MM3 (ref 0–0.05)
IMM GRANULOCYTES NFR BLD AUTO: 0.5 % (ref 0–0.5)
INR PPP: 1.09 (ref 0.91–1.09)
LYMPHOCYTES # BLD AUTO: 0.77 10*3/MM3 (ref 0.7–3.1)
LYMPHOCYTES NFR BLD AUTO: 6 % (ref 19.6–45.3)
MCH RBC QN AUTO: 31.5 PG (ref 26.6–33)
MCHC RBC AUTO-ENTMCNC: 33 G/DL (ref 31.5–35.7)
MCV RBC AUTO: 95.3 FL (ref 79–97)
MONOCYTES # BLD AUTO: 0.88 10*3/MM3 (ref 0.1–0.9)
MONOCYTES NFR BLD AUTO: 6.9 % (ref 5–12)
NEUTROPHILS # BLD AUTO: 10.85 10*3/MM3 (ref 1.7–7)
NEUTROPHILS NFR BLD AUTO: 84.8 % (ref 42.7–76)
NRBC BLD AUTO-RTO: 0 /100 WBC (ref 0–0.2)
PLATELET # BLD AUTO: 380 10*3/MM3 (ref 140–450)
PMV BLD AUTO: 8.8 FL (ref 6–12)
POTASSIUM BLD-SCNC: 4.1 MMOL/L (ref 3.5–5.2)
PROT SERPL-MCNC: 6.7 G/DL (ref 6–8.5)
PROTHROMBIN TIME: 13.7 SECONDS (ref 11.9–14.6)
RBC # BLD AUTO: 3.21 10*6/MM3 (ref 4.14–5.8)
RH BLD: POSITIVE
SODIUM BLD-SCNC: 145 MMOL/L (ref 136–145)
T&S EXPIRATION DATE: NORMAL
WBC NRBC COR # BLD: 12.79 10*3/MM3 (ref 3.4–10.8)

## 2020-06-21 PROCEDURE — 86901 BLOOD TYPING SEROLOGIC RH(D): CPT | Performed by: EMERGENCY MEDICINE

## 2020-06-21 PROCEDURE — G0378 HOSPITAL OBSERVATION PER HR: HCPCS

## 2020-06-21 PROCEDURE — 85018 HEMOGLOBIN: CPT | Performed by: FAMILY MEDICINE

## 2020-06-21 PROCEDURE — 85730 THROMBOPLASTIN TIME PARTIAL: CPT | Performed by: EMERGENCY MEDICINE

## 2020-06-21 PROCEDURE — 86850 RBC ANTIBODY SCREEN: CPT | Performed by: EMERGENCY MEDICINE

## 2020-06-21 PROCEDURE — 85014 HEMATOCRIT: CPT | Performed by: FAMILY MEDICINE

## 2020-06-21 PROCEDURE — 99284 EMERGENCY DEPT VISIT MOD MDM: CPT

## 2020-06-21 PROCEDURE — 85610 PROTHROMBIN TIME: CPT | Performed by: EMERGENCY MEDICINE

## 2020-06-21 PROCEDURE — 74177 CT ABD & PELVIS W/CONTRAST: CPT

## 2020-06-21 PROCEDURE — 82140 ASSAY OF AMMONIA: CPT | Performed by: EMERGENCY MEDICINE

## 2020-06-21 PROCEDURE — 25010000002 HYDROMORPHONE PER 4 MG: Performed by: INTERNAL MEDICINE

## 2020-06-21 PROCEDURE — 96374 THER/PROPH/DIAG INJ IV PUSH: CPT

## 2020-06-21 PROCEDURE — 86900 BLOOD TYPING SEROLOGIC ABO: CPT | Performed by: EMERGENCY MEDICINE

## 2020-06-21 PROCEDURE — 80053 COMPREHEN METABOLIC PANEL: CPT | Performed by: EMERGENCY MEDICINE

## 2020-06-21 PROCEDURE — 36415 COLL VENOUS BLD VENIPUNCTURE: CPT | Performed by: FAMILY MEDICINE

## 2020-06-21 PROCEDURE — 85025 COMPLETE CBC W/AUTO DIFF WBC: CPT | Performed by: EMERGENCY MEDICINE

## 2020-06-21 PROCEDURE — 25010000002 IOPAMIDOL 61 % SOLUTION: Performed by: EMERGENCY MEDICINE

## 2020-06-21 RX ORDER — HYDROCODONE BITARTRATE AND ACETAMINOPHEN 5; 325 MG/1; MG/1
1 TABLET ORAL ONCE
Status: COMPLETED | OUTPATIENT
Start: 2020-06-21 | End: 2020-06-21

## 2020-06-21 RX ORDER — SODIUM CHLORIDE 0.9 % (FLUSH) 0.9 %
10 SYRINGE (ML) INJECTION AS NEEDED
Status: DISCONTINUED | OUTPATIENT
Start: 2020-06-21 | End: 2020-06-22 | Stop reason: HOSPADM

## 2020-06-21 RX ORDER — PANTOPRAZOLE SODIUM 20 MG/1
20 TABLET, DELAYED RELEASE ORAL
Status: DISCONTINUED | OUTPATIENT
Start: 2020-06-22 | End: 2020-06-22 | Stop reason: HOSPADM

## 2020-06-21 RX ORDER — GUAIFENESIN 600 MG/1
600 TABLET, EXTENDED RELEASE ORAL 2 TIMES DAILY
Status: DISCONTINUED | OUTPATIENT
Start: 2020-06-21 | End: 2020-06-22 | Stop reason: HOSPADM

## 2020-06-21 RX ORDER — FERROUS SULFATE 325(65) MG
325 TABLET ORAL
Status: DISCONTINUED | OUTPATIENT
Start: 2020-06-22 | End: 2020-06-22 | Stop reason: HOSPADM

## 2020-06-21 RX ORDER — AMIODARONE HYDROCHLORIDE 200 MG/1
200 TABLET ORAL
Status: DISCONTINUED | OUTPATIENT
Start: 2020-06-22 | End: 2020-06-22 | Stop reason: HOSPADM

## 2020-06-21 RX ORDER — LISINOPRIL 20 MG/1
20 TABLET ORAL DAILY
Status: DISCONTINUED | OUTPATIENT
Start: 2020-06-22 | End: 2020-06-22 | Stop reason: HOSPADM

## 2020-06-21 RX ORDER — DILTIAZEM HYDROCHLORIDE 120 MG/1
120 CAPSULE, COATED, EXTENDED RELEASE ORAL
Status: DISCONTINUED | OUTPATIENT
Start: 2020-06-22 | End: 2020-06-22 | Stop reason: HOSPADM

## 2020-06-21 RX ORDER — CILOSTAZOL 100 MG/1
50 TABLET ORAL 2 TIMES DAILY
Status: DISCONTINUED | OUTPATIENT
Start: 2020-06-21 | End: 2020-06-22 | Stop reason: HOSPADM

## 2020-06-21 RX ORDER — HYDROXYZINE HYDROCHLORIDE 25 MG/1
25 TABLET, FILM COATED ORAL 3 TIMES DAILY PRN
Status: DISCONTINUED | OUTPATIENT
Start: 2020-06-21 | End: 2020-06-22 | Stop reason: HOSPADM

## 2020-06-21 RX ORDER — ASPIRIN 81 MG/1
81 TABLET ORAL DAILY
Status: DISCONTINUED | OUTPATIENT
Start: 2020-06-22 | End: 2020-06-22 | Stop reason: HOSPADM

## 2020-06-21 RX ORDER — HYDROMORPHONE HYDROCHLORIDE 1 MG/ML
0.5 INJECTION, SOLUTION INTRAMUSCULAR; INTRAVENOUS; SUBCUTANEOUS EVERY 4 HOURS PRN
Status: DISCONTINUED | OUTPATIENT
Start: 2020-06-21 | End: 2020-06-22 | Stop reason: HOSPADM

## 2020-06-21 RX ORDER — FAMOTIDINE 20 MG/1
20 TABLET, FILM COATED ORAL DAILY
Status: DISCONTINUED | OUTPATIENT
Start: 2020-06-22 | End: 2020-06-22 | Stop reason: HOSPADM

## 2020-06-21 RX ADMIN — IOPAMIDOL 100 ML: 612 INJECTION, SOLUTION INTRAVENOUS at 16:20

## 2020-06-21 RX ADMIN — HYDROMORPHONE HYDROCHLORIDE 0.5 MG: 1 INJECTION, SOLUTION INTRAMUSCULAR; INTRAVENOUS; SUBCUTANEOUS at 22:11

## 2020-06-21 RX ADMIN — GUAIFENESIN 600 MG: 600 TABLET, EXTENDED RELEASE ORAL at 22:47

## 2020-06-21 RX ADMIN — HYDROCODONE BITARTRATE AND ACETAMINOPHEN 1 TABLET: 5; 325 TABLET ORAL at 17:46

## 2020-06-21 RX ADMIN — CILOSTAZOL 50 MG: 100 TABLET ORAL at 22:47

## 2020-06-21 NOTE — TELEPHONE ENCOUNTER
"There was poor telephone reception with patient's son, so made call short and advised to return to the ER.      Reason for Disposition  • Tarry or jet black-colored stool (not dark green)    Additional Information  • Negative: Shock suspected (e.g., cold/pale/clammy skin, too weak to stand, low BP, rapid pulse)  • Negative: Difficult to awaken or acting confused (e.g., disoriented, slurred speech)  • Negative: Passed out (i.e., lost consciousness, collapsed and was not responding)  • Negative: [1] Vomiting AND [2] contains red blood or black (\"coffee ground\") material  (Exception: few red streaks in vomit that only happened once)  • Negative: Sounds like a life-threatening emergency to the triager  • Negative: Diarrhea is main symptom  • Negative: Stool color other than brown or tan is main concern  (no bleeding and no melena)  • Negative: SEVERE rectal bleeding (large blood clots; on and off, or constant bleeding)  • Negative: SEVERE dizziness (e.g., unable to stand, requires support to walk, feels like passing out now)  • Negative: [1] MODERATE rectal bleeding (small blood clots, passing blood without stool, or toilet water turns red) AND [2] more than once a day  • Negative: Pale skin (pallor) of new onset or worsening    Answer Assessment - Initial Assessment Questions  1. APPEARANCE of BLOOD: \"What color is it?\" \"Is it passed separately, on the surface of the stool, or mixed in with the stool?\"      Black tarry and foul smelling.    2. AMOUNT: \"How much blood was passed?\"       Unknown.   3. FREQUENCY: \"How many times has blood been passed with the stools?\"       Unknown   4. ONSET: \"When was the blood first seen in the stools?\" (Days or weeks)       Patient was discharged on Thursday after being admitted for rectal bleeding that was bright red.  Today having black tarry stool in large amount.    5. DIARRHEA: \"Is there also some diarrhea?\" If so, ask: \"How many diarrhea stools were passed in past 24 hours?\"     " "  Unknown.    6. CONSTIPATION: \"Do you have constipation?\" If so, \"How bad is it?\"      No   7. RECURRENT SYMPTOMS: \"Have you had blood in your stools before?\" If so, ask: \"When was the last time?\" and \"What happened that time?\"       Was recently discharged from hospital after admission for rectal bleeding.    8. BLOOD THINNERS: \"Do you take any blood thinners?\" (e.g., Coumadin/warfarin, Pradaxa/dabigatran, aspirin)      During the recent hospitalization he was taken off his blood thinners.    9. OTHER SYMPTOMS: \"Do you have any other symptoms?\"  (e.g., abdominal pain, vomiting, dizziness, fever)      No other symptoms reported.   10. PREGNANCY: \"Is there any chance you are pregnant?\" \"When was your last menstrual period?\"        Male    Protocols used: RECTAL BLEEDING-ADULT-AH      "

## 2020-06-21 NOTE — ED NOTES
Pt has had several small incontinent BM's past hr.  Pt skin around rectum red, irritated.  No breakdown noted.     Hugo Mccarty RN  06/21/20 2987

## 2020-06-21 NOTE — ED PROVIDER NOTES
Subjective   Patient presents with a complaint of rectal bleeding.  He says just got to the hospital a few days ago after treatment for the same.  He had upper and lower endoscopy and says no definitive source of bleeding was found.  He had been on Xarelto secondary to atrial fibrillation felt that may be part of the problem and stopped it and now he is only on aspirin a day.  He is not had a bowel movement in a couple of days but this morning try to have 1 and was having some stool that appeared to him to be bright red and pasty.  He felt he could not pass gas with a bit of a time.  He is not really having any pain in his abdomen.  Does have some pain in his foot where he has a vascular issue.  However he is feeling weak and so if he wanted to be checked out again.      History provided by:  Patient and relative   used: No    GI Problem   The primary symptoms include hematochezia. Primary symptoms do not include arthralgias. The illness began today.   The illness does not include chills, anorexia, dysphagia, odynophagia, bloating, constipation, tenesmus, back pain or itching. Associated medical issues do not include inflammatory bowel disease, GERD, gallstones, liver disease, alcohol abuse, PUD, gastric bypass, bowel resection, irritable bowel syndrome, hemorrhoids or diverticulitis.       Review of Systems   Constitutional: Negative.  Negative for chills.   HENT: Negative.    Respiratory: Negative.    Cardiovascular: Negative.    Gastrointestinal: Positive for hematochezia. Negative for anorexia, bloating, constipation and dysphagia.   Genitourinary: Negative.    Musculoskeletal: Negative.  Negative for arthralgias and back pain.   Skin: Negative.  Negative for itching.   Neurological: Negative.    Psychiatric/Behavioral: Negative.    All other systems reviewed and are negative.      Past Medical History:   Diagnosis Date   • Alcohol abuse    • Arthritis    • Atrial flutter (CMS/HCC)    •  Circulation problem    • History of tobacco abuse    • Hypertension    • PVD (peripheral vascular disease) (CMS/HCC)        No Known Allergies    Past Surgical History:   Procedure Laterality Date   • ADENOIDECTOMY     • COLONOSCOPY N/A 6/8/2020    Procedure: COLONOSCOPY WITH ANESTHESIA;  Surgeon: Mayela Ching MD;  Location: [facility] ENDOSCOPY;  Service: Gastroenterology;  Laterality: N/A;  preop; GI bleed   postop; avm's   PCP rachel calderón    • ENDOSCOPY N/A 6/8/2020    Procedure: ESOPHAGOGASTRODUODENOSCOPY WITH ANESTHESIA;  Surgeon: Mayela Ching MD;  Location: [facility] ENDOSCOPY;  Service: Gastroenterology;  Laterality: N/A;  proep; GI BLeed   postop; normal   PCP rachel calderón    • ENDOSCOPY N/A 6/15/2020    Procedure: ESOPHAGOGASTRODUODENOSCOPY WITH ANESTHESIA;  Surgeon: Carmine Banuelos MD;  Location: [facility] ENDOSCOPY;  Service: Gastroenterology;  Laterality: N/A;  pre gi bleed, anemia  post normal  dr rachel calderón       Family History   Problem Relation Age of Onset   • Dementia Mother    • Cancer Father    • Pancreatic cancer Father        Social History     Socioeconomic History   • Marital status:      Spouse name: Not on file   • Number of children: Not on file   • Years of education: Not on file   • Highest education level: Not on file   Tobacco Use   • Smoking status: Current Every Day Smoker     Packs/day: 0.50     Types: Cigarettes, Cigars   • Smokeless tobacco: Never Used   Substance and Sexual Activity   • Alcohol use: Not Currently     Alcohol/week: 2.0 standard drinks     Types: 2 Glasses of wine per week     Frequency: Never   • Drug use: Never   • Sexual activity: Defer       Prior to Admission medications    Medication Sig Start Date End Date Taking? Authorizing Provider   amiodarone (PACERONE) 200 MG tablet Take 1 tablet by mouth Daily. 5/22/20   Odalis Garcia APRN   aspirin 81 MG EC tablet Take 1 tablet by mouth Daily. 6/18/20   Marco A Katz MD   Cholecalciferol  (VITAMIN D) 50 MCG (2000 UT) tablet Take 2,000 Units by mouth Daily.    Jose Walls MD   cilostazol (PLETAL) 50 MG tablet Take 1 tablet by mouth 2 (Two) Times a Day. 5/14/20   Nolan Farias MD   dilTIAZem CD (CARDIZEM CD) 120 MG 24 hr capsule Take 1 capsule by mouth Daily. 5/11/20   Nolan Farias MD   famotidine (PEPCID) 20 MG tablet Take 20 mg by mouth Daily.    Jose Walls MD   ferrous sulfate 325 (65 FE) MG tablet Take 1 tablet by mouth Daily With Breakfast. 6/8/20   Marco A Katz MD   guaiFENesin (MUCINEX) 600 MG 12 hr tablet Take 600 mg by mouth 2 (Two) Times a Day.    Jose Walls MD   hydrOXYzine (ATARAX) 25 MG tablet Take 1 tablet by mouth 3 (Three) Times a Day As Needed for Anxiety. 4/16/20   BARRY Urrutia MD   lidocaine (LMX) 4 % cream Apply  topically to the appropriate area as directed As Needed for Mild Pain . Apply to left heel.    Jose Walls MD   lisinopril (PRINIVIL,ZESTRIL) 40 MG tablet Take 20 mg by mouth Daily.    Jose Walls MD   Multiple Vitamins-Minerals (CENTRUM ADULTS) tablet Take 1 tablet by mouth Daily.    Jose Walls MD   pantoprazole (PROTONIX) 20 MG EC tablet Take 20 mg by mouth Daily.    Jose Walls MD   sildenafil (VIAGRA) 50 MG tablet Take 50 mg by mouth Daily As Needed for Erectile Dysfunction (Sexual Activity).    Jose Walls MD       Medications   sodium chloride 0.9 % flush 10 mL (has no administration in time range)   amiodarone (PACERONE) tablet 200 mg (has no administration in time range)   aspirin EC tablet 81 mg (has no administration in time range)   cilostazol (PLETAL) tablet 50 mg (has no administration in time range)   dilTIAZem CD (CARDIZEM CD) 24 hr capsule 120 mg (has no administration in time range)   famotidine (PEPCID) tablet 20 mg (has no administration in time range)   ferrous sulfate tablet 325 mg (has no administration in time range)   guaiFENesin (MUCINEX) 12 hr  tablet 600 mg (has no administration in time range)   hydrOXYzine (ATARAX) tablet 25 mg (has no administration in time range)   lisinopril (PRINIVIL,ZESTRIL) tablet 20 mg (has no administration in time range)   pantoprazole (PROTONIX) EC tablet 20 mg (has no administration in time range)   iopamidol (ISOVUE-300) 61 % injection 100 mL (100 mL Intravenous Given 6/21/20 1620)   HYDROcodone-acetaminophen (NORCO) 5-325 MG per tablet 1 tablet (1 tablet Oral Given 6/21/20 1746)       Vitals:    06/21/20 1953   BP: 156/79   Pulse: 87   Resp: 18   Temp: 98.5 °F (36.9 °C)   SpO2: 95%         Objective   Physical Exam   Constitutional: He appears well-developed and well-nourished.   HENT:   Head: Normocephalic.   Cardiovascular: Normal rate and regular rhythm.   Pulmonary/Chest: Effort normal and breath sounds normal.   Abdominal: Soft. Bowel sounds are normal.   Musculoskeletal: Normal range of motion.   Neurological: He is alert.   Skin: Skin is warm and dry.   Psychiatric: He has a normal mood and affect. His behavior is normal.   Nursing note and vitals reviewed.      Procedures         Lab Results (last 24 hours)     Procedure Component Value Units Date/Time    CBC & Differential [[id number]] Collected:  06/21/20 1459    Specimen:  Blood from Arm, Right Updated:  06/21/20 7895    Narrative:       The following orders were created for panel order CBC & Differential.  Procedure                               Abnormality         Status                     ---------                               -----------         ------                     CBC Auto Differential[[id number]]        Abnormal            Final result                 Please view results for these tests on the individual orders.    Comprehensive Metabolic Panel [[id number]]  (Abnormal) Collected:  06/21/20 1459    Specimen:  Blood from Arm, Right Updated:  06/21/20 153     Glucose 84 mg/dL      BUN 24 mg/dL      Creatinine 0.73 mg/dL      Sodium 145 mmol/L       Potassium 4.1 mmol/L      Chloride 110 mmol/L      CO2 22.0 mmol/L      Calcium 9.4 mg/dL      Total Protein 6.7 g/dL      Albumin 3.70 g/dL      ALT (SGPT) 20 U/L      AST (SGOT) 27 U/L      Comment: Specimen hemolyzed.  Results may be affected.        Alkaline Phosphatase 104 U/L      Total Bilirubin 0.5 mg/dL      eGFR Non African Amer 106 mL/min/1.73      Globulin 3.0 gm/dL      A/G Ratio 1.2 g/dL      BUN/Creatinine Ratio 32.9     Anion Gap 13.0 mmol/L     Narrative:       GFR Normal >60  Chronic Kidney Disease <60  Kidney Failure <15      Protime-INR [[id number]]  (Normal) Collected:  06/21/20 1459    Specimen:  Blood from Arm, Right Updated:  06/21/20 1524     Protime 13.7 Seconds      INR 1.09    aPTT [[id number]]  (Abnormal) Collected:  06/21/20 1459    Specimen:  Blood from Arm, Right Updated:  06/21/20 1524     PTT 44.6 seconds     CBC Auto Differential [[id number]]  (Abnormal) Collected:  06/21/20 1459    Specimen:  Blood from Arm, Right Updated:  06/21/20 1515     WBC 12.79 10*3/mm3      RBC 3.21 10*6/mm3      Hemoglobin 10.1 g/dL      Hematocrit 30.6 %      MCV 95.3 fL      MCH 31.5 pg      MCHC 33.0 g/dL      RDW 17.1 %      RDW-SD 58.3 fl      MPV 8.8 fL      Platelets 380 10*3/mm3      Neutrophil % 84.8 %      Lymphocyte % 6.0 %      Monocyte % 6.9 %      Eosinophil % 1.3 %      Basophil % 0.5 %      Immature Grans % 0.5 %      Neutrophils, Absolute 10.85 10*3/mm3      Lymphocytes, Absolute 0.77 10*3/mm3      Monocytes, Absolute 0.88 10*3/mm3      Eosinophils, Absolute 0.16 10*3/mm3      Basophils, Absolute 0.06 10*3/mm3      Immature Grans, Absolute 0.07 10*3/mm3      nRBC 0.0 /100 WBC     Ammonia [[id number]]  (Abnormal) Collected:  06/21/20 1459    Specimen:  Blood from Arm, Right Updated:  06/21/20 1530     Ammonia 11 umol/L     [place] Urine - Urine, Clean Catch [[id number]] Collected:  06/21/20 1635    Specimen:  Urine, Clean Catch Updated:  06/21/20 1742     Extra Tube Hold for add-ons.      Comment: Auto resulted.             CT Abdomen Pelvis With Contrast   Final Result   1. Mild thickening of the gastric antral wall which appears somewhat   indistinct, with poor definition of fat planes between the stomach and   head of the pancreas. This may be related to peptic ulcer disease or   less likely neoplasm, no perforation is identified. Consider follow-up   with EGD.   2. Moderate constipation, without evidence of bowel obstruction.   3. Severe atherosclerotic disease of the aorta and iliac arteries as   well as the renal arteries and proximal SMA. There appears to be severe   stenosis of the proximal common iliac arteries, this could be better   assessed with CT angiography.   4. Severe osteoarthritis of the right hip as detailed above, with a   small right hip effusion.   5. Small bilateral benign appearing nephrogenic cysts.           This report was finalized on 06/21/2020 17:07 by Dr. Napoleon Ortiz MD.          ED Course  ED Course as of Jun 21 2037   Sun Jun 21, 2020 2036 I told the patient that his hemoglobin was actually better than it was a few days ago.  He CT scan revealed moderate constipation but nothing else that would be particular severe.  We talked about the patient's problems and the fact that he has had this bleeding probably from the Xarelto and from the AVMs that he has in his intestines and that this will be a problem off and on for some time.  At one point we had thought about let him go home and just self monitor and come back if things got worse but then he had some more bright red blood per rectum here in the emergency room so we decided it would be better to keep and make sure he did okay.    [TR]      ED Course User Index  [TR] Tato Alarcon Jr., MD          MDM  Number of Diagnoses or Management Options  Gastrointestinal hemorrhage, unspecified gastrointestinal hemorrhage type: new and requires workup     Amount and/or Complexity of Data Reviewed  Clinical lab tests:  ordered and reviewed  Tests in the radiology section of CPT®: ordered and reviewed  Decide to obtain previous medical records or to obtain history from someone other than the patient: yes  Discuss the patient with other providers: yes    Risk of Complications, Morbidity, and/or Mortality  Presenting problems: moderate  Diagnostic procedures: moderate  Management options: moderate    Patient Progress  Patient progress: stable      Final diagnoses:   Gastrointestinal hemorrhage, unspecified gastrointestinal hemorrhage type          Tato Alarcon Jr., MD  06/21/20 [id number]

## 2020-06-22 ENCOUNTER — READMISSION MANAGEMENT (OUTPATIENT)
Dept: CALL CENTER | Facility: HOSPITAL | Age: 75
End: 2020-06-22

## 2020-06-22 VITALS
DIASTOLIC BLOOD PRESSURE: 52 MMHG | HEIGHT: 67 IN | WEIGHT: 156 LBS | HEART RATE: 69 BPM | SYSTOLIC BLOOD PRESSURE: 144 MMHG | RESPIRATION RATE: 16 BRPM | BODY MASS INDEX: 24.48 KG/M2 | OXYGEN SATURATION: 98 % | TEMPERATURE: 97.8 F

## 2020-06-22 LAB
HCT VFR BLD AUTO: 28 % (ref 37.5–51)
HCT VFR BLD AUTO: 28.7 % (ref 37.5–51)
HGB BLD-MCNC: 9 G/DL (ref 13–17.7)
HGB BLD-MCNC: 9.4 G/DL (ref 13–17.7)

## 2020-06-22 PROCEDURE — 85014 HEMATOCRIT: CPT | Performed by: FAMILY MEDICINE

## 2020-06-22 PROCEDURE — 25010000002 HYDROMORPHONE PER 4 MG: Performed by: INTERNAL MEDICINE

## 2020-06-22 PROCEDURE — 99406 BEHAV CHNG SMOKING 3-10 MIN: CPT

## 2020-06-22 PROCEDURE — 96376 TX/PRO/DX INJ SAME DRUG ADON: CPT

## 2020-06-22 PROCEDURE — 85018 HEMOGLOBIN: CPT | Performed by: FAMILY MEDICINE

## 2020-06-22 PROCEDURE — 99214 OFFICE O/P EST MOD 30 MIN: CPT | Performed by: NURSE PRACTITIONER

## 2020-06-22 PROCEDURE — G0378 HOSPITAL OBSERVATION PER HR: HCPCS

## 2020-06-22 RX ORDER — AMOXICILLIN 250 MG
2 CAPSULE ORAL DAILY PRN
Qty: 60 TABLET | Refills: 1 | Status: SHIPPED | OUTPATIENT
Start: 2020-06-22 | End: 2020-07-01

## 2020-06-22 RX ADMIN — LISINOPRIL 20 MG: 20 TABLET ORAL at 10:08

## 2020-06-22 RX ADMIN — FERROUS SULFATE TAB 325 MG (65 MG ELEMENTAL FE) 325 MG: 325 (65 FE) TAB at 10:09

## 2020-06-22 RX ADMIN — FAMOTIDINE 20 MG: 20 TABLET, FILM COATED ORAL at 10:09

## 2020-06-22 RX ADMIN — AMIODARONE HYDROCHLORIDE 200 MG: 200 TABLET ORAL at 10:09

## 2020-06-22 RX ADMIN — DILTIAZEM HYDROCHLORIDE 120 MG: 120 CAPSULE, COATED, EXTENDED RELEASE ORAL at 05:40

## 2020-06-22 RX ADMIN — GUAIFENESIN 600 MG: 600 TABLET, EXTENDED RELEASE ORAL at 10:09

## 2020-06-22 RX ADMIN — HYDROMORPHONE HYDROCHLORIDE 0.5 MG: 1 INJECTION, SOLUTION INTRAMUSCULAR; INTRAVENOUS; SUBCUTANEOUS at 07:24

## 2020-06-22 RX ADMIN — ASPIRIN 81 MG: 81 TABLET ORAL at 10:09

## 2020-06-22 RX ADMIN — PANTOPRAZOLE SODIUM 20 MG: 20 TABLET, DELAYED RELEASE ORAL at 05:40

## 2020-06-22 RX ADMIN — CILOSTAZOL 50 MG: 100 TABLET ORAL at 10:09

## 2020-06-22 NOTE — DISCHARGE SUMMARY
[facility] Medicine Services  DISCHARGE SUMMARY       Date of Admission: 6/21/2020  Date of Discharge:  6/22/2020  Primary Care Physician: BARRY Urrutia MD    Presenting Problem/History of Present Illness:  Gastrointestinal hemorrhage, unspecified gastrointestinal hemorrhage type [K92.2]     Final Discharge Diagnoses:  Active Hospital Problems    Diagnosis   • Gastrointestinal hemorrhage     Added automatically from request for surgery [id number]         Consults: GI.    Pertinent Test Results:   IMPRESSION:  1. Mild thickening of the gastric antral wall which appears somewhat  indistinct, with poor definition of fat planes between the stomach and  head of the pancreas. This may be related to peptic ulcer disease or  less likely neoplasm, no perforation is identified. Consider follow-up  with EGD.  2. Moderate constipation, without evidence of bowel obstruction.  3. Severe atherosclerotic disease of the aorta and iliac arteries as  well as the renal arteries and proximal SMA. There appears to be severe  stenosis of the proximal common iliac arteries, this could be better  assessed with CT angiography.  4. Severe osteoarthritis of the right hip as detailed above, with a  small right hip effusion.  5. Small bilateral benign appearing nephrogenic cysts.    Chief Complaint on Day of Discharge: none    History of Present Illness on Day of Discharge:   Mr. Lerma is a 74 year old genlteman with a history of a-fib and PVD.  He presents today complaining of bloody stool.  He is well known to me from previous admission.       He has been admitted twice in the past 2 weeks for GI bleeding that was exacerbated by anticoagulation.  He underwent two EGD's and a colonoscopy.  He had a CT Enterography.  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  Given his bleeding issues, it was decided it was for the best to stop his anticoagulation.       He was discharged 3 days ago on 6/18/2020.  For the first  two days of being home, he was doing ok.  Today he felt constipated and was straining to have a bowel movement.  He had a lot of blood in the toilet bowl.  He came to the ER for evaluation.       His hemoglobin here is 1 point higher than it was 3 days ago.  However prior to being discharged from the ER today, he had a large bloody bowel movement.       Hospital Course:  The patient is a 74 y.o. male who presented to [facility] with GI bleed.      GI bleed.  Patient admitted to time in the past 2 weeks for GI bleed with exacerbation by anticoagulation.  Patient underwent to EGD  and a colonoscopy by GI.  Patient also had a CT enterography.  Work-up revealed he has AVN.  Due to his recurrent bleed secondary to AVM is best to stop anticoagulation.  Trend hemoglobin.  GI consult.  Transfuse as indicated.  Patient continue Protonix.  CT scan abdomen pelvic-Mild thickening of the gastric antral wall which appears somewhat indistinct- poor definition of fat planes between the stomach and head of the pancreas-  related to peptic ulcer disease or less likely neoplasm, no perforation is identified, moderate constipation- without evidence of bowel obstruction, severe atherosclerotic disease of the aorta and iliac arteries as  well as the renal arteries and proximal SMA-  appears to be severe  stenosis of the proximal common iliac arteries, this could be better  assessed with CT angiography, severe osteoarthritis of the right hip with a small right hip effusion, small bilateral benign appearing nephrogenic cysts.     Anemia.    Patient to continue iron sulfate.  Hemoglobin stable.  No sign of acute bleed.     Atrial fibrillation/hypertension.    Patient to continue Cardizem and amiodarone.    Patient to continue lisinopril.    Patient to continue aspirin.  Patient to stop Xarelto due to recurrent GI bleed, third episode.     Peripheral vascular disease.  Patient to Continue pletal.  Vascular surgery following  "outpatient.     Reflux.    Patient to continue Pepcid.    Patient to continue Protonix.     COPD.    Patient to continue guaifenesin.     Anxiety.  Atarax as needed.     Constipation.  Senna as needed.  Prune juice over-the-counter.      Vital signs stable, afebrile.  Plan to discharge patient home today.  Discharge home with family.  Follow with PCP 1 week.    Condition on Discharge: Stable    Physical Exam on Discharge:  /52 (BP Location: Right arm, Patient Position: Lying)   Pulse 69   Temp 97.8 °F (36.6 °C) (Oral)   Resp 16   Ht 170.2 cm (67\")   Wt 70.8 kg (156 lb)   SpO2 98%   BMI 24.43 kg/m²   Physical Exam   Constitutional: He is oriented to person, place, and time. He appears well-developed.   HENT:   Head: Normocephalic.   Eyes: Pupils are equal, round, and reactive to light. Conjunctivae are normal.   Neck: Neck supple. No JVD present.   Cardiovascular: Normal rate, regular rhythm, normal heart sounds and intact distal pulses. Exam reveals no gallop and no friction rub.   No murmur heard.  Pulmonary/Chest: Effort normal and breath sounds normal. No respiratory distress. He has no wheezes. He has no rales. He exhibits no tenderness.   Abdominal: Soft. Bowel sounds are normal. He exhibits no distension. There is no tenderness. There is no rebound and no guarding.   Musculoskeletal: Normal range of motion. He exhibits no edema, tenderness or deformity.   Neurological: He is alert and oriented to person, place, and time. He displays normal reflexes. No cranial nerve deficit. He exhibits normal muscle tone.   Skin: Skin is warm and dry. No rash noted.   Psychiatric: He has a normal mood and affect. His behavior is normal. Judgment and thought content normal.   Nursing note and vitals reviewed.        Discharge Disposition:  Home or Self Care    Discharge Medications:     Discharge Medications      New Medications      Instructions Start Date   sennosides-docusate 8.6-50 MG per tablet  Commonly " known as:  PERICOLACE   2 tablets, Oral, Daily PRN         Continue These Medications      Instructions Start Date   amiodarone 200 MG tablet  Commonly known as:  PACERONE   200 mg, Oral, Every 24 Hours Scheduled      aspirin 81 MG EC tablet   81 mg, Oral, Daily      cilostazol 50 MG tablet  Commonly known as:  PLETAL   50 mg, Oral, 2 Times Daily      dilTIAZem  MG 24 hr capsule  Commonly known as:  CARDIZEM CD   120 mg, Oral, Daily      famotidine 20 MG tablet  Commonly known as:  PEPCID   20 mg, Oral, Daily      ferrous sulfate 325 (65 FE) MG tablet   325 mg, Oral, Daily With Breakfast      guaiFENesin 600 MG 12 hr tablet  Commonly known as:  MUCINEX   600 mg, Oral, 2 Times Daily      hydrOXYzine 25 MG tablet  Commonly known as:  ATARAX   25 mg, Oral, 3 Times Daily PRN      lidocaine 4 % cream  Commonly known as:  LMX   Topical, As Needed, Apply to left heel.       lisinopril 40 MG tablet  Commonly known as:  PRINIVIL,ZESTRIL   20 mg, Oral, Daily      pantoprazole 20 MG EC tablet  Commonly known as:  PROTONIX   20 mg, Oral, Daily      Vitamin D 50 MCG (2000 UT) tablet   2,000 Units, Oral, Daily         Stop These Medications    Centrum Adults tablet     sildenafil 50 MG tablet  Commonly known as:  VIAGRA            Discharge Diet:   Diet Instructions     Advance Diet As Tolerated            Activity at Discharge:   Activity Instructions     Activity as Tolerated            Discharge Care Plan/Instructions: Discharge home with family.    Follow-up Appointments:   Future Appointments   Date Time Provider Department Center   7/1/2020  1:50 PM Justin Gallo MD [facility] None   7/7/2020 10:15 AM BARRY Urrutia MD [facility]   7/7/2020 11:30 AM Luis Enrique Donaldson DO [facility] None   7/9/2020  1:00 PM [facility]   9/23/2020  3:30 PM Nolan Farias MD [facility]     Follow with PCP 1 week.    Jevon Peralta MD  06/22/20  15:17    Time: Greater than 30  minutes.

## 2020-06-22 NOTE — PAYOR COMM NOTE
"Kingsley Servin (74 y.o. Male) [id number]     Admit   6/21   OBSERVATION  STAY   At this time     [facility] phone    Fax         Date of Birth Social Security Number Address Home Phone MRN    1945  [address] [facility] [address] [phone] [id number]    Confucianist Marital Status          Samaritan        Admission Date Admission Type Admitting Provider Attending Provider Department, Room/Bed    6/21/20 Emergency Jevon Peralta MD Truong, Khai C, MD [facility] 3C, 360/1    Discharge Date Discharge Disposition Discharge Destination                       Attending Provider:  Jevon Peralta MD    Allergies:  No Known Allergies    Isolation:  None   Infection:  None   Code Status:  CPR    Ht:  170.2 cm (67\")   Wt:  70.8 kg (156 lb)    Admission Cmt:  None   Principal Problem:  None                Active Insurance as of 6/21/2020     Primary Coverage     Payor Plan Insurance Group Employer/Plan Group    MEDICARE MEDICARE A & B      Payor Plan Address Payor Plan Phone Number Payor Plan Fax Number Effective Dates    [address] [phone]  9/1/2012 - None Entered    [facility] [id number]       Subscriber Name Subscriber Birth Date Member ID       KINGSLEY SERVIN 1945 [id number]           Secondary Coverage     Payor Plan Insurance Group Employer/Plan Group    [id number]      Payor Plan Address Payor Plan Phone Number Payor Plan Fax Number Effective Dates    [address] [phone]  2/10/2020 - None Entered    [facility] [address]       Subscriber Name Subscriber Birth Date Member ID       KINGSLEY SERVIN 1945 [id number]           Tertiary Coverage     Payor Plan Insurance Group Employer/Plan Group    [employer] [place] DE[id number]      Payor Plan Address Payor Plan Phone Number Payor Plan Fax Number Effective Dates    [facility] [phone]  2/10/2020 - None Entered    [address] [address] "       [facility] [address]       Subscriber Name Subscriber Birth Date Member ID       JOSE LUIS SERVIN 1945 [id number]           Other Coverage     Payor Plan Insurance Group Employer/Plan Group    [facility] OPTUM      Payor Plan Address Payor Plan Phone Number Payor Plan Fax Number Effective Dates    [address] [phone]  3/6/2020 - None Entered    [facility] [id number]       Subscriber Name Subscriber Birth Date Member ID       JOSE LUIS SERVIN 1945 [id number]                 Emergency Contacts      (Rel.) Home Phone Work Phone Mobile Phone    LUIS DANIEL SERVIN (Son) -- -- [phone]    boone tee (Daughter) -- -- [phone]    MARIANA TANG (Daughter) -- -- [phone]    Anaya Servin (Daughter) -- -- [phone]    Stacy servin (Daughter) -- -- [phone]               History & Physical      Marco A Katz MD at 06/21/20 1904              [facility] Medicine Services  HISTORY AND PHYSICAL    Date of Admission: 6/21/2020  Primary Care Physician: BARRY Urrutia MD    Subjective     Chief Complaint: blood in stool    History of Present Illness  Mr. Servin is a 74 year old genlteman with a history of a-fib and PVD.  He presents today complaining of bloody stool.  He is well known to me from previous admission.      He has been admitted twice in the past 2 weeks for GI bleeding that was exacerbated by anticoagulation.  He underwent two EGD's and a colonoscopy.  He had a CT Enterography.  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  Given his bleeding issues, it was decided it was for the best to stop his anticoagulation.      He was discharged 3 days ago on 6/18/2020.  For the first two days of being home, he was doing ok.  Today he felt constipated and was straining to have a bowel movement.  He had a lot of blood in the toilet bowl.  He came to the ER for evaluation.      His hemoglobin here is 1 point higher than it was 3 days ago.   However prior to being discharged from the ER today, he had a large bloody bowel movement.              Review of Systems   Constitutional: Negative for fatigue and fever.   HENT: Negative for congestion and ear pain.    Eyes: Negative for redness and visual disturbance.   Respiratory: Negative for cough, shortness of breath and wheezing.    Cardiovascular: Negative for chest pain and palpitations.   Gastrointestinal: Positive for blood in stool. Negative for abdominal pain, diarrhea, nausea and vomiting.   Endocrine: Negative for cold intolerance and heat intolerance.   Genitourinary: Negative for dysuria and frequency.   Musculoskeletal: Negative for arthralgias and back pain.   Skin: Negative for rash and wound.   Neurological: Negative for dizziness and headaches.   Psychiatric/Behavioral: Negative for confusion. The patient is not nervous/anxious.         Otherwise complete ROS reviewed and negative except as mentioned in the HPI.    Past Medical History:   Past Medical History:   Diagnosis Date   • Alcohol abuse    • Arthritis    • Atrial flutter (CMS/HCC)    • Circulation problem    • History of tobacco abuse    • Hypertension    • PVD (peripheral vascular disease) (CMS/HCC)      Past Surgical History:  Past Surgical History:   Procedure Laterality Date   • ADENOIDECTOMY     • COLONOSCOPY N/A 6/8/2020    Procedure: COLONOSCOPY WITH ANESTHESIA;  Surgeon: Mayela Ching MD;  Location: [facility] ENDOSCOPY;  Service: Gastroenterology;  Laterality: N/A;  preop; GI bleed   postop; avm's   PCP rachel calderón    • ENDOSCOPY N/A 6/8/2020    Procedure: ESOPHAGOGASTRODUODENOSCOPY WITH ANESTHESIA;  Surgeon: Mayela Ching MD;  Location: [facility] ENDOSCOPY;  Service: Gastroenterology;  Laterality: N/A;  proep; GI BLeed   postop; normal   PCP rachel calderón    • ENDOSCOPY N/A 6/15/2020    Procedure: ESOPHAGOGASTRODUODENOSCOPY WITH ANESTHESIA;  Surgeon: Carmine Banuelos MD;  Location: [facility] ENDOSCOPY;  Service:  Gastroenterology;  Laterality: N/A;  pre gi bleed, anemia  post normal  dr rachel calderón     Social History:  reports that he has been smoking cigarettes and cigars. He has been smoking about 0.50 packs per day. He has never used smokeless tobacco. He reports that he drank about 2.0 standard drinks of alcohol per week. He reports that he does not use drugs.    Family History: family history includes Cancer in his father; Dementia in his mother; Pancreatic cancer in his father.       Allergies:  No Known Allergies  Medications:  Prior to Admission medications    Medication Sig Start Date End Date Taking? Authorizing Provider   amiodarone (PACERONE) 200 MG tablet Take 1 tablet by mouth Daily. 5/22/20   Odalis Garcia APRN   aspirin 81 MG EC tablet Take 1 tablet by mouth Daily. 6/18/20   Marco A Katz MD   Cholecalciferol (VITAMIN D) 50 MCG (2000 UT) tablet Take 2,000 Units by mouth Daily.    Jose Walls MD   cilostazol (PLETAL) 50 MG tablet Take 1 tablet by mouth 2 (Two) Times a Day. 5/14/20   Nolan Farias MD   dilTIAZem CD (CARDIZEM CD) 120 MG 24 hr capsule Take 1 capsule by mouth Daily. 5/11/20   Nolan Farias MD   famotidine (PEPCID) 20 MG tablet Take 20 mg by mouth Daily.    Jose Walls MD   ferrous sulfate 325 (65 FE) MG tablet Take 1 tablet by mouth Daily With Breakfast. 6/8/20   Marco A Katz MD   guaiFENesin (MUCINEX) 600 MG 12 hr tablet Take 600 mg by mouth 2 (Two) Times a Day.    Jose Walls MD   hydrOXYzine (ATARAX) 25 MG tablet Take 1 tablet by mouth 3 (Three) Times a Day As Needed for Anxiety. 4/16/20   BARRY Calderón MD   lidocaine (LMX) 4 % cream Apply  topically to the appropriate area as directed As Needed for Mild Pain . Apply to left heel.    Jose Walls MD   lisinopril (PRINIVIL,ZESTRIL) 40 MG tablet Take 20 mg by mouth Daily.    Jose Walls MD   Multiple Vitamins-Minerals (CENTRUM ADULTS) tablet Take 1 tablet by  "mouth Daily.    ProviderJose MD   pantoprazole (PROTONIX) 20 MG EC tablet Take 20 mg by mouth Daily.    ProviderJose MD   sildenafil (VIAGRA) 50 MG tablet Take 50 mg by mouth Daily As Needed for Erectile Dysfunction (Sexual Activity).    ProviderJose MD     Objective     Vital Signs: /64   Pulse 70   Temp 98.3 °F (36.8 °C) (Oral)   Resp 16   Ht 170.2 cm (67\")   Wt 70.8 kg (156 lb)   SpO2 96%   BMI 24.43 kg/m²    Physical Exam   Constitutional: He is oriented to person, place, and time. He appears well-developed and well-nourished.   HENT:   Head: Normocephalic and atraumatic.   Right Ear: External ear normal.   Left Ear: External ear normal.   Nose: Nose normal.   Mouth/Throat: Oropharynx is clear and moist.   Eyes: Pupils are equal, round, and reactive to light. Conjunctivae and EOM are normal. Right eye exhibits no discharge. Left eye exhibits no discharge. No scleral icterus.   Neck: Normal range of motion. Neck supple. No tracheal deviation present. No thyromegaly present.   Cardiovascular: Normal rate, regular rhythm, normal heart sounds and intact distal pulses. Exam reveals no gallop and no friction rub.   No murmur heard.  Pulmonary/Chest: Effort normal and breath sounds normal. No stridor. No respiratory distress. He has no wheezes. He has no rales. He exhibits no tenderness.   Abdominal: Soft. Bowel sounds are normal. He exhibits no distension and no mass. There is no tenderness. There is no rebound and no guarding. No hernia.   Musculoskeletal: Normal range of motion. He exhibits no edema or deformity.   Lymphadenopathy:     He has no cervical adenopathy.   Neurological: He is alert and oriented to person, place, and time. He has normal reflexes. He displays normal reflexes. No cranial nerve deficit. He exhibits normal muscle tone. Coordination normal.   Skin: Skin is warm and dry. No rash noted. No erythema. No pallor.   Psychiatric: He has a normal mood and " affect. His behavior is normal. Judgment and thought content normal.   Vitals reviewed.      Results Reviewed:  Lab Results (last 24 hours)     Procedure Component Value Units Date/Time    [place] Urine - Urine, Clean Catch [[id number]] Collected:  06/21/20 1635    Specimen:  Urine, Clean Catch Updated:  06/21/20 1746     Extra Tube Hold for add-ons.     Comment: Auto resulted.       Comprehensive Metabolic Panel [[id number]]  (Abnormal) Collected:  06/21/20 1459    Specimen:  Blood from Arm, Right Updated:  06/21/20 1535     Glucose 84 mg/dL      BUN 24 mg/dL      Creatinine 0.73 mg/dL      Sodium 145 mmol/L      Potassium 4.1 mmol/L      Chloride 110 mmol/L      CO2 22.0 mmol/L      Calcium 9.4 mg/dL      Total Protein 6.7 g/dL      Albumin 3.70 g/dL      ALT (SGPT) 20 U/L      AST (SGOT) 27 U/L      Comment: Specimen hemolyzed.  Results may be affected.        Alkaline Phosphatase 104 U/L      Total Bilirubin 0.5 mg/dL      eGFR Non African Amer 106 mL/min/1.73      Globulin 3.0 gm/dL      A/G Ratio 1.2 g/dL      BUN/Creatinine Ratio 32.9     Anion Gap 13.0 mmol/L     Narrative:       GFR Normal >60  Chronic Kidney Disease <60  Kidney Failure <15      Ammonia [[id number]]  (Abnormal) Collected:  06/21/20 1459    Specimen:  Blood from Arm, Right Updated:  06/21/20 1530     Ammonia 11 umol/L     Protime-INR [[id number]]  (Normal) Collected:  06/21/20 1459    Specimen:  Blood from Arm, Right Updated:  06/21/20 1524     Protime 13.7 Seconds      INR 1.09    aPTT [[id number]]  (Abnormal) Collected:  06/21/20 1459    Specimen:  Blood from Arm, Right Updated:  06/21/20 1524     PTT 44.6 seconds     CBC & Differential [[id number]] Collected:  06/21/20 1459    Specimen:  Blood from Arm, Right Updated:  06/21/20 1515    Narrative:       The following orders were created for panel order CBC & Differential.  Procedure                               Abnormality         Status                     ---------                                -----------         ------                     CBC Auto Differential[[id number]]        Abnormal            Final result                 Please view results for these tests on the individual orders.    CBC Auto Differential [[id number]]  (Abnormal) Collected:  06/21/20 1459    Specimen:  Blood from Arm, Right Updated:  06/21/20 1515     WBC 12.79 10*3/mm3      RBC 3.21 10*6/mm3      Hemoglobin 10.1 g/dL      Hematocrit 30.6 %      MCV 95.3 fL      MCH 31.5 pg      MCHC 33.0 g/dL      RDW 17.1 %      RDW-SD 58.3 fl      MPV 8.8 fL      Platelets 380 10*3/mm3      Neutrophil % 84.8 %      Lymphocyte % 6.0 %      Monocyte % 6.9 %      Eosinophil % 1.3 %      Basophil % 0.5 %      Immature Grans % 0.5 %      Neutrophils, Absolute 10.85 10*3/mm3      Lymphocytes, Absolute 0.77 10*3/mm3      Monocytes, Absolute 0.88 10*3/mm3      Eosinophils, Absolute 0.16 10*3/mm3      Basophils, Absolute 0.06 10*3/mm3      Immature Grans, Absolute 0.07 10*3/mm3      nRBC 0.0 /100 WBC         Imaging Results (Last 24 Hours)     Procedure Component Value Units Date/Time    CT Abdomen Pelvis With Contrast [[id number]] Collected:  06/21/20 1651     Updated:  06/21/20 1710    Narrative:       EXAMINATION: CT ABDOMEN PELVIS W CONTRAST- 6/21/2020 4:51 PM CDT     HISTORY: GI bleeding     DOSE: 219 mGycm (Automatic exposure control technique was implemented in  an effort to keep the radiation dose as low as possible without  compromising image quality)     REPORT: Spiral CT of the abdomen and pelvis was performed after  administration of intravenous contrast from the lung bases through the  pubic symphysis. Reconstructed coronal and sagittal images are also  reviewed.     COMPARISON: CT abdomen pelvis with contrast 6/14/2020.     The lung bases are clear. The liver and spleen are homogeneous and  appear unremarkable. The gallbladder is partially contracted. The  stomach is decompressed. There is mild thickening of the antral wall,  which is  slightly indistinct and difficult to separate from the anterior  surface of the pancreas. The pancreas and adrenal glands are  unremarkable. The kidneys enhance normally, no renal mass or  hydronephrosis is identified. There is a small cyst in the posterior  cortex of the left kidney, measuring 5 mm. There 2 small cysts at the  upper pole the right kidney, both measuring less than 1 cm. The ureters  are decompressed. The bladder is unremarkable. There is moderate  prostate enlargement. Moderate stool volume is present within the distal  colon and rectum. No bowel dilation or obstruction is identified. There  is no obvious bowel wall thickening or mass. No free fluid or free air  is identified. There is heavy calcified plaque within the aorta and its  branches. The aorta has normal caliber. There appears to be high-grade  stenosis involving both common iliac arteries. Review of bone windows  demonstrates severe osteoarthritis of the right hip, with complete loss  of the joint space, large subchondral geodes within the acetabulum and  femoral head, with partial collapse of the femoral head.       Impression:       1. Mild thickening of the gastric antral wall which appears somewhat  indistinct, with poor definition of fat planes between the stomach and  head of the pancreas. This may be related to peptic ulcer disease or  less likely neoplasm, no perforation is identified. Consider follow-up  with EGD.  2. Moderate constipation, without evidence of bowel obstruction.  3. Severe atherosclerotic disease of the aorta and iliac arteries as  well as the renal arteries and proximal SMA. There appears to be severe  stenosis of the proximal common iliac arteries, this could be better  assessed with CT angiography.  4. Severe osteoarthritis of the right hip as detailed above, with a  small right hip effusion.  5. Small bilateral benign appearing nephrogenic cysts.        This report was finalized on 06/21/2020 17:07 by   Napoleon Ortiz MD.        I have personally reviewed and interpreted the radiology studies and ECG obtained at time of admission.     Assessment / Plan     Assessment:     1.  GI bleed secondary to AVM's  -Trend H&H  -Consult GI  -Transfuse as indicated     2.  Afib  -Cardizem  -Amiodarone       3.  HTN  -Lisinopril     4.  Bladder wall thickening/questionable bladder mass  -Urology planning outpt workup     5.  PVD  -Pletal held  -Vascular surgery following           Code Status: Full     I discussed the patient's findings and my recommendations with the patient    Estimated length of stay 1 night    Patient seen and examined by me on 6/21/2020 at 1650    Marco A Katz MD   06/21/20   19:04              Electronically signed by Marco A Katz MD at 06/21/20 1908          Emergency Department Notes      Tato Alarcon Jr., MD at 06/21/20 1426          Subjective   Patient presents with a complaint of rectal bleeding.  He says just got to the hospital a few days ago after treatment for the same.  He had upper and lower endoscopy and says no definitive source of bleeding was found.  He had been on Xarelto secondary to atrial fibrillation felt that may be part of the problem and stopped it and now he is only on aspirin a day.  He is not had a bowel movement in a couple of days but this morning try to have 1 and was having some stool that appeared to him to be bright red and pasty.  He felt he could not pass gas with a bit of a time.  He is not really having any pain in his abdomen.  Does have some pain in his foot where he has a vascular issue.  However he is feeling weak and so if he wanted to be checked out again.      History provided by:  Patient and relative   used: No    GI Problem   The primary symptoms include hematochezia. Primary symptoms do not include arthralgias. The illness began today.   The illness does not include chills, anorexia, dysphagia, odynophagia, bloating,  constipation, tenesmus, back pain or itching. Associated medical issues do not include inflammatory bowel disease, GERD, gallstones, liver disease, alcohol abuse, PUD, gastric bypass, bowel resection, irritable bowel syndrome, hemorrhoids or diverticulitis.       Review of Systems   Constitutional: Negative.  Negative for chills.   HENT: Negative.    Respiratory: Negative.    Cardiovascular: Negative.    Gastrointestinal: Positive for hematochezia. Negative for anorexia, bloating, constipation and dysphagia.   Genitourinary: Negative.    Musculoskeletal: Negative.  Negative for arthralgias and back pain.   Skin: Negative.  Negative for itching.   Neurological: Negative.    Psychiatric/Behavioral: Negative.    All other systems reviewed and are negative.      Past Medical History:   Diagnosis Date   • Alcohol abuse    • Arthritis    • Atrial flutter (CMS/HCC)    • Circulation problem    • History of tobacco abuse    • Hypertension    • PVD (peripheral vascular disease) (CMS/HCC)        No Known Allergies    Past Surgical History:   Procedure Laterality Date   • ADENOIDECTOMY     • COLONOSCOPY N/A 6/8/2020    Procedure: COLONOSCOPY WITH ANESTHESIA;  Surgeon: Mayela Ching MD;  Location: [facility] ENDOSCOPY;  Service: Gastroenterology;  Laterality: N/A;  preop; GI bleed   postop; avm's   PCP rachel calderón    • ENDOSCOPY N/A 6/8/2020    Procedure: ESOPHAGOGASTRODUODENOSCOPY WITH ANESTHESIA;  Surgeon: Mayela Ching MD;  Location: [facility] ENDOSCOPY;  Service: Gastroenterology;  Laterality: N/A;  proep; GI BLeed   postop; normal   KENNY calderón    • ENDOSCOPY N/A 6/15/2020    Procedure: ESOPHAGOGASTRODUODENOSCOPY WITH ANESTHESIA;  Surgeon: Carmine Banuelos MD;  Location: [facility] ENDOSCOPY;  Service: Gastroenterology;  Laterality: N/A;  pre gi bleed, anemia  post normal  dr rachel calderón       Family History   Problem Relation Age of Onset   • Dementia Mother    • Cancer Father    • Pancreatic cancer Father         Social History     Socioeconomic History   • Marital status:      Spouse name: Not on file   • Number of children: Not on file   • Years of education: Not on file   • Highest education level: Not on file   Tobacco Use   • Smoking status: Current Every Day Smoker     Packs/day: 0.50     Types: Cigarettes, Cigars   • Smokeless tobacco: Never Used   Substance and Sexual Activity   • Alcohol use: Not Currently     Alcohol/week: 2.0 standard drinks     Types: 2 Glasses of wine per week     Frequency: Never   • Drug use: Never   • Sexual activity: Defer       Prior to Admission medications    Medication Sig Start Date End Date Taking? Authorizing Provider   amiodarone (PACERONE) 200 MG tablet Take 1 tablet by mouth Daily. 5/22/20   Odalis Garcia APRN   aspirin 81 MG EC tablet Take 1 tablet by mouth Daily. 6/18/20   Marco A Katz MD   Cholecalciferol (VITAMIN D) 50 MCG (2000 UT) tablet Take 2,000 Units by mouth Daily.    Jose Walls MD   cilostazol (PLETAL) 50 MG tablet Take 1 tablet by mouth 2 (Two) Times a Day. 5/14/20   Nolan Farias MD   dilTIAZem CD (CARDIZEM CD) 120 MG 24 hr capsule Take 1 capsule by mouth Daily. 5/11/20   Nolan Farias MD   famotidine (PEPCID) 20 MG tablet Take 20 mg by mouth Daily.    Jose Walls MD   ferrous sulfate 325 (65 FE) MG tablet Take 1 tablet by mouth Daily With Breakfast. 6/8/20   Marco A Katz MD   guaiFENesin (MUCINEX) 600 MG 12 hr tablet Take 600 mg by mouth 2 (Two) Times a Day.    Jose Walls MD   hydrOXYzine (ATARAX) 25 MG tablet Take 1 tablet by mouth 3 (Three) Times a Day As Needed for Anxiety. 4/16/20   BARRY Urrutia MD   lidocaine (LMX) 4 % cream Apply  topically to the appropriate area as directed As Needed for Mild Pain . Apply to left heel.    Jose Walls MD   lisinopril (PRINIVIL,ZESTRIL) 40 MG tablet Take 20 mg by mouth Daily.    Jose Walls MD   Multiple Vitamins-Minerals  (CENTRUM ADULTS) tablet Take 1 tablet by mouth Daily.    ProviderJose MD   pantoprazole (PROTONIX) 20 MG EC tablet Take 20 mg by mouth Daily.    Jose Walls MD   sildenafil (VIAGRA) 50 MG tablet Take 50 mg by mouth Daily As Needed for Erectile Dysfunction (Sexual Activity).    Jose Walls MD       Medications   sodium chloride 0.9 % flush 10 mL (has no administration in time range)   amiodarone (PACERONE) tablet 200 mg (has no administration in time range)   aspirin EC tablet 81 mg (has no administration in time range)   cilostazol (PLETAL) tablet 50 mg (has no administration in time range)   dilTIAZem CD (CARDIZEM CD) 24 hr capsule 120 mg (has no administration in time range)   famotidine (PEPCID) tablet 20 mg (has no administration in time range)   ferrous sulfate tablet 325 mg (has no administration in time range)   guaiFENesin (MUCINEX) 12 hr tablet 600 mg (has no administration in time range)   hydrOXYzine (ATARAX) tablet 25 mg (has no administration in time range)   lisinopril (PRINIVIL,ZESTRIL) tablet 20 mg (has no administration in time range)   pantoprazole (PROTONIX) EC tablet 20 mg (has no administration in time range)   iopamidol (ISOVUE-300) 61 % injection 100 mL (100 mL Intravenous Given 6/21/20 1620)   HYDROcodone-acetaminophen (NORCO) 5-325 MG per tablet 1 tablet (1 tablet Oral Given 6/21/20 1746)       Vitals:    06/21/20 1953   BP: 156/79   Pulse: 87   Resp: 18   Temp: 98.5 °F (36.9 °C)   SpO2: 95%         Objective   Physical Exam   Constitutional: He appears well-developed and well-nourished.   HENT:   Head: Normocephalic.   Cardiovascular: Normal rate and regular rhythm.   Pulmonary/Chest: Effort normal and breath sounds normal.   Abdominal: Soft. Bowel sounds are normal.   Musculoskeletal: Normal range of motion.   Neurological: He is alert.   Skin: Skin is warm and dry.   Psychiatric: He has a normal mood and affect. His behavior is normal.   Nursing note and  vitals reviewed.      Procedures        Lab Results (last 24 hours)     Procedure Component Value Units Date/Time    CBC & Differential [[id number]] Collected:  06/21/20 1459    Specimen:  Blood from Arm, Right Updated:  06/21/20 1515    Narrative:       The following orders were created for panel order CBC & Differential.  Procedure                               Abnormality         Status                     ---------                               -----------         ------                     CBC Auto Differential[[id number]]        Abnormal            Final result                 Please view results for these tests on the individual orders.    Comprehensive Metabolic Panel [[id number]]  (Abnormal) Collected:  06/21/20 1459    Specimen:  Blood from Arm, Right Updated:  06/21/20 1535     Glucose 84 mg/dL      BUN 24 mg/dL      Creatinine 0.73 mg/dL      Sodium 145 mmol/L      Potassium 4.1 mmol/L      Chloride 110 mmol/L      CO2 22.0 mmol/L      Calcium 9.4 mg/dL      Total Protein 6.7 g/dL      Albumin 3.70 g/dL      ALT (SGPT) 20 U/L      AST (SGOT) 27 U/L      Comment: Specimen hemolyzed.  Results may be affected.        Alkaline Phosphatase 104 U/L      Total Bilirubin 0.5 mg/dL      eGFR Non African Amer 106 mL/min/1.73      Globulin 3.0 gm/dL      A/G Ratio 1.2 g/dL      BUN/Creatinine Ratio 32.9     Anion Gap 13.0 mmol/L     Narrative:       GFR Normal >60  Chronic Kidney Disease <60  Kidney Failure <15      Protime-INR [[id number]]  (Normal) Collected:  06/21/20 1459    Specimen:  Blood from Arm, Right Updated:  06/21/20 1524     Protime 13.7 Seconds      INR 1.09    aPTT [[id number]]  (Abnormal) Collected:  06/21/20 1459    Specimen:  Blood from Arm, Right Updated:  06/21/20 1524     PTT 44.6 seconds     CBC Auto Differential [[id number]]  (Abnormal) Collected:  06/21/20 1459    Specimen:  Blood from Arm, Right Updated:  06/21/20 1515     WBC 12.79 10*3/mm3      RBC 3.21 10*6/mm3      Hemoglobin 10.1 g/dL       Hematocrit 30.6 %      MCV 95.3 fL      MCH 31.5 pg      MCHC 33.0 g/dL      RDW 17.1 %      RDW-SD 58.3 fl      MPV 8.8 fL      Platelets 380 10*3/mm3      Neutrophil % 84.8 %      Lymphocyte % 6.0 %      Monocyte % 6.9 %      Eosinophil % 1.3 %      Basophil % 0.5 %      Immature Grans % 0.5 %      Neutrophils, Absolute 10.85 10*3/mm3      Lymphocytes, Absolute 0.77 10*3/mm3      Monocytes, Absolute 0.88 10*3/mm3      Eosinophils, Absolute 0.16 10*3/mm3      Basophils, Absolute 0.06 10*3/mm3      Immature Grans, Absolute 0.07 10*3/mm3      nRBC 0.0 /100 WBC     Ammonia [[id number]]  (Abnormal) Collected:  06/21/20 1459    Specimen:  Blood from Arm, Right Updated:  06/21/20 1530     Ammonia 11 umol/L     [place] Urine - Urine, Clean Catch [[id number]] Collected:  06/21/20 1635    Specimen:  Urine, Clean Catch Updated:  06/21/20 1746     Extra Tube Hold for add-ons.     Comment: Auto resulted.             CT Abdomen Pelvis With Contrast   Final Result   1. Mild thickening of the gastric antral wall which appears somewhat   indistinct, with poor definition of fat planes between the stomach and   head of the pancreas. This may be related to peptic ulcer disease or   less likely neoplasm, no perforation is identified. Consider follow-up   with EGD.   2. Moderate constipation, without evidence of bowel obstruction.   3. Severe atherosclerotic disease of the aorta and iliac arteries as   well as the renal arteries and proximal SMA. There appears to be severe   stenosis of the proximal common iliac arteries, this could be better   assessed with CT angiography.   4. Severe osteoarthritis of the right hip as detailed above, with a   small right hip effusion.   5. Small bilateral benign appearing nephrogenic cysts.           This report was finalized on 06/21/2020 17:07 by Dr. Napoleon Ortiz MD.          ED Course  ED Course as of Jun 21 2037   Sun Jun 21, 2020 2036 I told the patient that his hemoglobin was actually better  than it was a few days ago.  He CT scan revealed moderate constipation but nothing else that would be particular severe.  We talked about the patient's problems and the fact that he has had this bleeding probably from the Xarelto and from the AVMs that he has in his intestines and that this will be a problem off and on for some time.  At one point we had thought about let him go home and just self monitor and come back if things got worse but then he had some more bright red blood per rectum here in the emergency room so we decided it would be better to keep and make sure he did okay.    [TR]      ED Course User Index  [TR] Tato Alarcon Jr., MD          MDM  Number of Diagnoses or Management Options  Gastrointestinal hemorrhage, unspecified gastrointestinal hemorrhage type: new and requires workup     Amount and/or Complexity of Data Reviewed  Clinical lab tests: ordered and reviewed  Tests in the radiology section of CPT®:  ordered and reviewed  Decide to obtain previous medical records or to obtain history from someone other than the patient: yes  Discuss the patient with other providers: yes    Risk of Complications, Morbidity, and/or Mortality  Presenting problems: moderate  Diagnostic procedures: moderate  Management options: moderate    Patient Progress  Patient progress: stable      Final diagnoses:   Gastrointestinal hemorrhage, unspecified gastrointestinal hemorrhage type          Tato Alarcon Jr., MD  06/21/20 2037      Electronically signed by Tato Alarcon Jr., MD at 06/21/20 2037     Hugo Mccarty RN at 06/21/20 1845        Dr. Katz at bedside     Hugo Mccatry RN  06/21/20 1853      Electronically signed by Hugo Mccarty RN at 06/21/20 1853     Hugo Mccarty RN at 06/21/20 1853        Pt has had several small incontinent BM's past hr.  Pt skin around rectum red, irritated.  No breakdown noted.     Hugo Mccarty RN  06/21/20  1854      Electronically signed by Hugo Mccarty RN at 06/21/20 1854         Current Facility-Administered Medications   Medication Dose Route Frequency Provider Last Rate Last Dose   • amiodarone (PACERONE) tablet 200 mg  200 mg Oral Q24H Marco A Katz MD   200 mg at 06/22/20 1009   • aspirin EC tablet 81 mg  81 mg Oral Daily Marco A Katz MD   81 mg at 06/22/20 1009   • cilostazol (PLETAL) tablet 50 mg  50 mg Oral BID Marco A Katz MD   50 mg at 06/22/20 1009   • dilTIAZem CD (CARDIZEM CD) 24 hr capsule 120 mg  120 mg Oral Q AM Marco A Katz MD   120 mg at 06/22/20 0540   • famotidine (PEPCID) tablet 20 mg  20 mg Oral Daily Marco A Katz MD   20 mg at 06/22/20 1009   • ferrous sulfate tablet 325 mg  325 mg Oral Daily With Breakfast Marco A Katz MD   325 mg at 06/22/20 1009   • guaiFENesin (MUCINEX) 12 hr tablet 600 mg  600 mg Oral BID Marco A Katz MD   600 mg at 06/22/20 1009   • HYDROmorphone (DILAUDID) injection 0.5 mg  0.5 mg Intravenous Q4H PRN Alexsander Rubio DO   0.5 mg at 06/22/20 0724   • hydrOXYzine (ATARAX) tablet 25 mg  25 mg Oral TID PRN Marco A Katz MD       • lisinopril (PRINIVIL,ZESTRIL) tablet 20 mg  20 mg Oral Daily Marco A Katz MD   20 mg at 06/22/20 1008   • pantoprazole (PROTONIX) EC tablet 20 mg  20 mg Oral Q AM Marco A Katz MD   20 mg at 06/22/20 0540   • sodium chloride 0.9 % flush 10 mL  10 mL Intravenous PRN Marco A Katz MD         Physician Progress Notes (last 24 hours) (Notes from 06/21/20 1142 through 06/22/20 1142)    No notes of this type exist for this encounter.            Consult Notes (last 24 hours) (Notes from 06/21/20 1142 through 06/22/20 1142)      Andressa Tapia APRN at 06/22/20 0921      Consult Orders    1. Inpatient Gastroenterology Consult [[id number]] ordered by Marco A Katz MD at 06/21/20 1910                [facility]  Gastroenterology  Initial Inpatient Consult Note    Referring Provider: Marco A Katz MD    Reason for Consultation:   Rectal bleeding    Subjective     History of present illness:         75 yo admitted with rectal bleeding. He has had egd x 2 and colonoscopy since 6-8-20. Yesterday he was constipated/straining to have a bm. Tried for several hours but only passed few drops of red blood. He did get lightheaded. Yesterday evening finally had a bm with very little red blood. Since admission has had brown stool with no blood. He takes protonix and pepcid daily.  Takes pletal for PVD. Takes asa 81 mg daily. Prior to last admission he had been on xarelto for afib.  No black stool. No rectal pain. No abdominal pain. No syncope. No n/v. No fever.       This admission ct abdomen/pelvis 6-21-20 mild thickening gastric antral wall ( of note,  was not noted on recent ct enterog or egd ).   Hemoglobin on admission 10.0. This am hgb 9.4.  Prior to discharge 6-18-20 hgb 9.1.         Colonoscopy June 8, 2020 by Dr. Ching noting bleeding internal hemorrhoid, multiple nonbleeding AVMs.  EGD June 8, 2020 by Dr. Ching normal  EGD [facility] 15 by Dr. Vin borjas.   CT abdomen/pelvis with contrast enterography  6-14-20      She has also noted blood in her urine the last few weeks.  None presently.  Recent bladder wall thickening/questionable bladder mass with outpatient work-up planned.        Past Medical History:  Past Medical History:   Diagnosis Date   • Alcohol abuse    • Arthritis    • Atrial flutter (CMS/HCC)    • Circulation problem    • History of tobacco abuse    • Hypertension    • PVD (peripheral vascular disease) (CMS/HCC)        Past Surgical History:  Past Surgical History:   Procedure Laterality Date   • ADENOIDECTOMY     • COLONOSCOPY N/A 6/8/2020    Procedure: COLONOSCOPY WITH ANESTHESIA;  Surgeon: Mayela Ching MD;  Location: [facility] ENDOSCOPY;  Service: Gastroenterology;  Laterality: N/A;  preop; GI bleed    postop; avm's   PCP rachel calderón    • ENDOSCOPY N/A 6/8/2020    Procedure: ESOPHAGOGASTRODUODENOSCOPY WITH ANESTHESIA;  Surgeon: Mayela Ching MD;  Location: [facility] ENDOSCOPY;  Service: Gastroenterology;  Laterality: N/A;  proep; GI BLeed   postop; normal   PCP rachel calderón    • ENDOSCOPY N/A 6/15/2020    Procedure: ESOPHAGOGASTRODUODENOSCOPY WITH ANESTHESIA;  Surgeon: Carmine Banuelos MD;  Location: [facility] ENDOSCOPY;  Service: Gastroenterology;  Laterality: N/A;  pre gi bleed, anemia  post normal  dr rachel calderón        Social History:   Social History     Tobacco Use   • Smoking status: Current Every Day Smoker     Packs/day: 0.50     Types: Cigarettes, Cigars   • Smokeless tobacco: Never Used   Substance Use Topics   • Alcohol use: Not Currently     Alcohol/week: 2.0 standard drinks     Types: 2 Glasses of wine per week     Frequency: Never        Family History:  Family History   Problem Relation Age of Onset   • Dementia Mother    • Cancer Father    • Pancreatic cancer Father        Home Meds:  Medications Prior to Admission   Medication Sig Dispense Refill Last Dose   • amiodarone (PACERONE) 200 MG tablet Take 1 tablet by mouth Daily. 6 tablet 1 6/13/2020 at Unknown time   • aspirin 81 MG EC tablet Take 1 tablet by mouth Daily. 30 tablet 11    • Cholecalciferol (VITAMIN D) 50 MCG (2000 UT) tablet Take 2,000 Units by mouth Daily.   6/13/2020 at Unknown time   • cilostazol (PLETAL) 50 MG tablet Take 1 tablet by mouth 2 (Two) Times a Day. 180 tablet 0 6/13/2020 at Unknown time   • dilTIAZem CD (CARDIZEM CD) 120 MG 24 hr capsule Take 1 capsule by mouth Daily. 30 capsule 11 6/13/2020 at Unknown time   • famotidine (PEPCID) 20 MG tablet Take 20 mg by mouth Daily.   6/13/2020 at Unknown time   • ferrous sulfate 325 (65 FE) MG tablet Take 1 tablet by mouth Daily With Breakfast. 60 tablet 0    • guaiFENesin (MUCINEX) 600 MG 12 hr tablet Take 600 mg by mouth 2 (Two) Times a Day.   6/13/2020 at Unknown time   •  hydrOXYzine (ATARAX) 25 MG tablet Take 1 tablet by mouth 3 (Three) Times a Day As Needed for Anxiety. 60 tablet 5 Past Week at Unknown time   • lidocaine (LMX) 4 % cream Apply  topically to the appropriate area as directed As Needed for Mild Pain . Apply to left heel.   Past Week at Unknown time   • lisinopril (PRINIVIL,ZESTRIL) 40 MG tablet Take 20 mg by mouth Daily.   6/13/2020 at Unknown time   • Multiple Vitamins-Minerals (CENTRUM ADULTS) tablet Take 1 tablet by mouth Daily.   6/13/2020 at Unknown time   • pantoprazole (PROTONIX) 20 MG EC tablet Take 20 mg by mouth Daily.   6/13/2020 at Unknown time   • sildenafil (VIAGRA) 50 MG tablet Take 50 mg by mouth Daily As Needed for Erectile Dysfunction (Sexual Activity).   Past Month at Unknown time       Current Meds:  Current Facility-Administered Medications   Medication Dose Route Frequency Provider Last Rate Last Dose   • amiodarone (PACERONE) tablet 200 mg  200 mg Oral Q24H Marco A Katz MD       • aspirin EC tablet 81 mg  81 mg Oral Daily Marco A Katz MD       • cilostazol (PLETAL) tablet 50 mg  50 mg Oral BID Marco A Katz MD   50 mg at 06/21/20 2247   • dilTIAZem CD (CARDIZEM CD) 24 hr capsule 120 mg  120 mg Oral Q AM Marco A Katz MD   120 mg at 06/22/20 0540   • famotidine (PEPCID) tablet 20 mg  20 mg Oral Daily Marco A Katz MD       • ferrous sulfate tablet 325 mg  325 mg Oral Daily With Breakfast Marco A Katz MD       • guaiFENesin (MUCINEX) 12 hr tablet 600 mg  600 mg Oral BID Marco A Katz MD   600 mg at 06/21/20 2247   • HYDROmorphone (DILAUDID) injection 0.5 mg  0.5 mg Intravenous Q4H PRN Alexsander Rubio DO   0.5 mg at 06/22/20 0724   • hydrOXYzine (ATARAX) tablet 25 mg  25 mg Oral TID PRN Marco A Katz MD       • lisinopril (PRINIVIL,ZESTRIL) tablet 20 mg  20 mg Oral Daily Marco A Katz MD       • pantoprazole (PROTONIX) EC tablet 20 mg  20 mg Oral Q AM Sterling  Marco A Martinez MD   20 mg at 06/22/20 0540   • sodium chloride 0.9 % flush 10 mL  10 mL Intravenous PRN Marco A Katz MD           Allergies:  No Known Allergies    Review of Systems  Review of Systems   Constitutional: Negative for appetite change, chills, fatigue, fever and unexpected weight change.   HENT: Negative for sore throat and trouble swallowing.    Eyes: Negative for visual disturbance.   Respiratory: Negative for shortness of breath and wheezing.    Cardiovascular: Negative for chest pain and palpitations.   Gastrointestinal: Negative for abdominal distention, abdominal pain, diarrhea, nausea and vomiting.        As mentioned in hpi   Genitourinary: Negative for difficulty urinating and hematuria.   Musculoskeletal: Negative for arthralgias and back pain.   Skin: Negative for color change and rash.   Neurological: Negative for dizziness, seizures, syncope, light-headedness and headaches.   Hematological: Negative for adenopathy.   Psychiatric/Behavioral: Negative for confusion. The patient is not nervous/anxious.         Objective     Vital Signs  Temp:  [97.9 °F (36.6 °C)-98.6 °F (37 °C)] 98 °F (36.7 °C)  Heart Rate:  [65-87] 72  Resp:  [16-18] 16  BP: (135-182)/() 146/76    Physical Exam:   Physical Exam   Constitutional: He appears well-developed and well-nourished. No distress.   HENT:   Head: Normocephalic and atraumatic.   Eyes: EOM are normal. No scleral icterus.   Neck: Neck supple. No JVD present.   Cardiovascular: Normal rate, regular rhythm and normal heart sounds.   Pulmonary/Chest: Effort normal and breath sounds normal.   Abdominal: Soft. Bowel sounds are normal. He exhibits no distension. There is no tenderness.   Musculoskeletal: Normal range of motion. He exhibits no deformity.   Neurological: He is alert.   Skin: Skin is warm and dry. No rash noted.   Psychiatric: He has a normal mood and affect. His behavior is normal.   Vitals reviewed.      Results Review:   I  reviewed the patient's new clinical results.  I reviewed the patient's new imaging results and agree with the interpretation.  I reviewed the patient's other test results and agree with the interpretation    Lab Results (most recent)     Procedure Component Value Units Date/Time    Hemoglobin & Hematocrit, Blood [[id number]]  (Abnormal) Collected:  06/22/20 0733    Specimen:  Blood Updated:  06/22/20 0749     Hemoglobin 9.4 g/dL      Hematocrit 28.7 %     Hemoglobin & Hematocrit, Blood [[id number]]  (Abnormal) Collected:  06/21/20 2351    Specimen:  Blood Updated:  06/22/20 0019     Hemoglobin 9.0 g/dL      Hematocrit 28.0 %     [place] Urine - Urine, Clean Catch [[id number]] Collected:  06/21/20 1635    Specimen:  Urine, Clean Catch Updated:  06/21/20 1746     Extra Tube Hold for add-ons.     Comment: Auto resulted.       Comprehensive Metabolic Panel [[id number]]  (Abnormal) Collected:  06/21/20 1459    Specimen:  Blood from Arm, Right Updated:  06/21/20 1535     Glucose 84 mg/dL      BUN 24 mg/dL      Creatinine 0.73 mg/dL      Sodium 145 mmol/L      Potassium 4.1 mmol/L      Chloride 110 mmol/L      CO2 22.0 mmol/L      Calcium 9.4 mg/dL      Total Protein 6.7 g/dL      Albumin 3.70 g/dL      ALT (SGPT) 20 U/L      AST (SGOT) 27 U/L      Comment: Specimen hemolyzed.  Results may be affected.        Alkaline Phosphatase 104 U/L      Total Bilirubin 0.5 mg/dL      eGFR Non African Amer 106 mL/min/1.73      Globulin 3.0 gm/dL      A/G Ratio 1.2 g/dL      BUN/Creatinine Ratio 32.9     Anion Gap 13.0 mmol/L     Narrative:       GFR Normal >60  Chronic Kidney Disease <60  Kidney Failure <15      Ammonia [[id number]]  (Abnormal) Collected:  06/21/20 1459    Specimen:  Blood from Arm, Right Updated:  06/21/20 1530     Ammonia 11 umol/L     Protime-INR [[id number]]  (Normal) Collected:  06/21/20 1459    Specimen:  Blood from Arm, Right Updated:  06/21/20 1524     Protime 13.7 Seconds      INR 1.09    aPTT [[id number]]   (Abnormal) Collected:  06/21/20 1459    Specimen:  Blood from Arm, Right Updated:  06/21/20 1524     PTT 44.6 seconds     CBC & Differential [[id number]] Collected:  06/21/20 1459    Specimen:  Blood from Arm, Right Updated:  06/21/20 1515    Narrative:       The following orders were created for panel order CBC & Differential.  Procedure                               Abnormality         Status                     ---------                               -----------         ------                     CBC Auto Differential[[id number]]        Abnormal            Final result                 Please view results for these tests on the individual orders.    CBC Auto Differential [[id number]]  (Abnormal) Collected:  06/21/20 1459    Specimen:  Blood from Arm, Right Updated:  06/21/20 1515     WBC 12.79 10*3/mm3      RBC 3.21 10*6/mm3      Hemoglobin 10.1 g/dL      Hematocrit 30.6 %      MCV 95.3 fL      MCH 31.5 pg      MCHC 33.0 g/dL      RDW 17.1 %      RDW-SD 58.3 fl      MPV 8.8 fL      Platelets 380 10*3/mm3      Neutrophil % 84.8 %      Lymphocyte % 6.0 %      Monocyte % 6.9 %      Eosinophil % 1.3 %      Basophil % 0.5 %      Immature Grans % 0.5 %      Neutrophils, Absolute 10.85 10*3/mm3      Lymphocytes, Absolute 0.77 10*3/mm3      Monocytes, Absolute 0.88 10*3/mm3      Eosinophils, Absolute 0.16 10*3/mm3      Basophils, Absolute 0.06 10*3/mm3      Immature Grans, Absolute 0.07 10*3/mm3      nRBC 0.0 /100 WBC           Radiology:  Imaging Results (Last 24 Hours)     Procedure Component Value Units Date/Time    CT Abdomen Pelvis With Contrast [[id number]] Collected:  06/21/20 1651     Updated:  06/21/20 1710    Narrative:       EXAMINATION: CT ABDOMEN PELVIS W CONTRAST- 6/21/2020 4:51 PM CDT     HISTORY: GI bleeding     DOSE: 219 mGycm (Automatic exposure control technique was implemented in  an effort to keep the radiation dose as low as possible without  compromising image quality)     REPORT: Spiral CT of the  abdomen and pelvis was performed after  administration of intravenous contrast from the lung bases through the  pubic symphysis. Reconstructed coronal and sagittal images are also  reviewed.     COMPARISON: CT abdomen pelvis with contrast 6/14/2020.     The lung bases are clear. The liver and spleen are homogeneous and  appear unremarkable. The gallbladder is partially contracted. The  stomach is decompressed. There is mild thickening of the antral wall,  which is slightly indistinct and difficult to separate from the anterior  surface of the pancreas. The pancreas and adrenal glands are  unremarkable. The kidneys enhance normally, no renal mass or  hydronephrosis is identified. There is a small cyst in the posterior  cortex of the left kidney, measuring 5 mm. There 2 small cysts at the  upper pole the right kidney, both measuring less than 1 cm. The ureters  are decompressed. The bladder is unremarkable. There is moderate  prostate enlargement. Moderate stool volume is present within the distal  colon and rectum. No bowel dilation or obstruction is identified. There  is no obvious bowel wall thickening or mass. No free fluid or free air  is identified. There is heavy calcified plaque within the aorta and its  branches. The aorta has normal caliber. There appears to be high-grade  stenosis involving both common iliac arteries. Review of bone windows  demonstrates severe osteoarthritis of the right hip, with complete loss  of the joint space, large subchondral geodes within the acetabulum and  femoral head, with partial collapse of the femoral head.       Impression:       1. Mild thickening of the gastric antral wall which appears somewhat  indistinct, with poor definition of fat planes between the stomach and  head of the pancreas. This may be related to peptic ulcer disease or  less likely neoplasm, no perforation is identified. Consider follow-up  with EGD.  2. Moderate constipation, without evidence of bowel  obstruction.  3. Severe atherosclerotic disease of the aorta and iliac arteries as  well as the renal arteries and proximal SMA. There appears to be severe  stenosis of the proximal common iliac arteries, this could be better  assessed with CT angiography.  4. Severe osteoarthritis of the right hip as detailed above, with a  small right hip effusion.  5. Small bilateral benign appearing nephrogenic cysts.        This report was finalized on 06/21/2020 17:07 by Dr. Napoleon Ortiz MD.            Assessment/Plan       Gastrointestinal hemorrhage      1. Rectal bleeding  2. Anemia   3. AVM disease   4. Anticoagulation secondary to pletal.   5.  Abnormal CT abdomen and pelvis    Differential diagnosis discussed including hemorrhoids and AVMs.  His hemoglobin is 9.4 and was 9.1 prior to discharge on 6-18-20.  Has had extensive work-up recently for GI bleed including EGD x2, colonoscopy, and CT enterography.  No plan for repeat endoscopic evaluation at this time.  We will continue to monitor hemoglobin and transfuse as needed.  He is no longer noting any sign of active GI bleeding.  He is hemodynamically stable.  I did recommend a trial of Anusol suppositories but he declines.  We will continue to monitor patient.  Advance diet.    CT scan abdomen and pelvis showed some mild thickening of the gastric antral wall however he has had 2 upper endoscopies in the last few weeks that were normal.   No plan for repeat upper endoscopy at this time.  Recommend continue Protonix daily.          I discussed the patients findings and my recommendations with patient      EMR Dragon/transcription disclaimer:  Much of this encounter note is electronic transcription/translation of spoken language to printed text.  The electronic translation of spoken language may be erroneous, or at times, nonsensical words or phrases may be inadvertently transcribed.  Although I have reviewed the note for such errors, some may still exist.    Andressa Tapia  TAJ 9:21 AM    TAJ Christensen  06/22/20  09:21          Electronically signed by Andressa Tapia APRN at 06/22/20 0942

## 2020-06-22 NOTE — H&P
[facility] Medicine Services  HISTORY AND PHYSICAL    Date of Admission: 6/21/2020  Primary Care Physician: BARRY Urrutia MD    Subjective     Chief Complaint: blood in stool    History of Present Illness  Mr. Lerma is a 74 year old genlteman with a history of a-fib and PVD.  He presents today complaining of bloody stool.  He is well known to me from previous admission.      He has been admitted twice in the past 2 weeks for GI bleeding that was exacerbated by anticoagulation.  He underwent two EGD's and a colonoscopy.  He had a CT Enterography.  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  Given his bleeding issues, it was decided it was for the best to stop his anticoagulation.      He was discharged 3 days ago on 6/18/2020.  For the first two days of being home, he was doing ok.  Today he felt constipated and was straining to have a bowel movement.  He had a lot of blood in the toilet bowl.  He came to the ER for evaluation.      His hemoglobin here is 1 point higher than it was 3 days ago.  However prior to being discharged from the ER today, he had a large bloody bowel movement.              Review of Systems   Constitutional: Negative for fatigue and fever.   HENT: Negative for congestion and ear pain.    Eyes: Negative for redness and visual disturbance.   Respiratory: Negative for cough, shortness of breath and wheezing.    Cardiovascular: Negative for chest pain and palpitations.   Gastrointestinal: Positive for blood in stool. Negative for abdominal pain, diarrhea, nausea and vomiting.   Endocrine: Negative for cold intolerance and heat intolerance.   Genitourinary: Negative for dysuria and frequency.   Musculoskeletal: Negative for arthralgias and back pain.   Skin: Negative for rash and wound.   Neurological: Negative for dizziness and headaches.   Psychiatric/Behavioral: Negative for confusion. The patient is not nervous/anxious.         Otherwise complete ROS  reviewed and negative except as mentioned in the HPI.    Past Medical History:   Past Medical History:   Diagnosis Date   • Alcohol abuse    • Arthritis    • Atrial flutter (CMS/HCC)    • Circulation problem    • History of tobacco abuse    • Hypertension    • PVD (peripheral vascular disease) (CMS/HCC)      Past Surgical History:  Past Surgical History:   Procedure Laterality Date   • ADENOIDECTOMY     • COLONOSCOPY N/A 6/8/2020    Procedure: COLONOSCOPY WITH ANESTHESIA;  Surgeon: Mayela Ching MD;  Location: [facility] ENDOSCOPY;  Service: Gastroenterology;  Laterality: N/A;  preop; GI bleed   postop; avm's   PCP rachel calderón    • ENDOSCOPY N/A 6/8/2020    Procedure: ESOPHAGOGASTRODUODENOSCOPY WITH ANESTHESIA;  Surgeon: Mayela Ching MD;  Location: [facility] ENDOSCOPY;  Service: Gastroenterology;  Laterality: N/A;  proep; GI BLeed   postop; normal   PCP rachel calderón    • ENDOSCOPY N/A 6/15/2020    Procedure: ESOPHAGOGASTRODUODENOSCOPY WITH ANESTHESIA;  Surgeon: Carmine Banuelos MD;  Location: [facility] ENDOSCOPY;  Service: Gastroenterology;  Laterality: N/A;  pre gi bleed, anemia  post normal  dr rachel calderón     Social History:  reports that he has been smoking cigarettes and cigars. He has been smoking about 0.50 packs per day. He has never used smokeless tobacco. He reports that he drank about 2.0 standard drinks of alcohol per week. He reports that he does not use drugs.    Family History: family history includes Cancer in his father; Dementia in his mother; Pancreatic cancer in his father.       Allergies:  No Known Allergies  Medications:  Prior to Admission medications    Medication Sig Start Date End Date Taking? Authorizing Provider   amiodarone (PACERONE) 200 MG tablet Take 1 tablet by mouth Daily. 5/22/20   Odalis Garcia APRN   aspirin 81 MG EC tablet Take 1 tablet by mouth Daily. 6/18/20   Marco A Katz MD   Cholecalciferol (VITAMIN D) 50 MCG (2000 UT) tablet Take 2,000 Units by mouth  "Daily.    Jose Walls MD   cilostazol (PLETAL) 50 MG tablet Take 1 tablet by mouth 2 (Two) Times a Day. 5/14/20   Nolan Farias MD   dilTIAZem CD (CARDIZEM CD) 120 MG 24 hr capsule Take 1 capsule by mouth Daily. 5/11/20   Nolan Farias MD   famotidine (PEPCID) 20 MG tablet Take 20 mg by mouth Daily.    Jose Walls MD   ferrous sulfate 325 (65 FE) MG tablet Take 1 tablet by mouth Daily With Breakfast. 6/8/20   Marco A Katz MD   guaiFENesin (MUCINEX) 600 MG 12 hr tablet Take 600 mg by mouth 2 (Two) Times a Day.    Jose Walls MD   hydrOXYzine (ATARAX) 25 MG tablet Take 1 tablet by mouth 3 (Three) Times a Day As Needed for Anxiety. 4/16/20   BARRY Urrutia MD   lidocaine (LMX) 4 % cream Apply  topically to the appropriate area as directed As Needed for Mild Pain . Apply to left heel.    Jose Walls MD   lisinopril (PRINIVIL,ZESTRIL) 40 MG tablet Take 20 mg by mouth Daily.    Jose Walls MD   Multiple Vitamins-Minerals (CENTRUM ADULTS) tablet Take 1 tablet by mouth Daily.    Jose Walls MD   pantoprazole (PROTONIX) 20 MG EC tablet Take 20 mg by mouth Daily.    Jose Walls MD   sildenafil (VIAGRA) 50 MG tablet Take 50 mg by mouth Daily As Needed for Erectile Dysfunction (Sexual Activity).    Jose Walls MD     Objective     Vital Signs: /64   Pulse 70   Temp 98.3 °F (36.8 °C) (Oral)   Resp 16   Ht 170.2 cm (67\")   Wt 70.8 kg (156 lb)   SpO2 96%   BMI 24.43 kg/m²   Physical Exam   Constitutional: He is oriented to person, place, and time. He appears well-developed and well-nourished.   HENT:   Head: Normocephalic and atraumatic.   Right Ear: External ear normal.   Left Ear: External ear normal.   Nose: Nose normal.   Mouth/Throat: Oropharynx is clear and moist.   Eyes: Pupils are equal, round, and reactive to light. Conjunctivae and EOM are normal. Right eye exhibits no discharge. Left eye exhibits no " discharge. No scleral icterus.   Neck: Normal range of motion. Neck supple. No tracheal deviation present. No thyromegaly present.   Cardiovascular: Normal rate, regular rhythm, normal heart sounds and intact distal pulses. Exam reveals no gallop and no friction rub.   No murmur heard.  Pulmonary/Chest: Effort normal and breath sounds normal. No stridor. No respiratory distress. He has no wheezes. He has no rales. He exhibits no tenderness.   Abdominal: Soft. Bowel sounds are normal. He exhibits no distension and no mass. There is no tenderness. There is no rebound and no guarding. No hernia.   Musculoskeletal: Normal range of motion. He exhibits no edema or deformity.   Lymphadenopathy:     He has no cervical adenopathy.   Neurological: He is alert and oriented to person, place, and time. He has normal reflexes. He displays normal reflexes. No cranial nerve deficit. He exhibits normal muscle tone. Coordination normal.   Skin: Skin is warm and dry. No rash noted. No erythema. No pallor.   Psychiatric: He has a normal mood and affect. His behavior is normal. Judgment and thought content normal.   Vitals reviewed.      Results Reviewed:  Lab Results (last 24 hours)     Procedure Component Value Units Date/Time    [place] Urine - Urine, Clean Catch [[id number]] Collected:  06/21/20 1635    Specimen:  Urine, Clean Catch Updated:  06/21/20 1746     Extra Tube Hold for add-ons.     Comment: Auto resulted.       Comprehensive Metabolic Panel [[id number]]  (Abnormal) Collected:  06/21/20 1459    Specimen:  Blood from Arm, Right Updated:  06/21/20 1535     Glucose 84 mg/dL      BUN 24 mg/dL      Creatinine 0.73 mg/dL      Sodium 145 mmol/L      Potassium 4.1 mmol/L      Chloride 110 mmol/L      CO2 22.0 mmol/L      Calcium 9.4 mg/dL      Total Protein 6.7 g/dL      Albumin 3.70 g/dL      ALT (SGPT) 20 U/L      AST (SGOT) 27 U/L      Comment: Specimen hemolyzed.  Results may be affected.        Alkaline Phosphatase 104 U/L       Total Bilirubin 0.5 mg/dL      eGFR Non African Amer 106 mL/min/1.73      Globulin 3.0 gm/dL      A/G Ratio 1.2 g/dL      BUN/Creatinine Ratio 32.9     Anion Gap 13.0 mmol/L     Narrative:       GFR Normal >60  Chronic Kidney Disease <60  Kidney Failure <15      Ammonia [[id number]]  (Abnormal) Collected:  06/21/20 1459    Specimen:  Blood from Arm, Right Updated:  06/21/20 1530     Ammonia 11 umol/L     Protime-INR [[id number]]  (Normal) Collected:  06/21/20 1459    Specimen:  Blood from Arm, Right Updated:  06/21/20 1524     Protime 13.7 Seconds      INR 1.09    aPTT [[id number]]  (Abnormal) Collected:  06/21/20 1459    Specimen:  Blood from Arm, Right Updated:  06/21/20 1524     PTT 44.6 seconds     CBC & Differential [[id number]] Collected:  06/21/20 1459    Specimen:  Blood from Arm, Right Updated:  06/21/20 1515    Narrative:       The following orders were created for panel order CBC & Differential.  Procedure                               Abnormality         Status                     ---------                               -----------         ------                     CBC Auto Differential[[id number]]        Abnormal            Final result                 Please view results for these tests on the individual orders.    CBC Auto Differential [[id number]]  (Abnormal) Collected:  06/21/20 1459    Specimen:  Blood from Arm, Right Updated:  06/21/20 1515     WBC 12.79 10*3/mm3      RBC 3.21 10*6/mm3      Hemoglobin 10.1 g/dL      Hematocrit 30.6 %      MCV 95.3 fL      MCH 31.5 pg      MCHC 33.0 g/dL      RDW 17.1 %      RDW-SD 58.3 fl      MPV 8.8 fL      Platelets 380 10*3/mm3      Neutrophil % 84.8 %      Lymphocyte % 6.0 %      Monocyte % 6.9 %      Eosinophil % 1.3 %      Basophil % 0.5 %      Immature Grans % 0.5 %      Neutrophils, Absolute 10.85 10*3/mm3      Lymphocytes, Absolute 0.77 10*3/mm3      Monocytes, Absolute 0.88 10*3/mm3      Eosinophils, Absolute 0.16 10*3/mm3      Basophils, Absolute 0.06  10*3/mm3      Immature Grans, Absolute 0.07 10*3/mm3      nRBC 0.0 /100 WBC         Imaging Results (Last 24 Hours)     Procedure Component Value Units Date/Time    CT Abdomen Pelvis With Contrast [[id number]] Collected:  06/21/20 1651     Updated:  06/21/20 1710    Narrative:       EXAMINATION: CT ABDOMEN PELVIS W CONTRAST- 6/21/2020 4:51 PM CDT     HISTORY: GI bleeding     DOSE: 219 mGycm (Automatic exposure control technique was implemented in  an effort to keep the radiation dose as low as possible without  compromising image quality)     REPORT: Spiral CT of the abdomen and pelvis was performed after  administration of intravenous contrast from the lung bases through the  pubic symphysis. Reconstructed coronal and sagittal images are also  reviewed.     COMPARISON: CT abdomen pelvis with contrast 6/14/2020.     The lung bases are clear. The liver and spleen are homogeneous and  appear unremarkable. The gallbladder is partially contracted. The  stomach is decompressed. There is mild thickening of the antral wall,  which is slightly indistinct and difficult to separate from the anterior  surface of the pancreas. The pancreas and adrenal glands are  unremarkable. The kidneys enhance normally, no renal mass or  hydronephrosis is identified. There is a small cyst in the posterior  cortex of the left kidney, measuring 5 mm. There 2 small cysts at the  upper pole the right kidney, both measuring less than 1 cm. The ureters  are decompressed. The bladder is unremarkable. There is moderate  prostate enlargement. Moderate stool volume is present within the distal  colon and rectum. No bowel dilation or obstruction is identified. There  is no obvious bowel wall thickening or mass. No free fluid or free air  is identified. There is heavy calcified plaque within the aorta and its  branches. The aorta has normal caliber. There appears to be high-grade  stenosis involving both common iliac arteries. Review of bone  windows  demonstrates severe osteoarthritis of the right hip, with complete loss  of the joint space, large subchondral geodes within the acetabulum and  femoral head, with partial collapse of the femoral head.       Impression:       1. Mild thickening of the gastric antral wall which appears somewhat  indistinct, with poor definition of fat planes between the stomach and  head of the pancreas. This may be related to peptic ulcer disease or  less likely neoplasm, no perforation is identified. Consider follow-up  with EGD.  2. Moderate constipation, without evidence of bowel obstruction.  3. Severe atherosclerotic disease of the aorta and iliac arteries as  well as the renal arteries and proximal SMA. There appears to be severe  stenosis of the proximal common iliac arteries, this could be better  assessed with CT angiography.  4. Severe osteoarthritis of the right hip as detailed above, with a  small right hip effusion.  5. Small bilateral benign appearing nephrogenic cysts.        This report was finalized on 06/21/2020 17:07 by Dr. Napoleon Ortiz MD.        I have personally reviewed and interpreted the radiology studies and ECG obtained at time of admission.     Assessment / Plan     Assessment:     1.  GI bleed secondary to AVM's  -Trend H&H  -Consult GI  -Transfuse as indicated     2.  Afib  -Cardizem  -Amiodarone       3.  HTN  -Lisinopril     4.  Bladder wall thickening/questionable bladder mass  -Urology planning outpt workup     5.  PVD  -Pletal held  -Vascular surgery following           Code Status: Full     I discussed the patient's findings and my recommendations with the patient    Estimated length of stay 1 night    Patient seen and examined by me on 6/21/2020 at 1650    Marco A Katz MD   06/21/20   19:04

## 2020-06-22 NOTE — PLAN OF CARE
Recieved pt from ER with a GI bleed. the pt has had multiple Bms while on the floor, stool has been light brown in color with no overt signs of bleeding. medicated for pain x1. Pt has been NPO since midnight, pending GI consult. will continue to monitor. vss.

## 2020-06-22 NOTE — PROGRESS NOTES
Discharge Planning Assessment  [facility]     Patient Name: Kingsley Lerma  MRN: [id number]  Today's Date: 6/22/2020    Admit Date: 6/21/2020    Discharge Needs Assessment     Row Name 06/22/20 0931       Living Environment    Lives With  alone    Current Living Arrangements  home/apartment/condo    Primary Care Provided by  self    Provides Primary Care For  no one    Family Caregiver if Needed  child(velvet), adult    Quality of Family Relationships  supportive    Able to Return to Prior Arrangements  yes       Resource/Environmental Concerns    Resource/Environmental Concerns  none    Transportation Concerns  car, none       Transition Planning    Patient/Family Anticipates Transition to  home    Patient/Family Anticipated Services at Transition  none    Transportation Anticipated  family or friend will provide       Discharge Needs Assessment    Readmission Within the Last 30 Days  previous discharge plan unsuccessful    Concerns to be Addressed  denies needs/concerns at this time    Equipment Currently Used at Home  cane, straight    Anticipated Changes Related to Illness  none    Equipment Needed After Discharge  none    Discharge Coordination/Progress  PT resides at home alone and has adult children who can assist if needed. [facility] PT plans to dc home and is unaware of any needs at this time. [facility] upon [facility]. Will follow and assist as needed.         Discharge Plan    No documentation.       Destination      Coordination has not been started for this encounter.      Durable Medical Equipment      Coordination has not been started for this encounter.      Dialysis/Infusion      Coordination has not been started for this encounter.      Home Medical Care      Coordination has not been started for this encounter.      Therapy      Coordination has not been started for this encounter.      Community Resources      Coordination has not  been started for this encounter.          Demographic Summary    No documentation.       Functional Status    No documentation.       Psychosocial    No documentation.       Abuse/Neglect    No documentation.       Legal    No documentation.       Substance Abuse    No documentation.       Patient Forms    No documentation.           MAGALI Maya

## 2020-06-22 NOTE — OUTREACH NOTE
Medical Week 2 Survey      Responses   [facility] patient discharged from?  [place]   COVID-19 Test Status  Negative   Does the patient have one of the following disease processes/diagnoses(primary or secondary)?  Other   Week 2 attempt successful?  No   Revoke  Readmitted          Valeria Escobar RN

## 2020-06-22 NOTE — OUTREACH NOTE
Prep Survey      Responses   [facility] facility patient discharged from?  [place]   Is LACE score < 7 ?  Yes   Eligibility  [facility]   Date of Admission  06/21/20   Date of Discharge  06/22/20   Discharge Disposition  Home or Self Care   Discharge diagnosis  GI bleed   COVID-19 Test Status  Negative   Does the patient have one of the following disease processes/diagnoses(primary or secondary)?  Other   Does the patient have Home health ordered?  No   Is there a DME ordered?  No   Prep survey completed?  Yes          Valeria Escobar RN

## 2020-06-22 NOTE — PLAN OF CARE
Problem: Patient Care Overview  Goal: Plan of Care Review  Outcome: Ongoing (interventions implemented as appropriate)  Flowsheets (Taken 6/22/2020 1317)  Progress: no change  Plan of Care Reviewed With: patient  Outcome Summary: Pt's diet had been held pending GI consult and has now been advanced to a regular diet. No po intake recorded at this time. He reports his appetite is good. He has had weight loss in the last 3 months but reports it is d/t alchohol cessation, as he used to drink several drinks nightly and he no longer does that. He has blood loss anemia r/t rectal bleeding. Will follow for nutrition needs.

## 2020-06-22 NOTE — CONSULTS
[facility] Gastroenterology  Initial Inpatient Consult Note    Referring Provider: Marco A Katz MD    Reason for Consultation:   Rectal bleeding    Subjective     History of present illness:         75 yo admitted with rectal bleeding. He has had egd x 2 and colonoscopy since 6-8-20. Yesterday he was constipated/straining to have a bm. Tried for several hours but only passed few drops of red blood. He did get lightheaded. Yesterday evening finally had a bm with very little red blood. Since admission has had brown stool with no blood. He takes protonix and pepcid daily.  Takes pletal for PVD. Takes asa 81 mg daily. Prior to last admission he had been on xarelto for afib.  No black stool. No rectal pain. No abdominal pain. No syncope. No n/v. No fever.       This admission ct abdomen/pelvis 6-21-20 mild thickening gastric antral wall ( of note,  was not noted on recent ct enterog or egd ).   Hemoglobin on admission 10.0. This am hgb 9.4.  Prior to discharge 6-18-20 hgb 9.1.         Colonoscopy June 8, 2020 by Dr. Ching noting bleeding internal hemorrhoid, multiple nonbleeding AVMs.  EGD June 8, 2020 by Dr. Ching normal  EGD Josseline 15 by Dr. Vin borjas.   CT abdomen/pelvis with contrast enterography  6-14-20      She has also noted blood in her urine the last few weeks.  None presently.  Recent bladder wall thickening/questionable bladder mass with outpatient work-up planned.        Past Medical History:  Past Medical History:   Diagnosis Date   • Alcohol abuse    • Arthritis    • Atrial flutter (CMS/HCC)    • Circulation problem    • History of tobacco abuse    • Hypertension    • PVD (peripheral vascular disease) (CMS/HCC)        Past Surgical History:  Past Surgical History:   Procedure Laterality Date   • ADENOIDECTOMY     • COLONOSCOPY N/A 6/8/2020    Procedure: COLONOSCOPY WITH ANESTHESIA;  Surgeon: Mayela Ching MD;  Location: [facility] ENDOSCOPY;  Service: Gastroenterology;  Laterality: N/A;  preop;  GI bleed   postop; avm's   PCP rachel calderón    • ENDOSCOPY N/A 6/8/2020    Procedure: ESOPHAGOGASTRODUODENOSCOPY WITH ANESTHESIA;  Surgeon: Mayela Ching MD;  Location: [facility] ENDOSCOPY;  Service: Gastroenterology;  Laterality: N/A;  proep; GI BLeed   postop; normal   PCP rachel calderón    • ENDOSCOPY N/A 6/15/2020    Procedure: ESOPHAGOGASTRODUODENOSCOPY WITH ANESTHESIA;  Surgeon: Carmine Banuelos MD;  Location: [facility] ENDOSCOPY;  Service: Gastroenterology;  Laterality: N/A;  pre gi bleed, anemia  post normal  dr rachel calderón        Social History:   Social History     Tobacco Use   • Smoking status: Current Every Day Smoker     Packs/day: 0.50     Types: Cigarettes, Cigars   • Smokeless tobacco: Never Used   Substance Use Topics   • Alcohol use: Not Currently     Alcohol/week: 2.0 standard drinks     Types: 2 Glasses of wine per week     Frequency: Never        Family History:  Family History   Problem Relation Age of Onset   • Dementia Mother    • Cancer Father    • Pancreatic cancer Father        Home Meds:  Medications Prior to Admission   Medication Sig Dispense Refill Last Dose   • amiodarone (PACERONE) 200 MG tablet Take 1 tablet by mouth Daily. 6 tablet 1 6/13/2020 at Unknown time   • aspirin 81 MG EC tablet Take 1 tablet by mouth Daily. 30 tablet 11    • Cholecalciferol (VITAMIN D) 50 MCG (2000 UT) tablet Take 2,000 Units by mouth Daily.   6/13/2020 at Unknown time   • cilostazol (PLETAL) 50 MG tablet Take 1 tablet by mouth 2 (Two) Times a Day. 180 tablet 0 6/13/2020 at Unknown time   • dilTIAZem CD (CARDIZEM CD) 120 MG 24 hr capsule Take 1 capsule by mouth Daily. 30 capsule 11 6/13/2020 at Unknown time   • famotidine (PEPCID) 20 MG tablet Take 20 mg by mouth Daily.   6/13/2020 at Unknown time   • ferrous sulfate 325 (65 FE) MG tablet Take 1 tablet by mouth Daily With Breakfast. 60 tablet 0    • guaiFENesin (MUCINEX) 600 MG 12 hr tablet Take 600 mg by mouth 2 (Two) Times a Day.   6/13/2020 at Unknown  time   • hydrOXYzine (ATARAX) 25 MG tablet Take 1 tablet by mouth 3 (Three) Times a Day As Needed for Anxiety. 60 tablet 5 Past Week at Unknown time   • lidocaine (LMX) 4 % cream Apply  topically to the appropriate area as directed As Needed for Mild Pain . Apply to left heel.   Past Week at Unknown time   • lisinopril (PRINIVIL,ZESTRIL) 40 MG tablet Take 20 mg by mouth Daily.   6/13/2020 at Unknown time   • Multiple Vitamins-Minerals (CENTRUM ADULTS) tablet Take 1 tablet by mouth Daily.   6/13/2020 at Unknown time   • pantoprazole (PROTONIX) 20 MG EC tablet Take 20 mg by mouth Daily.   6/13/2020 at Unknown time   • sildenafil (VIAGRA) 50 MG tablet Take 50 mg by mouth Daily As Needed for Erectile Dysfunction (Sexual Activity).   Past Month at Unknown time       Current Meds:  Current Facility-Administered Medications   Medication Dose Route Frequency Provider Last Rate Last Dose   • amiodarone (PACERONE) tablet 200 mg  200 mg Oral Q24H Marco A Katz MD   200 mg at 06/22/20 1009   • aspirin EC tablet 81 mg  81 mg Oral Daily Marco A Katz MD   81 mg at 06/22/20 1009   • cilostazol (PLETAL) tablet 50 mg  50 mg Oral BID Marco A Katz MD   50 mg at 06/22/20 1009   • dilTIAZem CD (CARDIZEM CD) 24 hr capsule 120 mg  120 mg Oral Q AM Marco A Katz MD   120 mg at 06/22/20 0540   • famotidine (PEPCID) tablet 20 mg  20 mg Oral Daily Marco A Katz MD   20 mg at 06/22/20 1009   • ferrous sulfate tablet 325 mg  325 mg Oral Daily With Breakfast Marco A Katz MD   325 mg at 06/22/20 1009   • guaiFENesin (MUCINEX) 12 hr tablet 600 mg  600 mg Oral BID Marco A Katz MD   600 mg at 06/22/20 1009   • HYDROmorphone (DILAUDID) injection 0.5 mg  0.5 mg Intravenous Q4H PRN Alexsander Rubio DO   0.5 mg at 06/22/20 0724   • hydrOXYzine (ATARAX) tablet 25 mg  25 mg Oral TID PRN Marco A Katz MD       • lisinopril (PRINIVIL,ZESTRIL) tablet 20 mg  20 mg Oral Daily Sterling  Marco A Martinez MD   20 mg at 06/22/20 1008   • pantoprazole (PROTONIX) EC tablet 20 mg  20 mg Oral Q AM Marco A Katz MD   20 mg at 06/22/20 0540   • sodium chloride 0.9 % flush 10 mL  10 mL Intravenous PRN Marco A Katz MD           Allergies:  No Known Allergies    Review of Systems  Review of Systems   Constitutional: Negative for appetite change, chills, fatigue, fever and unexpected weight change.   HENT: Negative for sore throat and trouble swallowing.    Eyes: Negative for visual disturbance.   Respiratory: Negative for shortness of breath and wheezing.    Cardiovascular: Negative for chest pain and palpitations.   Gastrointestinal: Negative for abdominal distention, abdominal pain, diarrhea, nausea and vomiting.        As mentioned in hpi   Genitourinary: Negative for difficulty urinating and hematuria.   Musculoskeletal: Negative for arthralgias and back pain.   Skin: Negative for color change and rash.   Neurological: Negative for dizziness, seizures, syncope, light-headedness and headaches.   Hematological: Negative for adenopathy.   Psychiatric/Behavioral: Negative for confusion. The patient is not nervous/anxious.         Objective     Vital Signs  Temp:  [97.8 °F (36.6 °C)-98.6 °F (37 °C)] 97.8 °F (36.6 °C)  Heart Rate:  [65-87] 69  Resp:  [16-18] 16  BP: (135-182)/() 144/52    Physical Exam:   Physical Exam   Constitutional: He appears well-developed and well-nourished. No distress.   HENT:   Head: Normocephalic and atraumatic.   Eyes: EOM are normal. No scleral icterus.   Neck: Neck supple. No JVD present.   Cardiovascular: Normal rate, regular rhythm and normal heart sounds.   Pulmonary/Chest: Effort normal and breath sounds normal.   Abdominal: Soft. Bowel sounds are normal. He exhibits no distension. There is no tenderness.   Musculoskeletal: Normal range of motion. He exhibits no deformity.   Neurological: He is alert.   Skin: Skin is warm and dry. No rash noted.    Psychiatric: He has a normal mood and affect. His behavior is normal.   Vitals reviewed.      Results Review:   I reviewed the patient's new clinical results.  I reviewed the patient's new imaging results and agree with the interpretation.  I reviewed the patient's other test results and agree with the interpretation    Lab Results (most recent)     Procedure Component Value Units Date/Time    Hemoglobin & Hematocrit, Blood [[id number]]  (Abnormal) Collected:  06/22/20 0733    Specimen:  Blood Updated:  06/22/20 0749     Hemoglobin 9.4 g/dL      Hematocrit 28.7 %     Hemoglobin & Hematocrit, Blood [[id number]]  (Abnormal) Collected:  06/21/20 2351    Specimen:  Blood Updated:  06/22/20 0019     Hemoglobin 9.0 g/dL      Hematocrit 28.0 %     [place] Urine - Urine, Clean Catch [[id number]] Collected:  06/21/20 1635    Specimen:  Urine, Clean Catch Updated:  06/21/20 1746     Extra Tube Hold for add-ons.     Comment: Auto resulted.       Comprehensive Metabolic Panel [[id number]]  (Abnormal) Collected:  06/21/20 1459    Specimen:  Blood from Arm, Right Updated:  06/21/20 1535     Glucose 84 mg/dL      BUN 24 mg/dL      Creatinine 0.73 mg/dL      Sodium 145 mmol/L      Potassium 4.1 mmol/L      Chloride 110 mmol/L      CO2 22.0 mmol/L      Calcium 9.4 mg/dL      Total Protein 6.7 g/dL      Albumin 3.70 g/dL      ALT (SGPT) 20 U/L      AST (SGOT) 27 U/L      Comment: Specimen hemolyzed.  Results may be affected.        Alkaline Phosphatase 104 U/L      Total Bilirubin 0.5 mg/dL      eGFR Non African Amer 106 mL/min/1.73      Globulin 3.0 gm/dL      A/G Ratio 1.2 g/dL      BUN/Creatinine Ratio 32.9     Anion Gap 13.0 mmol/L     Narrative:       GFR Normal >60  Chronic Kidney Disease <60  Kidney Failure <15      Ammonia [[id number]]  (Abnormal) Collected:  06/21/20 1459    Specimen:  Blood from Arm, Right Updated:  06/21/20 1530     Ammonia 11 umol/L     Protime-INR [[id number]]  (Normal) Collected:  06/21/20 1459    Specimen:   Blood from Arm, Right Updated:  06/21/20 1524     Protime 13.7 Seconds      INR 1.09    aPTT [[id number]]  (Abnormal) Collected:  06/21/20 1459    Specimen:  Blood from Arm, Right Updated:  06/21/20 1524     PTT 44.6 seconds     CBC & Differential [[id number]] Collected:  06/21/20 1459    Specimen:  Blood from Arm, Right Updated:  06/21/20 1515    Narrative:       The following orders were created for panel order CBC & Differential.  Procedure                               Abnormality         Status                     ---------                               -----------         ------                     CBC Auto Differential[[id number]]        Abnormal            Final result                 Please view results for these tests on the individual orders.    CBC Auto Differential [[id number]]  (Abnormal) Collected:  06/21/20 1459    Specimen:  Blood from Arm, Right Updated:  06/21/20 1515     WBC 12.79 10*3/mm3      RBC 3.21 10*6/mm3      Hemoglobin 10.1 g/dL      Hematocrit 30.6 %      MCV 95.3 fL      MCH 31.5 pg      MCHC 33.0 g/dL      RDW 17.1 %      RDW-SD 58.3 fl      MPV 8.8 fL      Platelets 380 10*3/mm3      Neutrophil % 84.8 %      Lymphocyte % 6.0 %      Monocyte % 6.9 %      Eosinophil % 1.3 %      Basophil % 0.5 %      Immature Grans % 0.5 %      Neutrophils, Absolute 10.85 10*3/mm3      Lymphocytes, Absolute 0.77 10*3/mm3      Monocytes, Absolute 0.88 10*3/mm3      Eosinophils, Absolute 0.16 10*3/mm3      Basophils, Absolute 0.06 10*3/mm3      Immature Grans, Absolute 0.07 10*3/mm3      nRBC 0.0 /100 WBC           Radiology:  Imaging Results (Last 24 Hours)     Procedure Component Value Units Date/Time    CT Abdomen Pelvis With Contrast [[id number]] Collected:  06/21/20 1651     Updated:  06/21/20 1710    Narrative:       EXAMINATION: CT ABDOMEN PELVIS W CONTRAST- 6/21/2020 4:51 PM CDT     HISTORY: GI bleeding     DOSE: 219 mGycm (Automatic exposure control technique was implemented in  an effort to keep  the radiation dose as low as possible without  compromising image quality)     REPORT: Spiral CT of the abdomen and pelvis was performed after  administration of intravenous contrast from the lung bases through the  pubic symphysis. Reconstructed coronal and sagittal images are also  reviewed.     COMPARISON: CT abdomen pelvis with contrast 6/14/2020.     The lung bases are clear. The liver and spleen are homogeneous and  appear unremarkable. The gallbladder is partially contracted. The  stomach is decompressed. There is mild thickening of the antral wall,  which is slightly indistinct and difficult to separate from the anterior  surface of the pancreas. The pancreas and adrenal glands are  unremarkable. The kidneys enhance normally, no renal mass or  hydronephrosis is identified. There is a small cyst in the posterior  cortex of the left kidney, measuring 5 mm. There 2 small cysts at the  upper pole the right kidney, both measuring less than 1 cm. The ureters  are decompressed. The bladder is unremarkable. There is moderate  prostate enlargement. Moderate stool volume is present within the distal  colon and rectum. No bowel dilation or obstruction is identified. There  is no obvious bowel wall thickening or mass. No free fluid or free air  is identified. There is heavy calcified plaque within the aorta and its  branches. The aorta has normal caliber. There appears to be high-grade  stenosis involving both common iliac arteries. Review of bone windows  demonstrates severe osteoarthritis of the right hip, with complete loss  of the joint space, large subchondral geodes within the acetabulum and  femoral head, with partial collapse of the femoral head.       Impression:       1. Mild thickening of the gastric antral wall which appears somewhat  indistinct, with poor definition of fat planes between the stomach and  head of the pancreas. This may be related to peptic ulcer disease or  less likely neoplasm, no  perforation is identified. Consider follow-up  with EGD.  2. Moderate constipation, without evidence of bowel obstruction.  3. Severe atherosclerotic disease of the aorta and iliac arteries as  well as the renal arteries and proximal SMA. There appears to be severe  stenosis of the proximal common iliac arteries, this could be better  assessed with CT angiography.  4. Severe osteoarthritis of the right hip as detailed above, with a  small right hip effusion.  5. Small bilateral benign appearing nephrogenic cysts.        This report was finalized on 06/21/2020 17:07 by Dr. Napoleon Ortiz MD.            Assessment/Plan       Gastrointestinal hemorrhage      1. Rectal bleeding  2. Anemia   3. AVM disease   4. Anticoagulation secondary to pletal.   5.  Abnormal CT abdomen and pelvis    Differential diagnosis discussed including hemorrhoids and AVMs.  His hemoglobin is 9.4 and was 9.1 prior to discharge on 6-18-20.  Has had extensive work-up recently for GI bleed including EGD x2, colonoscopy, and CT enterography.  No plan for repeat endoscopic evaluation at this time.  We will continue to monitor hemoglobin and transfuse as needed.  He is no longer noting any sign of active GI bleeding.  He is hemodynamically stable.  I did recommend a trial of Anusol suppositories but he declines.  We will continue to monitor patient.  Advance diet.    CT scan abdomen and pelvis showed some mild thickening of the gastric antral wall however he has had 2 upper endoscopies in the last few weeks that were normal.   No plan for repeat upper endoscopy at this time.  Recommend continue Protonix daily.          I discussed the patients findings and my recommendations with patient      EMR Dragon/transcription disclaimer:  Much of this encounter note is electronic transcription/translation of spoken language to printed text.  The electronic translation of spoken language may be erroneous, or at times, nonsensical words or phrases may be  inadvertently transcribed.  Although I have reviewed the note for such errors, some may still exist.    Andressa Willie APRN 9:21 AM    · I have seen the patient personally with evaluation and plan of care reviewed and developed with APRN and nursing staff. Where indicated, I have addended and/or modified the above history of present illness, physical examination, and assessment and plan to reflect my findings and impressions. Essential elements of the care plan were discussed with APRN above.  Agree with findings and assessment/plan as documented above.    Bleeding on the past few drops of blood at home when he was straining.  Last bout was brown.  I suspect the bleeding came from anal irritation with the straining.  As above he declined suppository.  I think is reasonable for him to be discharged as his hemoglobin is stable and there is no signs of.  I did recommend getting on a fiber supplement such as Metamucil.  I suggest he take at least 5 g of fiber supplement a day to help get his bowels back into her normal routine.  He expressed understanding.  Follow-up as needed.      Erlin Escalante MD  06/22/20  13:06

## 2020-06-23 ENCOUNTER — TRANSITIONAL CARE MANAGEMENT TELEPHONE ENCOUNTER (OUTPATIENT)
Dept: CALL CENTER | Facility: HOSPITAL | Age: 75
End: 2020-06-23

## 2020-06-23 NOTE — OUTREACH NOTE
Call Center TCM Note      Responses   [facility] patient discharged from?  [place]   COVID-19 Test Status  Negative   Does the patient have one of the following disease processes/diagnoses(primary or secondary)?  Other   TCM attempt successful?  Yes   Call start time  1415   Call end time  1420   Discharge diagnosis  GI bleed   Meds reviewed with patient/caregiver?  Yes   Is the patient having any side effects they believe may be caused by any medication additions or changes?  No   Does the patient have all medications ordered at discharge?  Yes   Is the patient taking all medications as directed (includes completed medication regime)?  Yes   Does the patient have a primary care provider?   Yes   Does the patient have an appointment with their PCP within 7 days of discharge?  Greater than 7 days [TCM 9 days 07/01/2020]   Comments regarding PCP  Dr Urrutia   What is preventing the patient from scheduling follow up appointments within 7 days of discharge?  Earlier appointment not available   Has home health visited the patient within 72 hours of discharge?  N/A   Did the patient receive a copy of their discharge instructions?  Yes   Nursing interventions  Reviewed instructions with patient   What is the patient's perception of their health status since discharge?  Improving   Is the patient/caregiver able to teach back signs and symptoms related to disease process for when to call PCP?  Yes   Is the patient/caregiver able to teach back signs and symptoms related to disease process for when to call 911?  Yes   Is the patient/caregiver able to teach back the hierarchy of who to call/visit for symptoms/problems? PCP, Specialist, Home health nurse, Urgent Care, ED, 911  Yes   TCM call completed?  Yes   Wrap up additional comments  Pt doing well, states he feels pretty good. We went over medication changes. Pt is sched with PCP for TCM on 07/01/2020          Lucretia Sandhu MA    6/23/2020, 14:22

## 2020-06-23 NOTE — PAYOR COMM NOTE
"AK HOME 6-22-20    Kingsley Servin (74 y.o. Male)     Date of Birth Social Security Number Address Home Phone MRN    1945  [address] [phone] [id number]    Nondenominational Marital Status          Yazidism        Admission Date Admission Type Admitting Provider Attending Provider Department, Room/Bed    6/21/20 Emergency Jevon Peralta MD  [facility] 3C, 360/1    Discharge Date Discharge Disposition Discharge Destination        6/22/2020 Home or Self Care              Attending Provider:  (none)   Allergies:  No Known Allergies    Isolation:  None   Infection:  None   Code Status:  Prior    Ht:  170.2 cm (67\")   Wt:  70.8 kg (156 lb)    Admission Cmt:  None   Principal Problem:  None                Active Insurance as of 6/21/2020     Primary Coverage     Payor Plan Insurance Group Employer/Plan Group    [facility] OPTUM      Payor Plan Address Payor Plan Phone Number Payor Plan Fax Number Effective Dates    [address] [phone]  3/6/2020 - None Entered    [facility] [id number]       Subscriber Name Subscriber Birth Date Member ID       KINGSLEY SERVIN 1945 [id number]                 Emergency Contacts      (Rel.) Home Phone Work Phone Mobile Phone    LUIS DANIEL SERVIN (Son) -- -- [phone]    boone tee (Daughter) -- -- [phone]    KITTYAPRIL (Daughter) -- -- [phone]    Anaya Servin (Daughter) -- -- [phone]    marii Stacy (Daughter) -- -- [phone]               Discharge Summary      Jevon Peralta MD at 06/22/20 1509              [facility] Medicine Services  DISCHARGE SUMMARY       Date of Admission: 6/21/2020  Date of Discharge:  6/22/2020  Primary Care Physician: BARRY Urrutia MD    Presenting Problem/History of Present Illness:  Gastrointestinal hemorrhage, unspecified gastrointestinal hemorrhage type [K92.2]     Final Discharge Diagnoses:  Active Hospital " Problems    Diagnosis   • Gastrointestinal hemorrhage     Added automatically from request for surgery [id number]         Consults: GI.    Pertinent Test Results:   IMPRESSION:  1. Mild thickening of the gastric antral wall which appears somewhat  indistinct, with poor definition of fat planes between the stomach and  head of the pancreas. This may be related to peptic ulcer disease or  less likely neoplasm, no perforation is identified. Consider follow-up  with EGD.  2. Moderate constipation, without evidence of bowel obstruction.  3. Severe atherosclerotic disease of the aorta and iliac arteries as  well as the renal arteries and proximal SMA. There appears to be severe  stenosis of the proximal common iliac arteries, this could be better  assessed with CT angiography.  4. Severe osteoarthritis of the right hip as detailed above, with a  small right hip effusion.  5. Small bilateral benign appearing nephrogenic cysts.    Chief Complaint on Day of Discharge: none    History of Present Illness on Day of Discharge:   Mr. Lerma is a 74 year old genlteman with a history of a-fib and PVD.  He presents today complaining of bloody stool.  He is well known to me from previous admission.       He has been admitted twice in the past 2 weeks for GI bleeding that was exacerbated by anticoagulation.  He underwent two EGD's and a colonoscopy.  He had a CT Enterography.  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  Given his bleeding issues, it was decided it was for the best to stop his anticoagulation.       He was discharged 3 days ago on 6/18/2020.  For the first two days of being home, he was doing ok.  Today he felt constipated and was straining to have a bowel movement.  He had a lot of blood in the toilet bowl.  He came to the ER for evaluation.       His hemoglobin here is 1 point higher than it was 3 days ago.  However prior to being discharged from the ER today, he had a large bloody bowel movement.       Hospital Course:  The  patient is a 74 y.o. male who presented to [facility] with GI bleed.      GI bleed.  Patient admitted to time in the past 2 weeks for GI bleed with exacerbation by anticoagulation.  Patient underwent to EGD  and a colonoscopy by GI.  Patient also had a CT enterography.  Work-up revealed he has AVN.  Due to his recurrent bleed secondary to AVM is best to stop anticoagulation.  Trend hemoglobin.  GI consult.  Transfuse as indicated.  Patient continue Protonix.  CT scan abdomen pelvic-Mild thickening of the gastric antral wall which appears somewhat indistinct- poor definition of fat planes between the stomach and head of the pancreas-  related to peptic ulcer disease or less likely neoplasm, no perforation is identified, moderate constipation- without evidence of bowel obstruction, severe atherosclerotic disease of the aorta and iliac arteries as  well as the renal arteries and proximal SMA-  appears to be severe  stenosis of the proximal common iliac arteries, this could be better  assessed with CT angiography, severe osteoarthritis of the right hip with a small right hip effusion, small bilateral benign appearing nephrogenic cysts.     Anemia.     Patient to continue iron sulfate.  Hemoglobin stable.  No sign of acute bleed.     Atrial fibrillation/hypertension.     Patient to continue Cardizem and amiodarone.     Patient to continue lisinopril.     Patient to continue aspirin.  Patient to stop Xarelto due to recurrent GI bleed, third episode.     Peripheral vascular disease.  Patient to Continue pletal.   Vascular surgery following outpatient.     Reflux.     Patient to continue Pepcid.     Patient to continue Protonix.     COPD.     Patient to continue guaifenesin.     Anxiety.  Atarax as needed.     Constipation.  Senna as needed.  Prune juice over-the-counter.      Vital signs stable, afebrile.  Plan to discharge patient home today.  Discharge home with family.  Follow with PCP 1 week.    Condition  "on Discharge: Stable    Physical Exam on Discharge:  /52 (BP Location: Right arm, Patient Position: Lying)   Pulse 69   Temp 97.8 °F (36.6 °C) (Oral)   Resp 16   Ht 170.2 cm (67\")   Wt 70.8 kg (156 lb)   SpO2 98%   BMI 24.43 kg/m²    Physical Exam   Constitutional: He is oriented to person, place, and time. He appears well-developed.   HENT:   Head: Normocephalic.   Eyes: Pupils are equal, round, and reactive to light. Conjunctivae are normal.   Neck: Neck supple. No JVD present.   Cardiovascular: Normal rate, regular rhythm, normal heart sounds and intact distal pulses. Exam reveals no gallop and no friction rub.   No murmur heard.  Pulmonary/Chest: Effort normal and breath sounds normal. No respiratory distress. He has no wheezes. He has no rales. He exhibits no tenderness.   Abdominal: Soft. Bowel sounds are normal. He exhibits no distension. There is no tenderness. There is no rebound and no guarding.   Musculoskeletal: Normal range of motion. He exhibits no edema, tenderness or deformity.   Neurological: He is alert and oriented to person, place, and time. He displays normal reflexes. No cranial nerve deficit. He exhibits normal muscle tone.   Skin: Skin is warm and dry. No rash noted.   Psychiatric: He has a normal mood and affect. His behavior is normal. Judgment and thought content normal.   Nursing note and vitals reviewed.        Discharge Disposition:  Home or Self Care    Discharge Medications:     Discharge Medications      New Medications      Instructions Start Date   sennosides-docusate 8.6-50 MG per tablet  Commonly known as:  PERICOLACE   2 tablets, Oral, Daily PRN         Continue These Medications      Instructions Start Date   amiodarone 200 MG tablet  Commonly known as:  PACERONE   200 mg, Oral, Every 24 Hours Scheduled      aspirin 81 MG EC tablet   81 mg, Oral, Daily      cilostazol 50 MG tablet  Commonly known as:  PLETAL   50 mg, Oral, 2 Times Daily      dilTIAZem  MG 24 " hr capsule  Commonly known as:  CARDIZEM CD   120 mg, Oral, Daily      famotidine 20 MG tablet  Commonly known as:  PEPCID   20 mg, Oral, Daily      ferrous sulfate 325 (65 FE) MG tablet   325 mg, Oral, Daily With Breakfast      guaiFENesin 600 MG 12 hr tablet  Commonly known as:  MUCINEX   600 mg, Oral, 2 Times Daily      hydrOXYzine 25 MG tablet  Commonly known as:  ATARAX   25 mg, Oral, 3 Times Daily PRN      lidocaine 4 % cream  Commonly known as:  LMX   Topical, As Needed, Apply to left heel.       lisinopril 40 MG tablet  Commonly known as:  PRINIVIL,ZESTRIL   20 mg, Oral, Daily      pantoprazole 20 MG EC tablet  Commonly known as:  PROTONIX   20 mg, Oral, Daily      Vitamin D 50 MCG (2000 UT) tablet   2,000 Units, Oral, Daily         Stop These Medications    Centrum Adults tablet     sildenafil 50 MG tablet  Commonly known as:  VIAGRA            Discharge Diet:   Diet Instructions     Advance Diet As Tolerated            Activity at Discharge:   Activity Instructions     Activity as Tolerated            Discharge Care Plan/Instructions: Discharge home with family.    Follow-up Appointments:   Future Appointments   Date Time Provider Department Center   7/1/2020  1:50 PM Justin Gallo MD [facility] None   7/7/2020 10:15 AM BARRY Urrutia MD [facility]   7/7/2020 11:30 AM Luis Enrique Donaldson DO [facility] None   7/9/2020  1:00 PM [facility] NP2 [facility]   9/23/2020  3:30 PM Nolan Farias MD [facility]     Follow with PCP 1 week.    Jevon Peralta MD  06/22/20  15:17    Time: Greater than 30 minutes.        Electronically signed by Jevon Peralta MD at 06/22/20 0634

## 2020-06-29 ENCOUNTER — TELEPHONE (OUTPATIENT)
Dept: VASCULAR SURGERY | Facility: CLINIC | Age: 75
End: 2020-06-29

## 2020-06-29 NOTE — TELEPHONE ENCOUNTER
Received call from patient advising that he wanted [facility].  Advised that we did not currently have an auth for the patient to see Dr. Donaldson.  Patient stated that his podiatrist had submitted an auth for the patient to go to [facility].  Advised that [facility] does not accept [facility] insurance.  Patient stated that he wanted to let the auth ruin its course because [facility] the [facility] and didn't want to get things more confused.  Advised that he could call back at any time and we would get him in.

## 2020-07-01 ENCOUNTER — OFFICE VISIT (OUTPATIENT)
Dept: INTERNAL MEDICINE | Facility: CLINIC | Age: 75
End: 2020-07-01

## 2020-07-01 ENCOUNTER — PROCEDURE VISIT (OUTPATIENT)
Dept: UROLOGY | Facility: CLINIC | Age: 75
End: 2020-07-01

## 2020-07-01 ENCOUNTER — TELEPHONE (OUTPATIENT)
Dept: INTERNAL MEDICINE | Facility: CLINIC | Age: 75
End: 2020-07-01

## 2020-07-01 ENCOUNTER — LAB (OUTPATIENT)
Dept: LAB | Facility: HOSPITAL | Age: 75
End: 2020-07-01

## 2020-07-01 VITALS
DIASTOLIC BLOOD PRESSURE: 56 MMHG | SYSTOLIC BLOOD PRESSURE: 158 MMHG | OXYGEN SATURATION: 98 % | BODY MASS INDEX: 25.24 KG/M2 | HEART RATE: 78 BPM | TEMPERATURE: 98.2 F | HEIGHT: 67 IN | WEIGHT: 160.8 LBS | RESPIRATION RATE: 18 BRPM

## 2020-07-01 DIAGNOSIS — R63.4 WEIGHT LOSS: ICD-10-CM

## 2020-07-01 DIAGNOSIS — Z72.0 TOBACCO USE: ICD-10-CM

## 2020-07-01 DIAGNOSIS — R93.41 ABNORMAL CT SCAN, BLADDER: ICD-10-CM

## 2020-07-01 DIAGNOSIS — K92.2 GASTROINTESTINAL HEMORRHAGE, UNSPECIFIED GASTROINTESTINAL HEMORRHAGE TYPE: Primary | ICD-10-CM

## 2020-07-01 DIAGNOSIS — I48.92 ATRIAL FIBRILLATION AND FLUTTER (HCC): ICD-10-CM

## 2020-07-01 DIAGNOSIS — N40.0 BPH WITHOUT OBSTRUCTION/LOWER URINARY TRACT SYMPTOMS: ICD-10-CM

## 2020-07-01 DIAGNOSIS — S91.302A NON HEALING LEFT HEEL WOUND: Chronic | ICD-10-CM

## 2020-07-01 DIAGNOSIS — K92.2 GASTROINTESTINAL HEMORRHAGE, UNSPECIFIED GASTROINTESTINAL HEMORRHAGE TYPE: ICD-10-CM

## 2020-07-01 DIAGNOSIS — R93.41 ABNORMAL RADIOLOGIC FINDINGS ON DIAGNOSTIC IMAGING OF RENAL PELVIS, URETER, OR BLADDER: Primary | ICD-10-CM

## 2020-07-01 DIAGNOSIS — I48.91 ATRIAL FIBRILLATION AND FLUTTER (HCC): ICD-10-CM

## 2020-07-01 DIAGNOSIS — D62 ACUTE BLOOD LOSS ANEMIA: ICD-10-CM

## 2020-07-01 PROBLEM — K55.20 AVM (ARTERIOVENOUS MALFORMATION) OF COLON: Status: ACTIVE | Noted: 2020-07-01

## 2020-07-01 PROBLEM — Z79.01 CURRENT USE OF LONG TERM ANTICOAGULATION: Status: RESOLVED | Noted: 2020-05-07 | Resolved: 2020-07-01

## 2020-07-01 PROBLEM — M16.11 OSTEOARTHRITIS OF RIGHT HIP: Status: ACTIVE | Noted: 2020-07-01

## 2020-07-01 LAB
BASOPHILS # BLD AUTO: 0.04 10*3/MM3 (ref 0–0.2)
BASOPHILS NFR BLD AUTO: 0.5 % (ref 0–1.5)
BILIRUB BLD-MCNC: NEGATIVE MG/DL
CLARITY, POC: CLEAR
COLOR UR: YELLOW
DEPRECATED RDW RBC AUTO: 59 FL (ref 37–54)
EOSINOPHIL # BLD AUTO: 0.2 10*3/MM3 (ref 0–0.4)
EOSINOPHIL NFR BLD AUTO: 2.5 % (ref 0.3–6.2)
ERYTHROCYTE [DISTWIDTH] IN BLOOD BY AUTOMATED COUNT: 17.1 % (ref 12.3–15.4)
GLUCOSE UR STRIP-MCNC: NEGATIVE MG/DL
HCT VFR BLD AUTO: 32.9 % (ref 37.5–51)
HGB BLD-MCNC: 10.5 G/DL (ref 13–17.7)
IMM GRANULOCYTES # BLD AUTO: 0.05 10*3/MM3 (ref 0–0.05)
IMM GRANULOCYTES NFR BLD AUTO: 0.6 % (ref 0–0.5)
KETONES UR QL: NEGATIVE
LEUKOCYTE EST, POC: NEGATIVE
LYMPHOCYTES # BLD AUTO: 1.04 10*3/MM3 (ref 0.7–3.1)
LYMPHOCYTES NFR BLD AUTO: 12.8 % (ref 19.6–45.3)
MCH RBC QN AUTO: 31.2 PG (ref 26.6–33)
MCHC RBC AUTO-ENTMCNC: 31.9 G/DL (ref 31.5–35.7)
MCV RBC AUTO: 97.6 FL (ref 79–97)
MONOCYTES # BLD AUTO: 0.55 10*3/MM3 (ref 0.1–0.9)
MONOCYTES NFR BLD AUTO: 6.8 % (ref 5–12)
NEUTROPHILS NFR BLD AUTO: 6.23 10*3/MM3 (ref 1.7–7)
NEUTROPHILS NFR BLD AUTO: 76.8 % (ref 42.7–76)
NITRITE UR-MCNC: NEGATIVE MG/ML
NRBC BLD AUTO-RTO: 0 /100 WBC (ref 0–0.2)
PH UR: 5.5 [PH] (ref 5–8)
PLATELET # BLD AUTO: 313 10*3/MM3 (ref 140–450)
PMV BLD AUTO: 8.6 FL (ref 6–12)
PROT UR STRIP-MCNC: NEGATIVE MG/DL
RBC # BLD AUTO: 3.37 10*6/MM3 (ref 4.14–5.8)
RBC # UR STRIP: NEGATIVE /UL
SP GR UR: 1.02 (ref 1–1.03)
UROBILINOGEN UR QL: NORMAL
WBC # BLD AUTO: 8.11 10*3/MM3 (ref 3.4–10.8)

## 2020-07-01 PROCEDURE — 99495 TRANSJ CARE MGMT MOD F2F 14D: CPT | Performed by: INTERNAL MEDICINE

## 2020-07-01 PROCEDURE — 52000 CYSTOURETHROSCOPY: CPT | Performed by: UROLOGY

## 2020-07-01 PROCEDURE — 81001 URINALYSIS AUTO W/SCOPE: CPT | Performed by: UROLOGY

## 2020-07-01 PROCEDURE — 36415 COLL VENOUS BLD VENIPUNCTURE: CPT

## 2020-07-01 PROCEDURE — 85025 COMPLETE CBC W/AUTO DIFF WBC: CPT | Performed by: INTERNAL MEDICINE

## 2020-07-01 RX ORDER — FINASTERIDE 5 MG/1
5 TABLET, FILM COATED ORAL DAILY
Qty: 30 TABLET | Refills: 5 | Status: SHIPPED | OUTPATIENT
Start: 2020-07-01 | End: 2020-07-13 | Stop reason: SDUPTHER

## 2020-07-01 NOTE — TELEPHONE ENCOUNTER
----- Message from BARRY Urrutia MD sent at 7/1/2020  9:29 AM CDT -----  Can you call patient to notify them of improved labs?  His hemoglobin is now 10.5, 9 days ago was 9.4.  He needs to continue taking his iron supplementation.  Notify the office or go to the emergency room if he has any recurrent signs of bleeding.  Thanks

## 2020-07-01 NOTE — PROGRESS NOTES
Transitional Care Follow Up Visit  Subjective     Kingsley Lerma is a 74 y.o. male who presents for a transitional care management visit.    Within 48 business hours after discharge our office contacted him via telephone to coordinate his care and needs.      I reviewed and discussed the details of that call along with the discharge summary, hospital problems, inpatient lab results, inpatient diagnostic studies, and consultation reports with Kingsley.     Current outpatient and discharge medications have been reconciled for the patient.  Reviewed by: BARRY Urrutia MD      Date of TCM Phone Call 6/22/2020   [facility]   Date of Admission 6/21/2020   Date of Discharge 6/22/2020   Discharge Disposition Home or Self Care     Risk for Readmission (LACE) Score: 6 (6/22/2020  5:01 AM)      History of Present Illness   Course During Hospital Stay: He was originally admitted from June 13, 2020 through June 18, 2020 for acute GI bleed.  He then presented back to the hospital and was admitted from June 21 through June 22, 2020 for repeat GI bleeding.  He had a total of 3 units of packed red blood cells.  He had a complete work-up including EGD colonoscopy and CT enterography.  This revealed AVMs.  He was on Xarelto and given the 3 episodes of bleeding it was decided to come off this.  He remains on Pletal and a baby aspirin.  His discharge hemoglobin was 9.4.       He was discharged with iron supplementation and since then he has had formed black stool.  He feels well overall except for continued left foot pain related to atherosclerosis.    He continues to smoke although is decreasing the amount of smoking    1 of his concerns is weight loss of about 30 pounds since he started with his A. fib.  He has stopped drinking alcohol completely during that time.  Previous CT of his chest was negative for mass, he had a CT of his abdomen which was reviewed today.  He has a good appetite.  Recent colonoscopy as well.     For his  left foot he has been referred to vascular surgery at [facility].  He had been seen at wound care.  He may require revascularization.  CT of his abdomen and pelvis showed severe atherosclerosis of his aorta and iliac arteries as well as the renal arteries and proximal SMA.  There was severe stenosis at the proximal common iliac arteries and severe osteoarthritis of the right hip.      The following portions of the patient's history were reviewed and updated as appropriate: allergies, current medications, past family history, past medical history, past social history, past surgical history and problem list.    Review of Systems   Constitutional: Positive for unexpected weight change. Negative for diaphoresis, fatigue and fever.   HENT: Negative for congestion.    Respiratory: Negative for cough, shortness of breath and stridor.    Cardiovascular: Negative for chest pain, palpitations and leg swelling.   Gastrointestinal: Positive for blood in stool (Now resolved). Negative for abdominal distention, abdominal pain, constipation, diarrhea and nausea.   Musculoskeletal: Positive for arthralgias and gait problem.   Skin: Positive for wound.   Neurological: Negative for dizziness, tremors, weakness and light-headedness.       Objective   Physical Exam   Constitutional: He appears well-developed and well-nourished. No distress.   Sitting in a wheelchair, appears stated age, no acute distress   HENT:   Head: Normocephalic and atraumatic.   Right Ear: External ear normal.   Left Ear: External ear normal.   Eyes: Pupils are equal, round, and reactive to light. EOM are normal.   Neck: Phonation normal. No JVD present. No thyromegaly present.   Cardiovascular: Normal rate and regular rhythm. Exam reveals no friction rub.   No murmur heard.  Pulmonary/Chest: Effort normal and breath sounds normal. No stridor. No respiratory distress. He has no wheezes. He has no rales.   Abdominal: Soft. Bowel sounds are normal. He exhibits no  mass. There is no tenderness. There is no guarding.   Neurological: He is alert.   Skin: Skin is warm and dry. No erythema. No pallor.   Psychiatric: He has a normal mood and affect. His behavior is normal. Judgment and thought content normal.   Vitals reviewed.    Vitals:    07/01/20 0814   BP: 158/56   Pulse: 78   Resp: 18   Temp: 98.2 °F (36.8 °C)   SpO2: 98%       Assessment/Plan   Kingsley was seen today for hospital follow up.    Diagnoses and all orders for this visit:    Gastrointestinal hemorrhage, unspecified gastrointestinal hemorrhage type  -     Cancel: CBC w AUTO Differential; Future  -     CBC w AUTO Differential; Future    Atrial fibrillation and flutter (CMS/HCC)    Tobacco use    Non healing left heel wound    Acute blood loss anemia  -     Cancel: CBC w AUTO Differential; Future  -     CBC w AUTO Differential; Future    Weight loss      I suspect his GI bleeding has resolved.  This was secondary to AVMs and he has had a complete work-up with EGD colonoscopy and CT enterography.  Repeat CBC today.  He still having formed black stool but this is likely from his iron supplementation.    He continues on amiodarone and diltiazem for his A. fib.  Again, he is off anticoagulation (Xarelto)    He has a significant amount of peripheral artery disease and a nonhealing left foot wound.  He continues on Pletal and baby aspirin.  He follows with wound care and was referred to the [facility] vascular surgery and has appointment next week.  Hopefully he can get revascularized to help with his left foot pain.  He asked about pain medications I discussed that I do not manage chronic opiates for pain.  Offered referral to pain management but he declined.    Unfortunately, he continues to smoke.  Offered to help with complete tobacco cessation with medication but he declined.  Also discussed that stopping smoking will help his peripheral artery disease and his wound healing.    I am very pleased he stopped drinking  alcohol and I believe this is because of his weight loss.  He has had colonoscopy which was unremarkable.  He had a CT of his chest which was negative for any lung nodules or masses.  Also a CT of his abdomen which did not show any malignancies.  His appetite is very good.  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  I am doubtful this is causing his weight loss given good appetite and lack of symptoms.    Follow-up in 3 months for Medicare wellness visit or sooner if clinically indicated.    BARRY Urrutia MD  07/01/2020

## 2020-07-01 NOTE — TELEPHONE ENCOUNTER
Spoke with patient regarding labs. He voiced understanding to continue on with his iron supplement. He also understood that if he develops recurring symptoms of bleeding to notify the office or go to the ER.

## 2020-07-01 NOTE — PROGRESS NOTES
The patient presents for cystoscopy for evaluation of abnormal CT findings of the bladder.    Flexible cystoscopy was performed under routine sterile conditions.  We discussed the risks and benefits beforehand.  A formal timeout was performed.  Normal urethra.  He did have some moderate to severe BPH with intravesical extension of the median lobe.  He had some moderate trabeculations.  There were no tumors noted within the bladder.  The patient tolerated the procedure well.    The patient denies significant urinary symptoms.  We will start him on finasteride 5 mg daily.  I will see him back in 6 months with a bladder scan.

## 2020-07-01 NOTE — PROGRESS NOTES
Patient has been notified of lab results. He voiced understanding with continuing on with iron supplementation.

## 2020-07-07 ENCOUNTER — OFFICE VISIT (OUTPATIENT)
Dept: VASCULAR SURGERY | Age: 75
End: 2020-07-07
Payer: OTHER GOVERNMENT

## 2020-07-07 VITALS
HEART RATE: 123 BPM | SYSTOLIC BLOOD PRESSURE: 138 MMHG | DIASTOLIC BLOOD PRESSURE: 72 MMHG | RESPIRATION RATE: 18 BRPM | OXYGEN SATURATION: 98 %

## 2020-07-07 PROCEDURE — 99204 OFFICE O/P NEW MOD 45 MIN: CPT | Performed by: PHYSICIAN ASSISTANT

## 2020-07-07 RX ORDER — ASPIRIN 81 MG/1
81 TABLET ORAL DAILY
Status: ON HOLD | COMMUNITY
Start: 2020-06-18 | End: 2020-09-03 | Stop reason: HOSPADM

## 2020-07-07 RX ORDER — DILTIAZEM HYDROCHLORIDE 120 MG/1
120 CAPSULE, COATED, EXTENDED RELEASE ORAL DAILY
COMMUNITY
Start: 2020-05-11

## 2020-07-07 RX ORDER — FERROUS SULFATE 325(65) MG
325 TABLET ORAL
COMMUNITY
Start: 2020-06-08

## 2020-07-07 RX ORDER — PANTOPRAZOLE SODIUM 20 MG/1
20 TABLET, DELAYED RELEASE ORAL DAILY
COMMUNITY

## 2020-07-07 RX ORDER — LISINOPRIL 40 MG/1
20 TABLET ORAL DAILY
COMMUNITY

## 2020-07-07 RX ORDER — CHOLECALCIFEROL (VITAMIN D3) 50 MCG
2000 TABLET ORAL DAILY
COMMUNITY

## 2020-07-07 RX ORDER — FAMOTIDINE 20 MG/1
20 TABLET, FILM COATED ORAL DAILY PRN
COMMUNITY

## 2020-07-07 RX ORDER — AMIODARONE HYDROCHLORIDE 400 MG/1
1 TABLET ORAL DAILY
COMMUNITY
Start: 2020-05-07 | End: 2020-07-14

## 2020-07-07 RX ORDER — CILOSTAZOL 50 MG/1
50 TABLET ORAL 2 TIMES DAILY
COMMUNITY
Start: 2020-05-14

## 2020-07-07 RX ORDER — FINASTERIDE 5 MG/1
5 TABLET, FILM COATED ORAL DAILY
COMMUNITY
Start: 2020-07-01

## 2020-07-07 RX ORDER — LIDOCAINE 40 MG/G
CREAM TOPICAL PRN
COMMUNITY

## 2020-07-07 NOTE — PROGRESS NOTES
Patient Care Team:  Gloria Ovalle as PCP - General      History and Physical    Mr. Noreen Cristobal  is a 70 yo male who has a history of HTN, A-fib and tobacco abuse. He comes in today for evaluation of PVD with wound left heel. He is seeing Dr. Magan Schrader at [address] [place] [address]. He had a left leg arterial duplex on 5/20/2020  that showed an increase velocity suggesting narrowing > than 50%; monophasic poststenotic flow is noted throughout the arteries LLE. His current medication include ASA and Pletal daily. He is having a hard time walking due to the pain left foot. He is a 1/2 PPD smoker. Old records have been obtained, reviewed, and summarized. Irina Moore is a 69 y.o. male with the following history reviewed and recorded in [facility]: There are no active problems to display for this patient. Current Outpatient Medications   Medication Sig Dispense Refill    amiodarone (PACERONE) 400 MG tablet Take 1 tablet by mouth daily      cilostazol (PLETAL) 50 MG tablet Take 50 mg by mouth 2 times daily      dilTIAZem (CARDIZEM CD) 120 MG extended release capsule Take 120 mg by mouth daily      lidocaine (LMX) 4 % cream Apply topically as needed      lisinopril (PRINIVIL;ZESTRIL) 40 MG tablet Take 20 mg by mouth daily      pantoprazole (PROTONIX) 20 MG tablet Take 20 mg by mouth daily      vitamin D (CHOLECALCIFEROL) 50 MCG (2000 UT) TABS tablet Take 2,000 Units by mouth daily      aspirin 81 MG EC tablet Take 81 mg by mouth daily      ferrous sulfate (IRON 325) 325 (65 Fe) MG tablet Take 325 mg by mouth daily (with breakfast)      famotidine (PEPCID) 20 MG tablet Take 20 mg by mouth daily      finasteride (PROSCAR) 5 MG tablet Take 5 mg by mouth daily       No current facility-administered medications for this visit. Allergies: Patient has no known allergies.   Past Medical History:   Diagnosis Date    Afib [facility])     Anxiety     ED (erectile dysfunction)     Hyperlipemia     Hypertension      History reviewed. No pertinent surgical history. History reviewed. No pertinent family history. Social History     Tobacco Use    Smoking status: Current Every Day Smoker     Types: Cigars    Smokeless tobacco: Never Used    Tobacco comment: 8-10 day   Substance Use Topics    Alcohol use: Not on file       Review of Systems    Constitutional - no significant activity change, appetite change, or unexpected weight change. No fever or chills. No diaphoresis or significant fatigue. HENT - no significant rhinorrhea or epistaxis. No tinnitus or significant hearing loss. Eyes - no sudden vision change or amaurosis. Respiratory - no significant shortness of breath, wheezing, or stridor. No apnea, cough, or chest tightness associated with shortness of breath. Cardiovascular - no chest pain, syncope, or significant dizziness. No palpitations. He has BLE swelling, left leg > than the right. (see HPI)  Gastrointestinal - no abdominal swelling or pain. No blood in stool. No severe constipation, diarrhea, nausea, or vomiting. Genitourinary - No difficulty urinating, dysuria, frequency, or urgency. No flank pain or hematuria. Musculoskeletal - no back pain, gait disturbance, or myalgia. Skin - no color change, rash, pallor. Non healing wound left heel. Neurologic - no dizziness, facial asymmetry, or light headedness. No seizures. No speech difficulty or lateralizing weakness. Hematologic - no easy bruising or excessive bleeding. Psychiatric - no severe anxiety or nervousness. No confusion. All other review of systems are negative. Physical Exam    /72 (Site: Left Upper Arm, Position: Sitting, Cuff Size: Medium Adult)   Pulse 123   Resp 18   SpO2 98%     Constitutional - well developed, well nourished. No diaphoresis or acute distress. HENT - head normocephalic. Right external ear canal appears normal.  Left external ear canal appears normal.  Septum appears midline.   Eyes - conjunctiva normal. heel      Plan      Recommend he continue ASA and Pletal daily  Strongly encourage statin therapy  Good skin care/checks daily  Recommend no smoking    Proceed with aortogram with runoff possible angioplasty/atherectomy/stent

## 2020-07-10 ENCOUNTER — OFFICE VISIT (OUTPATIENT)
Age: 75
End: 2020-07-10

## 2020-07-10 VITALS — OXYGEN SATURATION: 96 % | TEMPERATURE: 98.1 F | HEART RATE: 79 BPM

## 2020-07-10 DIAGNOSIS — Z01.818 PRE-OP TESTING: ICD-10-CM

## 2020-07-10 LAB
ANION GAP SERPL CALCULATED.3IONS-SCNC: 12 MMOL/L (ref 7–19)
BUN BLDV-MCNC: 17 MG/DL (ref 8–23)
CALCIUM SERPL-MCNC: 9 MG/DL (ref 8.8–10.2)
CHLORIDE BLD-SCNC: 105 MMOL/L (ref 98–111)
CO2: 25 MMOL/L (ref 22–29)
CREAT SERPL-MCNC: 0.8 MG/DL (ref 0.5–1.2)
GFR NON-AFRICAN AMERICAN: >60
GLUCOSE BLD-MCNC: 77 MG/DL (ref 74–109)
HCT VFR BLD CALC: 37.7 % (ref 42–52)
HEMOGLOBIN: 11.8 G/DL (ref 14–18)
MCH RBC QN AUTO: 31.8 PG (ref 27–31)
MCHC RBC AUTO-ENTMCNC: 31.3 G/DL (ref 33–37)
MCV RBC AUTO: 101.6 FL (ref 80–94)
PDW BLD-RTO: 17.5 % (ref 11.5–14.5)
PLATELET # BLD: 281 K/UL (ref 130–400)
PMV BLD AUTO: 8.8 FL (ref 9.4–12.4)
POTASSIUM SERPL-SCNC: 4.1 MMOL/L (ref 3.5–5)
RBC # BLD: 3.71 M/UL (ref 4.7–6.1)
SODIUM BLD-SCNC: 142 MMOL/L (ref 136–145)
WBC # BLD: 7.2 K/UL (ref 4.8–10.8)

## 2020-07-11 LAB
REPORT: NORMAL
SARS-COV-2: NOT DETECTED
THIS TEST SENT TO: NORMAL

## 2020-07-13 ENCOUNTER — PREP FOR PROCEDURE (OUTPATIENT)
Dept: VASCULAR SURGERY | Age: 75
End: 2020-07-13

## 2020-07-13 DIAGNOSIS — R93.41 ABNORMAL CT SCAN, BLADDER: ICD-10-CM

## 2020-07-13 DIAGNOSIS — N40.0 BPH WITHOUT OBSTRUCTION/LOWER URINARY TRACT SYMPTOMS: ICD-10-CM

## 2020-07-13 RX ORDER — FINASTERIDE 5 MG/1
5 TABLET, FILM COATED ORAL DAILY
Qty: 30 TABLET | Refills: 5 | Status: SHIPPED | OUTPATIENT
Start: 2020-07-13 | End: 2020-07-23 | Stop reason: SDUPTHER

## 2020-07-13 RX ORDER — SODIUM CHLORIDE 0.9 % (FLUSH) 0.9 %
10 SYRINGE (ML) INJECTION PRN
Status: CANCELLED | OUTPATIENT
Start: 2020-07-13

## 2020-07-13 RX ORDER — SODIUM CHLORIDE 9 MG/ML
INJECTION, SOLUTION INTRAVENOUS CONTINUOUS
Status: CANCELLED | OUTPATIENT
Start: 2020-07-13

## 2020-07-13 RX ORDER — ASPIRIN 81 MG/1
81 TABLET ORAL ONCE
Status: CANCELLED | OUTPATIENT
Start: 2020-07-13 | End: 2020-07-13

## 2020-07-13 NOTE — H&P
Patient Care Team:  Nadia Gilbert as PCP - General      History and Physical    Mr. Aaliyah Hester  is a 68 yo male who has a history of HTN, A-fib and tobacco abuse. He comes in today for evaluation of PVD with wound left heel. He is seeing Dr. Pavithra Clark at [address] [place] [address]. He had a left leg arterial duplex on 5/20/2020  that showed an increase velocity suggesting narrowing > than 50%; monophasic poststenotic flow is noted throughout the arteries LLE. His current medication include ASA and Pletal daily. He is having a hard time walking due to the pain left foot. He is a 1/2 PPD smoker. Old records have been obtained, reviewed, and summarized. Tong Good is a 69 y.o. male with the following history reviewed and recorded in [facility]: There are no active problems to display for this patient. Current Outpatient Medications   Medication Sig Dispense Refill    amiodarone (PACERONE) 400 MG tablet Take 1 tablet by mouth daily      cilostazol (PLETAL) 50 MG tablet Take 50 mg by mouth 2 times daily      dilTIAZem (CARDIZEM CD) 120 MG extended release capsule Take 120 mg by mouth daily      lidocaine (LMX) 4 % cream Apply topically as needed      lisinopril (PRINIVIL;ZESTRIL) 40 MG tablet Take 20 mg by mouth daily      pantoprazole (PROTONIX) 20 MG tablet Take 20 mg by mouth daily      vitamin D (CHOLECALCIFEROL) 50 MCG (2000 UT) TABS tablet Take 2,000 Units by mouth daily      aspirin 81 MG EC tablet Take 81 mg by mouth daily      ferrous sulfate (IRON 325) 325 (65 Fe) MG tablet Take 325 mg by mouth daily (with breakfast)      famotidine (PEPCID) 20 MG tablet Take 20 mg by mouth daily      finasteride (PROSCAR) 5 MG tablet Take 5 mg by mouth daily       No current facility-administered medications for this visit. Allergies: Patient has no known allergies.   Past Medical History:   Diagnosis Date    Afib [facility])     Anxiety     ED (erectile dysfunction)     Hyperlipemia     Hypertension      No past surgical history on file. No family history on file. Social History     Tobacco Use    Smoking status: Current Every Day Smoker     Types: Cigars    Smokeless tobacco: Never Used    Tobacco comment: 8-10 day   Substance Use Topics    Alcohol use: Not on file       Review of Systems    Constitutional - no significant activity change, appetite change, or unexpected weight change. No fever or chills. No diaphoresis or significant fatigue. HENT - no significant rhinorrhea or epistaxis. No tinnitus or significant hearing loss. Eyes - no sudden vision change or amaurosis. Respiratory - no significant shortness of breath, wheezing, or stridor. No apnea, cough, or chest tightness associated with shortness of breath. Cardiovascular - no chest pain, syncope, or significant dizziness. No palpitations. He has BLE swelling, left leg > than the right. (see HPI)  Gastrointestinal - no abdominal swelling or pain. No blood in stool. No severe constipation, diarrhea, nausea, or vomiting. Genitourinary - No difficulty urinating, dysuria, frequency, or urgency. No flank pain or hematuria. Musculoskeletal - no back pain, gait disturbance, or myalgia. Skin - no color change, rash, pallor. Non healing wound left heel. Neurologic - no dizziness, facial asymmetry, or light headedness. No seizures. No speech difficulty or lateralizing weakness. Hematologic - no easy bruising or excessive bleeding. Psychiatric - no severe anxiety or nervousness. No confusion. All other review of systems are negative. Physical Exam    There were no vitals taken for this visit. Constitutional - well developed, well nourished. No diaphoresis or acute distress. HENT - head normocephalic. Right external ear canal appears normal.  Left external ear canal appears normal.  Septum appears midline. Eyes - conjunctiva normal.  EOMS normal.  No exudate. No icterus. Neck- ROM appears normal, no tracheal deviation.   Cardiovascular - Regular rate and rhythm. Heart sounds are normal.  No murmur, rub, or gallop. Carotid pulses are 2+ to palpation bilaterally without bruit. Extremities - Radial and ulnar pulses are 2+ to palpation bilaterally. Femoral pulses are palpable. DP and PT pulses are not palpable. He has BL swelling, left leg is > than the right. He has hyperkeratotic lichenified thickening noted pretibial areas both LE. Left is worse than the right. No cyanosis, clubbing. No signs atheroembolic event. Dressing left heel D/I. Wound medial nailfold on left 3rd toe. Pulmonary - effort appears normal.  No respiratory distress. Lungs - Breath sounds normal. No wheezes or rales. GI - Abdomen - soft, non tender, bowel sounds X 4 quadrants. No guarding or rebound tenderness. No distension or palpable mass. Genitourinary - deferred. Musculoskeletal - ROM appears normal.  No significant edema. Neurologic - alert and oriented X 3. Physiologic. Skin - warm, dry, and intact. No rash, erythema, or pallor. Psychiatric - mood, affect, and behavior appear normal.  Judgment and thought processes appear normal.    Risk factors for atherosclerosis of all vascular beds have been reviewed with the patient including:  Family history, tobacco abuse in all forms, elevated cholesterol, hyperlipidemia, and diabetes. Options were discussed with the patient including continued medical management versus proceeding with angiography and potential intervention for PVD with wound. Patient has opted to proceed with angiography. Risks have been discussed with the patient including but not limited to MI, death, CVA, bleed, nerve injury, infection, contrast nephropathy, possible need for dialysis, and need for further surgery. Assessment      1.  Athersclerosis of bilateral LE native arteries with wound left heel      Plan      Recommend he continue ASA and Pletal daily  Strongly encourage statin therapy  Good skin care/checks daily  Recommend no smoking    Proceed with aortogram with runoff possible angioplasty/atherectomy/stent

## 2020-07-13 NOTE — H&P (VIEW-ONLY)
Regular rate and rhythm. Heart sounds are normal.  No murmur, rub, or gallop. Carotid pulses are 2+ to palpation bilaterally without bruit. Extremities - Radial and ulnar pulses are 2+ to palpation bilaterally. Femoral pulses are palpable. DP and PT pulses are not palpable. He has BL swelling, left leg is > than the right. He has hyperkeratotic lichenified thickening noted pretibial areas both LE. Left is worse than the right. No cyanosis, clubbing. No signs atheroembolic event. Dressing left heel D/I. Wound medial nailfold on left 3rd toe. Pulmonary - effort appears normal.  No respiratory distress. Lungs - Breath sounds normal. No wheezes or rales. GI - Abdomen - soft, non tender, bowel sounds X 4 quadrants. No guarding or rebound tenderness. No distension or palpable mass. Genitourinary - deferred. Musculoskeletal - ROM appears normal.  No significant edema. Neurologic - alert and oriented X 3. Physiologic. Skin - warm, dry, and intact. No rash, erythema, or pallor. Psychiatric - mood, affect, and behavior appear normal.  Judgment and thought processes appear normal.    Risk factors for atherosclerosis of all vascular beds have been reviewed with the patient including:  Family history, tobacco abuse in all forms, elevated cholesterol, hyperlipidemia, and diabetes. Options were discussed with the patient including continued medical management versus proceeding with angiography and potential intervention for PVD with wound. Patient has opted to proceed with angiography. Risks have been discussed with the patient including but not limited to MI, death, CVA, bleed, nerve injury, infection, contrast nephropathy, possible need for dialysis, and need for further surgery. Assessment      1.  Athersclerosis of bilateral LE native arteries with wound left heel      Plan      Recommend he continue ASA and Pletal daily  Strongly encourage statin therapy  Good skin care/checks daily  Recommend no smoking    Proceed with aortogram with runoff possible angioplasty/atherectomy/stent

## 2020-07-14 ENCOUNTER — HOSPITAL ENCOUNTER (OUTPATIENT)
Dept: INTERVENTIONAL RADIOLOGY/VASCULAR | Age: 75
Discharge: HOME | End: 2020-07-14
Payer: OTHER GOVERNMENT

## 2020-07-14 VITALS
TEMPERATURE: 98.2 F | OXYGEN SATURATION: 97 % | SYSTOLIC BLOOD PRESSURE: 118 MMHG | HEART RATE: 55 BPM | DIASTOLIC BLOOD PRESSURE: 54 MMHG | BODY MASS INDEX: 24.71 KG/M2 | WEIGHT: 163 LBS | RESPIRATION RATE: 11 BRPM | HEIGHT: 68 IN

## 2020-07-14 PROCEDURE — 99152 MOD SED SAME PHYS/QHP 5/>YRS: CPT

## 2020-07-14 PROCEDURE — 75625 CONTRAST EXAM ABDOMINL AORTA: CPT

## 2020-07-14 PROCEDURE — 2709999900 IR AORTAGRAM ABDOMINAL SERIALOGRAM

## 2020-07-14 PROCEDURE — G0269 OCCLUSIVE DEVICE IN VEIN ART: HCPCS

## 2020-07-14 PROCEDURE — 6360000002 HC RX W HCPCS: Performed by: PHYSICIAN ASSISTANT

## 2020-07-14 PROCEDURE — 36200 PLACE CATHETER IN AORTA: CPT | Performed by: SURGERY

## 2020-07-14 PROCEDURE — 36200 PLACE CATHETER IN AORTA: CPT

## 2020-07-14 PROCEDURE — 75630 X-RAY AORTA LEG ARTERIES: CPT | Performed by: SURGERY

## 2020-07-14 PROCEDURE — 75716 ARTERY X-RAYS ARMS/LEGS: CPT

## 2020-07-14 PROCEDURE — 2580000003 HC RX 258: Performed by: PHYSICIAN ASSISTANT

## 2020-07-14 PROCEDURE — 2580000003 HC RX 258: Performed by: SURGERY

## 2020-07-14 PROCEDURE — 2500000003 HC RX 250 WO HCPCS: Performed by: SURGERY

## 2020-07-14 PROCEDURE — 6360000002 HC RX W HCPCS: Performed by: SURGERY

## 2020-07-14 PROCEDURE — 6360000004 HC RX CONTRAST MEDICATION: Performed by: SURGERY

## 2020-07-14 PROCEDURE — 6370000000 HC RX 637 (ALT 250 FOR IP): Performed by: SURGERY

## 2020-07-14 RX ORDER — SODIUM CHLORIDE 9 MG/ML
INJECTION, SOLUTION INTRAVENOUS CONTINUOUS
Status: DISCONTINUED | OUTPATIENT
Start: 2020-07-14 | End: 2020-07-16 | Stop reason: HOSPADM

## 2020-07-14 RX ORDER — ASPIRIN 81 MG/1
81 TABLET ORAL ONCE
Status: DISCONTINUED | OUTPATIENT
Start: 2020-07-14 | End: 2020-07-16 | Stop reason: HOSPADM

## 2020-07-14 RX ORDER — GUAIFENESIN 600 MG/1
600 TABLET, EXTENDED RELEASE ORAL 2 TIMES DAILY PRN
COMMUNITY

## 2020-07-14 RX ORDER — MIDAZOLAM HYDROCHLORIDE 1 MG/ML
INJECTION INTRAMUSCULAR; INTRAVENOUS
Status: COMPLETED | OUTPATIENT
Start: 2020-07-14 | End: 2020-07-14

## 2020-07-14 RX ORDER — FENTANYL CITRATE 50 UG/ML
INJECTION, SOLUTION INTRAMUSCULAR; INTRAVENOUS
Status: COMPLETED | OUTPATIENT
Start: 2020-07-14 | End: 2020-07-14

## 2020-07-14 RX ORDER — HEPARIN SODIUM 5000 [USP'U]/ML
INJECTION, SOLUTION INTRAVENOUS; SUBCUTANEOUS
Status: COMPLETED | OUTPATIENT
Start: 2020-07-14 | End: 2020-07-14

## 2020-07-14 RX ORDER — HYDROCODONE BITARTRATE AND ACETAMINOPHEN 5; 325 MG/1; MG/1
1 TABLET ORAL EVERY 4 HOURS PRN
Status: DISCONTINUED | OUTPATIENT
Start: 2020-07-14 | End: 2020-07-16 | Stop reason: HOSPADM

## 2020-07-14 RX ORDER — SODIUM CHLORIDE 0.9 % (FLUSH) 0.9 %
10 SYRINGE (ML) INJECTION EVERY 12 HOURS SCHEDULED
Status: DISCONTINUED | OUTPATIENT
Start: 2020-07-14 | End: 2020-07-16 | Stop reason: HOSPADM

## 2020-07-14 RX ORDER — HYDROCODONE BITARTRATE AND ACETAMINOPHEN 5; 325 MG/1; MG/1
2 TABLET ORAL EVERY 4 HOURS PRN
Status: DISCONTINUED | OUTPATIENT
Start: 2020-07-14 | End: 2020-07-16 | Stop reason: HOSPADM

## 2020-07-14 RX ORDER — SODIUM CHLORIDE 0.9 % (FLUSH) 0.9 %
10 SYRINGE (ML) INJECTION PRN
Status: DISCONTINUED | OUTPATIENT
Start: 2020-07-14 | End: 2020-07-16 | Stop reason: HOSPADM

## 2020-07-14 RX ORDER — HYDROXYZINE HYDROCHLORIDE 25 MG/1
TABLET, FILM COATED ORAL
COMMUNITY
Start: 2020-04-16 | End: 2020-07-14

## 2020-07-14 RX ORDER — HYDROCODONE BITARTRATE AND ACETAMINOPHEN 5; 325 MG/1; MG/1
1 TABLET ORAL EVERY 6 HOURS PRN
Qty: 20 TABLET | Refills: 0 | Status: SHIPPED | OUTPATIENT
Start: 2020-07-14 | End: 2020-07-21

## 2020-07-14 RX ORDER — ACETAMINOPHEN 325 MG/1
650 TABLET ORAL EVERY 4 HOURS PRN
Status: DISCONTINUED | OUTPATIENT
Start: 2020-07-14 | End: 2020-07-16 | Stop reason: HOSPADM

## 2020-07-14 RX ORDER — IODIXANOL 320 MG/ML
INJECTION, SOLUTION INTRAVASCULAR
Status: COMPLETED | OUTPATIENT
Start: 2020-07-14 | End: 2020-07-14

## 2020-07-14 RX ORDER — LIDOCAINE HYDROCHLORIDE 20 MG/ML
INJECTION, SOLUTION INFILTRATION; PERINEURAL
Status: COMPLETED | OUTPATIENT
Start: 2020-07-14 | End: 2020-07-14

## 2020-07-14 RX ORDER — HYDROXYZINE HYDROCHLORIDE 25 MG/1
25 TABLET, FILM COATED ORAL 3 TIMES DAILY PRN
COMMUNITY
Start: 2020-04-16

## 2020-07-14 RX ORDER — AMIODARONE HYDROCHLORIDE 200 MG/1
200 TABLET ORAL DAILY
COMMUNITY
Start: 2020-05-22

## 2020-07-14 RX ORDER — SODIUM CHLORIDE 9 MG/ML
INJECTION, SOLUTION INTRAVENOUS CONTINUOUS
Status: ACTIVE | OUTPATIENT
Start: 2020-07-14 | End: 2020-07-14

## 2020-07-14 RX ADMIN — FENTANYL CITRATE 25 MCG: 50 INJECTION, SOLUTION INTRAMUSCULAR; INTRAVENOUS at 08:42

## 2020-07-14 RX ADMIN — MIDAZOLAM 1 MG: 1 INJECTION INTRAMUSCULAR; INTRAVENOUS at 08:10

## 2020-07-14 RX ADMIN — HEPARIN SODIUM 5000 UNITS: 5000 INJECTION, SOLUTION INTRAVENOUS; SUBCUTANEOUS at 08:18

## 2020-07-14 RX ADMIN — FENTANYL CITRATE 25 MCG: 50 INJECTION, SOLUTION INTRAMUSCULAR; INTRAVENOUS at 08:15

## 2020-07-14 RX ADMIN — MIDAZOLAM 1 MG: 1 INJECTION INTRAMUSCULAR; INTRAVENOUS at 08:32

## 2020-07-14 RX ADMIN — Medication 2 G: at 07:43

## 2020-07-14 RX ADMIN — FENTANYL CITRATE 25 MCG: 50 INJECTION, SOLUTION INTRAMUSCULAR; INTRAVENOUS at 08:12

## 2020-07-14 RX ADMIN — FENTANYL CITRATE 25 MCG: 50 INJECTION, SOLUTION INTRAMUSCULAR; INTRAVENOUS at 08:32

## 2020-07-14 RX ADMIN — MIDAZOLAM 1 MG: 1 INJECTION INTRAMUSCULAR; INTRAVENOUS at 08:15

## 2020-07-14 RX ADMIN — SODIUM CHLORIDE: 9 INJECTION, SOLUTION INTRAVENOUS at 10:49

## 2020-07-14 RX ADMIN — IODIXANOL 130 ML: 320 INJECTION, SOLUTION INTRAVASCULAR at 08:52

## 2020-07-14 RX ADMIN — SODIUM CHLORIDE: 9 INJECTION, SOLUTION INTRAVENOUS at 07:04

## 2020-07-14 RX ADMIN — HYDROCODONE BITARTRATE AND ACETAMINOPHEN 2 TABLET: 5; 325 TABLET ORAL at 09:37

## 2020-07-14 RX ADMIN — LIDOCAINE HYDROCHLORIDE 10 ML: 20 INJECTION, SOLUTION INFILTRATION; PERINEURAL at 08:18

## 2020-07-14 RX ADMIN — MIDAZOLAM 1 MG: 1 INJECTION INTRAMUSCULAR; INTRAVENOUS at 08:42

## 2020-07-14 ASSESSMENT — PAIN DESCRIPTION - PAIN TYPE: TYPE: CHRONIC PAIN

## 2020-07-14 ASSESSMENT — PAIN SCALES - GENERAL
PAINLEVEL_OUTOF10: 8
PAINLEVEL_OUTOF10: 5

## 2020-07-14 ASSESSMENT — PAIN DESCRIPTION - ORIENTATION: ORIENTATION: LEFT

## 2020-07-14 ASSESSMENT — PAIN DESCRIPTION - LOCATION: LOCATION: FOOT

## 2020-07-14 NOTE — OP NOTE
Patient Name: Devendra Raza   YOB: 1945   MRN: [id number]     Procedure Date: 7/14/2020     Pre Op Diagnosis: left leg chronic ischemia with ulcer    Post Op Diagnosis: same    Procedure: Aortogram with bilateral extremity runoff. Anesthesia: Local with Moderate Sedation      Estimated Blood Loss: Less than 50 ml    Complications: None    Implants: Minx 5 Western Anais    Procedure Details: Patient was brought to the angiogram suite and placed in supine position. He received preoperative antibiotics. Bilateral groins were prepped and draped in sterile fashion. Timeout performed. Right common femoral artery was accessed under continuous ultrasound guidance using standard micropuncture technique after injection local anesthetic. The femoral artery appeared to be very calcified with the soft area that was found. It was accessed. The micropuncture wire had a struggle but it went inside. It was exchanged to 5 Western Anais glide sheath. Using stiff Glidewire I was able to navigate through the calcium into the aorta followed by Omni Flush catheter. Aortogram was bilateral extremity runoff were performed as mentioned below findings. Access site was sealed using 5 Western Anais minx. Plan would be to perform possible bilateral common femoral endarterectomies with aorta iliac revascularization. Findings: Aorta with severe infrarenal disease about 60 to 70% stenosis. Bilateral renal arteries appear patent with bilateral accessory renal's. There is right proximal common iliac high-grade stenosis with further disease there is left occlusion proximally with diffuse stenosis there after, external iliac appears to be disease proximally and then patent in the mid portion. Right external iliac artery appears to be occluded, right common femoral occluded, there is a right internal iliac artery patent with ostial disease at the left internal iliac artery, left common femoral artery occluded.   Left SFA with severe disease calcifications with high-grade stenosis at the proximal portion then diffuse calcified high-grade stenosis of the SFA mostly in distal portion with high-grade stenosis/occlusion at the above-knee popliteal artery which is patent in the mid portion and distal portion. TPT trunk with high-grade stenosis, AT appears to be patent with flow to the digital arteries, peroneal artery patent from proximal portion to the leg that appears to be reconstituted to posterior tibial artery at the ankle.   Right common femoral artery occluded as mentioned above, profunda femoris is reconstituted and appears to be patent, superficial femoral artery is occluded with above-knee reconstitution of popliteal there is severely diseased and occluded with collateralized flow to the new level popliteal artery has high-grade stenosis at the joint line and then patent there after with AT patent just proximally and occluded there after, TPT trunk has diffuse stenosis with peroneal artery patent that reconstitutes to posterior tibial artery at the foot and anterior tibial artery is collateralized to dorsalis pedis artery also and digital arteries patent    Disposition:aroused from sedation, and taken to the recovery room in a stable condition    Kp Davidson DO  7/14/2020 8:46 AM

## 2020-07-14 NOTE — INTERVAL H&P NOTE
Update History & Physical    The patient's History and Physical of July 13, 2020 was reviewed with the patient and I examined the patient. There was no change. The surgical site was confirmed by the patient and me. Plan: The risks, benefits, expected outcome, and alternative to the recommended procedure have been discussed with the patient. Patient understands and wants to proceed with the procedure.      Electronically signed by Jaime Estrella DO on 7/14/2020 at 7:58 AM

## 2020-07-22 ENCOUNTER — TELEPHONE (OUTPATIENT)
Dept: VASCULAR SURGERY | Age: 75
End: 2020-07-22

## 2020-07-22 ENCOUNTER — OFFICE VISIT (OUTPATIENT)
Dept: VASCULAR SURGERY | Age: 75
End: 2020-07-22
Payer: OTHER GOVERNMENT

## 2020-07-22 ENCOUNTER — TELEPHONE (OUTPATIENT)
Dept: CARDIOLOGY | Facility: CLINIC | Age: 75
End: 2020-07-22

## 2020-07-22 VITALS
RESPIRATION RATE: 16 BRPM | BODY MASS INDEX: 25.11 KG/M2 | OXYGEN SATURATION: 98 % | TEMPERATURE: 97.7 F | SYSTOLIC BLOOD PRESSURE: 154 MMHG | DIASTOLIC BLOOD PRESSURE: 78 MMHG | HEART RATE: 79 BPM | HEIGHT: 67 IN | WEIGHT: 160 LBS

## 2020-07-22 PROCEDURE — G8427 DOCREV CUR MEDS BY ELIG CLIN: HCPCS | Performed by: PHYSICIAN ASSISTANT

## 2020-07-22 PROCEDURE — 1123F ACP DISCUSS/DSCN MKR DOCD: CPT | Performed by: PHYSICIAN ASSISTANT

## 2020-07-22 PROCEDURE — 3017F COLORECTAL CA SCREEN DOC REV: CPT | Performed by: PHYSICIAN ASSISTANT

## 2020-07-22 PROCEDURE — 4004F PT TOBACCO SCREEN RCVD TLK: CPT | Performed by: PHYSICIAN ASSISTANT

## 2020-07-22 PROCEDURE — 4040F PNEUMOC VAC/ADMIN/RCVD: CPT | Performed by: PHYSICIAN ASSISTANT

## 2020-07-22 PROCEDURE — G8417 CALC BMI ABV UP PARAM F/U: HCPCS | Performed by: PHYSICIAN ASSISTANT

## 2020-07-22 PROCEDURE — 99214 OFFICE O/P EST MOD 30 MIN: CPT | Performed by: PHYSICIAN ASSISTANT

## 2020-07-22 NOTE — PROGRESS NOTES
No current facility-administered medications for this visit. Allergies: Cortisone  Past Medical History:   Diagnosis Date    Afib ([address]     Anxiety     Arthritis     ED (erectile dysfunction)     History of blood transfusion     Hyperlipemia     Hypertension      Past Surgical History:   Procedure Laterality Date    ADENOIDECTOMY       History reviewed. No pertinent family history. Social History     Tobacco Use    Smoking status: Current Every Day Smoker     Types: Cigars    Smokeless tobacco: Never Used    Tobacco comment: 8-10 day   Substance Use Topics    Alcohol use: Not on file       Review of Systems    Constitutional - no significant activity change, appetite change, or unexpected weight change. No fever or chills. No diaphoresis or significant fatigue. HENT - no significant rhinorrhea or epistaxis. No tinnitus or significant hearing loss. Eyes - no sudden vision change or amaurosis. Respiratory - no significant shortness of breath, wheezing, or stridor. No apnea, cough, or chest tightness associated with shortness of breath. Cardiovascular - no chest pain, syncope, or significant dizziness. No palpitations. He has BLE swelling, left leg > than the right. (see HPI)  Gastrointestinal - no abdominal swelling or pain. No blood in stool. No severe constipation, diarrhea, nausea, or vomiting. Genitourinary - No difficulty urinating, dysuria, frequency, or urgency. No flank pain or hematuria. Musculoskeletal - no back pain, gait disturbance, or myalgia. Skin - no color change, rash, pallor. Non healing wound left heel. Neurologic - no dizziness, facial asymmetry, or light headedness. No seizures. No speech difficulty or lateralizing weakness. Hematologic - no easy bruising or excessive bleeding. Psychiatric - no severe anxiety or nervousness. No confusion. All other review of systems are negative.       Physical Exam    BP (!) 159/67 (Site: Right Upper Arm)   Pulse 79 Temp 97.7 °F (36.5 °C) (Temporal)   Resp 16   Ht 5' 7\" (1.702 m)   Wt 160 lb (72.6 kg)   SpO2 98%   BMI 25.06 kg/m²     Constitutional - well developed, well nourished. No diaphoresis or acute distress. HENT - head normocephalic. Right external ear canal appears normal.  Left external ear canal appears normal.  Septum appears midline. Eyes - conjunctiva normal.  EOMS normal.  No exudate. No icterus. Neck- ROM appears normal, no tracheal deviation. Cardiovascular - Regular rate and rhythm. Heart sounds are normal.  No murmur, rub, or gallop. Carotid pulses are 2+ to palpation bilaterally without bruit. Extremities - Radial and ulnar pulses are 2+ to palpation bilaterally. Femoral pulses are palpable. DP and PT pulses are not palpable. He has BL swelling, left leg is > than the right. He has hyperkeratotic lichenified thickening noted pretibial areas both LE. Left is worse than the right. No cyanosis, clubbing. No signs atheroembolic event. Dressing left heel D/I. Wound medial nailfold on left 3rd toe. Pulmonary - effort appears normal.  No respiratory distress. Lungs - Breath sounds normal. No wheezes or rales. GI - Abdomen - soft, non tender, bowel sounds X 4 quadrants. No guarding or rebound tenderness. No distension or palpable mass. Genitourinary - deferred. Musculoskeletal - ROM appears normal.  No significant edema. Neurologic - alert and oriented X 3. Physiologic. Skin - warm, dry, and intact. No rash, erythema, or pallor. Psychiatric - mood, affect, and behavior appear normal.  Judgment and thought processes appear normal.    Risk factors for atherosclerosis of all vascular beds have been reviewed with the patient including:  Family history, tobacco abuse in all forms, elevated cholesterol, hyperlipidemia, and diabetes. CTA A/P - 8/21/2020 (reviewed by Dr. Julita Duran)  FINDINGS:    ABDOMINAL AND PELVIC VASCULATURE:    Descending thoracic aorta is normal in caliber. There is extensive    atheromatous calcification along the abdominal aorta. There is no    aneurysmal dilation of the abdominal aorta. Caliber measures up to 2    cm. Mild to moderate atheromatous narrowing along the infrarenal    abdominal aorta. Extensive atheromatous calcification along both both    iliac systems. There is a high-grade atheromatous narrowing at the    right common iliac artery although the common iliac artery appears    patent. The right internal iliac artery is patent, however, the right    external iliac artery is completely attenuated. A small amount of    contrast is seen within a small caliber right common femoral artery. High-grade atheromatous narrowing along several portions of the left    common iliac artery. The left internal iliac artery is severely    narrowed at its origin. Moderate to severe diffuse disease along the    left external iliac artery as well. The left common femoral artery    appears patent. No flow-limiting stenosis at the celiac or SMA    origins. Atheromatous calcification at both renal artery origins. ANJALI    is patent at its origin. RIGHT LOWER EXTREMITY VASCULATURE:    The superficial femoral artery is completely occluded. The profunda    femoris artery is narrow in caliber but patent. Popliteal artery is    reconstituted above the knee but is moderately narrowed with    atheromatous calcification throughout. The anterior tibial artery is    patent but diffusely narrow, enhancing down to the level of the ankle    joint. The tibial peroneal trunk is patent but diffusely narrow. The    peroneal artery is completely attenuated along its proximal third. The    posterior tibial artery is narrow in caliber but is visualized down to    the level of the ankle joint. LEFT LOWER EXTREMITY VASCULATURE:    Superficial femoral and profunda femoris arteries are patent but    moderately narrow with diffuse atheromatous disease throughout.  The    superficial femoral artery is near completely attenuated near the    adductor hiatus, with severely narrowed popliteal artery above the    level of the knee. Additional moderate narrowing of the    behind-the-knee and below-the-knee segments of the popliteal artery. Anterior tibial artery is patent and fairly normal in caliber, seen    below the level of the ankle. The tibial peroneal trunk is patent but    diffusely narrow. The peroneal artery is completely attenuated in its    proximal third. Contrast is seen in the posterior tibial artery into    the distal third of the calf and perhaps faintly visible at the ankle. OTHER FINDINGS:    Lung bases are clear. Advanced coronary atheromatous calcification. There are bilateral renal cortical scars. Prostatomegaly. Spinal    scoliotic curvature. Severe degenerative change at the right hip joint    with large geodes in the acetabulum and femoral head. Remodeling of    the right acetabulum with high riding femoral head.         Impression    1. Advanced atheromatous calcification along the abdominal aorta as    well as throughout the bilateral iliac systems and bilateral lower    extremities. The abdominal aorta is normal in caliber, with mild to    moderate atheromatous narrowing along its infrarenal portion. 2. High-grade atheromatous narrowing at the origin of the right common    iliac artery with complete atheromatous occlusion of the right    external iliac artery. 3. Multifocal high-grade atheromatous narrowings along the left common    and left external iliac arteries. 4. Complete occlusion of the right superficial femoral artery with    reconstitution of the popliteal artery, which is diffusely narrowed. Two-vessel runoff to the right ankle, with the peroneal artery being    completely attenuated along its proximal third. 5. Multifocal high-grade narrowings along the left superficial femoral    and popliteal arteries although these vessels remain patent.  There appears to be a two-vessel runoff to the left ankle although the left    posterior tibial artery is quite faint at the ankle. The left peroneal    artery is occluded along its proximal third. 6. Advanced coronary atheromatous calcification. Signed by Dr Mary Kate Vazquez on 8/21/2020 11:09 AM          Assessment      1. Athersclerosis of bilateral LE native arteries with wound left heel      Plan      Recommend he continue ASA and Pletal daily  Strongly encourage statin therapy  Good skin care/checks daily  Recommend no smoking  We will have the patient see Cardiology for cardiac risk stratification prior to surgery  Options have been discussed by Dr. Julita Duran with the patient including continued medical management vs. proceeding with Placement of VBX Aortic and kissing iliac stents; bilateral CFA endarterectomies with bilateral profundaplasty vs ABFB. Risks have been discussed with the patient including but not limited to MI, death, CVA, bleed, nerve injury, and infection. After he has been evaluated by Cardiology, we will plan to proceed with surgery      Addendum:   Patient called to inform me that he was willing to proceed with the Placement of VBX Aortic and kissing iliac stents; bilateral CFA endarterectomies with bilateral profundaplasty. But at this time he did not want to have the ABFB. I informed Dr. Julita Duran of the patient's wishes. Addendum: 8/12/2020  Patient report that he was exposed the Covid at work. His Surgery was cancelled due to this and will be rescheduled at a later date. Office note from Dr. Eamon Tucker (Cardiology,scanned into media) was reviewed by Dr. Julita Duran and patient is okay to proceed with planned surgery.        Proceed with Placement of VBX Aortic and kissing iliac stents; bilateral CFA endarterectomies with bilateral profundaplasty

## 2020-07-22 NOTE — TELEPHONE ENCOUNTER
Left message for Matthias Henry at [facility] (Dr. Tino Dawkins), informed that Dr. Michele Dunlap is needing to schedule patient for surgery ([id number] bypass) and we need cardiac clearance prior to surgery. Patient states that he is scheduled for an appointment tomorrow with Dr. Tino Dawkins. Please give our office a call. We will be happy to fax this request.    7/22/20 2:50pm Request faxed to Dr. Tino Dawkins at [phone].

## 2020-07-22 NOTE — TELEPHONE ENCOUNTER
Ayla called from Dr. Esteban's (?) office stating that pt needed surgical clearance.  She can be reached at [phone].  Pt has appt with NP's tomorrow and she has sent a fax over for clearance.  Vasile Preciado, CMA

## 2020-07-23 ENCOUNTER — TELEPHONE (OUTPATIENT)
Dept: UROLOGY | Facility: CLINIC | Age: 75
End: 2020-07-23

## 2020-07-23 ENCOUNTER — TELEPHONE (OUTPATIENT)
Dept: CARDIOLOGY | Facility: CLINIC | Age: 75
End: 2020-07-23

## 2020-07-23 ENCOUNTER — OFFICE VISIT (OUTPATIENT)
Dept: CARDIOLOGY | Facility: CLINIC | Age: 75
End: 2020-07-23

## 2020-07-23 VITALS
OXYGEN SATURATION: 99 % | BODY MASS INDEX: 24.8 KG/M2 | HEIGHT: 67 IN | HEART RATE: 65 BPM | WEIGHT: 158 LBS | SYSTOLIC BLOOD PRESSURE: 150 MMHG | DIASTOLIC BLOOD PRESSURE: 62 MMHG

## 2020-07-23 DIAGNOSIS — N40.0 BPH WITHOUT OBSTRUCTION/LOWER URINARY TRACT SYMPTOMS: ICD-10-CM

## 2020-07-23 DIAGNOSIS — I48.91 ATRIAL FIBRILLATION AND FLUTTER (HCC): Primary | ICD-10-CM

## 2020-07-23 DIAGNOSIS — I48.92 ATRIAL FIBRILLATION AND FLUTTER (HCC): Primary | ICD-10-CM

## 2020-07-23 DIAGNOSIS — R93.41 ABNORMAL CT SCAN, BLADDER: ICD-10-CM

## 2020-07-23 PROCEDURE — 99214 OFFICE O/P EST MOD 30 MIN: CPT | Performed by: NURSE PRACTITIONER

## 2020-07-23 RX ORDER — FINASTERIDE 5 MG/1
5 TABLET, FILM COATED ORAL DAILY
Qty: 30 TABLET | Refills: 5 | Status: SHIPPED | OUTPATIENT
Start: 2020-07-23 | End: 2021-03-25

## 2020-07-23 NOTE — PROGRESS NOTES
Subjective:     Encounter Date:07/23/2020      Patient ID: Kingsley Lerma is a 74 y.o. male.    Chief Complaint: follow up paroxysmal atrial fibrillation and flutter     The patient is followed for his paroxysmal atrial fibrillation and flutter, which was originally diagnosed 2-2020 (anticoagulation was initiated at that time). He was hospitalized following a syncopal spell in May and was in atrial flutter (he was otherwise asymptomatic with this). He was cardioverted that admission on 5/6, without a change in symptoms when NSR was restored. He was discharged home on 5/7 on amiodarone (rhythmol was stopped at that time). He returned to ER 5/9 because he found his heart rate to be 130s-140s and he experienced a brief dizzy spell. He was cardioverted for atrial flutter 2:1 with a HR in the 140s. NSR was restored only for a few seconds. His rhythm then proceed to bounce around from atrial flutter to atrial fibrillation to NSR with various heart rates (70s-140s). He denied any symptoms regardless of what rhythm he was in. He was discharged home with the addition of diltiazem for better rate control when in AF/AFL, and amio and Xarelto were continued. Also of note, his Pletal dose was decreased by half due to interaction with diltiazem.   The patient also has a history of alcohol abuse. He tells me he believes the only way he really knew he was in AF or AFL previously was by checking his pulse rate with a pulse oximeter.     At his last appointment with me on May 21, he was maintaining normal sinus rhythm.  His Xarelto, diltiazem, and amiodarone were continued at that time.    Since his last office visit here, he has had 3 hospitalizations for GI bleeding.  The initial hospitalization was around June 8th.  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 EGDs were unremarkable.  He did not require blood transfusion that admission.  He was discharged home on his same medical therapy, including Xarelto for anticoagulation  "and Pletal for claudication.  He returned on June 13 with recurrent GI bleeding, and hemoglobin in the range of 6-7.  He required 3 units of packed red blood cells that admission.  At that point time his Xarelto was discontinued and this was replaced with aspirin 81 mg daily.  His Pletal was continued.  He was discharged on June 18 and returned again on June 21 with recurrent GI bleeding.  However, during that admission his hemoglobin and hematocrit remained stable and he did not require transfusion.  He believes the bright red blood per rectum that admission may have been due to straining with constipation and the Xarelto \"still being in his system\", although he had not taken any since he was discharged 6/18.  Again, he was discharged home on aspirin and Pletal and no anticoagulation.  He denies any further signs of bleeding.    Today, his only complaint is left lower extremity and foot pain.  This is felt to be due to his vascular disease.  He also has a left foot wound followed by podiatry.  He has established care with [facility] vascular surgery for his peripheral vascular disease.  Their notes indicate the plan is for medical management versus ABFB versus bilateral CFA endarterectomies.  They are requesting preoperative cardiac risk assessment.    He denies chest pain, shortness of breath, orthopnea, PND, palpitations, syncope or presyncope.  He reports some chronic lower extremity edema, which is controlled by wrapping his lower extremities.  He reports he has done this chronically.      The following portions of the patient's history were reviewed and updated as appropriate: allergies, current medications, past family history, past medical history, past social history, past surgical history and problem list.  /62   Pulse 65   Ht 170.2 cm (67\")   Wt 71.7 kg (158 lb)   SpO2 99%   BMI 24.75 kg/m²   No Known Allergies    Current Outpatient Medications:   •  amiodarone (PACERONE) 200 MG tablet, Take " 1 tablet by mouth Daily., Disp: 6 tablet, Rfl: 1  •  aspirin 81 MG EC tablet, Take 1 tablet by mouth Daily., Disp: 30 tablet, Rfl: 11  •  Cholecalciferol (VITAMIN D) 50 MCG (2000 UT) tablet, Take 2,000 Units by mouth Daily., Disp: , Rfl:   •  cilostazol (PLETAL) 50 MG tablet, Take 1 tablet by mouth 2 (Two) Times a Day., Disp: 180 tablet, Rfl: 0  •  dilTIAZem CD (CARDIZEM CD) 120 MG 24 hr capsule, Take 1 capsule by mouth Daily., Disp: 30 capsule, Rfl: 11  •  famotidine (PEPCID) 20 MG tablet, Take 20 mg by mouth As Needed., Disp: , Rfl:   •  ferrous sulfate 325 (65 FE) MG tablet, Take 1 tablet by mouth Daily With Breakfast., Disp: 60 tablet, Rfl: 0  •  finasteride (PROSCAR) 5 MG tablet, Take 1 tablet by mouth Daily., Disp: 30 tablet, Rfl: 5  •  guaiFENesin (MUCINEX) 600 MG 12 hr tablet, Take 600 mg by mouth Daily., Disp: , Rfl:   •  hydrOXYzine (ATARAX) 25 MG tablet, Take 1 tablet by mouth 3 (Three) Times a Day As Needed for Anxiety., Disp: 60 tablet, Rfl: 5  •  lidocaine (LMX) 4 % cream, Apply  topically to the appropriate area as directed As Needed for Mild Pain . Apply to left heel., Disp: , Rfl:   •  lisinopril (PRINIVIL,ZESTRIL) 40 MG tablet, Take 20 mg by mouth Daily., Disp: , Rfl:   •  pantoprazole (PROTONIX) 20 MG EC tablet, Take 20 mg by mouth Daily., Disp: , Rfl:   Past Medical History:   Diagnosis Date   • Alcohol abuse    • Arthritis    • Atrial fibrillation (CMS/HCC)    • Atrial flutter (CMS/HCC)    • Circulation problem    • History of tobacco abuse    • Hypertension    • PVD (peripheral vascular disease) (CMS/HCC)      Past Surgical History:   Procedure Laterality Date   • ADENOIDECTOMY     • COLONOSCOPY N/A 6/8/2020    Procedure: COLONOSCOPY WITH ANESTHESIA;  Surgeon: Mayela Ching MD;  Location: [facility] ENDOSCOPY;  Service: Gastroenterology;  Laterality: N/A;  preop; GI bleed   postop; avm's   PCP rachel calderón    • ENDOSCOPY N/A 6/8/2020    Procedure: ESOPHAGOGASTRODUODENOSCOPY WITH ANESTHESIA;   Surgeon: Mayela Ching MD;  Location: [facility] ENDOSCOPY;  Service: Gastroenterology;  Laterality: N/A;  proep; GI BLeed   postop; normal   PCP rachel calderón    • ENDOSCOPY N/A 6/15/2020    Procedure: ESOPHAGOGASTRODUODENOSCOPY WITH ANESTHESIA;  Surgeon: Carmine Banuelos MD;  Location: [facility] ENDOSCOPY;  Service: Gastroenterology;  Laterality: N/A;  pre gi bleed, anemia  post normal  dr rachel calderón     Social History     Socioeconomic History   • Marital status: Single     Spouse name: Not on file   • Number of children: Not on file   • Years of education: Not on file   • Highest education level: Not on file   Tobacco Use   • Smoking status: Current Every Day Smoker     Packs/day: 0.50     Types: Cigarettes, Cigars   • Smokeless tobacco: Never Used   Substance and Sexual Activity   • Alcohol use: Yes     Alcohol/week: 2.0 standard drinks     Types: 2 Glasses of wine per week     Frequency: Never   • Drug use: Never   • Sexual activity: Defer     Family History   Problem Relation Age of Onset   • Dementia Mother    • Cancer Father    • Pancreatic cancer Father        Review of Systems   Constitution: Negative for chills, diaphoresis and fever.   HENT: Negative for nosebleeds.    Eyes: Negative for visual disturbance.   Cardiovascular: Negative for chest pain, claudication, cyanosis, dyspnea on exertion, irregular heartbeat, leg swelling, near-syncope, orthopnea, palpitations, paroxysmal nocturnal dyspnea and syncope.   Respiratory: Negative for cough, hemoptysis, shortness of breath, sputum production and wheezing.    Hematologic/Lymphatic: Negative for bleeding problem.   Skin: Negative for color change and flushing.   Musculoskeletal: Negative for falls.        Left foot pain    Gastrointestinal: Negative for bloating, abdominal pain, hematemesis, hematochezia, melena, nausea and vomiting.   Genitourinary: Negative for hematuria.   Neurological: Negative for dizziness, light-headedness and weakness.    Psychiatric/Behavioral: Negative for altered mental status.         ECG 12 Lead  Date/Time: 7/28/2020 10:53 AM  Performed by: Belkys Carty APRN  Authorized by: Belkys Carty APRN   Comparison: compared with previous ECG from 7/23/2020  Similar to previous ECG  Rhythm: sinus rhythm  BPM: 65                 Objective:     Physical Exam   Constitutional: He is oriented to person, place, and time. He appears well-developed and well-nourished. No distress.   HENT:   Head: Normocephalic and atraumatic.   Eyes: Pupils are equal, round, and reactive to light.   Neck: Normal range of motion. Neck supple. No JVD present. No thyromegaly present.   Cardiovascular: Normal rate, regular rhythm, normal heart sounds and intact distal pulses. Exam reveals no gallop and no friction rub.   No murmur heard.  Pulmonary/Chest: Effort normal and breath sounds normal. No respiratory distress. He has no wheezes. He has no rales. He exhibits no tenderness.   Abdominal: Soft. Bowel sounds are normal. He exhibits no distension. There is no tenderness.   Musculoskeletal: Normal range of motion. He exhibits no edema.   Neurological: He is alert and oriented to person, place, and time. No cranial nerve deficit.   Skin: Skin is warm and dry. He is not diaphoretic.   Psychiatric: He has a normal mood and affect. His behavior is normal.     2/2020 echo reviewed :  · Left atrial cavity size is borderline dilated.  · Left ventricular systolic function is normal.  · Hyperdynamic right ventricular systolic function noted.     RHYTHM IS ATRIAL FIBRILLATION  BORDERLINE BI-ATRIAL ENLARGEMENT  ALL THE LV AND RV WALLS CAN'T BE SEEN WELL BUT THERE APPEARS TO BE HYPERKINESIS OF BOTH VENTRICLES  NO SIGNIFICANT VALVULAR DYSFUNCTION  LVEF 70%       Assessment:          Diagnosis Plan   1. Atrial fibrillation and flutter (CMS/HCC)    See notes in HPI  Maintaining NSR per EKG today   Now on aspirin, no longer on anticoagulation due to recent GI bleeding.  On  amiodarone for rhythm control.  On diltiazem for rate control when in AF/AFL. It appears he has been asymptomatic to his rapid atrial arrhythmias in the past.      2. Essential hypertension    Elevated today.  He will monitor his blood pressure at home, and call here or PCP office with persistent elevations.  If this is persistently elevated, will consider increasing lisinopril dose.     3. PVD (peripheral vascular disease) (CMS/formerly [facility])    He is now followed by vascular surgery at [facility]. He is being considered for medical management vs ABFB vs CFA endarterectomy in the near future.   He reports associated left food wound and pain.      4. Alcoholism /alcohol abuse (CMS/formerly [facility])    We discussed alcohol consumption and how it relates to atrial arrhthymias. He reports he has cut back but still drinks regularly. Cessation encouraged.     5. Tobacco use  Kingsley Lerma  reports that he has been smoking cigarettes and cigars. He has been smoking about 0.50 packs per day. He has never used smokeless tobacco.. I have educated him on the risk of diseases from using tobacco products such as cancer, COPD and heart diease.     I advised him to quit and he is not willing to quit.    I spent 4 minutes counseling the patient.          6. GI bleeding -felt to be secondary to colonic AVMs.  See notes in HPI. Now on aspirin, and no longer anticoagulated.      Plan:       From a revised cardiac risk index standpoint, the patient is low risk for major cardiac event with vascular surgery.  This is primarily because he has no known ischemic heart disease, congestive heart failure, cerebrovascular disease, insulin-dependent diabetes, or creatinine greater than 2.  While the risk is low, it is not 0.  However, it is nonmodifiable because he has no signs or symptoms suggestive of an active cardiac condition.  He does have paroxysmal atrial fibrillation and flutter.  However, he is maintaining normal sinus rhythm on amiodarone.  Therefore, would  proceed with vascular surgery if this is felt to be warranted, without further delay from a cardiac standpoint.  Of note, due to his recent GI bleeding he may be considered for watchman device placement in the future.  As this will require aspirin in addition to warfarin for at least a 45-day period following device placement, it would be ideal if the patient could be on aspirin alone postoperatively following vascular sugery (rather than DAPT or ASA and pletal), if this is felt to be reasonable from a vascular standpoint.  CHADSVASC is 3.  Due to his history of paroxysmal atrial fibrillation flutter and his GI bleeding, which makes him a poor candidate for long-term anticoagulation, Watchman device placement may be considered.  After recovery from his upcoming vascular surgery, we will plan to set him up for a HATTIE to further evaluate his candidacy for the procedure.   For now, continue current medical therapy as listed above.  We will tentatively schedule follow-up for 1 to 3 months, sooner with cardiac symptoms or concerns.

## 2020-07-23 NOTE — TELEPHONE ENCOUNTER
----- Message from TAJ Rushing sent at 7/23/2020  2:48 PM CDT -----  Please notify the patient I discussed his case with Dr. Farias and sent our notes to vascular surgery at [facility].  Okay to have vascular surgery from a cardiology standpoint.  After he recovers from vascular surgery (if they decide to do it), we will discuss the next steps for possible Watchman placement. Thanks!

## 2020-07-23 NOTE — TELEPHONE ENCOUNTER
Dr. Gallo's patient     Patient is requesting the medication Finasteride 5 mg 1x daily [facility] in [place], [place].

## 2020-07-28 PROCEDURE — 93000 ELECTROCARDIOGRAM COMPLETE: CPT | Performed by: NURSE PRACTITIONER

## 2020-08-06 ENCOUNTER — TELEPHONE (OUTPATIENT)
Dept: VASCULAR SURGERY | Age: 75
End: 2020-08-06

## 2020-08-06 NOTE — TELEPHONE ENCOUNTER
I spoke with Chivo Glass and scheduled his surgery with Dr. Iggy Carballo for 8/17/20 arriving a [address] OP reg at 6:00am. I addressed medications/Instructions/Abd Prep (please see letter for inst.) I also set up his vein mapping for 8/7/20 at 3:00pm and pre-op Thurs 8/13/20 at 10:00am. Cassie longoria [facility] has been notified and will be here for the surgery and Dr. Antoinette Whitney will be there to assist. I made patient aware he will be admitted for 1-3 days after his surgery. I have mailed instructions to Chivo Glass. He will call with any questions or concerns.

## 2020-08-07 ENCOUNTER — HOSPITAL ENCOUNTER (OUTPATIENT)
Dept: VASCULAR LAB | Age: 75
Discharge: HOME | End: 2020-08-07
Payer: MEDICARE

## 2020-08-07 PROCEDURE — 93970 EXTREMITY STUDY: CPT

## 2020-08-11 ENCOUNTER — TELEPHONE (OUTPATIENT)
Dept: VASCULAR SURGERY | Age: 75
End: 2020-08-11

## 2020-08-11 NOTE — TELEPHONE ENCOUNTER
Patient called and left a voice mail that he had a question in regards to his procedure. I called patient back, he wanted to confirm where to go for his pre-op on 8/13/20. He will need to register at the [facility] on [address]. 8/13/20 at 10:00am for pre-op. To ensure the health and safety of our patients and staff, [facility] has implemented visitor restrictions. Only one person will be allowed to accompany you for your procedure. If you or your visitor are exhibiting signs & symptoms of illness such as fever, cough, sore throat or body aches, we ask that you reschedule your procedure to a later date after your symptoms have been resolved. New policy requires that anyone who comes into the hospital will be required to wear a face mask. A cloth mask is acceptable.

## 2020-08-12 ENCOUNTER — TELEPHONE (OUTPATIENT)
Dept: VASCULAR SURGERY | Age: 75
End: 2020-08-12

## 2020-08-12 RX ORDER — TRAMADOL HYDROCHLORIDE 50 MG/1
50 TABLET ORAL EVERY 6 HOURS PRN
Qty: 40 TABLET | Refills: 0 | OUTPATIENT
Start: 2020-08-12 | End: 2020-08-19

## 2020-08-19 ENCOUNTER — TELEPHONE (OUTPATIENT)
Dept: VASCULAR SURGERY | Age: 75
End: 2020-08-19

## 2020-08-19 NOTE — TELEPHONE ENCOUNTER
Spoke with patient to inform him that we need for him to get MRSA nasal culture when he comes in for his CTA. If it is positive he will have to be treated 5 days prior to surgery so we need to have it done to allow sufficient time for treatment if necessary prior to rescheduling his surgery. He states that he rescheduled his CTA for Fri. 8/21/2/0 and will also go for the MRSA swab . He will need to go to the [facility] for the MRSA swab. Patient also states that he did not have to quarantine because he was tested for COVID through his work that was negative.

## 2020-08-21 ENCOUNTER — HOSPITAL ENCOUNTER (OUTPATIENT)
Dept: CT IMAGING | Age: 75
Discharge: HOME | End: 2020-08-21
Payer: OTHER GOVERNMENT

## 2020-08-21 DIAGNOSIS — Z01.818 PRE-OP TESTING: ICD-10-CM

## 2020-08-21 LAB
ANION GAP SERPL CALCULATED.3IONS-SCNC: 10 MMOL/L (ref 7–19)
BUN BLDV-MCNC: 13 MG/DL (ref 8–23)
CALCIUM SERPL-MCNC: 9.5 MG/DL (ref 8.8–10.2)
CHLORIDE BLD-SCNC: 107 MMOL/L (ref 98–111)
CO2: 27 MMOL/L (ref 22–29)
CREAT SERPL-MCNC: 0.7 MG/DL (ref 0.5–1.2)
GFR AFRICAN AMERICAN: >59
GFR AFRICAN AMERICAN: >60
GFR NON-AFRICAN AMERICAN: >60
GFR NON-AFRICAN AMERICAN: >60
GLUCOSE BLD-MCNC: 84 MG/DL (ref 74–109)
HCT VFR BLD CALC: 47.6 % (ref 42–52)
HEMOGLOBIN: 15.4 G/DL (ref 14–18)
INR BLD: 1.03 (ref 0.88–1.18)
MCH RBC QN AUTO: 32.1 PG (ref 27–31)
MCHC RBC AUTO-ENTMCNC: 32.4 G/DL (ref 33–37)
MCV RBC AUTO: 99.2 FL (ref 80–94)
PDW BLD-RTO: 15 % (ref 11.5–14.5)
PERFORMED ON: NORMAL
PLATELET # BLD: 266 K/UL (ref 130–400)
PMV BLD AUTO: 8.9 FL (ref 9.4–12.4)
POC CREATININE: 0.8 MG/DL (ref 0.3–1.3)
POC SAMPLE TYPE: NORMAL
POTASSIUM SERPL-SCNC: 4.2 MMOL/L (ref 3.5–5)
PROTHROMBIN TIME: 13.4 SEC (ref 12–14.6)
RBC # BLD: 4.8 M/UL (ref 4.7–6.1)
SODIUM BLD-SCNC: 144 MMOL/L (ref 136–145)
WBC # BLD: 7.9 K/UL (ref 4.8–10.8)

## 2020-08-21 PROCEDURE — 75635 CT ANGIO ABDOMINAL ARTERIES: CPT

## 2020-08-21 PROCEDURE — 82565 ASSAY OF CREATININE: CPT

## 2020-08-21 PROCEDURE — 6360000004 HC RX CONTRAST MEDICATION: Performed by: PHYSICIAN ASSISTANT

## 2020-08-21 RX ADMIN — IOPAMIDOL 150 ML: 755 INJECTION, SOLUTION INTRAVENOUS at 10:52

## 2020-08-22 LAB — MRSA CULTURE ONLY: NORMAL

## 2020-08-24 ENCOUNTER — TELEPHONE (OUTPATIENT)
Dept: VASCULAR SURGERY | Age: 75
End: 2020-08-24

## 2020-08-27 ENCOUNTER — HOSPITAL ENCOUNTER (OUTPATIENT)
Dept: GENERAL RADIOLOGY | Age: 75
Discharge: HOME | End: 2020-08-27
Payer: OTHER GOVERNMENT

## 2020-08-27 ENCOUNTER — TELEPHONE (OUTPATIENT)
Dept: VASCULAR SURGERY | Age: 75
End: 2020-08-27

## 2020-08-27 ENCOUNTER — HOSPITAL ENCOUNTER (OUTPATIENT)
Dept: PREADMISSION TESTING | Age: 75
Discharge: HOME | End: 2020-08-31
Payer: OTHER GOVERNMENT

## 2020-08-27 VITALS — HEIGHT: 68 IN | BODY MASS INDEX: 24.25 KG/M2 | WEIGHT: 160 LBS

## 2020-08-27 LAB
ABO/RH: NORMAL
ANTIBODY SCREEN: NORMAL

## 2020-08-27 PROCEDURE — 86850 RBC ANTIBODY SCREEN: CPT

## 2020-08-27 PROCEDURE — 86900 BLOOD TYPING SEROLOGIC ABO: CPT

## 2020-08-27 PROCEDURE — 71046 X-RAY EXAM CHEST 2 VIEWS: CPT

## 2020-08-27 PROCEDURE — 86901 BLOOD TYPING SEROLOGIC RH(D): CPT

## 2020-08-27 PROCEDURE — U0003 INFECTIOUS AGENT DETECTION BY NUCLEIC ACID (DNA OR RNA); SEVERE ACUTE RESPIRATORY SYNDROME CORONAVIRUS 2 (SARS-COV-2) (CORONAVIRUS DISEASE [COVID-19]), AMPLIFIED PROBE TECHNIQUE, MAKING USE OF HIGH THROUGHPUT TECHNOLOGIES AS DESCRIBED BY CMS-2020-01-R: HCPCS

## 2020-08-27 PROCEDURE — 93005 ELECTROCARDIOGRAM TRACING: CPT

## 2020-08-27 NOTE — TELEPHONE ENCOUNTER
Annalise Zavala with [facility]ad[facility] sent a message that patient came in today for his pre-op and wanted to know if he needed to hold his Pletal prior to surgery. Informed her that I would call and talk with patient. Spoke with patient, informed him that he does not need to hold Pletal (cilostazol) prior to surgery. He did not have any other questions.

## 2020-08-28 ENCOUNTER — PREP FOR PROCEDURE (OUTPATIENT)
Dept: VASCULAR SURGERY | Age: 75
End: 2020-08-28

## 2020-08-28 LAB
EKG P AXIS: 62 DEGREES
EKG P-R INTERVAL: 152 MS
EKG Q-T INTERVAL: 436 MS
EKG QRS DURATION: 74 MS
EKG QTC CALCULATION (BAZETT): 455 MS
EKG T AXIS: 55 DEGREES

## 2020-08-28 PROCEDURE — 93010 ELECTROCARDIOGRAM REPORT: CPT | Performed by: INTERNAL MEDICINE

## 2020-08-28 RX ORDER — ASPIRIN 81 MG/1
81 TABLET ORAL ONCE
Status: CANCELLED | OUTPATIENT
Start: 2020-08-28 | End: 2020-08-28

## 2020-08-28 RX ORDER — SODIUM CHLORIDE 0.9 % (FLUSH) 0.9 %
10 SYRINGE (ML) INJECTION EVERY 12 HOURS SCHEDULED
Status: CANCELLED | OUTPATIENT
Start: 2020-08-28

## 2020-08-28 RX ORDER — SODIUM CHLORIDE 0.9 % (FLUSH) 0.9 %
10 SYRINGE (ML) INJECTION PRN
Status: CANCELLED | OUTPATIENT
Start: 2020-08-28

## 2020-08-28 NOTE — H&P
Patient Care Team:  Rahel Brewster as PCP - General      History and Physical    Mr. Lupe Kothari  is a 70 yo male who has a history of HTN, A-fib/A-flutter and tobacco abuse. He comes in today for evaluation of PVD with wound left heel. He is seeing Dr. Claudell Sheen at [address] [place] [address]. He had a left leg arterial duplex on 5/20/2020  that showed an increase velocity suggesting narrowing > than 50%; monophasic poststenotic flow is noted throughout the arteries LLE. His current medication include ASA and Pletal daily. He is having a hard time walking due to the pain left foot. He is a 1/2 PPD smoker. He underwent a A-gram on 7/14/2020 without intervention done at that time. He comes in today to discuss the results of the A-gram and possible options with Dr. Devora Lopez. Charlette Crenshaw is a 69 y.o. male with the following history reviewed and recorded in [facility]: There are no active problems to display for this patient.     Current Outpatient Medications   Medication Sig Dispense Refill    guaiFENesin (MUCINEX) 600 MG extended release tablet Take 600 mg by mouth 2 times daily      hydrOXYzine (ATARAX) 25 MG tablet Take 25 mg by mouth 3 times daily as needed      amiodarone (CORDARONE) 200 MG tablet Take 200 mg by mouth daily       cilostazol (PLETAL) 50 MG tablet Take 50 mg by mouth 2 times daily      dilTIAZem (CARDIZEM CD) 120 MG extended release capsule Take 120 mg by mouth daily      lidocaine (LMX) 4 % cream Apply topically as needed      lisinopril (PRINIVIL;ZESTRIL) 40 MG tablet Take 20 mg by mouth daily      pantoprazole (PROTONIX) 20 MG tablet Take 20 mg by mouth daily      vitamin D (CHOLECALCIFEROL) 50 MCG (2000 UT) TABS tablet Take 2,000 Units by mouth daily      aspirin 81 MG EC tablet Take 81 mg by mouth daily      ferrous sulfate (IRON 325) 325 (65 Fe) MG tablet Take 325 mg by mouth daily (with breakfast)      famotidine (PEPCID) 20 MG tablet Take 20 mg by mouth daily as needed       finasteride (PROSCAR) 5 MG tablet Take 5 mg by mouth daily       No current facility-administered medications for this visit. Allergies: Cortisone  Past Medical History:   Diagnosis Date    Afib ([address]     Anxiety     Arthritis     ED (erectile dysfunction)     History of blood transfusion     Hyperlipemia     Hypertension      Past Surgical History:   Procedure Laterality Date    ADENOIDECTOMY      ANKLE FRACTURE SURGERY       No family history on file. Social History     Tobacco Use    Smoking status: Current Every Day Smoker     Packs/day: 0.00     Types: Cigars    Smokeless tobacco: Never Used    Tobacco comment: occ cigar   Substance Use Topics    Alcohol use: Not on file       Review of Systems    Constitutional - no significant activity change, appetite change, or unexpected weight change. No fever or chills. No diaphoresis or significant fatigue. HENT - no significant rhinorrhea or epistaxis. No tinnitus or significant hearing loss. Eyes - no sudden vision change or amaurosis. Respiratory - no significant shortness of breath, wheezing, or stridor. No apnea, cough, or chest tightness associated with shortness of breath. Cardiovascular - no chest pain, syncope, or significant dizziness. No palpitations. He has BLE swelling, left leg > than the right. (see HPI)  Gastrointestinal - no abdominal swelling or pain. No blood in stool. No severe constipation, diarrhea, nausea, or vomiting. Genitourinary - No difficulty urinating, dysuria, frequency, or urgency. No flank pain or hematuria. Musculoskeletal - no back pain, gait disturbance, or myalgia. Skin - no color change, rash, pallor. Non healing wound left heel. Neurologic - no dizziness, facial asymmetry, or light headedness. No seizures. No speech difficulty or lateralizing weakness. Hematologic - no easy bruising or excessive bleeding. Psychiatric - no severe anxiety or nervousness. No confusion.   All other review of systems are negative. Physical Exam      Constitutional - well developed, well nourished. No diaphoresis or acute distress. HENT - head normocephalic. Right external ear canal appears normal.  Left external ear canal appears normal.  Septum appears midline. Eyes - conjunctiva normal.  EOMS normal.  No exudate. No icterus. Neck- ROM appears normal, no tracheal deviation. Cardiovascular - Regular rate and rhythm. Heart sounds are normal.  No murmur, rub, or gallop. Carotid pulses are 2+ to palpation bilaterally without bruit. Extremities - Radial and ulnar pulses are 2+ to palpation bilaterally. Femoral pulses are palpable. DP and PT pulses are not palpable. He has BL swelling, left leg is > than the right. He has hyperkeratotic lichenified thickening noted pretibial areas both LE. Left is worse than the right. No cyanosis, clubbing. No signs atheroembolic event. Dressing left heel D/I. Wound medial nailfold on left 3rd toe. Pulmonary - effort appears normal.  No respiratory distress. Lungs - Breath sounds normal. No wheezes or rales. GI - Abdomen - soft, non tender, bowel sounds X 4 quadrants. No guarding or rebound tenderness. No distension or palpable mass. Genitourinary - deferred. Musculoskeletal - ROM appears normal.  No significant edema. Neurologic - alert and oriented X 3. Physiologic. Skin - warm, dry, and intact. No rash, erythema, or pallor. Psychiatric - mood, affect, and behavior appear normal.  Judgment and thought processes appear normal.    Risk factors for atherosclerosis of all vascular beds have been reviewed with the patient including:  Family history, tobacco abuse in all forms, elevated cholesterol, hyperlipidemia, and diabetes. CTA A/P - 8/21/2020 (reviewed by Dr. Alvaro Orantes)  FINDINGS:    ABDOMINAL AND PELVIC VASCULATURE:    Descending thoracic aorta is normal in caliber. There is extensive    atheromatous calcification along the abdominal aorta.  There is no aneurysmal dilation of the abdominal aorta. Caliber measures up to 2    cm. Mild to moderate atheromatous narrowing along the infrarenal    abdominal aorta. Extensive atheromatous calcification along both both    iliac systems. There is a high-grade atheromatous narrowing at the    right common iliac artery although the common iliac artery appears    patent. The right internal iliac artery is patent, however, the right    external iliac artery is completely attenuated. A small amount of    contrast is seen within a small caliber right common femoral artery. High-grade atheromatous narrowing along several portions of the left    common iliac artery. The left internal iliac artery is severely    narrowed at its origin. Moderate to severe diffuse disease along the    left external iliac artery as well. The left common femoral artery    appears patent. No flow-limiting stenosis at the celiac or SMA    origins. Atheromatous calcification at both renal artery origins. ANJALI    is patent at its origin. RIGHT LOWER EXTREMITY VASCULATURE:    The superficial femoral artery is completely occluded. The profunda    femoris artery is narrow in caliber but patent. Popliteal artery is    reconstituted above the knee but is moderately narrowed with    atheromatous calcification throughout. The anterior tibial artery is    patent but diffusely narrow, enhancing down to the level of the ankle    joint. The tibial peroneal trunk is patent but diffusely narrow. The    peroneal artery is completely attenuated along its proximal third. The    posterior tibial artery is narrow in caliber but is visualized down to    the level of the ankle joint. LEFT LOWER EXTREMITY VASCULATURE:    Superficial femoral and profunda femoris arteries are patent but    moderately narrow with diffuse atheromatous disease throughout.  The    superficial femoral artery is near completely attenuated near the    adductor hiatus, with severely narrowed popliteal artery above the    level of the knee. Additional moderate narrowing of the    behind-the-knee and below-the-knee segments of the popliteal artery. Anterior tibial artery is patent and fairly normal in caliber, seen    below the level of the ankle. The tibial peroneal trunk is patent but    diffusely narrow. The peroneal artery is completely attenuated in its    proximal third. Contrast is seen in the posterior tibial artery into    the distal third of the calf and perhaps faintly visible at the ankle. OTHER FINDINGS:    Lung bases are clear. Advanced coronary atheromatous calcification. There are bilateral renal cortical scars. Prostatomegaly. Spinal    scoliotic curvature. Severe degenerative change at the right hip joint    with large geodes in the acetabulum and femoral head. Remodeling of    the right acetabulum with high riding femoral head.         Impression    1. Advanced atheromatous calcification along the abdominal aorta as    well as throughout the bilateral iliac systems and bilateral lower    extremities. The abdominal aorta is normal in caliber, with mild to    moderate atheromatous narrowing along its infrarenal portion. 2. High-grade atheromatous narrowing at the origin of the right common    iliac artery with complete atheromatous occlusion of the right    external iliac artery. 3. Multifocal high-grade atheromatous narrowings along the left common    and left external iliac arteries. 4. Complete occlusion of the right superficial femoral artery with    reconstitution of the popliteal artery, which is diffusely narrowed. Two-vessel runoff to the right ankle, with the peroneal artery being    completely attenuated along its proximal third. 5. Multifocal high-grade narrowings along the left superficial femoral    and popliteal arteries although these vessels remain patent.  There    appears to be a two-vessel runoff to the left ankle although the left    posterior

## 2020-08-28 NOTE — H&P (VIEW-ONLY)
Patient Care Team:  Varney Lombard as PCP - General      History and Physical    Mr. Lashonda Carter  is a 68 yo male who has a history of HTN, A-fib/A-flutter and tobacco abuse. He comes in today for evaluation of PVD with wound left heel. He is seeing Dr. Avila Mahajan at [address] [place] [address]. He had a left leg arterial duplex on 5/20/2020  that showed an increase velocity suggesting narrowing > than 50%; monophasic poststenotic flow is noted throughout the arteries LLE. His current medication include ASA and Pletal daily. He is having a hard time walking due to the pain left foot. He is a 1/2 PPD smoker. He underwent a A-gram on 7/14/2020 without intervention done at that time. He comes in today to discuss the results of the A-gram and possible options with Dr. Julita Duran. Debbie Obrien is a 69 y.o. male with the following history reviewed and recorded in [facility]: There are no active problems to display for this patient.     Current Outpatient Medications   Medication Sig Dispense Refill    guaiFENesin (MUCINEX) 600 MG extended release tablet Take 600 mg by mouth 2 times daily      hydrOXYzine (ATARAX) 25 MG tablet Take 25 mg by mouth 3 times daily as needed      amiodarone (CORDARONE) 200 MG tablet Take 200 mg by mouth daily       cilostazol (PLETAL) 50 MG tablet Take 50 mg by mouth 2 times daily      dilTIAZem (CARDIZEM CD) 120 MG extended release capsule Take 120 mg by mouth daily      lidocaine (LMX) 4 % cream Apply topically as needed      lisinopril (PRINIVIL;ZESTRIL) 40 MG tablet Take 20 mg by mouth daily      pantoprazole (PROTONIX) 20 MG tablet Take 20 mg by mouth daily      vitamin D (CHOLECALCIFEROL) 50 MCG (2000 UT) TABS tablet Take 2,000 Units by mouth daily      aspirin 81 MG EC tablet Take 81 mg by mouth daily      ferrous sulfate (IRON 325) 325 (65 Fe) MG tablet Take 325 mg by mouth daily (with breakfast)      famotidine (PEPCID) 20 MG tablet Take 20 mg by mouth daily as needed       finasteride (PROSCAR) 5 MG tablet Take 5 mg by mouth daily       No current facility-administered medications for this visit. Allergies: Cortisone  Past Medical History:   Diagnosis Date    Afib ([address]     Anxiety     Arthritis     ED (erectile dysfunction)     History of blood transfusion     Hyperlipemia     Hypertension      Past Surgical History:   Procedure Laterality Date    ADENOIDECTOMY      ANKLE FRACTURE SURGERY       No family history on file. Social History     Tobacco Use    Smoking status: Current Every Day Smoker     Packs/day: 0.00     Types: Cigars    Smokeless tobacco: Never Used    Tobacco comment: occ cigar   Substance Use Topics    Alcohol use: Not on file       Review of Systems    Constitutional - no significant activity change, appetite change, or unexpected weight change. No fever or chills. No diaphoresis or significant fatigue. HENT - no significant rhinorrhea or epistaxis. No tinnitus or significant hearing loss. Eyes - no sudden vision change or amaurosis. Respiratory - no significant shortness of breath, wheezing, or stridor. No apnea, cough, or chest tightness associated with shortness of breath. Cardiovascular - no chest pain, syncope, or significant dizziness. No palpitations. He has BLE swelling, left leg > than the right. (see HPI)  Gastrointestinal - no abdominal swelling or pain. No blood in stool. No severe constipation, diarrhea, nausea, or vomiting. Genitourinary - No difficulty urinating, dysuria, frequency, or urgency. No flank pain or hematuria. Musculoskeletal - no back pain, gait disturbance, or myalgia. Skin - no color change, rash, pallor. Non healing wound left heel. Neurologic - no dizziness, facial asymmetry, or light headedness. No seizures. No speech difficulty or lateralizing weakness. Hematologic - no easy bruising or excessive bleeding. Psychiatric - no severe anxiety or nervousness. No confusion.   All other review of systems are negative. Physical Exam      Constitutional - well developed, well nourished. No diaphoresis or acute distress. HENT - head normocephalic. Right external ear canal appears normal.  Left external ear canal appears normal.  Septum appears midline. Eyes - conjunctiva normal.  EOMS normal.  No exudate. No icterus. Neck- ROM appears normal, no tracheal deviation. Cardiovascular - Regular rate and rhythm. Heart sounds are normal.  No murmur, rub, or gallop. Carotid pulses are 2+ to palpation bilaterally without bruit. Extremities - Radial and ulnar pulses are 2+ to palpation bilaterally. Femoral pulses are palpable. DP and PT pulses are not palpable. He has BL swelling, left leg is > than the right. He has hyperkeratotic lichenified thickening noted pretibial areas both LE. Left is worse than the right. No cyanosis, clubbing. No signs atheroembolic event. Dressing left heel D/I. Wound medial nailfold on left 3rd toe. Pulmonary - effort appears normal.  No respiratory distress. Lungs - Breath sounds normal. No wheezes or rales. GI - Abdomen - soft, non tender, bowel sounds X 4 quadrants. No guarding or rebound tenderness. No distension or palpable mass. Genitourinary - deferred. Musculoskeletal - ROM appears normal.  No significant edema. Neurologic - alert and oriented X 3. Physiologic. Skin - warm, dry, and intact. No rash, erythema, or pallor. Psychiatric - mood, affect, and behavior appear normal.  Judgment and thought processes appear normal.    Risk factors for atherosclerosis of all vascular beds have been reviewed with the patient including:  Family history, tobacco abuse in all forms, elevated cholesterol, hyperlipidemia, and diabetes. CTA A/P - 8/21/2020 (reviewed by Dr. Samara Burroughs)  FINDINGS:    ABDOMINAL AND PELVIC VASCULATURE:    Descending thoracic aorta is normal in caliber. There is extensive    atheromatous calcification along the abdominal aorta.  There is no aneurysmal dilation of the abdominal aorta. Caliber measures up to 2    cm. Mild to moderate atheromatous narrowing along the infrarenal    abdominal aorta. Extensive atheromatous calcification along both both    iliac systems. There is a high-grade atheromatous narrowing at the    right common iliac artery although the common iliac artery appears    patent. The right internal iliac artery is patent, however, the right    external iliac artery is completely attenuated. A small amount of    contrast is seen within a small caliber right common femoral artery. High-grade atheromatous narrowing along several portions of the left    common iliac artery. The left internal iliac artery is severely    narrowed at its origin. Moderate to severe diffuse disease along the    left external iliac artery as well. The left common femoral artery    appears patent. No flow-limiting stenosis at the celiac or SMA    origins. Atheromatous calcification at both renal artery origins. ANJALI    is patent at its origin. RIGHT LOWER EXTREMITY VASCULATURE:    The superficial femoral artery is completely occluded. The profunda    femoris artery is narrow in caliber but patent. Popliteal artery is    reconstituted above the knee but is moderately narrowed with    atheromatous calcification throughout. The anterior tibial artery is    patent but diffusely narrow, enhancing down to the level of the ankle    joint. The tibial peroneal trunk is patent but diffusely narrow. The    peroneal artery is completely attenuated along its proximal third. The    posterior tibial artery is narrow in caliber but is visualized down to    the level of the ankle joint. LEFT LOWER EXTREMITY VASCULATURE:    Superficial femoral and profunda femoris arteries are patent but    moderately narrow with diffuse atheromatous disease throughout.  The    superficial femoral artery is near completely attenuated near the    adductor hiatus, with severely narrowed popliteal artery above the    level of the knee. Additional moderate narrowing of the    behind-the-knee and below-the-knee segments of the popliteal artery. Anterior tibial artery is patent and fairly normal in caliber, seen    below the level of the ankle. The tibial peroneal trunk is patent but    diffusely narrow. The peroneal artery is completely attenuated in its    proximal third. Contrast is seen in the posterior tibial artery into    the distal third of the calf and perhaps faintly visible at the ankle. OTHER FINDINGS:    Lung bases are clear. Advanced coronary atheromatous calcification. There are bilateral renal cortical scars. Prostatomegaly. Spinal    scoliotic curvature. Severe degenerative change at the right hip joint    with large geodes in the acetabulum and femoral head. Remodeling of    the right acetabulum with high riding femoral head.         Impression    1. Advanced atheromatous calcification along the abdominal aorta as    well as throughout the bilateral iliac systems and bilateral lower    extremities. The abdominal aorta is normal in caliber, with mild to    moderate atheromatous narrowing along its infrarenal portion. 2. High-grade atheromatous narrowing at the origin of the right common    iliac artery with complete atheromatous occlusion of the right    external iliac artery. 3. Multifocal high-grade atheromatous narrowings along the left common    and left external iliac arteries. 4. Complete occlusion of the right superficial femoral artery with    reconstitution of the popliteal artery, which is diffusely narrowed. Two-vessel runoff to the right ankle, with the peroneal artery being    completely attenuated along its proximal third. 5. Multifocal high-grade narrowings along the left superficial femoral    and popliteal arteries although these vessels remain patent.  There    appears to be a two-vessel runoff to the left ankle although the left    posterior tibial artery is quite faint at the ankle. The left peroneal    artery is occluded along its proximal third. 6. Advanced coronary atheromatous calcification. Signed by Dr Nickie Stuart on 8/21/2020 11:09 AM          Assessment      1. Athersclerosis of bilateral LE native arteries with wound left heel      Plan      Recommend he continue ASA and Pletal daily  Strongly encourage statin therapy  Good skin care/checks daily  Recommend no smoking  We will have the patient see Cardiology for cardiac risk stratification prior to surgery  Options have been discussed by Dr. Mihai Klein with the patient including continued medical management vs. proceeding with Placement of VBX Aortic and kissing iliac stents; bilateral CFA endarterectomies with bilateral profundaplasty vs ABFB. Risks have been discussed with the patient including but not limited to MI, death, CVA, bleed, nerve injury, and infection. After he has been evaluated by Cardiology, we will plan to proceed with surgery      Addendum:   Patient called to inform me that he was willing to proceed with the Placement of VBX Aortic and kissing iliac stents; bilateral CFA endarterectomies with bilateral profundaplasty. But at this time he did not want to have the ABFB. I informed Dr. Mihai Klein of the patient's wishes. Addendum: 8/12/2020  Patient report that he was exposed the Covid at work. His Surgery was cancelled due to this and will be rescheduled at a later date. Office note from Dr. Alexandro De Oliveira (Cardiology,scanned into media) was reviewed by Dr. Mihai Klein and patient is okay to proceed with planned surgery.        Proceed with Placement of VBX Aortic stent and bilateral kissing iliac stents; bilateral CFA endarterectomies with vein patch

## 2020-08-29 LAB
REPORT: NORMAL
SARS-COV-2: NOT DETECTED
THIS TEST SENT TO: NORMAL

## 2020-08-31 ENCOUNTER — APPOINTMENT (OUTPATIENT)
Dept: INTERVENTIONAL RADIOLOGY/VASCULAR | Age: 75
DRG: 253 | End: 2020-08-31
Attending: SURGERY
Payer: OTHER GOVERNMENT

## 2020-08-31 ENCOUNTER — ANESTHESIA EVENT (OUTPATIENT)
Dept: OPERATING ROOM | Age: 75
DRG: 253 | End: 2020-08-31
Payer: OTHER GOVERNMENT

## 2020-08-31 ENCOUNTER — HOSPITAL ENCOUNTER (INPATIENT)
Age: 75
LOS: 3 days | Discharge: HOME HEALTH | DRG: 253 | End: 2020-09-03
Attending: SURGERY | Admitting: SURGERY
Payer: OTHER GOVERNMENT

## 2020-08-31 ENCOUNTER — ANESTHESIA (OUTPATIENT)
Dept: OPERATING ROOM | Age: 75
DRG: 253 | End: 2020-08-31
Payer: OTHER GOVERNMENT

## 2020-08-31 VITALS
OXYGEN SATURATION: 100 % | RESPIRATION RATE: 4 BRPM | DIASTOLIC BLOOD PRESSURE: 62 MMHG | SYSTOLIC BLOOD PRESSURE: 135 MMHG | TEMPERATURE: 95.7 F

## 2020-08-31 PROBLEM — I74.09 AORTOILIAC OCCLUSIVE DISEASE (HCC): Status: ACTIVE | Noted: 2020-08-31

## 2020-08-31 LAB
ABO/RH: NORMAL
ANTIBODY SCREEN: NORMAL
APTT: >200 SEC (ref 26–36.2)
HCT VFR BLD CALC: 35.5 % (ref 42–52)
HEMOGLOBIN: 11.5 G/DL (ref 14–18)
MCH RBC QN AUTO: 31.9 PG (ref 27–31)
MCHC RBC AUTO-ENTMCNC: 32.4 G/DL (ref 33–37)
MCV RBC AUTO: 98.3 FL (ref 80–94)
PDW BLD-RTO: 14.6 % (ref 11.5–14.5)
PLATELET # BLD: 215 K/UL (ref 130–400)
PMV BLD AUTO: 8.9 FL (ref 9.4–12.4)
RBC # BLD: 3.61 M/UL (ref 4.7–6.1)
WBC # BLD: 10.5 K/UL (ref 4.8–10.8)

## 2020-08-31 PROCEDURE — 86900 BLOOD TYPING SEROLOGIC ABO: CPT

## 2020-08-31 PROCEDURE — P9045 ALBUMIN (HUMAN), 5%, 250 ML: HCPCS | Performed by: NURSE ANESTHETIST, CERTIFIED REGISTERED

## 2020-08-31 PROCEDURE — 3700000000 HC ANESTHESIA ATTENDED CARE: Performed by: SURGERY

## 2020-08-31 PROCEDURE — 04703FZ DILATION OF ABDOMINAL AORTA WITH THREE INTRALUMINAL DEVICES, PERCUTANEOUS APPROACH: ICD-10-PCS | Performed by: SURGERY

## 2020-08-31 PROCEDURE — 3700000001 HC ADD 15 MINUTES (ANESTHESIA): Performed by: SURGERY

## 2020-08-31 PROCEDURE — 2709999900 HC NON-CHARGEABLE SUPPLY: Performed by: SURGERY

## 2020-08-31 PROCEDURE — 37223 PR REVSC OPN/PRQ ILIAC ART W/STNT & ANGIOP IPSILATL: CPT | Performed by: SURGERY

## 2020-08-31 PROCEDURE — 86850 RBC ANTIBODY SCREEN: CPT

## 2020-08-31 PROCEDURE — 88311 DECALCIFY TISSUE: CPT

## 2020-08-31 PROCEDURE — 85730 THROMBOPLASTIN TIME PARTIAL: CPT

## 2020-08-31 PROCEDURE — 047H3DZ DILATION OF RIGHT EXTERNAL ILIAC ARTERY WITH INTRALUMINAL DEVICE, PERCUTANEOUS APPROACH: ICD-10-PCS | Performed by: SURGERY

## 2020-08-31 PROCEDURE — C1887 CATHETER, GUIDING: HCPCS | Performed by: SURGERY

## 2020-08-31 PROCEDURE — 047E3DZ DILATION OF RIGHT INTERNAL ILIAC ARTERY WITH INTRALUMINAL DEVICE, PERCUTANEOUS APPROACH: ICD-10-PCS | Performed by: SURGERY

## 2020-08-31 PROCEDURE — C1874 STENT, COATED/COV W/DEL SYS: HCPCS | Performed by: SURGERY

## 2020-08-31 PROCEDURE — 88304 TISSUE EXAM BY PATHOLOGIST: CPT

## 2020-08-31 PROCEDURE — 35371 RECHANNELING OF ARTERY: CPT | Performed by: SURGERY

## 2020-08-31 PROCEDURE — 6360000002 HC RX W HCPCS: Performed by: NURSE ANESTHETIST, CERTIFIED REGISTERED

## 2020-08-31 PROCEDURE — 6360000002 HC RX W HCPCS: Performed by: SURGERY

## 2020-08-31 PROCEDURE — C1773 RET DEV, INSERTABLE: HCPCS | Performed by: SURGERY

## 2020-08-31 PROCEDURE — C1725 CATH, TRANSLUMIN NON-LASER: HCPCS | Performed by: SURGERY

## 2020-08-31 PROCEDURE — 6370000000 HC RX 637 (ALT 250 FOR IP): Performed by: SURGERY

## 2020-08-31 PROCEDURE — 37236 OPEN/PERQ PLACE STENT 1ST: CPT | Performed by: SURGERY

## 2020-08-31 PROCEDURE — 04CL0ZZ EXTIRPATION OF MATTER FROM LEFT FEMORAL ARTERY, OPEN APPROACH: ICD-10-PCS | Performed by: SURGERY

## 2020-08-31 PROCEDURE — 6360000002 HC RX W HCPCS: Performed by: PHYSICIAN ASSISTANT

## 2020-08-31 PROCEDURE — C1876 STENT, NON-COA/NON-COV W/DEL: HCPCS | Performed by: SURGERY

## 2020-08-31 PROCEDURE — 36415 COLL VENOUS BLD VENIPUNCTURE: CPT

## 2020-08-31 PROCEDURE — 3600000007 HC SURGERY HYBRID BASE: Performed by: SURGERY

## 2020-08-31 PROCEDURE — 3600000017 HC SURGERY HYBRID ADDL 15MIN: Performed by: SURGERY

## 2020-08-31 PROCEDURE — 85027 COMPLETE CBC AUTOMATED: CPT

## 2020-08-31 PROCEDURE — 86901 BLOOD TYPING SEROLOGIC RH(D): CPT

## 2020-08-31 PROCEDURE — 2700000000 HC OXYGEN THERAPY PER DAY

## 2020-08-31 PROCEDURE — 37221 PR REVSC OPN/PRQ ILIAC ART W/STNT PLMT & ANGIOPLSTY: CPT | Performed by: SURGERY

## 2020-08-31 PROCEDURE — C1894 INTRO/SHEATH, NON-LASER: HCPCS | Performed by: SURGERY

## 2020-08-31 PROCEDURE — 6360000002 HC RX W HCPCS: Performed by: ANESTHESIOLOGY

## 2020-08-31 PROCEDURE — 2580000003 HC RX 258: Performed by: ANESTHESIOLOGY

## 2020-08-31 PROCEDURE — 2580000003 HC RX 258: Performed by: SURGERY

## 2020-08-31 PROCEDURE — 047J3DZ DILATION OF LEFT EXTERNAL ILIAC ARTERY WITH INTRALUMINAL DEVICE, PERCUTANEOUS APPROACH: ICD-10-PCS | Performed by: SURGERY

## 2020-08-31 PROCEDURE — 2000000000 HC ICU R&B

## 2020-08-31 PROCEDURE — 7100000001 HC PACU RECOVERY - ADDTL 15 MIN: Performed by: SURGERY

## 2020-08-31 PROCEDURE — 7100000000 HC PACU RECOVERY - FIRST 15 MIN: Performed by: SURGERY

## 2020-08-31 PROCEDURE — 2580000003 HC RX 258: Performed by: NURSE ANESTHETIST, CERTIFIED REGISTERED

## 2020-08-31 PROCEDURE — 04UL07Z SUPPLEMENT LEFT FEMORAL ARTERY WITH AUTOLOGOUS TISSUE SUBSTITUTE, OPEN APPROACH: ICD-10-PCS | Performed by: SURGERY

## 2020-08-31 PROCEDURE — 047K3EZ DILATION OF RIGHT FEMORAL ARTERY WITH TWO INTRALUMINAL DEVICES, PERCUTANEOUS APPROACH: ICD-10-PCS | Performed by: SURGERY

## 2020-08-31 PROCEDURE — C1769 GUIDE WIRE: HCPCS | Performed by: SURGERY

## 2020-08-31 PROCEDURE — 2500000003 HC RX 250 WO HCPCS: Performed by: NURSE ANESTHETIST, CERTIFIED REGISTERED

## 2020-08-31 PROCEDURE — 2780000010 HC IMPLANT OTHER: Performed by: SURGERY

## 2020-08-31 DEVICE — STENT EVAR L59MM DIA8-11MM CATH L80CM INTRO SHTH 7FR: Type: IMPLANTABLE DEVICE | Site: GROIN | Status: FUNCTIONAL

## 2020-08-31 DEVICE — IMPLANTABLE DEVICE: Type: IMPLANTABLE DEVICE | Site: GROIN | Status: FUNCTIONAL

## 2020-08-31 DEVICE — SELF-EXPANDING STENT SYSTEM
Type: IMPLANTABLE DEVICE | Site: GROIN | Status: FUNCTIONAL
Brand: INNOVA™ VASCULAR

## 2020-08-31 DEVICE — SELF-EXPANDING STENT SYSTEM
Type: IMPLANTABLE DEVICE | Site: GROIN | Status: FUNCTIONAL
Brand: EPIC™ VASCULAR

## 2020-08-31 DEVICE — GRAFT EVAR STNT L59MM DIA11-16MM CATH L135CM INTRO SHTH 8FR: Type: IMPLANTABLE DEVICE | Site: AORTA | Status: FUNCTIONAL

## 2020-08-31 DEVICE — PREMOUNTED STENT SYSTEM
Type: IMPLANTABLE DEVICE | Site: GROIN | Status: FUNCTIONAL
Brand: EXPRESS® LD ILIAC / BILIARY

## 2020-08-31 RX ORDER — FENTANYL CITRATE 50 UG/ML
50 INJECTION, SOLUTION INTRAMUSCULAR; INTRAVENOUS
Status: DISCONTINUED | OUTPATIENT
Start: 2020-08-31 | End: 2020-08-31 | Stop reason: HOSPADM

## 2020-08-31 RX ORDER — ENALAPRILAT 2.5 MG/2ML
1.25 INJECTION INTRAVENOUS
Status: DISCONTINUED | OUTPATIENT
Start: 2020-08-31 | End: 2020-08-31 | Stop reason: HOSPADM

## 2020-08-31 RX ORDER — SODIUM CHLORIDE 0.9 % (FLUSH) 0.9 %
10 SYRINGE (ML) INJECTION EVERY 12 HOURS SCHEDULED
Status: DISCONTINUED | OUTPATIENT
Start: 2020-08-31 | End: 2020-09-03 | Stop reason: HOSPADM

## 2020-08-31 RX ORDER — DILTIAZEM HYDROCHLORIDE 120 MG/1
120 CAPSULE, COATED, EXTENDED RELEASE ORAL DAILY
Status: DISCONTINUED | OUTPATIENT
Start: 2020-09-01 | End: 2020-09-03 | Stop reason: HOSPADM

## 2020-08-31 RX ORDER — HYDROCODONE BITARTRATE AND ACETAMINOPHEN 5; 325 MG/1; MG/1
1 TABLET ORAL EVERY 4 HOURS PRN
Status: DISCONTINUED | OUTPATIENT
Start: 2020-08-31 | End: 2020-09-02

## 2020-08-31 RX ORDER — SODIUM CHLORIDE 9 MG/ML
INJECTION, SOLUTION INTRAVENOUS CONTINUOUS
Status: DISCONTINUED | OUTPATIENT
Start: 2020-08-31 | End: 2020-08-31

## 2020-08-31 RX ORDER — ROCURONIUM BROMIDE 10 MG/ML
INJECTION, SOLUTION INTRAVENOUS PRN
Status: DISCONTINUED | OUTPATIENT
Start: 2020-08-31 | End: 2020-08-31 | Stop reason: SDUPTHER

## 2020-08-31 RX ORDER — LISINOPRIL 20 MG/1
20 TABLET ORAL DAILY
Status: DISCONTINUED | OUTPATIENT
Start: 2020-08-31 | End: 2020-09-03 | Stop reason: HOSPADM

## 2020-08-31 RX ORDER — SODIUM CHLORIDE 0.9 % (FLUSH) 0.9 %
10 SYRINGE (ML) INJECTION PRN
Status: DISCONTINUED | OUTPATIENT
Start: 2020-08-31 | End: 2020-08-31 | Stop reason: HOSPADM

## 2020-08-31 RX ORDER — ONDANSETRON 2 MG/ML
INJECTION INTRAMUSCULAR; INTRAVENOUS PRN
Status: DISCONTINUED | OUTPATIENT
Start: 2020-08-31 | End: 2020-08-31 | Stop reason: SDUPTHER

## 2020-08-31 RX ORDER — LABETALOL HYDROCHLORIDE 5 MG/ML
5 INJECTION, SOLUTION INTRAVENOUS EVERY 10 MIN PRN
Status: DISCONTINUED | OUTPATIENT
Start: 2020-08-31 | End: 2020-08-31 | Stop reason: HOSPADM

## 2020-08-31 RX ORDER — LIDOCAINE HYDROCHLORIDE 10 MG/ML
INJECTION, SOLUTION INFILTRATION; PERINEURAL PRN
Status: DISCONTINUED | OUTPATIENT
Start: 2020-08-31 | End: 2020-08-31 | Stop reason: SDUPTHER

## 2020-08-31 RX ORDER — FINASTERIDE 5 MG/1
5 TABLET, FILM COATED ORAL DAILY
Status: DISCONTINUED | OUTPATIENT
Start: 2020-08-31 | End: 2020-09-03 | Stop reason: HOSPADM

## 2020-08-31 RX ORDER — MORPHINE SULFATE 4 MG/ML
4 INJECTION, SOLUTION INTRAMUSCULAR; INTRAVENOUS EVERY 5 MIN PRN
Status: DISCONTINUED | OUTPATIENT
Start: 2020-08-31 | End: 2020-08-31 | Stop reason: HOSPADM

## 2020-08-31 RX ORDER — ALBUMIN, HUMAN INJ 5% 5 %
SOLUTION INTRAVENOUS PRN
Status: DISCONTINUED | OUTPATIENT
Start: 2020-08-31 | End: 2020-08-31 | Stop reason: SDUPTHER

## 2020-08-31 RX ORDER — CILOSTAZOL 100 MG/1
50 TABLET ORAL 2 TIMES DAILY
Status: DISCONTINUED | OUTPATIENT
Start: 2020-08-31 | End: 2020-09-03 | Stop reason: HOSPADM

## 2020-08-31 RX ORDER — SODIUM CHLORIDE, SODIUM LACTATE, POTASSIUM CHLORIDE, CALCIUM CHLORIDE 600; 310; 30; 20 MG/100ML; MG/100ML; MG/100ML; MG/100ML
INJECTION, SOLUTION INTRAVENOUS CONTINUOUS PRN
Status: DISCONTINUED | OUTPATIENT
Start: 2020-08-31 | End: 2020-08-31 | Stop reason: SDUPTHER

## 2020-08-31 RX ORDER — PROMETHAZINE HYDROCHLORIDE 25 MG/ML
6.25 INJECTION, SOLUTION INTRAMUSCULAR; INTRAVENOUS
Status: DISCONTINUED | OUTPATIENT
Start: 2020-08-31 | End: 2020-08-31 | Stop reason: HOSPADM

## 2020-08-31 RX ORDER — KETAMINE HYDROCHLORIDE 50 MG/ML
INJECTION, SOLUTION, CONCENTRATE INTRAMUSCULAR; INTRAVENOUS PRN
Status: DISCONTINUED | OUTPATIENT
Start: 2020-08-31 | End: 2020-08-31 | Stop reason: SDUPTHER

## 2020-08-31 RX ORDER — FENTANYL CITRATE 50 UG/ML
25 INJECTION, SOLUTION INTRAMUSCULAR; INTRAVENOUS
Status: DISCONTINUED | OUTPATIENT
Start: 2020-08-31 | End: 2020-08-31 | Stop reason: HOSPADM

## 2020-08-31 RX ORDER — LIDOCAINE HYDROCHLORIDE 10 MG/ML
1 INJECTION, SOLUTION EPIDURAL; INFILTRATION; INTRACAUDAL; PERINEURAL
Status: DISCONTINUED | OUTPATIENT
Start: 2020-08-31 | End: 2020-08-31 | Stop reason: HOSPADM

## 2020-08-31 RX ORDER — KETOROLAC TROMETHAMINE 30 MG/ML
INJECTION, SOLUTION INTRAMUSCULAR; INTRAVENOUS PRN
Status: DISCONTINUED | OUTPATIENT
Start: 2020-08-31 | End: 2020-08-31 | Stop reason: SDUPTHER

## 2020-08-31 RX ORDER — HYDROMORPHONE HYDROCHLORIDE 1 MG/ML
0.25 INJECTION, SOLUTION INTRAMUSCULAR; INTRAVENOUS; SUBCUTANEOUS EVERY 5 MIN PRN
Status: DISCONTINUED | OUTPATIENT
Start: 2020-08-31 | End: 2020-08-31 | Stop reason: HOSPADM

## 2020-08-31 RX ORDER — SODIUM CHLORIDE 0.9 % (FLUSH) 0.9 %
10 SYRINGE (ML) INJECTION PRN
Status: DISCONTINUED | OUTPATIENT
Start: 2020-08-31 | End: 2020-09-03 | Stop reason: HOSPADM

## 2020-08-31 RX ORDER — SODIUM CHLORIDE 9 MG/ML
INJECTION, SOLUTION INTRAVENOUS CONTINUOUS
Status: DISCONTINUED | OUTPATIENT
Start: 2020-08-31 | End: 2020-09-02

## 2020-08-31 RX ORDER — ASPIRIN 81 MG/1
81 TABLET ORAL DAILY
Status: DISCONTINUED | OUTPATIENT
Start: 2020-09-01 | End: 2020-09-03 | Stop reason: HOSPADM

## 2020-08-31 RX ORDER — SUCCINYLCHOLINE CHLORIDE 20 MG/ML
INJECTION INTRAMUSCULAR; INTRAVENOUS PRN
Status: DISCONTINUED | OUTPATIENT
Start: 2020-08-31 | End: 2020-08-31 | Stop reason: SDUPTHER

## 2020-08-31 RX ORDER — HYDROCODONE BITARTRATE AND ACETAMINOPHEN 5; 325 MG/1; MG/1
2 TABLET ORAL EVERY 4 HOURS PRN
Status: DISCONTINUED | OUTPATIENT
Start: 2020-08-31 | End: 2020-09-02

## 2020-08-31 RX ORDER — EPHEDRINE SULFATE 50 MG/ML
INJECTION, SOLUTION INTRAVENOUS PRN
Status: DISCONTINUED | OUTPATIENT
Start: 2020-08-31 | End: 2020-08-31 | Stop reason: SDUPTHER

## 2020-08-31 RX ORDER — ONDANSETRON 2 MG/ML
8 INJECTION INTRAMUSCULAR; INTRAVENOUS EVERY 8 HOURS PRN
Status: DISCONTINUED | OUTPATIENT
Start: 2020-08-31 | End: 2020-09-03 | Stop reason: HOSPADM

## 2020-08-31 RX ORDER — HEPARIN SODIUM 1000 [USP'U]/ML
INJECTION, SOLUTION INTRAVENOUS; SUBCUTANEOUS PRN
Status: DISCONTINUED | OUTPATIENT
Start: 2020-08-31 | End: 2020-08-31 | Stop reason: SDUPTHER

## 2020-08-31 RX ORDER — HYDRALAZINE HYDROCHLORIDE 20 MG/ML
5 INJECTION INTRAMUSCULAR; INTRAVENOUS EVERY 10 MIN PRN
Status: DISCONTINUED | OUTPATIENT
Start: 2020-08-31 | End: 2020-08-31 | Stop reason: HOSPADM

## 2020-08-31 RX ORDER — PROPOFOL 10 MG/ML
INJECTION, EMULSION INTRAVENOUS PRN
Status: DISCONTINUED | OUTPATIENT
Start: 2020-08-31 | End: 2020-08-31 | Stop reason: SDUPTHER

## 2020-08-31 RX ORDER — HYDROMORPHONE HYDROCHLORIDE 1 MG/ML
0.5 INJECTION, SOLUTION INTRAMUSCULAR; INTRAVENOUS; SUBCUTANEOUS EVERY 5 MIN PRN
Status: DISCONTINUED | OUTPATIENT
Start: 2020-08-31 | End: 2020-08-31 | Stop reason: HOSPADM

## 2020-08-31 RX ORDER — SODIUM CHLORIDE 0.9 % (FLUSH) 0.9 %
10 SYRINGE (ML) INJECTION EVERY 12 HOURS SCHEDULED
Status: DISCONTINUED | OUTPATIENT
Start: 2020-08-31 | End: 2020-08-31 | Stop reason: HOSPADM

## 2020-08-31 RX ORDER — SODIUM CHLORIDE, SODIUM LACTATE, POTASSIUM CHLORIDE, CALCIUM CHLORIDE 600; 310; 30; 20 MG/100ML; MG/100ML; MG/100ML; MG/100ML
INJECTION, SOLUTION INTRAVENOUS CONTINUOUS
Status: DISCONTINUED | OUTPATIENT
Start: 2020-08-31 | End: 2020-08-31

## 2020-08-31 RX ORDER — FENTANYL CITRATE 50 UG/ML
INJECTION, SOLUTION INTRAMUSCULAR; INTRAVENOUS PRN
Status: DISCONTINUED | OUTPATIENT
Start: 2020-08-31 | End: 2020-08-31 | Stop reason: SDUPTHER

## 2020-08-31 RX ORDER — HYDROMORPHONE HYDROCHLORIDE 1 MG/ML
0.5 INJECTION, SOLUTION INTRAMUSCULAR; INTRAVENOUS; SUBCUTANEOUS
Status: DISCONTINUED | OUTPATIENT
Start: 2020-08-31 | End: 2020-09-02

## 2020-08-31 RX ORDER — DEXAMETHASONE SODIUM PHOSPHATE 10 MG/ML
INJECTION, SOLUTION INTRAMUSCULAR; INTRAVENOUS PRN
Status: DISCONTINUED | OUTPATIENT
Start: 2020-08-31 | End: 2020-08-31 | Stop reason: SDUPTHER

## 2020-08-31 RX ORDER — MIDAZOLAM HYDROCHLORIDE 1 MG/ML
2 INJECTION INTRAMUSCULAR; INTRAVENOUS
Status: COMPLETED | OUTPATIENT
Start: 2020-08-31 | End: 2020-08-31

## 2020-08-31 RX ORDER — METOCLOPRAMIDE HYDROCHLORIDE 5 MG/ML
10 INJECTION INTRAMUSCULAR; INTRAVENOUS
Status: COMPLETED | OUTPATIENT
Start: 2020-08-31 | End: 2020-08-31

## 2020-08-31 RX ORDER — ASPIRIN 81 MG/1
81 TABLET ORAL ONCE
Status: DISCONTINUED | OUTPATIENT
Start: 2020-08-31 | End: 2020-08-31 | Stop reason: HOSPADM

## 2020-08-31 RX ORDER — DIPHENHYDRAMINE HYDROCHLORIDE 50 MG/ML
12.5 INJECTION INTRAMUSCULAR; INTRAVENOUS
Status: DISCONTINUED | OUTPATIENT
Start: 2020-08-31 | End: 2020-08-31 | Stop reason: HOSPADM

## 2020-08-31 RX ORDER — ACETAMINOPHEN 325 MG/1
650 TABLET ORAL EVERY 4 HOURS PRN
Status: DISCONTINUED | OUTPATIENT
Start: 2020-08-31 | End: 2020-09-03 | Stop reason: HOSPADM

## 2020-08-31 RX ORDER — AMIODARONE HYDROCHLORIDE 200 MG/1
200 TABLET ORAL DAILY
Status: DISCONTINUED | OUTPATIENT
Start: 2020-09-01 | End: 2020-09-03 | Stop reason: HOSPADM

## 2020-08-31 RX ORDER — MORPHINE SULFATE 4 MG/ML
2 INJECTION, SOLUTION INTRAMUSCULAR; INTRAVENOUS EVERY 5 MIN PRN
Status: DISCONTINUED | OUTPATIENT
Start: 2020-08-31 | End: 2020-08-31 | Stop reason: HOSPADM

## 2020-08-31 RX ORDER — MEPERIDINE HYDROCHLORIDE 50 MG/ML
12.5 INJECTION INTRAMUSCULAR; INTRAVENOUS; SUBCUTANEOUS EVERY 5 MIN PRN
Status: DISCONTINUED | OUTPATIENT
Start: 2020-08-31 | End: 2020-08-31 | Stop reason: HOSPADM

## 2020-08-31 RX ORDER — FAMOTIDINE 20 MG/1
20 TABLET, FILM COATED ORAL DAILY
Status: DISCONTINUED | OUTPATIENT
Start: 2020-08-31 | End: 2020-09-01

## 2020-08-31 RX ADMIN — HYDROMORPHONE HYDROCHLORIDE 0.5 MG: 1 INJECTION, SOLUTION INTRAMUSCULAR; INTRAVENOUS; SUBCUTANEOUS at 17:07

## 2020-08-31 RX ADMIN — CEFAZOLIN SODIUM 2 G: 10 INJECTION, POWDER, FOR SOLUTION INTRAVENOUS at 20:24

## 2020-08-31 RX ADMIN — FAMOTIDINE 20 MG: 20 TABLET ORAL at 17:35

## 2020-08-31 RX ADMIN — SUCCINYLCHOLINE CHLORIDE 80 MG: 20 INJECTION, SOLUTION INTRAMUSCULAR; INTRAVENOUS at 07:41

## 2020-08-31 RX ADMIN — HEPARIN SODIUM 1000 UNITS: 1000 INJECTION, SOLUTION INTRAVENOUS; SUBCUTANEOUS at 12:27

## 2020-08-31 RX ADMIN — HYDROMORPHONE HYDROCHLORIDE 0.25 MG: 1 INJECTION, SOLUTION INTRAMUSCULAR; INTRAVENOUS; SUBCUTANEOUS at 14:49

## 2020-08-31 RX ADMIN — LIDOCAINE HYDROCHLORIDE 50 MG: 10 INJECTION, SOLUTION INFILTRATION; PERINEURAL at 07:41

## 2020-08-31 RX ADMIN — Medication 2 G: at 07:47

## 2020-08-31 RX ADMIN — HYDROMORPHONE HYDROCHLORIDE 0.5 MG: 1 INJECTION, SOLUTION INTRAMUSCULAR; INTRAVENOUS; SUBCUTANEOUS at 20:30

## 2020-08-31 RX ADMIN — FENTANYL CITRATE 25 MCG: 50 INJECTION INTRAMUSCULAR; INTRAVENOUS at 14:36

## 2020-08-31 RX ADMIN — EPHEDRINE SULFATE 5 MG: 50 INJECTION INTRAMUSCULAR; INTRAVENOUS; SUBCUTANEOUS at 13:11

## 2020-08-31 RX ADMIN — SODIUM CHLORIDE, POTASSIUM CHLORIDE, SODIUM LACTATE AND CALCIUM CHLORIDE: 600; 310; 30; 20 INJECTION, SOLUTION INTRAVENOUS at 06:37

## 2020-08-31 RX ADMIN — HYDROCODONE BITARTRATE AND ACETAMINOPHEN 2 TABLET: 5; 325 TABLET ORAL at 18:12

## 2020-08-31 RX ADMIN — ROCURONIUM BROMIDE 30 MG: 10 INJECTION, SOLUTION INTRAVENOUS at 09:13

## 2020-08-31 RX ADMIN — FENTANYL CITRATE 50 MCG: 50 INJECTION INTRAMUSCULAR; INTRAVENOUS at 08:45

## 2020-08-31 RX ADMIN — HYDROMORPHONE HYDROCHLORIDE 0.25 MG: 1 INJECTION, SOLUTION INTRAMUSCULAR; INTRAVENOUS; SUBCUTANEOUS at 15:03

## 2020-08-31 RX ADMIN — MIDAZOLAM 2 MG: 1 INJECTION INTRAMUSCULAR; INTRAVENOUS at 07:05

## 2020-08-31 RX ADMIN — FENTANYL CITRATE 50 MCG: 50 INJECTION INTRAMUSCULAR; INTRAVENOUS at 10:31

## 2020-08-31 RX ADMIN — HEPARIN SODIUM 5000 UNITS: 1000 INJECTION, SOLUTION INTRAVENOUS; SUBCUTANEOUS at 08:58

## 2020-08-31 RX ADMIN — HEPARIN SODIUM 1000 UNITS: 1000 INJECTION, SOLUTION INTRAVENOUS; SUBCUTANEOUS at 10:45

## 2020-08-31 RX ADMIN — CILOSTAZOL 50 MG: 100 TABLET ORAL at 20:55

## 2020-08-31 RX ADMIN — SODIUM CHLORIDE, SODIUM LACTATE, POTASSIUM CHLORIDE, AND CALCIUM CHLORIDE: 600; 310; 30; 20 INJECTION, SOLUTION INTRAVENOUS at 09:40

## 2020-08-31 RX ADMIN — HEPARIN SODIUM 1000 UNITS: 1000 INJECTION, SOLUTION INTRAVENOUS; SUBCUTANEOUS at 09:55

## 2020-08-31 RX ADMIN — ROCURONIUM BROMIDE 20 MG: 10 INJECTION, SOLUTION INTRAVENOUS at 12:50

## 2020-08-31 RX ADMIN — SODIUM CHLORIDE, SODIUM LACTATE, POTASSIUM CHLORIDE, AND CALCIUM CHLORIDE: 600; 310; 30; 20 INJECTION, SOLUTION INTRAVENOUS at 07:30

## 2020-08-31 RX ADMIN — ROCURONIUM BROMIDE 10 MG: 10 INJECTION, SOLUTION INTRAVENOUS at 14:06

## 2020-08-31 RX ADMIN — Medication 10 MG: at 08:45

## 2020-08-31 RX ADMIN — Medication 10 ML: at 20:55

## 2020-08-31 RX ADMIN — FINASTERIDE 5 MG: 5 TABLET, FILM COATED ORAL at 17:35

## 2020-08-31 RX ADMIN — Medication 20 MG: at 07:41

## 2020-08-31 RX ADMIN — KETOROLAC TROMETHAMINE 10 MG: 30 INJECTION, SOLUTION INTRAMUSCULAR; INTRAVENOUS at 14:17

## 2020-08-31 RX ADMIN — HEPARIN SODIUM 1000 UNITS: 1000 INJECTION, SOLUTION INTRAVENOUS; SUBCUTANEOUS at 13:15

## 2020-08-31 RX ADMIN — HEPARIN SODIUM 1000 UNITS: 1000 INJECTION, SOLUTION INTRAVENOUS; SUBCUTANEOUS at 11:33

## 2020-08-31 RX ADMIN — FENTANYL CITRATE 50 MCG: 50 INJECTION INTRAMUSCULAR; INTRAVENOUS at 07:41

## 2020-08-31 RX ADMIN — EPHEDRINE SULFATE 5 MG: 50 INJECTION INTRAMUSCULAR; INTRAVENOUS; SUBCUTANEOUS at 13:12

## 2020-08-31 RX ADMIN — SODIUM CHLORIDE: 9 INJECTION, SOLUTION INTRAVENOUS at 16:49

## 2020-08-31 RX ADMIN — ONDANSETRON HYDROCHLORIDE 4 MG: 2 INJECTION, SOLUTION INTRAMUSCULAR; INTRAVENOUS at 14:17

## 2020-08-31 RX ADMIN — Medication 2 G: at 11:40

## 2020-08-31 RX ADMIN — FENTANYL CITRATE 50 MCG: 50 INJECTION INTRAMUSCULAR; INTRAVENOUS at 08:17

## 2020-08-31 RX ADMIN — ROCURONIUM BROMIDE 50 MG: 10 INJECTION, SOLUTION INTRAVENOUS at 07:49

## 2020-08-31 RX ADMIN — PROPOFOL 140 MG: 10 INJECTION, EMULSION INTRAVENOUS at 07:41

## 2020-08-31 RX ADMIN — DEXAMETHASONE SODIUM PHOSPHATE 10 MG: 10 INJECTION, SOLUTION INTRAMUSCULAR; INTRAVENOUS at 07:50

## 2020-08-31 RX ADMIN — Medication 10 MG: at 10:02

## 2020-08-31 RX ADMIN — HYDROMORPHONE HYDROCHLORIDE 0.5 MG: 1 INJECTION, SOLUTION INTRAMUSCULAR; INTRAVENOUS; SUBCUTANEOUS at 15:15

## 2020-08-31 RX ADMIN — FENTANYL CITRATE 25 MCG: 50 INJECTION INTRAMUSCULAR; INTRAVENOUS at 14:42

## 2020-08-31 RX ADMIN — SODIUM CHLORIDE, SODIUM LACTATE, POTASSIUM CHLORIDE, AND CALCIUM CHLORIDE: 600; 310; 30; 20 INJECTION, SOLUTION INTRAVENOUS at 13:02

## 2020-08-31 RX ADMIN — LISINOPRIL 20 MG: 20 TABLET ORAL at 17:35

## 2020-08-31 RX ADMIN — ROCURONIUM BROMIDE 20 MG: 10 INJECTION, SOLUTION INTRAVENOUS at 11:32

## 2020-08-31 RX ADMIN — METOCLOPRAMIDE 10 MG: 5 INJECTION, SOLUTION INTRAMUSCULAR; INTRAVENOUS at 15:40

## 2020-08-31 RX ADMIN — ROCURONIUM BROMIDE 20 MG: 10 INJECTION, SOLUTION INTRAVENOUS at 10:18

## 2020-08-31 RX ADMIN — ALBUMIN (HUMAN) 250 ML: 2.5 SOLUTION INTRAVENOUS at 13:14

## 2020-08-31 RX ADMIN — ALBUMIN (HUMAN) 250 ML: 2.5 SOLUTION INTRAVENOUS at 13:36

## 2020-08-31 RX ADMIN — SUGAMMADEX 150 MG: 100 INJECTION, SOLUTION INTRAVENOUS at 14:52

## 2020-08-31 RX ADMIN — PHENYLEPHRINE HYDROCHLORIDE 25 MCG/MIN: 10 INJECTION INTRAVENOUS at 07:56

## 2020-08-31 ASSESSMENT — PAIN DESCRIPTION - LOCATION: LOCATION: LEG

## 2020-08-31 ASSESSMENT — PAIN SCALES - GENERAL
PAINLEVEL_OUTOF10: 7
PAINLEVEL_OUTOF10: 7
PAINLEVEL_OUTOF10: 3
PAINLEVEL_OUTOF10: 4
PAINLEVEL_OUTOF10: 7

## 2020-08-31 ASSESSMENT — PAIN DESCRIPTION - PAIN TYPE: TYPE: SURGICAL PAIN;ACUTE PAIN

## 2020-08-31 ASSESSMENT — ENCOUNTER SYMPTOMS: SHORTNESS OF BREATH: 0

## 2020-08-31 ASSESSMENT — LIFESTYLE VARIABLES: SMOKING_STATUS: 1

## 2020-08-31 ASSESSMENT — PAIN DESCRIPTION - ORIENTATION: ORIENTATION: RIGHT;LEFT

## 2020-08-31 NOTE — ANESTHESIA POSTPROCEDURE EVALUATION
Department of Anesthesiology  Postprocedure Note    Patient: Charlette Crenshaw  MRN: [id number]  YOB: 1945  Date of evaluation: 8/31/2020  Time:  3:18 PM     Procedure Summary     Date:  08/31/20 Room / Location:  [facility] OR [address]    Anesthesia Start:  0730 Anesthesia Stop:  7571    Procedure:  PLACEMENT VBX AORTIC STENT WITH BILATERAL KISSING ILIAC STENTS WITH COMMON FEMORAL ARTERY ENDARTERECTOMY WITH VEIN PATCH (Bilateral ) Diagnosis:  (Y89.729)    Surgeon:  Edmund Butler DO Responsible Provider:  JANEY Manning CRNA    Anesthesia Type:  general ASA Status:  3          Anesthesia Type: general    Fabiola Phase I:      Fabiola Phase II:      Last vitals: Reviewed and per EMR flowsheets.        Anesthesia Post Evaluation    Patient location during evaluation: PACU  Patient participation: complete - patient participated  Level of consciousness: awake and alert and confused  Pain score: 4  Airway patency: patent  Nausea & Vomiting: no nausea and no vomiting  Complications: no  Cardiovascular status: hemodynamically stable  Respiratory status: acceptable and nasal cannula  Hydration status: euvolemic

## 2020-08-31 NOTE — ANESTHESIA PRE PROCEDURE
Department of Anesthesiology  Preprocedure Note       Name:  Eddie Humphrey   Age:  69 y.o.  :  1945                                          MRN:  [id number]         Date:  2020      Surgeon: Iker Calix):  Jh Jones DO    Procedure: Procedure(s):  PLACEMENT VBX AORTIC STENT WITH BILATERAL KISSING ILIAC STENTS WITH COMMON FEMORAL ARTERY ENDARTERECTOMY WITH VEIN PATCH VERSUS WITH GORE    Medications prior to admission:   Prior to Admission medications    Medication Sig Start Date End Date Taking?  Authorizing Provider   guaiFENesin (MUCINEX) 600 MG extended release tablet Take 600 mg by mouth 2 times daily    Historical Provider, MD   hydrOXYzine (ATARAX) 25 MG tablet Take 25 mg by mouth 3 times daily as needed 20   Historical Provider, MD   amiodarone (CORDARONE) 200 MG tablet Take 200 mg by mouth daily  20   Historical Provider, MD   cilostazol (PLETAL) 50 MG tablet Take 50 mg by mouth 2 times daily 20   Historical Provider, MD   dilTIAZem (CARDIZEM CD) 120 MG extended release capsule Take 120 mg by mouth daily 20   Historical Provider, MD   lidocaine (LMX) 4 % cream Apply topically as needed    Historical Provider, MD   lisinopril (PRINIVIL;ZESTRIL) 40 MG tablet Take 20 mg by mouth daily    Historical Provider, MD   pantoprazole (PROTONIX) 20 MG tablet Take 20 mg by mouth daily    Historical Provider, MD   vitamin D (CHOLECALCIFEROL) 50 MCG (2000 UT) TABS tablet Take 2,000 Units by mouth daily    Historical Provider, MD   aspirin 81 MG EC tablet Take 81 mg by mouth daily 20   Historical Provider, MD   ferrous sulfate (IRON 325) 325 (65 Fe) MG tablet Take 325 mg by mouth daily (with breakfast) 20   Historical Provider, MD   famotidine (PEPCID) 20 MG tablet Take 20 mg by mouth daily as needed     Historical Provider, MD   finasteride (PROSCAR) 5 MG tablet Take 5 mg by mouth daily 20   Historical Provider, MD       Current medications:    Current Facility-Administered Medications   Medication Dose Route Frequency Provider Last Rate Last Dose    ceFAZolin (ANCEF) 2 g in 0.9% sodium chloride 50 mL IVPB  2 g Intravenous On Call to OR Ca Joshua PA-C        sodium chloride flush 0.9 % injection 10 mL  10 mL Intravenous 2 times per day Cardinal Jimena PA-C        sodium chloride flush 0.9 % injection 10 mL  10 mL Intravenous PRN Ca Joshua PA-C        aspirin EC tablet 81 mg  81 mg Oral Once Cardinal Jimena PA-C           Allergies: Allergies   Allergen Reactions    Cortisone        Problem List:  There is no problem list on file for this patient. Past Medical History:        Diagnosis Date    Afib ([address]     Anxiety     Arthritis     ED (erectile dysfunction)     History of blood transfusion     Hyperlipemia     Hypertension        Past Surgical History:        Procedure Laterality Date    ADENOIDECTOMY      ANKLE FRACTURE SURGERY         Social History:    Social History     Tobacco Use    Smoking status: Current Every Day Smoker     Packs/day: 0.00     Types: Cigars    Smokeless tobacco: Never Used    Tobacco comment: occ cigar   Substance Use Topics    Alcohol use: Not on file                                Ready to quit: Not Answered  Counseling given: Not Answered  Comment: occ cigar      Vital Signs (Current): There were no vitals filed for this visit.                                            BP Readings from Last 3 Encounters:   07/22/20 (!) 154/78   07/14/20 (!) 118/54   07/07/20 138/72       NPO Status:                                                                                 BMI:   Wt Readings from Last 3 Encounters:   08/27/20 160 lb (72.6 kg)   07/22/20 160 lb (72.6 kg)   07/14/20 163 lb (73.9 kg)     There is no height or weight on file to calculate BMI.    CBC:   Lab Results   Component Value Date    WBC 7.9 08/21/2020    RBC 4.80 08/21/2020    HGB 15.4 08/21/2020    HCT 47.6 08/21/2020    MCV 99.2 08/21/2020    RDW 15.0 08/21/2020     08/21/2020       CMP:   Lab Results   Component Value Date     08/21/2020    K 4.2 08/21/2020     08/21/2020    CO2 27 08/21/2020    BUN 13 08/21/2020    CREATININE 0.8 08/21/2020    CREATININE 0.7 08/21/2020    GFRAA >60 08/21/2020    LABGLOM >60 08/21/2020    GLUCOSE 84 08/21/2020    CALCIUM 9.5 08/21/2020       POC Tests: No results for input(s): POCGLU, POCNA, POCK, POCCL, POCBUN, POCHEMO, POCHCT in the last 72 hours. Coags:   Lab Results   Component Value Date    PROTIME 13.4 08/21/2020    INR 1.03 08/21/2020       HCG (If Applicable): No results found for: PREGTESTUR, PREGSERUM, HCG, HCGQUANT     ABGs: No results found for: PHART, PO2ART, NAD5IIX, FTY7YRN, BEART, V5TIINDS     Type & Screen (If Applicable):  No results found for: LABABO, LABRH    Drug/Infectious Status (If Applicable):  No results found for: HIV, HEPCAB    COVID-19 Screening (If Applicable):   Lab Results   Component Value Date    COVID19 Not Detected 08/27/2020         Anesthesia Evaluation  Patient summary reviewed and Nursing notes reviewed no history of anesthetic complications:   Airway: Mallampati: II  TM distance: >3 FB   Neck ROM: full  Mouth opening: > = 3 FB Dental:          Pulmonary:   (+) current smoker    (-) shortness of breath and sleep apnea          Patient smoked on day of surgery. ROS comment: CXR:  Impression   No acute findings. Cardiovascular:  Exercise tolerance: poor (<4 METS),   (+) hypertension:, dysrhythmias: atrial fibrillation, hyperlipidemia    (-) pacemaker, past MI, CAD, CABG/stent and  angina    ECG reviewed      Echocardiogram reviewed         Beta Blocker:  Not on Beta Blocker      ROS comment: Echo 2/2020:    · Left atrial cavity size is borderline dilated. · Left ventricular systolic function is normal.  · Hyperdynamic right ventricular systolic function noted.     RHYTHM IS ATRIAL FIBRILLATION  BORDERLINE BI-ATRIAL ENLARGEMENT  ALL THE LV AND RV WALLS CAN'T BE SEEN WELL BUT THERE APPEARS TO BE   HYPERKINESIS OF BOTH VENTRICLES  NO SIGNIFICANT VALVULAR DYSFUNCTION    EK BPM  Sinus rhythm  Comparison Summary: No serial comparison made  Summary: Normal ECG     Neuro/Psych:      (-) seizures and CVA           GI/Hepatic/Renal:   (+) GERD: well controlled,      (-) liver disease and no renal disease       Endo/Other:    (+) no malignancy/cancer. (-) diabetes mellitus, blood dyscrasia, no malignancy/cancer               Abdominal:           Vascular:   + PVD, aortic or cerebral, .  - DVT and PE.       ROS comment: CTA abdomen/ pelvis with runoff:  1. Advanced atheromatous calcification along the abdominal aorta as   well as throughout the bilateral iliac systems and bilateral lower   extremities. The abdominal aorta is normal in caliber, with mild to   moderate atheromatous narrowing along its infrarenal portion. 2. High-grade atheromatous narrowing at the origin of the right common   iliac artery with complete atheromatous occlusion of the right   external iliac artery. 3. Multifocal high-grade atheromatous narrowings along the left common   and left external iliac arteries. 4. Complete occlusion of the right superficial femoral artery with   reconstitution of the popliteal artery, which is diffusely narrowed. Two-vessel runoff to the right ankle, with the peroneal artery being   completely attenuated along its proximal third. 5. Multifocal high-grade narrowings along the left superficial femoral   and popliteal arteries although these vessels remain patent. There   appears to be a two-vessel runoff to the left ankle although the left   posterior tibial artery is quite faint at the ankle. The left peroneal   artery is occluded along its proximal third. 6. Advanced coronary atheromatous calcification. .                               Anesthesia Plan      general     ASA 3       Induction: intravenous.   arterial line  MIPS: Postoperative

## 2020-08-31 NOTE — PROGRESS NOTES
4 Eyes Skin Assessment    Anita Anglin is being assessed upon: Transfer to New Unit    I agree that I, Corinne Hark, along with RN (either 2 RN's or 1 LPN and 1 RN) have performed a thorough Head to Toe Skin Assessment on the patient. ALL assessment sites listed below have been assessed. Areas assessed by both nurses:     [x]   Head, Face, and Ears   [x]   Shoulders, Back, and Chest  [x]   Arms, Elbows, and Hands   [x]   Coccyx, Sacrum, and Ischium  [x]   Legs, Feet, and Heels    Does the Patient have Skin Breakdown? Yes, wound(s) noted upon assessment. It is the responsibility of the Primary Nurse to assure that the following documentation, preventions, orders, and consults are complete on the above noted wound(s): Wound LDA initiated. LDA Flowsheet Documentation includes the Tamiko-wound, Wound Assessment, Measurements, Dressing Treatment, Drainage, and Color. Avila Prevention initiated: Yes  Wound Care Orders initiated: post op orders per Dr. Kavita Sutton    [facility] nurse consulted for Pressure Injury (Stage 3,4, Unstageable, DTI, NWPT, and Complex wounds) and New or Established Ostomies: No        Primary Nurse eSignature:  Corinne Hark, RN on 8/31/2020 at 5:56 PM      Co-Signer eSignature: Electronically signed by Belinda Estrella RN on 8/31/20 at 6:45 PM CDT

## 2020-08-31 NOTE — BRIEF OP NOTE
Brief Postoperative Note      Patient: Olivia Minaya  YOB: 1945  MRN: [id number]    Date of Procedure: 8/31/2020    Pre-Op Diagnosis: I70.244    Post-Op Diagnosis: Same       Procedure(s):  PLACEMENT VBX AORTIC STENT WITH BILATERAL KISSING ILIAC STENTS WITH COMMON FEMORAL ARTERY ENDARTERECTOMY WITH VEIN PATCH    Surgeon(s):  Andrey Pander, MD Cortez Burkitt,     Assistant:  * No surgical staff found *    Anesthesia: General    Estimated Blood Loss (mL): 0154    Complications: None    Specimens:   ID Type Source Tests Collected by Time Destination   1 :  Blood Arm APTT Cortez Burkitt, DO 8/31/2020 1159    A : [facility] Ray, DO 8/31/2020 1402        Implants:  Implant Name Type Inv.  Item Serial No.  Lot No. LRB No. Used Action   STENT ENDOPROSTHESIS VIABAHN [id number] - [de-identified] Stent:Coronary:Bare Metal STENT ENDOPROSTHESIS VIABAHN [id number] [de-identified] ONI BOYD [facility] [employer]   1 Implanted   BALLOON CATH VIABAHN BX [id number] - [de-identified] Vascular/Graft/Patch/Filter BALLOON CATH VIABAHN BX [id number] [de-identified]  [employer]  Left 1 Implanted   BALLOON CATH VIABAHN BX [id number] - [de-identified] Vascular/Graft/Patch/Filter BALLOON CATH VIABAHN BX [id number] [de-identified]  [employer]  Right 1 Implanted   STENT BILI EXP LD [id number] Stent:Biliary/Pancreatic/Iliac STENT BILI EXP LD [id number]  [employer]: Jorden Embs [id number] Right 1 Implanted   BALLOON CATH VIABAHN BX [id number] - [de-identified] Vascular/Graft/Patch/Filter BALLOON CATH VIABAHN BX [id number] [de-identified]  [employer]  Left 1 Implanted   GORE VIABAHN VBX BALLOON EXPANDABLE STENT   [id number]   Right 1 Implanted   STENT EPIC SELF-EXPANDING [id number] Stent:Coronary:Bare Metal STENT EPIC SELF-EXPANDING [id number]  [employer]: INTERVENTIONAL CARDIO [id number] Left 1 Implanted   STENT EPIC SELF-EXPANDING [id number] Stent:Coronary:Bare Metal STENT EPIC SELF-EXPANDING [id number]  [employer]: INTERVENTIONAL [employer]ARDIO [id number] Right 1 Implanted   STENT INNOVA [id number] Stent:Coronary:Bare Metal STENT INNOVA [id number]  [employer]: INTERVENTIONAL CARDIO [id number] Right 1 Implanted         Drains:   Urethral Catheter Double-lumen 16 fr (Active)       Findings: bilateral severe aortoiliac disease. At the completion bilateral DP signals.   SFA occluded on the right    Electronically signed by Maylin Weller DO on 8/31/2020 at 3:16 PM

## 2020-08-31 NOTE — INTERVAL H&P NOTE
Update History & Physical    The patient's History and Physical of August 28, 2020 was reviewed with the patient and I examined the patient. There was no change. The surgical site was confirmed by the patient and me. Plan: The risks, benefits, expected outcome, and alternative to the recommended procedure have been discussed with the patient. Patient understands and wants to proceed with the procedure. Possible fem fem bypass discussed.   Electronically signed by Saloni Banerjee DO on 8/31/2020 at 7:18 AM

## 2020-08-31 NOTE — PROGRESS NOTES
Pt arrived to  at 16:10 from PACU. Bedside handoff from [facility]. ICU monitoring initiated. Mepilex dressings to bilateral groin sites CDI, ace wrap to RLE CDI. Feet bilaterally are cool to the touch. Left DP dopplered 3+, PT absent; Right DP 1+ monophasic, PT absent. Mistral Air forced warming initaited to BLE. Dr. Tina Aviles aware of absent PTs per PACU RN. ABP elevated--does not correlate with cuff BP, other VSS. Pt is alert and oriented. Pt c/o pain 7/10 mostly in left heel. Will continue to monitor.

## 2020-09-01 LAB
ALBUMIN SERPL-MCNC: 3.1 G/DL (ref 3.5–5.2)
ALP BLD-CCNC: 66 U/L (ref 40–130)
ALT SERPL-CCNC: 9 U/L (ref 5–41)
ANION GAP SERPL CALCULATED.3IONS-SCNC: 7 MMOL/L (ref 7–19)
AST SERPL-CCNC: 13 U/L (ref 5–40)
BILIRUB SERPL-MCNC: 0.3 MG/DL (ref 0.2–1.2)
BUN BLDV-MCNC: 15 MG/DL (ref 8–23)
CALCIUM SERPL-MCNC: 8.2 MG/DL (ref 8.8–10.2)
CHLORIDE BLD-SCNC: 111 MMOL/L (ref 98–111)
CO2: 23 MMOL/L (ref 22–29)
CREAT SERPL-MCNC: 0.7 MG/DL (ref 0.5–1.2)
GFR AFRICAN AMERICAN: >59
GFR NON-AFRICAN AMERICAN: >60
GLUCOSE BLD-MCNC: 125 MG/DL (ref 74–109)
HCT VFR BLD CALC: 28.8 % (ref 42–52)
HEMOGLOBIN: 9.4 G/DL (ref 14–18)
MCH RBC QN AUTO: 31.5 PG (ref 27–31)
MCHC RBC AUTO-ENTMCNC: 32.6 G/DL (ref 33–37)
MCV RBC AUTO: 96.6 FL (ref 80–94)
PDW BLD-RTO: 14.4 % (ref 11.5–14.5)
PLATELET # BLD: 178 K/UL (ref 130–400)
PMV BLD AUTO: 9.2 FL (ref 9.4–12.4)
POTASSIUM SERPL-SCNC: 4.3 MMOL/L (ref 3.5–5)
RBC # BLD: 2.98 M/UL (ref 4.7–6.1)
SODIUM BLD-SCNC: 141 MMOL/L (ref 136–145)
TOTAL PROTEIN: 5.3 G/DL (ref 6.6–8.7)
WBC # BLD: 12.9 K/UL (ref 4.8–10.8)

## 2020-09-01 PROCEDURE — 6370000000 HC RX 637 (ALT 250 FOR IP): Performed by: SURGERY

## 2020-09-01 PROCEDURE — 85027 COMPLETE CBC AUTOMATED: CPT

## 2020-09-01 PROCEDURE — 2580000003 HC RX 258: Performed by: SURGERY

## 2020-09-01 PROCEDURE — 80053 COMPREHEN METABOLIC PANEL: CPT

## 2020-09-01 PROCEDURE — 37799 UNLISTED PX VASCULAR SURGERY: CPT

## 2020-09-01 PROCEDURE — 99024 POSTOP FOLLOW-UP VISIT: CPT | Performed by: SURGERY

## 2020-09-01 PROCEDURE — 6360000002 HC RX W HCPCS: Performed by: SURGERY

## 2020-09-01 PROCEDURE — 1210000000 HC MED SURG R&B

## 2020-09-01 RX ORDER — PANTOPRAZOLE SODIUM 20 MG/1
20 TABLET, DELAYED RELEASE ORAL
Status: DISCONTINUED | OUTPATIENT
Start: 2020-09-02 | End: 2020-09-03 | Stop reason: HOSPADM

## 2020-09-01 RX ADMIN — HYDROCODONE BITARTRATE AND ACETAMINOPHEN 1 TABLET: 5; 325 TABLET ORAL at 01:03

## 2020-09-01 RX ADMIN — HYDROMORPHONE HYDROCHLORIDE 0.5 MG: 1 INJECTION, SOLUTION INTRAMUSCULAR; INTRAVENOUS; SUBCUTANEOUS at 02:07

## 2020-09-01 RX ADMIN — CILOSTAZOL 50 MG: 100 TABLET ORAL at 08:11

## 2020-09-01 RX ADMIN — HYDROCODONE BITARTRATE AND ACETAMINOPHEN 1 TABLET: 5; 325 TABLET ORAL at 00:06

## 2020-09-01 RX ADMIN — DILTIAZEM HYDROCHLORIDE 120 MG: 120 CAPSULE, COATED, EXTENDED RELEASE ORAL at 08:11

## 2020-09-01 RX ADMIN — ENOXAPARIN SODIUM 40 MG: 40 INJECTION SUBCUTANEOUS at 12:25

## 2020-09-01 RX ADMIN — AMIODARONE HYDROCHLORIDE 200 MG: 200 TABLET ORAL at 08:11

## 2020-09-01 RX ADMIN — Medication 10 ML: at 20:56

## 2020-09-01 RX ADMIN — HYDROCODONE BITARTRATE AND ACETAMINOPHEN 2 TABLET: 5; 325 TABLET ORAL at 05:11

## 2020-09-01 RX ADMIN — CEFAZOLIN SODIUM 2 G: 10 INJECTION, POWDER, FOR SOLUTION INTRAVENOUS at 03:18

## 2020-09-01 RX ADMIN — HYDROCODONE BITARTRATE AND ACETAMINOPHEN 2 TABLET: 5; 325 TABLET ORAL at 21:00

## 2020-09-01 RX ADMIN — HYDROCODONE BITARTRATE AND ACETAMINOPHEN 2 TABLET: 5; 325 TABLET ORAL at 09:36

## 2020-09-01 RX ADMIN — CILOSTAZOL 50 MG: 100 TABLET ORAL at 20:56

## 2020-09-01 RX ADMIN — ASPIRIN 81 MG: 81 TABLET, COATED ORAL at 08:11

## 2020-09-01 RX ADMIN — Medication 10 ML: at 08:14

## 2020-09-01 RX ADMIN — LISINOPRIL 20 MG: 20 TABLET ORAL at 08:11

## 2020-09-01 RX ADMIN — HYDROMORPHONE HYDROCHLORIDE 0.5 MG: 1 INJECTION, SOLUTION INTRAMUSCULAR; INTRAVENOUS; SUBCUTANEOUS at 14:14

## 2020-09-01 ASSESSMENT — PAIN DESCRIPTION - ORIENTATION
ORIENTATION: RIGHT;LEFT
ORIENTATION: LEFT
ORIENTATION: RIGHT;LEFT
ORIENTATION: LEFT;RIGHT
ORIENTATION: RIGHT;LEFT

## 2020-09-01 ASSESSMENT — PAIN SCALES - GENERAL
PAINLEVEL_OUTOF10: 4
PAINLEVEL_OUTOF10: 8
PAINLEVEL_OUTOF10: 3
PAINLEVEL_OUTOF10: 2
PAINLEVEL_OUTOF10: 7
PAINLEVEL_OUTOF10: 7
PAINLEVEL_OUTOF10: 0
PAINLEVEL_OUTOF10: 7
PAINLEVEL_OUTOF10: 4
PAINLEVEL_OUTOF10: 7
PAINLEVEL_OUTOF10: 7
PAINLEVEL_OUTOF10: 4
PAINLEVEL_OUTOF10: 7

## 2020-09-01 ASSESSMENT — PAIN DESCRIPTION - LOCATION
LOCATION: LEG
LOCATION: LEG;GROIN
LOCATION: LEG

## 2020-09-01 ASSESSMENT — PAIN DESCRIPTION - FREQUENCY: FREQUENCY: CONTINUOUS

## 2020-09-01 ASSESSMENT — PAIN DESCRIPTION - PAIN TYPE
TYPE: SURGICAL PAIN
TYPE: ACUTE PAIN;CHRONIC PAIN;SURGICAL PAIN
TYPE: CHRONIC PAIN;ACUTE PAIN
TYPE: SURGICAL PAIN
TYPE: ACUTE PAIN;CHRONIC PAIN;SURGICAL PAIN
TYPE: SURGICAL PAIN
TYPE: ACUTE PAIN;SURGICAL PAIN
TYPE: SURGICAL PAIN

## 2020-09-01 ASSESSMENT — PAIN DESCRIPTION - PROGRESSION: CLINICAL_PROGRESSION: NOT CHANGED

## 2020-09-01 ASSESSMENT — PAIN - FUNCTIONAL ASSESSMENT: PAIN_FUNCTIONAL_ASSESSMENT: ACTIVITIES ARE NOT PREVENTED

## 2020-09-01 ASSESSMENT — PAIN DESCRIPTION - ONSET: ONSET: ON-GOING

## 2020-09-01 ASSESSMENT — PAIN DESCRIPTION - DESCRIPTORS: DESCRIPTORS: THROBBING

## 2020-09-01 NOTE — PLAN OF CARE
Problem: Falls - Risk of:  Goal: Will remain free from falls  Description: Will remain free from falls  Outcome: Met This Shift  Goal: Absence of physical injury  Description: Absence of physical injury  Outcome: Met This Shift     Problem: Skin Integrity:  Goal: Will show no infection signs and symptoms  Description: Will show no infection signs and symptoms  Outcome: Ongoing  Goal: Absence of new skin breakdown  Description: Absence of new skin breakdown  Outcome: Ongoing     Problem: Pain:  Description: Pain management should include both nonpharmacologic and pharmacologic interventions.   Goal: Pain level will decrease  Description: Pain level will decrease  Outcome: Ongoing  Goal: Control of acute pain  Description: Control of acute pain  Outcome: Ongoing  Goal: Control of chronic pain  Description: Control of chronic pain  Outcome: Ongoing

## 2020-09-01 NOTE — PROGRESS NOTES
Vascular Surgery  Dr.Victoria Bell   Daily Progress Note    Pt Name: [address] Record Number: [id number]  Date of Birth 1945   Today's Date: 9/1/2020    SUBJECTIVE:     Patient was seen and examined. Pain is controlled with pain meds, stating incision lines very tender, also left 3rd toe very tender  has not had nausea/vomiting    OBJECTIVE:     Patient Vitals for the past 24 hrs:   BP Temp Temp src Pulse Resp SpO2 Weight   09/01/20 0600 (!) 133/57 -- -- 72 14 99 % --   09/01/20 0500 (!) 136/50 -- -- 60 12 92 % --   09/01/20 0400 (!) 158/56 97 °F (36.1 °C) Temporal 73 16 98 % 154 lb 12.8 oz (70.2 kg)   09/01/20 0300 (!) 125/49 -- -- 62 10 96 % --   09/01/20 0200 (!) 131/59 -- -- 70 13 96 % --   09/01/20 0100 125/62 -- -- 61 14 98 % --   09/01/20 0000 (!) 150/52 97.1 °F (36.2 °C) Temporal 69 19 99 % --   08/31/20 2300 (!) 126/45 -- -- 59 12 96 % --   08/31/20 2200 (!) 135/47 -- -- 73 9 98 % --   08/31/20 2100 (!) 133/53 -- -- 69 13 97 % --   08/31/20 2000 (!) 139/57 98.3 °F (36.8 °C) Temporal 77 13 98 % --   08/31/20 1900 (!) 135/57 -- -- 69 (!) 0 97 % --   08/31/20 1834 -- -- -- -- (!) 0 97 % --   08/31/20 1716 (!) 136/55 -- -- 76 13 94 % --   08/31/20 1702 (!) 155/54 -- -- 66 9 97 % --   08/31/20 1651 119/89 -- -- 74 9 96 % --   08/31/20 1645 -- -- -- 72 10 96 % --   08/31/20 1620 -- -- -- 72 13 98 % --   08/31/20 1619 (!) 173/57 -- -- 73 13 98 % --   08/31/20 1600 (!) 121/43 -- -- 71 10 97 % --   08/31/20 1530 124/67 97.6 °F (36.4 °C) Temporal 81 12 96 % --   08/31/20 1525 117/61 -- -- 82 14 96 % --   08/31/20 1520 (!) 131/42 -- -- 85 12 94 % --   08/31/20 1515 (!) 136/51 98.1 °F (36.7 °C) Temporal 88 13 91 % --       Intake/Output Summary (Last 24 hours) at 9/1/2020 0847  Last data filed at 9/1/2020 0600  Gross per 24 hour   Intake 3918.75 ml   Output 2765 ml   Net 1153.75 ml     In: 1418.8 [P.O.:320; I.V.:998.8]  Out: 815 [Urine:815]    I/O last 3 completed shifts:   In: 3918.8 [P.O.:320; to left medial dorsal ankle, left lateral dorsal foot with X5 small dry ulcers, and left 3rd toe with large ulcer to dorsal tip with eschar/edema/and some erythema. LABS:     CBC:   Recent Labs     08/31/20  1532 09/01/20  0345   WBC 10.5 12.9*   RBC 3.61* 2.98*   HGB 11.5* 9.4*   HCT 35.5* 28.8*   MCV 98.3* 96.6*   MCH 31.9* 31.5*   MCHC 32.4* 32.6*   RDW 14.6* 14.4    178   MPV 8.9* 9.2*      Last 3 CMP:   Recent Labs     09/01/20  0345      K 4.3      CO2 23   BUN 15   CREATININE 0.7   GLUCOSE 125*   CALCIUM 8.2*   PROT 5.3*   LABALBU 3.1*   BILITOT 0.3   ALKPHOS 66   AST 13   ALT 9    Calcium:   Lab Results   Component Value Date    CALCIUM 8.2 09/01/2020    CALCIUM 9.5 08/21/2020    CALCIUM 9.0 07/10/2020        DVT prophylaxis:                                  [x] SCDs                                    ASSESSMENT:     Procedure 8/31/20 (s):  PLACEMENT VBX AORTIC STENT WITH BILATERAL KISSING ILIAC STENTS WITH COMMON FEMORAL ARTERY ENDARTERECTOMY WITH VEIN PATCH     1. HD # 1  Patient Active Problem List   Diagnosis    Aortoiliac occlusive disease ([address]   2.     Chief Complaint:  No chief complaint on file. PLAN:     1. IV KVO  2. Out of bed today with assist  3. Call to [facility] for wound care orders. Patient has follow up with  at [facility] on 9/14/20 at 11:00.   4. Aquacel AG moistened with drops of sterile normal saline to all left foot wounds daily. Daily dressing changes to bilateral groin and right medial ankle incision lines  5. Will fax Op note and progress note to [facility] as per requested [phone]  6. Okay to transfer to 3rd floor today, Vascular side  7.    Consult  for assist with incision care

## 2020-09-02 LAB
ANION GAP SERPL CALCULATED.3IONS-SCNC: 10 MMOL/L (ref 7–19)
BUN BLDV-MCNC: 15 MG/DL (ref 8–23)
CALCIUM SERPL-MCNC: 8.6 MG/DL (ref 8.8–10.2)
CHLORIDE BLD-SCNC: 107 MMOL/L (ref 98–111)
CO2: 25 MMOL/L (ref 22–29)
CREAT SERPL-MCNC: 0.7 MG/DL (ref 0.5–1.2)
GFR AFRICAN AMERICAN: >59
GFR NON-AFRICAN AMERICAN: >60
GLUCOSE BLD-MCNC: 98 MG/DL (ref 74–109)
HCT VFR BLD CALC: 31.7 % (ref 42–52)
HEMOGLOBIN: 10.2 G/DL (ref 14–18)
MCH RBC QN AUTO: 32.4 PG (ref 27–31)
MCHC RBC AUTO-ENTMCNC: 32.2 G/DL (ref 33–37)
MCV RBC AUTO: 100.6 FL (ref 80–94)
PDW BLD-RTO: 14.6 % (ref 11.5–14.5)
PLATELET # BLD: 181 K/UL (ref 130–400)
PMV BLD AUTO: 9.3 FL (ref 9.4–12.4)
POTASSIUM SERPL-SCNC: 3.7 MMOL/L (ref 3.5–5)
RBC # BLD: 3.15 M/UL (ref 4.7–6.1)
SODIUM BLD-SCNC: 142 MMOL/L (ref 136–145)
WBC # BLD: 13.3 K/UL (ref 4.8–10.8)

## 2020-09-02 PROCEDURE — 85027 COMPLETE CBC AUTOMATED: CPT

## 2020-09-02 PROCEDURE — 6370000000 HC RX 637 (ALT 250 FOR IP): Performed by: NURSE PRACTITIONER

## 2020-09-02 PROCEDURE — 36415 COLL VENOUS BLD VENIPUNCTURE: CPT

## 2020-09-02 PROCEDURE — 99024 POSTOP FOLLOW-UP VISIT: CPT | Performed by: SURGERY

## 2020-09-02 PROCEDURE — 93922 UPR/L XTREMITY ART 2 LEVELS: CPT

## 2020-09-02 PROCEDURE — 80048 BASIC METABOLIC PNL TOTAL CA: CPT

## 2020-09-02 PROCEDURE — 1210000000 HC MED SURG R&B

## 2020-09-02 PROCEDURE — 6370000000 HC RX 637 (ALT 250 FOR IP): Performed by: SURGERY

## 2020-09-02 PROCEDURE — 6360000002 HC RX W HCPCS: Performed by: NURSE PRACTITIONER

## 2020-09-02 PROCEDURE — 2580000003 HC RX 258: Performed by: NURSE PRACTITIONER

## 2020-09-02 RX ORDER — HYDROCODONE BITARTRATE AND ACETAMINOPHEN 10; 325 MG/1; MG/1
1 TABLET ORAL EVERY 4 HOURS PRN
Status: DISCONTINUED | OUTPATIENT
Start: 2020-09-02 | End: 2020-09-03 | Stop reason: HOSPADM

## 2020-09-02 RX ORDER — LIDOCAINE HYDROCHLORIDE 20 MG/ML
JELLY TOPICAL ONCE
Status: DISCONTINUED | OUTPATIENT
Start: 2020-09-02 | End: 2020-09-02

## 2020-09-02 RX ORDER — LIDOCAINE HYDROCHLORIDE 20 MG/ML
JELLY TOPICAL ONCE
Status: DISCONTINUED | OUTPATIENT
Start: 2020-09-02 | End: 2020-09-03 | Stop reason: HOSPADM

## 2020-09-02 RX ADMIN — FINASTERIDE 5 MG: 5 TABLET, FILM COATED ORAL at 08:25

## 2020-09-02 RX ADMIN — DILTIAZEM HYDROCHLORIDE 120 MG: 120 CAPSULE, COATED, EXTENDED RELEASE ORAL at 08:25

## 2020-09-02 RX ADMIN — HYDROCODONE BITARTRATE AND ACETAMINOPHEN 2 TABLET: 5; 325 TABLET ORAL at 08:25

## 2020-09-02 RX ADMIN — PANTOPRAZOLE SODIUM 20 MG: 20 TABLET, DELAYED RELEASE ORAL at 06:25

## 2020-09-02 RX ADMIN — ENOXAPARIN SODIUM 40 MG: 40 INJECTION SUBCUTANEOUS at 08:25

## 2020-09-02 RX ADMIN — CILOSTAZOL 50 MG: 100 TABLET ORAL at 08:25

## 2020-09-02 RX ADMIN — ASPIRIN 81 MG: 81 TABLET, COATED ORAL at 08:25

## 2020-09-02 RX ADMIN — HYDROCODONE BITARTRATE AND ACETAMINOPHEN 2 TABLET: 5; 325 TABLET ORAL at 04:20

## 2020-09-02 RX ADMIN — LISINOPRIL 20 MG: 20 TABLET ORAL at 08:25

## 2020-09-02 RX ADMIN — CILOSTAZOL 50 MG: 100 TABLET ORAL at 20:13

## 2020-09-02 RX ADMIN — Medication 10 ML: at 20:13

## 2020-09-02 RX ADMIN — HYDROCODONE BITARTRATE AND ACETAMINOPHEN 1 TABLET: 10; 325 TABLET ORAL at 16:26

## 2020-09-02 RX ADMIN — Medication 10 ML: at 08:26

## 2020-09-02 RX ADMIN — AMIODARONE HYDROCHLORIDE 200 MG: 200 TABLET ORAL at 08:25

## 2020-09-02 ASSESSMENT — PAIN SCALES - GENERAL
PAINLEVEL_OUTOF10: 9
PAINLEVEL_OUTOF10: 7
PAINLEVEL_OUTOF10: 7
PAINLEVEL_OUTOF10: 0

## 2020-09-02 ASSESSMENT — PAIN DESCRIPTION - DESCRIPTORS
DESCRIPTORS: THROBBING
DESCRIPTORS: THROBBING

## 2020-09-02 ASSESSMENT — PAIN - FUNCTIONAL ASSESSMENT
PAIN_FUNCTIONAL_ASSESSMENT: ACTIVITIES ARE NOT PREVENTED
PAIN_FUNCTIONAL_ASSESSMENT: ACTIVITIES ARE NOT PREVENTED

## 2020-09-02 ASSESSMENT — PAIN DESCRIPTION - PAIN TYPE
TYPE: ACUTE PAIN
TYPE: ACUTE PAIN;SURGICAL PAIN

## 2020-09-02 ASSESSMENT — PAIN DESCRIPTION - PROGRESSION
CLINICAL_PROGRESSION: GRADUALLY WORSENING
CLINICAL_PROGRESSION: GRADUALLY WORSENING

## 2020-09-02 ASSESSMENT — PAIN DESCRIPTION - ORIENTATION
ORIENTATION: RIGHT;LEFT
ORIENTATION: RIGHT;LEFT

## 2020-09-02 ASSESSMENT — PAIN DESCRIPTION - FREQUENCY
FREQUENCY: CONTINUOUS
FREQUENCY: CONTINUOUS

## 2020-09-02 ASSESSMENT — PAIN DESCRIPTION - ONSET
ONSET: ON-GOING
ONSET: ON-GOING

## 2020-09-02 ASSESSMENT — PAIN DESCRIPTION - LOCATION
LOCATION: LEG
LOCATION: LEG

## 2020-09-02 NOTE — PROGRESS NOTES
Vascular Preliminary Report  B/L LE MACARENA studies performed. Right PTA:  0.53  Left PTA:  0.40           DPA:  0.44        DPA:  0.47           TOE:  0.27        TOE:  0.24    Final report pending.

## 2020-09-02 NOTE — PROGRESS NOTES
Vascular Surgery  Dr.Victoria Bell   Daily Progress Note    Pt Name: [address] Record Number: [id number]  Date of Birth 1945   Today's Date: 9/2/2020    SUBJECTIVE:     Patient was seen and examined. Pain is controlled with oral pain meds  has not had nausea/vomiting  Stating his incision lines are really tender,left foot wounds hurt more today     OBJECTIVE:     Patient Vitals for the past 24 hrs:   BP Temp Temp src Pulse Resp SpO2   09/02/20 0622 (!) 130/53 97.3 °F (36.3 °C) -- 65 18 98 %   09/02/20 0000 (!) 112/53 97.7 °F (36.5 °C) Temporal 69 14 96 %   09/01/20 2101 -- -- -- 66 -- --   09/01/20 1840 (!) 134/59 97 °F (36.1 °C) Temporal 66 16 96 %   09/01/20 1200 (!) 118/93 98.8 °F (37.1 °C) Temporal 75 15 96 %   09/01/20 0900 (!) 131/46 -- -- 57 16 98 %   09/01/20 0800 (!) 140/52 98.9 °F (37.2 °C) Temporal 59 11 96 %       Intake/Output Summary (Last 24 hours) at 9/2/2020 0720  Last data filed at 9/2/2020 0422  Gross per 24 hour   Intake 420 ml   Output 750 ml   Net -330 ml     In: 240 [P.O.:240]  Out: 300 [Urine:300]    I/O last 3 completed shifts: In: 5 [P.O.:420]  Out: 750 [Urine:750]     Date 09/02/20 0000 - 09/02/20 2359   Shift 4629-3946 5523-1486 3744-6683 24 Hour Total   INTAKE   Shift Total(mL/kg)       OUTPUT   Urine(mL/kg/hr) 300   300   Shift Total(mL/kg) 300(4.3)   300(4.3)   Weight (kg) 70.2 70.2 70.2 70.2     Wt Readings from Last 3 Encounters:   09/01/20 154 lb 12.8 oz (70.2 kg)   08/27/20 160 lb (72.6 kg)   07/22/20 160 lb (72.6 kg)        Body mass index is 23.54 kg/m².      Diet: DIET CARDIAC;      MEDS:     Scheduled Meds:   pantoprazole  20 mg Oral QAM AC    enoxaparin  40 mg Subcutaneous Daily    amiodarone  200 mg Oral Daily    aspirin  81 mg Oral Daily    cilostazol  50 mg Oral BID    dilTIAZem  120 mg Oral Daily    finasteride  5 mg Oral Daily    lisinopril  20 mg Oral Daily    sodium chloride flush  10 mL Intravenous 2 times per day     Continuous Infusions:   sodium chloride 30 mL/hr (09/01/20 1032)     PRN Meds:sodium chloride flush, 10 mL, PRN  acetaminophen, 650 mg, Q4H PRN  HYDROcodone 5 mg - acetaminophen, 1 tablet, Q4H PRN    Or  HYDROcodone 5 mg - acetaminophen, 2 tablet, Q4H PRN  ondansetron, 8 mg, Q8H PRN        PHYSICAL EXAM:     CONSTITUTIONAL: Alert and oriented times 3, no acute distress and cooperative to examination. LUNGS: Clear bilaterally anteriorly, diminished lower lobe sounds  CARDIOVASCULAR: Heart regular rate and rhythm  ABDOMEN: soft, nontender, nondistended  NEUROLOGIC: Awake, alert, oriented to name, place and time. WOUND/INCISION:  Bilateral groin incision lines with Mepilex dressings intact. RLE incision line with staples, is CDI. Left foot wounds with Aquacel AG and Mepilex over sites. EXTREMITY: Feet warm to touch, doppler signals bilaterally PT/DP are monophasic    LABS:     CBC:   Recent Labs     08/31/20  1532 09/01/20  0345   WBC 10.5 12.9*   RBC 3.61* 2.98*   HGB 11.5* 9.4*   HCT 35.5* 28.8*   MCV 98.3* 96.6*   MCH 31.9* 31.5*   MCHC 32.4* 32.6*   RDW 14.6* 14.4    178   MPV 8.9* 9.2*      Last 3 CMP:   Recent Labs     09/01/20  0345      K 4.3      CO2 23   BUN 15   CREATININE 0.7   GLUCOSE 125*   CALCIUM 8.2*   PROT 5.3*   LABALBU 3.1*   BILITOT 0.3   ALKPHOS 66   AST 13   ALT 9        Calcium:   Lab Results   Component Value Date    CALCIUM 8.2 09/01/2020    CALCIUM 9.5 08/21/2020    CALCIUM 9.0 07/10/2020          DVT prophylaxis: [x] Lovenox                                    ASSESSMENT:     Procedure 8/31/20 (s):  PLACEMENT VBX AORTIC STENT WITH BILATERAL KISSING ILIAC STENTS WITH COMMON FEMORAL ARTERY ENDARTERECTOMY WITH VEIN PATCH  1. HD # 2  Patient Active Problem List   Diagnosis    Aortoiliac occlusive disease ([facility] [address]   2.     Chief Complaint:  No chief complaint on file. PLAN:     1. IV to saline lock  2. Increase activity, patient uses cane at home and has with him  3.  Post op MACARENA today  4. Op note and rounds note faxed to [address] yesterday ([phone]). Patient has F/U with  at [address] [place] [address],[address] on 9/14/20 at 11:00 for his left foot wounds.   5. HH consulted

## 2020-09-02 NOTE — CARE COORDINATION
Spoke to patient regarding MD orders for home health services. Patient was agreeable. Has [facility] as [facility]or source. Have to send to [place] to approve and set up with home health agency they are contracted with. Spoke with Blaine galloway at the [address] [place] [address]. Referral faxed to [facility] with Attn:  Gricelda Lynn  [facility]  [phone]   Fax  [phone].   Please notify [facility] at discharge and fax DC Summary and med list.  Electronically signed by Boris Gonzalez RN on 9/2/20 at 11:59 AM CDT

## 2020-09-03 VITALS
DIASTOLIC BLOOD PRESSURE: 60 MMHG | SYSTOLIC BLOOD PRESSURE: 138 MMHG | RESPIRATION RATE: 18 BRPM | WEIGHT: 164.4 LBS | TEMPERATURE: 98.1 F | HEART RATE: 72 BPM | OXYGEN SATURATION: 90 % | HEIGHT: 68 IN | BODY MASS INDEX: 24.92 KG/M2

## 2020-09-03 PROCEDURE — 0JDR0ZZ EXTRACTION OF LEFT FOOT SUBCUTANEOUS TISSUE AND FASCIA, OPEN APPROACH: ICD-10-PCS | Performed by: SURGERY

## 2020-09-03 PROCEDURE — 6370000000 HC RX 637 (ALT 250 FOR IP): Performed by: NURSE PRACTITIONER

## 2020-09-03 PROCEDURE — 99024 POSTOP FOLLOW-UP VISIT: CPT | Performed by: NURSE PRACTITIONER

## 2020-09-03 PROCEDURE — 97597 DBRDMT OPN WND 1ST 20 CM/<: CPT | Performed by: SURGERY

## 2020-09-03 PROCEDURE — 2580000003 HC RX 258: Performed by: NURSE PRACTITIONER

## 2020-09-03 PROCEDURE — 6360000002 HC RX W HCPCS: Performed by: NURSE PRACTITIONER

## 2020-09-03 PROCEDURE — 6370000000 HC RX 637 (ALT 250 FOR IP): Performed by: SURGERY

## 2020-09-03 RX ORDER — CLOPIDOGREL BISULFATE 75 MG/1
300 TABLET ORAL ONCE
Status: COMPLETED | OUTPATIENT
Start: 2020-09-03 | End: 2020-09-03

## 2020-09-03 RX ORDER — HYDROCODONE BITARTRATE AND ACETAMINOPHEN 10; 325 MG/1; MG/1
1 TABLET ORAL EVERY 4 HOURS PRN
Qty: 20 TABLET | Refills: 0
Start: 2020-09-03 | End: 2020-09-10

## 2020-09-03 RX ORDER — CLOPIDOGREL BISULFATE 75 MG/1
75 TABLET ORAL DAILY
Status: DISCONTINUED | OUTPATIENT
Start: 2020-09-04 | End: 2020-09-03 | Stop reason: HOSPADM

## 2020-09-03 RX ORDER — CLOPIDOGREL BISULFATE 75 MG/1
75 TABLET ORAL DAILY
Qty: 30 TABLET | Refills: 3 | Status: SHIPPED | OUTPATIENT
Start: 2020-09-04 | End: 2020-10-20

## 2020-09-03 RX ADMIN — CLOPIDOGREL BISULFATE 300 MG: 75 TABLET ORAL at 13:22

## 2020-09-03 RX ADMIN — ENOXAPARIN SODIUM 40 MG: 40 INJECTION SUBCUTANEOUS at 08:04

## 2020-09-03 RX ADMIN — CILOSTAZOL 50 MG: 100 TABLET ORAL at 08:04

## 2020-09-03 RX ADMIN — LISINOPRIL 20 MG: 20 TABLET ORAL at 08:03

## 2020-09-03 RX ADMIN — ASPIRIN 81 MG: 81 TABLET, COATED ORAL at 08:03

## 2020-09-03 RX ADMIN — DILTIAZEM HYDROCHLORIDE 120 MG: 120 CAPSULE, COATED, EXTENDED RELEASE ORAL at 08:03

## 2020-09-03 RX ADMIN — Medication 10 ML: at 08:10

## 2020-09-03 RX ADMIN — PANTOPRAZOLE SODIUM 20 MG: 20 TABLET, DELAYED RELEASE ORAL at 06:02

## 2020-09-03 RX ADMIN — AMIODARONE HYDROCHLORIDE 200 MG: 200 TABLET ORAL at 08:03

## 2020-09-03 RX ADMIN — FINASTERIDE 5 MG: 5 TABLET, FILM COATED ORAL at 08:03

## 2020-09-03 ASSESSMENT — PAIN SCALES - GENERAL: PAINLEVEL_OUTOF10: 0

## 2020-09-03 NOTE — CARE COORDINATION
[address] [facility] notified of patient discharge today and need for admission tomorrow. DC Summary and DC med list faxed.   Electronically signed by Durga Birmingham RN on 9/3/20 at 2:18 PM CDT

## 2020-09-03 NOTE — CARE COORDINATION
[facility] to [address] [facility] [address]  [phone].   Electronically signed by Vicky Diaz RN on 9/3/20 at 1:35 PM CDT

## 2020-09-03 NOTE — PLAN OF CARE
Problem: Falls - Risk of:  Goal: Will remain free from falls  Description: Will remain free from falls  9/3/2020 0100 by Tiarra Encarnacion RN  Outcome: Ongoing  9/2/2020 1839 by Estelle Marie RN  Outcome: Ongoing  Goal: Absence of physical injury  Description: Absence of physical injury  9/3/2020 0100 by Tiarra Encarnacion RN  Outcome: Ongoing  9/2/2020 1839 by Estelle Marie RN  Outcome: Ongoing     Problem: Skin Integrity:  Goal: Will show no infection signs and symptoms  Description: Will show no infection signs and symptoms  9/3/2020 0100 by Tiarra Encarnacion RN  Outcome: Ongoing  9/2/2020 1839 by Estelle Marie RN  Outcome: Ongoing  Goal: Absence of new skin breakdown  Description: Absence of new skin breakdown  9/3/2020 0100 by Tiarra Encarnacion RN  Outcome: Ongoing  9/2/2020 1839 by Estelle Marie RN  Outcome: Ongoing     Problem: Pain:  Goal: Pain level will decrease  Description: Pain level will decrease  9/3/2020 0100 by Tiarra Encarnacion RN  Outcome: Ongoing  9/2/2020 1839 by Estelle Marie RN  Outcome: Ongoing  Goal: Control of acute pain  Description: Control of acute pain  9/3/2020 0100 by Tiarra Encarnacion RN  Outcome: Ongoing  9/2/2020 1839 by Estelle Marie RN  Outcome: Ongoing  Goal: Control of chronic pain  Description: Control of chronic pain  9/3/2020 0100 by Tiarra Encarnacion RN  Outcome: Ongoing  9/2/2020 1839 by Estelle Marie RN  Outcome: Ongoing

## 2020-09-04 ENCOUNTER — TELEPHONE (OUTPATIENT)
Dept: VASCULAR SURGERY | Age: 75
End: 2020-09-04

## 2020-09-04 NOTE — TELEPHONE ENCOUNTER
----- Message from JANEY Farfan sent at 9/4/2020  9:50 AM CDT -----  Please call patient and clarify with him that his wound care appt is at [facility] with dr. Cuauhtemoc Woo. His post op is with us. I think they accidentally typed [facility] for follow up but its not. Its with dr. Cuauhtemoc Woo.   It was a typo on papers  ----- Message -----  From: Quentin Bermeo DO  Sent: 9/3/2020   5:41 PM CDT  To: JANEY Farfan

## 2020-09-09 NOTE — OP NOTE
Operative Note      Patient: Antoine Carney  YOB: 1945  MRN: [id number]      Procedure Note:  Wound: left heel and left dorsal foot wounds     Debridement: Non-excisional Debridement    Anesthetic:  viscous lidocaine  Using curette the wound was sharply debrided    down through and including the removal of slough. Total Surface Area Debrided:  0.2 x 0.2x 0.1 deep dorsal foot  and 0.3 x 0.2 x 0.1 deep heel  Devitalized Tissue Debrided:  slough    Percent of Wound Debrided: 100%      Bleeding: None    Hemostasis:   not needed      Response to treatment:  Well tolerated by patient.               Electronically signed by Mack Olivia DO on 9/9/2020 at 4:28 PM

## 2020-09-09 NOTE — OP NOTE
Operative Note      Patient: Myrna Shannon  YOB: 1945  MRN: [id number]    Date of Procedure: 8/31/2020    Pre-Op Diagnosis: I70.244    Post-Op Diagnosis: Same       Procedure(s):  PLACEMENT VBX AORTIC STENT WITH BILATERAL KISSING ILIAC STENTS WITH COMMON FEMORAL ARTERY ENDARTERECTOMY WITH VEIN PATCH    Surgeon(s):  MD Betty Huber Dr. was involved in the planning of the procedure, was present and assisted with opening and exposure of left femoral artery, percutaneous access via left brachial artery, performing endarterectomy of left common femoral artery, stenting and evaluation and closure of the procedure. Assistant:   * No surgical staff found *    Anesthesia: General    Estimated Blood Loss (mL): 9777    Complications: None    Specimens:   ID Type Source Tests Collected by Time Destination   1 :  Blood Arm APTT Nugent Solvesta, DO 8/31/2020 1159    A : [facility], DO 8/31/2020 1402        Implants:  Implant Name Type Inv.  Item Serial No.  Lot No. LRB No. Used Action   STENT ENDOPROSTHESIS VIABAHN [id number] - [de-identified] Stent:Coronary:Bare Metal STENT ENDOPROSTHESIS VIABAHN [id number] [de-identified] [employer]   1 Implanted   BALLOON CATH VIABAHN BX [id number] - [de-identified] Vascular/Graft/Patch/Filter BALLOON CATH VIABAHN BX [id number] [de-identified]  [employer]  Left 1 Implanted   BALLOON CATH VIABAHN BX [id number] - [de-identified] Vascular/Graft/Patch/Filter BALLOON CATH VIABAHN BX [id number] [de-identified]  [employer]  Right 1 Implanted   STENT BILI EXP LD [id number] Stent:Biliary/Pancreatic/Iliac STENT BILI EXP LD [id number]  [employer] [id number] Right 1 Implanted   BALLOON CATH VIABAHN BX [id number] - [de-identified] Vascular/Graft/Patch/Filter BALLOON CATH VIABAHN BX [id number] [de-identified]  [employer]  Left 1 Implanted vertical fashion. It was carried down with electrocautery. Common femoral artery was dissected using Metzenbaum scissors. Incision was extended proximally to expose iliac circumflex artery and inferior epigastric. Those were taken and Vesseloops. External iliac artery was also encircled with vessel loop. Distal incision was carried down to expose superficial femoral and profunda femoris artery more extensively on the right side. Superficial femoral artery was known to be occluded on the right side. It was patent on the left side. On the right side profunda branches were encircled with Vesseloops as well as superficial femoral artery. Right femoral artery was cannulated at the softer surface using micropuncture needle, micropuncture wire was inserted and micropuncture sheath was inserted. It was then changed to 7 Singaporean sheath in preparation for future stenting. Same maneuvers were done on the left side. Patient was given 5000u heparin. I attempted to advance my wire through the external iliac occlusion which were not known from previous angiogram and CAT scan. However I was unsuccessful. I used a stiff Glidewire, V 18 wire with Tyler cross catheter. 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 We were unsuccessful in advancing the wire through the occluded proximal common iliac artery. We decided to perform left brachial access and attempt to cross occlusions from the proximal direction. Left brachial artery was accessed under continuous ultrasound guidance using standard micropuncture technique. 5 Singaporean glide sheath was inserted, stiff Glidewire was advanced into the descending aorta and further into infrarenal aorta. The glide sheath was exchanged to 5 x 75 destination Raabi sheath.   Navicross and glide wire were advanced and positioned at the proximal side from common iliac occlusion  left side. Eventually the Glidewire was able to be advanced through the occlusion and into the left common femoral side. Proximal and distal control was gained. The sheath was removed, arteriotomy was extended proximally and distally using Swain scissors. The wire was then grabbed using pickup and completed body flossing. The sheath was then placed over the wire. Next we performed balloon angioplasty using 6 x 100  balloon at the calcified iliac occlusion. Next, we placed Tyler cross catheter over the Glidewire and advanced it beyond the occlusion into juxtarenal aorta. Next, wire was pulled into the proximal brachial sheath and Lunderquist wire was advanced into the catheter into the descending aorta. The catheter was removed. Using the same technique coming from brachial artery with destination sheath we maneuvered Tyler cross catheter advanced the wire into the right common femoral artery. The sheath was removed. Arteriotomy was extended using Swain scissors proximally and distally. And the wire was grabbed and externalized. 7 Tajik sheath was again placed over that wire. The catheter was advanced over the wire into proximal aorta. Again the wire was pulled back into proximal sheath and I placed Amplatz wire and positioned in the proximal aorta. This way I had bilateral stiff wires from bilateral common femoral into the aorta. Right common and external iliac angioplasty was performed using same 6 x 100  balloon. Left common iliac was also dilated using 8 x 100  balloon. We initially stented distal aorta using 11 x 59 VBX balloon expandable covered stent. It was then postdilated using 14 x 40 Crane balloon. Next, we placed bilateral kissing stents coming from both sides simultaneously placing 8 x 79 on the left and 8 x 59 on the right. To preserve right internal iliac artery we placed 8 x 27 express bare-metal balloon expandable stent.   Distal portions, external iliac arteries with high-grade stenosis were treated separate. Left side was treated using 10 x 60 self-expanding Innova stent. Right external iliac artery was treated using 7 x 59 VBX covered balloon expandable stent. Next we performed right common femoral endarterectomy using freer elevator extending into proximal SFA and significantly more into the profunda to perform profundoplasty as it was the only outflow vessel. The vein was marked on the right lower leg using ultrasound and dissected by performing skin incision using 15 blade and dissecting greater saphenous vein using Metzenbaum scissors. Branches were controlled using 3-0 silk and clips, proximally and distally it was also clipped and it was taken out. It was flushed using papaverine solution, opened using Swain scissors. Patch angioplasty was performed using 6-0 Prolene circumferential stitch. During endarterectomy there was injury to posterior wall of the proximal common femoral artery. This was sutured using 6-0 Prolene. During my vein patch and connected those stitch strings as suturing around. Then I placed same sheath over the wire and performed angiogram of the side before completion through the site of the patch. It was seen to have residual plaque just above my endarterectomy site. I deployed 9 x 60 self-expanding epic stent, however it failed to get low enough to cover the plaque. Next I performed 8 x 40 Innova stent placement that successfully treated the segment. Then I performed balloon angioplasty using 8 x 100  balloon. There was no residual stenosis. The sheath and the wire were removed and the patch suturing was completed after standard flushing technique. For hemostasis and required a few repair stitches. Simultaneously left common femoral endarterectomy was performed using same technique. Vein patch angioplasty was also performed using 6-0 Prolene circumferential stitch.   Standard flushing techniques were performed. Angiogram before completion showed no residual stenosis. Bilateral groins were irrigated using saline and closed in layers with 3-0 Vicryl, 4-0 Vicryl and staples. Vein harvest site on the right lower leg was also irrigated with saline and closed with 4-0 Vicryl and staples. Left brachial percutaneous site wire and the sheath were removed and manual pressure held until hemostasis. A-line was placed before procedure on the same extremity and at the end of manual pressure was good A-line tracing and no hematoma. Sterile dressing was placed on all operative sites. Patient tolerated procedure well and was transferred to PACU stable condition.       Electronically signed by Jovanna Stewart DO on 9/9/2020 at 2:48 PM

## 2020-09-15 ENCOUNTER — OFFICE VISIT (OUTPATIENT)
Dept: VASCULAR SURGERY | Age: 75
End: 2020-09-15

## 2020-09-15 ENCOUNTER — TELEPHONE (OUTPATIENT)
Dept: VASCULAR SURGERY | Age: 75
End: 2020-09-15

## 2020-09-15 VITALS — HEART RATE: 88 BPM | OXYGEN SATURATION: 99 % | TEMPERATURE: 98.1 F | RESPIRATION RATE: 18 BRPM

## 2020-09-15 PROCEDURE — 99024 POSTOP FOLLOW-UP VISIT: CPT | Performed by: NURSE PRACTITIONER

## 2020-09-15 RX ORDER — CEPHALEXIN 500 MG/1
500 CAPSULE ORAL 4 TIMES DAILY
Qty: 28 CAPSULE | Refills: 0 | Status: SHIPPED | OUTPATIENT
Start: 2020-09-15 | End: 2020-10-07 | Stop reason: ALTCHOICE

## 2020-09-22 ENCOUNTER — OFFICE VISIT (OUTPATIENT)
Dept: VASCULAR SURGERY | Age: 75
End: 2020-09-22

## 2020-09-22 VITALS
TEMPERATURE: 96.8 F | DIASTOLIC BLOOD PRESSURE: 60 MMHG | HEART RATE: 72 BPM | SYSTOLIC BLOOD PRESSURE: 158 MMHG | OXYGEN SATURATION: 97 % | RESPIRATION RATE: 18 BRPM

## 2020-09-22 PROCEDURE — 99024 POSTOP FOLLOW-UP VISIT: CPT | Performed by: PHYSICIAN ASSISTANT

## 2020-09-22 RX ORDER — DOXYCYCLINE HYCLATE 100 MG
100 TABLET ORAL 2 TIMES DAILY
Qty: 20 TABLET | Refills: 0 | Status: SHIPPED | OUTPATIENT
Start: 2020-09-22 | End: 2020-09-23

## 2020-09-22 NOTE — PROGRESS NOTES
Pio Hines is a 70 y.o. male who presents for post op evaluation. Patient had a PLACEMENT VBX AORTIC STENT WITH BILATERAL KISSING ILIAC STENTS WITH COMMON FEMORAL ARTERY ENDARTERECTOMY WITH VEIN PATCH 2 weeks ago. This was performed by Dr. Pat Claudio. He reports that RLE is more red. He is still taking the Keflex which he thinks is not helping. He is not elevating above the level of the heart. He denies any fever/chills. He is still going to wound care for the wounds on the left foot. He c/o of some pain in the medial left thigh area that he describes as a tightness. On evaluation today,bilateral groin incision are clean, dry, intact. RLE incision at distal aspect of the incision has slightly  with scattered areas of slough noted. He has moderate erythema noted. He  Has BLE swelling noted. He has 2+ DS bilateral DP, PT and peroneal pulses. Today we have recommended he wash all incisions daily with soap and water and apply Santyl to the areas of slough on the RLE incision and  cover with dry gauze until healed. Today we removed the remaining staples in the RLE incision. We will have him finish the remaining keflex and then switch him over to Doxycycline 100 mg BID. We discussed proper elevation. We have recommended continued Plavix and Pletal daily. We will follow up with him in 2 weeks or sooner if he develops fever or deterioration in the wound. This should bring you up to date on Pio Hines  As always we want to thank you for allowing us to participate in the care of your patients.     Sincerely,    Raman Bhakta PA-C

## 2020-09-22 NOTE — LETTER
[facility] [address]  [place] [address]  Phone: [phone]  Fax: [phone]    Xiomara Olson PA-C        September 22, 2020     Patient: Sabrina Knutson   YOB: 1945   Date of Visit: 9/22/2020       To Whom It May Concern: It is my medical opinion that Lazarus Aas may return to work with restriction: No heavy lifting, and No prolonged standing. If you have any questions or concerns, please don't hesitate to call.     Sincerely,        Xiomara Olson PA-C

## 2020-09-23 RX ORDER — DOXYCYCLINE 100 MG/1
100 TABLET ORAL 2 TIMES DAILY
Qty: 20 TABLET | Refills: 0 | Status: SHIPPED | OUTPATIENT
Start: 2020-09-23 | End: 2020-10-03

## 2020-10-07 ENCOUNTER — OFFICE VISIT (OUTPATIENT)
Dept: VASCULAR SURGERY | Age: 75
End: 2020-10-07

## 2020-10-07 VITALS
SYSTOLIC BLOOD PRESSURE: 130 MMHG | HEART RATE: 80 BPM | TEMPERATURE: 98.8 F | OXYGEN SATURATION: 98 % | DIASTOLIC BLOOD PRESSURE: 50 MMHG | RESPIRATION RATE: 18 BRPM

## 2020-10-07 PROCEDURE — 99024 POSTOP FOLLOW-UP VISIT: CPT | Performed by: PHYSICIAN ASSISTANT

## 2020-10-07 NOTE — PROGRESS NOTES
Ken Cardenas is a 70 y.o. male who presents for post op evaluation. Patient had a PLACEMENT VBX AORTIC STENT WITH BILATERAL KISSING ILIAC STENTS WITH COMMON FEMORAL ARTERY ENDARTERECTOMY WITH VEIN [address] on 9/19/2020. This was performed by Dr. Piter Kinsey. He reports that RLE is still red, and swollen. He is not elevating the right leg  above the level of the heart. He denies any fever/chills. He is still going to wound care for the wounds on the left foot. He reports intermittent pain in the right heel. He is not properly off loading his heels. He has finished all his antibiotics. On evaluation today,bilateral groin incision are clean, dry, intact. RLE incision at distal aspect of the incision has slightly  with scattered areas of slough noted. He has moderate erythema noted. His RLE is eczematous. left heel dressing D/I, right heel with no areas of discoloration or wounds noted. He  Has BLE swelling noted. He has 2+ DS bilateral DP, PT and peroneal pulses. Today we manually debrided the RLE incisional wounds under local with 2% Lidocaine. We we have recommended he wash all incisions daily with soap and water and apply Santyl to the areas of slough on the RLE incision and pack wounds  with wet gauze and then cover with dry gauze. We discussed proper elevation. He is to offload his heels at all times. We have recommended continued Plavix and Pletal daily. We will follow up with him in 2 weeks or sooner if he develops fever or deterioration in the wound. This should bring you up to date on Ken Cardenas  As always we want to thank you for allowing us to participate in the care of your patients.     Sincerely,    Brianda Mccormick PA-C

## 2020-10-16 ENCOUNTER — TELEPHONE (OUTPATIENT)
Dept: CARDIOLOGY | Facility: CLINIC | Age: 75
End: 2020-10-16

## 2020-10-16 NOTE — TELEPHONE ENCOUNTER
Spoke with patient and went over Dr. Farias's response.  Patient agreed to contact his vascular physician and expressed understanding over the information I went over with him.

## 2020-10-16 NOTE — TELEPHONE ENCOUNTER
Patient called the office today to report he went to the ER at [facility] due to more rectal bleeding this morning.  They took him off Plavix and changed his ASA from 81 mg daily to 325 mg daily.  He is asking if you approve of this medication change?  He was also told he needed to follow up with us.  I offered him an appt Monday, but he stated he was not able to come then.  An appt was booked in November on a Wednesday per his request.  Records from the ER visit have been scanned into his chart.  Please advise.

## 2020-10-20 RX ORDER — CLOPIDOGREL BISULFATE 75 MG/1
TABLET ORAL
Qty: 30 TABLET | Refills: 3 | Status: SHIPPED | OUTPATIENT
Start: 2020-10-20

## 2020-10-21 ENCOUNTER — OFFICE VISIT (OUTPATIENT)
Dept: INTERNAL MEDICINE | Facility: CLINIC | Age: 75
End: 2020-10-21

## 2020-10-21 ENCOUNTER — OFFICE VISIT (OUTPATIENT)
Dept: VASCULAR SURGERY | Age: 75
End: 2020-10-21

## 2020-10-21 VITALS
SYSTOLIC BLOOD PRESSURE: 118 MMHG | DIASTOLIC BLOOD PRESSURE: 60 MMHG | BODY MASS INDEX: 23.23 KG/M2 | HEIGHT: 67 IN | HEART RATE: 75 BPM | RESPIRATION RATE: 17 BRPM | WEIGHT: 148 LBS | OXYGEN SATURATION: 99 % | TEMPERATURE: 97.8 F

## 2020-10-21 VITALS
SYSTOLIC BLOOD PRESSURE: 152 MMHG | DIASTOLIC BLOOD PRESSURE: 68 MMHG | BODY MASS INDEX: 24.86 KG/M2 | HEIGHT: 68 IN | WEIGHT: 164 LBS

## 2020-10-21 DIAGNOSIS — I10 ESSENTIAL HYPERTENSION: ICD-10-CM

## 2020-10-21 DIAGNOSIS — Z11.59 NEED FOR HEPATITIS C SCREENING TEST: ICD-10-CM

## 2020-10-21 DIAGNOSIS — Z00.00 MEDICARE ANNUAL WELLNESS VISIT, SUBSEQUENT: Primary | ICD-10-CM

## 2020-10-21 DIAGNOSIS — S81.801A WOUND OF RIGHT LOWER EXTREMITY, INITIAL ENCOUNTER: ICD-10-CM

## 2020-10-21 PROCEDURE — 99024 POSTOP FOLLOW-UP VISIT: CPT | Performed by: PHYSICIAN ASSISTANT

## 2020-10-21 PROCEDURE — G0439 PPPS, SUBSEQ VISIT: HCPCS | Performed by: INTERNAL MEDICINE

## 2020-10-21 RX ORDER — ASPIRIN 81 MG/1
81 TABLET ORAL DAILY
COMMUNITY

## 2020-10-21 NOTE — PROGRESS NOTES
The ABCs of the Annual Wellness Visit  Subsequent Medicare Wellness Visit    Chief Complaint   Patient presents with   • Medicare Wellness-subsequent       Subjective   History of Present Illness:  Kingsley Lerma is a 75 y.o. male who presents for a Subsequent Medicare Wellness Visit.    Here for his annual wellness visit.  At the end of August he had thromboendarterectomy of the right common femoral with aortic stent with bilateral kissing iliac stents    He went to [facility] last week for another GI bleed.  He was taken off Plavix.  He was previously taken off of Xarelto for GI bleeding.  The patient contacted Dr. Farias and was told 81 mg of aspirin was likely sufficient and the patient was taking Plavix due to his peripheral arterial disease, not for stroke prophylaxis.  The patient did call [facility] vascular surgery and they were fine with discontinuing the Plavix per patient.    [facility] was 11 with an A1c of 5.5.    He still has a nonhealing wound of his right leg where the vein was harvested.  He is going to see his vascular surgeon today.  He continues to see [facility] wound care for nonhealing wound of the left foot but has not seen them for his right leg      HEALTH RISK ASSESSMENT    Recent Hospitalizations:  Recently treated at the following:  Other: [facility]    Current Medical Providers:  Patient Care Team:  BARRY Urrutia MD as PCP - General (Internal Medicine)    Smoking Status:  Social History     Tobacco Use   Smoking Status Current Every Day Smoker   • Packs/day: 0.50   • Types: Cigarettes, Cigars   Smokeless Tobacco Never Used       Alcohol Consumption:  Social History     Substance and Sexual Activity   Alcohol Use Yes   • Alcohol/week: 2.0 standard drinks   • Types: 2 Glasses of wine per week   • Frequency: Never       Depression Screen:   PHQ-2/PHQ-9 Depression Screening 10/21/2020   Little interest or pleasure in doing  things 0   Feeling down, depressed, or hopeless 0   Total Score 0       Fall Risk Screen:  LIZY Fall Risk Assessment was completed, and patient is at MODERATE risk for falls. Assessment completed on:10/21/2020    Health Habits and Functional and Cognitive Screening:  Functional & Cognitive Status 10/21/2020   Do you have difficulty preparing food and eating? No   Do you have difficulty bathing yourself, getting dressed or grooming yourself? No   Do you have difficulty using the toilet? No   Do you have difficulty moving around from place to place? Yes   Do you have trouble with steps or getting out of a bed or a chair? Yes   Current Diet Well Balanced Diet   Dental Exam Not up to date   Eye Exam Up to date   Exercise (times per week) 0 times per week   Current Exercise Activities Include None   Do you need help using the phone?  No   Are you deaf or do you have serious difficulty hearing?  No   Do you need help with transportation? Yes   Do you need help shopping? Yes   Do you need help preparing meals?  No   Do you need help with housework?  No   Do you need help with laundry? No   Do you need help taking your medications? No   Do you need help managing money? No   Do you ever drive or ride in a car without wearing a seat belt? No   Have you felt unusual stress, anger or loneliness in the last month? Yes   Who do you live with? Alone   If you need help, do you have trouble finding someone available to you? No   Have you been bothered in the last four weeks by sexual problems? No   Do you have difficulty concentrating, remembering or making decisions? No         Does the patient have evidence of cognitive impairment? No    Asprin use counseling:Taking ASA appropriately as indicated    Age-appropriate Screening Schedule:  Refer to the list below for future screening recommendations based on patient's age, sex and/or medical conditions. Orders for these recommended tests are listed in the plan section. The patient  has been provided with a written plan.    Health Maintenance   Topic Date Due   • TDAP/TD VACCINES (1 - Tdap) 09/19/1966   • ZOSTER VACCINE (1 of 2) 09/19/1997   • INFLUENZA VACCINE  08/01/2020   • COLONOSCOPY  06/08/2030          The following portions of the patient's history were reviewed and updated as appropriate: allergies, current medications, past family history, past medical history, past social history, past surgical history and problem list.    Outpatient Medications Prior to Visit   Medication Sig Dispense Refill   • amiodarone (PACERONE) 200 MG tablet Take 1 tablet by mouth Daily. 6 tablet 1   • aspirin 81 MG EC tablet Take 1 tablet by mouth Daily. 30 tablet 11   • Cholecalciferol (VITAMIN D) 50 MCG (2000 UT) tablet Take 2,000 Units by mouth Daily.     • cilostazol (PLETAL) 50 MG tablet Take 1 tablet by mouth 2 (Two) Times a Day. 180 tablet 0   • dilTIAZem CD (CARDIZEM CD) 120 MG 24 hr capsule Take 1 capsule by mouth Daily. 30 capsule 11   • famotidine (PEPCID) 20 MG tablet Take 20 mg by mouth As Needed.     • ferrous sulfate 325 (65 FE) MG tablet Take 1 tablet by mouth Daily With Breakfast. 60 tablet 0   • finasteride (PROSCAR) 5 MG tablet Take 1 tablet by mouth Daily. 30 tablet 5   • hydrOXYzine (ATARAX) 25 MG tablet Take 1 tablet by mouth 3 (Three) Times a Day As Needed for Anxiety. 60 tablet 5   • lidocaine (LMX) 4 % cream Apply  topically to the appropriate area as directed As Needed for Mild Pain . Apply to left heel.     • lisinopril (PRINIVIL,ZESTRIL) 20 MG tablet Take 20 mg by mouth Daily.     • pantoprazole (PROTONIX) 20 MG EC tablet Take 20 mg by mouth Daily.     • guaiFENesin (MUCINEX) 600 MG 12 hr tablet Take 600 mg by mouth Daily.       No facility-administered medications prior to visit.        Patient Active Problem List   Diagnosis   • Essential hypertension   • PVD (peripheral vascular disease) (CMS/HCC)   • Atrial fibrillation and flutter (CMS/HCC)   • Alcoholism /alcohol abuse  "(CMS/HCC)   • Non healing left heel wound   • Atrial fibrillation status post cardioversion (CMS/HCC)   • Tobacco use   • Syncope   • GI bleed   • Rectal bleeding   • Heme + stool   • Acute blood loss anemia   • Gastrointestinal hemorrhage   • Osteoarthritis of right hip   • AVM (arteriovenous malformation) of colon   • BPH without obstruction/lower urinary tract symptoms   • Abnormal CT scan, bladder       Advanced Care Planning:  ACP discussion was held with the patient during this visit. Patient does not have an advance directive, information provided.    Review of Systems   Constitutional: Negative for diaphoresis, fatigue and fever.   HENT: Negative for congestion.    Respiratory: Negative for cough, shortness of breath and stridor.    Cardiovascular: Negative for chest pain, palpitations and leg swelling.   Gastrointestinal: Positive for blood in stool (Now resolved). Negative for abdominal distention, abdominal pain, constipation, diarrhea and nausea.   Musculoskeletal: Positive for arthralgias and gait problem.   Skin: Positive for wound.   Neurological: Negative for dizziness, tremors, weakness and light-headedness.       Compared to one year ago, the patient feels his physical health is the same.  Compared to one year ago, the patient feels his mental health is the same.    Reviewed chart for potential of high risk medication in the elderly: yes  Reviewed chart for potential of harmful drug interactions in the elderly:yes    Objective         Vitals:    10/21/20 1140   BP: 118/60   BP Location: Left arm   Patient Position: Sitting   Cuff Size: Adult   Pulse: 75   Resp: 17   Temp: 97.8 °F (36.6 °C)   TempSrc: Oral   SpO2: 99%   Weight: 67.1 kg (148 lb)   Height: 170.2 cm (67.01\")       Body mass index is 23.17 kg/m².  Discussed the patient's BMI with him. The BMI is in the acceptable range.    Physical Exam  Constitutional:       General: He is not in acute distress.     Appearance: He is well-developed. "   HENT:      Head: Normocephalic and atraumatic.   Eyes:      Pupils: Pupils are equal, round, and reactive to light.   Neck:      Thyroid: No thyromegaly.      Trachea: Phonation normal.   Cardiovascular:      Rate and Rhythm: Normal rate.      Heart sounds: No murmur.   Pulmonary:      Effort: Pulmonary effort is normal. No respiratory distress.      Breath sounds: Normal breath sounds. No stridor. No wheezing or rales.   Abdominal:      General: Abdomen is flat.      Palpations: Abdomen is soft.      Tenderness: There is no abdominal tenderness.   Skin:     Coloration: Skin is not pale.      Findings: No erythema.      Comments: Right lower extremity wound bandaged, but I did remove part of the bandage exposing a nonhealing wound where the vein was harvested   Neurological:      General: No focal deficit present.      Mental Status: He is alert. Mental status is at baseline.   Psychiatric:         Behavior: Behavior normal.         Thought Content: Thought content normal.         Judgment: Judgment normal.               Assessment/Plan   Medicare Risks and Personalized Health Plan  CMS Preventative Services Quick Reference  Advance Directive Discussion  Dementia/Memory   Depression/Dysphoria  Diabetic Lab Screening   Fall Risk  Immunizations Discussed/Encouraged (specific immunizations; Influenza, Pneumococcal 23 and Shingrix )  Inadequate Social Support, Isolation, Loneliness, Lack of Transportation, Financial Difficulties, or Caregiver Stress   Inactivity/Sedentary  Polypharmacy    The above risks/problems have been discussed with the patient.  Pertinent information has been shared with the patient in the After Visit Summary.  Follow up plans and orders are seen below in the Assessment/Plan Section.    Diagnoses and all orders for this visit:    1. Medicare annual wellness visit, subsequent (Primary)  -     Hepatitis C Antibody; Future  -     Lipid Panel; Future    2. Essential hypertension    3. Need for  hepatitis C screening test  -     Hepatitis C Antibody; Future    4. Wound of right lower extremity, initial encounter  -     Ambulatory Referral to Wound Clinic       See above for his Medicare annual wellness visit.  He has declined influenza vaccine, Pneumovax 23 and Shingrix vaccines today.    His blood pressure is well controlled and I will continue his current regimen.  He can start checking his blood pressure at home as we can get a better more accurate reading, in the past his blood pressure has been high at other office visits    Recent GI bleeding and his Plavix was discontinued.  Prior to this his Xarelto was discontinued and he continues on baby aspirin only.  The patient has confirmed this is okay from vascular standpoint.  His GI bleeding is secondary to AVMs and has had complete work-up previously    I have ordered hepatitis C, lipid panel for him to get when he gets other labs.  He does not want obtaining labs today    He has a nonhealing wound over the last couple months where his vein was harvested from his right leg.  I am going to refer to [facility] wound care to they can help manage and heal this wound.    He continues on amiodarone and diltiazem for his A. fib.       Follow Up:  No follow-ups on file.     An After Visit Summary and PPPS were given to the patient.

## 2020-10-23 ENCOUNTER — TELEPHONE (OUTPATIENT)
Dept: INTERNAL MEDICINE | Facility: CLINIC | Age: 75
End: 2020-10-23

## 2020-10-23 NOTE — TELEPHONE ENCOUNTER
Tried to call patient regarding the call from earlier. I spoken with [facility]. They just wanted me to fax the office noted over to them. So that is taken care.     Regarding the referral to a dermatology. Dr. calderón stated that he would need to contact [facility] for a [facility] to a [facility] dermatologist as he can not refer to a VA certified dermatologist.

## 2020-10-23 NOTE — TELEPHONE ENCOUNTER
Spoke with patient. He stated that the [facility] wound care office called him even though in the note it states [facility].     Also he would like the referral to the dermatology office for cyst on his face. He says that it needs to be certified through the [facility]?

## 2020-10-23 NOTE — TELEPHONE ENCOUNTER
Yes, and I spoke with [facility] he does not need a referral. They asked to fax the note from the office over to them. He already has an appointment on October 30th with wound care.

## 2020-11-04 ENCOUNTER — OFFICE VISIT (OUTPATIENT)
Dept: VASCULAR SURGERY | Age: 75
End: 2020-11-04

## 2020-11-04 ENCOUNTER — OFFICE VISIT (OUTPATIENT)
Dept: CARDIOLOGY | Facility: CLINIC | Age: 75
End: 2020-11-04

## 2020-11-04 VITALS
BODY MASS INDEX: 24.25 KG/M2 | HEART RATE: 80 BPM | DIASTOLIC BLOOD PRESSURE: 65 MMHG | OXYGEN SATURATION: 98 % | SYSTOLIC BLOOD PRESSURE: 141 MMHG | WEIGHT: 160 LBS | RESPIRATION RATE: 16 BRPM | HEIGHT: 68 IN

## 2020-11-04 VITALS
HEIGHT: 68 IN | HEART RATE: 76 BPM | DIASTOLIC BLOOD PRESSURE: 56 MMHG | SYSTOLIC BLOOD PRESSURE: 124 MMHG | OXYGEN SATURATION: 98 % | BODY MASS INDEX: 24.1 KG/M2 | WEIGHT: 159 LBS

## 2020-11-04 DIAGNOSIS — I10 ESSENTIAL HYPERTENSION: ICD-10-CM

## 2020-11-04 DIAGNOSIS — Z72.0 TOBACCO USE: ICD-10-CM

## 2020-11-04 DIAGNOSIS — I48.91 ATRIAL FIBRILLATION AND FLUTTER (HCC): Primary | ICD-10-CM

## 2020-11-04 DIAGNOSIS — I73.9 PVD (PERIPHERAL VASCULAR DISEASE) (HCC): ICD-10-CM

## 2020-11-04 DIAGNOSIS — IMO0001 ALCOHOLISM /ALCOHOL ABUSE: ICD-10-CM

## 2020-11-04 DIAGNOSIS — I48.92 ATRIAL FIBRILLATION AND FLUTTER (HCC): Primary | ICD-10-CM

## 2020-11-04 DIAGNOSIS — K92.2 GASTROINTESTINAL HEMORRHAGE, UNSPECIFIED GASTROINTESTINAL HEMORRHAGE TYPE: ICD-10-CM

## 2020-11-04 DIAGNOSIS — K55.20 AVM (ARTERIOVENOUS MALFORMATION) OF COLON: ICD-10-CM

## 2020-11-04 PROCEDURE — 99024 POSTOP FOLLOW-UP VISIT: CPT | Performed by: PHYSICIAN ASSISTANT

## 2020-11-04 PROCEDURE — 99214 OFFICE O/P EST MOD 30 MIN: CPT | Performed by: NURSE PRACTITIONER

## 2020-11-04 PROCEDURE — 93000 ELECTROCARDIOGRAM COMPLETE: CPT | Performed by: NURSE PRACTITIONER

## 2020-11-04 NOTE — PROGRESS NOTES
Olimpia Lora is a 70 y.o. male who presents for post op evaluation. Patient had a PLACEMENT VBX AORTIC STENT WITH BILATERAL KISSING ILIAC STENTS WITH COMMON FEMORAL ARTERY ENDARTERECTOMY WITH VEIN [address] on 9/19/2020. This was performed by Dr. Ty Hankins. He comes in today for post op wound check RLE. He denies any fever/chills. He is been seen at the [place] [id number]. On evaluation today, RLE incision has scattered ryne of separation with slough noted. He has moderate erythema noted. His RLE is eczematous, swollen and slightly less indurated. We we have recommended he wash all incisions daily with soap and water. He is to continue local care on all wounds as directed by [facility]. We discussed proper elevation. He is to offload his heels at all times. We have recommended continued Plavix and Pletal daily. We will obtain a OLIMPIA around and call him with the results. [address] [facility] is to notify us if wound deteriorates. This should bring you up to date on Olimpia Lora  As always we want to thank you for allowing us to participate in the care of your patients.     Sincerely,    [facility]

## 2020-11-05 ENCOUNTER — TELEPHONE (OUTPATIENT)
Dept: INTERNAL MEDICINE | Facility: CLINIC | Age: 75
End: 2020-11-05

## 2020-11-05 ENCOUNTER — DOCUMENTATION (OUTPATIENT)
Dept: CARDIOLOGY | Facility: CLINIC | Age: 75
End: 2020-11-05

## 2020-11-05 NOTE — TELEPHONE ENCOUNTER
PT IS SAYING THAT HE THINKS HE IS SUPPOSE TO HAVE A REFERRAL.. AND THE [facility] IS SUPPOSE TO AUTHORIZE IT.. HE IS ABSOLUTELY CONFUSING ME .. HE SAYS HE IS CONFUSED HIMSELF.. HE SAYS HE IS SUPPOSE TO HAVE SOMETHING REMOVED OFF HIS FACE.. PLEASE ADVISE... ALSO PLEASE CALL IN HYDROXYZINE TO THE [facility]

## 2020-11-05 NOTE — PROGRESS NOTES
I discussed the case with Dr. Farias.  Given the fact that he has been maintaining normal sinus rhythm on amiodarone and he has continued to have intermittent GI bleeds despite being off of anticoagulation and on Plavix with Pletal, it is felt best not to pursue Watchman device workup/placement at this time as this would require at least 6 weeks of ASA + anticoag, followed by DAPT for 6 mo. The risk of bleeding is felt to outweigh the risk of stroke at this point, as he has been maintaining NSR on amio but continuing to have GI bleeds without anticoagulation (he may not even tolerate short term anticoag). Furthermore, he is currently requiring DAPT per [facility] vascular surgery given recent LE revascularization, which complicates the situation. I did reach out to [facility] Vascular office today to discuss his case and have not yet received a call back. I relayed these recommendations to the patient and he voices understanding- he is in agreement with not pursing Watchman workup at this time.

## 2020-11-05 NOTE — PROGRESS NOTES
Subjective:     Encounter Date:11/04/2020      Patient ID: Kingsley Lerma is a 75 y.o. male.    Chief Complaint: Follow-up atrial fibrillation and flutter    The patient is followed for his paroxysmal atrial fibrillation and flutter, which was originally diagnosed 2-2020 (anticoagulation was initiated at that time). He was hospitalized following a syncopal spell in May and was in atrial flutter (he was otherwise asymptomatic with this). He was cardioverted that admission on 5/6, without a change in symptoms when NSR was restored. He was discharged home on 5/7 on amiodarone (rhythmol was stopped at that time). He returned to ER 5/9 because he found his heart rate to be 130s-140s and he experienced a brief dizzy spell. He was cardioverted for atrial flutter 2:1 with a HR in the 140s. NSR was restored only for a few seconds. His rhythm then proceeded to bounce around from atrial flutter to atrial fibrillation to NSR with various heart rates (70s-140s). He denied any symptoms regardless of what rhythm he was in. He was discharged home with the addition of diltiazem for better rate control when in AF/AFL, and amio and Xarelto were continued. Also of note, his Pletal dose was decreased by half due to interaction with diltiazem.   The patient also has a history of alcohol abuse. He tells me he believes the only way he really knew he was in AF or AFL previously was by checking his pulse rate with a pulse oximeter.      At his last appointment with me on May 21, he was maintaining normal sinus rhythm.  His Xarelto, diltiazem, and amiodarone were continued at that time.     Since his last office visit in May, he had had 3 hospitalizations for GI bleeding.  The initial hospitalization was around June 8th.  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 EGDs were unremarkable.  He did not require blood transfusion that admission.  He was discharged home on his same medical therapy, including Xarelto for anticoagulation and  "Pletal for claudication.  He returned on June 13 with recurrent GI bleeding, and hemoglobin in the range of 6-7.  He required 3 units of packed red blood cells that admission.  At that point time his Xarelto was discontinued and this was replaced with aspirin 81 mg daily.  His Pletal was continued.  He was discharged on June 18 and returned again on June 21 with recurrent GI bleeding.  However, during that admission his hemoglobin and hematocrit remained stable and he did not require transfusion.  He believes the bright red blood per rectum that admission may have been due to straining with constipation and the Xarelto \"still being in his system\", although he had not taken any since he was discharged 6/18.  Again, he was discharged home on aspirin and Pletal and no anticoagulation.     At his visit on 7/23/2020 his only complaint was left leg and foot pain.  This was felt to be secondary to his vascular disease and lower extremity wound.  Preoperative cardiac risk assessment was requested by [facility] vascular surgery prior to lower extremity revascularization.  He was maintaining normal sinus rhythm.  He was taking aspirin and Pletal at that point.  It was noted that given his GI bleeding history, he might be considered for watchman device placement in the future.  Ideally, would want him to be on aspirin only following vascular surgery (leading up to watchman placement) if okay per the vascular surgeon.  It was noted that a HATTIE may be ordered to further evaluate his candidacy for Watchman, after recovery from vascular surgery.    On 08/31/2020 he was taken to the OR, underwent placement of VBX aortic stent with bilateral kissing iliac stents with common femoral artery endarterectomy with vein patch per Dr. Acuña.  He was discharged home in stable condition.  He now follows with wound care at [facility] for a total of 3 lower extremity wounds as well.  It is unclear exactly what combination of medications the " patient was discharged home on based on documentation, but the patient states he went home on Plavix and Pletal after revascularization.    On 10/16/2020 he presented to [facility] ER with complaints of bright red blood per rectum for 2 to 3 days.  Hemoglobin was stable.  He did not require blood transfusion.  Pletal was continued, Plavix was discontinued and he was told to take aspirin 325 mg daily.  However, after calling the office and getting feedback from Dr. Farias as well as talking to the on-call vascular surgeon at [facility] (per patient report), he reduced the aspirin to 81 mg daily.  He is currently on aspirin and Pletal.  However, vascular surgery notes from today indicate they recommend he takes Plavix and Pletal.    The patient reports he is feeling well overall.  His primary concern is his lower extremity wounds.  He has some mild associated lower extremity edema.  He denies any other symptoms.  He continues to drink around 2 glasses of wine per day.  He continues to smoke.              The following portions of the patient's history were reviewed and updated as appropriate: allergies, current medications, past family history, past medical history, past social history, past surgical history and problem list.    Review of Systems   Constitution: Negative for chills, diaphoresis and fever.   HENT: Negative for nosebleeds.    Eyes: Negative for visual disturbance.   Cardiovascular: Positive for leg swelling. Negative for chest pain, claudication, cyanosis, dyspnea on exertion, irregular heartbeat, near-syncope, orthopnea, palpitations, paroxysmal nocturnal dyspnea and syncope.   Respiratory: Negative for cough, hemoptysis, shortness of breath, sputum production and wheezing.    Hematologic/Lymphatic: Negative for bleeding problem.   Skin: Negative for color change and flushing.   Musculoskeletal: Negative for falls.   Gastrointestinal: Negative for bloating, abdominal pain, hematemesis, hematochezia,  "melena, nausea and vomiting.        GI bleeding    Genitourinary: Negative for hematuria.   Neurological: Negative for dizziness, light-headedness and weakness.   Psychiatric/Behavioral: Negative for altered mental status.       Allergies   Allergen Reactions   • Cortisone Nausea And Vomiting     Poss? Fever. Age 21       Current Outpatient Medications:   •  amiodarone (PACERONE) 200 MG tablet, Take 1 tablet by mouth Daily., Disp: 6 tablet, Rfl: 1  •  aspirin 81 MG EC tablet, Take 1 tablet by mouth Daily., Disp: 30 tablet, Rfl: 11  •  Cholecalciferol (VITAMIN D) 50 MCG (2000 UT) tablet, Take 2,000 Units by mouth Daily., Disp: , Rfl:   •  cilostazol (PLETAL) 50 MG tablet, Take 1 tablet by mouth 2 (Two) Times a Day., Disp: 180 tablet, Rfl: 0  •  dilTIAZem CD (CARDIZEM CD) 120 MG 24 hr capsule, Take 1 capsule by mouth Daily., Disp: 30 capsule, Rfl: 11  •  famotidine (PEPCID) 20 MG tablet, Take 20 mg by mouth As Needed., Disp: , Rfl:   •  ferrous sulfate 325 (65 FE) MG tablet, Take 1 tablet by mouth Daily With Breakfast., Disp: 60 tablet, Rfl: 0  •  finasteride (PROSCAR) 5 MG tablet, Take 1 tablet by mouth Daily., Disp: 30 tablet, Rfl: 5  •  Fluocinolone-Emollient (SYNALAR, CREAM, EX), Apply  topically., Disp: , Rfl:   •  hydrOXYzine (ATARAX) 25 MG tablet, Take 1 tablet by mouth 3 (Three) Times a Day As Needed for Anxiety., Disp: 60 tablet, Rfl: 5  •  lidocaine (LMX) 4 % cream, Apply  topically to the appropriate area as directed As Needed for Mild Pain . Apply to left heel., Disp: , Rfl:   •  lisinopril (PRINIVIL,ZESTRIL) 20 MG tablet, Take 20 mg by mouth Daily., Disp: , Rfl:   •  pantoprazole (PROTONIX) 20 MG EC tablet, Take 20 mg by mouth Daily., Disp: , Rfl:   •  silver sulfadiazine (SILVADENE, SSD) 1 % cream, APPLY TOPICALLY DAILY TO OPEN WOUND, Disp: , Rfl:          Objective:    /56   Pulse 76   Ht 172.7 cm (68\")   Wt 72.1 kg (159 lb)   SpO2 98%   BMI 24.18 kg/m²        Constitutional:       General: " Not in acute distress.     Appearance: Well-developed. Not diaphoretic.   Eyes:      Pupils: Pupils are equal, round, and reactive to light.   HENT:      Head: Normocephalic and atraumatic.   Neck:      Musculoskeletal: Normal range of motion and neck supple.      Vascular: No JVD.   Pulmonary:      Effort: Pulmonary effort is normal. No respiratory distress.      Breath sounds: Normal breath sounds.   Chest:      Chest wall: Not tender to palpatation.   Cardiovascular:      Normal rate. Regular rhythm.      Murmurs: There is no murmur.   Edema:     Peripheral edema (BLE) present.  Abdominal:      General: Bowel sounds are normal. There is no distension.      Palpations: Abdomen is soft.      Tenderness: There is no abdominal tenderness.   Musculoskeletal: Normal range of motion.   Skin:     General: Skin is warm and dry.   Neurological:      Mental Status: Alert and oriented to person, place, and time.      Cranial Nerves: No cranial nerve deficit.   Psychiatric:         Behavior: Behavior normal.         Lab Review:   Lab Results   Component Value Date    WBC 13.3 (H) 09/02/2020    HGB 10.2 (L) 09/02/2020    HCT 31.7 (L) 09/02/2020    .6 (H) 09/02/2020     09/02/2020     Lab Results   Component Value Date    GLUCOSE 98 09/02/2020    CALCIUM 8.6 (L) 09/02/2020     09/02/2020    K 3.7 09/02/2020    CO2 25 09/02/2020     09/02/2020    BUN 15 09/02/2020    CREATININE 0.7 09/02/2020    EGFRIFAFRI >59 09/02/2020    EGFRIFNONA >60 09/02/2020    BCR 32.9 (H) 06/21/2020    ANIONGAP 10 09/02/2020             ECG 12 Lead    Date/Time: 11/4/2020 6:29 PM  Performed by: Belkys Carty APRN  Authorized by: Belkys Carty APRN   Comparison: compared with previous ECG from 7/23/2020  Similar to previous ECG  Rhythm: sinus rhythm  BPM: 72              Results for orders placed during the hospital encounter of 02/10/20   Adult Transthoracic Echo Complete W/ Cont if Necessary Per Protocol    Narrative · Left  atrial cavity size is borderline dilated.  · Left ventricular systolic function is normal.  · Hyperdynamic right ventricular systolic function noted.     RHYTHM IS ATRIAL FIBRILLATION  BORDERLINE BI-ATRIAL ENLARGEMENT  ALL THE LV AND RV WALLS CAN'T BE SEEN WELL BUT THERE APPEARS TO BE   HYPERKINESIS OF BOTH VENTRICLES  NO SIGNIFICANT VALVULAR DYSFUNCTION         Assessment:      Problem List Items Addressed This Visit        Cardiovascular and Mediastinum    Essential hypertension    PVD (peripheral vascular disease) (CMS/[facility])    Atrial fibrillation and flutter (CMS/HCC) - Primary    AVM (arteriovenous malformation) of colon       Digestive    Gastrointestinal hemorrhage    Overview     Added automatically from request for surgery [id number]            Other    Alcoholism /alcohol abuse (CMS/[facility])    Tobacco use          Plan:     1.  Paroxysmal atrial fibrillation flutter: Established problem, stable.  Maintaining normal sinus rhythm on amiodarone.  Continue diltiazem for rate control when in atrial fibrillation or flutter.  No palpitations or syncope.  Not anticoagulated due to recurrent GI bleeds secondary to colonic AVMs.  Continue aspirin 81 mg daily. CHADSVASC 3.  Before ordering a HATTIE, I will discuss the case further with Dr. Farias in terms of whether or not he would be a good candidate for Watchman device placement.  He continues to have GI bleeds on antiplatelet medication and Pletal.    2.  Peripheral vascular disease: He had lower extremity revascularization at [facility] on 8/31/2020.  He is currently on aspirin 81 mg daily and Pletal 50 mg twice daily.  However, 11/4/2020 note from vascular indicates they recommend he be on Plavix and Pletal-defer to vascular surgery.  From a cardiac standpoint, since he cannot be anticoagulated the recommendation would be for aspirin 81 mg daily.  However, he would not necessarily need to be on aspirin if he was on Plavix per recommendations by vascular surgery.     3.  Lower  extremity wounds: Followed closely by wound care at [facility].    4.  Essential hypertension: Established problem, stable.  Controlled with lisinopril and diltiazem.    5.  Alcohol and tobacco abuse: Counseled on the importance of cessation from both.    6.  Recurrent GI bleeding secondary to colonic AVMs-most recent bleed was 10/16/2020-he was evaluated at [facility] ER.  Did not require transfusion.  Plavix was stopped, Pletal was continued and aspirin 81 mg was started following that ER visit.  See details regarding his bleeding history and HPI.    Follow-up 3 months with Dr. Farias, sooner with symptoms or concerns.  We will be in touch via telephone regarding scheduling a HATTIE if warranted in the near future.

## 2020-11-06 NOTE — PROGRESS NOTES
Rosita flynn [facility] called back today and said she was sorry she missed your call yesterday.  I read her the documentation note.  She said she was more than happy to still speak with you and would be back in office Monday.

## 2020-11-09 DIAGNOSIS — F41.9 ANXIETY: ICD-10-CM

## 2020-12-16 ENCOUNTER — HOSPITAL ENCOUNTER (OUTPATIENT)
Dept: VASCULAR LAB | Age: 75
Discharge: HOME | End: 2020-12-16
Payer: OTHER GOVERNMENT

## 2020-12-16 PROCEDURE — 93923 UPR/LXTR ART STDY 3+ LVLS: CPT

## 2020-12-28 RX ORDER — CILOSTAZOL 50 MG/1
50 TABLET ORAL 2 TIMES DAILY
Qty: 180 TABLET | Refills: 3 | Status: SHIPPED | OUTPATIENT
Start: 2020-12-28 | End: 2022-05-23 | Stop reason: SDUPTHER

## 2020-12-30 ENCOUNTER — VIRTUAL VISIT (OUTPATIENT)
Dept: VASCULAR SURGERY | Age: 75
End: 2020-12-30
Payer: OTHER GOVERNMENT

## 2020-12-30 PROCEDURE — 99441 PR PHYS/QHP TELEPHONE EVALUATION 5-10 MIN: CPT | Performed by: PHYSICIAN ASSISTANT

## 2020-12-30 NOTE — PROGRESS NOTES
Zo Poe is a 70 y.o. male evaluated via telephone on 12/30/2020. Consent:  He and/or health care decision maker is aware that that he may receive a bill for this telephone service, depending on his insurance coverage, and has provided verbal consent to proceed: Yes      Documentation:  I communicated with the patient and/or health care decision maker about due to the covid-19 Pandemic, we are trying to limit exposures to our patients whom have elective follow ups. We are calling each individual patient to make sure they are doing okay. Zo Poe is a 70 y.o. male with the following history as recorded in [facility]:  Patient Active Problem List    Diagnosis Date Noted    Essential hypertension     Mixed hyperlipidemia     Aortoiliac occlusive disease ([facility] [address] 08/31/2020     Current Outpatient Medications   Medication Sig Dispense Refill    silver sulfADIAZINE (SILVADENE) 1 % cream Apply topically daily to open wound. 25 g 0    aspirin 81 MG EC tablet Take 81 mg by mouth daily      FOLIC ACID PO Take by mouth      clopidogrel (PLAVIX) 75 MG tablet TAKE 1 TABLET BY MOUTH EVERY DAY 30 tablet 3    collagenase 250 UNIT/GM ointment Apply topically daily Apply topically daily.       guaiFENesin (MUCINEX) 600 MG extended release tablet Take 600 mg by mouth 2 times daily as needed       hydrOXYzine (ATARAX) 25 MG tablet Take 25 mg by mouth 3 times daily as needed for Anxiety       amiodarone (CORDARONE) 200 MG tablet Take 200 mg by mouth daily       cilostazol (PLETAL) 50 MG tablet Take 50 mg by mouth 2 times daily      dilTIAZem (CARDIZEM CD) 120 MG extended release capsule Take 120 mg by mouth daily      lidocaine (LMX) 4 % cream Apply topically as needed      lisinopril (PRINIVIL;ZESTRIL) 40 MG tablet Take 20 mg by mouth daily      pantoprazole (PROTONIX) 20 MG tablet Take 20 mg by mouth daily      vitamin D (CHOLECALCIFEROL) 50 MCG (2000 UT) TABS tablet Take 2,000 Units by mouth daily  ferrous sulfate (IRON 325) 325 (65 Fe) MG tablet Take 325 mg by mouth daily (with breakfast)      famotidine (PEPCID) 20 MG tablet Take 20 mg by mouth daily as needed       finasteride (PROSCAR) 5 MG tablet Take 5 mg by mouth daily       No current facility-administered medications for this visit. Allergies: Simvastatin, Cortisone, and Prednisone  Past Medical History:   Diagnosis Date    Afib ([address]     Anxiety     Arthritis     ED (erectile dysfunction)     History of blood transfusion     Hyperlipemia     Hypertension      Past Surgical History:   Procedure Laterality Date    ADENOIDECTOMY      ANKLE FRACTURE SURGERY      FEMORAL ENDARTERECTOMY Bilateral 8/31/2020    PLACEMENT VBX AORTIC STENT WITH BILATERAL KISSING ILIAC STENTS WITH COMMON FEMORAL ARTERY ENDARTERECTOMY WITH VEIN PATCH performed by Jun Hager DO at [facility] OR     No family history on file. Social History     Tobacco Use    Smoking status: Current Every Day Smoker     Packs/day: 0.00     Types: Cigars    Smokeless tobacco: Never Used    Tobacco comment: smokes small filter cigars (10 to 12) a day    Substance Use Topics    Alcohol use: Not on file     Comment: \"couple of glasses of wine daily\"       Details of this discussion including any medical advice provided: Mr. Aly Church is a 71 yo male who has a history of PVD. Today we are following up for this. He reports that he is still seeing Dr. Renato Vasquez for his LE wounds. He reports that the right leg wound has almost healed except for a area along the RLE incision. He is currently on ASA and and Pletal. He stopped the Plavix in October due to a GI bleed.        OLIMPIA - (patient refused to let Vascular lab put a cuff on his right ankle)  Impression        The patient has moderately diminished ankle-brachial indices bilateral    lower extremity(ies) which would be consistent with claudication level    symptoms.    Based on segmental pressures and doppler waveforms, the patient likely has    disease in the bilateral superficial femoral arterial segments.        Signature        ----------------------------------------------------------------    Electronically signed by Valentin Gorman    physician) on 12/16/2020 04:56 PM    ----------------------------------------------------------------       Velocities are measured in cm/s ; Diameters are measured in mm       Pressures   +--------------------------------------++--------+-----+----+--------+-----+   !                                      ! ! Right   !     !Left!        !     !   +--------------------------------------++--------+-----+----+--------+-----+   ! Location                              ! ! Pressure! Ratio!    !Pressure! Ratio! +--------------------------------------++--------+-----+----+--------+-----+   ! Upper Thigh                           !!184     !1.08 !    !255     !1.49 !   +--------------------------------------++--------+-----+----+--------+-----+   ! Lower Thigh                           !!159     !0.93 !    !174     !1.02 ! +--------------------------------------++--------+-----+----+--------+-----+   ! Calf                                  !!131     !0.77 !    !113     !0.66 !   +--------------------------------------++--------+-----+----+--------+-----+   ! Ankle PT                              !!        !     !    !75      !0.44 !   +--------------------------------------++--------+-----+----+--------+-----+   ! DP                                    !!        !     !   Aviva      !0.54 !   +--------------------------------------++--------+-----+----+--------+-----+   ! Great Toe                             !!52      !0.27 !    !44      !0.26 !   +--------------------------------------++--------+-----+----+--------+-----+         - Brachial Pressure:Right: 171. Left:167.         - MACARENA:Left: 0.54.       Plethysmographic Digit Evaluation +---------++--------+-----+---------------++--------+-----+----------------+   !         ! ! Right   !     ! Left           !!        !     !                !   +---------++--------+-----+---------------++--------+-----+----------------+   ! Location ! !Pressure! Ratio! PPG Wave Form  !!Pressure! Ratio! PPG Wave Form   !   +---------++--------+-----+---------------++--------+-----+----------------+   ! Great Toe!!47      !0.27 !               !!40      !0.26 !                !   +---------++--------+-----+---------------++--------+-----+----------------+         Assessment/Plan -      1. PVD with wound RLE      Recommend he continue ASA 81 mg daily ( I will discuss the issue with Plavix with Dr. Maryan Hendrickson)  Recommend statin therapy   Recommend no smoking  Recommend good BP control  Recommend low fat, low cholesterol diet  Recommend daily exercise in moderation  Recommend keep f/u appt with Dr. Tim Hylton for local wound care  Follow up in 6 months with a repeat OLIMPIA or sooner if claudication worsens/develops, develops new wound or IRP      I affirm this is a Patient Initiated Episode with a Patient who has not had a related appointment within my department in the past 7 days or scheduled within the next 24 hours.     Patient identification was verified at the start of the visit: Yes    Total Time: minutes: 5-10 minutes    Note: not billable if this call serves to triage the patient into an appointment for the relevant concern      Beth Abdalla

## 2021-02-22 RX ORDER — AMIODARONE HYDROCHLORIDE 200 MG/1
200 TABLET ORAL
Qty: 30 TABLET | Refills: 11 | Status: SHIPPED | OUTPATIENT
Start: 2021-02-22 | End: 2021-03-25 | Stop reason: SDUPTHER

## 2021-02-23 ENCOUNTER — TELEPHONE (OUTPATIENT)
Dept: CARDIOLOGY | Facility: CLINIC | Age: 76
End: 2021-02-23

## 2021-02-24 ENCOUNTER — OFFICE VISIT (OUTPATIENT)
Dept: GASTROENTEROLOGY | Facility: CLINIC | Age: 76
End: 2021-02-24

## 2021-02-24 VITALS
OXYGEN SATURATION: 99 % | HEIGHT: 68 IN | DIASTOLIC BLOOD PRESSURE: 72 MMHG | BODY MASS INDEX: 24.25 KG/M2 | SYSTOLIC BLOOD PRESSURE: 142 MMHG | TEMPERATURE: 97.7 F | WEIGHT: 160 LBS | HEART RATE: 63 BPM

## 2021-02-24 DIAGNOSIS — R19.5 POSITIVE FIT (FECAL IMMUNOCHEMICAL TEST): Primary | ICD-10-CM

## 2021-02-24 DIAGNOSIS — D64.9 ANEMIA, UNSPECIFIED TYPE: ICD-10-CM

## 2021-02-24 PROCEDURE — 99214 OFFICE O/P EST MOD 30 MIN: CPT | Performed by: NURSE PRACTITIONER

## 2021-02-24 RX ORDER — MULTIPLE VITAMINS W/ MINERALS TAB 9MG-400MCG
1 TAB ORAL DAILY
COMMUNITY
End: 2022-05-04

## 2021-02-24 RX ORDER — FOLIC ACID 1 MG/1
1 TABLET ORAL NIGHTLY
COMMUNITY

## 2021-02-24 NOTE — PROGRESS NOTES
Chief Complaint   Patient presents with   • Anemia     Pt has ongoing issues with anemia-[facility]   • Positive FIT Test     [facility]came [facility]        PCP: Maren Owen APRN  REFER: No ref. provider found    Subjective     HPI    He presents to office with positive [facility] due to continual anemia.  Coarse is constant.  Length of time test has been positive is unknown.  Testing performed as part of routine physical.  He denies change in bowels.  Bowels described as moving without difficulty, no change. No abdominal pain.  No BRBPR.  No melena.  Weight is stable.  He has undergone previous colonoscopy evaluation on 6/8/2020 with multiple nonbleeding colonic angiodysplastic lesions.  Nonbleeding internal hemorrhoids otherwise normal.  Patient underwent an EGD at the same time found to be normal..  There is not a family history of colon cancer or colon polyps.    The patient had previously been hospitalized last year 6/22/2020 for gastrointestinal hemorrhage.  The patient has had a history of GI bleeding prior exacerbated by anticoagulation.  As noted above he underwent 2 EGDs and a colonoscopy.  CT enterography negative his work-up revealed he had AVMs.  Given his bleeding issues it was decided it was best to stop his anticoagulation.   And discharged home stable.  Labs on 9/29/2020 CMP essentially unremarkable.  CBC revealed a hemoglobin of 11.8.  Occult blood fit [id number].  After above-noted EGD and colonoscopy previously performed for melena and anemia.     On Pletal    Results for JOSE LUIS SERVIN (MRN [id number]) as of 2/24/2021 13:17   Ref. Range 6/21/2020 23:51 6/22/2020 07:33 7/1/2020 09:11 7/10/2020 10:20 8/21/2020 08:48 8/31/2020 15:32 9/1/2020 03:45 9/2/2020 06:42   Hemoglobin Latest Ref Range: 14.0 - 18.0 g/dL 9.0 (L) 9.4 (L) 10.5 (L) 11.8 (L) 15.4 ((NONE)) 11.5 (L) 9.4 (L) 10.2 (L)       Past Medical History:    Diagnosis Date   • Alcohol abuse    • Arthritis    • Atrial fibrillation (CMS/HCC)    • Atrial flutter (CMS/HCC)    • BPH (benign prostatic hyperplasia)    • Circulation problem    • History of tobacco abuse    • Hyperlipidemia    • Hypertension    • PVD (peripheral vascular disease) (CMS/HCC)        Past Surgical History:   Procedure Laterality Date   • ADENOIDECTOMY     • AORTIC VALVE REPAIR/REPLACEMENT     • COLONOSCOPY N/A 6/8/2020    Multiple non-bleeding colonic angiodysplastic lesions; Non-bleeding internal hemorrhoids; The examination was otherwise normal; No specimens collected; Repeat 10 years   • ENDOSCOPY N/A 6/8/2020    Normal esophagus; Normal stomach; Normal examined duodenum; No specimens collected   • ENDOSCOPY N/A 6/15/2020    Dr. Banuelos-Normal examined duodenum; Normal stomach; Normal esophagus; No specimens collected   • VEIN SURGERY         Outpatient Medications Marked as Taking for the 2/24/21 encounter (Office Visit) with Rosita Matthews APRN   Medication Sig Dispense Refill   • amiodarone (PACERONE) 200 MG tablet Take 1 tablet by mouth Daily. 30 tablet 11   • aspirin 81 MG EC tablet Take 1 tablet by mouth Daily. 30 tablet 11   • Cholecalciferol (VITAMIN D) 50 MCG (2000 UT) tablet Take 2,000 Units by mouth Daily.     • cilostazol (PLETAL) 50 MG tablet Take 1 tablet by mouth 2 (Two) Times a Day. 180 tablet 3   • dilTIAZem CD (CARDIZEM CD) 120 MG 24 hr capsule Take 1 capsule by mouth Daily. 30 capsule 11   • ferrous sulfate 325 (65 FE) MG tablet Take 1 tablet by mouth Daily With Breakfast. 60 tablet 0   • finasteride (PROSCAR) 5 MG tablet Take 1 tablet by mouth Daily. 30 tablet 5   • folic acid (FOLVITE) 1 MG tablet Take 1 mg by mouth Daily.     • hydrOXYzine (ATARAX) 25 MG tablet Take 1 tablet by mouth 3 (Three) Times a Day As Needed for Anxiety. 60 tablet 5   • lisinopril (PRINIVIL,ZESTRIL) 20 MG tablet Take 20 mg by mouth Daily.     • multivitamin with minerals (Multivitamin Adult) tablet  "tablet Take 1 tablet by mouth Daily.     • pantoprazole (PROTONIX) 20 MG EC tablet Take 20 mg by mouth Daily.         Allergies   Allergen Reactions   • Prednisone Other (See Comments)     Made him anxious and jittery     • Simvastatin Other (See Comments)     Unknown    • Cortisone Nausea And Vomiting       Social History     Socioeconomic History   • Marital status: Single     Spouse name: Not on file   • Number of children: Not on file   • Years of education: Not on file   • Highest education level: Not on file   Tobacco Use   • Smoking status: Current Every Day Smoker     Packs/day: 0.00     Types: Cigars   • Smokeless tobacco: Never Used   Substance and Sexual Activity   • Alcohol use: Yes     Alcohol/week: 2.0 standard drinks     Types: 2 Glasses of wine per week     Frequency: Never     Comment: Daily    • Drug use: Never   • Sexual activity: Defer       Family History   Problem Relation Age of Onset   • Dementia Mother    • Cancer Father    • Pancreatic cancer Father    • Colon cancer Neg Hx    • Colon polyps Neg Hx    • Esophageal cancer Neg Hx    • Liver cancer Neg Hx    • Liver disease Neg Hx    • Rectal cancer Neg Hx    • Stomach cancer Neg Hx        Review of Systems    Objective     Vitals:    02/24/21 1305   BP: 142/72   BP Location: Left arm   Patient Position: Sitting   Cuff Size: Adult   Pulse: 63   Temp: 97.7 °F (36.5 °C)   TempSrc: Infrared   SpO2: 99%   Weight: 72.6 kg (160 lb)   Height: 172.7 cm (68\")     Body mass index is 24.33 kg/m².    Physical Exam    Imaging Results (Most Recent)     None          Body mass index is 24.33 kg/m².    Assessment/Plan     Diagnoses and all orders for this visit:    1. Positive FIT (fecal immunochemical test) (Primary)    2. Anemia, unspecified type    As noted above the patient has just undergone recent scopes 1 colonoscopy which revealed AVMs and 2 EGDs that were both normal.  AVMs are well noted to bleed and will be exacerbated most especially on " anticoagulation and/or antithrombotic.  The patient does continue to be on Pletal.  He is currently receiving iron which she needs to stay on PCP will need to monitor continual hemoglobins and transfuse outpatient as needed.  No further scopes needed at this time.    * Surgery not found *      I have explained a positive cologuard indicates the presence of DNA/hgb in stool that is associated with colon polyps or colon cancer.  There is a chance the test is a false positive with that chance being higher for people over the age of 65. This is due to normal changes in DNA associated with getting older (AGA).  Due to the positive result of the Cologuard I recommend pt undergoing colonoscopy.  Colonoscopy remains the gold standard for detection of colon polyps/colon cancer.      All risks, benefits, alternatives, and indications of colonoscopy procedure have been discussed with the patient. Risks to include perforation of the colon requiring possible surgery or colostomy, risk of bleeding from biopsies or removal of colon tissue, possibility of missing a colon polyp or cancer, or adverse drug reaction.  Benefits to include the diagnosis and management of disease of the colon and rectum.  Pt verbalizes understanding and agrees to proceed with procedure.      Patient's Body mass index is 24.33 kg/m². BMI is within normal parameters. No follow-up required..      There are no Patient Instructions on file for this visit.

## 2021-03-25 ENCOUNTER — OFFICE VISIT (OUTPATIENT)
Dept: CARDIOLOGY | Facility: CLINIC | Age: 76
End: 2021-03-25

## 2021-03-25 VITALS
WEIGHT: 164 LBS | SYSTOLIC BLOOD PRESSURE: 160 MMHG | BODY MASS INDEX: 24.86 KG/M2 | OXYGEN SATURATION: 97 % | HEART RATE: 64 BPM | DIASTOLIC BLOOD PRESSURE: 58 MMHG | HEIGHT: 68 IN

## 2021-03-25 DIAGNOSIS — Z92.29 HISTORY OF AMIODARONE THERAPY: ICD-10-CM

## 2021-03-25 DIAGNOSIS — IMO0001 ALCOHOLISM /ALCOHOL ABUSE: ICD-10-CM

## 2021-03-25 DIAGNOSIS — I73.9 PVD (PERIPHERAL VASCULAR DISEASE) (HCC): ICD-10-CM

## 2021-03-25 DIAGNOSIS — I48.92 ATRIAL FIBRILLATION AND FLUTTER (HCC): Primary | ICD-10-CM

## 2021-03-25 DIAGNOSIS — Z72.0 TOBACCO USE: ICD-10-CM

## 2021-03-25 DIAGNOSIS — I48.91 ATRIAL FIBRILLATION AND FLUTTER (HCC): Primary | ICD-10-CM

## 2021-03-25 DIAGNOSIS — K92.2 GASTROINTESTINAL HEMORRHAGE, UNSPECIFIED GASTROINTESTINAL HEMORRHAGE TYPE: ICD-10-CM

## 2021-03-25 DIAGNOSIS — K55.20 AVM (ARTERIOVENOUS MALFORMATION) OF COLON: ICD-10-CM

## 2021-03-25 DIAGNOSIS — I10 ESSENTIAL HYPERTENSION: ICD-10-CM

## 2021-03-25 PROCEDURE — 93000 ELECTROCARDIOGRAM COMPLETE: CPT | Performed by: NURSE PRACTITIONER

## 2021-03-25 PROCEDURE — 99214 OFFICE O/P EST MOD 30 MIN: CPT | Performed by: NURSE PRACTITIONER

## 2021-03-25 RX ORDER — GABAPENTIN 300 MG/1
300 CAPSULE ORAL
Status: ON HOLD | COMMUNITY
End: 2022-02-04 | Stop reason: SDUPTHER

## 2021-03-25 RX ORDER — AMIODARONE HYDROCHLORIDE 200 MG/1
200 TABLET ORAL
Qty: 90 TABLET | Refills: 3 | Status: SHIPPED | OUTPATIENT
Start: 2021-03-25 | End: 2022-03-08 | Stop reason: SDUPTHER

## 2021-03-25 RX ORDER — DILTIAZEM HYDROCHLORIDE 120 MG/1
120 CAPSULE, COATED, EXTENDED RELEASE ORAL DAILY
Qty: 90 CAPSULE | Refills: 3 | Status: SHIPPED | OUTPATIENT
Start: 2021-03-25 | End: 2022-05-04 | Stop reason: SDUPTHER

## 2021-03-25 RX ORDER — LISINOPRIL 40 MG/1
40 TABLET ORAL DAILY
Qty: 90 TABLET | Refills: 3 | Status: SHIPPED | OUTPATIENT
Start: 2021-03-25 | End: 2022-05-04 | Stop reason: SDUPTHER

## 2021-03-25 NOTE — PROGRESS NOTES
Subjective:     Encounter Date:03/25/2021      Patient ID: Kingsley Lerma is a 75 y.o. male.    Chief Complaint: Follow-up atrial fibrillation and flutter    The patient is followed for his paroxysmal atrial fibrillation and flutter, which was originally diagnosed 2-2020 (anticoagulation was initiated at that time). He was hospitalized following a syncopal spell in May and was in atrial flutter (he was otherwise asymptomatic with this). He was cardioverted that admission on 5/6, without a change in symptoms when NSR was restored. He was discharged home on 5/7 on amiodarone (rhythmol was stopped at that time). He returned to ER 5/9 because he found his heart rate to be 130s-140s and he experienced a brief dizzy spell. He was cardioverted for atrial flutter 2:1 with a HR in the 140s. NSR was restored only for a few seconds. His rhythm then proceeded to bounce around from atrial flutter to atrial fibrillation to NSR with various heart rates (70s-140s). He denied any symptoms regardless of what rhythm he was in. He was discharged home with the addition of diltiazem for better rate control when in AF/AFL, and amio and Xarelto were continued. Also of note, his Pletal dose was decreased by half due to interaction with diltiazem.   The patient also has a history of alcohol abuse. He tells me he believes the only way he really knew he was in AF or AFL previously was by checking his pulse rate with a pulse oximeter.      At his appointment with me on May 21, 2020, he was maintaining normal sinus rhythm.  His Xarelto, diltiazem, and amiodarone were continued at that time.     Since his office visit in May, he had had 3 hospitalizations for GI bleeding.  The initial hospitalization was around June 8th.  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 EGDs were unremarkable.  He did not require blood transfusion that admission.  He was discharged home on his same medical therapy, including Xarelto for anticoagulation and  "Pletal for claudication.  He returned on June 13 with recurrent GI bleeding, and hemoglobin in the range of 6-7.  He required 3 units of packed red blood cells that admission.  At that point time his Xarelto was discontinued and this was replaced with aspirin 81 mg daily.  His Pletal was continued.  He was discharged on June 18 and returned again on June 21 with recurrent GI bleeding.  However, during that admission his hemoglobin and hematocrit remained stable and he did not require transfusion.  He believes the bright red blood per rectum that admission may have been due to straining with constipation and the Xarelto \"still being in his system\", although he had not taken any since he was discharged 6/18.  Again, he was discharged home on aspirin and Pletal and no anticoagulation.     At his visit on 7/23/2020 his only complaint was left leg and foot pain.  This was felt to be secondary to his vascular disease and lower extremity wound.  Preoperative cardiac risk assessment was requested by [facility] vascular surgery prior to lower extremity revascularization.  He was maintaining normal sinus rhythm.  He was taking aspirin and Pletal at that point.  It was noted that given his GI bleeding history, he might be considered for watchman device placement in the future.  Ideally, would want him to be on aspirin only following vascular surgery (leading up to watchman placement) if okay per the vascular surgeon.  It was noted that a HATTIE may be ordered to further evaluate his candidacy for Watchman, after recovery from vascular surgery.    On 08/31/2020 he was taken to the OR, underwent placement of VBX aortic stent with bilateral kissing iliac stents with common femoral artery endarterectomy with vein patch per Dr. Acuña.  He was discharged home in stable condition.  He now follows with wound care at [facility] for a total of 3 lower extremity wounds as well.  It is unclear exactly what combination of medications the " patient was discharged home on based on documentation, but the patient states he went home on Plavix and Pletal after revascularization.    On 10/16/2020 he presented to [facility] ER with complaints of bright red blood per rectum for 2 to 3 days.  Hemoglobin was stable.  He did not require blood transfusion.  Pletal was continued, Plavix was discontinued and he was told to take aspirin 325 mg daily.  However, after calling the office and getting feedback from Dr. Farias as well as talking to the on-call vascular surgeon at [facility] (per patient report), he reduced the aspirin to 81 mg daily.  He is currently on aspirin and Pletal.  However, vascular surgery notes from today indicate they recommend he takes Plavix and Pletal.    I saw the patient in November 2020 and he was feeling well.  His primary concern was his lower extremity wounds.  He had associated mild lower extremity edema.  He denied any other symptoms.  He was continuing to drink around 2 glasses of wine per day.  He continued to smoke.  No changes were made at that visit.  I discussed the possibility of HATTIE/work-up for Watchman with Dr. Farias.  Given the fact that he had been maintaining normal sinus rhythm on amiodarone and continued to have intermittent GI bleeds despite being off of anticoagulation (although he was on Plavix and Pletal), it was felt best not to pursue watchman device placement at that time, given the fact that this would require 6 weeks of aspirin plus anticoagulation followed by dual antiplatelet therapy for a total of 6 months.  It was felt that the risk of bleeding outweighed the risk of stroke at that point.  Also, he was requiring dual antiplatelet therapy for [facility] vascular surgery given his recent lower extremity revascularization, which complicated the situation further.  I did attempt to contact vascular surgery at [facility] to discuss the case.    Today the patient states he has been doing well.  He denies any further signs  of bleeding since his last visit.  Although, he does report he had an OB stool that was positive.  He tells me his hemoglobin and hematocrit are better than they have been in a long time.  He admits uncontrolled blood pressures-systolics 150s to 190s.  He reports some chronic neuropathic pain and swelling in his legs.  He denies chest pain, palpitations, shortness of breath, orthopnea, PND, syncope or presyncope.              The following portions of the patient's history were reviewed and updated as appropriate: allergies, current medications, past family history, past medical history, past social history, past surgical history and problem list.    Review of Systems   Constitutional: Negative for chills, diaphoresis and fever.   HENT: Negative for nosebleeds.    Eyes: Negative for visual disturbance.   Cardiovascular: Positive for leg swelling (stable ). Negative for chest pain, claudication, cyanosis, dyspnea on exertion, irregular heartbeat, near-syncope, orthopnea, palpitations, paroxysmal nocturnal dyspnea and syncope.   Respiratory: Negative for cough, hemoptysis, shortness of breath, sputum production and wheezing.    Hematologic/Lymphatic: Negative for bleeding problem.   Skin: Negative for color change and flushing.   Musculoskeletal: Negative for falls.   Gastrointestinal: Negative for bloating, abdominal pain, hematemesis, hematochezia, melena, nausea and vomiting.   Genitourinary: Negative for hematuria.   Neurological: Negative for dizziness, light-headedness and weakness.   Psychiatric/Behavioral: Negative for altered mental status.       Allergies   Allergen Reactions   • Prednisone Other (See Comments)     Made him anxious and jittery     • Simvastatin Other (See Comments)     Unknown    • Cortisone Nausea And Vomiting       Current Outpatient Medications:   •  amiodarone (PACERONE) 200 MG tablet, Take 1 tablet by mouth Daily., Disp: 90 tablet, Rfl: 3  •  aspirin 81 MG EC tablet, Take 1 tablet by  "mouth Daily., Disp: 30 tablet, Rfl: 11  •  Cholecalciferol (VITAMIN D) 50 MCG (2000 UT) tablet, Take 2,000 Units by mouth Daily., Disp: , Rfl:   •  cilostazol (PLETAL) 50 MG tablet, Take 1 tablet by mouth 2 (Two) Times a Day., Disp: 180 tablet, Rfl: 3  •  dilTIAZem CD (CARDIZEM CD) 120 MG 24 hr capsule, Take 1 capsule by mouth Daily., Disp: 90 capsule, Rfl: 3  •  folic acid (FOLVITE) 1 MG tablet, Take 1 mg by mouth Daily., Disp: , Rfl:   •  gabapentin (NEURONTIN) 300 MG capsule, Take 300 mg by mouth every night at bedtime., Disp: , Rfl:   •  hydrOXYzine (ATARAX) 25 MG tablet, Take 1 tablet by mouth 3 (Three) Times a Day As Needed for Anxiety., Disp: 60 tablet, Rfl: 5  •  lisinopril (PRINIVIL,ZESTRIL) 40 MG tablet, Take 1 tablet by mouth Daily., Disp: 90 tablet, Rfl: 3  •  multivitamin with minerals (Multivitamin Adult) tablet tablet, Take 1 tablet by mouth Daily., Disp: , Rfl:   •  pantoprazole (PROTONIX) 20 MG EC tablet, Take 20 mg by mouth Daily., Disp: , Rfl:          Objective:    /58   Pulse 64   Ht 172.7 cm (68\")   Wt 74.4 kg (164 lb)   SpO2 97%   BMI 24.94 kg/m²        Constitutional:       General: Not in acute distress.     Appearance: Well-developed. Not diaphoretic.   Eyes:      Pupils: Pupils are equal, round, and reactive to light.   HENT:      Head: Normocephalic and atraumatic.   Neck:      Vascular: No JVD.   Pulmonary:      Effort: Pulmonary effort is normal. No respiratory distress.      Breath sounds: Normal breath sounds.   Chest:      Chest wall: Not tender to palpatation.   Cardiovascular:      Normal rate. Regular rhythm.      Murmurs: There is no murmur.   Edema:     Peripheral edema (BLE; with erythema, and dry, flaky skin ) present.  Abdominal:      General: Bowel sounds are normal. There is no distension.      Palpations: Abdomen is soft.      Tenderness: There is no abdominal tenderness.   Musculoskeletal: Normal range of motion.      Cervical back: Normal range of motion and " neck supple. Skin:     General: Skin is warm and dry.   Neurological:      Mental Status: Alert and oriented to person, place, and time.      Cranial Nerves: No cranial nerve deficit.   Psychiatric:         Behavior: Behavior normal.         Lab Review:   Lab Results   Component Value Date    WBC 13.3 (H) 09/02/2020    HGB 10.2 (L) 09/02/2020    HCT 31.7 (L) 09/02/2020    .6 (H) 09/02/2020     09/02/2020     Lab Results   Component Value Date    GLUCOSE 98 09/02/2020    CALCIUM 8.6 (L) 09/02/2020     09/02/2020    K 3.7 09/02/2020    CO2 25 09/02/2020     09/02/2020    BUN 15 09/02/2020    CREATININE 0.7 09/02/2020    EGFRIFAFRI >59 09/02/2020    EGFRIFNONA >60 09/02/2020    BCR 32.9 (H) 06/21/2020    ANIONGAP 10 09/02/2020     Lab Results   Component Value Date    TSH 1.170 05/05/2020     Lab Results   Component Value Date    GLUCOSE 98 09/02/2020    BUN 15 09/02/2020    CREATININE 0.7 09/02/2020    EGFRIFNONA >60 09/02/2020    EGFRIFAFRI >59 09/02/2020    BCR 32.9 (H) 06/21/2020    K 3.7 09/02/2020    CO2 25 09/02/2020    CALCIUM 8.6 (L) 09/02/2020    ALBUMIN 3.1 (L) 09/01/2020    AST 13 09/01/2020    ALT 9 09/01/2020 5/2020 CXR -     HISTORY: Chest Pain protocol     COMPARISON: 3/6/2020.     FINDINGS:  The lungs are expanded bilaterally and clear. The pleural  spaces are clear. Heart size is normal. There are degenerative changes  of the shoulders and spine. No acute bony abnormality is seen.     IMPRESSION:  No active disease is seen.               ECG 12 Lead    Date/Time: 3/25/2021 2:15 PM  Performed by: Belkys Carty APRN  Authorized by: Belkys Carty APRN   Comparison: compared with previous ECG from 11/4/2020  Similar to previous ECG  Rhythm: sinus rhythm  BPM: 64    Clinical impression: normal ECG            Results for orders placed during the hospital encounter of 02/10/20    Adult Transthoracic Echo Complete W/ Cont if Necessary Per Protocol    Interpretation Summary  ·  Left atrial cavity size is borderline dilated.  · Left ventricular systolic function is normal.  · Hyperdynamic right ventricular systolic function noted.    RHYTHM IS ATRIAL FIBRILLATION  BORDERLINE BI-ATRIAL ENLARGEMENT  ALL THE LV AND RV WALLS CAN'T BE SEEN WELL BUT THERE APPEARS TO BE HYPERKINESIS OF BOTH VENTRICLES  NO SIGNIFICANT VALVULAR DYSFUNCTION      Assessment:      Problem List Items Addressed This Visit        Cardiac and Vasculature    Essential hypertension    Relevant Medications    dilTIAZem CD (CARDIZEM CD) 120 MG 24 hr capsule    lisinopril (PRINIVIL,ZESTRIL) 40 MG tablet    PVD (peripheral vascular disease) (CMS/[facility])    Atrial fibrillation and flutter (CMS/[facility]) - Primary    Relevant Medications    dilTIAZem CD (CARDIZEM CD) 120 MG 24 hr capsule       Gastrointestinal Abdominal     Gastrointestinal hemorrhage    Overview     Added automatically from request for surgery [id number]         AVM (arteriovenous malformation) of [facility]    Alcoholism /alcohol abuse (CMS/[facility])       Tobacco    Tobacco use      Other Visit Diagnoses     History of amiodarone therapy        Relevant Orders    Full Pulmonary Function Test With Bronchodilator          Plan:     1.  Paroxysmal atrial fibrillation flutter: Established problem, stable.  Maintaining normal sinus rhythm on amiodarone.  Continue diltiazem for rate control when in atrial fibrillation or flutter.  No palpitations or syncope.  Not anticoagulated due to recurrent GI bleeds secondary to colonic AVMs.  Continue aspirin 81 mg daily. CHADSVASC 3.  See notes in HPI regarding previous discussion with Dr. Farias about potential Watchman device placement.  Ultimately, it was felt that the risk of bleeding outweighed the benefit of placing the device given the fact that he continued to have GI bleeds on antiplatelet medication and Pletal, and he has been maintaining normal sinus rhythm on amiodarone.  -Given his chronic amiodarone therapy, will  continue to monitor LFTs, TSH and PFTs.  Normal LFTs and TSH noted above.  Order placed today for PFTs.    2.  Peripheral vascular disease: He had lower extremity revascularization at [facility] on 8/31/2020.  He is currently on aspirin 81 mg daily and Pletal 50 mg twice daily.  However, 11/4/2020 note from vascular indicates they recommend he be on Plavix and Pletal-defer to vascular surgery.  From a cardiac standpoint, since he cannot be anticoagulated the recommendation would be for aspirin 81 mg daily.  However, he would not necessarily need to be on aspirin if he was on Plavix per recommendations by vascular surgery.  Per report from the patient today, he states he is going to see vascular surgery again on 4/7.  He states they are aware that he is on aspirin and Pletal now.     3.  Lower extremity wounds: Previously followed by wound care at [facility].  He states his wounds have now healed.    4.  Essential hypertension: Established problem, uncontrolled.    -Increase lisinopril from 20 mg to 40 mg daily and continue to monitor.  -Continue current dose of diltiazem    5.  Alcohol and tobacco abuse: Counseled on the importance of cessation from both.    6.  Recurrent GI bleeding secondary to colonic AVMs-most recent bleed was 10/16/2020-he was evaluated at [facility] ER.  Did not require transfusion for that particular bleed, but has required transfusion in the past.  Plavix was stopped, Pletal was continued and aspirin 81 mg was started following that ER visit.  See details regarding his bleeding history and HPI.    Follow-up in 3 to 6 months with Dr. Farias, sooner with symptoms or concerns.

## 2021-03-26 ENCOUNTER — TELEPHONE (OUTPATIENT)
Dept: CARDIOLOGY | Facility: CLINIC | Age: 76
End: 2021-03-26

## 2021-03-26 NOTE — TELEPHONE ENCOUNTER
Lisinopril, Diltiazem, and Amiodarone RXs with clinic note are faxed to the [facility].  Brittany Aviles MA

## 2021-03-29 PROBLEM — N40.1 BENIGN LOCALIZED HYPERPLASIA OF PROSTATE WITH URINARY RETENTION: Status: ACTIVE | Noted: 2021-03-29

## 2021-03-29 PROBLEM — I73.9 PVD (PERIPHERAL VASCULAR DISEASE) (HCC): Status: ACTIVE | Noted: 2021-03-29

## 2021-03-29 PROBLEM — R73.03 PREDIABETES: Status: ACTIVE | Noted: 2021-03-29

## 2021-03-29 PROBLEM — E54 VITAMIN C DEFICIENCY: Status: ACTIVE | Noted: 2021-03-29

## 2021-03-29 PROBLEM — L97.529 ULCER OF LEFT FOOT (HCC): Status: ACTIVE | Noted: 2021-03-29

## 2021-03-29 PROBLEM — D50.9 IRON DEFICIENCY ANEMIA, UNSPECIFIED: Status: ACTIVE | Noted: 2021-03-29

## 2021-03-29 PROBLEM — L72.3 SEBACEOUS CYST: Status: ACTIVE | Noted: 2021-03-29

## 2021-03-29 PROBLEM — E55.9 VITAMIN D DEFICIENCY: Status: ACTIVE | Noted: 2021-03-29

## 2021-03-29 PROBLEM — F41.8 DEPRESSION WITH ANXIETY: Status: ACTIVE | Noted: 2021-03-29

## 2021-03-29 PROBLEM — R33.8 BENIGN LOCALIZED HYPERPLASIA OF PROSTATE WITH URINARY RETENTION: Status: ACTIVE | Noted: 2021-03-29

## 2021-03-29 PROBLEM — J18.9 PNEUMONIA: Status: ACTIVE | Noted: 2021-03-29

## 2021-04-07 ENCOUNTER — OFFICE VISIT (OUTPATIENT)
Dept: VASCULAR SURGERY | Age: 76
End: 2021-04-07
Payer: OTHER GOVERNMENT

## 2021-04-07 VITALS
RESPIRATION RATE: 16 BRPM | HEART RATE: 70 BPM | SYSTOLIC BLOOD PRESSURE: 162 MMHG | WEIGHT: 164 LBS | TEMPERATURE: 97.8 F | BODY MASS INDEX: 24.86 KG/M2 | DIASTOLIC BLOOD PRESSURE: 71 MMHG | HEIGHT: 68 IN

## 2021-04-07 DIAGNOSIS — I73.9 PVD (PERIPHERAL VASCULAR DISEASE) (HCC): Primary | ICD-10-CM

## 2021-04-07 PROCEDURE — 99213 OFFICE O/P EST LOW 20 MIN: CPT | Performed by: SURGERY

## 2021-04-07 RX ORDER — GABAPENTIN 300 MG/1
CAPSULE ORAL
Qty: 90 CAPSULE | Refills: 2 | Status: CANCELLED | OUTPATIENT
Start: 2021-04-07 | End: 2021-07-07

## 2021-04-07 RX ORDER — GABAPENTIN 300 MG/1
300 CAPSULE ORAL DAILY
Qty: 90 CAPSULE | Refills: 0 | Status: SHIPPED | OUTPATIENT
Start: 2021-04-07 | End: 2021-07-23 | Stop reason: SDUPTHER

## 2021-04-07 RX ORDER — GABAPENTIN 300 MG/1
300 CAPSULE ORAL 3 TIMES DAILY
COMMUNITY

## 2021-04-07 NOTE — PROGRESS NOTES
Alexandro Ag (:  1945) is a 70 y.o. male,Established patient, here for evaluation of the following chief complaint(s): Other ([facility]- PVD)            SUBJECTIVE/OBJECTIVE:  Brian Hoang has a history of peripheral vascular disease of the lower extremities. He has had this for 1 - 5 years. Current treatment includes ASA 81 mg po qd, clopidogrel 75 mg po qd. Brian Hoang has healed his wounds, was seen by podiatrist Dr. Venkatesh Lomas. He has bilateral lower extremity edema. He had underwent procedure in the last year for aortoiliac disease with aorto iliac stenting. He came to follow-up today having no new complaints. He was given Neurontin by podiatrist and he initially thought it was not helping, however later on he noticed that it did give him good relief for his neuropathy. He is asking to refill Neurontin for him today. Alexandro Ag is a 70 y.o. male with the following history as recorded in [facility]:  Patient Active Problem List    Diagnosis Date Noted    Vitamin D deficiency 2021    Vitamin C deficiency 2021    Sebaceous cyst 2021    Prediabetes 2021    Pneumonia 2021    PVD (peripheral vascular disease) ([facility] 2021    Depression with anxiety 2021    Ulcer of left foot ([facility] 2021    Iron deficiency anemia, unspecified 2021    Benign localized hyperplasia of prostate with urinary retention 2021    Essential hypertension     Mixed hyperlipidemia     Aortoiliac occlusive disease ([facility] 2020     Current Outpatient Medications   Medication Sig Dispense Refill    gabapentin (NEURONTIN) 300 MG capsule Take 300 mg by mouth 3 times daily.  gabapentin (NEURONTIN) 300 MG capsule Take 1 capsule by mouth daily for 90 days.  90 capsule 0    aspirin 81 MG EC tablet Take 81 mg by mouth daily      FOLIC ACID PO Take by mouth      hydrOXYzine (ATARAX) 25 MG tablet Take 25 mg by mouth 3 times daily as needed for Anxiety       amiodarone (CORDARONE) 200 MG tablet Take 200 mg by mouth daily       dilTIAZem (CARDIZEM CD) 120 MG extended release capsule Take 120 mg by mouth daily      lisinopril (PRINIVIL;ZESTRIL) 40 MG tablet Take 20 mg by mouth daily      pantoprazole (PROTONIX) 20 MG tablet Take 20 mg by mouth daily      vitamin D (CHOLECALCIFEROL) 50 MCG (2000 UT) TABS tablet Take 2,000 Units by mouth daily      finasteride (PROSCAR) 5 MG tablet Take 5 mg by mouth daily      silver sulfADIAZINE (SILVADENE) 1 % cream Apply topically daily to open wound. (Patient not taking: Reported on 4/7/2021) 25 g 0    clopidogrel (PLAVIX) 75 MG tablet TAKE 1 TABLET BY MOUTH EVERY DAY (Patient not taking: Reported on 4/7/2021) 30 tablet 3    collagenase 250 UNIT/GM ointment Apply topically daily Apply topically daily.  guaiFENesin (MUCINEX) 600 MG extended release tablet Take 600 mg by mouth 2 times daily as needed       cilostazol (PLETAL) 50 MG tablet Take 50 mg by mouth 2 times daily      lidocaine (LMX) 4 % cream Apply topically as needed      ferrous sulfate (IRON 325) 325 (65 Fe) MG tablet Take 325 mg by mouth daily (with breakfast)      famotidine (PEPCID) 20 MG tablet Take 20 mg by mouth daily as needed        No current facility-administered medications for this visit. Allergies: Simvastatin, Cortisone, and Prednisone  Past Medical History:   Diagnosis Date    Afib ([facility]     Anxiety     Arthritis     ED (erectile dysfunction)     History of blood transfusion     Hyperlipemia     Hypertension      Past Surgical History:   Procedure Laterality Date    ADENOIDECTOMY      ANKLE FRACTURE SURGERY      FACIAL SURGERY  03/2021    Had done at [address] [place] [address] Bilateral 8/31/2020    PLACEMENT VBX AORTIC STENT WITH BILATERAL KISSING ILIAC STENTS WITH COMMON FEMORAL ARTERY ENDARTERECTOMY WITH VEIN PATCH performed by Trae Basurto DO at [address]     History reviewed. No pertinent family history.   Social History Tobacco Use    Smoking status: Current Every Day Smoker     Packs/day: 0.00     Types: Cigars    Smokeless tobacco: Never Used    Tobacco comment: smokes small filter cigars (10 to 12) a day    Substance Use Topics    Alcohol use: Not on file     Comment: \"couple of glasses of wine daily\"       ROS  Eyes - no sudden vision change or amaurosis. Respiratory - no significant shortness of breath,  Cardiovascular - no chest pain or syncope. No  significant leg swelling. No claudication. Musculoskeletal - no gait disturbance  Skin - wounds healed  Neurologic -  No speech difficulty or lateralizing weakness. All other review of systems are negative. Physical Exam    BP (!) 162/71 (Site: Right Upper Arm, Position: Sitting, Cuff Size: Medium Adult)   Pulse 70   Temp 97.8 °F (36.6 °C) (Temporal)   Resp 16   Ht 5' 8\" (1.727 m)   Wt 164 lb (74.4 kg)   BMI 24.94 kg/m²         Cardiovascular - Regular rate and rhythm. Pulmonary - effort appears normal.  No respiratory distress. Lungs - Breath sounds normal. No wheezes or rales. Extremities - {Extremities - Radial and brachial pulses are 2+ to palpation bilaterally. Right femoral pulse: present 2+; Left femoral pulse: present 2+; No cyanosis, clubbing, 2+ significant edema. No signs atheroembolic event. Neurologic - alert and oriented X 3. Physiologic. Face symmetric. Skin - warm, dry, and intact. none. wound  Psychiatric - mood, affect, and behavior appear normal.  Judgment and thought processes appear normal.    Risk factors for atherosclerosis of all vascular beds have been reviewed with the patient including:  Family history, tobacco abuse in all forms, elevated cholesterol, hyperlipidemia, and diabetes. Reviewed on this visit: pcp notes    Reviewed previous studies including: marjan  Individual images were reviewed. I agree with the findings  Results were discussed with the patient.         ASSESSMENT/PLAN:  1. PVD (peripheral vascular disease) ([facility]  -     VL LOWER EXTREMITY ARTERIAL SEGMENTAL PRESSURES W PPG; Future  -     gabapentin (NEURONTIN) 300 MG capsule; Take 1 capsule by mouth daily for 90 days. , Disp-90 capsule, R-0Normal    1. PVD (peripheral vascular disease) ([facility]     stable    Discussed management of marjan which includes:  continue asa and plavix to maintain patency of stents  Strongly encourage start/continue statin therapy -   Recommend no smoking - discussed the effect tobacco has on illness; Patient is scheduled for MARJAN for surveillance of his aorto iliac stents  He will keep his legs elevated as much as possible        Patient instructed to walk as much as possible. Call our office with any progressive pain in leg(s) or hip(s) with walking. Take good care of your feet. Let us know right away if you develop a wound on your foot. An electronic signature was used to authenticate this note.     -- Side, DO

## 2021-04-08 ENCOUNTER — TELEPHONE (OUTPATIENT)
Dept: VASCULAR SURGERY | Age: 76
End: 2021-04-08

## 2021-04-26 ENCOUNTER — APPOINTMENT (OUTPATIENT)
Dept: PULMONOLOGY | Facility: HOSPITAL | Age: 76
End: 2021-04-26

## 2021-04-28 ENCOUNTER — HOSPITAL ENCOUNTER (OUTPATIENT)
Dept: VASCULAR LAB | Age: 76
Discharge: HOME | End: 2021-04-28
Payer: OTHER GOVERNMENT

## 2021-04-28 DIAGNOSIS — I70.238 ATHEROSCLEROSIS OF NATIVE ARTERIES OF RIGHT LEG WITH ULCERATION OF OTHER PART OF LOWER LEG (HCC): ICD-10-CM

## 2021-04-28 DIAGNOSIS — I74.09 AORTOILIAC OCCLUSIVE DISEASE (HCC): ICD-10-CM

## 2021-04-28 PROCEDURE — 93923 UPR/LXTR ART STDY 3+ LVLS: CPT

## 2021-04-29 ENCOUNTER — APPOINTMENT (OUTPATIENT)
Dept: PULMONOLOGY | Facility: HOSPITAL | Age: 76
End: 2021-04-29

## 2021-04-30 DIAGNOSIS — I73.9 PVD (PERIPHERAL VASCULAR DISEASE) (HCC): Primary | ICD-10-CM

## 2021-05-14 ENCOUNTER — TELEPHONE (OUTPATIENT)
Dept: VASCULAR SURGERY | Age: 76
End: 2021-05-14

## 2021-05-14 NOTE — TELEPHONE ENCOUNTER
Faxed notes to [facility] from appointment on 4/7/21 with Dr Brijesh Blair per [facility] request

## 2021-05-15 ENCOUNTER — NURSE TRIAGE (OUTPATIENT)
Dept: CALL CENTER | Facility: HOSPITAL | Age: 76
End: 2021-05-15

## 2021-05-15 NOTE — TELEPHONE ENCOUNTER
"Soreness below navel ends at base of penis. No fever. Has had a BM. No N/V.  He is a ELEAZAR and spoke with Isabel APODACA and was instructed to take Ibuprofen and apply heating pad. He does feel like it has improved. Rates pain currently as 2/10. Drinking fluids. Care advise per guideline.    Reason for Disposition  • [1] MODERATE pain (e.g., interferes with normal activities) AND [2] pain comes and goes (cramps) AND [3] present > 24 hours  (Exception: pain with Vomiting or Diarrhea - see that Guideline)    Additional Information  • Negative: Shock suspected (e.g., cold/pale/clammy skin, too weak to stand, low BP, rapid pulse)  • Negative: Difficult to awaken or acting confused (e.g., disoriented, slurred speech)  • Negative: Passed out (i.e., lost consciousness, collapsed and was not responding)  • Negative: Sounds like a life-threatening emergency to the triager  • Negative: Chest pain  • Negative: Pain is mainly in upper abdomen  (if needed ask: \"is it mainly above the belly button?\")  • Negative: Followed an abdomen (stomach) injury  • Negative: [1] SEVERE pain (e.g., excruciating) AND [2] present > 1 hour  • Negative: [1] SEVERE pain AND [2] age > 60  • Negative: [1] Vomiting AND [2] contains red blood or black (\"coffee ground\") material  (Exception: few red streaks in vomit that only happened once)  • Negative: Blood in bowel movements  (Exception: Blood on surface of BM with constipation)  • Negative: Black or tarry bowel movements  (Exception: chronic-unchanged  black-grey bowel movements AND is taking iron pills or Pepto-bismol)  • Negative: [1] Unable to urinate (or only a few drops) > 4 hours AND [2] bladder feels very full (e.g., palpable bladder or strong urge to urinate)  • Negative: [1] Pain in the scrotum or testicle AND [2] present > 1 hour  • Negative: Patient sounds very sick or weak to the triager  • Negative: [1] MILD-MODERATE pain AND [2] constant AND [3] present > 2 hours  • Negative: [1] Vomiting AND " "[2] abdomen looks much more swollen than usual  • Negative: [1] Vomiting AND [2] contains bile (green color)  • Negative: White of the eyes have turned yellow (i.e., jaundice)  • Negative: Fever > 103 F (39.4 C)  • Negative: [1] Fever > 101 F (38.3 C) AND [2] age > 60  • Negative: [1] Fever > 100.0 F (37.8 C) AND [2] bedridden (e.g., nursing home patient, CVA, chronic illness, recovering from surgery)  • Negative: [1] Fever > 100.0 F (37.8 C) AND [2] diabetes mellitus or weak immune system (e.g., HIV positive, cancer chemo, splenectomy, organ transplant, chronic steroids)  • Negative: [1] SEVERE pain AND [2] present < 1 hour    Answer Assessment - Initial Assessment Questions  1. LOCATION: \"Where does it hurt?\"       Lower abdominal pain  2. RADIATION: \"Does the pain shoot anywhere else?\" (e.g., chest, back)      No radiation  3. ONSET: \"When did the pain begin?\" (Minutes, hours or days ago)      Last night  4. SUDDEN: \"Gradual or sudden onset?\"    Woke with this pain 10:30 pm  5. PATTERN \"Does the pain come and go, or is it constant?\"     - If constant: \"Is it getting better, staying the same, or worsening?\"       (Note: Constant means the pain never goes away completely; most serious pain is constant and it progresses)      - If intermittent: \"How long does it last?\" \"Do you have pain now?\"      (Note: Intermittent means the pain goes away completely between bouts)      *No Answer*  6. SEVERITY: \"How bad is the pain?\"  (e.g., Scale 1-10; mild, moderate, or severe)     - MILD (1-3): doesn't interfere with normal activities, abdomen soft and not tender to touch      - MODERATE (4-7): interferes with normal activities or awakens from sleep, tender to touch      - SEVERE (8-10): excruciating pain, doubled over, unable to do any normal activities       2/10  7. RECURRENT SYMPTOM: \"Have you ever had this type of abdominal pain before?\" If so, ask: \"When was the last time?\" and \"What happened that time?\"      no  8. " "CAUSE: \"What do you think is causing the abdominal pain?\"  Does not know  9. RELIEVING/AGGRAVATING FACTORS: \"What makes it better or worse?\" (e.g., movement, antacids, bowel movement)     Has had 2 bowel movements today  10. OTHER SYMPTOMS: \"Has there been any vomiting, diarrhea, constipation, or urine problems?\"  denies    Protocols used: ABDOMINAL PAIN - MALE-ADULT-      "

## 2021-06-01 ENCOUNTER — APPOINTMENT (OUTPATIENT)
Dept: PULMONOLOGY | Facility: HOSPITAL | Age: 76
End: 2021-06-01

## 2021-06-21 ENCOUNTER — LAB (OUTPATIENT)
Dept: LAB | Facility: HOSPITAL | Age: 76
End: 2021-06-21

## 2021-06-21 PROCEDURE — U0004 COV-19 TEST NON-CDC HGH THRU: HCPCS | Performed by: NURSE PRACTITIONER

## 2021-06-23 ENCOUNTER — HOSPITAL ENCOUNTER (OUTPATIENT)
Dept: PULMONOLOGY | Facility: HOSPITAL | Age: 76
Discharge: HOME | End: 2021-06-23
Admitting: NURSE PRACTITIONER

## 2021-06-23 DIAGNOSIS — Z92.29 HISTORY OF AMIODARONE THERAPY: ICD-10-CM

## 2021-06-23 PROCEDURE — 94729 DIFFUSING CAPACITY: CPT

## 2021-06-23 PROCEDURE — 94726 PLETHYSMOGRAPHY LUNG VOLUMES: CPT

## 2021-06-23 PROCEDURE — 94729 DIFFUSING CAPACITY: CPT | Performed by: INTERNAL MEDICINE

## 2021-06-23 PROCEDURE — 94060 EVALUATION OF WHEEZING: CPT

## 2021-06-23 PROCEDURE — 94726 PLETHYSMOGRAPHY LUNG VOLUMES: CPT | Performed by: INTERNAL MEDICINE

## 2021-06-23 PROCEDURE — 94060 EVALUATION OF WHEEZING: CPT | Performed by: INTERNAL MEDICINE

## 2021-06-23 RX ORDER — ALBUTEROL SULFATE 2.5 MG/3ML
2.5 SOLUTION RESPIRATORY (INHALATION) ONCE
Status: COMPLETED | OUTPATIENT
Start: 2021-06-23 | End: 2021-06-23

## 2021-06-23 RX ADMIN — ALBUTEROL SULFATE 2.5 MG: 2.5 SOLUTION RESPIRATORY (INHALATION) at 09:26

## 2021-06-25 ENCOUNTER — DOCUMENTATION (OUTPATIENT)
Dept: CARDIOLOGY | Facility: CLINIC | Age: 76
End: 2021-06-25

## 2021-06-25 ENCOUNTER — TELEPHONE (OUTPATIENT)
Dept: CARDIOLOGY | Facility: CLINIC | Age: 76
End: 2021-06-25

## 2021-06-25 NOTE — PROGRESS NOTES
I contacted the patient yesterday with his PFT results.  Diffusion capacity is at lower limits of normal.  He has a moderate obstructive ventilatory defect.  I recommended referral to pulmonology, given his obstructive defect and smoking history.  However the patient states he has no shortness of breath whatsoever and he prefers to continue to follow-up with his PCP for now.  Will have copy of PFTs sent to his PCP as well.  No change in plan of care otherwise.

## 2021-06-25 NOTE — TELEPHONE ENCOUNTER
----- Message from TAJ Rushing sent at 6/25/2021 11:55 AM CDT -----  Please send a copy of the PFT results to his PCP.  Thanks.

## 2021-07-19 ENCOUNTER — APPOINTMENT (OUTPATIENT)
Dept: PULMONOLOGY | Facility: HOSPITAL | Age: 76
End: 2021-07-19

## 2021-07-23 DIAGNOSIS — I73.9 PVD (PERIPHERAL VASCULAR DISEASE) (HCC): ICD-10-CM

## 2021-07-23 RX ORDER — GABAPENTIN 300 MG/1
300 CAPSULE ORAL DAILY
Qty: 90 CAPSULE | Refills: 0 | Status: SHIPPED | OUTPATIENT
Start: 2021-07-23 | End: 2021-10-19 | Stop reason: SDUPTHER

## 2021-10-15 DIAGNOSIS — I73.9 PVD (PERIPHERAL VASCULAR DISEASE) (HCC): ICD-10-CM

## 2021-10-15 RX ORDER — GABAPENTIN 300 MG/1
300 CAPSULE ORAL DAILY
Qty: 90 CAPSULE | Refills: 0 | Status: CANCELLED | OUTPATIENT
Start: 2021-10-15 | End: 2022-01-13

## 2021-10-19 DIAGNOSIS — I73.9 PVD (PERIPHERAL VASCULAR DISEASE) (HCC): ICD-10-CM

## 2021-10-19 RX ORDER — GABAPENTIN 300 MG/1
300 CAPSULE ORAL DAILY
Qty: 90 CAPSULE | Refills: 0 | Status: SHIPPED | OUTPATIENT
Start: 2021-10-19 | End: 2022-01-17

## 2021-10-25 ENCOUNTER — OFFICE VISIT (OUTPATIENT)
Dept: CARDIOLOGY | Facility: CLINIC | Age: 76
End: 2021-10-25

## 2021-10-25 VITALS
HEIGHT: 68 IN | OXYGEN SATURATION: 98 % | WEIGHT: 162 LBS | BODY MASS INDEX: 24.55 KG/M2 | HEART RATE: 75 BPM | SYSTOLIC BLOOD PRESSURE: 168 MMHG | DIASTOLIC BLOOD PRESSURE: 60 MMHG

## 2021-10-25 DIAGNOSIS — K55.20 AVM (ARTERIOVENOUS MALFORMATION) OF COLON: ICD-10-CM

## 2021-10-25 DIAGNOSIS — I48.91 ATRIAL FIBRILLATION AND FLUTTER (HCC): ICD-10-CM

## 2021-10-25 DIAGNOSIS — K92.2 GASTROINTESTINAL HEMORRHAGE, UNSPECIFIED GASTROINTESTINAL HEMORRHAGE TYPE: ICD-10-CM

## 2021-10-25 DIAGNOSIS — I10 ESSENTIAL HYPERTENSION: Primary | ICD-10-CM

## 2021-10-25 DIAGNOSIS — Z72.0 TOBACCO USE: ICD-10-CM

## 2021-10-25 DIAGNOSIS — I73.9 PVD (PERIPHERAL VASCULAR DISEASE) (HCC): ICD-10-CM

## 2021-10-25 DIAGNOSIS — I48.92 ATRIAL FIBRILLATION AND FLUTTER (HCC): ICD-10-CM

## 2021-10-25 PROCEDURE — 99214 OFFICE O/P EST MOD 30 MIN: CPT | Performed by: NURSE PRACTITIONER

## 2021-10-25 PROCEDURE — 93000 ELECTROCARDIOGRAM COMPLETE: CPT | Performed by: NURSE PRACTITIONER

## 2021-10-25 RX ORDER — FERROUS SULFATE 325(65) MG
325 TABLET ORAL
COMMUNITY

## 2021-10-25 RX ORDER — FINASTERIDE 5 MG/1
5 TABLET, FILM COATED ORAL NIGHTLY
COMMUNITY

## 2021-10-25 NOTE — PROGRESS NOTES
Subjective:     Encounter Date:10/25/2021      Patient ID: Kingsley Lerma is a 76 y.o. male.    Chief Complaint: Follow-up atrial fibrillation and flutter    The patient is followed for his paroxysmal atrial fibrillation and flutter, which was originally diagnosed 2-2020 (anticoagulation was initiated at that time). He was hospitalized following a syncopal spell in May and was in atrial flutter (he was otherwise asymptomatic with this). He was cardioverted that admission on 5/6, without a change in symptoms when NSR was restored. He was discharged home on 5/7 on amiodarone (rhythmol was stopped at that time). He returned to ER 5/9 because he found his heart rate to be 130s-140s and he experienced a brief dizzy spell. He was cardioverted for atrial flutter 2:1 with a HR in the 140s. NSR was restored only for a few seconds. His rhythm then proceeded to bounce around from atrial flutter to atrial fibrillation to NSR with various heart rates (70s-140s). He denied any symptoms regardless of what rhythm he was in. He was discharged home with the addition of diltiazem for better rate control when in AF/AFL, and amio and Xarelto were continued. Also of note, his Pletal dose was decreased by half due to interaction with diltiazem.   The patient also has a history of alcohol abuse. He tells me he believes the only way he really knew he was in AF or AFL previously was by checking his pulse rate with a pulse oximeter.      At his appointment with me on May 21, 2020, he was maintaining normal sinus rhythm.  His Xarelto, diltiazem, and amiodarone were continued at that time.     Since his office visit in May, he had had 3 hospitalizations for GI bleeding.  The initial hospitalization was around June 8th.  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 EGDs were unremarkable.  He did not require blood transfusion that admission.  He was discharged home on his same medical therapy, including Xarelto for anticoagulation and  "Pletal for claudication.  He returned on June 13 with recurrent GI bleeding, and hemoglobin in the range of 6-7.  He required 3 units of packed red blood cells that admission.  At that point time his Xarelto was discontinued and this was replaced with aspirin 81 mg daily.  His Pletal was continued.  He was discharged on June 18 and returned again on June 21 with recurrent GI bleeding.  However, during that admission his hemoglobin and hematocrit remained stable and he did not require transfusion.  He believes the bright red blood per rectum that admission may have been due to straining with constipation and the Xarelto \"still being in his system\", although he had not taken any since he was discharged 6/18.  Again, he was discharged home on aspirin and Pletal and no anticoagulation.     At his visit on 7/23/2020 his only complaint was left leg and foot pain.  This was felt to be secondary to his vascular disease and lower extremity wound.  Preoperative cardiac risk assessment was requested by [facility] vascular surgery prior to lower extremity revascularization.  He was maintaining normal sinus rhythm.  He was taking aspirin and Pletal at that point.  It was noted that given his GI bleeding history, he might be considered for watchman device placement in the future.  Ideally, would want him to be on aspirin only following vascular surgery (leading up to watchman placement) if okay per the vascular surgeon.  It was noted that a HATTIE may be ordered to further evaluate his candidacy for Watchman, after recovery from vascular surgery.    On 08/31/2020 he was taken to the OR, underwent placement of VBX aortic stent with bilateral kissing iliac stents with common femoral artery endarterectomy with vein patch per Dr. Acuña.  He was discharged home in stable condition.  He now follows with wound care at [facility] for a total of 3 lower extremity wounds as well.  It is unclear exactly what combination of medications the " patient was discharged home on based on documentation, but the patient states he went home on Plavix and Pletal after revascularization.    On 10/16/2020 he presented to [facility] ER with complaints of bright red blood per rectum for 2 to 3 days.  Hemoglobin was stable.  He did not require blood transfusion.  Pletal was continued, Plavix was discontinued and he was told to take aspirin 325 mg daily.  However, after calling the office and getting feedback from Dr. Farias as well as talking to the on-call vascular surgeon at [facility] (per patient report), he reduced the aspirin to 81 mg daily.  He is currently on aspirin and Pletal.  However, vascular surgery notes from today indicate they recommend he takes Plavix and Pletal.    I saw the patient in November 2020 and he was feeling well.  His primary concern was his lower extremity wounds.  He had associated mild lower extremity edema.  He denied any other symptoms.  He was continuing to drink around 2 glasses of wine per day.  He continued to smoke.  No changes were made at that visit.  I discussed the possibility of HATTIE/work-up for Watchman with Dr. Farias.  Given the fact that he had been maintaining normal sinus rhythm on amiodarone and continued to have intermittent GI bleeds despite being off of anticoagulation (although he was on Plavix and Pletal), it was felt best not to pursue watchman device placement at that time, given the fact that this would require 6 weeks of aspirin plus anticoagulation followed by dual antiplatelet therapy for a total of 6 months.  It was felt that the risk of bleeding outweighed the risk of stroke at that point.  Also, he was requiring dual antiplatelet therapy for [facility] vascular surgery given his recent lower extremity revascularization, which complicated the situation further.  I did attempt to contact vascular surgery at [facility] to discuss the case.    I saw the patient in March 2021.  He was doing well and was without any signs  of bleeding although he reported he had an OB stool that was positive.  He was without complaint.  At that visit, lisinopril was increased from 20 mg to 40 mg for better blood pressure control.  Also, PFTs were ordered given his chronic use of amiodarone.  See results below.  Ultimately, diffusion capacity was normal but he had moderate obstructive ventilatory defect and pulmonology referral was recommended.  The patient declined the referral due to lack of dyspnea.    Today the patient states he is doing very well.  He denies any chest pain, shortness of breath, palpitations, orthopnea, PND.  His blood pressure is high today in the office, but he has not been monitoring it and is unsure what it is running since the medicine change.  He does have a chronic congested cough.            The following portions of the patient's history were reviewed and updated as appropriate: allergies, current medications, past family history, past medical history, past social history, past surgical history and problem list.    Review of Systems   Constitutional: Negative for chills, diaphoresis and fever.   HENT: Negative for nosebleeds.    Eyes: Negative for visual disturbance.   Cardiovascular: Positive for leg swelling (stable ). Negative for claudication, cyanosis, dyspnea on exertion, irregular heartbeat and near-syncope.   Respiratory: Positive for cough. Negative for hemoptysis, sputum production and wheezing.    Hematologic/Lymphatic: Negative for bleeding problem.   Skin: Negative for color change and flushing.   Musculoskeletal: Negative for falls.   Gastrointestinal: Negative for bloating, abdominal pain, hematemesis, hematochezia, melena, nausea and vomiting.   Genitourinary: Negative for hematuria.   Neurological: Negative for light-headedness.   Psychiatric/Behavioral: Negative for altered mental status.       Allergies   Allergen Reactions   • Prednisone Other (See Comments)     Made him anxious and jittery     •  "Cortisone Nausea And Vomiting       Current Outpatient Medications:   •  amiodarone (PACERONE) 200 MG tablet, Take 1 tablet by mouth Daily., Disp: 90 tablet, Rfl: 3  •  aspirin 81 MG EC tablet, Take 1 tablet by mouth Daily., Disp: 30 tablet, Rfl: 11  •  Cholecalciferol (VITAMIN D) 50 MCG (2000 UT) tablet, Take 2,000 Units by mouth Daily., Disp: , Rfl:   •  cilostazol (PLETAL) 50 MG tablet, Take 1 tablet by mouth 2 (Two) Times a Day., Disp: 180 tablet, Rfl: 3  •  dilTIAZem CD (CARDIZEM CD) 120 MG 24 hr capsule, Take 1 capsule by mouth Daily., Disp: 90 capsule, Rfl: 3  •  ferrous sulfate 325 (65 FE) MG tablet, Take 325 mg by mouth Daily With Breakfast., Disp: , Rfl:   •  finasteride (PROSCAR) 5 MG tablet, Take 5 mg by mouth Daily., Disp: , Rfl:   •  folic acid (FOLVITE) 1 MG tablet, Take 1 mg by mouth Daily., Disp: , Rfl:   •  gabapentin (NEURONTIN) 300 MG capsule, Take 300 mg by mouth every night at bedtime., Disp: , Rfl:   •  lisinopril (PRINIVIL,ZESTRIL) 40 MG tablet, Take 1 tablet by mouth Daily., Disp: 90 tablet, Rfl: 3  •  multivitamin with minerals (Multivitamin Adult) tablet tablet, Take 1 tablet by mouth Daily., Disp: , Rfl:   •  pantoprazole (PROTONIX) 20 MG EC tablet, Take 20 mg by mouth Daily., Disp: , Rfl:   •  hydrOXYzine (ATARAX) 25 MG tablet, Take 1 tablet by mouth 3 (Three) Times a Day As Needed for Anxiety., Disp: 60 tablet, Rfl: 5         Objective:    /60   Pulse 75   Ht 172.7 cm (68\")   Wt 73.5 kg (162 lb)   SpO2 98%   BMI 24.63 kg/m²        Constitutional:       General: Not in acute distress.     Appearance: Well-developed. Not diaphoretic.   Eyes:      Pupils: Pupils are equal, round, and reactive to light.   HENT:      Head: Normocephalic and atraumatic.   Neck:      Vascular: No JVD.   Pulmonary:      Effort: Pulmonary effort is normal. No respiratory distress.      Breath sounds: Normal breath sounds.   Chest:      Chest wall: Not tender to palpatation.   Cardiovascular:      " Normal rate. Regular rhythm.      Murmurs: There is no murmur.   Edema:     Peripheral edema (BLE; with erythema, and dry, flaky skin ) present.  Abdominal:      General: Bowel sounds are normal. There is no distension.      Palpations: Abdomen is soft.      Tenderness: There is no abdominal tenderness.   Musculoskeletal: Normal range of motion.      Cervical back: Normal range of motion and neck supple. Skin:     General: Skin is warm and dry.   Neurological:      Mental Status: Alert and oriented to person, place, and time.      Cranial Nerves: No cranial nerve deficit.   Psychiatric:         Behavior: Behavior normal.         Lab Review:   Lab Results   Component Value Date    WBC 13.3 (H) 09/02/2020    HGB 10.2 (L) 09/02/2020    HCT 31.7 (L) 09/02/2020    .6 (H) 09/02/2020     09/02/2020     Lab Results   Component Value Date    GLUCOSE 98 09/02/2020    CALCIUM 8.6 (L) 09/02/2020     09/02/2020    K 3.7 09/02/2020    CO2 25 09/02/2020     09/02/2020    BUN 15 09/02/2020    CREATININE 0.7 09/02/2020    EGFRIFAFRI >59 09/02/2020    EGFRIFNONA >60 09/02/2020    BCR 32.9 (H) 06/21/2020    ANIONGAP 10 09/02/2020     Lab Results   Component Value Date    TSH 1.170 05/05/2020     Lab Results   Component Value Date    GLUCOSE 98 09/02/2020    BUN 15 09/02/2020    CREATININE 0.7 09/02/2020    EGFRIFNONA >60 09/02/2020    EGFRIFAFRI >59 09/02/2020    BCR 32.9 (H) 06/21/2020    K 3.7 09/02/2020    CO2 25 09/02/2020    CALCIUM 8.6 (L) 09/02/2020    ALBUMIN 3.1 (L) 09/01/2020    AST 13 09/01/2020    ALT 9 09/01/2020 5/2020 CXR -     HISTORY: Chest Pain protocol     COMPARISON: 3/6/2020.     FINDINGS:  The lungs are expanded bilaterally and clear. The pleural  spaces are clear. Heart size is normal. There are degenerative changes  of the shoulders and spine. No acute bony abnormality is seen.     IMPRESSION:  No active disease is seen.               ECG 12 Lead    Date/Time: 10/25/2021 4:34  PM  Performed by: Belkys Carty APRN  Authorized by: Belkys Carty APRN   Comparison: compared with previous ECG from 3/25/2021  Similar to previous ECG  Rhythm: sinus bradycardia  BPM: 58          [facility] - Pulmonary Function Test     [address]  [address]  [phone]     Patient : Kingsley Lerma   MRN : [id number]  CSN : [id number]  Pulmonologist : Cuco Cruz MD  Date : 6/23/2021     ______________________________________________________________________     Interpretation :  1.  Spirometry is consistent with a moderate obstructive ventilatory defect.  2.  There is actually some worsening of small airways function postbronchodilator and otherwise there is no significant change in spirometry postbronchodilator and a moderate obstructive ventilatory defect is still present.  3.  Lung volumes reveal borderline hyperinflation.  There is also a mild decrease in inspiratory capacity.  4.  Diffusion capacity is at the lower limits of normal but is normalized when corrected for alveolar volume.        Cuco Cruz MD    Results for orders placed during the hospital encounter of 02/10/20    Adult Transthoracic Echo Complete W/ Cont if Necessary Per Protocol    Interpretation Summary  · Left atrial cavity size is borderline dilated.  · Left ventricular systolic function is normal.  · Hyperdynamic right ventricular systolic function noted.    RHYTHM IS ATRIAL FIBRILLATION  BORDERLINE BI-ATRIAL ENLARGEMENT  ALL THE LV AND RV WALLS CAN'T BE SEEN WELL BUT THERE APPEARS TO BE HYPERKINESIS OF BOTH VENTRICLES  NO SIGNIFICANT VALVULAR DYSFUNCTION      Assessment:      Problem List Items Addressed This Visit        Cardiac and Vasculature    Essential hypertension - Primary    PVD (peripheral vascular disease) (HCC)    Atrial fibrillation and flutter (HCC)       Gastrointestinal Abdominal     Gastrointestinal hemorrhage    Overview     Added automatically from request for surgery [id number]          AVM (arteriovenous malformation) of colon       Tobacco    Tobacco use          Plan:     1.  Paroxysmal atrial fibrillation flutter: Established problem, stable.  Maintaining normal sinus rhythm on amiodarone.  Continue diltiazem for rate control when in atrial fibrillation or flutter.  No palpitations or syncope.  Not anticoagulated due to recurrent GI bleeds secondary to colonic AVMs.  Continue aspirin 81 mg daily. CHADSVASC 3.  See notes in HPI regarding previous discussion with Dr. Farias about potential Watchman device placement.  Ultimately, it was felt that the risk of bleeding outweighed the benefit of placing the device given the fact that he continued to have GI bleeds on antiplatelet medication and Pletal, and he has been maintaining normal sinus rhythm on amiodarone.  -Given his chronic amiodarone therapy, will continue to monitor LFTs, TSH and PFTs.  Normal LFTs and TSH noted above.  See copy of PFTs above-diffusion capacity normal, patient declined referral to pulmonology for moderate obstructive ventilatory defect citing no dyspnea.    2.  Peripheral vascular disease: He had lower extremity revascularization at [facility] on 8/31/2020.  He is currently on aspirin 81 mg daily and Pletal 50 mg twice daily.  However, 11/4/2020 note from vascular indicates they recommend he be on Plavix and Pletal-defer to vascular surgery.  From a cardiac standpoint, since he cannot be anticoagulated the recommendation would be for aspirin 81 mg daily.  However, he would not necessarily need to be on aspirin if he was on Plavix per recommendations by vascular surgery.      3.  Essential hypertension: Established problem, uncertain control.  Elevated today.  I increased his lisinopril in March.  He has not been monitoring his blood pressure to see if it has improved since that time.  Continue to monitor and follow closely with PCP for this.  -Continue lisinopril 40 mg daily  -Continue current dose of diltiazem    5.   Alcohol and tobacco abuse: Counseled on the importance of cessation from both.    6.  Recurrent GI bleeding secondary to colonic AVMs-most recent bleed was 10/16/2020-he was evaluated at [facility] ER.  Did not require transfusion for that particular bleed, but has required transfusion in the past.  Plavix was stopped, Pletal was continued and aspirin 81 mg was started following that ER visit.  See details regarding his bleeding history and HPI.    Follow-up in  6 months with Dr. Farias, sooner with symptoms or concerns.

## 2022-01-17 ENCOUNTER — APPOINTMENT (OUTPATIENT)
Dept: CT IMAGING | Facility: HOSPITAL | Age: 77
End: 2022-01-17

## 2022-01-17 ENCOUNTER — APPOINTMENT (OUTPATIENT)
Dept: GENERAL RADIOLOGY | Facility: HOSPITAL | Age: 77
End: 2022-01-17

## 2022-01-17 ENCOUNTER — HOSPITAL ENCOUNTER (EMERGENCY)
Facility: HOSPITAL | Age: 77
Discharge: HOME | End: 2022-01-17
Admitting: EMERGENCY MEDICINE

## 2022-01-17 ENCOUNTER — NURSE TRIAGE (OUTPATIENT)
Dept: CALL CENTER | Facility: HOSPITAL | Age: 77
End: 2022-01-17

## 2022-01-17 VITALS
HEIGHT: 68 IN | OXYGEN SATURATION: 100 % | HEART RATE: 66 BPM | RESPIRATION RATE: 20 BRPM | BODY MASS INDEX: 19.1 KG/M2 | SYSTOLIC BLOOD PRESSURE: 134 MMHG | DIASTOLIC BLOOD PRESSURE: 56 MMHG | WEIGHT: 126 LBS | TEMPERATURE: 97.9 F

## 2022-01-17 DIAGNOSIS — R19.7 DIARRHEA, UNSPECIFIED TYPE: Primary | ICD-10-CM

## 2022-01-17 DIAGNOSIS — Z86.19 HISTORY OF CLOSTRIDIUM DIFFICILE INFECTION: ICD-10-CM

## 2022-01-17 DIAGNOSIS — N39.0 URINARY TRACT INFECTION WITHOUT HEMATURIA, SITE UNSPECIFIED: ICD-10-CM

## 2022-01-17 DIAGNOSIS — M16.11 OSTEOARTHRITIS OF RIGHT HIP, UNSPECIFIED OSTEOARTHRITIS TYPE: ICD-10-CM

## 2022-01-17 LAB
ALBUMIN SERPL-MCNC: 3.4 G/DL (ref 3.5–5.2)
ALBUMIN/GLOB SERPL: 1.1 G/DL
ALP SERPL-CCNC: 78 U/L (ref 39–117)
ALT SERPL W P-5'-P-CCNC: 15 U/L (ref 1–41)
ANION GAP SERPL CALCULATED.3IONS-SCNC: 11 MMOL/L (ref 5–15)
APTT PPP: 49.4 SECONDS (ref 24.1–35)
AST SERPL-CCNC: 17 U/L (ref 1–40)
BACTERIA UR QL AUTO: ABNORMAL /HPF
BASOPHILS # BLD AUTO: 0.04 10*3/MM3 (ref 0–0.2)
BASOPHILS NFR BLD AUTO: 0.2 % (ref 0–1.5)
BILIRUB SERPL-MCNC: 0.4 MG/DL (ref 0–1.2)
BILIRUB UR QL STRIP: ABNORMAL
BUN SERPL-MCNC: 29 MG/DL (ref 8–23)
BUN/CREAT SERPL: 26.4 (ref 7–25)
CALCIUM SPEC-SCNC: 8.8 MG/DL (ref 8.6–10.5)
CHLORIDE SERPL-SCNC: 108 MMOL/L (ref 98–107)
CLARITY UR: ABNORMAL
CO2 SERPL-SCNC: 23 MMOL/L (ref 22–29)
COLOR UR: ABNORMAL
CREAT SERPL-MCNC: 1.1 MG/DL (ref 0.76–1.27)
DEPRECATED RDW RBC AUTO: 61.8 FL (ref 37–54)
EOSINOPHIL # BLD AUTO: 0 10*3/MM3 (ref 0–0.4)
EOSINOPHIL NFR BLD AUTO: 0 % (ref 0.3–6.2)
ERYTHROCYTE [DISTWIDTH] IN BLOOD BY AUTOMATED COUNT: 16.8 % (ref 12.3–15.4)
GFR SERPL CREATININE-BSD FRML MDRD: 65 ML/MIN/1.73
GLOBULIN UR ELPH-MCNC: 3 GM/DL
GLUCOSE SERPL-MCNC: 73 MG/DL (ref 65–99)
GLUCOSE UR STRIP-MCNC: NEGATIVE MG/DL
HCT VFR BLD AUTO: 37.7 % (ref 37.5–51)
HGB BLD-MCNC: 12.4 G/DL (ref 13–17.7)
HGB UR QL STRIP.AUTO: NEGATIVE
HYALINE CASTS UR QL AUTO: ABNORMAL /LPF
IMM GRANULOCYTES # BLD AUTO: 0.11 10*3/MM3 (ref 0–0.05)
IMM GRANULOCYTES NFR BLD AUTO: 0.5 % (ref 0–0.5)
INR PPP: 1.03 (ref 0.91–1.09)
KETONES UR QL STRIP: ABNORMAL
LEUKOCYTE ESTERASE UR QL STRIP.AUTO: ABNORMAL
LIPASE SERPL-CCNC: 14 U/L (ref 13–60)
LYMPHOCYTES # BLD AUTO: 0.52 10*3/MM3 (ref 0.7–3.1)
LYMPHOCYTES NFR BLD AUTO: 2.5 % (ref 19.6–45.3)
MCH RBC QN AUTO: 32.6 PG (ref 26.6–33)
MCHC RBC AUTO-ENTMCNC: 32.9 G/DL (ref 31.5–35.7)
MCV RBC AUTO: 99.2 FL (ref 79–97)
MONOCYTES # BLD AUTO: 0.99 10*3/MM3 (ref 0.1–0.9)
MONOCYTES NFR BLD AUTO: 4.7 % (ref 5–12)
NEUTROPHILS NFR BLD AUTO: 19.49 10*3/MM3 (ref 1.7–7)
NEUTROPHILS NFR BLD AUTO: 92.1 % (ref 42.7–76)
NITRITE UR QL STRIP: NEGATIVE
NRBC BLD AUTO-RTO: 0 /100 WBC (ref 0–0.2)
PH UR STRIP.AUTO: <=5 [PH] (ref 5–8)
PLATELET # BLD AUTO: 306 10*3/MM3 (ref 140–450)
PMV BLD AUTO: 8.9 FL (ref 6–12)
POTASSIUM SERPL-SCNC: 3.8 MMOL/L (ref 3.5–5.2)
PROT SERPL-MCNC: 6.4 G/DL (ref 6–8.5)
PROT UR QL STRIP: ABNORMAL
PROTHROMBIN TIME: 13.1 SECONDS (ref 11.9–14.6)
RBC # BLD AUTO: 3.8 10*6/MM3 (ref 4.14–5.8)
RBC # UR STRIP: ABNORMAL /HPF
REF LAB TEST METHOD: ABNORMAL
SARS-COV-2 RNA PNL SPEC NAA+PROBE: NOT DETECTED
SODIUM SERPL-SCNC: 142 MMOL/L (ref 136–145)
SP GR UR STRIP: 1.02 (ref 1–1.03)
SQUAMOUS #/AREA URNS HPF: ABNORMAL /HPF
TROPONIN T SERPL-MCNC: <0.01 NG/ML (ref 0–0.03)
UROBILINOGEN UR QL STRIP: ABNORMAL
WBC # UR STRIP: ABNORMAL /HPF
WBC NRBC COR # BLD: 21.15 10*3/MM3 (ref 3.4–10.8)

## 2022-01-17 PROCEDURE — 84484 ASSAY OF TROPONIN QUANT: CPT | Performed by: NURSE PRACTITIONER

## 2022-01-17 PROCEDURE — 25010000002 ONDANSETRON PER 1 MG: Performed by: NURSE PRACTITIONER

## 2022-01-17 PROCEDURE — 99284 EMERGENCY DEPT VISIT MOD MDM: CPT

## 2022-01-17 PROCEDURE — 96374 THER/PROPH/DIAG INJ IV PUSH: CPT

## 2022-01-17 PROCEDURE — 80053 COMPREHEN METABOLIC PANEL: CPT | Performed by: NURSE PRACTITIONER

## 2022-01-17 PROCEDURE — 93005 ELECTROCARDIOGRAM TRACING: CPT | Performed by: NURSE PRACTITIONER

## 2022-01-17 PROCEDURE — 85610 PROTHROMBIN TIME: CPT | Performed by: NURSE PRACTITIONER

## 2022-01-17 PROCEDURE — 93010 ELECTROCARDIOGRAM REPORT: CPT | Performed by: INTERNAL MEDICINE

## 2022-01-17 PROCEDURE — 25010000002 IOPAMIDOL 61 % SOLUTION: Performed by: NURSE PRACTITIONER

## 2022-01-17 PROCEDURE — 83690 ASSAY OF LIPASE: CPT | Performed by: NURSE PRACTITIONER

## 2022-01-17 PROCEDURE — 87086 URINE CULTURE/COLONY COUNT: CPT | Performed by: NURSE PRACTITIONER

## 2022-01-17 PROCEDURE — 74177 CT ABD & PELVIS W/CONTRAST: CPT

## 2022-01-17 PROCEDURE — 73502 X-RAY EXAM HIP UNI 2-3 VIEWS: CPT

## 2022-01-17 PROCEDURE — 87635 SARS-COV-2 COVID-19 AMP PRB: CPT | Performed by: NURSE PRACTITIONER

## 2022-01-17 PROCEDURE — 81001 URINALYSIS AUTO W/SCOPE: CPT | Performed by: NURSE PRACTITIONER

## 2022-01-17 PROCEDURE — 85025 COMPLETE CBC W/AUTO DIFF WBC: CPT | Performed by: NURSE PRACTITIONER

## 2022-01-17 PROCEDURE — 85730 THROMBOPLASTIN TIME PARTIAL: CPT | Performed by: NURSE PRACTITIONER

## 2022-01-17 RX ORDER — ONDANSETRON 2 MG/ML
4 INJECTION INTRAMUSCULAR; INTRAVENOUS ONCE
Status: COMPLETED | OUTPATIENT
Start: 2022-01-17 | End: 2022-01-17

## 2022-01-17 RX ORDER — ONDANSETRON 4 MG/1
4 TABLET, ORALLY DISINTEGRATING ORAL EVERY 6 HOURS PRN
Qty: 12 TABLET | Refills: 0 | Status: SHIPPED | OUTPATIENT
Start: 2022-01-17 | End: 2022-05-04

## 2022-01-17 RX ORDER — CEFDINIR 300 MG/1
300 CAPSULE ORAL 2 TIMES DAILY
Qty: 20 CAPSULE | Refills: 0 | Status: ON HOLD | OUTPATIENT
Start: 2022-01-17 | End: 2022-01-29

## 2022-01-17 RX ADMIN — SODIUM CHLORIDE 500 ML: 9 INJECTION, SOLUTION INTRAVENOUS at 18:59

## 2022-01-17 RX ADMIN — IOPAMIDOL 100 ML: 612 INJECTION, SOLUTION INTRAVENOUS at 19:45

## 2022-01-17 RX ADMIN — ONDANSETRON 4 MG: 2 INJECTION INTRAMUSCULAR; INTRAVENOUS at 14:38

## 2022-01-17 RX ADMIN — SODIUM CHLORIDE 500 ML: 9 INJECTION, SOLUTION INTRAVENOUS at 14:37

## 2022-01-17 NOTE — ED TRIAGE NOTES
Pt presents to the [facility] for diarrhea and general weakness. Pt states that he has a hx of C.diff. Pt is A&O x4.

## 2022-01-17 NOTE — TELEPHONE ENCOUNTER
"Has fallen right hip soreness, bruising, diarrhea. Thinks may be c-diff.    Reason for Disposition  • Patient sounds very sick or weak to the triager    Additional Information  • Negative: Shock suspected (e.g., cold/pale/clammy skin, too weak to stand, low BP, rapid pulse)  • Negative: Difficult to awaken or acting confused (e.g., disoriented, slurred speech)  • Negative: Sounds like a life-threatening emergency to the triager  • Negative: Vomiting also present and worse than the diarrhea  • Negative: [1] Blood in stool AND [2] without diarrhea  • Negative: Diarrhea in a cancer patient who is currently (or recently) receiving chemotherapy or radiation therapy, or cancer patient who has metastatic or end-stage cancer and is receiving palliative care  • Negative: [1] SEVERE abdominal pain (e.g., excruciating) AND [2] present > 1 hour  • Negative: [1] SEVERE abdominal pain AND [2] age > 60 years  • Negative: [1] Blood in the stool AND [2] moderate or large amount of blood  • Negative: Black or tarry bowel movements (Exception: chronic-unchanged black-grey bowel movements AND is taking iron pills or Pepto-bismol)  • Negative: [1] Drinking very little AND [2] dehydration suspected (e.g., no urine > 12 hours, very dry mouth, very lightheaded)    Answer Assessment - Initial Assessment Questions  1. DIARRHEA SEVERITY: \"How bad is the diarrhea?\" \"How many extra stools have you had in the past 24 hours than normal?\"     - NO DIARRHEA (SCALE 0)    - MILD (SCALE 1-3): Few loose or mushy BMs; increase of 1-3 stools over normal daily number of stools; mild increase in ostomy output.    -  MODERATE (SCALE 4-7): Increase of 4-6 stools daily over normal; moderate increase in ostomy output.  * SEVERE (SCALE 8-10; OR 'WORST POSSIBLE'): Increase of 7 or more stools daily over normal; moderate increase in ostomy output; incontinence.      States a lot  2. ONSET: \"When did the diarrhea begin?\"       Having off and on for 3 month  3. BM " "CONSISTENCY: \"How loose or watery is the diarrhea?\"       loose  4. VOMITING: \"Are you also vomiting?\" If Yes, ask: \"How many times in the past 24 hours?\"       no  5. ABDOMINAL PAIN: \"Are you having any abdominal pain?\" If Yes, ask: \"What does it feel like?\" (e.g., crampy, dull, intermittent, constant)       no  6. ABDOMINAL PAIN SEVERITY: If present, ask: \"How bad is the pain?\"  (e.g., Scale 1-10; mild, moderate, or severe)    - MILD (1-3): doesn't interfere with normal activities, abdomen soft and not tender to touch     - MODERATE (4-7): interferes with normal activities or awakens from sleep, tender to touch     - SEVERE (8-10): excruciating pain, doubled over, unable to do any normal activities        no  7. ORAL INTAKE: If vomiting, \"Have you been able to drink liquids?\" \"How much fluids have you had in the past 24 hours?\"      yes  8. HYDRATION: \"Any signs of dehydration?\" (e.g., dry mouth [not just dry lips], too weak to stand, dizziness, new weight loss) \"When did you last urinate?\"      no  9. EXPOSURE: \"Have you traveled to a foreign country recently?\" \"Have you been exposed to anyone with diarrhea?\" \"Could you have eaten any food that was spoiled?\"      no  10. ANTIBIOTIC USE: \"Are you taking antibiotics now or have you taken antibiotics in the past 2 months?\"        no  11. OTHER SYMPTOMS: \"Do you have any other symptoms?\" (e.g., fever, blood in stool)        Fell this morning,has right hip soreness  12. PREGNANCY: \"Is there any chance you are pregnant?\" \"When was your last menstrual period?\"        na    Protocols used: DIARRHEA-ADULT-AH      "

## 2022-01-17 NOTE — ED PROVIDER NOTES
Subjective   Patient is a 76-year-old male with history significant for alcohol abuse, atrial fibrillation, atrial flutter, BPH, tobacco abuse, hyperlipidemia, hypertension, peripheral vascular disease.  He presents the ER with complaints of diarrhea.  Patient states he was just discharged from a rehabilitation center in [place] on Friday due to C. difficile.  He states he was on a second round/about a C. difficile.  He states initially he was discharged after a 3-day admission and when symptoms returned he was readmitted for several weeks.  Patient states around 430 this morning he began experiencing nausea and profuse diarrhea.  He states he fell in the kitchen due to his weakness.  He describes a decreased appetite and states he cannot stand the smell of food.  He is currently on a probiotic however uncertain if he is on any other medication for C. difficile.  He denies any vomiting.  Due to symptoms described he came to the ER for evaluation and treatment.          Review of Systems   Constitutional: Positive for fatigue. Negative for fever.   HENT: Negative.  Negative for congestion.    Eyes: Negative.    Respiratory: Negative.  Negative for cough and shortness of breath.    Cardiovascular: Negative.  Negative for chest pain.   Gastrointestinal: Positive for abdominal pain, diarrhea and nausea. Negative for vomiting.   Genitourinary: Negative.  Negative for decreased urine volume.   Musculoskeletal: Negative.  Negative for back pain.   Skin: Negative.    Neurological: Positive for weakness.       Past Medical History:   Diagnosis Date   • Alcohol abuse    • Arthritis    • Atrial fibrillation (HCC)    • Atrial flutter (HCC)    • BPH (benign prostatic hyperplasia)    • Circulation problem    • History of tobacco abuse    • Hyperlipidemia    • Hypertension    • PVD (peripheral vascular disease) (HCC)        Allergies   Allergen Reactions   • Prednisone Other (See Comments)     Made him anxious and jittery     •  Cortisone Nausea And Vomiting       Past Surgical History:   Procedure Laterality Date   • ADENOIDECTOMY     • AORTIC VALVE REPAIR/REPLACEMENT     • APPENDECTOMY     • COLONOSCOPY N/A 6/8/2020    Multiple non-bleeding colonic angiodysplastic lesions; Non-bleeding internal hemorrhoids; The examination was otherwise normal; No specimens collected; Repeat 10 years   • ENDOSCOPY N/A 6/8/2020    Normal esophagus; Normal stomach; Normal examined duodenum; No specimens collected   • ENDOSCOPY N/A 6/15/2020    Dr. Banuelos-Normal examined duodenum; Normal stomach; Normal esophagus; No specimens collected   • VEIN SURGERY         Family History   Problem Relation Age of Onset   • Dementia Mother    • Cancer Father    • Pancreatic cancer Father    • Colon cancer Neg Hx    • Colon polyps Neg Hx    • Esophageal cancer Neg Hx    • Liver cancer Neg Hx    • Liver disease Neg Hx    • Rectal cancer Neg Hx    • Stomach cancer Neg Hx        Social History     Socioeconomic History   • Marital status: Single   Tobacco Use   • Smoking status: Current Every Day Smoker     Packs/day: 0.50     Types: Cigars   • Smokeless tobacco: Never Used   • Tobacco comment: SMOKES ABOUT 10 ROSSI SWEET CIGARS A DAY   Vaping Use   • Vaping Use: Never used   Substance and Sexual Activity   • Alcohol use: Yes     Alcohol/week: 2.0 standard drinks     Types: 2 Glasses of wine per week     Comment: Daily    • Drug use: Never   • Sexual activity: Defer           Objective   Physical Exam  Vitals and nursing note reviewed.   Constitutional:       General: He is not in acute distress.     Appearance: He is well-developed. He is not diaphoretic.   HENT:      Head: Normocephalic and atraumatic.      Right Ear: External ear normal.      Left Ear: External ear normal.      Nose: Nose normal.      Mouth/Throat:      Mouth: Mucous membranes are moist.      Pharynx: Oropharynx is clear.   Eyes:      General: No scleral icterus.     Extraocular Movements: Extraocular  movements intact.      Conjunctiva/sclera: Conjunctivae normal.      Pupils: Pupils are equal, round, and reactive to light.   Neck:      Thyroid: No thyromegaly.      Vascular: No JVD.   Cardiovascular:      Rate and Rhythm: Normal rate and regular rhythm.      Heart sounds: Normal heart sounds. No murmur heard.      Pulmonary:      Effort: Pulmonary effort is normal. No respiratory distress.      Breath sounds: Normal breath sounds. No wheezing or rales.   Chest:      Chest wall: No tenderness.   Abdominal:      General: Bowel sounds are normal. There is no distension.      Palpations: Abdomen is soft. There is no mass.      Tenderness: There is abdominal tenderness. There is no guarding or rebound.   Musculoskeletal:         General: Normal range of motion.      Cervical back: Normal range of motion and neck supple.   Lymphadenopathy:      Cervical: No cervical adenopathy.   Skin:     General: Skin is warm and dry.      Capillary Refill: Capillary refill takes less than 2 seconds.      Coloration: Skin is not pale.      Findings: No erythema or rash.   Neurological:      General: No focal deficit present.      Mental Status: He is alert and oriented to person, place, and time.      Cranial Nerves: No cranial nerve deficit.      Coordination: Coordination normal.      Deep Tendon Reflexes: Reflexes are normal and symmetric.   Psychiatric:         Mood and Affect: Mood normal.         Behavior: Behavior normal.         Thought Content: Thought content normal.         Judgment: Judgment normal.         Procedures           ED Course  ED Course as of 01/18/22 1357   Mon Jan 17, 2022 2049 Patient's white blood count is elevated at 21.15. Hemoglobin hematocrit is 12.4 and 37.7. CMP patient's BUN and creatinine is 29 and 1.10, sodium 142, potassium 3.8, CO2 23, anion gap is 11. No significant indication of dehydration. Patient does have a urinary tract infection with moderate leukocytes and too numerous to count white  blood cells. He has 15 ketones noted in his urine. Patient has received a liter of IV fluids. In nearly 7 hours patient has not had any diarrhea to collect for a stool study. CT scan is negative for any acute findings. It is negative for colitis as well as diverticulitis. X-ray of the right hip and pelvis is negative for acute fractures however does show severe osteoarthritis. [TW]   2051 Lipase was within normal limits at 14, troponin is less than 0.010, COVID is negative. [TW]   2056 Attempted to get records from the rehab unit patient was at however per our  the medical records office had already closed.  [TW]   2106 Providing patient with a po challenge. He is concerned about being discharged and having sxs return. We have given crackers and water and will reassess. [TW]   2124 Patient ate crackers and water without any vomiting or nausea.  He has had no diarrhea.  He is now telling the nursing staff he is ready to be discharged.  He will need low threshold for return with any acute or worsening symptoms. [TW]      ED Course User Index  [TW] Yumiko David, APRN                                                 MDM  Number of Diagnoses or Management Options  Diarrhea, unspecified type: new and requires workup  History of Clostridium difficile infection: new and requires workup  Osteoarthritis of right hip, unspecified osteoarthritis type: new and requires workup  Urinary tract infection without hematuria, site unspecified: new and requires workup     Amount and/or Complexity of Data Reviewed  Clinical lab tests: ordered and reviewed  Tests in the radiology section of CPT®: ordered and reviewed  Decide to obtain previous medical records or to obtain history from someone other than the patient: yes  Discuss the patient with other providers: yes    Risk of Complications, Morbidity, and/or Mortality  Presenting problems: moderate  Diagnostic procedures: moderate  Management options: moderate    Patient  Progress  Patient progress: improved      Final diagnoses:   Diarrhea, unspecified type   Urinary tract infection without hematuria, site unspecified   History of Clostridium difficile infection   Osteoarthritis of right hip, unspecified osteoarthritis type       ED Disposition  ED Disposition     ED Disposition Condition Comment    Discharge Good           No follow-up provider specified.       Medication List      New Prescriptions    cefdinir 300 MG capsule  Commonly known as: OMNICEF  Take 1 capsule by mouth 2 (Two) Times a Day for 10 days.     ondansetron ODT 4 MG disintegrating tablet  Commonly known as: ZOFRAN-ODT  Place 1 tablet on the tongue Every 6 (Six) Hours As Needed for Nausea.           Where to Get Your Medications      These medications were sent to [facility]/pharmacy #[id number] - [place], [place] - [address] [place] [address] - [phone]  - [phone] [address] [address]    Phone: [phone]   · cefdinir 300 MG capsule  · ondansetron ODT 4 MG disintegrating tablet          Yumiko David, APRN  01/18/22 9634

## 2022-01-18 ENCOUNTER — NURSE TRIAGE (OUTPATIENT)
Dept: CALL CENTER | Facility: HOSPITAL | Age: 77
End: 2022-01-18

## 2022-01-18 LAB
BACTERIA SPEC AEROBE CULT: NO GROWTH
QT INTERVAL: 400 MS
QTC INTERVAL: 446 MS

## 2022-01-18 RX ORDER — CILOSTAZOL 50 MG/1
50 TABLET ORAL 2 TIMES DAILY
Qty: 180 TABLET | Refills: 3 | Status: CANCELLED | OUTPATIENT
Start: 2022-01-18

## 2022-01-18 NOTE — TELEPHONE ENCOUNTER
Reviewed guideline with caller, advises he been evaluated within 24 hours. Caller agrees to follow care advice.     Reason for Disposition  • [1] Recent antibiotic therapy (i.e., within last 2 months) AND [2] diarrhea present > 3 days since antibiotic was stopped    Additional Information  • Negative: Shock suspected (e.g., cold/pale/clammy skin, too weak to stand, low BP, rapid pulse)  • Negative: Difficult to awaken or acting confused (e.g., disoriented, slurred speech)  • Negative: Sounds like a life-threatening emergency to the triager  • Negative: Vomiting also present and worse than the diarrhea  • Negative: [1] Blood in stool AND [2] without diarrhea  • Negative: Diarrhea in a cancer patient who is currently (or recently) receiving chemotherapy or radiation therapy, or cancer patient who has metastatic or end-stage cancer and is receiving palliative care  • Negative: [1] SEVERE abdominal pain (e.g., excruciating) AND [2] present > 1 hour  • Negative: [1] SEVERE abdominal pain AND [2] age > 60 years  • Negative: [1] Blood in the stool AND [2] moderate or large amount of blood  • Negative: Black or tarry bowel movements (Exception: chronic-unchanged black-grey bowel movements AND is taking iron pills or Pepto-bismol)  • Negative: [1] Drinking very little AND [2] dehydration suspected (e.g., no urine > 12 hours, very dry mouth, very lightheaded)  • Negative: Patient sounds very sick or weak to the triager  • Negative: [1] SEVERE diarrhea (e.g., 7 or more times / day more than normal) AND [2] age > 60 years  • Negative: [1] Constant abdominal pain AND [2] present > 2 hours  • Negative: [1] Fever > 103 F (39.4 C) AND [2] not able to get the fever down using Fever Care Advice  • Negative: [1] SEVERE diarrhea (e.g., 7 or more times / day more than normal) AND [2] present > 24 hours (1 day)  • Negative: [1] MODERATE diarrhea (e.g., 4-6 times / day more than normal) AND [2] present > 48 hours (2 days)  • Negative: [1]  "MODERATE diarrhea (e.g., 4-6 times / day more than normal) AND [2] age > 70 years  • Negative: Fever > 101 F (38.3 C)  • Negative: Fever present > 3 days (72 hours)  • Negative: Abdominal pain  (Exception: Pain clears with each passage of diarrhea stool)  • Negative: [1] Blood in the stool AND [2] small amount of blood   (Exception: only on toilet paper. Reason: diarrhea can cause rectal irritation with blood on wiping)  • Negative: [1] Mucus or pus in stool AND [2] present > 2 days AND [3] diarrhea is more than mild    Answer Assessment - Initial Assessment Questions  1. DIARRHEA SEVERITY: \"How bad is the diarrhea?\" \"How many extra stools have you had in the past 24 hours than normal?\"     - NO DIARRHEA (SCALE 0)    - MILD (SCALE 1-3): Few loose or mushy BMs; increase of 1-3 stools over normal daily number of stools; mild increase in ostomy output.    -  MODERATE (SCALE 4-7): Increase of 4-6 stools daily over normal; moderate increase in ostomy output.  * SEVERE (SCALE 8-10; OR 'WORST POSSIBLE'): Increase of 7 or more stools daily over normal; moderate increase in ostomy output; incontinence.      2 stool today, mild   2. ONSET: \"When did the diarrhea begin?\"       Has had C.Diff, was hospitalized for 75 days  3. BM CONSISTENCY: \"How loose or watery is the diarrhea?\"       watery  4. VOMITING: \"Are you also vomiting?\" If Yes, ask: \"How many times in the past 24 hours?\"       no  5. ABDOMINAL PAIN: \"Are you having any abdominal pain?\" If Yes, ask: \"What does it feel like?\" (e.g., crampy, dull, intermittent, constant)       no  6. ABDOMINAL PAIN SEVERITY: If present, ask: \"How bad is the pain?\"  (e.g., Scale 1-10; mild, moderate, or severe)    - MILD (1-3): doesn't interfere with normal activities, abdomen soft and not tender to touch     - MODERATE (4-7): interferes with normal activities or awakens from sleep, tender to touch     - SEVERE (8-10): excruciating pain, doubled over, unable to do any normal activities  " "      n  7. ORAL INTAKE: If vomiting, \"Have you been able to drink liquids?\" \"How much fluids have you had in the past 24 hours?\"      na  8. HYDRATION: \"Any signs of dehydration?\" (e.g., dry mouth [not just dry lips], too weak to stand, dizziness, new weight loss) \"When did you last urinate?\"      Weakness, urinated   9. EXPOSURE: \"Have you traveled to a foreign country recently?\" \"Have you been exposed to anyone with diarrhea?\" \"Could you have eaten any food that was spoiled?\"      no  10. ANTIBIOTIC USE: \"Are you taking antibiotics now or have you taken antibiotics in the past 2 months?\"        Prescribed ABO to start today  11. OTHER SYMPTOMS: \"Do you have any other symptoms?\" (e.g., fever, blood in stool)        no  12. PREGNANCY: \"Is there any chance you are pregnant?\" \"When was your last menstrual period?\"        na    Protocols used: DIARRHEA-ADULT-AH      "

## 2022-01-18 NOTE — DISCHARGE INSTRUCTIONS
Outpatient stool studies; push fluids; use low threshold for return if have continued episodes of diarrhea  Close f/u with pcp for re-evaluation  With continued hip pain you will need to f/u with orthopedics

## 2022-01-18 NOTE — TELEPHONE ENCOUNTER
This request needs to go to his vascular surgeon at [facility] as it is not a cardiac med. Please cancel the request sent to me and inform the patient so he can get the refill from vascular surgery if they want him to remain on it. Thanks. Please also inform home health.

## 2022-01-27 ENCOUNTER — APPOINTMENT (OUTPATIENT)
Dept: CT IMAGING | Facility: HOSPITAL | Age: 77
End: 2022-01-27

## 2022-01-27 ENCOUNTER — APPOINTMENT (OUTPATIENT)
Dept: GENERAL RADIOLOGY | Facility: HOSPITAL | Age: 77
End: 2022-01-27

## 2022-01-27 ENCOUNTER — HOSPITAL ENCOUNTER (INPATIENT)
Facility: HOSPITAL | Age: 77
LOS: 8 days | Discharge: NURSING, REHAB OR LONG-TERM CARE | End: 2022-02-04
Attending: EMERGENCY MEDICINE | Admitting: FAMILY MEDICINE

## 2022-01-27 DIAGNOSIS — I48.91 ATRIAL FIBRILLATION AND FLUTTER: ICD-10-CM

## 2022-01-27 DIAGNOSIS — Z78.9 DECREASED ACTIVITIES OF DAILY LIVING (ADL): ICD-10-CM

## 2022-01-27 DIAGNOSIS — R93.41 ABNORMAL CT SCAN, BLADDER: ICD-10-CM

## 2022-01-27 DIAGNOSIS — N40.0 BPH WITHOUT OBSTRUCTION/LOWER URINARY TRACT SYMPTOMS: ICD-10-CM

## 2022-01-27 DIAGNOSIS — Z74.09 IMPAIRED MOBILITY: ICD-10-CM

## 2022-01-27 DIAGNOSIS — K92.2 GASTROINTESTINAL HEMORRHAGE, UNSPECIFIED GASTROINTESTINAL HEMORRHAGE TYPE: ICD-10-CM

## 2022-01-27 DIAGNOSIS — L89.320 PRESSURE INJURY OF LEFT BUTTOCK, UNSTAGEABLE: Primary | ICD-10-CM

## 2022-01-27 DIAGNOSIS — D62 ACUTE BLOOD LOSS ANEMIA: ICD-10-CM

## 2022-01-27 DIAGNOSIS — R19.7 DIARRHEA, UNSPECIFIED TYPE: ICD-10-CM

## 2022-01-27 DIAGNOSIS — K92.1 GASTROINTESTINAL HEMORRHAGE WITH MELENA: ICD-10-CM

## 2022-01-27 DIAGNOSIS — K62.5 RECTAL BLEEDING: ICD-10-CM

## 2022-01-27 DIAGNOSIS — R53.1 GENERALIZED WEAKNESS: ICD-10-CM

## 2022-01-27 DIAGNOSIS — I48.91 ATRIAL FIBRILLATION STATUS POST CARDIOVERSION: ICD-10-CM

## 2022-01-27 DIAGNOSIS — A09 DIARRHEA OF INFECTIOUS ORIGIN: ICD-10-CM

## 2022-01-27 DIAGNOSIS — S91.302A NON HEALING LEFT HEEL WOUND: Chronic | ICD-10-CM

## 2022-01-27 DIAGNOSIS — K55.20 AVM (ARTERIOVENOUS MALFORMATION) OF COLON: ICD-10-CM

## 2022-01-27 DIAGNOSIS — R19.5 HEME + STOOL: ICD-10-CM

## 2022-01-27 DIAGNOSIS — I48.92 ATRIAL FIBRILLATION AND FLUTTER: ICD-10-CM

## 2022-01-27 DIAGNOSIS — R55 SYNCOPE, UNSPECIFIED SYNCOPE TYPE: ICD-10-CM

## 2022-01-27 DIAGNOSIS — M16.11 OSTEOARTHRITIS OF RIGHT HIP, UNSPECIFIED OSTEOARTHRITIS TYPE: ICD-10-CM

## 2022-01-27 DIAGNOSIS — I73.9 PVD (PERIPHERAL VASCULAR DISEASE): ICD-10-CM

## 2022-01-27 DIAGNOSIS — Z72.0 TOBACCO USE: ICD-10-CM

## 2022-01-27 DIAGNOSIS — E87.6 HYPOKALEMIA: ICD-10-CM

## 2022-01-27 DIAGNOSIS — I82.413 ACUTE DEEP VEIN THROMBOSIS (DVT) OF FEMORAL VEIN OF BOTH LOWER EXTREMITIES: ICD-10-CM

## 2022-01-27 DIAGNOSIS — I10 ESSENTIAL HYPERTENSION: ICD-10-CM

## 2022-01-27 DIAGNOSIS — I26.99 ACUTE PULMONARY EMBOLISM WITHOUT ACUTE COR PULMONALE, UNSPECIFIED PULMONARY EMBOLISM TYPE: ICD-10-CM

## 2022-01-27 DIAGNOSIS — F10.10 ALCOHOL ABUSE: ICD-10-CM

## 2022-01-27 DIAGNOSIS — I26.99 ACUTE PULMONARY EMBOLISM, UNSPECIFIED PULMONARY EMBOLISM TYPE, UNSPECIFIED WHETHER ACUTE COR PULMONALE PRESENT: ICD-10-CM

## 2022-01-27 LAB
ALBUMIN SERPL-MCNC: 2.9 G/DL (ref 3.5–5.2)
ALBUMIN/GLOB SERPL: 0.9 G/DL
ALP SERPL-CCNC: 97 U/L (ref 39–117)
ALT SERPL W P-5'-P-CCNC: 9 U/L (ref 1–41)
ANION GAP SERPL CALCULATED.3IONS-SCNC: 12 MMOL/L (ref 5–15)
ARTERIAL PATENCY WRIST A: ABNORMAL
AST SERPL-CCNC: 13 U/L (ref 1–40)
ATMOSPHERIC PRESS: 756 MMHG
BACTERIA UR QL AUTO: ABNORMAL /HPF
BASE EXCESS BLDA CALC-SCNC: 4.3 MMOL/L (ref 0–2)
BASOPHILS # BLD AUTO: 0.03 10*3/MM3 (ref 0–0.2)
BASOPHILS NFR BLD AUTO: 0.2 % (ref 0–1.5)
BDY SITE: ABNORMAL
BILIRUB SERPL-MCNC: 0.4 MG/DL (ref 0–1.2)
BILIRUB UR QL STRIP: NEGATIVE
BODY TEMPERATURE: 37 C
BUN SERPL-MCNC: 38 MG/DL (ref 8–23)
BUN/CREAT SERPL: 30.6 (ref 7–25)
CALCIUM SPEC-SCNC: 8.4 MG/DL (ref 8.6–10.5)
CHLORIDE SERPL-SCNC: 102 MMOL/L (ref 98–107)
CLARITY UR: CLEAR
CO2 SERPL-SCNC: 26 MMOL/L (ref 22–29)
COLOR UR: YELLOW
CREAT SERPL-MCNC: 1.24 MG/DL (ref 0.76–1.27)
CRP SERPL-MCNC: 10.85 MG/DL (ref 0–0.5)
D DIMER PPP FEU-MCNC: 6.81 MG/L (FEU) (ref 0–0.5)
D-LACTATE SERPL-SCNC: 1.7 MMOL/L (ref 0.5–2)
DEPRECATED RDW RBC AUTO: 55.6 FL (ref 37–54)
EOSINOPHIL # BLD AUTO: 0.02 10*3/MM3 (ref 0–0.4)
EOSINOPHIL NFR BLD AUTO: 0.1 % (ref 0.3–6.2)
ERYTHROCYTE [DISTWIDTH] IN BLOOD BY AUTOMATED COUNT: 16 % (ref 12.3–15.4)
GFR SERPL CREATININE-BSD FRML MDRD: 57 ML/MIN/1.73
GLOBULIN UR ELPH-MCNC: 3.2 GM/DL
GLUCOSE SERPL-MCNC: 89 MG/DL (ref 65–99)
GLUCOSE UR STRIP-MCNC: NEGATIVE MG/DL
HCO3 BLDA-SCNC: 26.9 MMOL/L (ref 20–26)
HCT VFR BLD AUTO: 32.9 % (ref 37.5–51)
HGB BLD-MCNC: 10.9 G/DL (ref 13–17.7)
HGB UR QL STRIP.AUTO: NEGATIVE
HYALINE CASTS UR QL AUTO: ABNORMAL /LPF
IMM GRANULOCYTES # BLD AUTO: 0.22 10*3/MM3 (ref 0–0.05)
IMM GRANULOCYTES NFR BLD AUTO: 1.2 % (ref 0–0.5)
INR PPP: 1.12 (ref 0.91–1.09)
KETONES UR QL STRIP: ABNORMAL
LEUKOCYTE ESTERASE UR QL STRIP.AUTO: ABNORMAL
LYMPHOCYTES # BLD AUTO: 1.11 10*3/MM3 (ref 0.7–3.1)
LYMPHOCYTES NFR BLD AUTO: 5.9 % (ref 19.6–45.3)
Lab: ABNORMAL
MAGNESIUM SERPL-MCNC: 2.4 MG/DL (ref 1.6–2.4)
MCH RBC QN AUTO: 31.4 PG (ref 26.6–33)
MCHC RBC AUTO-ENTMCNC: 33.1 G/DL (ref 31.5–35.7)
MCV RBC AUTO: 94.8 FL (ref 79–97)
MODALITY: ABNORMAL
MONOCYTES # BLD AUTO: 0.97 10*3/MM3 (ref 0.1–0.9)
MONOCYTES NFR BLD AUTO: 5.2 % (ref 5–12)
NEUTROPHILS NFR BLD AUTO: 16.36 10*3/MM3 (ref 1.7–7)
NEUTROPHILS NFR BLD AUTO: 87.4 % (ref 42.7–76)
NITRITE UR QL STRIP: NEGATIVE
NRBC BLD AUTO-RTO: 0 /100 WBC (ref 0–0.2)
NT-PROBNP SERPL-MCNC: 1667 PG/ML (ref 0–900)
PCO2 BLDA: 32.4 MM HG (ref 35–45)
PCO2 TEMP ADJ BLD: 32.4 MM HG (ref 35–45)
PH BLDA: 7.53 PH UNITS (ref 7.35–7.45)
PH UR STRIP.AUTO: 6 [PH] (ref 5–8)
PH, TEMP CORRECTED: 7.53 PH UNITS (ref 7.35–7.45)
PLATELET # BLD AUTO: 250 10*3/MM3 (ref 140–450)
PMV BLD AUTO: 9.2 FL (ref 6–12)
PO2 BLDA: 97.6 MM HG (ref 83–108)
PO2 TEMP ADJ BLD: 97.6 MM HG (ref 83–108)
POTASSIUM SERPL-SCNC: 2.4 MMOL/L (ref 3.5–5.2)
POTASSIUM SERPL-SCNC: 2.5 MMOL/L (ref 3.5–5.2)
PROCALCITONIN SERPL-MCNC: 0.15 NG/ML (ref 0–0.25)
PROT SERPL-MCNC: 6.1 G/DL (ref 6–8.5)
PROT UR QL STRIP: ABNORMAL
PROTHROMBIN TIME: 14 SECONDS (ref 11.9–14.6)
RBC # BLD AUTO: 3.47 10*6/MM3 (ref 4.14–5.8)
RBC # UR STRIP: ABNORMAL /HPF
REF LAB TEST METHOD: ABNORMAL
SAO2 % BLDCOA: 98.8 % (ref 94–99)
SARS-COV-2 RNA PNL SPEC NAA+PROBE: NOT DETECTED
SODIUM SERPL-SCNC: 140 MMOL/L (ref 136–145)
SP GR UR STRIP: >1.03 (ref 1–1.03)
SQUAMOUS #/AREA URNS HPF: ABNORMAL /HPF
TRANS CELLS #/AREA URNS HPF: ABNORMAL /HPF
TROPONIN T SERPL-MCNC: <0.01 NG/ML (ref 0–0.03)
TROPONIN T SERPL-MCNC: <0.01 NG/ML (ref 0–0.03)
UROBILINOGEN UR QL STRIP: ABNORMAL
VENTILATOR MODE: ABNORMAL
WBC # UR STRIP: ABNORMAL /HPF
WBC NRBC COR # BLD: 18.71 10*3/MM3 (ref 3.4–10.8)
YEAST URNS QL MICRO: ABNORMAL /HPF

## 2022-01-27 PROCEDURE — 83735 ASSAY OF MAGNESIUM: CPT | Performed by: EMERGENCY MEDICINE

## 2022-01-27 PROCEDURE — 71275 CT ANGIOGRAPHY CHEST: CPT

## 2022-01-27 PROCEDURE — 25010000002 POTASSIUM CHLORIDE PER 2 MEQ: Performed by: EMERGENCY MEDICINE

## 2022-01-27 PROCEDURE — 93005 ELECTROCARDIOGRAM TRACING: CPT | Performed by: EMERGENCY MEDICINE

## 2022-01-27 PROCEDURE — 82803 BLOOD GASES ANY COMBINATION: CPT

## 2022-01-27 PROCEDURE — 84145 PROCALCITONIN (PCT): CPT | Performed by: EMERGENCY MEDICINE

## 2022-01-27 PROCEDURE — 81001 URINALYSIS AUTO W/SCOPE: CPT | Performed by: EMERGENCY MEDICINE

## 2022-01-27 PROCEDURE — 84132 ASSAY OF SERUM POTASSIUM: CPT | Performed by: INTERNAL MEDICINE

## 2022-01-27 PROCEDURE — 36600 WITHDRAWAL OF ARTERIAL BLOOD: CPT

## 2022-01-27 PROCEDURE — 86140 C-REACTIVE PROTEIN: CPT | Performed by: EMERGENCY MEDICINE

## 2022-01-27 PROCEDURE — 83880 ASSAY OF NATRIURETIC PEPTIDE: CPT | Performed by: EMERGENCY MEDICINE

## 2022-01-27 PROCEDURE — 85379 FIBRIN DEGRADATION QUANT: CPT | Performed by: EMERGENCY MEDICINE

## 2022-01-27 PROCEDURE — 93010 ELECTROCARDIOGRAM REPORT: CPT | Performed by: INTERNAL MEDICINE

## 2022-01-27 PROCEDURE — 83605 ASSAY OF LACTIC ACID: CPT | Performed by: EMERGENCY MEDICINE

## 2022-01-27 PROCEDURE — 87086 URINE CULTURE/COLONY COUNT: CPT | Performed by: EMERGENCY MEDICINE

## 2022-01-27 PROCEDURE — 87635 SARS-COV-2 COVID-19 AMP PRB: CPT | Performed by: EMERGENCY MEDICINE

## 2022-01-27 PROCEDURE — 80053 COMPREHEN METABOLIC PANEL: CPT | Performed by: EMERGENCY MEDICINE

## 2022-01-27 PROCEDURE — 85610 PROTHROMBIN TIME: CPT | Performed by: EMERGENCY MEDICINE

## 2022-01-27 PROCEDURE — 71045 X-RAY EXAM CHEST 1 VIEW: CPT

## 2022-01-27 PROCEDURE — 84484 ASSAY OF TROPONIN QUANT: CPT | Performed by: EMERGENCY MEDICINE

## 2022-01-27 PROCEDURE — 85025 COMPLETE CBC W/AUTO DIFF WBC: CPT | Performed by: EMERGENCY MEDICINE

## 2022-01-27 PROCEDURE — 99285 EMERGENCY DEPT VISIT HI MDM: CPT

## 2022-01-27 PROCEDURE — 0 IOPAMIDOL PER 1 ML: Performed by: EMERGENCY MEDICINE

## 2022-01-27 RX ORDER — SODIUM CHLORIDE 0.9 % (FLUSH) 0.9 %
10 SYRINGE (ML) INJECTION AS NEEDED
Status: DISCONTINUED | OUTPATIENT
Start: 2022-01-27 | End: 2022-02-04 | Stop reason: HOSPADM

## 2022-01-27 RX ORDER — POTASSIUM CHLORIDE 750 MG/1
40 CAPSULE, EXTENDED RELEASE ORAL AS NEEDED
Status: DISCONTINUED | OUTPATIENT
Start: 2022-01-27 | End: 2022-02-01

## 2022-01-27 RX ORDER — POTASSIUM CHLORIDE 1.5 G/1.77G
40 POWDER, FOR SOLUTION ORAL AS NEEDED
Status: DISCONTINUED | OUTPATIENT
Start: 2022-01-27 | End: 2022-02-01

## 2022-01-27 RX ORDER — POTASSIUM CHLORIDE 14.9 MG/ML
20 INJECTION INTRAVENOUS ONCE
Status: COMPLETED | OUTPATIENT
Start: 2022-01-27 | End: 2022-01-27

## 2022-01-27 RX ORDER — POTASSIUM CHLORIDE 7.45 MG/ML
10 INJECTION INTRAVENOUS
Status: DISCONTINUED | OUTPATIENT
Start: 2022-01-27 | End: 2022-02-01

## 2022-01-27 RX ORDER — POTASSIUM CHLORIDE 1.5 G/1.77G
40 POWDER, FOR SOLUTION ORAL ONCE
Status: COMPLETED | OUTPATIENT
Start: 2022-01-27 | End: 2022-01-27

## 2022-01-27 RX ADMIN — POTASSIUM CHLORIDE 40 MEQ: 10 CAPSULE, COATED, EXTENDED RELEASE ORAL at 23:59

## 2022-01-27 RX ADMIN — POTASSIUM CHLORIDE 40 MEQ: 1.5 POWDER, FOR SOLUTION ORAL at 20:31

## 2022-01-27 RX ADMIN — IOPAMIDOL 100 ML: 755 INJECTION, SOLUTION INTRAVENOUS at 20:14

## 2022-01-27 RX ADMIN — POTASSIUM CHLORIDE 20 MEQ: 14.9 INJECTION, SOLUTION INTRAVENOUS at 19:31

## 2022-01-28 ENCOUNTER — APPOINTMENT (OUTPATIENT)
Dept: ULTRASOUND IMAGING | Facility: HOSPITAL | Age: 77
End: 2022-01-28

## 2022-01-28 ENCOUNTER — APPOINTMENT (OUTPATIENT)
Dept: CARDIOLOGY | Facility: HOSPITAL | Age: 77
End: 2022-01-28

## 2022-01-28 PROBLEM — I26.99 ACUTE PULMONARY EMBOLISM (HCC): Status: ACTIVE | Noted: 2022-01-28

## 2022-01-28 PROBLEM — R19.7 DIARRHEA: Status: ACTIVE | Noted: 2022-01-28

## 2022-01-28 PROBLEM — F10.10 ALCOHOL ABUSE: Status: ACTIVE | Noted: 2020-02-16

## 2022-01-28 LAB
ANION GAP SERPL CALCULATED.3IONS-SCNC: 10 MMOL/L (ref 5–15)
ANION GAP SERPL CALCULATED.3IONS-SCNC: 5 MMOL/L (ref 5–15)
ANION GAP SERPL CALCULATED.3IONS-SCNC: 7 MMOL/L (ref 5–15)
BH CV ECHO MEAS - AO MAX PG (FULL): 1 MMHG
BH CV ECHO MEAS - AO MAX PG: 3.6 MMHG
BH CV ECHO MEAS - AO MEAN PG (FULL): 1 MMHG
BH CV ECHO MEAS - AO MEAN PG: 2 MMHG
BH CV ECHO MEAS - AO ROOT AREA (BSA CORRECTED): 1.4
BH CV ECHO MEAS - AO ROOT AREA: 4.5 CM^2
BH CV ECHO MEAS - AO ROOT DIAM: 2.4 CM
BH CV ECHO MEAS - AO V2 MAX: 94.8 CM/SEC
BH CV ECHO MEAS - AO V2 MEAN: 63.8 CM/SEC
BH CV ECHO MEAS - AO V2 VTI: 22.4 CM
BH CV ECHO MEAS - AVA(I,A): 2.1 CM^2
BH CV ECHO MEAS - AVA(I,D): 2.1 CM^2
BH CV ECHO MEAS - AVA(V,A): 2.4 CM^2
BH CV ECHO MEAS - AVA(V,D): 2.4 CM^2
BH CV ECHO MEAS - BSA(HAYCOCK): 1.6 M^2
BH CV ECHO MEAS - BSA: 1.7 M^2
BH CV ECHO MEAS - BZI_BMI: 19.7 KILOGRAMS/M^2
BH CV ECHO MEAS - BZI_METRIC_HEIGHT: 170.2 CM
BH CV ECHO MEAS - BZI_METRIC_WEIGHT: 57.2 KG
BH CV ECHO MEAS - EDV(CUBED): 84 ML
BH CV ECHO MEAS - EDV(MOD-SP4): 83.4 ML
BH CV ECHO MEAS - EDV(TEICH): 86.8 ML
BH CV ECHO MEAS - EF(CUBED): 79.6 %
BH CV ECHO MEAS - EF(MOD-SP4): 58.6 %
BH CV ECHO MEAS - EF(TEICH): 72.2 %
BH CV ECHO MEAS - ESV(CUBED): 17.2 ML
BH CV ECHO MEAS - ESV(MOD-SP4): 34.5 ML
BH CV ECHO MEAS - ESV(TEICH): 24.1 ML
BH CV ECHO MEAS - FS: 41.1 %
BH CV ECHO MEAS - IVS/LVPW: 1.2
BH CV ECHO MEAS - IVSD: 0.92 CM
BH CV ECHO MEAS - LA DIMENSION: 3.2 CM
BH CV ECHO MEAS - LA/AO: 1.3
BH CV ECHO MEAS - LAT PEAK E' VEL: 11.5 CM/SEC
BH CV ECHO MEAS - LV DIASTOLIC VOL/BSA (35-75): 50.2 ML/M^2
BH CV ECHO MEAS - LV MASS(C)D: 114.5 GRAMS
BH CV ECHO MEAS - LV MASS(C)DI: 68.9 GRAMS/M^2
BH CV ECHO MEAS - LV MAX PG: 2.5 MMHG
BH CV ECHO MEAS - LV MEAN PG: 1 MMHG
BH CV ECHO MEAS - LV SYSTOLIC VOL/BSA (12-30): 20.8 ML/M^2
BH CV ECHO MEAS - LV V1 MAX: 79.8 CM/SEC
BH CV ECHO MEAS - LV V1 MEAN: 51.5 CM/SEC
BH CV ECHO MEAS - LV V1 VTI: 16.5 CM
BH CV ECHO MEAS - LVIDD: 4.4 CM
BH CV ECHO MEAS - LVIDS: 2.6 CM
BH CV ECHO MEAS - LVLD AP4: 8.1 CM
BH CV ECHO MEAS - LVLS AP4: 6.6 CM
BH CV ECHO MEAS - LVOT AREA (M): 2.8 CM^2
BH CV ECHO MEAS - LVOT AREA: 2.8 CM^2
BH CV ECHO MEAS - LVOT DIAM: 1.9 CM
BH CV ECHO MEAS - LVPWD: 0.75 CM
BH CV ECHO MEAS - MED PEAK E' VEL: 8.27 CM/SEC
BH CV ECHO MEAS - MV A MAX VEL: 76.6 CM/SEC
BH CV ECHO MEAS - MV DEC TIME: 0.21 SEC
BH CV ECHO MEAS - MV E MAX VEL: 64 CM/SEC
BH CV ECHO MEAS - MV E/A: 0.84
BH CV ECHO MEAS - RAP SYSTOLE: 5 MMHG
BH CV ECHO MEAS - RVSP: 26 MMHG
BH CV ECHO MEAS - SI(AO): 61 ML/M^2
BH CV ECHO MEAS - SI(CUBED): 40.2 ML/M^2
BH CV ECHO MEAS - SI(LVOT): 28.2 ML/M^2
BH CV ECHO MEAS - SI(MOD-SP4): 29.4 ML/M^2
BH CV ECHO MEAS - SI(TEICH): 37.7 ML/M^2
BH CV ECHO MEAS - SV(AO): 101.3 ML
BH CV ECHO MEAS - SV(CUBED): 66.9 ML
BH CV ECHO MEAS - SV(LVOT): 46.8 ML
BH CV ECHO MEAS - SV(MOD-SP4): 48.9 ML
BH CV ECHO MEAS - SV(TEICH): 62.6 ML
BH CV ECHO MEAS - TR MAX VEL: 229 CM/SEC
BH CV ECHO MEASUREMENTS AVERAGE E/E' RATIO: 6.47
BUN SERPL-MCNC: 27 MG/DL (ref 8–23)
BUN SERPL-MCNC: 30 MG/DL (ref 8–23)
BUN SERPL-MCNC: 34 MG/DL (ref 8–23)
BUN/CREAT SERPL: 33.3 (ref 7–25)
BUN/CREAT SERPL: 33.8 (ref 7–25)
BUN/CREAT SERPL: 35.3 (ref 7–25)
CALCIUM SPEC-SCNC: 7.8 MG/DL (ref 8.6–10.5)
CALCIUM SPEC-SCNC: 8.1 MG/DL (ref 8.6–10.5)
CALCIUM SPEC-SCNC: 8.1 MG/DL (ref 8.6–10.5)
CHLORIDE SERPL-SCNC: 103 MMOL/L (ref 98–107)
CHLORIDE SERPL-SCNC: 108 MMOL/L (ref 98–107)
CHLORIDE SERPL-SCNC: 109 MMOL/L (ref 98–107)
CO2 SERPL-SCNC: 26 MMOL/L (ref 22–29)
CO2 SERPL-SCNC: 27 MMOL/L (ref 22–29)
CO2 SERPL-SCNC: 29 MMOL/L (ref 22–29)
CREAT SERPL-MCNC: 0.8 MG/DL (ref 0.76–1.27)
CREAT SERPL-MCNC: 0.85 MG/DL (ref 0.76–1.27)
CREAT SERPL-MCNC: 1.02 MG/DL (ref 0.76–1.27)
GFR SERPL CREATININE-BSD FRML MDRD: 71 ML/MIN/1.73
GFR SERPL CREATININE-BSD FRML MDRD: 88 ML/MIN/1.73
GFR SERPL CREATININE-BSD FRML MDRD: 94 ML/MIN/1.73
GLUCOSE SERPL-MCNC: 107 MG/DL (ref 65–99)
GLUCOSE SERPL-MCNC: 86 MG/DL (ref 65–99)
GLUCOSE SERPL-MCNC: 92 MG/DL (ref 65–99)
LEFT ATRIUM VOLUME INDEX: 18.8 ML/M2
LEFT ATRIUM VOLUME: 31.2 CM3
MAGNESIUM SERPL-MCNC: 2.5 MG/DL (ref 1.6–2.4)
MAXIMAL PREDICTED HEART RATE: 146 BPM
PHOSPHATE SERPL-MCNC: 2.5 MG/DL (ref 2.5–4.5)
POTASSIUM SERPL-SCNC: 3 MMOL/L (ref 3.5–5.2)
POTASSIUM SERPL-SCNC: 3.9 MMOL/L (ref 3.5–5.2)
POTASSIUM SERPL-SCNC: 4.1 MMOL/L (ref 3.5–5.2)
SODIUM SERPL-SCNC: 140 MMOL/L (ref 136–145)
SODIUM SERPL-SCNC: 142 MMOL/L (ref 136–145)
SODIUM SERPL-SCNC: 142 MMOL/L (ref 136–145)
STRESS TARGET HR: 124 BPM

## 2022-01-28 PROCEDURE — 80048 BASIC METABOLIC PNL TOTAL CA: CPT | Performed by: INTERNAL MEDICINE

## 2022-01-28 PROCEDURE — 93306 TTE W/DOPPLER COMPLETE: CPT

## 2022-01-28 PROCEDURE — 93970 EXTREMITY STUDY: CPT | Performed by: SURGERY

## 2022-01-28 PROCEDURE — 93306 TTE W/DOPPLER COMPLETE: CPT | Performed by: INTERNAL MEDICINE

## 2022-01-28 PROCEDURE — 93970 EXTREMITY STUDY: CPT

## 2022-01-28 PROCEDURE — 84100 ASSAY OF PHOSPHORUS: CPT | Performed by: INTERNAL MEDICINE

## 2022-01-28 PROCEDURE — 94799 UNLISTED PULMONARY SVC/PX: CPT

## 2022-01-28 PROCEDURE — 25010000002 ENOXAPARIN PER 10 MG: Performed by: INTERNAL MEDICINE

## 2022-01-28 PROCEDURE — 25010000002 SODIUM CHLORIDE 0.9 % WITH KCL 20 MEQ 20-0.9 MEQ/L-% SOLUTION: Performed by: INTERNAL MEDICINE

## 2022-01-28 PROCEDURE — 83735 ASSAY OF MAGNESIUM: CPT | Performed by: INTERNAL MEDICINE

## 2022-01-28 RX ORDER — ACETAMINOPHEN 160 MG/5ML
650 SOLUTION ORAL EVERY 4 HOURS PRN
Status: DISCONTINUED | OUTPATIENT
Start: 2022-01-28 | End: 2022-02-04 | Stop reason: HOSPADM

## 2022-01-28 RX ORDER — MELATONIN
2000 DAILY
Status: DISCONTINUED | OUTPATIENT
Start: 2022-01-28 | End: 2022-02-04 | Stop reason: HOSPADM

## 2022-01-28 RX ORDER — DILTIAZEM HYDROCHLORIDE 120 MG/1
120 CAPSULE, COATED, EXTENDED RELEASE ORAL DAILY
Status: DISCONTINUED | OUTPATIENT
Start: 2022-01-28 | End: 2022-02-04 | Stop reason: HOSPADM

## 2022-01-28 RX ORDER — LORAZEPAM 2 MG/ML
1 INJECTION INTRAMUSCULAR
Status: ACTIVE | OUTPATIENT
Start: 2022-01-28 | End: 2022-02-04

## 2022-01-28 RX ORDER — FERROUS SULFATE 325(65) MG
325 TABLET ORAL
Status: DISCONTINUED | OUTPATIENT
Start: 2022-01-28 | End: 2022-02-04 | Stop reason: HOSPADM

## 2022-01-28 RX ORDER — SODIUM CHLORIDE 0.9 % (FLUSH) 0.9 %
10 SYRINGE (ML) INJECTION EVERY 12 HOURS SCHEDULED
Status: DISCONTINUED | OUTPATIENT
Start: 2022-01-28 | End: 2022-02-04 | Stop reason: HOSPADM

## 2022-01-28 RX ORDER — SODIUM CHLORIDE 0.9 % (FLUSH) 0.9 %
10 SYRINGE (ML) INJECTION AS NEEDED
Status: DISCONTINUED | OUTPATIENT
Start: 2022-01-28 | End: 2022-02-04 | Stop reason: HOSPADM

## 2022-01-28 RX ORDER — ACETAMINOPHEN 325 MG/1
650 TABLET ORAL EVERY 4 HOURS PRN
Status: DISCONTINUED | OUTPATIENT
Start: 2022-01-28 | End: 2022-02-04 | Stop reason: HOSPADM

## 2022-01-28 RX ORDER — ONDANSETRON 2 MG/ML
4 INJECTION INTRAMUSCULAR; INTRAVENOUS EVERY 6 HOURS PRN
Status: DISCONTINUED | OUTPATIENT
Start: 2022-01-28 | End: 2022-01-31 | Stop reason: SDUPTHER

## 2022-01-28 RX ORDER — ONDANSETRON 4 MG/1
4 TABLET, FILM COATED ORAL EVERY 6 HOURS PRN
Status: DISCONTINUED | OUTPATIENT
Start: 2022-01-28 | End: 2022-01-31 | Stop reason: SDUPTHER

## 2022-01-28 RX ORDER — POTASSIUM CHLORIDE 750 MG/1
40 CAPSULE, EXTENDED RELEASE ORAL EVERY 4 HOURS
Status: COMPLETED | OUTPATIENT
Start: 2022-01-28 | End: 2022-01-28

## 2022-01-28 RX ORDER — ACETAMINOPHEN 650 MG/1
650 SUPPOSITORY RECTAL EVERY 4 HOURS PRN
Status: DISCONTINUED | OUTPATIENT
Start: 2022-01-28 | End: 2022-02-04 | Stop reason: HOSPADM

## 2022-01-28 RX ORDER — FOLIC ACID 1 MG/1
1 TABLET ORAL DAILY
Status: DISCONTINUED | OUTPATIENT
Start: 2022-01-28 | End: 2022-02-04 | Stop reason: HOSPADM

## 2022-01-28 RX ORDER — FINASTERIDE 5 MG/1
5 TABLET, FILM COATED ORAL DAILY
Status: DISCONTINUED | OUTPATIENT
Start: 2022-01-28 | End: 2022-02-04 | Stop reason: HOSPADM

## 2022-01-28 RX ORDER — LORAZEPAM 1 MG/1
1 TABLET ORAL
Status: ACTIVE | OUTPATIENT
Start: 2022-01-28 | End: 2022-02-04

## 2022-01-28 RX ORDER — MULTIPLE VITAMINS W/ MINERALS TAB 9MG-400MCG
1 TAB ORAL DAILY
Status: DISCONTINUED | OUTPATIENT
Start: 2022-01-28 | End: 2022-02-04 | Stop reason: HOSPADM

## 2022-01-28 RX ORDER — ONDANSETRON 4 MG/1
4 TABLET, ORALLY DISINTEGRATING ORAL EVERY 6 HOURS PRN
Status: DISCONTINUED | OUTPATIENT
Start: 2022-01-28 | End: 2022-02-04 | Stop reason: HOSPADM

## 2022-01-28 RX ORDER — SODIUM CHLORIDE AND POTASSIUM CHLORIDE 150; 900 MG/100ML; MG/100ML
125 INJECTION, SOLUTION INTRAVENOUS CONTINUOUS
Status: DISCONTINUED | OUTPATIENT
Start: 2022-01-28 | End: 2022-01-29

## 2022-01-28 RX ORDER — PANTOPRAZOLE SODIUM 20 MG/1
20 TABLET, DELAYED RELEASE ORAL DAILY
Status: DISCONTINUED | OUTPATIENT
Start: 2022-01-28 | End: 2022-02-04 | Stop reason: HOSPADM

## 2022-01-28 RX ORDER — GABAPENTIN 300 MG/1
300 CAPSULE ORAL NIGHTLY
Status: DISCONTINUED | OUTPATIENT
Start: 2022-01-28 | End: 2022-02-04 | Stop reason: HOSPADM

## 2022-01-28 RX ORDER — LORAZEPAM 0.5 MG/1
0.5 TABLET ORAL
Status: ACTIVE | OUTPATIENT
Start: 2022-01-28 | End: 2022-02-04

## 2022-01-28 RX ORDER — HYDROXYZINE HYDROCHLORIDE 25 MG/1
25 TABLET, FILM COATED ORAL 3 TIMES DAILY PRN
Status: DISCONTINUED | OUTPATIENT
Start: 2022-01-28 | End: 2022-02-04 | Stop reason: HOSPADM

## 2022-01-28 RX ORDER — LORAZEPAM 2 MG/ML
0.5 INJECTION INTRAMUSCULAR
Status: ACTIVE | OUTPATIENT
Start: 2022-01-28 | End: 2022-02-04

## 2022-01-28 RX ORDER — ASPIRIN 81 MG/1
81 TABLET ORAL DAILY
Status: DISCONTINUED | OUTPATIENT
Start: 2022-01-28 | End: 2022-02-04 | Stop reason: HOSPADM

## 2022-01-28 RX ORDER — AMIODARONE HYDROCHLORIDE 200 MG/1
200 TABLET ORAL
Status: DISCONTINUED | OUTPATIENT
Start: 2022-01-28 | End: 2022-02-04 | Stop reason: HOSPADM

## 2022-01-28 RX ORDER — LISINOPRIL 20 MG/1
40 TABLET ORAL DAILY
Status: DISCONTINUED | OUTPATIENT
Start: 2022-01-28 | End: 2022-02-04 | Stop reason: HOSPADM

## 2022-01-28 RX ORDER — CILOSTAZOL 100 MG/1
50 TABLET ORAL
Status: DISCONTINUED | OUTPATIENT
Start: 2022-01-28 | End: 2022-02-04 | Stop reason: HOSPADM

## 2022-01-28 RX ADMIN — FINASTERIDE 5 MG: 5 TABLET, FILM COATED ORAL at 09:00

## 2022-01-28 RX ADMIN — SODIUM CHLORIDE, PRESERVATIVE FREE 10 ML: 5 INJECTION INTRAVENOUS at 09:03

## 2022-01-28 RX ADMIN — CILOSTAZOL 50 MG: 100 TABLET ORAL at 17:02

## 2022-01-28 RX ADMIN — DILTIAZEM HYDROCHLORIDE 120 MG: 120 CAPSULE, COATED, EXTENDED RELEASE ORAL at 09:00

## 2022-01-28 RX ADMIN — THIAMINE HCL TAB 100 MG 100 MG: 100 TAB at 06:08

## 2022-01-28 RX ADMIN — POTASSIUM CHLORIDE 40 MEQ: 750 CAPSULE, EXTENDED RELEASE ORAL at 13:56

## 2022-01-28 RX ADMIN — ASPIRIN 81 MG: 81 TABLET, COATED ORAL at 09:00

## 2022-01-28 RX ADMIN — POTASSIUM CHLORIDE AND SODIUM CHLORIDE 125 ML/HR: 900; 150 INJECTION, SOLUTION INTRAVENOUS at 06:08

## 2022-01-28 RX ADMIN — Medication 2000 UNITS: at 09:00

## 2022-01-28 RX ADMIN — PANTOPRAZOLE SODIUM 20 MG: 20 TABLET, DELAYED RELEASE ORAL at 09:00

## 2022-01-28 RX ADMIN — CILOSTAZOL 50 MG: 100 TABLET ORAL at 09:00

## 2022-01-28 RX ADMIN — LISINOPRIL 40 MG: 20 TABLET ORAL at 09:00

## 2022-01-28 RX ADMIN — ENOXAPARIN SODIUM 60 MG: 60 INJECTION SUBCUTANEOUS at 06:08

## 2022-01-28 RX ADMIN — POTASSIUM CHLORIDE 40 MEQ: 10 CAPSULE, COATED, EXTENDED RELEASE ORAL at 06:25

## 2022-01-28 RX ADMIN — POTASSIUM CHLORIDE 40 MEQ: 750 CAPSULE, EXTENDED RELEASE ORAL at 10:49

## 2022-01-28 RX ADMIN — Medication 1 TABLET: at 09:00

## 2022-01-28 RX ADMIN — GABAPENTIN 300 MG: 300 CAPSULE ORAL at 20:45

## 2022-01-28 RX ADMIN — ENOXAPARIN SODIUM 60 MG: 60 INJECTION SUBCUTANEOUS at 17:02

## 2022-01-28 RX ADMIN — AMIODARONE HYDROCHLORIDE 200 MG: 200 TABLET ORAL at 09:00

## 2022-01-28 RX ADMIN — FOLIC ACID 1 MG: 1 TABLET ORAL at 09:00

## 2022-01-29 LAB
ANION GAP SERPL CALCULATED.3IONS-SCNC: 4 MMOL/L (ref 5–15)
ANION GAP SERPL CALCULATED.3IONS-SCNC: 5 MMOL/L (ref 5–15)
ANION GAP SERPL CALCULATED.3IONS-SCNC: 5 MMOL/L (ref 5–15)
BACTERIA SPEC AEROBE CULT: NORMAL
BUN SERPL-MCNC: 20 MG/DL (ref 8–23)
BUN SERPL-MCNC: 21 MG/DL (ref 8–23)
BUN SERPL-MCNC: 24 MG/DL (ref 8–23)
BUN/CREAT SERPL: 27.3 (ref 7–25)
BUN/CREAT SERPL: 27.4 (ref 7–25)
BUN/CREAT SERPL: 30.8 (ref 7–25)
CALCIUM SPEC-SCNC: 7.4 MG/DL (ref 8.6–10.5)
CALCIUM SPEC-SCNC: 7.5 MG/DL (ref 8.6–10.5)
CALCIUM SPEC-SCNC: 7.8 MG/DL (ref 8.6–10.5)
CHLORIDE SERPL-SCNC: 110 MMOL/L (ref 98–107)
CHLORIDE SERPL-SCNC: 111 MMOL/L (ref 98–107)
CHLORIDE SERPL-SCNC: 113 MMOL/L (ref 98–107)
CO2 SERPL-SCNC: 24 MMOL/L (ref 22–29)
CO2 SERPL-SCNC: 27 MMOL/L (ref 22–29)
CO2 SERPL-SCNC: 27 MMOL/L (ref 22–29)
CREAT SERPL-MCNC: 0.73 MG/DL (ref 0.76–1.27)
CREAT SERPL-MCNC: 0.77 MG/DL (ref 0.76–1.27)
CREAT SERPL-MCNC: 0.78 MG/DL (ref 0.76–1.27)
GFR SERPL CREATININE-BSD FRML MDRD: 105 ML/MIN/1.73
GFR SERPL CREATININE-BSD FRML MDRD: 97 ML/MIN/1.73
GFR SERPL CREATININE-BSD FRML MDRD: 99 ML/MIN/1.73
GLUCOSE SERPL-MCNC: 102 MG/DL (ref 65–99)
GLUCOSE SERPL-MCNC: 83 MG/DL (ref 65–99)
GLUCOSE SERPL-MCNC: 92 MG/DL (ref 65–99)
POTASSIUM SERPL-SCNC: 4.2 MMOL/L (ref 3.5–5.2)
POTASSIUM SERPL-SCNC: 4.4 MMOL/L (ref 3.5–5.2)
POTASSIUM SERPL-SCNC: 4.8 MMOL/L (ref 3.5–5.2)
SODIUM SERPL-SCNC: 141 MMOL/L (ref 136–145)
SODIUM SERPL-SCNC: 142 MMOL/L (ref 136–145)
SODIUM SERPL-SCNC: 143 MMOL/L (ref 136–145)

## 2022-01-29 PROCEDURE — 25010000002 SODIUM CHLORIDE 0.9 % WITH KCL 20 MEQ 20-0.9 MEQ/L-% SOLUTION: Performed by: INTERNAL MEDICINE

## 2022-01-29 PROCEDURE — 99221 1ST HOSP IP/OBS SF/LOW 40: CPT | Performed by: SURGERY

## 2022-01-29 PROCEDURE — 25010000002 ENOXAPARIN PER 10 MG: Performed by: INTERNAL MEDICINE

## 2022-01-29 PROCEDURE — 80048 BASIC METABOLIC PNL TOTAL CA: CPT | Performed by: INTERNAL MEDICINE

## 2022-01-29 RX ADMIN — AMIODARONE HYDROCHLORIDE 200 MG: 200 TABLET ORAL at 09:00

## 2022-01-29 RX ADMIN — PANTOPRAZOLE SODIUM 20 MG: 20 TABLET, DELAYED RELEASE ORAL at 08:59

## 2022-01-29 RX ADMIN — ENOXAPARIN SODIUM 60 MG: 60 INJECTION SUBCUTANEOUS at 07:46

## 2022-01-29 RX ADMIN — Medication 1 TABLET: at 13:11

## 2022-01-29 RX ADMIN — DILTIAZEM HYDROCHLORIDE 120 MG: 120 CAPSULE, COATED, EXTENDED RELEASE ORAL at 08:59

## 2022-01-29 RX ADMIN — FINASTERIDE 5 MG: 5 TABLET, FILM COATED ORAL at 08:59

## 2022-01-29 RX ADMIN — SODIUM CHLORIDE, PRESERVATIVE FREE 10 ML: 5 INJECTION INTRAVENOUS at 20:03

## 2022-01-29 RX ADMIN — ASPIRIN 81 MG: 81 TABLET, COATED ORAL at 09:00

## 2022-01-29 RX ADMIN — CILOSTAZOL 50 MG: 100 TABLET ORAL at 16:59

## 2022-01-29 RX ADMIN — SODIUM CHLORIDE, PRESERVATIVE FREE 10 ML: 5 INJECTION INTRAVENOUS at 09:01

## 2022-01-29 RX ADMIN — GABAPENTIN 300 MG: 300 CAPSULE ORAL at 20:03

## 2022-01-29 RX ADMIN — FOLIC ACID 1 MG: 1 TABLET ORAL at 08:59

## 2022-01-29 RX ADMIN — FERROUS SULFATE TAB 325 MG (65 MG ELEMENTAL FE) 325 MG: 325 (65 FE) TAB at 13:11

## 2022-01-29 RX ADMIN — CILOSTAZOL 50 MG: 100 TABLET ORAL at 08:59

## 2022-01-29 RX ADMIN — LISINOPRIL 40 MG: 20 TABLET ORAL at 09:00

## 2022-01-29 RX ADMIN — POTASSIUM CHLORIDE AND SODIUM CHLORIDE 125 ML/HR: 900; 150 INJECTION, SOLUTION INTRAVENOUS at 03:38

## 2022-01-29 RX ADMIN — Medication 2000 UNITS: at 09:00

## 2022-01-30 LAB
ABO GROUP BLD: NORMAL
ADV 40+41 DNA STL QL NAA+NON-PROBE: NOT DETECTED
ANION GAP SERPL CALCULATED.3IONS-SCNC: 6 MMOL/L (ref 5–15)
ANION GAP SERPL CALCULATED.3IONS-SCNC: 6 MMOL/L (ref 5–15)
ANION GAP SERPL CALCULATED.3IONS-SCNC: 7 MMOL/L (ref 5–15)
ASTRO TYP 1-8 RNA STL QL NAA+NON-PROBE: NOT DETECTED
BLD GP AB SCN SERPL QL: NEGATIVE
BUN SERPL-MCNC: 16 MG/DL (ref 8–23)
BUN SERPL-MCNC: 16 MG/DL (ref 8–23)
BUN SERPL-MCNC: 17 MG/DL (ref 8–23)
BUN/CREAT SERPL: 22.2 (ref 7–25)
BUN/CREAT SERPL: 23.5 (ref 7–25)
BUN/CREAT SERPL: 24.6 (ref 7–25)
C CAYETANENSIS DNA STL QL NAA+NON-PROBE: NOT DETECTED
C COLI+JEJ+UPSA DNA STL QL NAA+NON-PROBE: NOT DETECTED
C DIFF TOX GENS STL QL NAA+PROBE: POSITIVE
CALCIUM SPEC-SCNC: 7.7 MG/DL (ref 8.6–10.5)
CHLORIDE SERPL-SCNC: 107 MMOL/L (ref 98–107)
CHLORIDE SERPL-SCNC: 107 MMOL/L (ref 98–107)
CHLORIDE SERPL-SCNC: 109 MMOL/L (ref 98–107)
CO2 SERPL-SCNC: 26 MMOL/L (ref 22–29)
CO2 SERPL-SCNC: 26 MMOL/L (ref 22–29)
CO2 SERPL-SCNC: 27 MMOL/L (ref 22–29)
CREAT SERPL-MCNC: 0.68 MG/DL (ref 0.76–1.27)
CREAT SERPL-MCNC: 0.69 MG/DL (ref 0.76–1.27)
CREAT SERPL-MCNC: 0.72 MG/DL (ref 0.76–1.27)
CRYPTOSP DNA STL QL NAA+NON-PROBE: NOT DETECTED
E HISTOLYT DNA STL QL NAA+NON-PROBE: NOT DETECTED
EAEC PAA PLAS AGGR+AATA ST NAA+NON-PRB: NOT DETECTED
EC STX1+STX2 GENES STL QL NAA+NON-PROBE: NOT DETECTED
EPEC EAE GENE STL QL NAA+NON-PROBE: NOT DETECTED
ETEC LTA+ST1A+ST1B TOX ST NAA+NON-PROBE: NOT DETECTED
G LAMBLIA DNA STL QL NAA+NON-PROBE: NOT DETECTED
GFR SERPL CREATININE-BSD FRML MDRD: 107 ML/MIN/1.73
GFR SERPL CREATININE-BSD FRML MDRD: 112 ML/MIN/1.73
GFR SERPL CREATININE-BSD FRML MDRD: 114 ML/MIN/1.73
GLUCOSE SERPL-MCNC: 106 MG/DL (ref 65–99)
GLUCOSE SERPL-MCNC: 106 MG/DL (ref 65–99)
GLUCOSE SERPL-MCNC: 84 MG/DL (ref 65–99)
NOROVIRUS GI+II RNA STL QL NAA+NON-PROBE: NOT DETECTED
P SHIGELLOIDES DNA STL QL NAA+NON-PROBE: NOT DETECTED
POTASSIUM SERPL-SCNC: 4 MMOL/L (ref 3.5–5.2)
POTASSIUM SERPL-SCNC: 4 MMOL/L (ref 3.5–5.2)
POTASSIUM SERPL-SCNC: 4.1 MMOL/L (ref 3.5–5.2)
RH BLD: POSITIVE
RVA RNA STL QL NAA+NON-PROBE: NOT DETECTED
S ENT+BONG DNA STL QL NAA+NON-PROBE: NOT DETECTED
SAPO I+II+IV+V RNA STL QL NAA+NON-PROBE: NOT DETECTED
SHIGELLA SP+EIEC IPAH ST NAA+NON-PROBE: NOT DETECTED
SODIUM SERPL-SCNC: 140 MMOL/L (ref 136–145)
SODIUM SERPL-SCNC: 140 MMOL/L (ref 136–145)
SODIUM SERPL-SCNC: 141 MMOL/L (ref 136–145)
T&S EXPIRATION DATE: NORMAL
V CHOL+PARA+VUL DNA STL QL NAA+NON-PROBE: NOT DETECTED
V CHOLERAE DNA STL QL NAA+NON-PROBE: NOT DETECTED
Y ENTEROCOL DNA STL QL NAA+NON-PROBE: NOT DETECTED

## 2022-01-30 PROCEDURE — 87493 C DIFF AMPLIFIED PROBE: CPT | Performed by: FAMILY MEDICINE

## 2022-01-30 PROCEDURE — 86850 RBC ANTIBODY SCREEN: CPT | Performed by: NURSE PRACTITIONER

## 2022-01-30 PROCEDURE — 0097U HC BIOFIRE FILMARRAY GI PANEL: CPT | Performed by: FAMILY MEDICINE

## 2022-01-30 PROCEDURE — 80048 BASIC METABOLIC PNL TOTAL CA: CPT | Performed by: INTERNAL MEDICINE

## 2022-01-30 PROCEDURE — 86900 BLOOD TYPING SEROLOGIC ABO: CPT | Performed by: NURSE PRACTITIONER

## 2022-01-30 PROCEDURE — 86901 BLOOD TYPING SEROLOGIC RH(D): CPT | Performed by: NURSE PRACTITIONER

## 2022-01-30 RX ORDER — CEFDINIR 300 MG/1
300 CAPSULE ORAL 2 TIMES DAILY
Status: ON HOLD | COMMUNITY
End: 2022-01-30

## 2022-01-30 RX ORDER — VANCOMYCIN HYDROCHLORIDE 250 MG/1
250 CAPSULE ORAL EVERY 6 HOURS SCHEDULED
Status: DISCONTINUED | OUTPATIENT
Start: 2022-01-30 | End: 2022-02-04 | Stop reason: HOSPADM

## 2022-01-30 RX ORDER — VANCOMYCIN
250 KIT EVERY 6 HOURS SCHEDULED
Status: DISCONTINUED | OUTPATIENT
Start: 2022-01-30 | End: 2022-01-30 | Stop reason: SDUPTHER

## 2022-01-30 RX ORDER — SILDENAFIL 100 MG/1
50 TABLET, FILM COATED ORAL AS NEEDED
COMMUNITY
End: 2022-02-04 | Stop reason: HOSPADM

## 2022-01-30 RX ADMIN — GABAPENTIN 300 MG: 300 CAPSULE ORAL at 22:08

## 2022-01-30 RX ADMIN — AMIODARONE HYDROCHLORIDE 200 MG: 200 TABLET ORAL at 09:11

## 2022-01-30 RX ADMIN — FINASTERIDE 5 MG: 5 TABLET, FILM COATED ORAL at 09:12

## 2022-01-30 RX ADMIN — CILOSTAZOL 50 MG: 100 TABLET ORAL at 09:11

## 2022-01-30 RX ADMIN — FOLIC ACID 1 MG: 1 TABLET ORAL at 09:11

## 2022-01-30 RX ADMIN — SODIUM CHLORIDE, PRESERVATIVE FREE 10 ML: 5 INJECTION INTRAVENOUS at 09:10

## 2022-01-30 RX ADMIN — VANCOMYCIN HYDROCHLORIDE 250 MG: 250 CAPSULE ORAL at 19:08

## 2022-01-30 RX ADMIN — FERROUS SULFATE TAB 325 MG (65 MG ELEMENTAL FE) 325 MG: 325 (65 FE) TAB at 09:11

## 2022-01-30 RX ADMIN — Medication 1 TABLET: at 09:11

## 2022-01-30 RX ADMIN — Medication 2000 UNITS: at 09:11

## 2022-01-30 RX ADMIN — ASPIRIN 81 MG: 81 TABLET, COATED ORAL at 09:11

## 2022-01-30 RX ADMIN — DILTIAZEM HYDROCHLORIDE 120 MG: 120 CAPSULE, COATED, EXTENDED RELEASE ORAL at 09:11

## 2022-01-30 RX ADMIN — CILOSTAZOL 50 MG: 100 TABLET ORAL at 19:07

## 2022-01-30 RX ADMIN — PANTOPRAZOLE SODIUM 20 MG: 20 TABLET, DELAYED RELEASE ORAL at 09:11

## 2022-01-30 RX ADMIN — SODIUM CHLORIDE, PRESERVATIVE FREE 10 ML: 5 INJECTION INTRAVENOUS at 22:08

## 2022-01-30 RX ADMIN — LISINOPRIL 40 MG: 20 TABLET ORAL at 09:11

## 2022-01-31 ENCOUNTER — ANESTHESIA EVENT (OUTPATIENT)
Dept: PERIOP | Facility: HOSPITAL | Age: 77
End: 2022-01-31

## 2022-01-31 ENCOUNTER — ANESTHESIA (OUTPATIENT)
Dept: PERIOP | Facility: HOSPITAL | Age: 77
End: 2022-01-31

## 2022-01-31 ENCOUNTER — APPOINTMENT (OUTPATIENT)
Dept: INTERVENTIONAL RADIOLOGY/VASCULAR | Facility: HOSPITAL | Age: 77
End: 2022-01-31

## 2022-01-31 LAB
ANION GAP SERPL CALCULATED.3IONS-SCNC: 6 MMOL/L (ref 5–15)
ANION GAP SERPL CALCULATED.3IONS-SCNC: 7 MMOL/L (ref 5–15)
BUN SERPL-MCNC: 15 MG/DL (ref 8–23)
BUN SERPL-MCNC: 15 MG/DL (ref 8–23)
BUN/CREAT SERPL: 22.7 (ref 7–25)
BUN/CREAT SERPL: 23.8 (ref 7–25)
CALCIUM SPEC-SCNC: 7.4 MG/DL (ref 8.6–10.5)
CALCIUM SPEC-SCNC: 7.6 MG/DL (ref 8.6–10.5)
CHLORIDE SERPL-SCNC: 108 MMOL/L (ref 98–107)
CHLORIDE SERPL-SCNC: 109 MMOL/L (ref 98–107)
CO2 SERPL-SCNC: 27 MMOL/L (ref 22–29)
CO2 SERPL-SCNC: 28 MMOL/L (ref 22–29)
CREAT SERPL-MCNC: 0.63 MG/DL (ref 0.76–1.27)
CREAT SERPL-MCNC: 0.66 MG/DL (ref 0.76–1.27)
GFR SERPL CREATININE-BSD FRML MDRD: 118 ML/MIN/1.73
GFR SERPL CREATININE-BSD FRML MDRD: 124 ML/MIN/1.73
GLUCOSE SERPL-MCNC: 102 MG/DL (ref 65–99)
GLUCOSE SERPL-MCNC: 87 MG/DL (ref 65–99)
POTASSIUM SERPL-SCNC: 3.6 MMOL/L (ref 3.5–5.2)
POTASSIUM SERPL-SCNC: 3.8 MMOL/L (ref 3.5–5.2)
SARS-COV-2 RNA PNL SPEC NAA+PROBE: NOT DETECTED
SODIUM SERPL-SCNC: 142 MMOL/L (ref 136–145)
SODIUM SERPL-SCNC: 143 MMOL/L (ref 136–145)

## 2022-01-31 PROCEDURE — 76000 FLUOROSCOPY <1 HR PHYS/QHP: CPT

## 2022-01-31 PROCEDURE — 06H03DZ INSERTION OF INTRALUMINAL DEVICE INTO INFERIOR VENA CAVA, PERCUTANEOUS APPROACH: ICD-10-PCS | Performed by: SURGERY

## 2022-01-31 PROCEDURE — 37191 INS ENDOVAS VENA CAVA FILTR: CPT | Performed by: SURGERY

## 2022-01-31 PROCEDURE — 25010000002 CEFAZOLIN PER 500 MG: Performed by: NURSE PRACTITIONER

## 2022-01-31 PROCEDURE — B5191ZZ FLUOROSCOPY OF INFERIOR VENA CAVA USING LOW OSMOLAR CONTRAST: ICD-10-PCS | Performed by: SURGERY

## 2022-01-31 PROCEDURE — 80048 BASIC METABOLIC PNL TOTAL CA: CPT | Performed by: INTERNAL MEDICINE

## 2022-01-31 PROCEDURE — 25010000002 IOPAMIDOL 61 % SOLUTION: Performed by: SURGERY

## 2022-01-31 PROCEDURE — C1769 GUIDE WIRE: HCPCS | Performed by: SURGERY

## 2022-01-31 PROCEDURE — C1894 INTRO/SHEATH, NON-LASER: HCPCS | Performed by: SURGERY

## 2022-01-31 PROCEDURE — 25010000002 CEFAZOLIN PER 500 MG

## 2022-01-31 PROCEDURE — 99253 IP/OBS CNSLTJ NEW/EST LOW 45: CPT | Performed by: NURSE PRACTITIONER

## 2022-01-31 PROCEDURE — C1880 VENA CAVA FILTER: HCPCS | Performed by: SURGERY

## 2022-01-31 PROCEDURE — 87635 SARS-COV-2 COVID-19 AMP PRB: CPT | Performed by: NURSE PRACTITIONER

## 2022-01-31 PROCEDURE — 25010000002 HEPARIN (PORCINE) PER 1000 UNITS: Performed by: SURGERY

## 2022-01-31 PROCEDURE — 25010000002 PROPOFOL 1000 MG/100ML EMULSION: Performed by: NURSE ANESTHETIST, CERTIFIED REGISTERED

## 2022-01-31 DEVICE — DENALI® VENA CAVA FILTER FEMORAL
Type: IMPLANTABLE DEVICE | Site: VENA CAVA | Status: FUNCTIONAL
Brand: DENALI® VENA CAVA FILTER

## 2022-01-31 RX ORDER — SODIUM CHLORIDE, SODIUM LACTATE, POTASSIUM CHLORIDE, CALCIUM CHLORIDE 600; 310; 30; 20 MG/100ML; MG/100ML; MG/100ML; MG/100ML
100 INJECTION, SOLUTION INTRAVENOUS CONTINUOUS
Status: DISCONTINUED | OUTPATIENT
Start: 2022-01-31 | End: 2022-01-31

## 2022-01-31 RX ORDER — HYDROCODONE BITARTRATE AND ACETAMINOPHEN 5; 325 MG/1; MG/1
1 TABLET ORAL EVERY 4 HOURS PRN
Status: DISCONTINUED | OUTPATIENT
Start: 2022-01-31 | End: 2022-02-04 | Stop reason: HOSPADM

## 2022-01-31 RX ORDER — FLUMAZENIL 0.1 MG/ML
0.2 INJECTION INTRAVENOUS AS NEEDED
Status: DISCONTINUED | OUTPATIENT
Start: 2022-01-31 | End: 2022-01-31 | Stop reason: HOSPADM

## 2022-01-31 RX ORDER — SODIUM CHLORIDE 0.9 % (FLUSH) 0.9 %
3 SYRINGE (ML) INJECTION EVERY 12 HOURS SCHEDULED
Status: DISCONTINUED | OUTPATIENT
Start: 2022-01-31 | End: 2022-01-31 | Stop reason: HOSPADM

## 2022-01-31 RX ORDER — BUPIVACAINE HCL/0.9 % NACL/PF 0.1 %
2 PLASTIC BAG, INJECTION (ML) EPIDURAL ONCE
Status: COMPLETED | OUTPATIENT
Start: 2022-01-31 | End: 2022-01-31

## 2022-01-31 RX ORDER — OXYCODONE AND ACETAMINOPHEN 7.5; 325 MG/1; MG/1
2 TABLET ORAL EVERY 4 HOURS PRN
Status: DISCONTINUED | OUTPATIENT
Start: 2022-01-31 | End: 2022-01-31 | Stop reason: HOSPADM

## 2022-01-31 RX ORDER — LIDOCAINE HYDROCHLORIDE 10 MG/ML
0.5 INJECTION, SOLUTION EPIDURAL; INFILTRATION; INTRACAUDAL; PERINEURAL ONCE AS NEEDED
Status: DISCONTINUED | OUTPATIENT
Start: 2022-01-31 | End: 2022-01-31 | Stop reason: HOSPADM

## 2022-01-31 RX ORDER — OXYCODONE AND ACETAMINOPHEN 10; 325 MG/1; MG/1
1 TABLET ORAL ONCE AS NEEDED
Status: DISCONTINUED | OUTPATIENT
Start: 2022-01-31 | End: 2022-01-31 | Stop reason: HOSPADM

## 2022-01-31 RX ORDER — ONDANSETRON 4 MG/1
4 TABLET, FILM COATED ORAL EVERY 6 HOURS PRN
Status: DISCONTINUED | OUTPATIENT
Start: 2022-01-31 | End: 2022-02-04 | Stop reason: HOSPADM

## 2022-01-31 RX ORDER — FENTANYL CITRATE 50 UG/ML
25 INJECTION, SOLUTION INTRAMUSCULAR; INTRAVENOUS
Status: DISCONTINUED | OUTPATIENT
Start: 2022-01-31 | End: 2022-01-31 | Stop reason: HOSPADM

## 2022-01-31 RX ORDER — ONDANSETRON 2 MG/ML
4 INJECTION INTRAMUSCULAR; INTRAVENOUS EVERY 6 HOURS PRN
Status: DISCONTINUED | OUTPATIENT
Start: 2022-01-31 | End: 2022-02-04 | Stop reason: HOSPADM

## 2022-01-31 RX ORDER — SODIUM HYPOCHLORITE 1.25 MG/ML
SOLUTION TOPICAL EVERY 12 HOURS SCHEDULED
Status: DISCONTINUED | OUTPATIENT
Start: 2022-01-31 | End: 2022-02-04 | Stop reason: HOSPADM

## 2022-01-31 RX ORDER — PROPOFOL 10 MG/ML
INJECTION, EMULSION INTRAVENOUS CONTINUOUS PRN
Status: DISCONTINUED | OUTPATIENT
Start: 2022-01-31 | End: 2022-01-31 | Stop reason: SURG

## 2022-01-31 RX ORDER — LABETALOL HYDROCHLORIDE 5 MG/ML
5 INJECTION, SOLUTION INTRAVENOUS
Status: DISCONTINUED | OUTPATIENT
Start: 2022-01-31 | End: 2022-01-31 | Stop reason: HOSPADM

## 2022-01-31 RX ORDER — SODIUM CHLORIDE 0.9 % (FLUSH) 0.9 %
3-10 SYRINGE (ML) INJECTION AS NEEDED
Status: DISCONTINUED | OUTPATIENT
Start: 2022-01-31 | End: 2022-01-31 | Stop reason: HOSPADM

## 2022-01-31 RX ORDER — ACETAMINOPHEN 325 MG/1
650 TABLET ORAL EVERY 4 HOURS PRN
Status: DISCONTINUED | OUTPATIENT
Start: 2022-01-31 | End: 2022-01-31 | Stop reason: SDUPTHER

## 2022-01-31 RX ORDER — BUPIVACAINE HYDROCHLORIDE 5 MG/ML
INJECTION, SOLUTION EPIDURAL; INTRACAUDAL AS NEEDED
Status: DISCONTINUED | OUTPATIENT
Start: 2022-01-31 | End: 2022-01-31 | Stop reason: HOSPADM

## 2022-01-31 RX ORDER — NALOXONE HCL 0.4 MG/ML
0.4 VIAL (ML) INJECTION AS NEEDED
Status: DISCONTINUED | OUTPATIENT
Start: 2022-01-31 | End: 2022-01-31 | Stop reason: HOSPADM

## 2022-01-31 RX ADMIN — LISINOPRIL 40 MG: 20 TABLET ORAL at 16:01

## 2022-01-31 RX ADMIN — VANCOMYCIN HYDROCHLORIDE 250 MG: 250 CAPSULE ORAL at 17:06

## 2022-01-31 RX ADMIN — ASPIRIN 81 MG: 81 TABLET, COATED ORAL at 16:01

## 2022-01-31 RX ADMIN — SODIUM CHLORIDE, POTASSIUM CHLORIDE, SODIUM LACTATE AND CALCIUM CHLORIDE 100 ML/HR: 600; 310; 30; 20 INJECTION, SOLUTION INTRAVENOUS at 14:22

## 2022-01-31 RX ADMIN — CEFAZOLIN SODIUM 2 G: 10 INJECTION, POWDER, FOR SOLUTION INTRAVENOUS at 15:01

## 2022-01-31 RX ADMIN — SODIUM CHLORIDE, PRESERVATIVE FREE 10 ML: 5 INJECTION INTRAVENOUS at 20:50

## 2022-01-31 RX ADMIN — FOLIC ACID 1 MG: 1 TABLET ORAL at 16:01

## 2022-01-31 RX ADMIN — VANCOMYCIN HYDROCHLORIDE 250 MG: 250 CAPSULE ORAL at 23:58

## 2022-01-31 RX ADMIN — Medication 1 TABLET: at 16:01

## 2022-01-31 RX ADMIN — VANCOMYCIN HYDROCHLORIDE 250 MG: 250 CAPSULE ORAL at 05:43

## 2022-01-31 RX ADMIN — PROPOFOL 100 MCG/KG/MIN: 10 INJECTION, EMULSION INTRAVENOUS at 15:00

## 2022-01-31 RX ADMIN — VANCOMYCIN HYDROCHLORIDE 250 MG: 250 CAPSULE ORAL at 00:06

## 2022-01-31 RX ADMIN — GABAPENTIN 300 MG: 300 CAPSULE ORAL at 20:50

## 2022-01-31 RX ADMIN — AMIODARONE HYDROCHLORIDE 200 MG: 200 TABLET ORAL at 08:44

## 2022-01-31 RX ADMIN — CILOSTAZOL 50 MG: 100 TABLET ORAL at 16:01

## 2022-01-31 RX ADMIN — COLLAGENASE SANTYL 1 APPLICATION: 250 OINTMENT TOPICAL at 18:18

## 2022-01-31 RX ADMIN — FINASTERIDE 5 MG: 5 TABLET, FILM COATED ORAL at 16:05

## 2022-01-31 RX ADMIN — Medication 2000 UNITS: at 16:01

## 2022-01-31 RX ADMIN — SODIUM CHLORIDE, PRESERVATIVE FREE 10 ML: 5 INJECTION INTRAVENOUS at 08:00

## 2022-01-31 RX ADMIN — CILOSTAZOL 50 MG: 100 TABLET ORAL at 17:06

## 2022-01-31 RX ADMIN — DILTIAZEM HYDROCHLORIDE 120 MG: 120 CAPSULE, COATED, EXTENDED RELEASE ORAL at 08:44

## 2022-01-31 RX ADMIN — PANTOPRAZOLE SODIUM 20 MG: 20 TABLET, DELAYED RELEASE ORAL at 16:04

## 2022-01-31 RX ADMIN — VANCOMYCIN HYDROCHLORIDE 250 MG: 250 CAPSULE ORAL at 16:01

## 2022-01-31 RX ADMIN — Medication: at 18:18

## 2022-01-31 NOTE — ANESTHESIA POSTPROCEDURE EVALUATION
"Patient: Kingsley Lerma    Procedure Summary     Date: 01/31/22 Room / Location: [facility] OR  /  PAD HYBRID OR 12    Anesthesia Start: 1451 Anesthesia Stop: 1534    Procedure: VENA CAVA FILTER INSERTION (N/A Groin) Diagnosis:       Acute pulmonary embolism without acute cor pulmonale, unspecified pulmonary embolism type (HCC)      (Acute pulmonary embolism without acute cor pulmonale, unspecified pulmonary embolism type (HCC) [I26.99])    Surgeons: Luis Enrique Donaldson DO Provider: Jeff Castillo CRNA    Anesthesia Type: MAC ASA Status: 3          Anesthesia Type: MAC    Vitals  Vitals Value Taken Time   /59 01/31/22 1540   Temp 98.2 °F (36.8 °C) 01/31/22 1540   Pulse 72 01/31/22 1540   Resp 16 01/31/22 1540   SpO2 96 % 01/31/22 1540           Post Anesthesia Care and Evaluation    Patient location during evaluation: PACU  Patient participation: complete - patient participated  Level of consciousness: awake and alert  Pain management: adequate  Airway patency: patent  Anesthetic complications: No anesthetic complications    Cardiovascular status: acceptable  Respiratory status: acceptable  Hydration status: acceptable    Comments: Blood pressure 134/59, pulse 72, temperature 98.2 °F (36.8 °C), temperature source Temporal, resp. rate 16, height 170.2 cm (67\"), weight 57.2 kg (126 lb 3.2 oz), SpO2 96 %.    Pt discharged from PACU based on drea score >8      "

## 2022-01-31 NOTE — ANESTHESIA PREPROCEDURE EVALUATION
Anesthesia Evaluation     Patient summary reviewed and Nursing notes reviewed   no history of anesthetic complications:  NPO Solid Status: > 8 hours  NPO Liquid Status: > 8 hours           Airway   Mallampati: II  TM distance: >3 FB  Neck ROM: full  Dental    (+) poor dentition    Pulmonary    (+) pulmonary embolism, a smoker Former,   (-) COPD, asthma, sleep apnea    ROS comment: Left lower lobe pulmonary embolus. Central pulmonary arteries are  nondilated. No evidence of right heart strain.  2. Lung emphysema.  3. Advanced coronary atheromatous calcification.  Cardiovascular   Exercise tolerance: poor (<4 METS)    PT is on anticoagulation therapy    (+) hypertension, CAD, dysrhythmias Atrial Fib, PVD,   (-) pacemaker, past MI, angina, cardiac stents      Neuro/Psych  (-) seizures, TIA, CVA  GI/Hepatic/Renal/Endo    (+)  GI bleeding ,   (-) GERD, liver disease, no renal disease, diabetes    ROS Comment: Admitted with dizziness, anemia, passing blood rectally    Musculoskeletal     Abdominal    Substance History   (+) alcohol use,      OB/GYN          Other   arthritis,                        Anesthesia Plan    ASA 3     MAC     intravenous induction     Anesthetic plan, all risks, benefits, and alternatives have been provided, discussed and informed consent has been obtained with: patient.

## 2022-02-01 LAB
ALBUMIN SERPL-MCNC: 2.1 G/DL (ref 3.5–5.2)
ALBUMIN/GLOB SERPL: 0.8 G/DL
ALP SERPL-CCNC: 100 U/L (ref 39–117)
ALT SERPL W P-5'-P-CCNC: 16 U/L (ref 1–41)
ANION GAP SERPL CALCULATED.3IONS-SCNC: 6 MMOL/L (ref 5–15)
AST SERPL-CCNC: 27 U/L (ref 1–40)
BASOPHILS # BLD AUTO: 0.02 10*3/MM3 (ref 0–0.2)
BASOPHILS NFR BLD AUTO: 0.2 % (ref 0–1.5)
BILIRUB SERPL-MCNC: 0.2 MG/DL (ref 0–1.2)
BUN SERPL-MCNC: 13 MG/DL (ref 8–23)
BUN/CREAT SERPL: 23.2 (ref 7–25)
CALCIUM SPEC-SCNC: 7.7 MG/DL (ref 8.6–10.5)
CHLORIDE SERPL-SCNC: 108 MMOL/L (ref 98–107)
CHOLEST SERPL-MCNC: 88 MG/DL (ref 0–200)
CO2 SERPL-SCNC: 27 MMOL/L (ref 22–29)
CREAT SERPL-MCNC: 0.56 MG/DL (ref 0.76–1.27)
DEPRECATED RDW RBC AUTO: 55.8 FL (ref 37–54)
EOSINOPHIL # BLD AUTO: 0.12 10*3/MM3 (ref 0–0.4)
EOSINOPHIL NFR BLD AUTO: 1.2 % (ref 0.3–6.2)
ERYTHROCYTE [DISTWIDTH] IN BLOOD BY AUTOMATED COUNT: 15.5 % (ref 12.3–15.4)
GFR SERPL CREATININE-BSD FRML MDRD: 143 ML/MIN/1.73
GLOBULIN UR ELPH-MCNC: 2.5 GM/DL
GLUCOSE SERPL-MCNC: 87 MG/DL (ref 65–99)
HBA1C MFR BLD: 4.8 % (ref 4.8–5.6)
HCT VFR BLD AUTO: 27.9 % (ref 37.5–51)
HDLC SERPL-MCNC: 36 MG/DL (ref 40–60)
HGB BLD-MCNC: 9 G/DL (ref 13–17.7)
IMM GRANULOCYTES # BLD AUTO: 0.08 10*3/MM3 (ref 0–0.05)
IMM GRANULOCYTES NFR BLD AUTO: 0.8 % (ref 0–0.5)
LDLC SERPL CALC-MCNC: 38 MG/DL (ref 0–100)
LDLC/HDLC SERPL: 1.12 {RATIO}
LYMPHOCYTES # BLD AUTO: 1.35 10*3/MM3 (ref 0.7–3.1)
LYMPHOCYTES NFR BLD AUTO: 13.4 % (ref 19.6–45.3)
MAGNESIUM SERPL-MCNC: 1.8 MG/DL (ref 1.6–2.4)
MCH RBC QN AUTO: 31.7 PG (ref 26.6–33)
MCHC RBC AUTO-ENTMCNC: 32.3 G/DL (ref 31.5–35.7)
MCV RBC AUTO: 98.2 FL (ref 79–97)
MONOCYTES # BLD AUTO: 0.5 10*3/MM3 (ref 0.1–0.9)
MONOCYTES NFR BLD AUTO: 4.9 % (ref 5–12)
NEUTROPHILS NFR BLD AUTO: 79.5 % (ref 42.7–76)
NEUTROPHILS NFR BLD AUTO: 8.04 10*3/MM3 (ref 1.7–7)
NRBC BLD AUTO-RTO: 0 /100 WBC (ref 0–0.2)
PLATELET # BLD AUTO: 267 10*3/MM3 (ref 140–450)
PMV BLD AUTO: 9.1 FL (ref 6–12)
POTASSIUM SERPL-SCNC: 3 MMOL/L (ref 3.5–5.2)
PROT SERPL-MCNC: 4.6 G/DL (ref 6–8.5)
RBC # BLD AUTO: 2.84 10*6/MM3 (ref 4.14–5.8)
SODIUM SERPL-SCNC: 141 MMOL/L (ref 136–145)
TRIGL SERPL-MCNC: 59 MG/DL (ref 0–150)
TSH SERPL DL<=0.05 MIU/L-ACNC: 2.68 UIU/ML (ref 0.27–4.2)
VLDLC SERPL-MCNC: 14 MG/DL (ref 5–40)
WBC NRBC COR # BLD: 10.11 10*3/MM3 (ref 3.4–10.8)

## 2022-02-01 PROCEDURE — 84443 ASSAY THYROID STIM HORMONE: CPT | Performed by: FAMILY MEDICINE

## 2022-02-01 PROCEDURE — 80053 COMPREHEN METABOLIC PANEL: CPT | Performed by: FAMILY MEDICINE

## 2022-02-01 PROCEDURE — 85025 COMPLETE CBC W/AUTO DIFF WBC: CPT | Performed by: FAMILY MEDICINE

## 2022-02-01 PROCEDURE — 97166 OT EVAL MOD COMPLEX 45 MIN: CPT

## 2022-02-01 PROCEDURE — 83735 ASSAY OF MAGNESIUM: CPT | Performed by: FAMILY MEDICINE

## 2022-02-01 PROCEDURE — 83036 HEMOGLOBIN GLYCOSYLATED A1C: CPT | Performed by: FAMILY MEDICINE

## 2022-02-01 PROCEDURE — 80061 LIPID PANEL: CPT | Performed by: FAMILY MEDICINE

## 2022-02-01 PROCEDURE — 97162 PT EVAL MOD COMPLEX 30 MIN: CPT | Performed by: PHYSICAL THERAPIST

## 2022-02-01 RX ORDER — POTASSIUM CHLORIDE 750 MG/1
40 CAPSULE, EXTENDED RELEASE ORAL 2 TIMES DAILY
Status: COMPLETED | OUTPATIENT
Start: 2022-02-01 | End: 2022-02-01

## 2022-02-01 RX ADMIN — POTASSIUM CHLORIDE 40 MEQ: 10 CAPSULE, COATED, EXTENDED RELEASE ORAL at 13:46

## 2022-02-01 RX ADMIN — DILTIAZEM HYDROCHLORIDE 120 MG: 120 CAPSULE, COATED, EXTENDED RELEASE ORAL at 09:48

## 2022-02-01 RX ADMIN — SODIUM CHLORIDE, PRESERVATIVE FREE 10 ML: 5 INJECTION INTRAVENOUS at 09:48

## 2022-02-01 RX ADMIN — ASPIRIN 81 MG: 81 TABLET, COATED ORAL at 09:40

## 2022-02-01 RX ADMIN — VANCOMYCIN HYDROCHLORIDE 250 MG: 250 CAPSULE ORAL at 17:58

## 2022-02-01 RX ADMIN — Medication 1 TABLET: at 09:40

## 2022-02-01 RX ADMIN — Medication: at 09:39

## 2022-02-01 RX ADMIN — COLLAGENASE SANTYL 1 APPLICATION: 250 OINTMENT TOPICAL at 20:41

## 2022-02-01 RX ADMIN — AMIODARONE HYDROCHLORIDE 200 MG: 200 TABLET ORAL at 09:40

## 2022-02-01 RX ADMIN — GABAPENTIN 300 MG: 300 CAPSULE ORAL at 20:41

## 2022-02-01 RX ADMIN — FOLIC ACID 1 MG: 1 TABLET ORAL at 09:40

## 2022-02-01 RX ADMIN — COLLAGENASE SANTYL 1 APPLICATION: 250 OINTMENT TOPICAL at 09:39

## 2022-02-01 RX ADMIN — PANTOPRAZOLE SODIUM 20 MG: 20 TABLET, DELAYED RELEASE ORAL at 12:09

## 2022-02-01 RX ADMIN — CILOSTAZOL 50 MG: 100 TABLET ORAL at 09:40

## 2022-02-01 RX ADMIN — VANCOMYCIN HYDROCHLORIDE 250 MG: 250 CAPSULE ORAL at 05:00

## 2022-02-01 RX ADMIN — LISINOPRIL 40 MG: 20 TABLET ORAL at 09:40

## 2022-02-01 RX ADMIN — SODIUM CHLORIDE, PRESERVATIVE FREE 10 ML: 5 INJECTION INTRAVENOUS at 20:42

## 2022-02-01 RX ADMIN — Medication 2000 UNITS: at 09:40

## 2022-02-01 RX ADMIN — VANCOMYCIN HYDROCHLORIDE 250 MG: 250 CAPSULE ORAL at 23:20

## 2022-02-01 RX ADMIN — FINASTERIDE 5 MG: 5 TABLET, FILM COATED ORAL at 09:41

## 2022-02-01 RX ADMIN — Medication: at 20:43

## 2022-02-01 RX ADMIN — VANCOMYCIN HYDROCHLORIDE 250 MG: 250 CAPSULE ORAL at 12:09

## 2022-02-01 RX ADMIN — FERROUS SULFATE TAB 325 MG (65 MG ELEMENTAL FE) 325 MG: 325 (65 FE) TAB at 09:40

## 2022-02-01 RX ADMIN — POTASSIUM CHLORIDE 40 MEQ: 10 CAPSULE, COATED, EXTENDED RELEASE ORAL at 20:41

## 2022-02-01 RX ADMIN — CILOSTAZOL 50 MG: 100 TABLET ORAL at 17:58

## 2022-02-01 NOTE — PLAN OF CARE
Goal Outcome Evaluation:  Plan of Care Reviewed With: patient        Progress: no change  Outcome Summary: PT eval completed. Pt asleep, awakens with max VC and tactile cues. Pt oriented x4 with c/o weakness. Pt rolls mod I to use bedpan at beginning of session and performs sidelying to sit with sup. Pt with c/o dizziness upon sitting EOB, BP measures 129/44 and dizziness subsides. Pt performs sit to stand t/f and gait training with CGA and RW, demos sustained PF of R foot through entire gait cycle . Pt reports this is a chronic deviation that he has had for years. Pt left sitting in chair with call light, and encouraged to call for assist to get up. Pt will benefit from skilled PT to improve strength, balance, gait training and safety with mobility. Recommend d/c home with assist and HH.

## 2022-02-01 NOTE — PLAN OF CARE
Goal Outcome Evaluation:  Plan of Care Reviewed With: patient        Progress: no change  Outcome Summary: OT eval completed. Pt asleep, requires loud verbal and tactile stim to arouse. Oriented x4. Assisted to use bedpan at start of session, reports unable to control to take the time getting to a BSC or toilet. mod I to S for bed mobility rolling and coming to sitting EOB. Pt reports dizziness upon sitting up, /44. ModA for LB dressing, pt reports he uses a sock aid at baseline. Stands with CGA-minAx2 pulling up from RW despite cues and with decreased safety awareness, sits too far away from chair and unsafe positioning of RW to sit. With fxl mobility pt walks on toes on R LE, reports this is chronic. Pt at increased risk for falls, especially when in a hurry or distracted. OT indicated to address safety and fall risk reduction in context of ADL/IADL and fxl mobility. Pt reports he has family that lives nearby that helps often, plans to return home alone with family assist and  nursing and therapy.

## 2022-02-01 NOTE — NURSING NOTE
Bedside neuro handoff with Marco A MICHEL, no changes.    Adequate: hears normal conversation without difficulty

## 2022-02-01 NOTE — THERAPY EVALUATION
Patient Name: Kingsley Lerma  : 1945    MRN: [id number]                              Today's Date: 2022       Admit Date: 2022    Visit Dx:     ICD-10-CM ICD-9-CM   1. Pressure injury of left buttock, unstageable (formerly [facility])  L89.320 707.05     707.25   2. Hypokalemia  E87.6 276.8   3. Diarrhea, unspecified type  R19.7 787.91   4. Generalized weakness  R53.1 780.79   5. Acute pulmonary embolism without acute cor pulmonale, unspecified pulmonary embolism type (formerly [facility])  I26.99 415.19   6. Acute deep vein thrombosis (DVT) of femoral vein of both lower extremities (formerly [facility])  I82.413 453.41   7. Impaired mobility  Z74.09 799.89   8. Decreased activities of daily living (ADL)  Z78.9 V49.89     Patient Active Problem List   Diagnosis   • Essential hypertension   • PVD (peripheral vascular disease) (formerly [facility])   • Atrial fibrillation and flutter (formerly [facility])   • Alcohol abuse   • Non healing left heel wound   • Atrial fibrillation status post cardioversion (formerly [facility])   • Tobacco use   • Syncope   • GI bleed   • Rectal bleeding   • Heme + stool   • Acute blood loss anemia   • Gastrointestinal hemorrhage   • Osteoarthritis of right hip   • AVM (arteriovenous malformation) of colon   • BPH without obstruction/lower urinary tract symptoms   • Abnormal CT scan, bladder   • Hypokalemia   • Diarrhea   • Acute pulmonary embolism (HCC)     Past Medical History:   Diagnosis Date   • Alcohol abuse    • Arthritis    • Atrial fibrillation (HCC)    • Atrial flutter (HCC)    • BPH (benign prostatic hyperplasia)    • Circulation problem    • History of tobacco abuse    • Hyperlipidemia    • Hypertension    • PVD (peripheral vascular disease) (formerly [facility])      Past Surgical History:   Procedure Laterality Date   • ADENOIDECTOMY     • AORTIC VALVE REPAIR/REPLACEMENT     • APPENDECTOMY     • COLONOSCOPY N/A 2020    Multiple non-bleeding colonic angiodysplastic lesions; Non-bleeding internal hemorrhoids; The examination was otherwise normal; No specimens  collected; Repeat 10 years   • ENDOSCOPY N/A 6/8/2020    Normal esophagus; Normal stomach; Normal examined duodenum; No specimens collected   • ENDOSCOPY N/A 6/15/2020    Dr. Banuelos-Normal examined duodenum; Normal stomach; Normal esophagus; No specimens collected   • VEIN SURGERY     • VENA CAVA FILTER INSERTION N/A 1/31/2022    Procedure: VENA CAVA FILTER INSERTION;  Surgeon: Luis Enrique Donaldson DO;  Location: [facility] HYBRID OR 12;  Service: Vascular;  Laterality: N/A;      General Information     Row Name 02/01/22 01          OT Time and Intention    Document Type evaluation  -MW     Mode of Treatment occupational therapy  -MW     Row Name 02/01/22 01          General Information    Patient Profile Reviewed yes  -MW     Prior Level of Function independent:; all household mobility; ADL's; cooking; cleaning; max assist:; driving  daughter drives pt as needed  -MW     Existing Precautions/Restrictions fall  -MW     Barriers to Rehab medically complex  -MW     Row Name 02/01/22 01          Occupational Profile    Reason for Services/Referral (Occupational Profile) Presented with 2 wk hx of SOA, worsens on exertion; dx: PE, c. diff  -MW     Row Name 02/01/22 0801          Living Environment    Lives With alone  daughter assists as needed  -MW     Row Name 02/01/22 01          Home Main Entrance    Number of Stairs, Main Entrance one  -MW     Stair Railings, Main Entrance none  -MW     Row Name 02/01/22 01          Stairs Within Home, Primary    Number of Stairs, Within Home, Primary none  -MW     Row Name 02/01/22 01          Cognition    Orientation Status (Cognition) oriented x 4  -MW     Row Name 02/01/22 0801          Safety Issues, Functional Mobility    Safety Issues Affecting Function (Mobility) awareness of need for assistance; insight into deficits/self-awareness  -MW     Impairments Affecting Function (Mobility) balance  -MW           User Key  (r) = Recorded By, (t) = Taken By, (c) = Cosigned By     Initials Name Provider Type     Jen Bravo, OTR/L Occupational Therapist                 Mobility/ADL's     Row Name 02/01/22 0801          Bed Mobility    Bed Mobility rolling left; sidelying-sit  -     Rolling Left Antelope (Bed Mobility) modified independence  -     Sidelying-Sit [place] (Bed Mobility) supervision; verbal cues  -     Assistive Device (Bed Mobility) bed rails  -     Row Name 02/01/22 0801          Transfers    Transfers sit-stand transfer  -     Comment (Transfers) unsteady upon standing, pulls up from walker despite cues, sits too far away from chair for optimal fall risk reduction  -     Sit-Stand Antelope (Transfers) contact guard; minimum assist (75% patient effort); 1 person assist; 2 person assist  -     Row Name 02/01/22 0801          Sit-Stand Transfer    Assistive Device (Sit-Stand Transfers) walker, front-wheeled  -     Row Name 02/01/22 0801          Functional Mobility    Functional Mobility- Ind. Level contact guard assist  -     Functional Mobility- Device rolling walker  -     Functional Mobility- Comment on R LE walks on toes, reports this is chronic; mildly unsteady and impulsive, very eager to get back to chair to have breakfast, increased risk for falls upon return home when distracted or in a hurry  -     Row Name 02/01/22 0801          Activities of Daily Living    BADL Assessment/Intervention lower body dressing  -     Row Name 02/01/22 0801          Lower Body Dressing Assessment/Training    Antelope Level (Lower Body Dressing) don; socks; moderate assist (50% patient effort)  -     Position (Lower Body Dressing) edge of bed sitting  -     Comment (Lower Body Dressing) needs assist on R, reports using sock aid at baseline  -           User Key  (r) = Recorded By, (t) = Taken By, (c) = Cosigned By    Initials Name Provider Type     Jen Bravo, OTR/L Occupational Therapist               Obj/Interventions     Row  Name 02/01/22 0801          Sensory Assessment (Somatosensory)    Sensory Assessment (Somatosensory) UE sensation intact  -[facility] Name 02/01/22 0801          Range of Motion Comprehensive    General Range of Motion bilateral upper extremity ROM WFL  -[facility] Name 02/01/22 0801          Strength Comprehensive (MMT)    General Manual Muscle Testing (MMT) Assessment no strength deficits identified  -[facility] Name 02/01/22 0801          Balance    Balance Assessment sitting static balance; sitting dynamic balance; standing static balance; standing dynamic balance  -MW     Static Sitting Balance WFL  -MW     Dynamic Sitting Balance WFL  -MW     Static Standing Balance mild impairment; supported  -MW     Dynamic Standing Balance mild impairment; supported  -MW           User Key  (r) = Recorded By, (t) = Taken By, (c) = Cosigned By    Initials Name Provider Type     Jen Bravo, OTR/L Occupational Therapist               Goals/Plan     Row Name 02/01/22 0801          Transfer Goal 1 (OT)    Activity/Assistive Device (Transfer Goal 1, OT) sit-to-stand/stand-to-sit; bed-to-chair/chair-to-bed; toilet; tub; tub bench  -MW     [place] Level/Cues Needed (Transfer Goal 1, OT) standby assist  -MW     Time Frame (Transfer Goal 1, OT) long term goal (LTG); 10 days  -MW     Progress/Outcome (Transfer Goal 1, OT) goal ongoing  -MW     Row Name 02/01/22 0801          Bathing Goal 1 (OT)    Activity/Device (Bathing Goal 1, OT) bathing skills, all; shower chair  -MW     [place] Level/Cues Needed (Bathing Goal 1, OT) set-up required; supervision required  -MW     Time Frame (Bathing Goal 1, OT) long term goal (LTG); 10 days  -MW     Progress/Outcomes (Bathing Goal 1, OT) goal ongoing  -[facility] Name 02/01/22 0801          Toileting Goal 1 (OT)    Activity/Device (Toileting Goal 1, OT) toileting skills, all; commode; grab bar/safety frame  -     [place] Level/Cues Needed (Toileting Goal 1, OT) standby  assist  -MW     Time Frame (Toileting Goal 1, OT) long term goal (LTG); 10 days  -MW     Progress/Outcome (Toileting Goal 1, OT) goal ongoing  -MW     Row Name 02/01/22 0801          Problem Specific Goal 1 (OT)    Problem Specific Goal 1 (OT) 5 min dynamic standing task with RW for UE support at SBA demo good safety awareness with no LOB  -MW     Time Frame (Problem Specific Goal 1, OT) long term goal (LTG); 10 days  -MW     Progress/Outcome (Problem Specific Goal 1, OT) goal ongoing  -MW     Row Name 02/01/22 0801          Therapy Assessment/Plan (OT)    Planned Therapy Interventions (OT) activity tolerance training; BADL retraining; cognitive/visual perception retraining; functional balance retraining; IADL retraining; occupation/activity based interventions; patient/caregiver education/training; strengthening exercise; transfer/mobility retraining  -MW           User Key  (r) = Recorded By, (t) = Taken By, (c) = Cosigned By    Initials Name Provider Type    MW Jen Bravo, OTR/L Occupational Therapist               Clinical Impression     Row Name 02/01/22 0801          Pain Assessment    Additional Documentation Pain Scale: Numbers Pre/Post-Treatment (Group)  -MW     [facility] Name 02/01/22 01          Pain Scale: Numbers Pre/Post-Treatment    Pretreatment Pain Rating 0/10 - no pain  -MW     Posttreatment Pain Rating 0/10 - no pain  -MW     Row Name 02/01/22 01          Plan of Care Review    Plan of Care Reviewed With patient  -MW     Progress no change  -MW     Outcome Summary OT eval completed. Pt asleep, requires loud verbal and tactile stim to arouse. Oriented x4. Assisted to use bedpan at start of session, reports unable to control to take the time getting to a BSC or toilet. mod I to S for bed mobility rolling and coming to sitting EOB. Pt reports dizziness upon sitting up, /44. ModA for LB dressing, pt reports he uses a sock aid at baseline. Stands with CGA-minAx2 pulling up from RW despite  cues and with decreased safety awareness, sits too far away from chair and unsafe positioning of RW to sit. With fxl mobility pt walks on toes on R LE, reports this is chronic. Pt at increased risk for falls, especially when in a hurry or distracted. OT indicated to address safety and fall risk reduction in context of ADL/IADL and fxl mobility. Pt reports he has family that lives nearby that helps often, plans to return home alone with family assist and  nursing and therapy.  -MW     Row Name 02/01/22 0801          Therapy Assessment/Plan (OT)    Rehab Potential (OT) good, to achieve stated therapy goals  -MW     Criteria for Skilled Therapeutic Interventions Met (OT) yes; skilled treatment is necessary  -MW     Therapy Frequency (OT) 5 times/wk  -MW     Predicted Duration of Therapy Intervention (OT) 10 days  -MW     Row Name 02/01/22 0801          Therapy Plan Review/Discharge Plan (OT)    Anticipated Discharge Disposition (OT) home with assist; home with home health  -     Row Name 02/01/22 0801          Vital Signs    Intra Systolic BP Rehab 129  -MW     Intra Treatment Diastolic BP 44  -MW     Post Systolic BP Rehab 139  -MW     Post Treatment Diastolic BP 52  -MW     Pre SpO2 (%) 99  -MW     O2 Delivery Pre Treatment room air  -MW     Pre Patient Position Supine  -MW     Intra Patient Position Sitting  -MW     Post Patient Position Sitting  -MW     Row Name 02/01/22 0801          Positioning and Restraints    Pre-Treatment Position in bed  -MW     Post Treatment Position chair  -MW     In Chair notified nsg; sitting; call light within reach; encouraged to call for assist  -MW           User Key  (r) = Recorded By, (t) = Taken By, (c) = Cosigned By    Initials Name Provider Type    Jen Bell, OTR/L Occupational Therapist               Outcome Measures     Row Name 02/01/22 0801          How much help from another is currently needed...    Putting on and taking off regular lower body clothing? 2   -MW     Bathing (including washing, rinsing, and drying) 3  -MW     Toileting (which includes using toilet bed pan or urinal) 2  -MW     Putting on and taking off regular upper body clothing 4  -MW     Taking care of personal grooming (such as brushing teeth) 4  -MW     Eating meals 4  -MW     AM-PAC 6 Clicks Score (OT) 19  -MW     Row Name 02/01/22 0753          How much help from another person do you currently need...    Turning from your back to your side while in flat bed without using bedrails? 4  -SB     Moving from lying on back to sitting on the side of a flat bed without bedrails? 3  -SB     Moving to and from a bed to a chair (including a wheelchair)? 3  -SB     Standing up from a chair using your arms (e.g., wheelchair, bedside chair)? 3  -SB     Climbing 3-5 steps with a railing? 3  -SB     To walk in hospital room? 3  -SB     AM-PAC 6 Clicks Score (PT) 19  -SB     Row Name 02/01/22 0801 02/01/22 0753       Functional Assessment    Outcome Measure Options AM-PAC 6 Clicks Daily Activity (OT)  -MW AM-PAC 6 Clicks Basic Mobility (PT)  -SB          User Key  (r) = Recorded By, (t) = Taken By, (c) = Cosigned By    Initials Name Provider Type    Jen Bell, OTR/L Occupational Therapist    Andreia Sterling, PT DPT Physical Therapist                Occupational Therapy Education                 Title: PT OT SLP Therapies (In Progress)     Topic: Occupational Therapy (Done)     Point: ADL training (Done)     Description:   Instruct learner(s) on proper safety adaptation and remediation techniques during self care or transfers.   Instruct in proper use of assistive devices.              Learning Progress Summary           Patient Acceptance, E,D, VU,NR by MARJAN at 2/1/2022 0904                   Point: Home exercise program (Done)     Description:   Instruct learner(s) on appropriate technique for monitoring, assisting and/or progressing therapeutic exercises/activities.              Learning Progress  Summary           Patient Acceptance, E,D, VU,NR by  at 2/1/2022 0904                               User Key     Initials Effective Dates Name Provider Type Discipline     08/28/18 -  Jen Bravo, OTR/L Occupational Therapist OT              OT Recommendation and Plan  Planned Therapy Interventions (OT): activity tolerance training, BADL retraining, cognitive/visual perception retraining, functional balance retraining, IADL retraining, occupation/activity based interventions, patient/caregiver education/training, strengthening exercise, transfer/mobility retraining  Therapy Frequency (OT): 5 times/wk  Plan of Care Review  Plan of Care Reviewed With: patient  Progress: no change  Outcome Summary: OT eval completed. Pt asleep, requires loud verbal and tactile stim to arouse. Oriented x4. Assisted to use bedpan at start of session, reports unable to control to take the time getting to a BSC or toilet. mod I to S for bed mobility rolling and coming to sitting EOB. Pt reports dizziness upon sitting up, /44. ModA for LB dressing, pt reports he uses a sock aid at baseline. Stands with CGA-minAx2 pulling up from RW despite cues and with decreased safety awareness, sits too far away from chair and unsafe positioning of RW to sit. With fxl mobility pt walks on toes on R LE, reports this is chronic. Pt at increased risk for falls, especially when in a hurry or distracted. OT indicated to address safety and fall risk reduction in context of ADL/IADL and fxl mobility. Pt reports he has family that lives nearby that helps often, plans to return home alone with family assist and  nursing and therapy.     Time Calculation:    Time Calculation- OT     Row Name 02/01/22 0904             Time Calculation- OT    OT Start Time 0801  +10 min chart review and time checking with RN prior to session  -      OT Stop Time 0845  -      OT Time Calculation (min) 44 min  -      OT Received On 02/01/22  -      OT Goal  Re-Cert Due Date 02/15/22  -MARJAN            User Key  (r) = Recorded By, (t) = Taken By, (c) = Cosigned By    Initials Name Provider Type    Jen Bell, OTR/L Occupational Therapist              Therapy Charges for Today     Code Description Service Date Service Provider Modifiers Qty    [id number] HC OT EVAL MOD COMPLEXITY 4 2/1/2022 Jen Bravo OTR/L GO 1               CYNTHIA Richard/MEL  2/1/2022

## 2022-02-01 NOTE — CASE MANAGEMENT/SOCIAL WORK
Continued Stay Note   [place]     Patient Name: Kingsley Lerma  MRN: [id number]  Today's Date: 2/1/2022    Admit Date: 1/27/2022     Discharge Plan     Row Name 02/01/22 1402       Plan    Plan Referral to [facility]    Patient/Family in Agreement with Plan yes    Plan Comments Spoke with pt which is agreeable for referral being given to [facility], he has been there recently and saying he can not live alone at present. Spoke with Tyra from facility and provided referral [phone]. Will follow for decision.               Discharge Codes    No documentation.                     SHREYAS Esparza

## 2022-02-01 NOTE — PROGRESS NOTES
[facility] Medicine Services  INPATIENT PROGRESS NOTE    Length of Stay: 5  Date of Admission: 1/27/2022  Primary Care Physician: Provider, No Known    Subjective   Chief Complaint: Pulmonary embolus/bilateral DVT/coccyx wound/history of recurrent GI bleed    HPI   Patient refused to go back on anticoagulation.  Patient will only take an aspirin a day.  No sign of acute bleeding.  Discussed with patient benefit going to rehab, patient agreed.  Patient wants to go to [facility].    Review of Systems   Constitutional: Positive for activity change, appetite change and fatigue. Negative for chills and fever.   HENT: Negative for hearing loss, nosebleeds, tinnitus and trouble swallowing.    Eyes: Negative for visual disturbance.   Respiratory: Negative for cough, chest tightness, shortness of breath and wheezing.    Cardiovascular: Negative for chest pain, palpitations and leg swelling.   Gastrointestinal: Negative for abdominal distention, abdominal pain, blood in stool, constipation, diarrhea, nausea and vomiting.   Endocrine: Negative for cold intolerance, heat intolerance, polydipsia, polyphagia and polyuria.   Genitourinary: Negative for decreased urine volume, difficulty urinating, dysuria, flank pain, frequency and hematuria.   Musculoskeletal: Positive for arthralgias, gait problem and myalgias. Negative for joint swelling.   Skin: Negative for rash.   Allergic/Immunologic: Negative for immunocompromised state.   Neurological: Positive for weakness. Negative for dizziness, syncope, light-headedness and headaches.   Hematological: Negative for adenopathy. Does not bruise/bleed easily.   Psychiatric/Behavioral: Negative for confusion and sleep disturbance. The patient is not nervous/anxious.            All pertinent negatives and positives are as above. All other systems have been reviewed and are negative unless otherwise stated.     Objective    Temp:  [97.6 °F (36.4 °C)-98.4  °F (36.9 °C)] 97.8 °F (36.6 °C)  Heart Rate:  [14-86] 86  Resp:  [15-18] 18  BP: (122-134)/(49-71) 131/71    Intake/Output Summary (Last 24 hours) at 2/1/2022 1224  Last data filed at 2/1/2022 0600  Gross per 24 hour   Intake --   Output 325 ml   Net -325 ml     Physical Exam  Vitals and nursing note reviewed.   Constitutional:       Appearance: He is well-developed.      Comments: Advanced age.  Cachectic.  Chronically ill.   HENT:      Head: Normocephalic and atraumatic.   Eyes:      Conjunctiva/sclera: Conjunctivae normal.      Pupils: Pupils are equal, round, and reactive to light.   Neck:      Vascular: No JVD.   Cardiovascular:      Rate and Rhythm: Normal rate and regular rhythm.      Heart sounds: Normal heart sounds. No murmur heard.  No friction rub. No gallop.    Pulmonary:      Effort: Pulmonary effort is normal. No respiratory distress.      Breath sounds: Normal breath sounds. No wheezing or rales.   Chest:      Chest wall: No tenderness.   Abdominal:      General: Bowel sounds are normal. There is no distension.      Palpations: Abdomen is soft.      Tenderness: There is no abdominal tenderness. There is no guarding or rebound.      Comments: Cachectic . BMI is 19.   Musculoskeletal:         General: No tenderness or deformity.      Cervical back: Neck supple.   Skin:     General: Skin is warm and dry.      Capillary Refill: Capillary refill takes 2 to 3 seconds.      Findings: No rash.  Coccyx wound.  Neurological:      Mental Status: He is alert and oriented to person, place, and time.      Cranial Nerves: No cranial nerve deficit.      Motor: Weakness present. No abnormal muscle tone.      Coordination: Coordination abnormal.      Gait: Gait abnormal.      Deep Tendon Reflexes: Reflexes normal.   Psychiatric:         Behavior: Behavior normal.         Thought Content: Thought content normal.      Results Review:  Lab Results (last 24 hours)     Procedure Component Value Units Date/Time     Comprehensive Metabolic Panel [[id number]]  (Abnormal) Collected: 02/01/22 0422    Specimen: Blood Updated: 02/01/22 0524     Glucose 87 mg/dL      BUN 13 mg/dL      Creatinine 0.56 mg/dL      Sodium 141 mmol/L      Potassium 3.0 mmol/L      Chloride 108 mmol/L      CO2 27.0 mmol/L      Calcium 7.7 mg/dL      Total Protein 4.6 g/dL      Albumin 2.10 g/dL      ALT (SGPT) 16 U/L      AST (SGOT) 27 U/L      Alkaline Phosphatase 100 U/L      Total Bilirubin 0.2 mg/dL      eGFR Non African Amer 143 mL/min/1.73      Globulin 2.5 gm/dL      A/G Ratio 0.8 g/dL      BUN/Creatinine Ratio 23.2     Anion Gap 6.0 mmol/L     Narrative:      GFR Normal >60  Chronic Kidney Disease <60  Kidney Failure <15      Lipid Panel [[id number]]  (Abnormal) Collected: 02/01/22 0422    Specimen: Blood Updated: 02/01/22 0524     Total Cholesterol 88 mg/dL      Triglycerides 59 mg/dL      HDL Cholesterol 36 mg/dL      LDL Cholesterol  38 mg/dL      VLDL Cholesterol 14 mg/dL      LDL/HDL Ratio 1.12    Narrative:      Cholesterol Reference Ranges  (U.S. Department of Health and Human Services ATP III Classifications)    Desirable          <200 mg/dL  Borderline High    200-239 mg/dL  High Risk          >240 mg/dL      Triglyceride Reference Ranges  (U.S. Department of Health and Human Services ATP III Classifications)    Normal           <150 mg/dL  Borderline High  150-199 mg/dL  High             200-499 mg/dL  Very High        >500 mg/dL    HDL Reference Ranges  (U.S. Department of Health and Human Services ATP III Classifications)    Low     <40 mg/dl (major risk factor for CHD)  High    >60 mg/dl ('negative' risk factor for CHD)        LDL Reference Ranges  (U.S. Department of Health and Human Services ATP III Classifications)    Optimal          <100 mg/dL  Near Optimal     100-129 mg/dL  Borderline High  130-159 mg/dL  High             160-189 mg/dL  Very High        >189 mg/dL    TSH [[id number]]  (Normal) Collected: 02/01/22 0422    Specimen:  Blood Updated: 02/01/22 0524     TSH 2.680 uIU/mL     Hemoglobin A1c [[id number]]  (Normal) Collected: 02/01/22 0422    Specimen: Blood Updated: 02/01/22 0512     Hemoglobin A1C 4.80 %     Narrative:      Hemoglobin A1C Ranges:    Increased Risk for Diabetes  5.7% to 6.4%  Diabetes                     >= 6.5%  Diabetic Goal                < 7.0%    CBC & Differential [[id number]]  (Abnormal) Collected: 02/01/22 0422    Specimen: Blood Updated: 02/01/22 0456    Narrative:      The following orders were created for panel order CBC & Differential.  Procedure                               Abnormality         Status                     ---------                               -----------         ------                     CBC Auto Differential[[id number]]        Abnormal            Final result                 Please view results for these tests on the individual orders.    CBC Auto Differential [[id number]]  (Abnormal) Collected: 02/01/22 0422    Specimen: Blood Updated: 02/01/22 0456     WBC 10.11 10*3/mm3      RBC 2.84 10*6/mm3      Hemoglobin 9.0 g/dL      Hematocrit 27.9 %      MCV 98.2 fL      MCH 31.7 pg      MCHC 32.3 g/dL      RDW 15.5 %      RDW-SD 55.8 fl      MPV 9.1 fL      Platelets 267 10*3/mm3      Neutrophil % 79.5 %      Lymphocyte % 13.4 %      Monocyte % 4.9 %      Eosinophil % 1.2 %      Basophil % 0.2 %      Immature Grans % 0.8 %      Neutrophils, Absolute 8.04 10*3/mm3      Lymphocytes, Absolute 1.35 10*3/mm3      Monocytes, Absolute 0.50 10*3/mm3      Eosinophils, Absolute 0.12 10*3/mm3      Basophils, Absolute 0.02 10*3/mm3      Immature Grans, Absolute 0.08 10*3/mm3      nRBC 0.0 /100 WBC     Basic Metabolic Panel [[id number]]  (Abnormal) Collected: 01/31/22 1144    Specimen: Blood Updated: 01/31/22 1258     Glucose 87 mg/dL      BUN 15 mg/dL      Creatinine 0.63 mg/dL      Sodium 143 mmol/L      Potassium 3.8 mmol/L      Chloride 108 mmol/L      CO2 28.0 mmol/L      Calcium 7.6 mg/dL      eGFR Non   Amer 124 mL/min/1.73      BUN/Creatinine Ratio 23.8     Anion Gap 7.0 mmol/L     Narrative:      GFR Normal >60  Chronic Kidney Disease <60  Kidney Failure <15             Cultures:  Urine Culture   Date Value Ref Range Status   01/27/2022 <25,000 CFU/mL Mixed Yudi Isolated  Final       Radiology Data:    Imaging Results (Last 24 Hours)     Procedure Component Value Units Date/Time    IR IVC Filter Placement [[id number]] Collected: 02/01/22 0647     Updated: 02/01/22 0647    Narrative:      Performed by Dr. Donaldson. Please see procedure note.  This report was finalized on  by Dr. Luis Enrique Donaldson MD.    FL C Arm During Surgery [[id number]] Collected: 02/01/22 0647     Updated: 02/01/22 0647    Narrative:      Performed by Dr. Donaldson. Please see procedure note.  This report was finalized on  by Dr. Luis Enrique Donaldson MD.          Allergies   Allergen Reactions   • Prednisone Other (See Comments)     Made him anxious and jittery     • Cortisone Nausea And Vomiting       Scheduled meds:   amiodarone, 200 mg, Oral, Q24H  aspirin, 81 mg, Oral, Daily  cholecalciferol, 2,000 Units, Oral, Daily  cilostazol, 50 mg, Oral, BID AC  collagenase, 1 application, Topical, Q12H  dilTIAZem CD, 120 mg, Oral, Daily  ferrous sulfate, 325 mg, Oral, Daily With Breakfast  finasteride, 5 mg, Oral, Daily  folic acid, 1 mg, Oral, Daily  gabapentin, 300 mg, Oral, Nightly  lisinopril, 40 mg, Oral, Daily  multivitamin with minerals, 1 tablet, Oral, Daily  pantoprazole, 20 mg, Oral, Daily  sodium chloride, 10 mL, Intravenous, Q12H  sodium hypochlorite, , Topical, Q12H  vancomycin, 250 mg, Oral, Q6H        PRN meds:  •  acetaminophen **OR** acetaminophen **OR** acetaminophen  •  HYDROcodone-acetaminophen  •  hydrOXYzine  •  LORazepam **OR** LORazepam **OR** LORazepam **OR** LORazepam **OR** LORazepam **OR** LORazepam  •  ondansetron **OR** ondansetron  •  ondansetron ODT  •  potassium chloride **OR** potassium chloride **OR** potassium  chloride  •  [COMPLETED] Insert peripheral IV **AND** sodium chloride  •  sodium chloride    Assessment/Plan       Acute pulmonary embolism (HCC)    Alcohol abuse    Atrial fibrillation status post cardioversion (HCC)    Tobacco use    AVM (arteriovenous malformation) of colon    Hypokalemia    Diarrhea      Plan:  DVT/PE.  Consult vascular for IVC filter.  Unable to tolerate anticoagulation.  History of GI bleed x2 on 2 different anticoagulation.  Patient stated he does not want to be on any anticoagulation.  Patient just wanted be on baby aspirin a day.  Bleeding risks versus embolus has been discussed with patient.  Incentive spirometer.  Chest x-ray-No active disease is seen.  Doppler ultrasound lower extremity-evidence of deep venous thrombosis in both lower  Extremities.  CTA of the chest-Left lower lobe pulmonary embolus, Central pulmonary arteries are  Nondilated, No evidence of right heart strain, Lung emphysema,  Advanced coronary atheromatous calcification.  Status post IVC filter 1/31/2022  Patient is currently on room air.     Diarrhea, C Diff positive.  Started PO Vanco today.     Atrial fibrillation/hypertension.  Cardizem.  Amiodarone.  Cardizem CD . lisinopril.  Continue aspirin.  Patient refused to be on anticoagulation due to history of recurrent bleeding.  Echocardiogram-fraction 56-60%, mild aortic regurgitation    Hypokalemia . p.o. potassium . magnesium level.    History of AV malformation in the colon.     Peripheral vascular disease.  Plental.  Continue aspirin.     Previous GI bleed.  Unable to tolerate ACE due to GI bleed.     Alcohol abuse . Ativan as needed.  Ciwa protocol.     Coccyx wound.  Collagenase.  Consult wound care.  Dakin's solution.  Wound care with home health.     Anemia.  Iron sulfate.     Benign prostate hypertrophy.  Proscar.     Neuropathy.  Neurontin.     Reflux.  Protonix.  Zofran as needed.     Chronic pain.  Norco as needed.     Anxiety/depression.  Added back as  needed.     C. difficile positive.  Urine culture-mixed maria alejandra.  GI panel-negative.  Covid-19-negative.      Nutrition . vitamin D.  Folate.  Multivitamins.  Regular diet.     Deconditioning.  PT and OT consult.     Discharge Planning:  Pending rehab placement.  Patient wants to go to [facility] for rehab.    Electronically signed by Jevon Peralta MD, 02/01/22, 12:24 PM CST.

## 2022-02-01 NOTE — PLAN OF CARE
Goal Outcome Evaluation:               Patient rested fair throughout the night.  Multiple bowel movements throughout the night.  Drsg to R groin CDI.  Denies pain.  ASA for VTE.  Voiding per urinal.

## 2022-02-01 NOTE — PROGRESS NOTES
Clarified orders with Yris Oakes with [facility].  Wound care can be completed daily once discharged home.

## 2022-02-01 NOTE — THERAPY EVALUATION
Patient Name: Kingsley Lerma  : 1945    MRN: [id number]                              Today's Date: 2022       Admit Date: 2022    Visit Dx:     ICD-10-CM ICD-9-CM   1. Pressure injury of left buttock, unstageable ([facility])  L89.320 707.05     707.25   2. Hypokalemia  E87.6 276.8   3. Diarrhea, unspecified type  R19.7 787.91   4. Generalized weakness  R53.1 780.79   5. Acute pulmonary embolism without acute cor pulmonale, unspecified pulmonary embolism type ([facility])  I26.99 415.19   6. Acute deep vein thrombosis (DVT) of femoral vein of both lower extremities ([facility])  I82.413 453.41   7. Impaired mobility  Z74.09 799.89     Patient Active Problem List   Diagnosis   • Essential hypertension   • PVD (peripheral vascular disease) ([facility])   • Atrial fibrillation and flutter (HCC)   • Alcohol abuse   • Non healing left heel wound   • Atrial fibrillation status post cardioversion ([facility])   • Tobacco use   • Syncope   • GI bleed   • Rectal bleeding   • Heme + stool   • Acute blood loss anemia   • Gastrointestinal hemorrhage   • Osteoarthritis of right hip   • AVM (arteriovenous malformation) of colon   • BPH without obstruction/lower urinary tract symptoms   • Abnormal CT scan, bladder   • Hypokalemia   • Diarrhea   • Acute pulmonary embolism (HCC)     Past Medical History:   Diagnosis Date   • Alcohol abuse    • Arthritis    • Atrial fibrillation (HCC)    • Atrial flutter (HCC)    • BPH (benign prostatic hyperplasia)    • Circulation problem    • History of tobacco abuse    • Hyperlipidemia    • Hypertension    • PVD (peripheral vascular disease) ([facility])      Past Surgical History:   Procedure Laterality Date   • ADENOIDECTOMY     • AORTIC VALVE REPAIR/REPLACEMENT     • APPENDECTOMY     • COLONOSCOPY N/A 2020    Multiple non-bleeding colonic angiodysplastic lesions; Non-bleeding internal hemorrhoids; The examination was otherwise normal; No specimens collected; Repeat 10 years   • ENDOSCOPY N/A 2020    Normal  esophagus; Normal stomach; Normal examined duodenum; No specimens collected   • ENDOSCOPY N/A 6/15/2020    Dr. Banuelos-Normal examined duodenum; Normal stomach; Normal esophagus; No specimens collected   • VEIN SURGERY     • VENA CAVA FILTER INSERTION N/A 1/31/2022    Procedure: VENA CAVA FILTER INSERTION;  Surgeon: Luis Enrique Donaldson DO;  Location: [facility] OR ;  Service: Vascular;  Laterality: N/A;      General Information     Row Name 02/01/22 0753          Physical Therapy Time and Intention    Document Type evaluation  -SB     Mode of Treatment physical therapy  -SB     Row Name 02/01/22 0753          General Information    Patient Profile Reviewed yes  -SB     Prior Level of Function independent:; all household mobility; ADL's  dtr drives patient as needed  -SB     Existing Precautions/Restrictions fall  -SB     Barriers to Rehab medically complex  -SB     Row Name 02/01/22 0753          Living Environment    Lives With alone  dtr assists when needed, step over tub, BSC, rollator, RW, shower chair  -SB     Row Name 02/01/22 0753          Home Main Entrance    Number of Stairs, Main Entrance one  -SB     Stair Railings, Main Entrance none  -SB     Row Name 02/01/22 0753          Stairs Within Home, Primary    Number of Stairs, Within Home, Primary none  -SB     Row Name 02/01/22 0753          Cognition    Orientation Status (Cognition) oriented x 4  -SB     Row Name 02/01/22 0753          Safety Issues, Functional Mobility    Safety Issues Affecting Function (Mobility) awareness of need for assistance; insight into deficits/self-awareness  -SB     Impairments Affecting Function (Mobility) balance; strength; endurance/activity tolerance  -SB           User Key  (r) = Recorded By, (t) = Taken By, (c) = Cosigned By    Initials Name Provider Type    Andreia Sterling PT DPT Physical Therapist               Mobility     Row Name 02/01/22 0753          Bed Mobility    Bed Mobility rolling left;  sidelying-sit; rolling right  -SB     Rolling Left [place] (Bed Mobility) modified independence  -SB     Sidelying-Sit [place] (Bed Mobility) supervision; verbal cues  -SB     Assistive Device (Bed Mobility) bed rails  -SB     Comment (Bed Mobility) left up in chair at end of session  -SB     Row Name 02/01/22 0753          Transfers    Comment (Transfers) slight anterior LOB upon initial stand but recovered with CGA and use of RW  -SB     Row Name 02/01/22 0753          Sit-Stand Transfer    Sit-Stand [place] (Transfers) contact guard; verbal cues  -SB     Assistive Device (Sit-Stand Transfers) walker, front-wheeled  -SB     Row Name 02/01/22 0753          Gait/Stairs (Locomotion)    [place] Level (Gait) contact guard; verbal cues  -SB     Assistive Device (Gait) walker, front-wheeled  -SB     Distance in Feet (Gait) 30 feet in room  -SB     Deviations/Abnormal Patterns (Gait) right sided deviations  -SB     Right Sided Gait Deviations heel strike decreased; weight shift ability decreased  -SB     Comment (Gait/Stairs) pt maintains PF of R foot during entire gait cycle- reports he has been doing this for years  -SB           User Key  (r) = Recorded By, (t) = Taken By, (c) = Cosigned By    Initials Name Provider Type    Andreia Sterling, PREETI DPT Physical Therapist               Obj/Interventions     Row Name 02/01/22 0753          Range of Motion Comprehensive    General Range of Motion bilateral lower extremity ROM WFL  -SB     Row Name 02/01/22 0753          Strength Comprehensive (MMT)    General Manual Muscle Testing (MMT) Assessment lower extremity strength deficits identified  -SB     Comment, General Manual Muscle Testing (MMT) Assessment LLE 4/5; RLE hip not tested due to recent procedure, knee ext 3/5, DF 3-/5  -SB     Row Name 02/01/22 0753          Balance    Balance Assessment sitting static balance; sitting dynamic balance; standing static balance; standing dynamic balance  -SB      Static Sitting Balance WFL; unsupported; sitting, edge of bed  -SB     Dynamic Sitting Balance WFL; unsupported; sitting, edge of bed  -SB     Static Standing Balance mild impairment; supported; standing  -SB     Dynamic Standing Balance mild impairment; supported; standing  -SB     Row Name 02/01/22 0753          Sensory Assessment (Somatosensory)    Sensory Assessment (Somatosensory) LE sensation intact  -SB           User Key  (r) = Recorded By, (t) = Taken By, (c) = Cosigned By    Initials Name Provider Type    SB Andreia Charles, PT DPT Physical Therapist               Goals/Plan     Row Name 02/01/22 0753          Bed Mobility Goal 1 (PT)    Activity/Assistive Device (Bed Mobility Goal 1, PT) bed mobility activities, all  -SB     [place] Level/Cues Needed (Bed Mobility Goal 1, PT) independent  -SB     Time Frame (Bed Mobility Goal 1, PT) long term goal (LTG)  -SB     Progress/Outcomes (Bed Mobility Goal 1, PT) goal ongoing  -SB     Row Name 02/01/22 0753          Transfer Goal 1 (PT)    Activity/Assistive Device (Transfer Goal 1, PT) sit-to-stand/stand-to-sit; bed-to-chair/chair-to-bed; walker, rolling  -SB     [place] Level/Cues Needed (Transfer Goal 1, PT) modified independence  -SB     Time Frame (Transfer Goal 1, PT) long term goal (LTG)  -SB     Progress/Outcome (Transfer Goal 1, PT) goal ongoing  -SB     Row Name 02/01/22 0753          Gait Training Goal 1 (PT)    Activity/Assistive Device (Gait Training Goal 1, PT) gait (walking locomotion); increase endurance/gait distance; diminish gait deviation; improve balance and speed; decrease fall risk; walker, rolling; normalize weight shifts  -SB     [place] Level (Gait Training Goal 1, PT) supervision required  -SB     Distance (Gait Training Goal 1, PT) 150  -SB     Time Frame (Gait Training Goal 1, PT) long term goal (LTG)  -SB     Progress/Outcome (Gait Training Goal 1, PT) goal ongoing  -SB           User Key  (r) = Recorded By, (t) = Taken  By, (c) = Cosigned By    Initials Name Provider Type    SB Andreia Charles, PT DPT Physical Therapist               Clinical Impression     Row Name 02/01/22 0753          Pain    Additional Documentation Pain Scale: Numbers Pre/Post-Treatment (Group)  -SB     Row Name 02/01/22 0753          Pain Scale: Numbers Pre/Post-Treatment    Pretreatment Pain Rating 0/10 - no pain  -SB     Pain Intervention(s) Medication (See MAR); Repositioned; Ambulation/increased activity  -SB     Row Name 02/01/22 0753          Plan of Care Review    Plan of Care Reviewed With patient  -SB     Progress no change  -SB     Outcome Summary PT eval completed. Pt asleep, awakens with max VC and tactile cues. Pt oriented x4 with c/o weakness. Pt rolls mod I to use bedpan at beginning of session and performs sidelying to sit with sup. Pt with c/o dizziness upon sitting EOB, BP measures 129/44 and dizziness subsides. Pt performs sit to stand t/f and gait training with CGA and RW, demos sustained PF of R foot through entire gait cycle . Pt reports this is a chronic deviation that he has had for years. Pt left sitting in chair with call light, and encouraged to call for assist to get up. Pt will benefit from skilled PT to improve strength, balance, gait training and safety with mobility. Recommend d/c home with assist and HH.  -SB     Row Name 02/01/22 0753          Therapy Assessment/Plan (PT)    Patient/Family Therapy Goals Statement (PT) go home  -SB     Rehab Potential (PT) good, to achieve stated therapy goals  -SB     Criteria for Skilled Interventions Met (PT) yes; meets criteria; skilled treatment is necessary  -SB     Predicted Duration of Therapy Intervention (PT) until d/c or goals achieved  -SB     Row Name 02/01/22 0753          Vital Signs    Intra Systolic BP Rehab 129  -SB     Intra Treatment Diastolic BP 44  -SB     Post Systolic BP Rehab 139  -SB     Post Treatment Diastolic BP 52  -SB     Pre SpO2 (%) 99  -SB     O2 Delivery Pre  Treatment room air  -SB     O2 Delivery Intra Treatment room air  -SB     O2 Delivery Post Treatment room air  -SB     Pre Patient Position Supine  -SB     Intra Patient Position Sitting  -SB     Post Patient Position Sitting  -SB     Row Name 02/01/22 0753          Positioning and Restraints    Pre-Treatment Position in bed  -SB     Post Treatment Position chair  -SB     In Chair notified nsg; sitting; call light within reach; encouraged to call for assist  -SB           User Key  (r) = Recorded By, (t) = Taken By, (c) = Cosigned By    Initials Name Provider Type    Andreia Sterling, PT DPT Physical Therapist               Outcome Measures     Row Name 02/01/22 0753          How much help from another person do you currently need...    Turning from your back to your side while in flat bed without using bedrails? 4  -SB     Moving from lying on back to sitting on the side of a flat bed without bedrails? 3  -SB     Moving to and from a bed to a chair (including a wheelchair)? 3  -SB     Standing up from a chair using your arms (e.g., wheelchair, bedside chair)? 3  -SB     Climbing 3-5 steps with a railing? 3  -SB     To walk in hospital room? 3  -SB     AM-PAC 6 Clicks Score (PT) 19  -SB     Row Name 02/01/22 0753          Functional Assessment    Outcome Measure Options AM-PAC 6 Clicks Basic Mobility (PT)  -SB           User Key  (r) = Recorded By, (t) = Taken By, (c) = Cosigned By    Initials Name Provider Type    Andreia Sterling, PT DPT Physical Therapist                             Physical Therapy Education                 Title: PT OT SLP Therapies (In Progress)     Topic: Physical Therapy (In Progress)     Point: Mobility training (Done)     Learning Progress Summary           Patient Acceptance, E, VU by ALISHA at 2/1/2022 0827    Comment: pt edu on POC, benefits of act and d/c plans                   Point: Home exercise program (Not Started)     Learner Progress:  Not documented in this visit.           Point: Body mechanics (Not Started)     Learner Progress:  Not documented in this visit.          Point: Precautions (Done)     Learning Progress Summary           Patient Acceptance, E, VU by SB at 2/1/2022 0827    Comment: pt edu on POC, benefits of act and d/c plans                               User Key     Initials Effective Dates Name Provider Type Discipline    SB 06/16/21 -  Andreia Charles PT DPT Physical Therapist PT              PT Recommendation and Plan  Planned Therapy Interventions (PT): balance training, bed mobility training, gait training, patient/family education, transfer training, strengthening  Plan of Care Reviewed With: patient  Progress: no change  Outcome Summary: PT eval completed. Pt asleep, awakens with max VC and tactile cues. Pt oriented x4 with c/o weakness. Pt rolls mod I to use bedpan at beginning of session and performs sidelying to sit with sup. Pt with c/o dizziness upon sitting EOB, BP measures 129/44 and dizziness subsides. Pt performs sit to stand t/f and gait training with CGA and RW, demos sustained PF of R foot through entire gait cycle . Pt reports this is a chronic deviation that he has had for years. Pt left sitting in chair with call light, and encouraged to call for assist to get up. Pt will benefit from skilled PT to improve strength, balance, gait training and safety with mobility. Recommend d/c home with assist and HH.     Time Calculation:    PT Charges     Row Name 02/01/22 0900             Time Calculation    Start Time 0753  -SB      Stop Time 0848  -SB      Time Calculation (min) 55 min  -SB      PT Received On 02/01/22  -SB      PT Goal Re-Cert Due Date 02/11/22  -SB            User Key  (r) = Recorded By, (t) = Taken By, (c) = Cosigned By    Initials Name Provider Type    Andreia Sterling, PT DPT Physical Therapist              Therapy Charges for Today     Code Description Service Date Service Provider Modifiers Qty    62118349387 HC PT EVAL MOD COMPLEXITY  4 2/1/2022 Andreia Charles, PT DPT GP 1          PT G-Codes  Outcome Measure Options: AM-PAC 6 Clicks Basic Mobility (PT)  AM-PAC 6 Clicks Score (PT): 19    Andreia Charles PT DPT  2/1/2022

## 2022-02-02 LAB
ALBUMIN SERPL-MCNC: 2.2 G/DL (ref 3.5–5.2)
ALBUMIN/GLOB SERPL: 0.8 G/DL
ALP SERPL-CCNC: 112 U/L (ref 39–117)
ALT SERPL W P-5'-P-CCNC: 19 U/L (ref 1–41)
ANION GAP SERPL CALCULATED.3IONS-SCNC: 4 MMOL/L (ref 5–15)
AST SERPL-CCNC: 27 U/L (ref 1–40)
BASOPHILS # BLD AUTO: 0.03 10*3/MM3 (ref 0–0.2)
BASOPHILS NFR BLD AUTO: 0.3 % (ref 0–1.5)
BILIRUB SERPL-MCNC: 0.2 MG/DL (ref 0–1.2)
BUN SERPL-MCNC: 11 MG/DL (ref 8–23)
BUN/CREAT SERPL: 16.2 (ref 7–25)
CALCIUM SPEC-SCNC: 7.7 MG/DL (ref 8.6–10.5)
CHLORIDE SERPL-SCNC: 109 MMOL/L (ref 98–107)
CO2 SERPL-SCNC: 28 MMOL/L (ref 22–29)
CREAT SERPL-MCNC: 0.68 MG/DL (ref 0.76–1.27)
DEPRECATED RDW RBC AUTO: 55.8 FL (ref 37–54)
EOSINOPHIL # BLD AUTO: 0.12 10*3/MM3 (ref 0–0.4)
EOSINOPHIL NFR BLD AUTO: 1.4 % (ref 0.3–6.2)
ERYTHROCYTE [DISTWIDTH] IN BLOOD BY AUTOMATED COUNT: 15.7 % (ref 12.3–15.4)
GFR SERPL CREATININE-BSD FRML MDRD: 114 ML/MIN/1.73
GLOBULIN UR ELPH-MCNC: 2.6 GM/DL
GLUCOSE SERPL-MCNC: 88 MG/DL (ref 65–99)
HCT VFR BLD AUTO: 28.7 % (ref 37.5–51)
HGB BLD-MCNC: 9.3 G/DL (ref 13–17.7)
IMM GRANULOCYTES # BLD AUTO: 0.06 10*3/MM3 (ref 0–0.05)
IMM GRANULOCYTES NFR BLD AUTO: 0.7 % (ref 0–0.5)
LYMPHOCYTES # BLD AUTO: 1.24 10*3/MM3 (ref 0.7–3.1)
LYMPHOCYTES NFR BLD AUTO: 14 % (ref 19.6–45.3)
MCH RBC QN AUTO: 32 PG (ref 26.6–33)
MCHC RBC AUTO-ENTMCNC: 32.4 G/DL (ref 31.5–35.7)
MCV RBC AUTO: 98.6 FL (ref 79–97)
MONOCYTES # BLD AUTO: 0.42 10*3/MM3 (ref 0.1–0.9)
MONOCYTES NFR BLD AUTO: 4.7 % (ref 5–12)
NEUTROPHILS NFR BLD AUTO: 6.98 10*3/MM3 (ref 1.7–7)
NEUTROPHILS NFR BLD AUTO: 78.9 % (ref 42.7–76)
NRBC BLD AUTO-RTO: 0 /100 WBC (ref 0–0.2)
PLATELET # BLD AUTO: 296 10*3/MM3 (ref 140–450)
PMV BLD AUTO: 9 FL (ref 6–12)
POTASSIUM SERPL-SCNC: 4.3 MMOL/L (ref 3.5–5.2)
PROT SERPL-MCNC: 4.8 G/DL (ref 6–8.5)
RBC # BLD AUTO: 2.91 10*6/MM3 (ref 4.14–5.8)
SODIUM SERPL-SCNC: 141 MMOL/L (ref 136–145)
WBC NRBC COR # BLD: 8.85 10*3/MM3 (ref 3.4–10.8)

## 2022-02-02 PROCEDURE — 97535 SELF CARE MNGMENT TRAINING: CPT

## 2022-02-02 PROCEDURE — 85025 COMPLETE CBC W/AUTO DIFF WBC: CPT | Performed by: FAMILY MEDICINE

## 2022-02-02 PROCEDURE — 80053 COMPREHEN METABOLIC PANEL: CPT | Performed by: FAMILY MEDICINE

## 2022-02-02 PROCEDURE — 97110 THERAPEUTIC EXERCISES: CPT

## 2022-02-02 PROCEDURE — 97530 THERAPEUTIC ACTIVITIES: CPT

## 2022-02-02 RX ADMIN — HYDROCODONE BITARTRATE AND ACETAMINOPHEN 1 TABLET: 5; 325 TABLET ORAL at 20:18

## 2022-02-02 RX ADMIN — FERROUS SULFATE TAB 325 MG (65 MG ELEMENTAL FE) 325 MG: 325 (65 FE) TAB at 10:43

## 2022-02-02 RX ADMIN — COLLAGENASE SANTYL 1 APPLICATION: 250 OINTMENT TOPICAL at 10:44

## 2022-02-02 RX ADMIN — SODIUM CHLORIDE, PRESERVATIVE FREE 10 ML: 5 INJECTION INTRAVENOUS at 10:44

## 2022-02-02 RX ADMIN — VANCOMYCIN HYDROCHLORIDE 250 MG: 250 CAPSULE ORAL at 18:27

## 2022-02-02 RX ADMIN — SODIUM CHLORIDE, PRESERVATIVE FREE 10 ML: 5 INJECTION INTRAVENOUS at 20:18

## 2022-02-02 RX ADMIN — FINASTERIDE 5 MG: 5 TABLET, FILM COATED ORAL at 10:45

## 2022-02-02 RX ADMIN — Medication 1 TABLET: at 10:43

## 2022-02-02 RX ADMIN — ASPIRIN 81 MG: 81 TABLET, COATED ORAL at 10:43

## 2022-02-02 RX ADMIN — GABAPENTIN 300 MG: 300 CAPSULE ORAL at 20:18

## 2022-02-02 RX ADMIN — COLLAGENASE SANTYL 1 APPLICATION: 250 OINTMENT TOPICAL at 20:18

## 2022-02-02 RX ADMIN — PANTOPRAZOLE SODIUM 20 MG: 20 TABLET, DELAYED RELEASE ORAL at 14:35

## 2022-02-02 RX ADMIN — CILOSTAZOL 50 MG: 100 TABLET ORAL at 05:48

## 2022-02-02 RX ADMIN — DILTIAZEM HYDROCHLORIDE 120 MG: 120 CAPSULE, COATED, EXTENDED RELEASE ORAL at 10:43

## 2022-02-02 RX ADMIN — AMIODARONE HYDROCHLORIDE 200 MG: 200 TABLET ORAL at 10:43

## 2022-02-02 RX ADMIN — VANCOMYCIN HYDROCHLORIDE 250 MG: 250 CAPSULE ORAL at 14:35

## 2022-02-02 RX ADMIN — Medication: at 10:44

## 2022-02-02 RX ADMIN — Medication: at 20:18

## 2022-02-02 RX ADMIN — LISINOPRIL 40 MG: 20 TABLET ORAL at 10:43

## 2022-02-02 RX ADMIN — CILOSTAZOL 50 MG: 100 TABLET ORAL at 18:27

## 2022-02-02 RX ADMIN — Medication 2000 UNITS: at 10:43

## 2022-02-02 RX ADMIN — FOLIC ACID 1 MG: 1 TABLET ORAL at 10:43

## 2022-02-02 RX ADMIN — VANCOMYCIN HYDROCHLORIDE 250 MG: 250 CAPSULE ORAL at 05:48

## 2022-02-02 NOTE — CASE MANAGEMENT/SOCIAL WORK
Continued Stay Note   [place]     Patient Name: Kingsley Lerma  MRN: [id number]  Today's Date: 2/2/2022    Admit Date: 1/27/2022     Discharge Plan     Row Name 02/02/22 1019       Plan    Plan SNF    Patient/Family in Agreement with Plan yes    Plan Comments [facility] does not have a private room available and will need one per Tyra there due to C -Diff.  [place] will work on possibly having one and pt agreeable.  Will follow for bed offer.               Discharge Codes    No documentation.                     SHREYAS Esparza

## 2022-02-02 NOTE — PLAN OF CARE
Goal Outcome Evaluation:  Plan of Care Reviewed With: patient        Progress: improving  Outcome Summary: Pt had no c/o pain this shift. Safety maintained. IV IID. Voiding per urinal. Pt turned Q2 hrs and PRN. No new skin breakdown noted. Wound present to coccyx, dressing in place. IVC filter incision dressing in place to right groin CDI. Pt up with assist of 1-2. Cont c-diff precautions. Cont to maintain.

## 2022-02-02 NOTE — THERAPY TREATMENT NOTE
Acute Care - Physical Therapy Treatment Note  [facility]     Patient Name: Kingsley Lerma  : 1945  MRN: [id number]  Today's Date: 2022      Visit Dx:     ICD-10-CM ICD-9-CM   1. Pressure injury of left buttock, unstageable ([facility])  L89.320 707.05     707.25   2. Hypokalemia  E87.6 276.8   3. Diarrhea, unspecified type  R19.7 787.91   4. Generalized weakness  R53.1 780.79   5. Acute pulmonary embolism without acute cor pulmonale, unspecified pulmonary embolism type ([facility])  I26.99 415.19   6. Acute deep vein thrombosis (DVT) of femoral vein of both lower extremities ([facility])  I82.413 453.41   7. Impaired mobility  Z74.09 799.89   8. Decreased activities of daily living (ADL)  Z78.9 V49.89     Patient Active Problem List   Diagnosis   • Essential hypertension   • PVD (peripheral vascular disease) ([facility])   • Atrial fibrillation and flutter ([facility])   • Alcohol abuse   • Non healing left heel wound   • Atrial fibrillation status post cardioversion ([facility])   • Tobacco use   • Syncope   • GI bleed   • Rectal bleeding   • Heme + stool   • Acute blood loss anemia   • Gastrointestinal hemorrhage   • Osteoarthritis of right hip   • AVM (arteriovenous malformation) of colon   • BPH without obstruction/lower urinary tract symptoms   • Abnormal CT scan, bladder   • Hypokalemia   • Diarrhea   • Acute pulmonary embolism ([facility])     Past Medical History:   Diagnosis Date   • Alcohol abuse    • Arthritis    • Atrial fibrillation (HCC)    • Atrial flutter (HCC)    • BPH (benign prostatic hyperplasia)    • Circulation problem    • History of tobacco abuse    • Hyperlipidemia    • Hypertension    • PVD (peripheral vascular disease) ([facility])      Past Surgical History:   Procedure Laterality Date   • ADENOIDECTOMY     • AORTIC VALVE REPAIR/REPLACEMENT     • APPENDECTOMY     • COLONOSCOPY N/A 2020    Multiple non-bleeding colonic angiodysplastic lesions; Non-bleeding internal hemorrhoids; The examination was otherwise normal; No  specimens collected; Repeat 10 years   • ENDOSCOPY N/A 6/8/2020    Normal esophagus; Normal stomach; Normal examined duodenum; No specimens collected   • ENDOSCOPY N/A 6/15/2020    Dr. Banuelos-Normal examined duodenum; Normal stomach; Normal esophagus; No specimens collected   • VEIN SURGERY     • VENA CAVA FILTER INSERTION N/A 1/31/2022    Procedure: VENA CAVA FILTER INSERTION;  Surgeon: Luis Enrique Donaldson DO;  Location: [facility] OR ;  Service: Vascular;  Laterality: N/A;     PT Assessment (last 12 hours)     PT Evaluation and Treatment     Row Name 02/02/22 1415 02/02/22 1020       Physical Therapy Time and Intention    Subjective Information complains of; pain  stomach  -WK --    Document Type therapy note (daily note)  -WK --    Mode of Treatment physical therapy  -WK --    Session Not Performed -- patient/family declined treatment  -WK    Comment -- Pt stated he has had a busy more and wants to wait till afternoon  -WK    Row Name 02/02/22 1415          General Information    Existing Precautions/Restrictions fall  -WK     Row Name 02/02/22 1415          Bed Mobility    Bed Mobility sit-sidelying  -WK     Scooting/Bridging [place] (Bed Mobility) standby assist  -WK     Sidelying-Sit [place] (Bed Mobility) supervision; minimum assist (75% patient effort)  -WK     Sit-Sidelying [place] (Bed Mobility) verbal cues; contact guard  -WK     Row Name 02/02/22 1415          Transfers    Sit-Stand [place] (Transfers) --  refused due to fear of having diarrhea  -WK     Row Name 02/02/22 1415          Aerobic Exercise    Comment, Aerobic Exercise (Therapeutic Exercise) 20 reps BLE AROM sitting EOB  -WK     Row Name             Wound 01/27/22 2246 coccyx    Wound - Properties Group Placement Date: 01/27/22 -ST Placement Time: 2246 -ST Present on Hospital Admission: Y  -ST Location: coccyx  -ST     Retired Wound - Properties Group Date first assessed: 01/27/22 -ST Time first assessed: 2246 -ST  Present on Hospital Admission: Y  -ST Location: coccyx  -ST     Row Name             Wound 01/31/22 1529 Right anterior groin Incision    Wound - Properties Group Placement Date: 01/31/22  -AC Placement Time: 1529 -AC Present on Hospital Admission: N  -AC Side: Right  -AC Orientation: anterior  -AC Location: groin  -AC Primary Wound Type: Incision  -AC Additional Comments: 2x2 tegaderm  -AC     Retired Wound - Properties Group Date first assessed: 01/31/22  -AC Time first assessed: 1529 -AC Present on Hospital Admission: N  -AC Side: Right  -AC Location: groin  -AC Primary Wound Type: Incision  -AC Additional Comments: 2x2 tegaderm  -AC     Row Name 02/02/22 1415          Plan of Care Review    Progress no change  -WK     Outcome Summary Pt performed bed mobility with Min A. pt refused sit to stand due to fear of having diarrhea. Pt performed scooting up HOB and in bed with SBA and performed AROM BLE exercise while sitting EOB  -WK     Row Name 02/02/22 1415          Positioning and Restraints    Pre-Treatment Position in bed  -WK     Post Treatment Position bed  -WK     In Bed fowlers; call light within reach; encouraged to call for assist  -WK           User Key  (r) = Recorded By, (t) = Taken By, (c) = Cosigned By    Initials Name Provider Type    Fany Valdovinos RN Registered Nurse    Madelyn Trent RN Registered Nurse    Miladis Wells PTA Physical Therapy Assistant                Physical Therapy Education                 Title: PT OT SLP Therapies (In Progress)     Topic: Physical Therapy (In Progress)     Point: Mobility training (Done)     Learning Progress Summary           Patient Acceptance, E, VU by SB at 2/1/2022 0827    Comment: pt edu on POC, benefits of act and d/c plans                   Point: Home exercise program (Not Started)     Learner Progress:  Not documented in this visit.          Point: Body mechanics (Not Started)     Learner Progress:  Not documented in this visit.           Point: Precautions (Done)     Learning Progress Summary           Patient Acceptance, E, VU by SB at 2/1/2022 0827    Comment: pt edu on POC, benefits of act and d/c plans                               User Key     Initials Effective Dates Name Provider Type Discipline     06/16/21 -  Andreia Charles, PT DPT Physical Therapist PT              PT Recommendation and Plan     Progress: no change  Outcome Summary: Pt performed bed mobility with Min A. pt refused sit to stand due to fear of having diarrhea. Pt performed scooting up HOB and in bed with SBA and performed AROM BLE exercise while sitting EOB       Time Calculation:    PT Charges     Row Name 02/02/22 1433             Time Calculation    Start Time 1415  -WK      Stop Time 1430  -WK      Time Calculation (min) 15 min  -WK      PT Received On 02/02/22  -WK              Time Calculation- PT    Total Timed Code Minutes- PT 15 minute(s)  -WK            User Key  (r) = Recorded By, (t) = Taken By, (c) = Cosigned By    Initials Name Provider Type    WK Miladis Alcazar PTA Physical Therapy Assistant              Therapy Charges for Today     Code Description Service Date Service Provider Modifiers Qty    73898057834  PT THERAPEUTIC ACT EA 15 MIN 2/2/2022 Miladis Alcazar PTA GP 1          PT G-Codes  Outcome Measure Options: AM-PAC 6 Clicks Daily Activity (OT)  AM-PAC 6 Clicks Score (PT): 19  AM-PAC 6 Clicks Score (OT): 19    Miladis Alcazar PTA  2/2/2022

## 2022-02-02 NOTE — PROGRESS NOTES
[facility] Medicine Services  INPATIENT PROGRESS NOTE    Length of Stay: 6  Date of Admission: 1/27/2022  Primary Care Physician: Provider, No Known    Subjective   Chief Complaint: Pulmonary embolus/bilateral DVT/coccyx wound/history of recurrent GI bleed    HPI   Patient stepped diarrhea symptoms with C. difficile.  Waiting for rehab placement.  Patient is afebrile.  Blood pressure stable.  Patient having chest pain or leg pain.    Review of Systems   Constitutional: Positive for activity change, appetite change and fatigue. Negative for chills and fever.   HENT: Negative for hearing loss, nosebleeds, tinnitus and trouble swallowing.    Eyes: Negative for visual disturbance.   Respiratory: Negative for cough, chest tightness, shortness of breath and wheezing.    Cardiovascular: Negative for chest pain, palpitations and leg swelling.   Gastrointestinal: Negative for abdominal distention, abdominal pain, blood in stool, constipation, diarrhea, nausea and vomiting.   Endocrine: Negative for cold intolerance, heat intolerance, polydipsia, polyphagia and polyuria.   Genitourinary: Negative for decreased urine volume, difficulty urinating, dysuria, flank pain, frequency and hematuria.   Musculoskeletal: Positive for arthralgias, gait problem and myalgias. Negative for joint swelling.   Skin: Negative for rash.   Allergic/Immunologic: Negative for immunocompromised state.   Neurological: Positive for weakness. Negative for dizziness, syncope, light-headedness and headaches.   Hematological: Negative for adenopathy. Does not bruise/bleed easily.   Psychiatric/Behavioral: Negative for confusion and sleep disturbance. The patient is not nervous/anxious.    All pertinent negatives and positives are as above. All other systems have been reviewed and are negative unless otherwise stated.     Objective    Temp:  [97.6 °F (36.4 °C)-98.5 °F (36.9 °C)] 98.2 °F (36.8 °C)  Heart Rate:  [70-80]  80  Resp:  [14-18] 16  BP: (113-145)/(50-59) 127/58    Intake/Output Summary (Last 24 hours) at 2/2/2022 1443  Last data filed at 2/2/2022 1302  Gross per 24 hour   Intake 360 ml   Output 725 ml   Net -365 ml     Physical Exam  Vitals and nursing note reviewed.   Constitutional:       Appearance: He is well-developed.      Comments: Advanced age.  Cachectic.  Chronically ill.   HENT:      Head: Normocephalic and atraumatic.   Eyes:      Conjunctiva/sclera: Conjunctivae normal.      Pupils: Pupils are equal, round, and reactive to light.   Neck:      Vascular: No JVD.   Cardiovascular:      Rate and Rhythm: Normal rate and regular rhythm.      Heart sounds: Normal heart sounds. No murmur heard.  No friction rub. No gallop.    Pulmonary:      Effort: Pulmonary effort is normal. No respiratory distress.      Breath sounds: Normal breath sounds. No wheezing or rales.   Chest:      Chest wall: No tenderness.   Abdominal:      General: Bowel sounds are normal. There is no distension.      Palpations: Abdomen is soft.      Tenderness: There is no abdominal tenderness. There is no guarding or rebound.      Comments: Cachectic . BMI is 19.   Musculoskeletal:         General: No tenderness or deformity.      Cervical back: Neck supple.   Skin:     General: Skin is warm and dry.      Capillary Refill: Capillary refill takes 2 to 3 seconds.      Findings: No rash.  Coccyx wound.  Neurological:      Mental Status: He is alert and oriented to person, place, and time.      Cranial Nerves: No cranial nerve deficit.      Motor: Weakness present. No abnormal muscle tone.      Coordination: Coordination abnormal.      Gait: Gait abnormal.      Deep Tendon Reflexes: Reflexes normal.   Psychiatric:         Behavior: Behavior normal.         Thought Content: Thought content normal.   Results Review:  Lab Results (last 24 hours)     Procedure Component Value Units Date/Time    Comprehensive Metabolic Panel [[id number]]  (Abnormal)  Collected: 02/02/22 0358    Specimen: Blood Updated: 02/02/22 0537     Glucose 88 mg/dL      BUN 11 mg/dL      Creatinine 0.68 mg/dL      Sodium 141 mmol/L      Potassium 4.3 mmol/L      Chloride 109 mmol/L      CO2 28.0 mmol/L      Calcium 7.7 mg/dL      Total Protein 4.8 g/dL      Albumin 2.20 g/dL      ALT (SGPT) 19 U/L      AST (SGOT) 27 U/L      Alkaline Phosphatase 112 U/L      Total Bilirubin 0.2 mg/dL      eGFR Non African Amer 114 mL/min/1.73      Globulin 2.6 gm/dL      A/G Ratio 0.8 g/dL      BUN/Creatinine Ratio 16.2     Anion Gap 4.0 mmol/L     Narrative:      GFR Normal >60  Chronic Kidney Disease <60  Kidney Failure <15      CBC & Differential [[id number]]  (Abnormal) Collected: 02/02/22 0358    Specimen: Blood Updated: 02/02/22 0515    Narrative:      The following orders were created for panel order CBC & Differential.  Procedure                               Abnormality         Status                     ---------                               -----------         ------                     CBC Auto Differential[[id number]]        Abnormal            Final result                 Please view results for these tests on the individual orders.    CBC Auto Differential [[id number]]  (Abnormal) Collected: 02/02/22 0358    Specimen: Blood Updated: 02/02/22 0515     WBC 8.85 10*3/mm3      RBC 2.91 10*6/mm3      Hemoglobin 9.3 g/dL      Hematocrit 28.7 %      MCV 98.6 fL      MCH 32.0 pg      MCHC 32.4 g/dL      RDW 15.7 %      RDW-SD 55.8 fl      MPV 9.0 fL      Platelets 296 10*3/mm3      Neutrophil % 78.9 %      Lymphocyte % 14.0 %      Monocyte % 4.7 %      Eosinophil % 1.4 %      Basophil % 0.3 %      Immature Grans % 0.7 %      Neutrophils, Absolute 6.98 10*3/mm3      Lymphocytes, Absolute 1.24 10*3/mm3      Monocytes, Absolute 0.42 10*3/mm3      Eosinophils, Absolute 0.12 10*3/mm3      Basophils, Absolute 0.03 10*3/mm3      Immature Grans, Absolute 0.06 10*3/mm3      nRBC 0.0 /100 WBC             Cultures:  Urine Culture   Date Value Ref Range Status   01/27/2022 <25,000 CFU/mL Mixed Yudi Isolated  Final       Radiology Data:    Imaging Results (Last 24 Hours)     Procedure Component Value Units Date/Time    IR IVC Filter Placement [[id number]] Collected: 02/01/22 0647     Updated: 02/01/22 1552    Narrative:      Performed by Dr. Donaldson. Please see procedure note.  This report was finalized on 02/01/2022 15:49 by Dr. Luis Enrique Donaldson MD.    FL C Arm During Surgery [[id number]] Collected: 02/01/22 0647     Updated: 02/01/22 1552    Narrative:      Performed by Dr. Donaldson. Please see procedure note.  This report was finalized on 02/01/2022 15:49 by Dr. Luis Enrique Donaldson MD.          Allergies   Allergen Reactions   • Prednisone Other (See Comments)     Made him anxious and jittery     • Cortisone Nausea And Vomiting       Scheduled meds:   amiodarone, 200 mg, Oral, Q24H  aspirin, 81 mg, Oral, Daily  cholecalciferol, 2,000 Units, Oral, Daily  cilostazol, 50 mg, Oral, BID AC  collagenase, 1 application, Topical, Q12H  dilTIAZem CD, 120 mg, Oral, Daily  ferrous sulfate, 325 mg, Oral, Daily With Breakfast  finasteride, 5 mg, Oral, Daily  folic acid, 1 mg, Oral, Daily  gabapentin, 300 mg, Oral, Nightly  lisinopril, 40 mg, Oral, Daily  multivitamin with minerals, 1 tablet, Oral, Daily  pantoprazole, 20 mg, Oral, Daily  sodium chloride, 10 mL, Intravenous, Q12H  sodium hypochlorite, , Topical, Q12H  vancomycin, 250 mg, Oral, Q6H        PRN meds:  •  acetaminophen **OR** acetaminophen **OR** acetaminophen  •  HYDROcodone-acetaminophen  •  hydrOXYzine  •  LORazepam **OR** LORazepam **OR** LORazepam **OR** LORazepam **OR** LORazepam **OR** LORazepam  •  ondansetron **OR** ondansetron  •  ondansetron ODT  •  [COMPLETED] Insert peripheral IV **AND** sodium chloride  •  sodium chloride    Assessment/Plan       Acute pulmonary embolism (HCC)    Alcohol abuse    Atrial fibrillation status post cardioversion (HCC)     Tobacco use    AVM (arteriovenous malformation) of colon    Hypokalemia    Diarrhea      Plan:  DVT/PE.  Consult vascular for IVC filter.  Unable to tolerate anticoagulation.  History of GI bleed x2 on 2 different anticoagulation.  Patient stated he does not want to be on any anticoagulation.  Patient just wanted be on baby aspirin a day.  Bleeding risks versus embolus has been discussed with patient.  Incentive spirometer.  Chest x-ray-No active disease is seen.  Doppler ultrasound lower extremity-evidence of deep venous thrombosis in both lower  Extremities.  CTA of the chest-Left lower lobe pulmonary embolus, Central pulmonary arteries are  Nondilated, No evidence of right heart strain, Lung emphysema,  Advanced coronary atheromatous calcification.  Status post IVC filter 1/31/2022  Patient is currently on room air.     Diarrhea, C Diff positive.  Continue PO Vanco.     Atrial fibrillation/hypertension.  Cardizem.  Amiodarone.  Cardizem CD . lisinopril.  Continue aspirin.  Patient refused to be on anticoagulation due to history of recurrent bleeding.  Echocardiogram-fraction 56-60%, mild aortic regurgitation     Hypokalemia .  Resolved.. magnesium level-normal.     History of AV malformation in the colon.     Peripheral vascular disease.  Plental.  Continue aspirin.     Previous GI bleed.  Unable to tolerate ACE due to GI bleed.     Alcohol abuse . Ativan as needed.  Ciwa protocol.     Coccyx wound.  Collagenase.  Consult wound care.  Dakin's solution.  Wound care with home health.     Anemia.  Hemoglobin stable.  Iron sulfate.  No sign of acute bleed.     Benign prostate hypertrophy.  Proscar.     Neuropathy.  Neurontin.     Reflux.  Protonix.  Zofran as needed.     Chronic pain.  Norco as needed.     Anxiety/depression.  Added back as needed.     C. difficile positive.  Urine culture-mixed maria alejandra.  GI panel-negative.  Covid-19-negative.      Nutrition . vitamin D.  Folate.  Multivitamins.  Regular  diet.     Deconditioning.  PT and OT consult.     Discharge Planning:  Pending rehab placement.    Possible [facility].    Electronically signed by Jevon Peralta MD, 02/02/22, 2:43 PM CST.

## 2022-02-02 NOTE — THERAPY TREATMENT NOTE
Acute Care - Occupational Therapy Treatment Note  [facility]     Patient Name: Kingsley Lerma  : 1945  MRN: [id number]  Today's Date: 2022             Admit Date: 2022       ICD-10-CM ICD-9-CM   1. Pressure injury of left buttock, unstageable ([facility])  L89.320 707.05     707.25   2. Hypokalemia  E87.6 276.8   3. Diarrhea, unspecified type  R19.7 787.91   4. Generalized weakness  R53.1 780.79   5. Acute pulmonary embolism without acute cor pulmonale, unspecified pulmonary embolism type ([facility])  I26.99 415.19   6. Acute deep vein thrombosis (DVT) of femoral vein of both lower extremities ([facility])  I82.413 453.41   7. Impaired mobility  Z74.09 799.89   8. Decreased activities of daily living (ADL)  Z78.9 V49.89     Patient Active Problem List   Diagnosis   • Essential hypertension   • PVD (peripheral vascular disease) ([facility])   • Atrial fibrillation and flutter ([facility])   • Alcohol abuse   • Non healing left heel wound   • Atrial fibrillation status post cardioversion ([facility])   • Tobacco use   • Syncope   • GI bleed   • Rectal bleeding   • Heme + stool   • Acute blood loss anemia   • Gastrointestinal hemorrhage   • Osteoarthritis of right hip   • AVM (arteriovenous malformation) of colon   • BPH without obstruction/lower urinary tract symptoms   • Abnormal CT scan, bladder   • Hypokalemia   • Diarrhea   • Acute pulmonary embolism ([facility])     Past Medical History:   Diagnosis Date   • Alcohol abuse    • Arthritis    • Atrial fibrillation ([facility])    • Atrial flutter ([facility])    • BPH (benign prostatic hyperplasia)    • Circulation problem    • History of tobacco abuse    • Hyperlipidemia    • Hypertension    • PVD (peripheral vascular disease) ([facility])      Past Surgical History:   Procedure Laterality Date   • ADENOIDECTOMY     • AORTIC VALVE REPAIR/REPLACEMENT     • APPENDECTOMY     • COLONOSCOPY N/A 2020    Multiple non-bleeding colonic angiodysplastic lesions; Non-bleeding internal hemorrhoids; The examination was  "otherwise normal; No specimens collected; Repeat 10 years   • ENDOSCOPY N/A 6/8/2020    Normal esophagus; Normal stomach; Normal examined duodenum; No specimens collected   • ENDOSCOPY N/A 6/15/2020    Dr. Banuelos-Normal examined duodenum; Normal stomach; Normal esophagus; No specimens collected   • VEIN SURGERY     • VENA CAVA FILTER INSERTION N/A 1/31/2022    Procedure: VENA CAVA FILTER INSERTION;  Surgeon: Luis Enrique Donaldson DO;  Location: [facility] OR ;  Service: Vascular;  Laterality: N/A;         OT ASSESSMENT FLOWSHEET (last 12 hours)     OT Evaluation and Treatment     Row Name 02/02/22 0914                   OT Time and Intention    Subjective Information complains of; pain; weakness; fatigue  \"anxiety, confusion, uncertainty for whats going to happen (d/c planning\"  -MW        Document Type therapy note (daily note)  -MW        Mode of Treatment occupational therapy  -MW        Comment just finished full bed bath and linen change as OT entered room, fatigued.  -MW                  General Information    Existing Precautions/Restrictions fall  -MW                  Pain Assessment    Additional Documentation Pain Scale: Numbers Pre/Post-Treatment (Group)  -MW                  Pain Scale: Numbers Pre/Post-Treatment    Pretreatment Pain Rating 8/10  -MW        Posttreatment Pain Rating 8/10  -MW        Pain Location - Side Left  -MW        Pain Location foot  -MW        Pain Intervention(s) Medication (See MAR); Repositioned; Ambulation/increased activity  -MW                  Bed Mobility    Bed Mobility scooting/bridging; rolling left; rolling right  -MW        Rolling Left [place] (Bed Mobility) modified independence  -MW        Rolling Right [place] (Bed Mobility) modified independence  -MW        Scooting/Bridging [place] (Bed Mobility) modified independence  -MW                  Motor Skills    Therapeutic Exercise aerobic  -MW                  Aerobic Exercise    Comment, Aerobic " Exercise (Therapeutic Exercise) 10 reps BUE chest press, bicep curl, tricep ext with manual resistance throughout  -MW                  Activities of Daily Living    BADL Assessment/Intervention bathing  -MW                  Bathing Assessment/Intervention    Comment (Bathing) performed full bed bath with staff as OT arriving, able to hold self in all positions mod I with use of bed rail; very fatigued following limited OT txt  -MW                  Wound 01/27/22 2246 coccyx    Wound - Properties Group Placement Date: 01/27/22  -ST Placement Time: 2246  -ST Present on Hospital Admission: Y  -ST Location: coccyx  -ST        Retired Wound - Properties Group Date first assessed: 01/27/22 -ST Time first assessed: 2246 -ST Present on Hospital Admission: Y  -ST Location: coccyx  -ST                  Wound 01/31/22 1529 Right anterior groin Incision    Wound - Properties Group Placement Date: 01/31/22  -AC Placement Time: 1529  -AC Present on Hospital Admission: N  -AC Side: Right  -AC Orientation: anterior  -AC Location: groin  -AC Primary Wound Type: Incision  -AC Additional Comments: 2x2 tegaderm  -AC        Retired Wound - Properties Group Date first assessed: 01/31/22  -AC Time first assessed: 1529  -AC Present on Hospital Admission: N  -AC Side: Right  -AC Location: groin  -AC Primary Wound Type: Incision  -AC Additional Comments: 2x2 tegaderm  -AC                  Plan of Care Review    Plan of Care Reviewed With patient  -MW        Progress no change  -MW        Outcome Summary OT txt completed. Pt just finishing full bed bath with staff upon OT entering, able to hold self in sidelying during bath and linen changing mod I. Grooming indepedently with set up. Ther ex BUE x10 reps with manual resistance provided. Pt deferred further activity to rest from fatigue of bed bath. Cont OT POC. Recommend d/c SNF.  -MW                  Positioning and Restraints    Pre-Treatment Position in bed  -MW        Post Treatment  Position bed  -MW        In Bed fowlers; call light within reach; encouraged to call for assist; side rails up x2  -                  Therapy Plan Review/Discharge Plan (OT)    Anticipated Discharge Disposition (OT) skilled nursing facility  -              User Key  (r) = Recorded By, (t) = Taken By, (c) = Cosigned By    Initials Name Effective Dates    Fany Valdovinos RN 06/16/21 -      Madelyn Billy RN 06/16/21 -      Jen Bravo, OTR/MEL 08/28/18 -                  Occupational Therapy Education                 Title: PT OT SLP Therapies (In Progress)     Topic: Occupational Therapy (Done)     Point: ADL training (Done)     Description:   Instruct learner(s) on proper safety adaptation and remediation techniques during self care or transfers.   Instruct in proper use of assistive devices.              Learning Progress Summary           Patient Acceptance, E,D, VU,NR by  at 2/1/2022 0904                   Point: Home exercise program (Done)     Description:   Instruct learner(s) on appropriate technique for monitoring, assisting and/or progressing therapeutic exercises/activities.              Learning Progress Summary           Patient Acceptance, E,D, VU,NR by  at 2/1/2022 0904                               User Key     Initials Effective Dates Name Provider Type Discipline     08/28/18 -  Jen Bravo, OTR/L Occupational Therapist OT                  OT Recommendation and Plan  Planned Therapy Interventions (OT): activity tolerance training, BADL retraining, cognitive/visual perception retraining, functional balance retraining, IADL retraining, occupation/activity based interventions, patient/caregiver education/training, strengthening exercise, transfer/mobility retraining  Therapy Frequency (OT): 5 times/wk  Plan of Care Review  Plan of Care Reviewed With: patient  Progress: no change  Outcome Summary: OT txt completed. Pt just finishing full bed bath with staff upon OT  entering, able to hold self in sidelying during bath and linen changing mod I. Grooming indepedently with set up. Ther ex BUE x10 reps with manual resistance provided. Pt deferred further activity to rest from fatigue of bed bath. Cont OT POC. Recommend d/c SNF.  Plan of Care Reviewed With: patient  Outcome Summary: OT txt completed. Pt just finishing full bed bath with staff upon OT entering, able to hold self in sidelying during bath and linen changing mod I. Grooming indepedently with set up. Ther ex BUE x10 reps with manual resistance provided. Pt deferred further activity to rest from fatigue of bed bath. Cont OT POC. Recommend d/c SNF.        Time Calculation:    Time Calculation- OT     Row Name 02/02/22 0956             Time Calculation- OT    OT Start Time 0914  -MW      OT Stop Time 0948  -MW      OT Time Calculation (min) 34 min  -MW      Total Timed Code Minutes- OT 34 minute(s)  -MW      OT Received On 02/02/22  -            User Key  (r) = Recorded By, (t) = Taken By, (c) = Cosigned By    Initials Name Provider Type    MW Jen Bravo OTR/L Occupational Therapist              Therapy Charges for Today     Code Description Service Date Service Provider Modifiers Qty    29749061941 HC OT EVAL MOD COMPLEXITY 4 2/1/2022 Jen Bravo OTR/L GO 1    54618384390 HC OT SELF CARE/MGMT/TRAIN EA 15 MIN 2/2/2022 Jen Bravo OTR/L GO 1    57500329306 HC OT THER PROC EA 15 MIN 2/2/2022 Jen Bravo OTR/L GO 1               Jen Bravo OTR/MEL  2/2/2022

## 2022-02-02 NOTE — PLAN OF CARE
Goal Outcome Evaluation:  Plan of Care Reviewed With: patient        Progress: no change  Outcome Summary: OT txt completed. Pt just finishing full bed bath with staff upon OT entering, able to hold self in sidelying during bath and linen changing mod I. Grooming indepedently with set up. Ther ex BUE x10 reps with manual resistance provided. Pt deferred further activity to rest from fatigue of bed bath. Cont OT POC. Recommend d/c SNF.

## 2022-02-02 NOTE — PLAN OF CARE
Goal Outcome Evaluation:               Patient resting fair throughout the night.  Room air.  Continued loose/mucoid stools.  Drsg CDI to coccyx and R groin.  ASA for VTE.  Encouraged frequent repositioning, but usually refuses.

## 2022-02-02 NOTE — PLAN OF CARE
Problem: Adult Inpatient Plan of Care  Goal: Plan of Care Review  Recent Flowsheet Documentation  Taken 2/2/2022 1415 by Miladis Alcazar, DASHAWN  Progress: no change  Outcome Summary: Pt performed bed mobility with Min A. pt refused sit to stand due to fear of having diarrhea. Pt performed scooting up HOB and in bed with SBA and performed AROM BLE exercise while sitting EOB

## 2022-02-03 LAB
ALBUMIN SERPL-MCNC: 2.2 G/DL (ref 3.5–5.2)
ALBUMIN/GLOB SERPL: 0.8 G/DL
ALP SERPL-CCNC: 120 U/L (ref 39–117)
ALT SERPL W P-5'-P-CCNC: 22 U/L (ref 1–41)
ANION GAP SERPL CALCULATED.3IONS-SCNC: 4 MMOL/L (ref 5–15)
AST SERPL-CCNC: 31 U/L (ref 1–40)
BASOPHILS # BLD AUTO: 0.03 10*3/MM3 (ref 0–0.2)
BASOPHILS NFR BLD AUTO: 0.3 % (ref 0–1.5)
BILIRUB SERPL-MCNC: <0.2 MG/DL (ref 0–1.2)
BUN SERPL-MCNC: 10 MG/DL (ref 8–23)
BUN/CREAT SERPL: 15.4 (ref 7–25)
CALCIUM SPEC-SCNC: 7.7 MG/DL (ref 8.6–10.5)
CHLORIDE SERPL-SCNC: 106 MMOL/L (ref 98–107)
CO2 SERPL-SCNC: 29 MMOL/L (ref 22–29)
CREAT SERPL-MCNC: 0.65 MG/DL (ref 0.76–1.27)
DEPRECATED RDW RBC AUTO: 56.5 FL (ref 37–54)
EOSINOPHIL # BLD AUTO: 0.07 10*3/MM3 (ref 0–0.4)
EOSINOPHIL NFR BLD AUTO: 0.8 % (ref 0.3–6.2)
ERYTHROCYTE [DISTWIDTH] IN BLOOD BY AUTOMATED COUNT: 15.5 % (ref 12.3–15.4)
GFR SERPL CREATININE-BSD FRML MDRD: 120 ML/MIN/1.73
GLOBULIN UR ELPH-MCNC: 2.7 GM/DL
GLUCOSE SERPL-MCNC: 107 MG/DL (ref 65–99)
HCT VFR BLD AUTO: 27.9 % (ref 37.5–51)
HGB BLD-MCNC: 9 G/DL (ref 13–17.7)
IMM GRANULOCYTES # BLD AUTO: 0.06 10*3/MM3 (ref 0–0.05)
IMM GRANULOCYTES NFR BLD AUTO: 0.7 % (ref 0–0.5)
LYMPHOCYTES # BLD AUTO: 1.45 10*3/MM3 (ref 0.7–3.1)
LYMPHOCYTES NFR BLD AUTO: 16.4 % (ref 19.6–45.3)
MCH RBC QN AUTO: 31.9 PG (ref 26.6–33)
MCHC RBC AUTO-ENTMCNC: 32.3 G/DL (ref 31.5–35.7)
MCV RBC AUTO: 98.9 FL (ref 79–97)
MONOCYTES # BLD AUTO: 0.48 10*3/MM3 (ref 0.1–0.9)
MONOCYTES NFR BLD AUTO: 5.4 % (ref 5–12)
NEUTROPHILS NFR BLD AUTO: 6.74 10*3/MM3 (ref 1.7–7)
NEUTROPHILS NFR BLD AUTO: 76.4 % (ref 42.7–76)
NRBC BLD AUTO-RTO: 0 /100 WBC (ref 0–0.2)
PLATELET # BLD AUTO: 323 10*3/MM3 (ref 140–450)
PMV BLD AUTO: 8.7 FL (ref 6–12)
POTASSIUM SERPL-SCNC: 4.3 MMOL/L (ref 3.5–5.2)
PROT SERPL-MCNC: 4.9 G/DL (ref 6–8.5)
RBC # BLD AUTO: 2.82 10*6/MM3 (ref 4.14–5.8)
SODIUM SERPL-SCNC: 139 MMOL/L (ref 136–145)
WBC NRBC COR # BLD: 8.83 10*3/MM3 (ref 3.4–10.8)

## 2022-02-03 PROCEDURE — 97110 THERAPEUTIC EXERCISES: CPT

## 2022-02-03 PROCEDURE — 85025 COMPLETE CBC W/AUTO DIFF WBC: CPT | Performed by: FAMILY MEDICINE

## 2022-02-03 PROCEDURE — 80053 COMPREHEN METABOLIC PANEL: CPT | Performed by: FAMILY MEDICINE

## 2022-02-03 RX ADMIN — DILTIAZEM HYDROCHLORIDE 120 MG: 120 CAPSULE, COATED, EXTENDED RELEASE ORAL at 10:05

## 2022-02-03 RX ADMIN — VANCOMYCIN HYDROCHLORIDE 250 MG: 250 CAPSULE ORAL at 12:37

## 2022-02-03 RX ADMIN — SODIUM CHLORIDE, PRESERVATIVE FREE 10 ML: 5 INJECTION INTRAVENOUS at 20:48

## 2022-02-03 RX ADMIN — VANCOMYCIN HYDROCHLORIDE 250 MG: 250 CAPSULE ORAL at 05:29

## 2022-02-03 RX ADMIN — SODIUM CHLORIDE, PRESERVATIVE FREE 10 ML: 5 INJECTION INTRAVENOUS at 10:07

## 2022-02-03 RX ADMIN — GABAPENTIN 300 MG: 300 CAPSULE ORAL at 20:48

## 2022-02-03 RX ADMIN — LISINOPRIL 40 MG: 20 TABLET ORAL at 10:05

## 2022-02-03 RX ADMIN — PANTOPRAZOLE SODIUM 20 MG: 20 TABLET, DELAYED RELEASE ORAL at 12:37

## 2022-02-03 RX ADMIN — ASPIRIN 81 MG: 81 TABLET, COATED ORAL at 10:05

## 2022-02-03 RX ADMIN — VANCOMYCIN HYDROCHLORIDE 250 MG: 250 CAPSULE ORAL at 17:35

## 2022-02-03 RX ADMIN — COLLAGENASE SANTYL 1 APPLICATION: 250 OINTMENT TOPICAL at 20:48

## 2022-02-03 RX ADMIN — AMIODARONE HYDROCHLORIDE 200 MG: 200 TABLET ORAL at 10:05

## 2022-02-03 RX ADMIN — Medication: at 20:48

## 2022-02-03 RX ADMIN — CILOSTAZOL 50 MG: 100 TABLET ORAL at 17:35

## 2022-02-03 RX ADMIN — VANCOMYCIN HYDROCHLORIDE 250 MG: 250 CAPSULE ORAL at 00:20

## 2022-02-03 RX ADMIN — FINASTERIDE 5 MG: 5 TABLET, FILM COATED ORAL at 10:05

## 2022-02-03 RX ADMIN — Medication 2000 UNITS: at 10:05

## 2022-02-03 RX ADMIN — Medication: at 10:08

## 2022-02-03 RX ADMIN — COLLAGENASE SANTYL 1 APPLICATION: 250 OINTMENT TOPICAL at 10:08

## 2022-02-03 RX ADMIN — Medication 1 TABLET: at 10:05

## 2022-02-03 RX ADMIN — FERROUS SULFATE TAB 325 MG (65 MG ELEMENTAL FE) 325 MG: 325 (65 FE) TAB at 10:05

## 2022-02-03 RX ADMIN — FOLIC ACID 1 MG: 1 TABLET ORAL at 10:05

## 2022-02-03 RX ADMIN — CILOSTAZOL 50 MG: 100 TABLET ORAL at 05:29

## 2022-02-03 RX ADMIN — VANCOMYCIN HYDROCHLORIDE 250 MG: 250 CAPSULE ORAL at 23:14

## 2022-02-03 NOTE — THERAPY TREATMENT NOTE
Acute Care - Physical Therapy Treatment Note  [facility]     Patient Name: Kingsley Lerma  : 1945  MRN: [id number]  Today's Date: 2/3/2022      Visit Dx:     ICD-10-CM ICD-9-CM   1. Pressure injury of left buttock, unstageable ([facility])  L89.320 707.05     707.25   2. Hypokalemia  E87.6 276.8   3. Diarrhea, unspecified type  R19.7 787.91   4. Generalized weakness  R53.1 780.79   5. Acute pulmonary embolism without acute cor pulmonale, unspecified pulmonary embolism type ([facility])  I26.99 415.19   6. Acute deep vein thrombosis (DVT) of femoral vein of both lower extremities ([facility])  I82.413 453.41   7. Impaired mobility  Z74.09 799.89   8. Decreased activities of daily living (ADL)  Z78.9 V49.89     Patient Active Problem List   Diagnosis   • Essential hypertension   • PVD (peripheral vascular disease) ([facility])   • Atrial fibrillation and flutter ([facility])   • Alcohol abuse   • Non healing left heel wound   • Atrial fibrillation status post cardioversion ([facility])   • Tobacco use   • Syncope   • GI bleed   • Rectal bleeding   • Heme + stool   • Acute blood loss anemia   • Gastrointestinal hemorrhage   • Osteoarthritis of right hip   • AVM (arteriovenous malformation) of colon   • BPH without obstruction/lower urinary tract symptoms   • Abnormal CT scan, bladder   • Hypokalemia   • Diarrhea   • Acute pulmonary embolism ([facility])     Past Medical History:   Diagnosis Date   • Alcohol abuse    • Arthritis    • Atrial fibrillation (HCC)    • Atrial flutter (HCC)    • BPH (benign prostatic hyperplasia)    • Circulation problem    • History of tobacco abuse    • Hyperlipidemia    • Hypertension    • PVD (peripheral vascular disease) ([facility])      Past Surgical History:   Procedure Laterality Date   • ADENOIDECTOMY     • AORTIC VALVE REPAIR/REPLACEMENT     • APPENDECTOMY     • COLONOSCOPY N/A 2020    Multiple non-bleeding colonic angiodysplastic lesions; Non-bleeding internal hemorrhoids; The examination was otherwise normal; No  specimens collected; Repeat 10 years   • ENDOSCOPY N/A 6/8/2020    Normal esophagus; Normal stomach; Normal examined duodenum; No specimens collected   • ENDOSCOPY N/A 6/15/2020    Dr. Banuelos-Normal examined duodenum; Normal stomach; Normal esophagus; No specimens collected   • VEIN SURGERY     • VENA CAVA FILTER INSERTION N/A 1/31/2022    Procedure: VENA CAVA FILTER INSERTION;  Surgeon: Luis Enrique Donaldson DO;  Location: [facility] OR ;  Service: Vascular;  Laterality: N/A;     PT Assessment (last 12 hours)     PT Evaluation and Treatment     [facility] Name 02/03/22 1015          Physical Therapy Time and Intention    Subjective Information complains of; pain  -     Document Type therapy note (daily note)  -     Mode of Treatment physical therapy  -[facility] Name 02/03/22 1015          General Information    Existing Precautions/Restrictions fall  -[facility] Name 02/03/22 1015          Pain    Additional Documentation Pain Scale: Numbers Pre/Post-Treatment (Group)  -AH     Row Name 02/03/22 1015          Pain Scale: Numbers Pre/Post-Treatment    Pretreatment Pain Rating 7/10  -     Posttreatment Pain Rating 7/10  -     Pain Location - Side Bilateral  -     Pain Location - Orientation lower  -     Pain Location extremity  -[facility] Name 02/03/22 1015          Pain Scale: Word Pre/Post-Treatment    Pain Intervention(s) Repositioned  -[facility] Name 02/03/22 1015          Bed Mobility    Bed Mobility sit-sidelying  -     Scooting/Bridging [place] (Bed Mobility) standby assist  -     Sidelying-Sit [place] (Bed Mobility) supervision  -     Sit-Sidelying [place] (Bed Mobility) verbal cues; contact guard; minimum assist (75% patient effort)  -     Comment (Bed Mobility) sat EOB 10 minutes, refused to attempt to stand, due to fear of diarrhea  -[facility] Name 02/03/22 1015          Transfers    Sit-Stand [place] (Transfers) --  refused due to fear of having diarrhea  -      Row Name 02/03/22 1015          Aerobic Exercise    Comment, Aerobic Exercise (Therapeutic Exercise) BLE HEP x 20 reps  -AH     Row Name             Wound 01/27/22 2246 coccyx    Wound - Properties Group Placement Date: 01/27/22  -ST Placement Time: 2246  -ST Present on Hospital Admission: Y  -ST Location: coccyx  -ST     Retired Wound - Properties Group Date first assessed: 01/27/22  -ST Time first assessed: 2246  -ST Present on Hospital Admission: Y  -ST Location: coccyx  -ST     Row Name             Wound 01/31/22 1529 Right anterior groin Incision    Wound - Properties Group Placement Date: 01/31/22  -AC Placement Time: 1529  -AC Present on Hospital Admission: N  -AC Side: Right  -AC Orientation: anterior  -AC Location: groin  -AC Primary Wound Type: Incision  -AC Additional Comments: 2x2 tegaderm  -AC     Retired Wound - Properties Group Date first assessed: 01/31/22  -AC Time first assessed: 1529  -AC Present on Hospital Admission: N  -AC Side: Right  -AC Location: groin  -AC Primary Wound Type: Incision  -AC Additional Comments: 2x2 tegaderm  -AC     Row Name 02/03/22 1015          Plan of Care Review    Plan of Care Reviewed With patient  -     Progress no change  -     Outcome Summary pt declined to attempt to stand or get out of bed due to fear of diarrhea, offered to get a brief for pt, he still refused to get up, pt performed BLE HEP x 20 reps, trans to EOB sba, sat EOB 10 minutes performed BLE AROM at EOB, trans back to bed cga-min assist, pt would benefit from [facility]  -     Row Name 02/03/22 1015          Positioning and Restraints    Pre-Treatment Position in bed  -     Post Treatment Position bed  -     In Bed fowlers; call light within reach; encouraged to call for assist; exit alarm on  -           User Key  (r) = Recorded By, (t) = Taken By, (c) = Cosigned By    Initials Name Provider Type    Jessica Belcher PTA Physical Therapy Assistant    Fany Valdovinos, RN Registered Nurse     Madelyn Trent, RN Registered Nurse                Physical Therapy Education                 Title: PT OT SLP Therapies (In Progress)     Topic: Physical Therapy (In Progress)     Point: Mobility training (Done)     Learning Progress Summary           Patient Acceptance, E, VU by  at 2/1/2022 0827    Comment: pt edu on POC, benefits of act and d/c plans                   Point: Home exercise program (Done)     Learning Progress Summary           Patient Acceptance, E,TB,D,H, VU by  at 2/3/2022 1048    Comment: BLE HEP                   Point: Body mechanics (Not Started)     Learner Progress:  Not documented in this visit.          Point: Precautions (Done)     Learning Progress Summary           Patient Acceptance, E, VU by SB at 2/1/2022 0827    Comment: pt edu on POC, benefits of act and d/c plans                               User Key     Initials Effective Dates Name Provider Type Discipline     06/16/21 -  Jessica Nair PTA Physical Therapy Assistant PT     06/16/21 -  Andreia Charles PT DPT Physical Therapist PT              PT Recommendation and Plan     Plan of Care Reviewed With: patient  Progress: no change  Outcome Summary: pt declined to attempt to stand or get out of bed due to fear of diarrhea, offered to get a brief for pt, he still refused to get up, pt performed BLE HEP x 20 reps, trans to EOB sba, sat EOB 10 minutes performed BLE AROM at EOB, trans back to bed cga-min assist, pt would benefit from SNF       Time Calculation:    PT Charges     Row Name 02/03/22 1048             Time Calculation    Start Time 1015  -      Stop Time 1045  -      Time Calculation (min) 30 min  -      PT Received On 02/03/22  -              Time Calculation- PT    Total Timed Code Minutes- PT 30 minute(s)  -              Timed Charges    07149 - PT Therapeutic Exercise Minutes 30  -AH              Total Minutes    Timed Charges Total Minutes 30  -AH       Total Minutes 30  -AH             User Key  (r) = Recorded By, (t) = Taken By, (c) = Cosigned By    Initials Name Provider Type     Jessica Nair PTA Physical Therapy Assistant              Therapy Charges for Today     Code Description Service Date Service Provider Modifiers Qty    [id number] HC PT THER PROC EA 15 MIN 2/3/2022 Jessica Nair PTA GP 2          PT G-Codes  Outcome Measure Options: AM-PAC 6 Clicks Daily Activity (OT)  AM-PAC 6 Clicks Score (PT): 19  AM-PAC 6 Clicks Score (OT): 19    Jessica Nair PTA  2/3/2022

## 2022-02-03 NOTE — PROGRESS NOTES
[facility] Medicine Services  INPATIENT PROGRESS NOTE    Length of Stay: 7  Date of Admission: 1/27/2022  Primary Care Physician: Provider, No Known    Subjective   Chief Complaint:Pulmonary embolus/bilateral DVT/coccyx wound/history of recurrent GI bleed     HPI   Patient still have diarrhea symptoms.  Patient Satric and stronger slowly.  Waiting for rehab placement.  Patient is afebrile.  Blood pressure stable.  Patient having chest pain or shortness of breath at this time.    Review of Systems   Constitutional: Positive for activity change, appetite change and fatigue. Negative for chills and fever.   HENT: Negative for hearing loss, nosebleeds, tinnitus and trouble swallowing.    Eyes: Negative for visual disturbance.   Respiratory: Negative for cough, chest tightness, shortness of breath and wheezing.    Cardiovascular: Negative for chest pain, palpitations and leg swelling.   Gastrointestinal: Negative for abdominal distention, abdominal pain, blood in stool, constipation, diarrhea, nausea and vomiting.   Endocrine: Negative for cold intolerance, heat intolerance, polydipsia, polyphagia and polyuria.   Genitourinary: Negative for decreased urine volume, difficulty urinating, dysuria, flank pain, frequency and hematuria.   Musculoskeletal: Positive for arthralgias, gait problem and myalgias. Negative for joint swelling.   Skin: Negative for rash.   Allergic/Immunologic: Negative for immunocompromised state.   Neurological: Positive for weakness. Negative for dizziness, syncope, light-headedness and headaches.   Hematological: Negative for adenopathy. Does not bruise/bleed easily.   Psychiatric/Behavioral: Negative for confusion and sleep disturbance. The patient is not nervous/anxious.    All pertinent negatives and positives are as above. All other systems have been reviewed and are negative unless otherwise stated.     Objective    Temp:  [98 °F (36.7 °C)-98.6 °F (37 °C)]  98 °F (36.7 °C)  Heart Rate:  [67-78] 76  Resp:  [16-18] 16  BP: (126-136)/(56-72) 126/66    Intake/Output Summary (Last 24 hours) at 2/3/2022 1155  Last data filed at 2/3/2022 0827  Gross per 24 hour   Intake 240 ml   Output 1450 ml   Net -1210 ml     Physical Exam  Vitals and nursing note reviewed.   Constitutional:       Appearance: He is well-developed.      Comments: Advanced age.  Cachectic.  Chronically ill.   HENT:      Head: Normocephalic and atraumatic.   Eyes:      Conjunctiva/sclera: Conjunctivae normal.      Pupils: Pupils are equal, round, and reactive to light.   Neck:      Vascular: No JVD.   Cardiovascular:      Rate and Rhythm: Normal rate and regular rhythm.      Heart sounds: Normal heart sounds. No murmur heard.  No friction rub. No gallop.    Pulmonary:      Effort: Pulmonary effort is normal. No respiratory distress.      Breath sounds: Normal breath sounds. No wheezing or rales.   Chest:      Chest wall: No tenderness.   Abdominal:      General: Bowel sounds are normal. There is no distension.      Palpations: Abdomen is soft.      Tenderness: There is no abdominal tenderness. There is no guarding or rebound.      Comments: Cachectic . BMI is 19.   Musculoskeletal:         General: No tenderness or deformity.      Cervical back: Neck supple.   Skin:     General: Skin is warm and dry.      Capillary Refill: Capillary refill takes 2 to 3 seconds.      Findings: No rash.  Coccyx wound.  Neurological:      Mental Status: He is alert and oriented to person, place, and time.      Cranial Nerves: No cranial nerve deficit.      Motor: Weakness present. No abnormal muscle tone.      Coordination: Coordination abnormal.      Gait: Gait abnormal.      Deep Tendon Reflexes: Reflexes normal.   Psychiatric:         Behavior: Behavior normal.         Thought Content: Thought content normal.   Results Review:  Lab Results (last 24 hours)     Procedure Component Value Units Date/Time    Comprehensive  Metabolic Panel [[id number]]  (Abnormal) Collected: 02/03/22 0527    Specimen: Blood Updated: 02/03/22 0628     Glucose 107 mg/dL      BUN 10 mg/dL      Creatinine 0.65 mg/dL      Sodium 139 mmol/L      Potassium 4.3 mmol/L      Chloride 106 mmol/L      CO2 29.0 mmol/L      Calcium 7.7 mg/dL      Total Protein 4.9 g/dL      Albumin 2.20 g/dL      ALT (SGPT) 22 U/L      AST (SGOT) 31 U/L      Alkaline Phosphatase 120 U/L      Total Bilirubin <0.2 mg/dL      eGFR Non African Amer 120 mL/min/1.73      Globulin 2.7 gm/dL      A/G Ratio 0.8 g/dL      BUN/Creatinine Ratio 15.4     Anion Gap 4.0 mmol/L     Narrative:      GFR Normal >60  Chronic Kidney Disease <60  Kidney Failure <15      CBC & Differential [[id number]]  (Abnormal) Collected: 02/03/22 0527    Specimen: Blood Updated: 02/03/22 0607    Narrative:      The following orders were created for panel order CBC & Differential.  Procedure                               Abnormality         Status                     ---------                               -----------         ------                     CBC Auto Differential[[id number]]        Abnormal            Final result                 Please view results for these tests on the individual orders.    CBC Auto Differential [[id number]]  (Abnormal) Collected: 02/03/22 0527    Specimen: Blood Updated: 02/03/22 0607     WBC 8.83 10*3/mm3      RBC 2.82 10*6/mm3      Hemoglobin 9.0 g/dL      Hematocrit 27.9 %      MCV 98.9 fL      MCH 31.9 pg      MCHC 32.3 g/dL      RDW 15.5 %      RDW-SD 56.5 fl      MPV 8.7 fL      Platelets 323 10*3/mm3      Neutrophil % 76.4 %      Lymphocyte % 16.4 %      Monocyte % 5.4 %      Eosinophil % 0.8 %      Basophil % 0.3 %      Immature Grans % 0.7 %      Neutrophils, Absolute 6.74 10*3/mm3      Lymphocytes, Absolute 1.45 10*3/mm3      Monocytes, Absolute 0.48 10*3/mm3      Eosinophils, Absolute 0.07 10*3/mm3      Basophils, Absolute 0.03 10*3/mm3      Immature Grans, Absolute 0.06 10*3/mm3       nRBC 0.0 /100 WBC            Cultures:  Urine Culture   Date Value Ref Range Status   01/27/2022 <25,000 CFU/mL Mixed Yudi Isolated  Final       Radiology Data:    Imaging Results (Last 24 Hours)     ** No results found for the last 24 hours. **          Allergies   Allergen Reactions   • Prednisone Other (See Comments)     Made him anxious and jittery     • Cortisone Nausea And Vomiting       Scheduled meds:   amiodarone, 200 mg, Oral, Q24H  aspirin, 81 mg, Oral, Daily  cholecalciferol, 2,000 Units, Oral, Daily  cilostazol, 50 mg, Oral, BID AC  collagenase, 1 application, Topical, Q12H  dilTIAZem CD, 120 mg, Oral, Daily  ferrous sulfate, 325 mg, Oral, Daily With Breakfast  finasteride, 5 mg, Oral, Daily  folic acid, 1 mg, Oral, Daily  gabapentin, 300 mg, Oral, Nightly  lisinopril, 40 mg, Oral, Daily  multivitamin with minerals, 1 tablet, Oral, Daily  pantoprazole, 20 mg, Oral, Daily  sodium chloride, 10 mL, Intravenous, Q12H  sodium hypochlorite, , Topical, Q12H  vancomycin, 250 mg, Oral, Q6H        PRN meds:  •  acetaminophen **OR** acetaminophen **OR** acetaminophen  •  HYDROcodone-acetaminophen  •  hydrOXYzine  •  LORazepam **OR** LORazepam **OR** LORazepam **OR** LORazepam **OR** LORazepam **OR** LORazepam  •  ondansetron **OR** ondansetron  •  ondansetron ODT  •  [COMPLETED] Insert peripheral IV **AND** sodium chloride  •  sodium chloride    Assessment/Plan       Acute pulmonary embolism (HCC)    Alcohol abuse    Atrial fibrillation status post cardioversion (HCC)    Tobacco use    AVM (arteriovenous malformation) of colon    Hypokalemia    Diarrhea      Plan:  DVT/PE.  Consult vascular for IVC filter.  Unable to tolerate anticoagulation.  History of GI bleed x2 on 2 different anticoagulation.  Patient stated he does not want to be on any anticoagulation.  Patient just wanted be on baby aspirin a day.  Bleeding risks versus embolus has been discussed with patient.  Incentive spirometer.  Chest x-ray-No  active disease is seen.  Doppler ultrasound lower extremity-evidence of deep venous thrombosis in both lower  Extremities.  CTA of the chest-Left lower lobe pulmonary embolus, Central pulmonary arteries are  Nondilated, No evidence of right heart strain, Lung emphysema,  Advanced coronary atheromatous calcification.  Status post IVC filter 1/31/2022  Patient is currently on room air.     Diarrhea, C Diff positive.  Continue PO Vanco.     Atrial fibrillation/hypertension.  Cardizem.  Amiodarone.  Cardizem CD . lisinopril.  Continue aspirin.  Patient refused to be on anticoagulation due to history of recurrent bleeding.  Echocardiogram-fraction 56-60%, mild aortic regurgitation     Hypokalemia .  Resolved.. magnesium level-normal.     History of AV malformation in the colon.     Peripheral vascular disease.  Plental.  Continue aspirin.     Previous GI bleed.  Unable to tolerate ACE due to GI bleed.     Alcohol abuse . Ativan as needed.  Ciwa protocol.     Coccyx wound.  Collagenase.  Consult wound care.  Dakin's solution.  Wound care with home health.     Anemia.  Hemoglobin stable.  Iron sulfate.  No sign of acute bleed.     Benign prostate hypertrophy.  Proscar.     Neuropathy.  Neurontin.     Reflux.  Protonix.  Zofran as needed.     Chronic pain.  Norco as needed.     Anxiety/depression.  Added back as needed.     C. difficile positive.  Urine culture-mixed maria alejandra.  GI panel-negative.  Covid-19-negative.      Nutrition . vitamin D.  Folate.  Multivitamins.  Regular diet.     Deconditioning.  PT and OT consult.     Discharge Planning:  Pending rehab placement.        Electronically signed by Jevon Peralta MD, 02/03/22, 11:55 AM CST.

## 2022-02-03 NOTE — CASE MANAGEMENT/SOCIAL WORK
Continued Stay Note   [place]     Patient Name: Kingsley Lerma  MRN: [id number]  Today's Date: 2/3/2022    Admit Date: 1/27/2022     Discharge Plan     Row Name 02/03/22 1012       Plan    Plan SNF    Plan Comments Spoke with Tyra from [facility] and they do not have a private room for him currently saying he will need one due to C-diff. Will follow for alternative options. Tyra saying he will not need private room after completion of abx and has formed stool.    Pt saying he prefers not going to another facility, informed of [facility] having open private rooms. Is pt just waiting for room and ready for d/c? Will follow.    1220- Dr. Peralta called saying pt is ready for d/c. Called pt and he did agree for referral being given to [facility], referral given to Katlin there.                Discharge Codes    No documentation.                     SHREYAS Esparza

## 2022-02-03 NOTE — PLAN OF CARE
Goal Outcome Evaluation:           Progress: improving   Pt alert and oriented x4. VSS. No c/o pain. GAMBINO. PPP. Vena cava Filter for VTE. Tolerating reg diet. Dressing CDI. Voiding via urinal and bedpan. Last BM last night. Continues contact isolation for cdiff. Call light within reach. Safety maintained.

## 2022-02-03 NOTE — PLAN OF CARE
Goal Outcome Evaluation:  Plan of Care Reviewed With: patient      Progress: no change  Outcome Summary: Ntn assessment completed. Oral intake 87.5% of four meals per 3 day review. Weight loss of 36 lbs (22%) over 3 months, confirmed by pt. Unstageable wound to coccyx area. Encouraged oral intake, oral supplement intake as tolerated and nutrient dense snacks. Pt may benefit from probiotic due to antibiotic use and recurrent c.diff. Regular diet. Boost Plus added BID. Cont to follow for plan of care.

## 2022-02-03 NOTE — PLAN OF CARE
Goal Outcome Evaluation:         Patient rested well throughout the night with minimal c/o pain.  No bowel movements throughout the night.  Voiding per urinal.  Drsg CDI to coccyx.  ASA for VTE.

## 2022-02-03 NOTE — PLAN OF CARE
Goal Outcome Evaluation:  Plan of Care Reviewed With: patient        Progress: no change  Outcome Summary: pt declined to attempt to stand or get out of bed due to fear of diarrhea, offered to get a brief for pt, he still refused to get up, pt performed BLE HEP x 20 reps, trans to EOB sba, sat EOB 10 minutes performed BLE AROM at EOB, trans back to bed cga-min assist, pt would benefit from SNF

## 2022-02-03 NOTE — PROGRESS NOTES
Malnutrition Severity Assessment    Patient Name:  Kingsley Lerma  YOB: 1945  MRN: [id number]  Admit Date:  1/27/2022    Patient meets criteria for : Severe Malnutrition (Secondary signs: Generalized weakness,dry,flaky skin,non-healing wound)    Malnutrition Severity Assessment  Malnutrition Type: Chronic Disease - Related Malnutrition  Malnutrition Type (last 8 hours)     Malnutrition Severity Assessment     Row Name 02/03/22 1647       Malnutrition Severity Assessment    Malnutrition Type Chronic Disease - Related Malnutrition    Row Name 02/03/22 1647       Insufficient Energy Intake     Insufficient Energy Intake Findings Severe    Insufficient Energy Intake  <75% of est. energy requirement for > or equal to 1 month    Row Name 02/03/22 1647       Unintentional Weight Loss     Unintentional Weight Loss Findings Severe    Unintentional Weight Loss  Weight loss greater than 7.5% in three months  36 lbs (22%) over 3 months    Row Name 02/03/22 1647       Criteria Met (Must meet criteria for severity in at least 2 of these categories: M Wasting, Fat Loss, Fluid, Secondary Signs, Wt. Status, Intake)    Patient meets criteria for  Severe Malnutrition  Secondary signs: Generalized weakness,dry,flaky skin,non-healing wound              Electronically signed by:  Susanne Helms MS,RDN,LD  02/03/22 16:55 CST

## 2022-02-04 VITALS
RESPIRATION RATE: 18 BRPM | OXYGEN SATURATION: 97 % | DIASTOLIC BLOOD PRESSURE: 74 MMHG | WEIGHT: 126.2 LBS | SYSTOLIC BLOOD PRESSURE: 128 MMHG | HEART RATE: 67 BPM | TEMPERATURE: 98 F | HEIGHT: 67 IN | BODY MASS INDEX: 19.81 KG/M2

## 2022-02-04 PROBLEM — E43 SEVERE MALNUTRITION (HCC): Status: ACTIVE | Noted: 2022-02-04

## 2022-02-04 LAB
ANION GAP SERPL CALCULATED.3IONS-SCNC: 4 MMOL/L (ref 5–15)
BUN SERPL-MCNC: 11 MG/DL (ref 8–23)
BUN/CREAT SERPL: 16.9 (ref 7–25)
CALCIUM SPEC-SCNC: 8.5 MG/DL (ref 8.6–10.5)
CHLORIDE SERPL-SCNC: 104 MMOL/L (ref 98–107)
CO2 SERPL-SCNC: 31 MMOL/L (ref 22–29)
CREAT SERPL-MCNC: 0.65 MG/DL (ref 0.76–1.27)
DEPRECATED RDW RBC AUTO: 57.7 FL (ref 37–54)
ERYTHROCYTE [DISTWIDTH] IN BLOOD BY AUTOMATED COUNT: 15.8 % (ref 12.3–15.4)
GFR SERPL CREATININE-BSD FRML MDRD: 120 ML/MIN/1.73
GLUCOSE SERPL-MCNC: 120 MG/DL (ref 65–99)
HCT VFR BLD AUTO: 33 % (ref 37.5–51)
HGB BLD-MCNC: 10.3 G/DL (ref 13–17.7)
MCH RBC QN AUTO: 31.1 PG (ref 26.6–33)
MCHC RBC AUTO-ENTMCNC: 31.2 G/DL (ref 31.5–35.7)
MCV RBC AUTO: 99.7 FL (ref 79–97)
PLATELET # BLD AUTO: 379 10*3/MM3 (ref 140–450)
PMV BLD AUTO: 8.2 FL (ref 6–12)
POTASSIUM SERPL-SCNC: 4.6 MMOL/L (ref 3.5–5.2)
RBC # BLD AUTO: 3.31 10*6/MM3 (ref 4.14–5.8)
SARS-COV-2 RNA PNL SPEC NAA+PROBE: NOT DETECTED
SODIUM SERPL-SCNC: 139 MMOL/L (ref 136–145)
WBC NRBC COR # BLD: 7.91 10*3/MM3 (ref 3.4–10.8)

## 2022-02-04 PROCEDURE — 80048 BASIC METABOLIC PNL TOTAL CA: CPT | Performed by: FAMILY MEDICINE

## 2022-02-04 PROCEDURE — 87635 SARS-COV-2 COVID-19 AMP PRB: CPT | Performed by: FAMILY MEDICINE

## 2022-02-04 PROCEDURE — 85027 COMPLETE CBC AUTOMATED: CPT | Performed by: FAMILY MEDICINE

## 2022-02-04 RX ORDER — GABAPENTIN 300 MG/1
300 CAPSULE ORAL
Qty: 24 CAPSULE | Refills: 0 | Status: SHIPPED | OUTPATIENT
Start: 2022-02-04 | End: 2022-06-16

## 2022-02-04 RX ORDER — VANCOMYCIN HYDROCHLORIDE 250 MG/1
250 CAPSULE ORAL EVERY 6 HOURS SCHEDULED
Qty: 21 CAPSULE | Refills: 0 | Status: SHIPPED | OUTPATIENT
Start: 2022-02-04 | End: 2022-02-10

## 2022-02-04 RX ORDER — SODIUM HYPOCHLORITE 1.25 MG/ML
1 SOLUTION TOPICAL EVERY 12 HOURS SCHEDULED
Start: 2022-02-04 | End: 2022-05-04

## 2022-02-04 RX ADMIN — VANCOMYCIN HYDROCHLORIDE 250 MG: 250 CAPSULE ORAL at 13:55

## 2022-02-04 RX ADMIN — ASPIRIN 81 MG: 81 TABLET, COATED ORAL at 09:21

## 2022-02-04 RX ADMIN — FOLIC ACID 1 MG: 1 TABLET ORAL at 09:21

## 2022-02-04 RX ADMIN — Medication 1 TABLET: at 09:21

## 2022-02-04 RX ADMIN — FINASTERIDE 5 MG: 5 TABLET, FILM COATED ORAL at 09:21

## 2022-02-04 RX ADMIN — LISINOPRIL 40 MG: 20 TABLET ORAL at 09:21

## 2022-02-04 RX ADMIN — Medication: at 09:21

## 2022-02-04 RX ADMIN — FERROUS SULFATE TAB 325 MG (65 MG ELEMENTAL FE) 325 MG: 325 (65 FE) TAB at 09:21

## 2022-02-04 RX ADMIN — COLLAGENASE SANTYL 1 APPLICATION: 250 OINTMENT TOPICAL at 09:21

## 2022-02-04 RX ADMIN — AMIODARONE HYDROCHLORIDE 200 MG: 200 TABLET ORAL at 09:21

## 2022-02-04 RX ADMIN — PANTOPRAZOLE SODIUM 20 MG: 20 TABLET, DELAYED RELEASE ORAL at 13:55

## 2022-02-04 RX ADMIN — VANCOMYCIN HYDROCHLORIDE 250 MG: 250 CAPSULE ORAL at 05:59

## 2022-02-04 RX ADMIN — DILTIAZEM HYDROCHLORIDE 120 MG: 120 CAPSULE, COATED, EXTENDED RELEASE ORAL at 09:21

## 2022-02-04 RX ADMIN — CILOSTAZOL 50 MG: 100 TABLET ORAL at 09:21

## 2022-02-04 RX ADMIN — Medication 2000 UNITS: at 09:21

## 2022-02-04 RX ADMIN — SODIUM CHLORIDE, PRESERVATIVE FREE 10 ML: 5 INJECTION INTRAVENOUS at 09:22

## 2022-02-04 NOTE — CASE MANAGEMENT/SOCIAL WORK
Continued Stay Note   [place]     Patient Name: Kingsley Lerma  MRN: [id number]  Today's Date: 2/4/2022    Admit Date: 1/27/2022     Discharge Plan     Row Name 02/04/22 0931       Plan    Plan SNF    Patient/Family in Agreement with Plan yes    Plan Comments Left message for Tyra from [facility] to see if they have private room available now.  Spoke with Katlin from [facility], they are still reviewing and will call back when decision made.  Informed pt that we need to look at other SNF's, he is saying we can but he does not want to commit to them. Will follow.               Discharge Codes    No documentation.                     SHREYAS Esparza

## 2022-02-04 NOTE — PLAN OF CARE
Goal Outcome Evaluation:           Progress: improving   Pt alert and oriented x4. VSS. No c/o pain. GAMBINO. PPP. Vena cava Filter for VTE. Tolerating reg diet. Wound care complete. Voiding via urinal and bedpan. Last BM today. Formed stool. Continues contact isolation for cdiff. Plans to d/c to snf today. Call light within reach. Safety maintained

## 2022-02-04 NOTE — DISCHARGE SUMMARY
[facility] Medicine Services  DISCHARGE SUMMARY       Date of Admission: 1/27/2022  Date of Discharge:  2/4/2022  Primary Care Physician: Provider, No Known    Presenting Problem/History of Present Illness:  Hypokalemia [E87.6]     Final Discharge Diagnoses:  Active Hospital Problems    Diagnosis    • **Acute pulmonary embolism (HCC)    • Severe malnutrition (CMS/HCC)    • Diarrhea    • Hypokalemia    • AVM (arteriovenous malformation) of colon    • Tobacco use    • Atrial fibrillation status post cardioversion (HCC)    • Alcohol abuse        Consults: Wound care.  Vascular.    Procedures Performed:   Status post IVC placement    Pertinent Test Results:   Results for orders placed during the hospital encounter of 01/27/22    Adult Transthoracic Echo Complete W/ Cont if Necessary Per Protocol    Interpretation Summary  · Left ventricular systolic function is normal. Left ventricular ejection fraction appears to be 56 - 60%.  · Normal right ventricular cavity size and systolic function noted.  · Mild aortic valve regurgitation is present.    .  Imaging Results (All)     Procedure Component Value Units Date/Time    IR IVC Filter Placement [[id number]] Collected: 02/01/22 0647     Updated: 02/01/22 1552    Narrative:      Performed by Dr. Donaldson. Please see procedure note.  This report was finalized on 02/01/2022 15:49 by Dr. Luis Enrique Donaldson MD.    FL C Arm During Surgery [[id number]] Collected: 02/01/22 0647     Updated: 02/01/22 1552    Narrative:      Performed by Dr. Donaldson. Please see procedure note.  This report was finalized on 02/01/2022 15:49 by Dr. Luis Enrique Donaldson MD.    US Venous Doppler Lower Extremity Bilateral (duplex) [[id number]] Collected: 01/28/22 1350     Updated: 01/28/22 1354    Narrative:      History: Swelling       Impression:      Impression: There is evidence of deep venous thrombosis in both lower  extremities.     Comments: Bilateral lower extremity  venous duplex exam was performed  using color Doppler flow, Doppler waveform analysis, and grayscale  imaging, with and without compression. There is evidence of deep venous  thrombosis in the superficial femoral vein in the right lower extremity.  There is also evidence of deep venous thrombosis in the common femoral,  superficial femoral, and popliteal veins in the left lower extremity.        This report was finalized on 01/28/2022 13:51 by Dr. Luis Enrique Donaldson MD.    CT Angiogram Chest [[id number]] Collected: 01/27/22 2042     Updated: 01/27/22 2049    Narrative:      CT ANGIOGRAM CHEST- 1/27/2022 8:13 PM CST      HISTORY: dyspnea, hypoxia, elevated dimer; E87.6-Hypokalemia;  R19.7-Diarrhea, unspecified; R53.1-Weakness      COMPARISON: CT scan dated 2/10/2020.      DOSE LENGTH PRODUCT: 213 mGy cm. Automated exposure control was also  utilized to decrease patient radiation dose.     TECHNIQUE: Helical tomographic images of the chest were obtained after  the administration of intravenous contrast following angiogram protocol.  Additionally, 3D and multiplanar reformatted images were provided.        FINDINGS:    Pulmonary arteries: There is adequate enhancement of the pulmonary  arteries to evaluate for central and segmental pulmonary emboli. Left  lower lobe pulmonary embolus. Central pulmonary arteries are nondilated.        Aorta and great vessels: The aorta is not well opacified but  demonstrates no aneurysmal dilation. Mild calcified plaque in the arch.     Visualized neck base: The imaged portion of the base of the neck appears  unremarkable.      Lungs: Mild lung emphysema. Lungs are otherwise clear. Mild debris in  the left lower lobe airways. No pleural effusion.     Heart: The heart is normal in size. There is no pericardial effusion.  Advanced coronary artery atheromatous calcification.     Mediastinum and lymph nodes: No suspicious hilar or mediastinal  adenopathy..     Skeletal and soft tissues: Chest  wall soft tissues are unremarkable. No  acute bony abnormality. Thoracic spine degenerative change..      Upper abdomen: The imaged portion of the upper abdomen demonstrates no  acute process.        Impression:      1. Left lower lobe pulmonary embolus. Central pulmonary arteries are  nondilated. No evidence of right heart strain.  2. Lung emphysema.  3. Advanced coronary atheromatous calcification.  This report was finalized on 01/27/2022 20:46 by Dr Adi Ivan, .    XR Chest 1 View [[id number]] Collected: 01/27/22 1624     Updated: 01/27/22 1628    Narrative:      EXAMINATION:  XR CHEST 1 VW-  1/27/2022 4:14 PM CST     HISTORY: Hypoxia.     COMPARISON: 5/5/2020.     FINDINGS:  There is no focal infiltrate or effusion. The heart is normal  in size. The thoracic aorta is atheromatous. There are degenerative  changes of the spine and shoulders. No acute bony abnormality is seen.       Impression:      No active disease is seen.        This report was finalized on 01/27/2022 16:25 by Dr. Michael Pond MD.        Chief Complaint on Day of Discharge: none    History of Present Illness on Day of Discharge:   Patient is a 76-year-old white male past medical history of congestive heart failure A. fib  Hypertension GI bleeding from AVMs no longer on Xarelto recurrent C. difficile colitis and alcohol abuse.  He presents after home health sent him over here because they said his O2 sat was in the 70s.  Patient is recently mince found to have diarrhea again  In the ER but unable to give a stool sample.  He does state that the diarrhea has improved and is not nearly as bad as it was a few days ago.  He also is complaining of some shortness of breath intermittently for the last 2 weeks with exertional component.  Denies cough fevers chills.  His O2 sats on room air in the emergency room were normal and still remain normal.  He was evaluated in the ER found to have a potassium of 2.5, with a recheck of 2.4 CRP is elevated at  10.8, once again white count is 18.7 lactic acid is normal procalcitonin is also normal.  Covid was negative patient has been unable to give a stool sample yet.  Chest x-ray was unremarkable for acute findings however CTA of the chest shows a left lower lobe pulmonary embolus without evidence of heart strain, also lung emphysema and advanced coronary artery disease.  Patient is being admitted for a new pulmonary embolus and hypokalemia.       Hospital Course:  The patient is a 76 y.o. male who presented to [facility] with DVT/PE/C. difficile/atrial fib/history of AV malformation/coccyx wound.      DVT/PE.  Consult vascular for IVC filter.  Unable to tolerate anticoagulation.  History of GI bleed x2 on 2 different anticoagulation.  Patient stated he does not want to be on any anticoagulation.  Patient just wanted be on baby aspirin a day.  Bleeding risks versus embolus has been discussed with patient.  Incentive spirometer.  Chest x-ray-No active disease is seen.  Doppler ultrasound lower extremity-evidence of deep venous thrombosis in both lower  Extremities.  CTA of the chest-Left lower lobe pulmonary embolus, Central pulmonary arteries are  Nondilated, No evidence of right heart strain, Lung emphysema,  Advanced coronary atheromatous calcification.  Status post IVC filter 1/31/2022  Patient is currently on room air.     Diarrhea, C Diff positive.  Continue PO Vanco.     Atrial fibrillation/hypertension.  Cardizem.  Amiodarone.  Cardizem CD . lisinopril.  Continue aspirin.  Patient refused to be on anticoagulation due to history of recurrent bleeding.  Echocardiogram-fraction 56-60%, mild aortic regurgitation     Hypokalemia .  Resolved.. magnesium level-normal.     History of AV malformation in the colon.     Peripheral vascular disease.  Plental.  Continue aspirin.     Previous GI bleed.  Unable to tolerate ACE due to GI bleed.     Alcohol abuse . Ativan as needed.  Ciwa protocol.     Coccyx wound.  " Collagenase.  Consult wound care.  Dakin's solution.  Wound care with home health.     Anemia.  Hemoglobin stable.  Iron sulfate.  No sign of acute bleed.     Benign prostate hypertrophy.  Proscar.     Neuropathy.  Neurontin.     Reflux.  Protonix.  Zofran as needed.     Chronic pain.  Norco as needed.     Anxiety/depression.  Added back as needed.     C. difficile positive.  Urine culture-mixed maria alejandra.  GI panel-negative.  Covid-19-negative.      Nutrition . vitamin D.  Folate.  Multivitamins.  Regular diet.     Deconditioning.  PT and OT consult.     Vital signs stable, afebrile.  Plan to discharge patient to rehab-[facility].  Follow rehab physician soon as able.    Condition on Discharge: Stable    Physical Exam on Discharge:  /74 (BP Location: Right arm, Patient Position: Lying)   Pulse 67   Temp 98 °F (36.7 °C) (Oral)   Resp 18   Ht 170.2 cm (67\")   Wt 57.2 kg (126 lb 3.2 oz)   SpO2 97%   BMI 19.77 kg/m²   Physical Exam  Vitals and nursing note reviewed.   Constitutional:       Appearance: He is well-developed.      Comments: Advanced age.  Cachectic.  Chronically ill.   HENT:      Head: Normocephalic and atraumatic.   Eyes:      Conjunctiva/sclera: Conjunctivae normal.      Pupils: Pupils are equal, round, and reactive to light.   Neck:      Vascular: No JVD.   Cardiovascular:      Rate and Rhythm: Normal rate and regular rhythm.      Heart sounds: Normal heart sounds. No murmur heard.  No friction rub. No gallop.    Pulmonary:      Effort: Pulmonary effort is normal. No respiratory distress.      Breath sounds: Normal breath sounds. No wheezing or rales.   Chest:      Chest wall: No tenderness.   Abdominal:      General: Bowel sounds are normal. There is no distension.      Palpations: Abdomen is soft.      Tenderness: There is no abdominal tenderness. There is no guarding or rebound.      Comments: Cachectic . BMI is 19.   Musculoskeletal:         General: No tenderness or deformity. "      Cervical back: Neck supple.   Skin:     General: Skin is warm and dry.      Capillary Refill: Capillary refill takes 2 to 3 seconds.      Findings: No rash.  Coccyx wound.  Neurological:      Mental Status: He is alert and oriented to person, place, and time.      Cranial Nerves: No cranial nerve deficit.      Motor: Weakness present. No abnormal muscle tone.      Coordination: Coordination abnormal.      Gait: Gait abnormal.      Deep Tendon Reflexes: Reflexes normal.   Psychiatric:         Behavior: Behavior normal.         Thought Content: Thought content normal.     Discharge Disposition:  Skilled Nursing Facility (DC - External)    Discharge Medications:     Discharge Medications      New Medications      Instructions Start Date   collagenase 250 UNIT/GM ointment   1 application, Topical, Every 12 Hours Scheduled      sodium hypochlorite 0.125 % solution topical solution 0.125%  Commonly known as: DAKIN'S 1/4 STRENGTH   473 mL, Topical, Every 12 Hours Scheduled      vancomycin 250 MG capsule  Commonly known as: VANCOCIN   250 mg, Oral, Every 6 Hours Scheduled         Continue These Medications      Instructions Start Date   amiodarone 200 MG tablet  Commonly known as: PACERONE   200 mg, Oral, Every 24 Hours Scheduled      aspirin 81 MG EC tablet   81 mg, Oral, Daily      cilostazol 50 MG tablet  Commonly known as: PLETAL   50 mg, Oral, 2 Times Daily      dilTIAZem  MG 24 hr capsule  Commonly known as: CARDIZEM CD   120 mg, Oral, Daily      ferrous sulfate 325 (65 FE) MG tablet   325 mg, Oral, Daily With Breakfast      finasteride 5 MG tablet  Commonly known as: PROSCAR   5 mg, Oral, Daily      folic acid 1 MG tablet  Commonly known as: FOLVITE   1 mg, Oral, Daily      gabapentin 300 MG capsule  Commonly known as: NEURONTIN   300 mg, Oral, Every Night at Bedtime      LACTOBACILLUS PO   1 tablet, Oral, Daily      lisinopril 40 MG tablet  Commonly known as: PRINIVIL,ZESTRIL   40 mg, Oral, Daily       Multivitamin Adult tablet tablet   1 tablet, Oral, Daily      ondansetron ODT 4 MG disintegrating tablet  Commonly known as: ZOFRAN-ODT   4 mg, Translingual, Every 6 Hours PRN      pantoprazole 20 MG EC tablet  Commonly known as: PROTONIX   20 mg, Oral, Daily      Vitamin D 50 MCG (2000 UT) tablet   2,000 Units, Oral, Daily         Stop These Medications    fidaxomicin 200 MG tablet  Commonly known as: DIFICID     sildenafil 100 MG tablet  Commonly known as: VIAGRA            Discharge Diet:   Diet Instructions     Advance Diet As Tolerated            Activity at Discharge:   Activity Instructions     Activity as Tolerated            Discharge Care Plan/Instructions: Discharge to [facility].  Continue wound care at rehab.    Follow-up Appointments:   Future Appointments   Date Time Provider Department Center   5/4/2022  3:15 PM Nolan Farias MD [facility] CD PAD PAD   Follow-up with nursing home physician as soon as able.    Electronically signed by Jevon Peralta MD, 02/04/22, 10:33 CST.    Time: Greater than 30 minutes.

## 2022-02-04 NOTE — CASE MANAGEMENT/SOCIAL WORK
Continued Stay Note  [facility]     Patient Name: Kingsley Lerma  MRN: [id number]  Today's Date: 2/4/2022    Admit Date: 1/27/2022     Discharge Plan     Row Name 02/04/22 0959       Plan    Plan [facility]    Patient/Family in Agreement with Plan yes    Final Discharge Disposition Code 03 - skilled nursing facility (SNF)    Final Note Pt accepted and will be discharged to [facility] today( per Katlin there [phone]), will need negative Covid test prior to leaving.  Pt will be admitted at SNF level care. Pt is calling family to try to find someone to transport. Dr. Peralta aware of facility cutoff time due to meds.    [facility] [phone]  Fax: [phone] or [phone]  Katlin’s Cell [phone]  [facility] Cell [phone]      Row Name 02/04/22 0931       Plan    Plan SNF    Patient/Family in Agreement with Plan yes    Plan Comments Left message for Tyra from [facility] to see if they had private room become available.  Spoke with Katlin from [facility] and they are still reviewing and she will call back when decision made.  Informed pt that we need to look at other SNF's, he is saying we can but he does not want to commit to them. Will follow.               Discharge Codes    No documentation.                     SHREYAS Esparza

## 2022-02-04 NOTE — THERAPY DISCHARGE NOTE
Acute Care - Physical Therapy Discharge Summary  [facility]       Patient Name: Kingsley Lerma  : 1945  MRN: [id number]    Today's Date: 2022                 Admit Date: 2022      PT Recommendation and Plan    Visit Dx:    ICD-10-CM ICD-9-CM   1. Pressure injury of left buttock, unstageable ([facility])  L89.320 707.05     707.25   2. Hypokalemia  E87.6 276.8   3. Diarrhea, unspecified type  R19.7 787.91   4. Generalized weakness  R53.1 780.79   5. Acute pulmonary embolism without acute cor pulmonale, unspecified pulmonary embolism type ([facility])  I26.99 415.19   6. Acute deep vein thrombosis (DVT) of femoral vein of both lower extremities ([facility])  I82.413 453.41   7. Impaired mobility  Z74.09 799.89   8. Decreased activities of daily living (ADL)  Z78.9 V49.89   9. Acute pulmonary embolism, unspecified pulmonary embolism type, unspecified whether acute cor pulmonale present ([facility])  I26.99 415.19   10. Diarrhea of infectious origin  A09 009.2   11. Abnormal CT scan, bladder  R93.41 793.5   12. BPH without obstruction/lower urinary tract symptoms  N40.0 600.00   13. AVM (arteriovenous malformation) of colon  K55.20 569.84   14. Osteoarthritis of right hip, unspecified osteoarthritis type  M16.11 715.95   15. Gastrointestinal hemorrhage, unspecified gastrointestinal hemorrhage type  K92.2 578.9   16. Acute blood loss anemia  D62 285.1   17. Heme + stool  R19.5 792.1   18. Rectal bleeding  K62.5 569.3   19. Gastrointestinal hemorrhage with melena  K92.1 578.1   20. Syncope, unspecified syncope type  R55 780.2   21. Tobacco use  Z72.0 305.1   22. Atrial fibrillation status post cardioversion ([facility])  I48.91 427.31   23. Non healing left heel wound  S91.302A 892.1   24. Alcohol abuse  F10.10 305.00   25. Atrial fibrillation and flutter ([facility])  I48.91 427.31    I48.92 427.32   26. PVD (peripheral vascular disease) (HCC)  I73.9 443.9   27. Essential hypertension  I10 401.9                PT Rehab Goals     Row Name  02/04/22 1508             Bed Mobility Goal 1 (PT)    Activity/Assistive Device (Bed Mobility Goal 1, PT) bed mobility activities, all  -AH      Trimble Level/Cues Needed (Bed Mobility Goal 1, PT) independent  -AH      Time Frame (Bed Mobility Goal 1, PT) long term goal (LTG)  -AH      Progress/Outcomes (Bed Mobility Goal 1, PT) goal not met  -AH              Transfer Goal 1 (PT)    Activity/Assistive Device (Transfer Goal 1, PT) sit-to-stand/stand-to-sit; bed-to-chair/chair-to-bed; walker, rolling  -      Trimble Level/Cues Needed (Transfer Goal 1, PT) modified independence  -AH      Time Frame (Transfer Goal 1, PT) long term goal (LTG)  -AH      Progress/Outcome (Transfer Goal 1, PT) goal not met  -AH              Gait Training Goal 1 (PT)    Activity/Assistive Device (Gait Training Goal 1, PT) gait (walking locomotion); increase endurance/gait distance; diminish gait deviation; improve balance and speed; decrease fall risk; walker, rolling; normalize weight shifts  -AH      [place]ble Level (Gait Training Goal 1, PT) supervision required  -AH      Distance (Gait Training Goal 1, PT) 150  -AH      Time Frame (Gait Training Goal 1, PT) long term goal (LTG)  -AH      Progress/Outcome (Gait Training Goal 1, PT) goal not met  -            User Key  (r) = Recorded By, (t) = Taken By, (c) = Cosigned By    Initials Name Provider Type Discipline     Jessica Nair PTA Physical Therapy Assistant PT                Therapy Charges for Today     Code Description Service Date Service Provider Modifiers Qty    [id number]  PT THER PROC EA 15 MIN 2/3/2022 Jessica Nair PTA GP 2          PT Discharge Summary  Reason for Discharge: Discharge from facility  Outcomes Achieved: Refer to plan of care for updates on goals achieved  Discharge Destination: SNF      Jessica Nair PTA   2/4/2022

## 2022-02-05 NOTE — PAYOR COMM NOTE
"DC TO SNF 2-4-22  [id number]    Kingsley Servin (76 y.o. Male)             Date of Birth Social Security Number Address Home Phone MRN    1945  [address]  [place] [address] [phone] [id number]    Alevism Marital Status             Mu-ism Single       Admission Date Admission Type Admitting Provider Attending Provider Department, Room/Bed    1/27/22 Emergency Jevon Peralta MD  [facility] 3A, 358/1    Discharge Date Discharge Disposition Discharge Destination          2/4/2022 Skilled Nursing Facility (DC - External)              Attending Provider: (none)   Allergies: Prednisone, Cortisone    Isolation: None   Infection: C.difficile (01/30/22)   Code Status: Prior   Advance Care Planning Activity    Ht: 170.2 cm (67\")   Wt: 57.2 kg (126 lb 3.2 oz)    Admission Cmt: None   Principal Problem: Acute pulmonary embolism (HCC) [I26.99]                 Active Insurance as of 1/27/2022     Primary Coverage     Payor Plan Insurance Group Employer/Plan Group    [employer] [place] [id number]      Payor Plan Address Payor Plan Phone Number Payor Plan Fax Number Effective Dates    [facility] OFFICE OF COMMUNITY CARE [phone]  1/17/2022 - None Entered    [address] [address]       [facility] [address]       Subscriber Name Subscriber Birth Date Member ID       KINGSLEY SERVIN 1945 [id number]                 Emergency Contacts      (Rel.) Home Phone Work Phone Mobile Phone    LUIS DANIEL SERVIN (Son) -- -- [phone]    boone tee (Daughter) -- -- [phone]    PERKINS,APRIL (Daughter) -- -- [phone]    FlorentinAnaya (Daughter) -- -- [phone]    Stacy servin (Daughter) -- -- [phone]               Discharge Summary      Jevon Peralta MD at 02/04/22 [address] [place] [address] Medicine Services  DISCHARGE SUMMARY       Date of Admission: 1/27/2022  Date of Discharge:  2/4/2022  Primary Care Physician: Provider, No " Known    Presenting Problem/History of Present Illness:  Hypokalemia [E87.6]     Final Discharge Diagnoses:  Active Hospital Problems    Diagnosis    • **Acute pulmonary embolism (HCC)    • Severe malnutrition (CMS/HCC)    • Diarrhea    • Hypokalemia    • AVM (arteriovenous malformation) of colon    • Tobacco use    • Atrial fibrillation status post cardioversion (HCC)    • Alcohol abuse        Consults: Wound care.  Vascular.    Procedures Performed:   Status post IVC placement    Pertinent Test Results:   Results for orders placed during the hospital encounter of 01/27/22    Adult Transthoracic Echo Complete W/ Cont if Necessary Per Protocol    Interpretation Summary  · Left ventricular systolic function is normal. Left ventricular ejection fraction appears to be 56 - 60%.  · Normal right ventricular cavity size and systolic function noted.  · Mild aortic valve regurgitation is present.    .  Imaging Results (All)     Procedure Component Value Units Date/Time    IR IVC Filter Placement [[id number]] Collected: 02/01/22 0647     Updated: 02/01/22 1552    Narrative:      Performed by Dr. Donaldson. Please see procedure note.  This report was finalized on 02/01/2022 15:49 by Dr. Luis Enrique Donaldson MD.    FL C Arm During Surgery [[id number]] Collected: 02/01/22 0647     Updated: 02/01/22 1552    Narrative:      Performed by Dr. Donaldson. Please see procedure note.  This report was finalized on 02/01/2022 15:49 by Dr. Luis Enrique Donaldson MD.    US Venous Doppler Lower Extremity Bilateral (duplex) [[id number]] Collected: 01/28/22 1350     Updated: 01/28/22 1354    Narrative:      History: Swelling       Impression:      Impression: There is evidence of deep venous thrombosis in both lower  extremities.     Comments: Bilateral lower extremity venous duplex exam was performed  using color Doppler flow, Doppler waveform analysis, and grayscale  imaging, with and without compression. There is evidence of deep venous  thrombosis in  the superficial femoral vein in the right lower extremity.  There is also evidence of deep venous thrombosis in the common femoral,  superficial femoral, and popliteal veins in the left lower extremity.        This report was finalized on 01/28/2022 13:51 by Dr. Luis Enrique Donaldson MD.    CT Angiogram Chest [[id number]] Collected: 01/27/22 2042     Updated: 01/27/22 2049    Narrative:      CT ANGIOGRAM CHEST- 1/27/2022 8:13 PM CST      HISTORY: dyspnea, hypoxia, elevated dimer; E87.6-Hypokalemia;  R19.7-Diarrhea, unspecified; R53.1-Weakness      COMPARISON: CT scan dated 2/10/2020.      DOSE LENGTH PRODUCT: 213 mGy cm. Automated exposure control was also  utilized to decrease patient radiation dose.     TECHNIQUE: Helical tomographic images of the chest were obtained after  the administration of intravenous contrast following angiogram protocol.  Additionally, 3D and multiplanar reformatted images were provided.        FINDINGS:    Pulmonary arteries: There is adequate enhancement of the pulmonary  arteries to evaluate for central and segmental pulmonary emboli. Left  lower lobe pulmonary embolus. Central pulmonary arteries are nondilated.        Aorta and great vessels: The aorta is not well opacified but  demonstrates no aneurysmal dilation. Mild calcified plaque in the arch.     Visualized neck base: The imaged portion of the base of the neck appears  unremarkable.      Lungs: Mild lung emphysema. Lungs are otherwise clear. Mild debris in  the left lower lobe airways. No pleural effusion.     Heart: The heart is normal in size. There is no pericardial effusion.  Advanced coronary artery atheromatous calcification.     Mediastinum and lymph nodes: No suspicious hilar or mediastinal  adenopathy..     Skeletal and soft tissues: Chest wall soft tissues are unremarkable. No  acute bony abnormality. Thoracic spine degenerative change..      Upper abdomen: The imaged portion of the upper abdomen demonstrates no  acute  process.        Impression:      1. Left lower lobe pulmonary embolus. Central pulmonary arteries are  nondilated. No evidence of right heart strain.  2. Lung emphysema.  3. Advanced coronary atheromatous calcification.  This report was finalized on 01/27/2022 20:46 by Dr Adi Ivan, .    XR Chest 1 View [[id number]] Collected: 01/27/22 1624     Updated: 01/27/22 1628    Narrative:      EXAMINATION:  XR CHEST 1 VW-  1/27/2022 4:14 PM CST     HISTORY: Hypoxia.     COMPARISON: 5/5/2020.     FINDINGS:  There is no focal infiltrate or effusion. The heart is normal  in size. The thoracic aorta is atheromatous. There are degenerative  changes of the spine and shoulders. No acute bony abnormality is seen.       Impression:      No active disease is seen.        This report was finalized on 01/27/2022 16:25 by Dr. Michael Pond MD.        Chief Complaint on Day of Discharge: none    History of Present Illness on Day of Discharge:   Patient is a 76-year-old white male past medical history of congestive heart failure A. fib  Hypertension GI bleeding from AVMs no longer on Xarelto recurrent C. difficile colitis and alcohol abuse.  He presents after home health sent him over here because they said his O2 sat was in the 70s.  Patient is recently mince found to have diarrhea again  In the ER but unable to give a stool sample.  He does state that the diarrhea has improved and is not nearly as bad as it was a few days ago.  He also is complaining of some shortness of breath intermittently for the last 2 weeks with exertional component.  Denies cough fevers chills.  His O2 sats on room air in the emergency room were normal and still remain normal.  He was evaluated in the ER found to have a potassium of 2.5, with a recheck of 2.4 CRP is elevated at 10.8, once again white count is 18.7 lactic acid is normal procalcitonin is also normal.  Covid was negative patient has been unable to give a stool sample yet.  Chest x-ray was  unremarkable for acute findings however CTA of the chest shows a left lower lobe pulmonary embolus without evidence of heart strain, also lung emphysema and advanced coronary artery disease.  Patient is being admitted for a new pulmonary embolus and hypokalemia.       Hospital Course:  The patient is a 76 y.o. male who presented to [facility] with DVT/PE/C. difficile/atrial fib/history of AV malformation/coccyx wound.      DVT/PE.  Consult vascular for IVC filter.  Unable to tolerate anticoagulation.  History of GI bleed x2 on 2 different anticoagulation.  Patient stated he does not want to be on any anticoagulation.  Patient just wanted be on baby aspirin a day.  Bleeding risks versus embolus has been discussed with patient.  Incentive spirometer.  Chest x-ray-No active disease is seen.  Doppler ultrasound lower extremity-evidence of deep venous thrombosis in both lower  Extremities.  CTA of the chest-Left lower lobe pulmonary embolus, Central pulmonary arteries are  Nondilated, No evidence of right heart strain, Lung emphysema,  Advanced coronary atheromatous calcification.  Status post IVC filter 1/31/2022  Patient is currently on room air.     Diarrhea, C Diff positive.  Continue PO Vanco.     Atrial fibrillation/hypertension.  Cardizem.  Amiodarone.  Cardizem CD . lisinopril.  Continue aspirin.  Patient refused to be on anticoagulation due to history of recurrent bleeding.  Echocardiogram-fraction 56-60%, mild aortic regurgitation     Hypokalemia .  Resolved.. magnesium level-normal.     History of AV malformation in the colon.     Peripheral vascular disease.  Plental.  Continue aspirin.     Previous GI bleed.  Unable to tolerate ACE due to GI bleed.     Alcohol abuse . Ativan as needed.  Ciwa protocol.     Coccyx wound.  Collagenase.  Consult wound care.  Dakin's solution.  Wound care with home health.     Anemia.  Hemoglobin stable.  Iron sulfate.  No sign of acute bleed.     Benign prostate  "hypertrophy.  Proscar.     Neuropathy.  Neurontin.     Reflux.  Protonix.  Zofran as needed.     Chronic pain.  Norco as needed.     Anxiety/depression.  Added back as needed.     C. difficile positive.  Urine culture-mixed maria alejandra.  GI panel-negative.  Covid-19-negative.      Nutrition . vitamin D.  Folate.  Multivitamins.  Regular diet.     Deconditioning.  PT and OT consult.     Vital signs stable, afebrile.  Plan to discharge patient to rehab-[facility].  Follow rehab physician soon as able.    Condition on Discharge: Stable    Physical Exam on Discharge:  /74 (BP Location: Right arm, Patient Position: Lying)   Pulse 67   Temp 98 °F (36.7 °C) (Oral)   Resp 18   Ht 170.2 cm (67\")   Wt 57.2 kg (126 lb 3.2 oz)   SpO2 97%   BMI 19.77 kg/m²   Physical Exam  Vitals and nursing note reviewed.   Constitutional:       Appearance: He is well-developed.      Comments: Advanced age.  Cachectic.  Chronically ill.   HENT:      Head: Normocephalic and atraumatic.   Eyes:      Conjunctiva/sclera: Conjunctivae normal.      Pupils: Pupils are equal, round, and reactive to light.   Neck:      Vascular: No JVD.   Cardiovascular:      Rate and Rhythm: Normal rate and regular rhythm.      Heart sounds: Normal heart sounds. No murmur heard.  No friction rub. No gallop.    Pulmonary:      Effort: Pulmonary effort is normal. No respiratory distress.      Breath sounds: Normal breath sounds. No wheezing or rales.   Chest:      Chest wall: No tenderness.   Abdominal:      General: Bowel sounds are normal. There is no distension.      Palpations: Abdomen is soft.      Tenderness: There is no abdominal tenderness. There is no guarding or rebound.      Comments: Cachectic . BMI is 19.   Musculoskeletal:         General: No tenderness or deformity.      Cervical back: Neck supple.   Skin:     General: Skin is warm and dry.      Capillary Refill: Capillary refill takes 2 to 3 seconds.      Findings: No rash.  Coccyx " wound.  Neurological:      Mental Status: He is alert and oriented to person, place, and time.      Cranial Nerves: No cranial nerve deficit.      Motor: Weakness present. No abnormal muscle tone.      Coordination: Coordination abnormal.      Gait: Gait abnormal.      Deep Tendon Reflexes: Reflexes normal.   Psychiatric:         Behavior: Behavior normal.         Thought Content: Thought content normal.     Discharge Disposition:  Skilled Nursing Facility (DC - External)    Discharge Medications:     Discharge Medications      New Medications      Instructions Start Date   collagenase 250 UNIT/GM ointment   1 application, Topical, Every 12 Hours Scheduled      sodium hypochlorite 0.125 % solution topical solution 0.125%  Commonly known as: DAKIN'S 1/4 STRENGTH   473 mL, Topical, Every 12 Hours Scheduled      vancomycin 250 MG capsule  Commonly known as: VANCOCIN   250 mg, Oral, Every 6 Hours Scheduled         Continue These Medications      Instructions Start Date   amiodarone 200 MG tablet  Commonly known as: PACERONE   200 mg, Oral, Every 24 Hours Scheduled      aspirin 81 MG EC tablet   81 mg, Oral, Daily      cilostazol 50 MG tablet  Commonly known as: PLETAL   50 mg, Oral, 2 Times Daily      dilTIAZem  MG 24 hr capsule  Commonly known as: CARDIZEM CD   120 mg, Oral, Daily      ferrous sulfate 325 (65 FE) MG tablet   325 mg, Oral, Daily With Breakfast      finasteride 5 MG tablet  Commonly known as: PROSCAR   5 mg, Oral, Daily      folic acid 1 MG tablet  Commonly known as: FOLVITE   1 mg, Oral, Daily      gabapentin 300 MG capsule  Commonly known as: NEURONTIN   300 mg, Oral, Every Night at Bedtime      LACTOBACILLUS PO   1 tablet, Oral, Daily      lisinopril 40 MG tablet  Commonly known as: PRINIVIL,ZESTRIL   40 mg, Oral, Daily      Multivitamin Adult tablet tablet   1 tablet, Oral, Daily      ondansetron ODT 4 MG disintegrating tablet  Commonly known as: ZOFRAN-ODT   4 mg, Translingual, Every 6 Hours  PRN      pantoprazole 20 MG EC tablet  Commonly known as: PROTONIX   20 mg, Oral, Daily      Vitamin D 50 MCG (2000 UT) tablet   2,000 Units, Oral, Daily         Stop These Medications    fidaxomicin 200 MG tablet  Commonly known as: DIFICID     sildenafil 100 MG tablet  Commonly known as: VIAGRA            Discharge Diet:   Diet Instructions     Advance Diet As Tolerated            Activity at Discharge:   Activity Instructions     Activity as Tolerated            Discharge Care Plan/Instructions: Discharge to [facility] re[facility].  Continue wound care at rehab.    Follow-up Appointments:   Future Appointments   Date Time Provider Department Center   5/4/2022  3:15 PM Nolan Farias MD [facility] CD PAD PAD   Follow-up with nursing home physician as soon as able.    Electronically signed by Jevon Peralta MD, 02/04/22, 10:33 CST.    Time: Greater than 30 minutes.        Electronically signed by Jevon Peralta MD at 02/04/22 1034

## 2022-02-05 NOTE — THERAPY DISCHARGE NOTE
Acute Care - Occupational Therapy Discharge Summary  [facility]     Patient Name: Kingsley Lerma  : 1945  MRN: [id number]    Today's Date: 2022                 Admit Date: 2022        OT Recommendation and Plan    Visit Dx:    ICD-10-CM ICD-9-CM   1. Pressure injury of left buttock, unstageable ([facility])  L89.320 707.05     707.25   2. Hypokalemia  E87.6 276.8   3. Diarrhea, unspecified type  R19.7 787.91   4. Generalized weakness  R53.1 780.79   5. Acute pulmonary embolism without acute cor pulmonale, unspecified pulmonary embolism type ([facility])  I26.99 415.19   6. Acute deep vein thrombosis (DVT) of femoral vein of both lower extremities ([facility])  I82.413 453.41   7. Impaired mobility  Z74.09 799.89   8. Decreased activities of daily living (ADL)  Z78.9 V49.89   9. Acute pulmonary embolism, unspecified pulmonary embolism type, unspecified whether acute cor pulmonale present ([facility])  I26.99 415.19   10. Diarrhea of infectious origin  A09 009.2   11. Abnormal CT scan, bladder  R93.41 793.5   12. BPH without obstruction/lower urinary tract symptoms  N40.0 600.00   13. AVM (arteriovenous malformation) of colon  K55.20 569.84   14. Osteoarthritis of right hip, unspecified osteoarthritis type  M16.11 715.95   15. Gastrointestinal hemorrhage, unspecified gastrointestinal hemorrhage type  K92.2 578.9   16. Acute blood loss anemia  D62 285.1   17. Heme + stool  R19.5 792.1   18. Rectal bleeding  K62.5 569.3   19. Gastrointestinal hemorrhage with melena  K92.1 578.1   20. Syncope, unspecified syncope type  R55 780.2   21. Tobacco use  Z72.0 305.1   22. Atrial fibrillation status post cardioversion ([facility])  I48.91 427.31   23. Non healing left heel wound  S91.302A 892.1   24. Alcohol abuse  F10.10 305.00   25. Atrial fibrillation and flutter ([facility])  I48.91 427.31    I48.92 427.32   26. PVD (peripheral vascular disease) (HCC)  I73.9 443.9   27. Essential hypertension  I10 401.9                OT Rehab Goals     Row Name  02/05/22 0735             Transfer Goal 1 (OT)    Activity/Assistive Device (Transfer Goal 1, OT) sit-to-stand/stand-to-sit; bed-to-chair/chair-to-bed; toilet; tub; tub bench  -MT      [place] Level/Cues Needed (Transfer Goal 1, OT) standby assist  -MT      Time Frame (Transfer Goal 1, OT) long term goal (LTG); 10 days  -MT      Progress/Outcome (Transfer Goal 1, OT) goal not met  -MT              Bathing Goal 1 (OT)    Activity/Device (Bathing Goal 1, OT) bathing skills, all; shower chair  -MT      [place] Level/Cues Needed (Bathing Goal 1, OT) set-up required; supervision required  -MT      Time Frame (Bathing Goal 1, OT) long term goal (LTG); 10 days  -MT      Progress/Outcomes (Bathing Goal 1, OT) goal not met  -MT              Toileting Goal 1 (OT)    Activity/Device (Toileting Goal 1, OT) toileting skills, all; commode; grab bar/safety frame  -MT      [place] Level/Cues Needed (Toileting Goal 1, OT) standby assist  -MT      Time Frame (Toileting Goal 1, OT) long term goal (LTG); 10 days  -MT      Progress/Outcome (Toileting Goal 1, OT) goal not met  -MT              Problem Specific Goal 1 (OT)    Problem Specific Goal 1 (OT) 5 min dynamic standing task with RW for UE support at SBA demo good safety awareness with no LOB  -MT      Time Frame (Problem Specific Goal 1, OT) long term goal (LTG); 10 days  -MT      Progress/Outcome (Problem Specific Goal 1, OT) goal not met  -MT            User Key  (r) = Recorded By, (t) = Taken By, (c) = Cosigned By    Initials Name Provider Type Discipline    MT Azucena Mcgowan COTA Occupational Therapy Assistant OT                            OT Discharge Summary  Reason for Discharge: Discharge from facility  Outcomes Achieved: Refer to plan of care for updates on goals achieved  Discharge Destination: SNF      BRIANDA Keita  2/5/2022

## 2022-02-09 ENCOUNTER — OFFICE VISIT (OUTPATIENT)
Dept: WOUND CARE | Facility: HOSPITAL | Age: 77
End: 2022-02-09

## 2022-02-09 ENCOUNTER — LAB REQUISITION (OUTPATIENT)
Dept: LAB | Facility: HOSPITAL | Age: 77
End: 2022-02-09

## 2022-02-09 DIAGNOSIS — Z00.00 ENCOUNTER FOR GENERAL ADULT MEDICAL EXAMINATION WITHOUT ABNORMAL FINDINGS: ICD-10-CM

## 2022-02-09 DIAGNOSIS — I10 ESSENTIAL (PRIMARY) HYPERTENSION: ICD-10-CM

## 2022-02-09 DIAGNOSIS — I48.91 ATRIAL FIBRILLATION, UNSPECIFIED TYPE: ICD-10-CM

## 2022-02-09 DIAGNOSIS — L89.153 PRESSURE ULCER OF SACRAL REGION, STAGE 3: ICD-10-CM

## 2022-02-09 DIAGNOSIS — R26.89 OTHER ABNORMALITIES OF GAIT AND MOBILITY: ICD-10-CM

## 2022-02-09 PROCEDURE — 87075 CULTR BACTERIA EXCEPT BLOOD: CPT | Performed by: NURSE PRACTITIONER

## 2022-02-09 PROCEDURE — 87176 TISSUE HOMOGENIZATION CULTR: CPT | Performed by: NURSE PRACTITIONER

## 2022-02-09 PROCEDURE — G0463 HOSPITAL OUTPT CLINIC VISIT: HCPCS

## 2022-02-09 PROCEDURE — 87070 CULTURE OTHR SPECIMN AEROBIC: CPT | Performed by: NURSE PRACTITIONER

## 2022-02-09 PROCEDURE — 87186 SC STD MICRODIL/AGAR DIL: CPT | Performed by: NURSE PRACTITIONER

## 2022-02-09 PROCEDURE — 99214 OFFICE O/P EST MOD 30 MIN: CPT | Performed by: NURSE PRACTITIONER

## 2022-02-09 PROCEDURE — 11042 DBRDMT SUBQ TIS 1ST 20SQCM/<: CPT | Performed by: NURSE PRACTITIONER

## 2022-02-09 PROCEDURE — 87205 SMEAR GRAM STAIN: CPT | Performed by: NURSE PRACTITIONER

## 2022-02-11 ENCOUNTER — APPOINTMENT (OUTPATIENT)
Dept: WOUND CARE | Facility: HOSPITAL | Age: 77
End: 2022-02-11

## 2022-02-12 LAB
BACTERIA SPEC AEROBE CULT: ABNORMAL
BACTERIA SPEC AEROBE CULT: ABNORMAL
GRAM STN SPEC: ABNORMAL

## 2022-02-14 LAB — BACTERIA SPEC ANAEROBE CULT: NORMAL

## 2022-02-16 ENCOUNTER — OFFICE VISIT (OUTPATIENT)
Dept: WOUND CARE | Facility: HOSPITAL | Age: 77
End: 2022-02-16

## 2022-02-16 DIAGNOSIS — I10 ESSENTIAL (PRIMARY) HYPERTENSION: ICD-10-CM

## 2022-02-16 DIAGNOSIS — L89.153 PRESSURE ULCER OF SACRAL REGION, STAGE 3: ICD-10-CM

## 2022-02-16 DIAGNOSIS — R26.89 OTHER ABNORMALITIES OF GAIT AND MOBILITY: ICD-10-CM

## 2022-02-16 DIAGNOSIS — I48.91 ATRIAL FIBRILLATION, UNSPECIFIED TYPE: ICD-10-CM

## 2022-02-16 LAB
QT INTERVAL: 648 MS
QT INTERVAL: 652 MS
QTC INTERVAL: 652 MS
QTC INTERVAL: 668 MS

## 2022-02-16 PROCEDURE — 11042 DBRDMT SUBQ TIS 1ST 20SQCM/<: CPT | Performed by: NURSE PRACTITIONER

## 2022-02-21 ENCOUNTER — LAB REQUISITION (OUTPATIENT)
Dept: LAB | Facility: HOSPITAL | Age: 77
End: 2022-02-21

## 2022-02-21 DIAGNOSIS — Z00.00 ENCOUNTER FOR GENERAL ADULT MEDICAL EXAMINATION WITHOUT ABNORMAL FINDINGS: ICD-10-CM

## 2022-02-21 LAB
ANION GAP SERPL CALCULATED.3IONS-SCNC: 9 MMOL/L (ref 5–15)
BASOPHILS # BLD AUTO: 0.05 10*3/MM3 (ref 0–0.2)
BASOPHILS NFR BLD AUTO: 0.8 % (ref 0–1.5)
BUN SERPL-MCNC: 14 MG/DL (ref 8–23)
BUN/CREAT SERPL: 25 (ref 7–25)
CALCIUM SPEC-SCNC: 8.9 MG/DL (ref 8.6–10.5)
CHLORIDE SERPL-SCNC: 104 MMOL/L (ref 98–107)
CO2 SERPL-SCNC: 27 MMOL/L (ref 22–29)
CREAT SERPL-MCNC: 0.56 MG/DL (ref 0.76–1.27)
DEPRECATED RDW RBC AUTO: 63 FL (ref 37–54)
EOSINOPHIL # BLD AUTO: 0.08 10*3/MM3 (ref 0–0.4)
EOSINOPHIL NFR BLD AUTO: 1.2 % (ref 0.3–6.2)
ERYTHROCYTE [DISTWIDTH] IN BLOOD BY AUTOMATED COUNT: 16.4 % (ref 12.3–15.4)
GFR SERPL CREATININE-BSD FRML MDRD: 143 ML/MIN/1.73
GLUCOSE SERPL-MCNC: 76 MG/DL (ref 65–99)
HCT VFR BLD AUTO: 32.4 % (ref 37.5–51)
HGB BLD-MCNC: 10.4 G/DL (ref 13–17.7)
IMM GRANULOCYTES # BLD AUTO: 0.04 10*3/MM3 (ref 0–0.05)
IMM GRANULOCYTES NFR BLD AUTO: 0.6 % (ref 0–0.5)
LYMPHOCYTES # BLD AUTO: 1.25 10*3/MM3 (ref 0.7–3.1)
LYMPHOCYTES NFR BLD AUTO: 19.5 % (ref 19.6–45.3)
MCH RBC QN AUTO: 33.5 PG (ref 26.6–33)
MCHC RBC AUTO-ENTMCNC: 32.1 G/DL (ref 31.5–35.7)
MCV RBC AUTO: 104.5 FL (ref 79–97)
MONOCYTES # BLD AUTO: 0.57 10*3/MM3 (ref 0.1–0.9)
MONOCYTES NFR BLD AUTO: 8.9 % (ref 5–12)
NEUTROPHILS NFR BLD AUTO: 4.42 10*3/MM3 (ref 1.7–7)
NEUTROPHILS NFR BLD AUTO: 69 % (ref 42.7–76)
NRBC BLD AUTO-RTO: 0 /100 WBC (ref 0–0.2)
PLATELET # BLD AUTO: 318 10*3/MM3 (ref 140–450)
PMV BLD AUTO: 8.9 FL (ref 6–12)
POTASSIUM SERPL-SCNC: 3.8 MMOL/L (ref 3.5–5.2)
RBC # BLD AUTO: 3.1 10*6/MM3 (ref 4.14–5.8)
SODIUM SERPL-SCNC: 140 MMOL/L (ref 136–145)
WBC NRBC COR # BLD: 6.41 10*3/MM3 (ref 3.4–10.8)

## 2022-02-21 PROCEDURE — 80048 BASIC METABOLIC PNL TOTAL CA: CPT

## 2022-02-21 PROCEDURE — 36415 COLL VENOUS BLD VENIPUNCTURE: CPT

## 2022-02-21 PROCEDURE — 85025 COMPLETE CBC W/AUTO DIFF WBC: CPT

## 2022-02-23 ENCOUNTER — OFFICE VISIT (OUTPATIENT)
Dept: WOUND CARE | Facility: HOSPITAL | Age: 77
End: 2022-02-23

## 2022-02-23 DIAGNOSIS — L89.153 PRESSURE ULCER OF SACRAL REGION, STAGE 3: ICD-10-CM

## 2022-02-23 DIAGNOSIS — R26.89 OTHER ABNORMALITIES OF GAIT AND MOBILITY: ICD-10-CM

## 2022-02-23 DIAGNOSIS — I10 ESSENTIAL (PRIMARY) HYPERTENSION: ICD-10-CM

## 2022-02-23 DIAGNOSIS — I48.91 ATRIAL FIBRILLATION, UNSPECIFIED TYPE: ICD-10-CM

## 2022-02-23 PROCEDURE — 11042 DBRDMT SUBQ TIS 1ST 20SQCM/<: CPT | Performed by: NURSE PRACTITIONER

## 2022-02-27 NOTE — THERAPY DISCHARGE NOTE
Acute Care - Occupational Therapy Discharge Summary  [facility]     Patient Name: Kingsley Lerma  : 1945  MRN: [id number]    Today's Date: 2020  Onset of Illness/Injury or Date of Surgery: 20    Date of Referral to OT: 20  Referring Physician: Dr. Katz      Admit Date: 2020        OT Recommendation and Plan    Visit Dx:    ICD-10-CM ICD-9-CM   1. Gastrointestinal hemorrhage, unspecified gastrointestinal hemorrhage type K92.2 578.9   2. Impaired mobility and ADLs Z74.09 V49.89    Z78.9    3. Impaired mobility Z74.09 799.89               Rehab Goal Summary     Row Name 20 0800 20 0733          Bed Mobility Goal 1 (PT)    Activity/Assistive Device (Bed Mobility Goal 1, PT)  --  bed mobility activities, all  -     [place] Level/Cues Needed (Bed Mobility Goal 1, PT)  --  independent  -     Time Frame (Bed Mobility Goal 1, PT)  --  long term goal (LTG)  -     Progress/Outcomes (Bed Mobility Goal 1, PT)  --  goal not met  -        Transfer Goal 1 (PT)    Activity/Assistive Device (Transfer Goal 1, PT)  --  sit-to-stand/stand-to-sit;bed-to-chair/chair-to-bed;walker, rolling  -     [place] Level/Cues Needed (Transfer Goal 1, PT)  --  conditional independence  -     Time Frame (Transfer Goal 1, PT)  --  long term goal (LTG)  -     Progress/Outcome (Transfer Goal 1, PT)  --  goal not met  -        Gait Training Goal 1 (PT)    Activity/Assistive Device (Gait Training Goal 1, PT)  --  gait (walking locomotion);improve balance and speed;increase endurance/gait distance;increase energy conservation;decrease fall risk;diminish gait deviation;decrease asymmetrical patterns;assistive device use;walker, rolling  -     [place] Level (Gait Training Goal 1, PT)  --  supervision required  -     Distance (Gait Goal 1, PT)  --  80  -     Time Frame (Gait Training Goal 1, PT)  --  long term goal (LTG)  -     Progress/Outcome (Gait Training Goal 1, PT)  --  goal not  met  -        Transfer Goal 1 (OT)    Activity/Assistive Device (Transfer Goal 1, OT)  sit-to-stand/stand-to-sit;bed-to-chair/chair-to-bed;toilet;shower chair  -TS  --     [place] Level/Cues Needed (Transfer Goal 1, OT)  contact guard assist  -TS  --     Time Frame (Transfer Goal 1, OT)  long term goal (LTG);by discharge  -TS  --     Progress/Outcome (Transfer Goal 1, OT)  goal not met  -TS  --        Toileting Goal 1 (OT)    Activity/Device (Toileting Goal 1, OT)  toileting skills, all;commode  -TS  --     [place] Level/Cues Needed (Toileting Goal 1, OT)  supervision required  -TS  --     Time Frame (Toileting Goal 1, OT)  long term goal (LTG);by discharge  -TS  --     Progress/Outcome (Toileting Goal 1, OT)  goal not met  -TS  --        Balance Goal 1 (OT)    Activity/Assistive Device (Balance Goal 1, OT)  sitting, dynamic;standing, dynamic  -TS  --     [place] Level/Cues Needed (Balance Goal 1, OT)  contact guard assist  -TS  --     Time Frame (Balance Goal 1, OT)  long term goal (LTG);by discharge  -TS  --     Progress/Outcomes (Balance Goal 1, OT)  goal not met  -TS  --       User Key  (r) = Recorded By, (t) = Taken By, (c) = Cosigned By    Initials Name Provider Type Discipline     Jessica Nair, PTA Physical Therapy Assistant PT    TS Yumiko Black, LAMAR/L Occupational Therapy Assistant OT          Outcome Measures     Row Name 06/18/20 1600 06/17/20 1400          How much help from another is currently needed...    Putting on and taking off regular lower body clothing?  3  -MM  3  -MW     Bathing (including washing, rinsing, and drying)  3  -MM  3  -MW     Toileting (which includes using toilet bed pan or urinal)  3  -MM  3  -MW     Putting on and taking off regular upper body clothing  4  -MM  4  -MW     Taking care of personal grooming (such as brushing teeth)  4  -MM  4  -MW     Eating meals  4  -MM  4  -MW     AM-PAC 6 Clicks Score (OT)  21  -MM  21  -MW        Functional  Assessment    Outcome Measure Options  AM-PAC 6 Clicks Daily Activity (OT)  -MM  AM-PAC 6 Clicks Daily Activity (OT)  -MW       User Key  (r) = Recorded By, (t) = Taken By, (c) = Cosigned By    Initials Name Provider Type    Peter Andres, OTR/L Occupational Therapist    MW Jen Bravo, OTR/L Occupational Therapist          Therapy Suggested Charges     Code   Minutes Charges    81997 (CPT®) Hc Ot Neuromusc Re Education Ea 15 Min      02419 (CPT®) Hc Ot Ther Proc Ea 15 Min      02258 (CPT®) Hc Ot Therapeutic Act Ea 15 Min      85787 (CPT®) Hc Ot Manual Therapy Ea 15 Min      44220 (CPT®) Hc Ot Iontophoresis Ea 15 Min      71108 (CPT®) Hc Ot Elec Stim Ea-Per 15 Min      55781 (CPT®) Hc Ot Ultrasound Ea 15 Min      43555 (CPT®) Hc Ot Self Care/Mgmt/Train Ea 15 Min 39 3    Total  39 3              OT Discharge Summary  Reason for Discharge: Discharge from facility  Outcomes Achieved: Refer to plan of care for updates on goals achieved  Discharge Destination: Home with assist      MORALES Mckeon  6/19/2020    No

## 2022-03-02 ENCOUNTER — OFFICE VISIT (OUTPATIENT)
Dept: WOUND CARE | Facility: HOSPITAL | Age: 77
End: 2022-03-02

## 2022-03-02 DIAGNOSIS — R26.89 OTHER ABNORMALITIES OF GAIT AND MOBILITY: ICD-10-CM

## 2022-03-02 DIAGNOSIS — I10 ESSENTIAL (PRIMARY) HYPERTENSION: ICD-10-CM

## 2022-03-02 DIAGNOSIS — I48.91 ATRIAL FIBRILLATION, UNSPECIFIED TYPE: ICD-10-CM

## 2022-03-02 DIAGNOSIS — L89.153 PRESSURE ULCER OF SACRAL REGION, STAGE 3: ICD-10-CM

## 2022-03-02 PROCEDURE — 11042 DBRDMT SUBQ TIS 1ST 20SQCM/<: CPT | Performed by: NURSE PRACTITIONER

## 2022-03-08 ENCOUNTER — TELEPHONE (OUTPATIENT)
Dept: CARDIOLOGY | Facility: CLINIC | Age: 77
End: 2022-03-08

## 2022-03-08 RX ORDER — AMIODARONE HYDROCHLORIDE 200 MG/1
200 TABLET ORAL
Qty: 90 TABLET | Refills: 3 | Status: SHIPPED | OUTPATIENT
Start: 2022-03-08 | End: 2022-05-04 | Stop reason: SDUPTHER

## 2022-03-08 RX ORDER — AMIODARONE HYDROCHLORIDE 200 MG/1
200 TABLET ORAL
Qty: 90 TABLET | Refills: 3 | Status: CANCELLED | OUTPATIENT
Start: 2022-03-08

## 2022-03-08 NOTE — TELEPHONE ENCOUNTER
The patient calls in stating his refill for Amiodarone expires at the end of this month.  He would like for us to send a refill request in to the [facility].  Thank you!

## 2022-03-16 ENCOUNTER — OFFICE VISIT (OUTPATIENT)
Dept: WOUND CARE | Facility: HOSPITAL | Age: 77
End: 2022-03-16

## 2022-03-16 DIAGNOSIS — I10 ESSENTIAL (PRIMARY) HYPERTENSION: ICD-10-CM

## 2022-03-16 DIAGNOSIS — I48.91 ATRIAL FIBRILLATION, UNSPECIFIED TYPE: ICD-10-CM

## 2022-03-16 DIAGNOSIS — R26.89 OTHER ABNORMALITIES OF GAIT AND MOBILITY: ICD-10-CM

## 2022-03-16 DIAGNOSIS — L89.153 PRESSURE ULCER OF SACRAL REGION, STAGE 3: ICD-10-CM

## 2022-03-16 PROCEDURE — G0463 HOSPITAL OUTPT CLINIC VISIT: HCPCS

## 2022-03-16 PROCEDURE — 99212 OFFICE O/P EST SF 10 MIN: CPT | Performed by: NURSE PRACTITIONER

## 2022-05-04 ENCOUNTER — OFFICE VISIT (OUTPATIENT)
Dept: CARDIOLOGY | Facility: CLINIC | Age: 77
End: 2022-05-04

## 2022-05-04 VITALS
HEART RATE: 67 BPM | SYSTOLIC BLOOD PRESSURE: 120 MMHG | BODY MASS INDEX: 22.29 KG/M2 | OXYGEN SATURATION: 94 % | HEIGHT: 67 IN | WEIGHT: 142 LBS | DIASTOLIC BLOOD PRESSURE: 62 MMHG

## 2022-05-04 DIAGNOSIS — I48.91 ATRIAL FIBRILLATION AND FLUTTER: Primary | ICD-10-CM

## 2022-05-04 DIAGNOSIS — I10 ESSENTIAL HYPERTENSION: ICD-10-CM

## 2022-05-04 DIAGNOSIS — I48.92 ATRIAL FIBRILLATION AND FLUTTER: Primary | ICD-10-CM

## 2022-05-04 DIAGNOSIS — Z95.828 PRESENCE OF IVC FILTER: ICD-10-CM

## 2022-05-04 DIAGNOSIS — Z91.89 AT RISK FOR AMIODARONE TOXICITY WITH LONG TERM USE: ICD-10-CM

## 2022-05-04 DIAGNOSIS — Z79.899 AT RISK FOR AMIODARONE TOXICITY WITH LONG TERM USE: ICD-10-CM

## 2022-05-04 PROCEDURE — 93000 ELECTROCARDIOGRAM COMPLETE: CPT | Performed by: INTERNAL MEDICINE

## 2022-05-04 PROCEDURE — 99214 OFFICE O/P EST MOD 30 MIN: CPT | Performed by: INTERNAL MEDICINE

## 2022-05-04 RX ORDER — AMIODARONE HYDROCHLORIDE 200 MG/1
200 TABLET ORAL
Qty: 90 TABLET | Refills: 3 | Status: SHIPPED | OUTPATIENT
Start: 2022-05-04

## 2022-05-04 RX ORDER — LISINOPRIL 40 MG/1
40 TABLET ORAL DAILY
Qty: 90 TABLET | Refills: 3 | Status: SHIPPED | OUTPATIENT
Start: 2022-05-04

## 2022-05-04 RX ORDER — DILTIAZEM HYDROCHLORIDE 120 MG/1
120 CAPSULE, COATED, EXTENDED RELEASE ORAL DAILY
Qty: 90 CAPSULE | Refills: 3 | Status: SHIPPED | OUTPATIENT
Start: 2022-05-04 | End: 2022-10-07 | Stop reason: SDUPTHER

## 2022-05-04 NOTE — PROGRESS NOTES
Subjective:     Encounter Date: 05/04/22      Patient ID: Kingsley Lerma is a 76 y.o. male.    Chief Complaint: Follow-up atrial fibrillation and flutter  76-year-old male returns today for follow-up of atrial fibrillation and flutter.  Anticoagulation was initiated when he was first diagnosed with these in February 2020, then he returned a few months later in May 2020 with syncope (was in atrial flutter and otherwise asymptomatic with this). He was cardioverted that admission on 5/6/20, without a change in symptoms when NSR was restored. He was discharged home on 5/7 on amiodarone (rhythmol was stopped at that time). He returned to ER 5/9 because he found his heart rate to be 130s-140s and he experienced a brief dizzy spell. He was cardioverted for atrial flutter 2:1 with a HR in the 140s. NSR was restored only for a few seconds. His rhythm then proceeded to bounce around from atrial flutter to atrial fibrillation to NSR with various heart rates (70s-140s). He denied any symptoms regardless of what rhythm he was in. He was discharged home with the addition of diltiazem for better rate control when in AF/AFL, and amio and Xarelto were continued. Also of note, his Pletal dose was decreased by half due to interaction with diltiazem.   The patient also has a history of alcohol abuse.     Therefater, he had had 3 hospitalizations for GI bleeding (initially in June '20) -  he was found to have colonic AVMs on colonoscopy. EGDs were unremarkable.  He did not require blood transfusion that admission.  He was discharged home on his same medical therapy, including Xarelto for anticoagulation and Pletal for claudication.  He returned a week later with recurrent GI bleeding, and hemoglobin in the range of 6-7, and required 3 units of packed red blood cells that admission.  At that point, Xarelto was discontinued and replaced with aspirin 81 mg daily.  Pletal was continued.  He was discharged on June 18 and returned again  on June 21 with recurrent GI bleeding.  However, during that admission his hemoglobin and hematocrit remained stable and he did not require transfusion.      At follow-up visit in July 2020, he was in sinus rhythm we did discuss potential of watchman device for stroke protection but plan to wait until an upcoming vascular procedure before discussing this further.    On 10/16/2020 he presented to [facility] ER with complaints of bright red blood per rectum for 2 to 3 days.  Hemoglobin was stable.  He did not require blood transfusion.  Pletal was continued, Plavix was discontinued and he was told to take aspirin 325 mg daily that he later reduced to 81mg.    At subsequent follow-ups, since he continued to maintain normal sinus rhythm on amiodarone and continued to have intermittent GI bleeds despite being off of anticoagulation (although he was on Plavix and Pletal), it was felt best not to pursue watchman device placement at that time, given the fact that this would require 6 weeks of aspirin plus anticoagulation followed by dual antiplatelet therapy for a total of 6 months.      He was last seen in the office in October 2021 by TAJ Jordan, is doing well, continue sinus rhythm still on amiodarone.  No changes were made he returns today for routine follow-up.  He remains on aspirin 81 mg daily for CVA prophylaxis with atrial fibrillation, as well as for his peripheral vascular disease.  He also remains on amiodarone, and does not think he has had any recurrences of atrial fibrillation or flutter since his last visit.      Since his last visit here, he was hospitalized briefly in January-February 2022 with the PE but was not anticoagulated then due to history of GI bleeding noted above.  An IVC filter was placed by Dr. Donaldson.  Patient has not seen him since.  He denies any current bleeding problems.  He also denies any change in breathing, palpitations, or chest pain.  He is asking about alcohol  consumption, stating that he drinks about 1-2 beers per day.                 The following portions of the patient's history were reviewed and updated as appropriate: allergies, current medications, past family history, past medical history, past social history, past surgical history and problem list.    Review of Systems   Constitutional: Negative for malaise/fatigue.   Cardiovascular: Positive for leg swelling. Negative for chest pain, claudication, dyspnea on exertion, near-syncope, orthopnea, palpitations, paroxysmal nocturnal dyspnea and syncope.   Respiratory: Negative for shortness of breath.    Hematologic/Lymphatic: Does not bruise/bleed easily.       Allergies   Allergen Reactions   • Prednisone Other (See Comments)     Made him anxious and jittery     • Cortisone Nausea And Vomiting       Current Outpatient Medications:   •  amiodarone (PACERONE) 200 MG tablet, Take 1 tablet by mouth Daily., Disp: 90 tablet, Rfl: 3  •  aspirin 81 MG EC tablet, Take 1 tablet by mouth Daily., Disp: 30 tablet, Rfl: 11  •  Cholecalciferol (VITAMIN D) 50 MCG (2000 UT) tablet, Take 2,000 Units by mouth Daily., Disp: , Rfl:   •  cilostazol (PLETAL) 50 MG tablet, Take 1 tablet by mouth 2 (Two) Times a Day., Disp: 180 tablet, Rfl: 3  •  dilTIAZem CD (CARDIZEM CD) 120 MG 24 hr capsule, Take 1 capsule by mouth Daily., Disp: 90 capsule, Rfl: 3  •  ferrous sulfate 325 (65 FE) MG tablet, Take 325 mg by mouth Daily With Breakfast., Disp: , Rfl:   •  finasteride (PROSCAR) 5 MG tablet, Take 5 mg by mouth Daily., Disp: , Rfl:   •  folic acid (FOLVITE) 1 MG tablet, Take 1 mg by mouth Daily., Disp: , Rfl:   •  gabapentin (NEURONTIN) 300 MG capsule, Take 1 capsule by mouth every night at bedtime., Disp: 24 capsule, Rfl: 0  •  LACTOBACILLUS PO, Take 1 tablet by mouth Daily., Disp: , Rfl:   •  lisinopril (PRINIVIL,ZESTRIL) 40 MG tablet, Take 1 tablet by mouth Daily., Disp: 90 tablet, Rfl: 3  •  pantoprazole (PROTONIX) 20 MG EC tablet, Take 20  "mg by mouth Daily., Disp: , Rfl:          Objective:    /62 (BP Location: Left arm, Patient Position: Sitting, Cuff Size: Adult)   Pulse 67   Ht 170.2 cm (67.01\")   Wt 64.4 kg (142 lb)   SpO2 94%   BMI 22.24 kg/m²        Vitals and nursing note reviewed.   Constitutional:       General: Not in acute distress.     Appearance: Well-developed. Not diaphoretic.   HENT:      Head: Normocephalic and atraumatic.   Neck:      Vascular: No JVD.   Pulmonary:      Effort: Pulmonary effort is normal. No respiratory distress.      Comments: Diminished expiratory phase diffusely  Cardiovascular:      Normal rate. Regular rhythm.      Murmurs: There is no murmur.   Edema:     Peripheral edema (BLE; with erythema, and dry, flaky skin ) present.     Ankle: bilateral trace edema of the ankle.     Feet: bilateral trace edema of the feet.  Skin:     General: Skin is warm and dry.   Neurological:      Mental Status: Alert and oriented to person, place, and time.      Cranial Nerves: No cranial nerve deficit.   Psychiatric:         Behavior: Behavior normal.         Lab Review:   Lab Results   Component Value Date    WBC 6.41 02/21/2022    HGB 10.4 (L) 02/21/2022    HCT 32.4 (L) 02/21/2022    .5 (H) 02/21/2022     02/21/2022     Lab Results   Component Value Date    GLUCOSE 93 02/28/2022    CALCIUM 9.1 02/28/2022     02/28/2022    K 4.1 02/28/2022    CO2 30.0 (H) 02/28/2022     02/28/2022    BUN 18 02/28/2022    CREATININE 0.62 (L) 02/28/2022    EGFRIFAFRI >59 09/02/2020    EGFRIFNONA 127 02/28/2022    BCR 29.0 (H) 02/28/2022    ANIONGAP 6.0 02/28/2022     Lab Results   Component Value Date    TSH 2.680 02/01/2022     Lab Results   Component Value Date    GLUCOSE 93 02/28/2022    BUN 18 02/28/2022    CREATININE 0.62 (L) 02/28/2022    EGFRIFNONA 127 02/28/2022    EGFRIFAFRI >59 09/02/2020    BCR 29.0 (H) 02/28/2022    K 4.1 02/28/2022    CO2 30.0 (H) 02/28/2022    CALCIUM 9.1 02/28/2022    ALBUMIN 2.20 " (L) 02/03/2022    AST 31 02/03/2022    ALT 22 02/03/2022 5/2020 CXR -     HISTORY: Chest Pain protocol     COMPARISON: 3/6/2020.     FINDINGS:  The lungs are expanded bilaterally and clear. The pleural  spaces are clear. Heart size is normal. There are degenerative changes  of the shoulders and spine. No acute bony abnormality is seen.     IMPRESSION:  No active disease is seen.               ECG 12 Lead    Date/Time: 5/4/2022 5:34 PM  Performed by: Nolan Farias MD  Authorized by: Nolan Farias MD   Comparison: compared with previous ECG from 1/27/2022  Comparison to previous ECG: QT is no longer prolonged  Rhythm: sinus rhythm  Rate: normal  BPM: 67  QRS axis: normal and right  Other findings: non-specific ST-T wave changes    Clinical impression: abnormal EKG        [facility] - Pulmonary Function Test     [address]  [place]  [address]  [phone]     Patient : Kingsley Lerma   MRN : [id number]  CSN : [id number]  Pulmonologist : Cuco Cruz MD  Date : 6/23/2021     ______________________________________________________________________     Interpretation :  1.  Spirometry is consistent with a moderate obstructive ventilatory defect.  2.  There is actually some worsening of small airways function postbronchodilator and otherwise there is no significant change in spirometry postbronchodilator and a moderate obstructive ventilatory defect is still present.  3.  Lung volumes reveal borderline hyperinflation.  There is also a mild decrease in inspiratory capacity.  4.  Diffusion capacity is at the lower limits of normal but is normalized when corrected for alveolar volume.        Cuco Cruz MD    Results for orders placed during the hospital encounter of 01/27/22    Adult Transthoracic Echo Complete W/ Cont if Necessary Per Protocol    Interpretation Summary  · Left ventricular systolic function is normal. Left ventricular ejection fraction appears to be 56 - 60%.  · Normal  right ventricular cavity size and systolic function noted.  · Mild aortic valve regurgitation is present.      Assessment:      Problem List Items Addressed This Visit        Cardiac and Vasculature    Essential hypertension    Relevant Medications    lisinopril (PRINIVIL,ZESTRIL) 40 MG tablet    dilTIAZem CD (CARDIZEM CD) 120 MG 24 hr capsule    Atrial fibrillation and flutter (HCC) - Primary    Relevant Medications    dilTIAZem CD (CARDIZEM CD) 120 MG 24 hr capsule    Other Relevant Orders    Full Pulmonary Function Test & ABG With Bronchodilator      Other Visit Diagnoses     At risk for amiodarone toxicity with long term use        Relevant Orders    Full Pulmonary Function Test & ABG With Bronchodilator    Presence of IVC filter        Relevant Orders    Ambulatory Referral to Vascular Surgery          Plan:     1.  Paroxysmal atrial fibrillation flutter: Established problem, stable.  Maintaining normal sinus rhythm on amiodarone.    -Continue diltiazem for rate control when in atrial fibrillation or flutter.    -Not anticoagulated due to recurrent GI bleeds secondary to colonic AVMs.  Continue aspirin 81 mg daily. CHADSVASC 3.  At present, the risk of bleeding on the requisite 6 weeks of anticoagulation pleasant on platelet therapy after watchman still seems to outweigh the potential benefit.   -If new data emerges such that it is improved to not require anticoagulation after watchman implant, or other devices are prove that do not require anticoagulation thereafter, we can reconsider.  However, that would require the IVC filter that was placed this past January to be removed to advance large bore equipment from the femoral venous system up into the heart.  -Continue amiodarone to maintain sinus rhythm, with toxicity screening as noted below    2.  Screening for amiodarone toxicity: I did review labs from his hospitalization here on 2/2/2022 which included a normal TSH and normal LFTs.  -Needs repeat PFTs this  June  -Encouraged to discuss getting his eyes checked with his PCP    3.  Peripheral vascular disease and presence of IVC filter: He had lower extremity revascularization at [facility] on 8/31/2020.  He is currently on aspirin 81 mg daily and Pletal 50 mg twice daily.    -refer to Dr Donaldson; patient states he does not wish to follow with vascular surgeon at [facility] any longer.  -Specifically, I am referring back to Dr. Donaldson for recommendations regarding plans for IVC filter (should it be removed 6 months after implant?).     -Though we have no definitive plans established to attempt left atrial appendage closure, as noted in #1 above, if we were to have viable options for this in the future, it would not be possible with IVC filter in place.   However, this is not the primary motivation behind the referral; I primarily want to make sure that there is a definitive game plan documented regarding his IVC filter regardless of whether or not we need to have large-bore access through the IVC in the future or not.    4.  Essential hypertension: Well-controlled  -Continue lisinopril 40 mg daily  -Continue current dose of diltiazem    5.  Alcohol and tobacco abuse: Counseled on the importance of cessation from both.    6.  Recurrent GI bleeding secondary to colonic AVMs - not anticoagulated due to these    F/u 6 months with TAJ Jordan, or sooner with new or worsening symptoms    Electronically signed by Nolan Farias MD, 05/04/22, 5:47 PM CDT.

## 2022-05-05 ENCOUNTER — TELEPHONE (OUTPATIENT)
Dept: CARDIOLOGY | Facility: CLINIC | Age: 77
End: 2022-05-05

## 2022-05-05 NOTE — TELEPHONE ENCOUNTER
Please place an order for Covid testing for Mr. Lerma to have prior to his PFT 6/15/22.  Thank you.

## 2022-05-10 DIAGNOSIS — I73.9 PVD (PERIPHERAL VASCULAR DISEASE) (HCC): ICD-10-CM

## 2022-05-10 RX ORDER — GABAPENTIN 300 MG/1
300 CAPSULE ORAL DAILY
Qty: 90 CAPSULE | Refills: 0 | Status: CANCELLED | OUTPATIENT
Start: 2022-05-10 | End: 2022-08-08

## 2022-05-12 DIAGNOSIS — I73.9 PVD (PERIPHERAL VASCULAR DISEASE) (HCC): Primary | ICD-10-CM

## 2022-05-13 ENCOUNTER — TELEPHONE (OUTPATIENT)
Dept: VASCULAR SURGERY | Age: 77
End: 2022-05-13

## 2022-05-13 NOTE — TELEPHONE ENCOUNTER
I sw the pt to let him know it would be best for him to reach out to his PCP in regards to a RF of Gabapentin. We have not seen the pt in over a year. We would not be in compliance if we filled this medication. I offered to r/s the pt's f/u appts so we can get him back on track. The pt states he will contact his PCP and request this medication.

## 2022-05-23 RX ORDER — CILOSTAZOL 50 MG/1
50 TABLET ORAL 2 TIMES DAILY
Qty: 180 TABLET | Refills: 3 | Status: SHIPPED | OUTPATIENT
Start: 2022-05-23 | End: 2022-06-16

## 2022-05-24 ENCOUNTER — TELEPHONE (OUTPATIENT)
Dept: CARDIOLOGY | Facility: CLINIC | Age: 77
End: 2022-05-24

## 2022-05-24 NOTE — TELEPHONE ENCOUNTER
A prescription was sent to [facility] pharmacy for Pletal. Pharmacist,Sarah, states Pletal is contraindicated for use with diltiazem. After consulting Dr Farias about this he said to notify the patient to discuss getting P[facility] filled [facility]. Patient sees Dr Donaldson on 5/26, so he could ask him as well about filling this. Left a detailed voicemail on patient's phone and instructed Sarah to delete refill request. She voiced understanding. Jessica Infante MA

## 2022-05-25 ENCOUNTER — TELEPHONE (OUTPATIENT)
Dept: VASCULAR SURGERY | Facility: CLINIC | Age: 77
End: 2022-05-25

## 2022-05-26 ENCOUNTER — TELEPHONE (OUTPATIENT)
Dept: VASCULAR SURGERY | Facility: CLINIC | Age: 77
End: 2022-05-26

## 2022-05-26 ENCOUNTER — OFFICE VISIT (OUTPATIENT)
Dept: VASCULAR SURGERY | Facility: CLINIC | Age: 77
End: 2022-05-26

## 2022-05-26 ENCOUNTER — PATIENT ROUNDING (BHMG ONLY) (OUTPATIENT)
Dept: VASCULAR SURGERY | Facility: CLINIC | Age: 77
End: 2022-05-26

## 2022-05-26 VITALS
HEART RATE: 52 BPM | SYSTOLIC BLOOD PRESSURE: 142 MMHG | HEIGHT: 67 IN | RESPIRATION RATE: 18 BRPM | WEIGHT: 145 LBS | OXYGEN SATURATION: 98 % | DIASTOLIC BLOOD PRESSURE: 70 MMHG | BODY MASS INDEX: 22.76 KG/M2

## 2022-05-26 DIAGNOSIS — G62.9 NEUROPATHY: ICD-10-CM

## 2022-05-26 DIAGNOSIS — I73.9 PAD (PERIPHERAL ARTERY DISEASE): ICD-10-CM

## 2022-05-26 DIAGNOSIS — Z01.818 PREOP TESTING: ICD-10-CM

## 2022-05-26 DIAGNOSIS — Z79.02 ENCOUNTER FOR MONITORING ANTIPLATELET THERAPY: ICD-10-CM

## 2022-05-26 DIAGNOSIS — I65.23 BILATERAL CAROTID ARTERY STENOSIS: ICD-10-CM

## 2022-05-26 DIAGNOSIS — Z51.81 ENCOUNTER FOR MONITORING ANTIPLATELET THERAPY: ICD-10-CM

## 2022-05-26 DIAGNOSIS — Z95.828 PRESENCE OF IVC FILTER: Primary | ICD-10-CM

## 2022-05-26 PROCEDURE — 99214 OFFICE O/P EST MOD 30 MIN: CPT | Performed by: SURGERY

## 2022-05-26 RX ORDER — GABAPENTIN 300 MG/1
300 CAPSULE ORAL 3 TIMES DAILY
Qty: 30 CAPSULE | Refills: 0 | Status: SHIPPED | OUTPATIENT
Start: 2022-05-26 | End: 2022-06-16

## 2022-05-26 NOTE — TELEPHONE ENCOUNTER
Spoke with patient and informed of appointment to arrive at heart center at 1230 for a 1pm appt 7/21/22.  Appt with Dr ramsey to follow at 230 pm.  Pt understand, mailing out reminder at this time per pt request.

## 2022-05-26 NOTE — PROGRESS NOTES
"05/26/2022        Provider, No Known  [facility] [place] [address]        Kingsley Lerma  1945      Chief Complaint   Patient presents with   • Establish Care     Referral by Dr Nolan Farias for Presence of IVC Filter.  Pt is a current smoker. Pt denies any stroke-like symptoms.        Dear Provider, No Known:   I had the pleasure of seeing you patient in the office today for follow up.  As you recall, the patient is a 76 y.o. male who we saw while hospitalized  And found to have left lower lobe pulmonary embolus with no evidence of right heart strain.  He was found to have deep vein thrombus to bilateral lower extremities.  Due to GI bleeding while previously anticoagulated he is unable to take anticoagulation. He did have an IVC filter placed 1/31/2022.  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  He has discussed watchman device with his cardiologist and if new data comes of the does not require anticoagulation, they will revisit.  In any case, the IVC filter would have to be removed.  He also would like to transfer his care to have everything at [facility].  He previously followed with [facility] vascular and in 2020 underwent bilateral femoral endarterectomies with placement of bilateral kissing iliac stents by Dr. Acuña.    Review of Systems   Constitutional: Negative.    HENT: Negative.    Eyes: Negative.    Respiratory: Negative.    Cardiovascular: Negative.    Gastrointestinal: Negative.    Endocrine: Negative.    Genitourinary: Negative.    Musculoskeletal: Negative.    Skin: Negative.    Allergic/Immunologic: Negative.    Neurological: Negative.    Hematological: Negative.    Psychiatric/Behavioral: Negative.    All other systems reviewed and are negative.       /70 (BP Location: Left arm, Patient Position: Sitting, Cuff Size: Adult)   Pulse 52   Resp 18   Ht 170.2 cm (67\")   Wt 65.8 kg (145 lb)   " SpO2 98%   BMI 22.71 kg/m²    Physical Exam  Vitals and nursing note reviewed.   Constitutional:       Appearance: He is well-developed.   HENT:      Head: Normocephalic and atraumatic.   Eyes:      General: No scleral icterus.     Pupils: Pupils are equal, round, and reactive to light.   Neck:      Thyroid: No thyromegaly.      Vascular: No carotid bruit or JVD.   Cardiovascular:      Rate and Rhythm: Normal rate and regular rhythm.      Pulses:           Carotid pulses are 2+ on the right side and 2+ on the left side.       Femoral pulses are 2+ on the right side and 2+ on the left side.       Popliteal pulses are 2+ on the right side and 2+ on the left side.        Dorsalis pedis pulses are 2+ on the right side and 2+ on the left side.        Posterior tibial pulses are 2+ on the right side and 2+ on the left side.      Heart sounds: Normal heart sounds.   Pulmonary:      Effort: Pulmonary effort is normal.      Breath sounds: Normal breath sounds.   Abdominal:      General: Bowel sounds are normal. There is no distension or abdominal bruit.      Palpations: Abdomen is soft. There is no mass.      Tenderness: There is no abdominal tenderness.   Musculoskeletal:         General: Normal range of motion.      Cervical back: Neck supple.   Lymphadenopathy:      Cervical: No cervical adenopathy.   Skin:     General: Skin is warm and dry.   Neurological:      Mental Status: He is alert and oriented to person, place, and time.      Cranial Nerves: No cranial nerve deficit.      Sensory: No sensory deficit.               Patient Active Problem List   Diagnosis   • Essential hypertension   • PVD (peripheral vascular disease) ([facility])   • Atrial fibrillation and flutter ([facility])   • Alcohol abuse   • Non healing left heel wound   • Atrial fibrillation status post cardioversion ([facility])   • Tobacco use   • Syncope   • GI bleed   • Rectal bleeding   • Heme + stool   • Acute blood loss anemia   • Gastrointestinal hemorrhage   •  Osteoarthritis of right hip   • AVM (arteriovenous malformation) of colon   • BPH without obstruction/lower urinary tract symptoms   • Abnormal CT scan, bladder   • Hypokalemia   • Diarrhea   • Acute pulmonary embolism (HCC)   • Severe malnutrition (CMS/HCC)   • Presence of IVC filter       ICD-10-CM ICD-9-CM   1. Presence of IVC filter  Z95.828 V45.89   2. Neuropathy  G62.9 355.9   3. Preop testing  Z01.818 V72.84   4. Encounter for monitoring antiplatelet therapy  Z51.81 V58.83    Z79.02 V58.63   5. PAD (peripheral artery disease) ([facility])  I73.9 443.9   6. Bilateral carotid artery stenosis  I65.23 433.10     433.30         PLAN: After thoroughly evaluating Kingsley Lerma, I believe the best course of action is to proceed with vena cava filter removal.  Risks and benefits were explained in great length to the patient, which included but not limited to,bleeding, infection, vessel damage, nerve damage, vessel rupture, filter migration, filter perforation.  His routine vascular testing was scheduled for July, we will take over scheduling this and we will order a carotid duplex and ABIs.  He is currently not having any claudication to his lower extremities.  The patient is to continue taking their medications as previously discussed.   This was all discussed in full with complete understanding.    Thanks you for allowing me to participate in the care of your patient.  Please do not hesitate to call with any questions or concerns.  We will keep you aware of any further encounters with Kingsley Lerma.      Sincerely Yours,        Luis Enrique Donaldson, DO

## 2022-05-31 ENCOUNTER — TELEPHONE (OUTPATIENT)
Dept: VASCULAR SURGERY | Facility: CLINIC | Age: 77
End: 2022-05-31

## 2022-05-31 NOTE — TELEPHONE ENCOUNTER
SPOKE WITH PT TO DISCUSS SURGERY. PT WAS DRIVING UNABLE TO TALK AT THIS TIME. PT TO RETURN CALL LATER.

## 2022-06-01 ENCOUNTER — TELEPHONE (OUTPATIENT)
Dept: VASCULAR SURGERY | Facility: CLINIC | Age: 77
End: 2022-06-01

## 2022-06-01 PROBLEM — Z01.818 PREOP TESTING: Status: ACTIVE | Noted: 2022-06-01

## 2022-06-01 NOTE — TELEPHONE ENCOUNTER
SPOKE WITH PATIENT REGARDING SURGERY. PT WAS INFORMED OF PRE WORK ON 06/16 AT 0945. PT WAS ALSO INFORMED OF SURGERY ON 06/22 AT 0500. PT WAS INFORMED OF COVID TEST AND VISITATION. PT STATED UNDERSTANDING OF INSTRUCTIONS AND ALL INFORMATION.

## 2022-06-03 ENCOUNTER — TELEPHONE (OUTPATIENT)
Dept: CARDIOLOGY | Facility: CLINIC | Age: 77
End: 2022-06-03

## 2022-06-03 NOTE — TELEPHONE ENCOUNTER
Patient left message asking about getting a refill on Pletal. I left a detailed voicemail, repeating the advice from Dr Farias as documented in the 5/24 telephone encounter. Jessica Infante MA

## 2022-06-06 ENCOUNTER — TELEPHONE (OUTPATIENT)
Dept: PODIATRY | Facility: CLINIC | Age: 77
End: 2022-06-06

## 2022-06-06 ENCOUNTER — TELEPHONE (OUTPATIENT)
Dept: CARDIOLOGY | Facility: CLINIC | Age: 77
End: 2022-06-06

## 2022-06-06 DIAGNOSIS — Z01.812 PRE-PROCEDURE LAB EXAM: Primary | ICD-10-CM

## 2022-06-06 NOTE — TELEPHONE ENCOUNTER
Pt states he has just started seeing dr ramsey, is on a med called cilostazol. Has been taking for a long time. Is taking diitiazem now too and there is a conflict of meds with the two. The cilostazol was really helping but now cant take it due to conflict in meds. The diitiazem is for heart and needs it. No one can give him a answer on it and would like to know your thoughts.

## 2022-06-06 NOTE — TELEPHONE ENCOUNTER
Patient called back today asking about pletal refill. I told him I had left voicemail messages on 5/24 and again on 6/3, that the [facility] told us Pletal is contraindicated for use with diltiazem. I informed him, as documented in the prior messages, that Dr Farias will not fill this prescription and he should ask [facility] provider or Dr Donaldson to fill this, since it is a vascular medication. He voiced understanding and said he would call Dr Donaldson. Jessica Infante MA

## 2022-06-08 ENCOUNTER — TELEPHONE (OUTPATIENT)
Dept: VASCULAR SURGERY | Facility: CLINIC | Age: 77
End: 2022-06-08

## 2022-06-08 NOTE — TELEPHONE ENCOUNTER
Tomeka called (From Dr. Nowak's office) and said that they got a refill request for the patient's Pletal.  She stated that there was a note stating the patient was to ask Dr. Donaldson to write this RX.  I didn't see anything in the chart that stated this information.  She said that Dr. Nowak's said that he was turning this over to Vascular.     I sent a message to Helen to see what she wanted to do.  She said that this isn't a medication that we put people on, in our office and it had contraindications with his other medications. So, she said they couldn't fill this medication    I called the patient back and gave him this information.  He stated that he had already talked to Juan earlier and was already told that he didn't need this medication.    I also informed Tomeka in cardiology.

## 2022-06-13 ENCOUNTER — TELEPHONE (OUTPATIENT)
Dept: VASCULAR SURGERY | Facility: CLINIC | Age: 77
End: 2022-06-13

## 2022-06-13 NOTE — TELEPHONE ENCOUNTER
Left message letting Mr Lerma know that I had spoke with Helen VANG in regards to his Pletal and she stated that is not a medication we prescribe. We will not prescribe especially if there is an interaction with his cardiac medications. I advised Mr Lerma he would need to address this with his PCP. I advised Mr Lerma if he had any other questions to please give our office a call at [phone]        ----- Message -----  From: Skye Valdivia  Sent: 6/7/2022   3:32 PM CDT  To: TAJ Patricia    Mr Lerma called and stated he needed his Pletal refilled as they cancelled the prescription that Dr Farias and sent in because it had an interaction with [facility]. Dr Farias then told him when he called to get a [facility] that the Pletal is a Vascular Medication and he will no longer fill the medication. Mr Lerma states he has been on this medication for years and it really works for him if we could send [facility] the [facility] to the [facility]. Mr Lerma stated some how [facility] got involved and he has transferred all his Vascular Care over to Dr Donaldson and no longer see's [facility]. If you can please advise I will let the patient know. He takes Pletal 50 mg 2 times daily. Thanks [facility] Fax Number [phone]

## 2022-06-16 ENCOUNTER — PRE-ADMISSION TESTING (OUTPATIENT)
Dept: PREADMISSION TESTING | Facility: HOSPITAL | Age: 77
End: 2022-06-16

## 2022-06-16 ENCOUNTER — HOSPITAL ENCOUNTER (OUTPATIENT)
Dept: PULMONOLOGY | Facility: HOSPITAL | Age: 77
Discharge: HOME | End: 2022-06-16

## 2022-06-16 VITALS
HEART RATE: 60 BPM | HEIGHT: 67 IN | BODY MASS INDEX: 22.56 KG/M2 | RESPIRATION RATE: 16 BRPM | OXYGEN SATURATION: 99 % | DIASTOLIC BLOOD PRESSURE: 58 MMHG | WEIGHT: 143.74 LBS | SYSTOLIC BLOOD PRESSURE: 157 MMHG

## 2022-06-16 DIAGNOSIS — Z95.828 PRESENCE OF IVC FILTER: ICD-10-CM

## 2022-06-16 DIAGNOSIS — I48.91 ATRIAL FIBRILLATION AND FLUTTER: ICD-10-CM

## 2022-06-16 DIAGNOSIS — Z79.02 ENCOUNTER FOR MONITORING ANTIPLATELET THERAPY: ICD-10-CM

## 2022-06-16 DIAGNOSIS — Z01.818 PREOP TESTING: ICD-10-CM

## 2022-06-16 DIAGNOSIS — Z51.81 ENCOUNTER FOR MONITORING ANTIPLATELET THERAPY: ICD-10-CM

## 2022-06-16 DIAGNOSIS — Z91.89 AT RISK FOR AMIODARONE TOXICITY WITH LONG TERM USE: ICD-10-CM

## 2022-06-16 DIAGNOSIS — Z79.899 AT RISK FOR AMIODARONE TOXICITY WITH LONG TERM USE: ICD-10-CM

## 2022-06-16 DIAGNOSIS — I48.92 ATRIAL FIBRILLATION AND FLUTTER: ICD-10-CM

## 2022-06-16 LAB
ANION GAP SERPL CALCULATED.3IONS-SCNC: 7 MMOL/L (ref 5–15)
APTT PPP: 54 SECONDS (ref 24.1–35)
ARTERIAL PATENCY WRIST A: POSITIVE
ATMOSPHERIC PRESS: 752 MMHG
BASE EXCESS BLDA CALC-SCNC: 0.3 MMOL/L (ref 0–2)
BASOPHILS # BLD AUTO: 0.06 10*3/MM3 (ref 0–0.2)
BASOPHILS NFR BLD AUTO: 0.8 % (ref 0–1.5)
BDY SITE: NORMAL
BODY TEMPERATURE: 37 C
BUN SERPL-MCNC: 20 MG/DL (ref 8–23)
BUN/CREAT SERPL: 23.8 (ref 7–25)
CALCIUM SPEC-SCNC: 9.7 MG/DL (ref 8.6–10.5)
CHLORIDE SERPL-SCNC: 107 MMOL/L (ref 98–107)
CO2 SERPL-SCNC: 27 MMOL/L (ref 22–29)
CREAT SERPL-MCNC: 0.84 MG/DL (ref 0.76–1.27)
DEPRECATED RDW RBC AUTO: 49.7 FL (ref 37–54)
EGFRCR SERPLBLD CKD-EPI 2021: 91.5 ML/MIN/1.73
EOSINOPHIL # BLD AUTO: 0.16 10*3/MM3 (ref 0–0.4)
EOSINOPHIL NFR BLD AUTO: 2 % (ref 0.3–6.2)
ERYTHROCYTE [DISTWIDTH] IN BLOOD BY AUTOMATED COUNT: 13.4 % (ref 12.3–15.4)
GLUCOSE SERPL-MCNC: 83 MG/DL (ref 65–99)
HCO3 BLDA-SCNC: 23.9 MMOL/L (ref 20–26)
HCT VFR BLD AUTO: 42.6 % (ref 37.5–51)
HGB BLD-MCNC: 13.5 G/DL (ref 13–17.7)
IMM GRANULOCYTES # BLD AUTO: 0.04 10*3/MM3 (ref 0–0.05)
IMM GRANULOCYTES NFR BLD AUTO: 0.5 % (ref 0–0.5)
INR PPP: 1.09 (ref 0.91–1.09)
LYMPHOCYTES # BLD AUTO: 1.67 10*3/MM3 (ref 0.7–3.1)
LYMPHOCYTES NFR BLD AUTO: 21.4 % (ref 19.6–45.3)
Lab: NORMAL
MCH RBC QN AUTO: 31.8 PG (ref 26.6–33)
MCHC RBC AUTO-ENTMCNC: 31.7 G/DL (ref 31.5–35.7)
MCV RBC AUTO: 100.2 FL (ref 79–97)
MODALITY: NORMAL
MONOCYTES # BLD AUTO: 0.49 10*3/MM3 (ref 0.1–0.9)
MONOCYTES NFR BLD AUTO: 6.3 % (ref 5–12)
NEUTROPHILS NFR BLD AUTO: 5.39 10*3/MM3 (ref 1.7–7)
NEUTROPHILS NFR BLD AUTO: 69 % (ref 42.7–76)
NRBC BLD AUTO-RTO: 0 /100 WBC (ref 0–0.2)
PCO2 BLDA: 35 MM HG (ref 35–45)
PCO2 TEMP ADJ BLD: 35 MM HG (ref 35–45)
PH BLDA: 7.44 PH UNITS (ref 7.35–7.45)
PH, TEMP CORRECTED: 7.44 PH UNITS (ref 7.35–7.45)
PLATELET # BLD AUTO: 231 10*3/MM3 (ref 140–450)
PMV BLD AUTO: 8.8 FL (ref 6–12)
PO2 BLDA: 93.5 MM HG (ref 83–108)
PO2 TEMP ADJ BLD: 93.5 MM HG (ref 83–108)
POTASSIUM SERPL-SCNC: 4.5 MMOL/L (ref 3.5–5.2)
PROTHROMBIN TIME: 13.7 SECONDS (ref 11.9–14.6)
RBC # BLD AUTO: 4.25 10*6/MM3 (ref 4.14–5.8)
SAO2 % BLDCOA: 97.6 % (ref 94–99)
SODIUM SERPL-SCNC: 141 MMOL/L (ref 136–145)
VENTILATOR MODE: NORMAL
WBC NRBC COR # BLD: 7.81 10*3/MM3 (ref 3.4–10.8)

## 2022-06-16 PROCEDURE — 80048 BASIC METABOLIC PNL TOTAL CA: CPT

## 2022-06-16 PROCEDURE — 85025 COMPLETE CBC W/AUTO DIFF WBC: CPT

## 2022-06-16 PROCEDURE — 94726 PLETHYSMOGRAPHY LUNG VOLUMES: CPT | Performed by: INTERNAL MEDICINE

## 2022-06-16 PROCEDURE — 94060 EVALUATION OF WHEEZING: CPT | Performed by: INTERNAL MEDICINE

## 2022-06-16 PROCEDURE — 36415 COLL VENOUS BLD VENIPUNCTURE: CPT

## 2022-06-16 PROCEDURE — 36600 WITHDRAWAL OF ARTERIAL BLOOD: CPT

## 2022-06-16 PROCEDURE — 94726 PLETHYSMOGRAPHY LUNG VOLUMES: CPT

## 2022-06-16 PROCEDURE — 94060 EVALUATION OF WHEEZING: CPT

## 2022-06-16 PROCEDURE — 85730 THROMBOPLASTIN TIME PARTIAL: CPT

## 2022-06-16 PROCEDURE — 85610 PROTHROMBIN TIME: CPT

## 2022-06-16 PROCEDURE — 82803 BLOOD GASES ANY COMBINATION: CPT

## 2022-06-16 RX ORDER — ALBUTEROL SULFATE 2.5 MG/3ML
2.5 SOLUTION RESPIRATORY (INHALATION) ONCE
Status: COMPLETED | OUTPATIENT
Start: 2022-06-16 | End: 2022-06-16

## 2022-06-16 RX ADMIN — ALBUTEROL SULFATE 2.5 MG: 2.5 SOLUTION RESPIRATORY (INHALATION) at 11:09

## 2022-06-16 NOTE — DISCHARGE INSTRUCTIONS
Before you come to the hospital        Arrival time: AS DIRECTED BY OFFICE     YOU MAY TAKE THE FOLLOWING MEDICATION(S) THE MORNING OF SURGERY WITH A SIP OF WATER: NONE    DO NOT TAKE LISINOPRIL 24 HOURS PRIOR TO SURGERY           ALL OTHER HOME MEDICATION CHECK WITH YOUR PHYSICIAN (especially if you are taking diabetes medicines or blood thinners)    Do not take any Erectile Dysfunction medications (EX: CIALIS, VIAGRA) 24 hours prior to surgery      If you were given and instructed to use a germ- killing soap, use as directed the night before surgery and the morning of surgery before coming to the hospital.             Eating and drinking restrictions prior to scheduled arrival time    2 Hours before arrival time STOP   Drinking Clear liquids (water, apple juice-no pulp)     6 Hours before arrival time STOP   Milk or drinks that contain milk, full liquids    6 Hours before arrival time STOP   Light meals or foods, such as toast or cereal    8 Hours before arrival time STOP   Heavy foods, such as meat, fried foods, or fatty foods    (It is extremely important that you follow these guidelines to prevent delay or cancelation of your procedure)     Clear Liquids  Water and flavored water                                                                      Clear Fruit juices, such as cranberry juice and apple juice.  Black coffee (NO cream of any kind, including powdered).  Plain tea  Clear bouillon or broth.  Flavored gelatin.  Soda.  Gatorade or Powerade.  Full liquid examples  Juices that have pulp.  Frozen ice pops that contain fruit pieces.  Coffee with creamer  Milk.  Yogurt.              MANAGING PAIN AFTER SURGERY    We know you are probably wondering what your pain will be like after surgery.  Following surgery it is unrealistic to expect you will not have pain.   Pain is how our bodies let us know that something is wrong or cautions us to be careful.  That said, our goal is to make your pain  tolerable.    Methods we may use to treat your pain include (oral or IV medications, PCAs, epidurals, nerve blocks, etc.)   While some procedures require IV pain medications for a short time after surgery, transitioning to pain medications by mouth allows for better management of pain.   Your nurse will encourage you to take oral pain medications whenever possible.  IV medications work almost immediately, but only last a short while.  Taking medications by mouth allows for a more constant level of medication in your blood stream for a longer period of time.      Once your pain is out of control it is harder to get back under control.  It is important you are aware when your next dose of pain medication is due.  If you are admitted, your nurse may write the time of your next dose on the white board in your room to help you remember.      We are interested in your pain and encourage you to inform us about aggravating factors during your visit.   Many times a simple repositioning every few hours can make a big difference.    If your physician says it is okay, do not let your pain prevent you from getting out of bed. Be sure to call your nurse for assistance prior to getting up so you do not fall.      Before surgery, please decide your tolerable pain goal.  These faces help describe the pain ratings we use on a 0-10 scale.   Be prepared to tell us your goal and whether or not you take pain or anxiety medications at home.

## 2022-06-17 ENCOUNTER — TELEPHONE (OUTPATIENT)
Dept: VASCULAR SURGERY | Facility: CLINIC | Age: 77
End: 2022-06-17

## 2022-06-17 NOTE — TELEPHONE ENCOUNTER
Caller: Kingsley Lerma    Relationship to patient: Self    Best call back number: [phone]    Chief complaint: PT HAS TO BE IN FOR A PROCEDURE AT 5 AM WAS NEEDING A LATER APPT DUE TO TRANSPORTATION     Type of visit: PROCEDURE    Requested date: LATER THAN 5 AM    If rescheduling, when is the original appointment: 6/22/22 @ 5 AM

## 2022-06-21 ENCOUNTER — TELEPHONE (OUTPATIENT)
Dept: VASCULAR SURGERY | Facility: CLINIC | Age: 77
End: 2022-06-21

## 2022-06-21 NOTE — H&P
6/21/2022            Provider, No Known  [facility] [place] [address]           Kingsley Lerma  1945             Chief Complaint   Patient presents with   • Establish Care       Referral by Dr Nolan Fairas for Presence of IVC Filter.  Pt is a current smoker. Pt denies any stroke-like symptoms.          Dear Provider, No Known:   I had the pleasure of seeing you patient in the office today for follow up.  As you recall, the patient is a 76 y.o. male who we saw while hospitalized  And found to have left lower lobe pulmonary embolus with no evidence of right heart strain.  He was found to have deep vein thrombus to bilateral lower extremities.  Due to GI bleeding while previously anticoagulated he is unable to take anticoagulation. He did have an IVC filter placed 1/31/2022.  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  He has discussed watchman device with his cardiologist and if new data comes of the does not require anticoagulation, they will revisit.  In any case, the IVC filter would have to be removed.  He also would like to transfer his care to have everything at [facility].  He previously followed with [facility] vascular and in 2020 underwent bilateral femoral endarterectomies with placement of bilateral kissing iliac stents by Dr. Acuña.    Past Medical History:   Diagnosis Date   • Alcohol abuse    • Arthritis    • Atrial fibrillation (HCC)    • Atrial flutter (HCC)    • BPH (benign prostatic hyperplasia)    • Circulation problem    • GERD (gastroesophageal reflux disease)    • History of tobacco abuse    • History of transfusion    • Hyperlipidemia    • Hypertension    • Pulmonary embolism (HCC)    • PVD (peripheral vascular disease) (HCC)      Past Surgical History:   Procedure Laterality Date   • ADENOIDECTOMY     • AORTIC VALVE REPAIR/REPLACEMENT     • APPENDECTOMY     • COLONOSCOPY N/A 6/8/2020    Multiple  non-bleeding colonic angiodysplastic lesions; Non-bleeding internal hemorrhoids; The examination was otherwise normal; No specimens collected; Repeat 10 years   • ENDOSCOPY N/A 6/8/2020    Normal esophagus; Normal stomach; Normal examined duodenum; No specimens collected   • ENDOSCOPY N/A 6/15/2020    Dr. Banuelos-Normal examined duodenum; Normal stomach; Normal esophagus; No specimens collected   • VEIN SURGERY     • VENA CAVA FILTER INSERTION N/A 1/31/2022    Procedure: VENA CAVA FILTER INSERTION;  Surgeon: Luis Enrique Donaldson DO;  Location: [place] [address];  Service: Vascular;  Laterality: N/A;     Family History   Problem Relation Age of Onset   • Dementia Mother    • Cancer Father    • Pancreatic cancer Father    • Colon cancer Neg Hx    • Colon polyps Neg Hx    • Esophageal cancer Neg Hx    • Liver cancer Neg Hx    • Liver disease Neg Hx    • Rectal cancer Neg Hx    • Stomach cancer Neg Hx      Social History     Tobacco Use   • Smoking status: Current Every Day Smoker     Packs/day: 0.50     Types: Cigars   • Smokeless tobacco: Never Used   • Tobacco comment: SMOKES ABOUT 10 ROSSI SWEET CIGARS A DAY   Vaping Use   • Vaping Use: Never used   Substance Use Topics   • Alcohol use: Not Currently     Alcohol/week: 2.0 standard drinks     Types: 2 Glasses of wine per week     Comment: Daily    • Drug use: Never     Allergies   Allergen Reactions   • Prednisone Other (See Comments)     Made him anxious and jittery     • Cortisone Nausea And Vomiting     Current Outpatient Medications   Medication Instructions   • amiodarone (PACERONE) 200 mg, Oral, Every 24 Hours Scheduled   • aspirin 81 mg, Oral, Daily   • dilTIAZem CD (CARDIZEM CD) 120 mg, Oral, Daily   • ferrous sulfate 325 mg, Oral, Daily With Breakfast   • finasteride (PROSCAR) 5 mg, Oral, Daily   • folic acid (FOLVITE) 1 mg, Oral, Daily   • LACTOBACILLUS PO 1 tablet, Oral, Daily   • lisinopril (PRINIVIL,ZESTRIL) 40 mg, Oral, Daily   • pantoprazole  "(PROTONIX) 20 mg, Oral, Daily   • Vitamin D 2,000 Units, Oral, Daily          Review of Systems   Constitutional: Negative.    HENT: Negative.    Eyes: Negative.    Respiratory: Negative.    Cardiovascular: Negative.    Gastrointestinal: Negative.    Endocrine: Negative.    Genitourinary: Negative.    Musculoskeletal: Negative.    Skin: Negative.    Allergic/Immunologic: Negative.    Neurological: Negative.    Hematological: Negative.    Psychiatric/Behavioral: Negative.    All other systems reviewed and are negative.        /70 (BP Location: Left arm, Patient Position: Sitting, Cuff Size: Adult)   Pulse 52   Resp 18   Ht 170.2 cm (67\")   Wt 65.8 kg (145 lb)   SpO2 98%   BMI 22.71 kg/m²    Physical Exam  Vitals and nursing note reviewed.   Constitutional:       Appearance: He is well-developed.   HENT:      Head: Normocephalic and atraumatic.   Eyes:      General: No scleral icterus.     Pupils: Pupils are equal, round, and reactive to light.   Neck:      Thyroid: No thyromegaly.      Vascular: No carotid bruit or JVD.   Cardiovascular:      Rate and Rhythm: Normal rate and regular rhythm.      Pulses:           Carotid pulses are 2+ on the right side and 2+ on the left side.       Femoral pulses are 2+ on the right side and 2+ on the left side.       Popliteal pulses are 2+ on the right side and 2+ on the left side.        Dorsalis pedis pulses are 2+ on the right side and 2+ on the left side.        Posterior tibial pulses are 2+ on the right side and 2+ on the left side.      Heart sounds: Normal heart sounds.   Pulmonary:      Effort: Pulmonary effort is normal.      Breath sounds: Normal breath sounds.   Abdominal:      General: Bowel sounds are normal. There is no distension or abdominal bruit.      Palpations: Abdomen is soft. There is no mass.      Tenderness: There is no abdominal tenderness.   Musculoskeletal:         General: Normal range of motion.      Cervical back: Neck supple. "   Lymphadenopathy:      Cervical: No cervical adenopathy.   Skin:     General: Skin is warm and dry.   Neurological:      Mental Status: He is alert and oriented to person, place, and time.      Cranial Nerves: No cranial nerve deficit.      Sensory: No sensory deficit.                       Patient Active Problem List   Diagnosis   • Essential hypertension   • PVD (peripheral vascular disease) ([facility])   • Atrial fibrillation and flutter ([facility])   • Alcohol abuse   • Non healing left heel wound   • Atrial fibrillation status post cardioversion ([facility])   • Tobacco use   • Syncope   • GI bleed   • Rectal bleeding   • Heme + stool   • Acute blood loss anemia   • Gastrointestinal hemorrhage   • Osteoarthritis of right hip   • AVM (arteriovenous malformation) of colon   • BPH without obstruction/lower urinary tract symptoms   • Abnormal CT scan, bladder   • Hypokalemia   • Diarrhea   • Acute pulmonary embolism ([facility])   • Severe malnutrition (CMS/[facility])   • Presence of IVC filter      Visit Diagnosis       ICD-10-CM ICD-9-CM   1. Presence of IVC filter  Z95.828 V45.89   2. Neuropathy  G62.9 355.9   3. Preop testing  Z01.818 V72.84   4. Encounter for monitoring antiplatelet therapy  Z51.81 V58.83     Z79.02 V58.63   5. PAD (peripheral artery disease) ([facility])  I73.9 443.9   6. Bilateral carotid artery stenosis  I65.23 433.10       433.30               PLAN: After thoroughly evaluating Kingsley Lerma, I believe the best course of action is to proceed with vena cava filter removal.  Risks and benefits were explained in great length to the patient, which included but not limited to,bleeding, infection, vessel damage, nerve damage, vessel rupture, filter migration, filter perforation.  His routine vascular testing was scheduled for July, we will take over scheduling this and we will order a carotid duplex and ABIs.  He is currently not having any claudication to his lower extremities.  The patient is to continue taking their medications  as previously discussed.   This was all discussed in full with complete understanding.     Thanks you for allowing me to participate in the care of your patient.  Please do not hesitate to call with any questions or concerns.  We will keep you aware of any further encounters with Kingsley Lerma.        Sincerely Yours,           Luis Enrique Donaldson, DO

## 2022-06-22 ENCOUNTER — ANESTHESIA EVENT (OUTPATIENT)
Dept: PERIOP | Facility: HOSPITAL | Age: 77
End: 2022-06-22

## 2022-06-22 ENCOUNTER — HOSPITAL ENCOUNTER (OUTPATIENT)
Facility: HOSPITAL | Age: 77
Setting detail: HOSPITAL OUTPATIENT SURGERY
Discharge: HOME | End: 2022-06-22
Attending: SURGERY | Admitting: SURGERY

## 2022-06-22 ENCOUNTER — ANESTHESIA (OUTPATIENT)
Dept: PERIOP | Facility: HOSPITAL | Age: 77
End: 2022-06-22

## 2022-06-22 ENCOUNTER — APPOINTMENT (OUTPATIENT)
Dept: INTERVENTIONAL RADIOLOGY/VASCULAR | Facility: HOSPITAL | Age: 77
End: 2022-06-22

## 2022-06-22 VITALS
TEMPERATURE: 98 F | RESPIRATION RATE: 15 BRPM | SYSTOLIC BLOOD PRESSURE: 146 MMHG | OXYGEN SATURATION: 97 % | HEART RATE: 58 BPM | DIASTOLIC BLOOD PRESSURE: 123 MMHG

## 2022-06-22 DIAGNOSIS — Z01.818 PREOP TESTING: ICD-10-CM

## 2022-06-22 DIAGNOSIS — Z95.828 PRESENCE OF IVC FILTER: ICD-10-CM

## 2022-06-22 PROCEDURE — C1773 RET DEV, INSERTABLE: HCPCS | Performed by: SURGERY

## 2022-06-22 PROCEDURE — 37193 REM ENDOVAS VENA CAVA FILTER: CPT | Performed by: SURGERY

## 2022-06-22 PROCEDURE — 25010000002 PROPOFOL 1000 MG/100ML EMULSION: Performed by: NURSE ANESTHETIST, CERTIFIED REGISTERED

## 2022-06-22 PROCEDURE — C1894 INTRO/SHEATH, NON-LASER: HCPCS | Performed by: SURGERY

## 2022-06-22 PROCEDURE — C1769 GUIDE WIRE: HCPCS | Performed by: SURGERY

## 2022-06-22 PROCEDURE — 25010000002 CEFAZOLIN PER 500 MG: Performed by: SURGERY

## 2022-06-22 PROCEDURE — 25010000002 IOPAMIDOL 61 % SOLUTION: Performed by: SURGERY

## 2022-06-22 PROCEDURE — 76000 FLUOROSCOPY <1 HR PHYS/QHP: CPT

## 2022-06-22 PROCEDURE — 25010000002 HEPARIN (PORCINE) PER 1000 UNITS: Performed by: SURGERY

## 2022-06-22 RX ORDER — ONDANSETRON 2 MG/ML
4 INJECTION INTRAMUSCULAR; INTRAVENOUS ONCE AS NEEDED
Status: DISCONTINUED | OUTPATIENT
Start: 2022-06-22 | End: 2022-06-22 | Stop reason: HOSPADM

## 2022-06-22 RX ORDER — OXYCODONE AND ACETAMINOPHEN 7.5; 325 MG/1; MG/1
2 TABLET ORAL EVERY 4 HOURS PRN
Status: DISCONTINUED | OUTPATIENT
Start: 2022-06-22 | End: 2022-06-22 | Stop reason: HOSPADM

## 2022-06-22 RX ORDER — LABETALOL HYDROCHLORIDE 5 MG/ML
5 INJECTION, SOLUTION INTRAVENOUS
Status: DISCONTINUED | OUTPATIENT
Start: 2022-06-22 | End: 2022-06-22 | Stop reason: HOSPADM

## 2022-06-22 RX ORDER — LIDOCAINE HYDROCHLORIDE 10 MG/ML
0.5 INJECTION, SOLUTION EPIDURAL; INFILTRATION; INTRACAUDAL; PERINEURAL ONCE AS NEEDED
Status: DISCONTINUED | OUTPATIENT
Start: 2022-06-22 | End: 2022-06-22 | Stop reason: HOSPADM

## 2022-06-22 RX ORDER — HYDROCODONE BITARTRATE AND ACETAMINOPHEN 5; 325 MG/1; MG/1
1 TABLET ORAL ONCE AS NEEDED
Status: DISCONTINUED | OUTPATIENT
Start: 2022-06-22 | End: 2022-06-22 | Stop reason: HOSPADM

## 2022-06-22 RX ORDER — OXYCODONE AND ACETAMINOPHEN 10; 325 MG/1; MG/1
1 TABLET ORAL ONCE AS NEEDED
Status: DISCONTINUED | OUTPATIENT
Start: 2022-06-22 | End: 2022-06-22 | Stop reason: HOSPADM

## 2022-06-22 RX ORDER — SODIUM CHLORIDE 0.9 % (FLUSH) 0.9 %
3-10 SYRINGE (ML) INJECTION AS NEEDED
Status: DISCONTINUED | OUTPATIENT
Start: 2022-06-22 | End: 2022-06-22 | Stop reason: HOSPADM

## 2022-06-22 RX ORDER — FLUMAZENIL 0.1 MG/ML
0.2 INJECTION INTRAVENOUS AS NEEDED
Status: DISCONTINUED | OUTPATIENT
Start: 2022-06-22 | End: 2022-06-22 | Stop reason: HOSPADM

## 2022-06-22 RX ORDER — FENTANYL CITRATE 50 UG/ML
25 INJECTION, SOLUTION INTRAMUSCULAR; INTRAVENOUS
Status: DISCONTINUED | OUTPATIENT
Start: 2022-06-22 | End: 2022-06-22 | Stop reason: HOSPADM

## 2022-06-22 RX ORDER — NALOXONE HCL 0.4 MG/ML
0.4 VIAL (ML) INJECTION AS NEEDED
Status: DISCONTINUED | OUTPATIENT
Start: 2022-06-22 | End: 2022-06-22 | Stop reason: HOSPADM

## 2022-06-22 RX ORDER — IBUPROFEN 600 MG/1
600 TABLET ORAL ONCE AS NEEDED
Status: DISCONTINUED | OUTPATIENT
Start: 2022-06-22 | End: 2022-06-22 | Stop reason: HOSPADM

## 2022-06-22 RX ORDER — BUPIVACAINE HYDROCHLORIDE 5 MG/ML
INJECTION, SOLUTION EPIDURAL; INTRACAUDAL AS NEEDED
Status: DISCONTINUED | OUTPATIENT
Start: 2022-06-22 | End: 2022-06-22 | Stop reason: HOSPADM

## 2022-06-22 RX ORDER — DROPERIDOL 2.5 MG/ML
0.62 INJECTION, SOLUTION INTRAMUSCULAR; INTRAVENOUS ONCE AS NEEDED
Status: DISCONTINUED | OUTPATIENT
Start: 2022-06-22 | End: 2022-06-22 | Stop reason: HOSPADM

## 2022-06-22 RX ORDER — PROPOFOL 10 MG/ML
INJECTION, EMULSION INTRAVENOUS AS NEEDED
Status: DISCONTINUED | OUTPATIENT
Start: 2022-06-22 | End: 2022-06-22 | Stop reason: SURG

## 2022-06-22 RX ORDER — LIDOCAINE HYDROCHLORIDE 20 MG/ML
INJECTION, SOLUTION EPIDURAL; INFILTRATION; INTRACAUDAL; PERINEURAL AS NEEDED
Status: DISCONTINUED | OUTPATIENT
Start: 2022-06-22 | End: 2022-06-22 | Stop reason: SURG

## 2022-06-22 RX ORDER — SODIUM CHLORIDE 0.9 % (FLUSH) 0.9 %
3 SYRINGE (ML) INJECTION AS NEEDED
Status: DISCONTINUED | OUTPATIENT
Start: 2022-06-22 | End: 2022-06-22 | Stop reason: HOSPADM

## 2022-06-22 RX ORDER — SODIUM CHLORIDE 0.9 % (FLUSH) 0.9 %
3 SYRINGE (ML) INJECTION EVERY 12 HOURS SCHEDULED
Status: DISCONTINUED | OUTPATIENT
Start: 2022-06-22 | End: 2022-06-22 | Stop reason: HOSPADM

## 2022-06-22 RX ORDER — SODIUM CHLORIDE, SODIUM LACTATE, POTASSIUM CHLORIDE, CALCIUM CHLORIDE 600; 310; 30; 20 MG/100ML; MG/100ML; MG/100ML; MG/100ML
1000 INJECTION, SOLUTION INTRAVENOUS CONTINUOUS
Status: DISCONTINUED | OUTPATIENT
Start: 2022-06-22 | End: 2022-06-22 | Stop reason: HOSPADM

## 2022-06-22 RX ORDER — SODIUM CHLORIDE, SODIUM LACTATE, POTASSIUM CHLORIDE, CALCIUM CHLORIDE 600; 310; 30; 20 MG/100ML; MG/100ML; MG/100ML; MG/100ML
100 INJECTION, SOLUTION INTRAVENOUS CONTINUOUS
Status: DISCONTINUED | OUTPATIENT
Start: 2022-06-22 | End: 2022-06-22 | Stop reason: HOSPADM

## 2022-06-22 RX ADMIN — SODIUM CHLORIDE, POTASSIUM CHLORIDE, SODIUM LACTATE AND CALCIUM CHLORIDE 1000 ML: 600; 310; 30; 20 INJECTION, SOLUTION INTRAVENOUS at 07:13

## 2022-06-22 RX ADMIN — PROPOFOL 60 MG: 10 INJECTION, EMULSION INTRAVENOUS at 08:31

## 2022-06-22 RX ADMIN — PROPOFOL 150 MCG/KG/MIN: 10 INJECTION, EMULSION INTRAVENOUS at 08:32

## 2022-06-22 RX ADMIN — LIDOCAINE HYDROCHLORIDE 80 MG: 20 INJECTION, SOLUTION EPIDURAL; INFILTRATION; INTRACAUDAL; PERINEURAL at 08:31

## 2022-06-22 NOTE — OP NOTE
Kingsley Lerma  6/22/2022     PREOPERATIVE DIAGNOSIS: Presence of IVC filter [Z95.828]  Preop testing [Z01.818]     POSTOPERATIVE DIAGNOSIS: Post-Op Diagnosis Codes:     * Presence of IVC filter [Z95.828]     * Preop testing [Z01.818]     PROCEDURE PERFORMED:   1.  Ultrasound-guided cannulation of the right internal jugular vein  2.  Inferior venacavogram with radiographic supervision and interpretation  3.  Retrieval of a Bard Iris IVC filter     SURGEON: Luis Enrique Donaldson DO      ANESTHESIA: MAC    PREPARATION: Routine.    STAFF: Circulator: Madelyn Billy RN  Scrub Person: Lucia Hopkins  Assistant: Evelyn Russell  Vascular Radiology Technician: Hilary Gunn    Estimated Blood Loss: minimal    SPECIMENS: None    COMPLICATIONS: None    INDICATIONS: Kingsley Lerma is a 76 y.o. male who we saw while hospitalized  And found to have left lower lobe pulmonary embolus with no evidence of right heart strain.  He was found to have deep vein thrombus to bilateral lower extremities.  Due to GI bleeding while previously anticoagulated he is unable to take anticoagulation. He did have an IVC filter placed 1/31/2022.  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  He has discussed watchman device with his cardiologist and if new data comes of the does not require anticoagulation, they will revisit.  In any case, the IVC filter would have to be removed.  He also would like to transfer his care to have everything at [facility].  He previously followed with [facility] vascular and in 2020 underwent bilateral femoral endarterectomies with placement of bilateral kissing iliac stents by Dr. Acuña. The indications, risks, and possible complications of the procedure were explained to the patient, who voiced understanding and wished to proceed with surgery.     PROCEDURE IN DETAIL: The patient was taken to the operating room and placed on  the operating table in a supine position. After MAC anesthesia was obtained, the bilateral groins was prepped and draped in a sterile manner.  Under ultrasound guidance and using a micropuncture technique the right internal jugular vein was cannulated and a micro sheath was placed.  The advantage Glidewire was advanced down into the IVC under fluoroscopic guidance.  The Bard sheath was then placed over the wire down into the IVC.  The inner dilator and wire were removed.  An inferior venacavogram was performed.  There was no clot burden noted in the filter.  The snare device was placed through the sheath and the filter was easily snared and retrieved into the sheath and removed in its entirety.  Completion inferior venacavogram was performed which showed no extravasation from the IVC.  The sheath was then removed.  Direct pressure was held for an additional 10 minutes to help ensure hemostasis.  Sterile dressings were applied. The patient tolerated the procedure well. Sponge and needle counts were correct. The patient was then awakened and extubated in the operating room and taken to the recovery room in good condition.    Luis Enrique Donaldson,   Date: 6/22/2022 Time: 09:04 CDT

## 2022-06-22 NOTE — ANESTHESIA PREPROCEDURE EVALUATION
Anesthesia Evaluation     Patient summary reviewed and Nursing notes reviewed   no history of anesthetic complications:  NPO Solid Status: > 8 hours  NPO Liquid Status: > 8 hours           Airway   Mallampati: I  TM distance: >3 FB  Neck ROM: full  No difficulty expected  Dental          Pulmonary    (+) pulmonary embolism (7 months ago), a smoker Current,   (-) sleep apnea  Cardiovascular   Exercise tolerance: good (4-7 METS)    (+) hypertension, dysrhythmias Atrial Fib, hyperlipidemia,       Neuro/Psych  (-) seizures, TIA, CVA  GI/Hepatic/Renal/Endo    (+)  GERD, GI bleeding ,   (-) liver disease, no renal disease, diabetes    Musculoskeletal     Abdominal    Substance History   (+) alcohol use (quit feb 2020),      OB/GYN          Other   arthritis,                      Anesthesia Plan    ASA 3     MAC     intravenous induction     Anesthetic plan, risks, benefits, and alternatives have been provided, discussed and informed consent has been obtained with: patient.        CODE STATUS:

## 2022-06-22 NOTE — ANESTHESIA POSTPROCEDURE EVALUATION
Patient: Kingsley Lerma    Procedure Summary     Date: 06/22/22 Room / Location: [facility] OR  / [facility] HYBRID OR 12    Anesthesia Start: 0828 Anesthesia Stop: 0922    Procedure: VENA CAVA FILTER REMOVAL (Right Neck) Diagnosis:       Presence of IVC filter      Preop testing      (Presence of IVC filter [Z95.828])      (Preop testing [Z01.818])    Surgeons: Luis Enrique Donaldosn DO Provider: Yumiko Taylor CRNA    Anesthesia Type: MAC ASA Status: 3          Anesthesia Type: MAC    Vitals  Vitals Value Taken Time   /55 06/22/22 0936   Temp 98 °F (36.7 °C) 06/22/22 0935   Pulse 54 06/22/22 0940   Resp 15 06/22/22 0940   SpO2 99 % 06/22/22 0940           Post Anesthesia Care and Evaluation    Patient location during evaluation: PACU  Patient participation: complete - patient participated  Level of consciousness: awake and alert  Pain management: adequate    Airway patency: patent  Anesthetic complications: No anesthetic complications    Cardiovascular status: acceptable  Respiratory status: acceptable  Hydration status: acceptable    Comments: Blood pressure 139/60, pulse 58, temperature 98 °F (36.7 °C), temperature source Temporal, resp. rate 15, SpO2 98 %.    Pt discharged from PACU based on drea score >8

## 2022-06-23 ENCOUNTER — TELEPHONE (OUTPATIENT)
Dept: VASCULAR SURGERY | Age: 77
End: 2022-06-23

## 2022-06-23 NOTE — TELEPHONE ENCOUNTER
Ibrahima Xiong called to cancel testing scheduled on 7/6 and does not wish to reschedule at this time.  If needed, please return call @ [phone]      Thank you

## 2022-06-23 NOTE — TELEPHONE ENCOUNTER
Left Mr. Schuster Hamper a message to remind the patient of his vascular testing on 07/06/2022. I let him know that the provider will be out of the office that day and someone will call him with the results.

## 2022-06-29 ENCOUNTER — TELEPHONE (OUTPATIENT)
Dept: VASCULAR SURGERY | Age: 77
End: 2022-06-29

## 2022-07-05 ENCOUNTER — TELEPHONE (OUTPATIENT)
Dept: VASCULAR SURGERY | Facility: CLINIC | Age: 77
End: 2022-07-05

## 2022-07-06 ENCOUNTER — OFFICE VISIT (OUTPATIENT)
Dept: VASCULAR SURGERY | Facility: CLINIC | Age: 77
End: 2022-07-06

## 2022-07-06 VITALS
OXYGEN SATURATION: 98 % | SYSTOLIC BLOOD PRESSURE: 100 MMHG | DIASTOLIC BLOOD PRESSURE: 40 MMHG | WEIGHT: 146 LBS | HEART RATE: 57 BPM | HEIGHT: 67 IN | BODY MASS INDEX: 22.91 KG/M2

## 2022-07-06 DIAGNOSIS — I10 ESSENTIAL HYPERTENSION: ICD-10-CM

## 2022-07-06 DIAGNOSIS — I73.9 PAD (PERIPHERAL ARTERY DISEASE): ICD-10-CM

## 2022-07-06 DIAGNOSIS — Z72.0 TOBACCO USE: ICD-10-CM

## 2022-07-06 DIAGNOSIS — Z95.828 S/P IVC FILTER: Primary | ICD-10-CM

## 2022-07-06 PROCEDURE — 99024 POSTOP FOLLOW-UP VISIT: CPT | Performed by: NURSE PRACTITIONER

## 2022-07-19 PROBLEM — Z95.828 PRESENCE OF IVC FILTER: Status: RESOLVED | Noted: 2022-05-26 | Resolved: 2022-07-19

## 2022-07-19 NOTE — PROGRESS NOTES
"07/06/2022         Yohana Flanagan, APRN  [address]  [place] [address]        Kingsley Lerma  1945      Chief Complaint   Patient presents with   • Post-op     Pt here for 2 week post op for IVC filter pt states no issues regarding his condition   Pt denies any stroke like Sx  Pt is a current everyday smoker        Dear Yohana Flanagan, APRN:   I had the pleasure of seeing you patient in the office today for follow up.  As you recall, the patient is a 76 y.o. male who we saw while hospitalized  And found to have left lower lobe pulmonary embolus with no evidence of right heart strain.  He was found to have deep vein thrombus to bilateral lower extremities.  Due to GI bleeding while previously anticoagulated he is unable to take anticoagulation. He did have an IVC filter placed 1/31/2022.  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  He has discussed watchman device with his cardiologist and if new data comes of the does not require anticoagulation, they will revisit.  He previously followed with [facility] vascular and in 2020 underwent bilateral femoral endarterectomies with placement of bilateral kissing iliac stents by Dr. Acuña.  He did undergo removal of IVC filter on 6/22/2022.  He has done well and puncture site has healed.    Review of Systems   Constitutional: Negative.    HENT: Negative.    Eyes: Negative.    Respiratory: Negative.    Cardiovascular: Negative.    Gastrointestinal: Negative.    Endocrine: Negative.    Genitourinary: Negative.    Musculoskeletal: Negative.    Skin: Negative.    Allergic/Immunologic: Negative.    Neurological: Negative.    Hematological: Negative.    Psychiatric/Behavioral: Negative.    All other systems reviewed and are negative.       /40   Pulse 57   Ht 170.2 cm (67\")   Wt 66.2 kg (146 lb)   SpO2 98%   BMI 22.87 kg/m²    Physical Exam  Vitals and nursing note " reviewed.   Constitutional:       Appearance: He is well-developed.   HENT:      Head: Normocephalic and atraumatic.   Eyes:      General: No scleral icterus.     Pupils: Pupils are equal, round, and reactive to light.   Neck:      Thyroid: No thyromegaly.      Comments: Puncture site healed  Cardiovascular:      Rate and Rhythm: Normal rate and regular rhythm.      Pulses: Normal pulses.      Heart sounds: Normal heart sounds.   Pulmonary:      Effort: Pulmonary effort is normal.      Breath sounds: Normal breath sounds.   Abdominal:      General: Bowel sounds are normal.      Palpations: Abdomen is soft.   Musculoskeletal:         General: Normal range of motion.      Cervical back: Normal range of motion and neck supple.   Skin:     General: Skin is warm and dry.   Neurological:      General: No focal deficit present.      Mental Status: He is alert and oriented to person, place, and time.   Psychiatric:         Mood and Affect: Mood normal.         Behavior: Behavior normal.         Thought Content: Thought content normal.         Judgment: Judgment normal.              Patient Active Problem List   Diagnosis   • Essential hypertension   • PVD (peripheral vascular disease) ([facility])   • Atrial fibrillation and flutter ([facility])   • Alcohol abuse   • Non healing left heel wound   • Atrial fibrillation status post cardioversion ([facility])   • Tobacco use   • Syncope   • GI bleed   • Rectal bleeding   • Heme + stool   • Acute blood loss anemia   • Gastrointestinal hemorrhage   • Osteoarthritis of right hip   • AVM (arteriovenous malformation) of colon   • BPH without obstruction/lower urinary tract symptoms   • Abnormal CT scan, bladder   • Hypokalemia   • Diarrhea   • Acute pulmonary embolism ([facility])   • Severe malnutrition (CMS/[facility])   • Preop testing       ICD-10-CM ICD-9-CM   1. S/P IVC filter  Z95.828 V45.89   2. Essential hypertension  I10 401.9   3. PAD (peripheral artery disease) ([facility])  I73.9 443.9   4. Tobacco use  Z72.0  305.1         PLAN: After thoroughly evaluating Kingsley Lerma, I am pleased to report he is doing well status post vena cava filter removal.  Puncture site has healed.  We will see him back at his regularly scheduled appointment in July with his testing including a carotid duplex and ABIs.  I did discuss vascular risk factors as they pertain to the progression of vascular disease including controlling his hypertension.  His blood pressure is stable.  Unfortunately he does smoke cigars on a daily basis and does not have a desire to quit smoking at this time.  The patient is to continue taking their medications as previously discussed.   This was all discussed in full with complete understanding.    Thanks you for allowing me to participate in the care of your patient.  Please do not hesitate to call with any questions or concerns.  We will keep you aware of any further encounters with Kingsley Lerma.      Sincerely Yours,        TAJ Patricia

## 2022-07-20 ENCOUNTER — TELEPHONE (OUTPATIENT)
Dept: VASCULAR SURGERY | Facility: CLINIC | Age: 77
End: 2022-07-20

## 2022-07-20 NOTE — TELEPHONE ENCOUNTER
Attempted to contact pt to remind for testing tomorrow.  No answer.  If pt calls back please inform to arrive at [facility] at 1230 for 1 pm appt and then Dr. Donaldson at 230 after appt.

## 2022-07-21 ENCOUNTER — OFFICE VISIT (OUTPATIENT)
Dept: VASCULAR SURGERY | Facility: CLINIC | Age: 77
End: 2022-07-21

## 2022-07-21 ENCOUNTER — HOSPITAL ENCOUNTER (OUTPATIENT)
Dept: ULTRASOUND IMAGING | Facility: HOSPITAL | Age: 77
Discharge: HOME | End: 2022-07-21

## 2022-07-21 VITALS
OXYGEN SATURATION: 99 % | WEIGHT: 144 LBS | DIASTOLIC BLOOD PRESSURE: 68 MMHG | HEART RATE: 65 BPM | SYSTOLIC BLOOD PRESSURE: 152 MMHG | BODY MASS INDEX: 22.6 KG/M2 | HEIGHT: 67 IN

## 2022-07-21 DIAGNOSIS — I65.23 BILATERAL CAROTID ARTERY STENOSIS: ICD-10-CM

## 2022-07-21 DIAGNOSIS — I73.9 PAD (PERIPHERAL ARTERY DISEASE): ICD-10-CM

## 2022-07-21 DIAGNOSIS — G62.9 NEUROPATHY: ICD-10-CM

## 2022-07-21 DIAGNOSIS — I65.23 BILATERAL CAROTID ARTERY STENOSIS: Primary | ICD-10-CM

## 2022-07-21 PROCEDURE — 93923 UPR/LXTR ART STDY 3+ LVLS: CPT

## 2022-07-21 PROCEDURE — 93880 EXTRACRANIAL BILAT STUDY: CPT | Performed by: SURGERY

## 2022-07-21 PROCEDURE — 99214 OFFICE O/P EST MOD 30 MIN: CPT | Performed by: SURGERY

## 2022-07-21 PROCEDURE — 93923 UPR/LXTR ART STDY 3+ LVLS: CPT | Performed by: SURGERY

## 2022-07-21 PROCEDURE — 93880 EXTRACRANIAL BILAT STUDY: CPT

## 2022-07-21 RX ORDER — GABAPENTIN 300 MG/1
300 CAPSULE ORAL DAILY
Qty: 90 CAPSULE | Refills: 10 | Status: SHIPPED | OUTPATIENT
Start: 2022-07-21 | End: 2023-01-19

## 2022-07-21 NOTE — PROGRESS NOTES
7/21/2022         Yohana Flanagan, APRN  [address]  [place] [address]        Kingsley Lerma  1945      Chief Complaint   Patient presents with   • Peripheral Vascular Disease     Patient presents for 2 month follow up on PVD with ABIs. Patient denies swelling to legs, but does report neuropathy to L foot. Pt is not using any compression garments at this time.    • Carotid Artery Disease     Patients presents for 2 month follow up on BL Carotid Artery Stenosis with carotid testing. Pt denies any stroke like symptoms.        Dear Yohana Flanagan, APRN:   I had the pleasure of seeing you patient in the office today for follow up.  As you recall, the patient is a 76 y.o. male who we saw while hospitalized  And found to have left lower lobe pulmonary embolus with no evidence of right heart strain.  He was found to have deep vein thrombus to bilateral lower extremities.  Due to GI bleeding while previously anticoagulated he is unable to take anticoagulation. He did have an IVC filter placed 1/31/2022 and removed 6/22/2022. 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  He previously followed with [facility] vascular and in 2020 underwent bilateral femoral endarterectomies with placement vbx aortic stent of bilateral kissing iliac stents by Dr. Acuña.  He did undergo removal of IVC filter on 6/22/2022.  He did have noninvasive testing performed today, which I did personally review.    Review of Systems   Constitutional: Negative.    HENT: Negative.    Eyes: Negative.    Respiratory: Negative.    Cardiovascular: Negative.    Gastrointestinal: Negative.    Endocrine: Negative.    Genitourinary: Negative.    Musculoskeletal: Negative.    Skin: Negative.    Allergic/Immunologic: Negative.    Neurological: Positive for numbness.   Hematological: Negative.    Psychiatric/Behavioral: Negative.    All other systems reviewed and  "are negative.       /68   Pulse 65   Ht 170.2 cm (67\")   Wt 65.3 kg (144 lb)   SpO2 99%   BMI 22.55 kg/m²    Physical Exam  Vitals and nursing note reviewed.   Constitutional:       Appearance: He is well-developed.   HENT:      Head: Normocephalic and atraumatic.   Eyes:      General: No scleral icterus.     Pupils: Pupils are equal, round, and reactive to light.   Neck:      Thyroid: No thyromegaly.   Cardiovascular:      Rate and Rhythm: Normal rate and regular rhythm.      Pulses:           Femoral pulses are 2+ on the right side and 2+ on the left side.       Dorsalis pedis pulses are detected w/ Doppler on the right side and detected w/ Doppler on the left side.        Posterior tibial pulses are detected w/ Doppler on the right side and detected w/ Doppler on the left side.      Heart sounds: Normal heart sounds.   Pulmonary:      Effort: Pulmonary effort is normal.      Breath sounds: Normal breath sounds.   Abdominal:      General: Bowel sounds are normal.      Palpations: Abdomen is soft.   Musculoskeletal:         General: Normal range of motion.      Cervical back: Normal range of motion and neck supple.      Comments: Uses cane   Skin:     General: Skin is warm and dry.   Neurological:      General: No focal deficit present.      Mental Status: He is alert and oriented to person, place, and time.   Psychiatric:         Mood and Affect: Mood normal.         Behavior: Behavior normal.         Thought Content: Thought content normal.         Judgment: Judgment normal.        US Carotid Bilateral    Result Date: 7/21/2022  Narrative: History: Carotid occlusive disease      Impression: Impression: 1. There is less than 50% stenosis of the right internal carotid artery. 2. There is less than 50% stenosis of the left internal carotid artery. 3. Antegrade flow is demonstrated in bilateral vertebral arteries. 4. There is heavy plaque burden at both bifurcations. Further imaging with a CTA of the neck " may be warranted.  Comments: Bilateral carotid vertebral arterial duplex scan was performed.  Grayscale imaging shows intimal thickening and calcified elements at the carotid bifurcation. The right internal carotid artery peak systolic velocity is 106.7 cm/sec. The end-diastolic velocity is 20.7 cm/sec. The right ICA/CCA ratio is approximately 1.5 . These findings correlate with less than 50% stenosis of the right internal carotid artery.  Grayscale imaging shows intimal thickening and calcified elements at the carotid bifurcation. The left internal carotid artery peak systolic velocity is 109.3 cm/sec. The end-diastolic velocity is 21.3 cm/sec. The left ICA/CCA ratio is approximately 1.2 . These findings correlate with less than 50% stenosis of the left internal carotid artery.  Antegrade flow is demonstrated in bilateral vertebral arteries.  This report was finalized on 07/21/2022 15:22 by Dr. Luis Enrique Donaldson MD.    US Ankle / Brachial Indices Extremity Complete    Result Date: 7/21/2022  Narrative:  History: PAD  Comments: Bilateral lower extremity arterial with multi-level pulse volume recordings and segmental pressures were performed at rest and stress.  The right ankle/brachial index is 0.61. The waveforms are biphasic without dampening.These findings are consistent with moderate arterial insufficiency of the right lower extremity at rest.  The left ankle/brachial index is 0.61. The waveforms are biphasic without dampening. These findings are consistent with moderate arterial insufficiency of the left lower extremity at rest.      Impression: Impression: 1. Moderate arterial insufficiency of the right lower extremity at rest. 2. Moderate arterial insufficiency of the left lower extremity at rest.   This report was finalized on 07/21/2022 15:15 by Dr. Luis Enrique Donaldson MD.    IR Vena Cava Filter Retrieval, FL C Arm During Surgery    Result Date: 6/22/2022  Narrative: Performed by Dr. Donaldson. Please see  procedure note. This report was finalized on 06/22/2022 11:40 by Dr. Luis Enrique Donaldson MD.         Patient Active Problem List   Diagnosis   • Essential hypertension   • PVD (peripheral vascular disease) ([facility])   • Atrial fibrillation and flutter (HCC)   • Alcohol abuse   • Non healing left heel wound   • Atrial fibrillation status post cardioversion (HCC)   • Tobacco use   • Syncope   • GI bleed   • Rectal bleeding   • Heme + stool   • Acute blood loss anemia   • Gastrointestinal hemorrhage   • Osteoarthritis of right hip   • AVM (arteriovenous malformation) of colon   • BPH without obstruction/lower urinary tract symptoms   • Abnormal CT scan, bladder   • Hypokalemia   • Diarrhea   • Acute pulmonary embolism (HCC)   • Severe malnutrition (CMS/HCC)   • Preop testing       ICD-10-CM ICD-9-CM   1. Bilateral carotid artery stenosis  I65.23 433.10     433.30   2. Neuropathy  G62.9 355.9         PLAN: After thoroughly evaluating Kingsley Lerma, I believe the best course of action is to proceed with further imaging including a CTA of the neck for further evaluation of his carotid disease.  Testing shows less than 50% stenosis however there is heavy plaque burden noted at both bifurcations.  His ABIs show moderate arterial insufficiency to his lower extremities.  His femoral pulses are bounding and good Doppler signals noted bilaterally.  I will see him back in 2 weeks to review his CTA of the neck.  He is having problems obtaining Neurontin from his primary care provider.  I will prescribe Neurontin 300 mg once daily with refills.  I did discuss vascular risk factors as they pertain to the progression of vascular disease including controlling his hypertension.  His blood pressure is stable.  Unfortunately he does smoke cigars on a daily basis and does not have a desire to quit smoking at this time.  The patient is to continue taking their medications as previously discussed.   This was all discussed in full with complete  understanding.    Thanks you for allowing me to participate in the care of your patient.  Please do not hesitate to call with any questions or concerns.  We will keep you aware of any further encounters with Kingsley Lerma.      Sincerely Yours,        Luis Enrique Donaldson, DO    Scribed for Dr. Luis Enrique Donaldson by Helen VANG

## 2022-07-22 ENCOUNTER — TELEPHONE (OUTPATIENT)
Dept: VASCULAR SURGERY | Facility: CLINIC | Age: 77
End: 2022-07-22

## 2022-07-22 NOTE — TELEPHONE ENCOUNTER
Returned patients call to let him know our office is willing to always help our patients to their vehicles.     Patient stated he did not have an issue with our office and our office saved him by taking him to his car. He was very appreciative of us taking him to his car . Patient was very appreciative of my call back to him to discuss our transport of patients .

## 2022-08-15 ENCOUNTER — TELEPHONE (OUTPATIENT)
Dept: VASCULAR SURGERY | Facility: CLINIC | Age: 77
End: 2022-08-15

## 2022-08-15 NOTE — TELEPHONE ENCOUNTER
Confirmed testing and f/u appt with pt.  Pt is to call the main  when he arrives at the hospital and transport will get a wheelchair and help him to main registration and to CT scan.

## 2022-08-16 ENCOUNTER — TELEPHONE (OUTPATIENT)
Dept: VASCULAR SURGERY | Facility: CLINIC | Age: 77
End: 2022-08-16

## 2022-08-16 ENCOUNTER — HOSPITAL ENCOUNTER (OUTPATIENT)
Dept: CT IMAGING | Facility: HOSPITAL | Age: 77
Discharge: HOME | End: 2022-08-16
Admitting: SURGERY

## 2022-08-16 ENCOUNTER — OFFICE VISIT (OUTPATIENT)
Dept: VASCULAR SURGERY | Facility: CLINIC | Age: 77
End: 2022-08-16

## 2022-08-16 VITALS
DIASTOLIC BLOOD PRESSURE: 80 MMHG | WEIGHT: 145.2 LBS | HEIGHT: 67 IN | SYSTOLIC BLOOD PRESSURE: 130 MMHG | HEART RATE: 61 BPM | OXYGEN SATURATION: 99 % | BODY MASS INDEX: 22.79 KG/M2

## 2022-08-16 DIAGNOSIS — I65.23 BILATERAL CAROTID ARTERY STENOSIS: ICD-10-CM

## 2022-08-16 DIAGNOSIS — I10 ESSENTIAL HYPERTENSION: ICD-10-CM

## 2022-08-16 DIAGNOSIS — I73.9 PAD (PERIPHERAL ARTERY DISEASE): ICD-10-CM

## 2022-08-16 DIAGNOSIS — I65.23 BILATERAL CAROTID ARTERY STENOSIS: Primary | ICD-10-CM

## 2022-08-16 PROCEDURE — 0 IOPAMIDOL PER 1 ML: Performed by: SURGERY

## 2022-08-16 PROCEDURE — 99214 OFFICE O/P EST MOD 30 MIN: CPT | Performed by: SURGERY

## 2022-08-16 PROCEDURE — 70498 CT ANGIOGRAPHY NECK: CPT

## 2022-08-16 RX ORDER — ATORVASTATIN CALCIUM 20 MG/1
20 TABLET, FILM COATED ORAL DAILY
Qty: 90 TABLET | Refills: 0 | Status: SHIPPED | OUTPATIENT
Start: 2022-08-16 | End: 2022-12-02

## 2022-08-16 RX ORDER — CLOPIDOGREL BISULFATE 75 MG/1
75 TABLET ORAL DAILY
Qty: 30 TABLET | Refills: 1 | Status: SHIPPED | OUTPATIENT
Start: 2022-08-16 | End: 2022-09-08

## 2022-08-16 RX ADMIN — IOPAMIDOL 100 ML: 755 INJECTION, SOLUTION INTRAVENOUS at 08:25

## 2022-08-16 NOTE — PROGRESS NOTES
"08/16/2022         Yohana Flanagan, APRN  [address]  [place] [address]        Kingsley Lerma  1945      Chief Complaint   Patient presents with   • Carotid Artery Disease     CTA neck. Patient denies any stroke like sx. No other problems.             Dear Yohana Flanagan, APRN:   I had the pleasure of seeing you patient in the office today for follow up.  As you recall, the patient is a 76 y.o. male who we saw while hospitalized  And found to have left lower lobe pulmonary embolus with no evidence of right heart strain.  He was found to have deep vein thrombus to bilateral lower extremities.  Due to GI bleeding while previously anticoagulated he is unable to take anticoagulation. He did have an IVC filter placed 1/31/2022 and removed 6/22/2022. 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  He previously followed with [facility] vascular and in 2020 underwent bilateral femoral endarterectomies with placement vbx aortic stent of bilateral kissing iliac stents by Dr. Acuña.  He did have noninvasive testing performed today, which I did personally review.     Review of Systems   Constitutional: Negative.    HENT: Negative.    Eyes: Negative.    Respiratory: Negative.    Cardiovascular: Negative.    Gastrointestinal: Negative.    Endocrine: Negative.    Genitourinary: Negative.    Musculoskeletal: Negative.    Skin: Negative.    Allergic/Immunologic: Negative.    Neurological: Positive for numbness.   Hematological: Negative.    Psychiatric/Behavioral: Negative.    All other systems reviewed and are negative.        /80   Pulse 61   Ht 170.2 cm (67\")   Wt 65.9 kg (145 lb 3.2 oz)   SpO2 99%   BMI 22.74 kg/m²    Physical Exam  Vitals and nursing note reviewed.   Constitutional:       Appearance: He is well-developed.   HENT:      Head: Normocephalic and atraumatic.   Eyes:      General: No scleral icterus.     " Pupils: Pupils are equal, round, and reactive to light.   Neck:      Thyroid: No thyromegaly.      Vascular: No carotid bruit or JVD.   Cardiovascular:      Rate and Rhythm: Normal rate and regular rhythm.      Pulses:           Carotid pulses are 2+ on the right side and 2+ on the left side.       Femoral pulses are 2+ on the right side and 2+ on the left side.       Dorsalis pedis pulses are detected w/ Doppler on the right side and detected w/ Doppler on the left side.        Posterior tibial pulses are detected w/ Doppler on the right side and detected w/ Doppler on the left side.      Heart sounds: Normal heart sounds.   Pulmonary:      Effort: Pulmonary effort is normal.      Breath sounds: Normal breath sounds.   Abdominal:      General: Bowel sounds are normal. There is no distension or abdominal bruit.      Palpations: Abdomen is soft. There is no mass.      Tenderness: There is no abdominal tenderness.   Musculoskeletal:         General: Normal range of motion.      Cervical back: Neck supple.   Lymphadenopathy:      Cervical: No cervical adenopathy.   Skin:     General: Skin is warm and dry.   Neurological:      Mental Status: He is alert and oriented to person, place, and time.      Cranial Nerves: No cranial nerve deficit.      Sensory: No sensory deficit.        Diagnostic Data:   CTA neck personally reviewed and felt to have right carotid 60% and left 80%.     CT Angiogram Neck    Result Date: 8/16/2022  Narrative: EXAMINATION: CT ANGIOGRAM NECK-   8/16/2022 8:12 AM CDT  HISTORY: Carotid artery stenosis; I65.23-Occlusion and stenosis of bilateral carotid arteries  In order to have a CT radiation dose as low as reasonably achievable Automated Exposure Control was utilized for adjustment of the mA and/or KV according to patient size.  DLP in mGycm= 311  The CT angiography of the neck is performed after intravenous contrast enhancement.  The images are acquired in axial plane with subsequent 2-D  reconstruction in coronal and sagittal planes and 3-D maximum intensity projection reconstruction.  There is no previous similar study for comparison. Correlation made with previous CT scan of the head dated 5/5/2020.  The limited visualized thoracic aorta show atheromatous changes. No aneurysmal dilatation or dissection.  Small calcific plaques are seen at the origin of the brachiocephalic trunk. No stenosis. A divides into normal-appearing common carotid and right subclavian arteries.  There is a large calcific plaque at the origin of the right common carotid artery. Approximately 50% diameter stenosis. The remaining proximal left common carotid artery is normal.  There is a large calcific plaque in the distal right common carotid artery before bifurcation. No significant stenosis. The plaque extends into the proximal left internal carotid artery. A less than 50% diameter stenosis. The remaining right internal carotid artery is moderately tortuous without any area of focal stenosis or aneurysmal dilatation. A normal right common carotid artery seen at the base of the skull and in the right carotid canal.  There is a large calcific plaque in the distal left common carotid artery. There is less than 50% diameter stenosis. The plaque extends into the proximal left internal carotid artery with less than 50% diameter stenosis the remaining left internal carotid artery is normal. Normal left internal carotid artery seen in the carotid canal.  There is severe atheromatous changes of the internal carotid arteries bilaterally in the cavernous sinus more severely on the left side. Is a 50-69% stenosis of the left internal carotid artery in the cavernous sinus. A 50% stenosis of the right internal carotid artery in the cavernous sinus.  Normal sized suprasellar internal carotid arteries bilaterally is seen dividing into normal-appearing anterior and middle cerebral arteries.  There is atheromatous plaque at the origin of  both vertebral artery. There is a large calcific plaque in the proximal V2 segment of the left vertebral artery resultant 50% stenosis. Small plaque is seen in the proximal right vertebral artery. No stenosis. Normal size vertebral artery enter the foramen magnum. The right vertebral artery gas attenuated after origin of PICA. Subsequently both joint to make a relatively small basilar artery. The basilar artery divides into a normal-sized left PCA and a small and attenuated P1 segment of the right PCA. A larger than average the right posterior communicating artery joint and the right PCA to the subsequent normal  P2 and P3 segments. No focal stenosis or aneurysmal dilatation.  The review of the soft tissues of the neck show bilateral enhancing nodules in both parotid glands. No lymphadenopathy. Moderate lobulation of the thyroid gland bilaterally at the is noted with probable bilateral nodules which are poorly visualized and evaluated. Limited visualized lungs are unremarkable.      Impression: 1. Atheromatous changes of the common and internal carotid arteries bilaterally is less than 50% diameter stenosis in the neck. A moderate, 50-69% stenosis of the left internal carotid artery in the cavernous sinus. The limited visualized intracranial circulation is unremarkable. 2. Atheromatous plaque in the vertebral arteries bilaterally. Areas of stenosis in both vertebral arteries at detailed above. A right-sided fetal posterior circulation. The details given above. 3. The NASCET criteria was utilized for estimation of carotid arterial stenosis. 4. Enhancing small lobulated nodules in both parotid glands may represent multiple pleomorphic adenomas or Warthin's tumor?. Further follow-up may be obtained. This report was finalized on 08/16/2022 09:30 by Dr. Chelsey Colmenares MD.    US Carotid Bilateral    Result Date: 7/21/2022  Narrative: History: Carotid occlusive disease      Impression: Impression: 1. There is less than  50% stenosis of the right internal carotid artery. 2. There is less than 50% stenosis of the left internal carotid artery. 3. Antegrade flow is demonstrated in bilateral vertebral arteries. 4. There is heavy plaque burden at both bifurcations. Further imaging with a CTA of the neck may be warranted.  Comments: Bilateral carotid vertebral arterial duplex scan was performed.  Grayscale imaging shows intimal thickening and calcified elements at the carotid bifurcation. The right internal carotid artery peak systolic velocity is 106.7 cm/sec. The end-diastolic velocity is 20.7 cm/sec. The right ICA/CCA ratio is approximately 1.5 . These findings correlate with less than 50% stenosis of the right internal carotid artery.  Grayscale imaging shows intimal thickening and calcified elements at the carotid bifurcation. The left internal carotid artery peak systolic velocity is 109.3 cm/sec. The end-diastolic velocity is 21.3 cm/sec. The left ICA/CCA ratio is approximately 1.2 . These findings correlate with less than 50% stenosis of the left internal carotid artery.  Antegrade flow is demonstrated in bilateral vertebral arteries.  This report was finalized on 07/21/2022 15:22 by Dr. Luis Enrique Donaldson MD.    US Ankle / Brachial Indices Extremity Complete    Result Date: 7/21/2022  Narrative:  History: PAD  Comments: Bilateral lower extremity arterial with multi-level pulse volume recordings and segmental pressures were performed at rest and stress.  The right ankle/brachial index is 0.61. The waveforms are biphasic without dampening.These findings are consistent with moderate arterial insufficiency of the right lower extremity at rest.  The left ankle/brachial index is 0.61. The waveforms are biphasic without dampening. These findings are consistent with moderate arterial insufficiency of the left lower extremity at rest.      Impression: Impression: 1. Moderate arterial insufficiency of the right lower extremity at rest. 2.  Moderate arterial insufficiency of the left lower extremity at rest.   This report was finalized on 07/21/2022 15:15 by Dr. Luis Enrique Donaldson MD.         Patient Active Problem List   Diagnosis   • Essential hypertension   • PVD (peripheral vascular disease) (HCC)   • Atrial fibrillation and flutter (HCC)   • Alcohol abuse   • Non healing left heel wound   • Atrial fibrillation status post cardioversion (HCC)   • Tobacco use   • Syncope   • GI bleed   • Rectal bleeding   • Heme + stool   • Acute blood loss anemia   • Gastrointestinal hemorrhage   • Osteoarthritis of right hip   • AVM (arteriovenous malformation) of colon   • BPH without obstruction/lower urinary tract symptoms   • Abnormal CT scan, bladder   • Hypokalemia   • Diarrhea   • Acute pulmonary embolism (HCC)   • Severe malnutrition (CMS/HCC)   • Preop testing     No diagnosis found.      PLAN: After thoroughly evaluating Kingsley Lerma, I believe the best course of action is to proceed with left transcarotid artery revascularization.  Risks of TCAR include, but are not limited to, bleeding, infection, vessel damage, nerve damage, MI, stroke, and death.  The patient understands these risks and would like to proceed with the procedure.  I did review his testing noting about 60% right carotid stenosis and 80% left carotid stenosis.  I will send in Plavix and Lipitor.  He already takes aspirin.  He will need to continue these uninterrupted. He does have increased bleeding risk but will need to be on prior to TCAR and at least for one month post op, then may be able to stay on aspirin after that time.   I have reached out to Dr. Farias to see if any further testing is needed prior to clearance.  Once I receive communication, I will schedule him appropriately.  His ABIs show moderate arterial insufficiency to his lower extremities.  His femoral pulses are bounding and good Doppler signals noted bilaterally. Previously, I did prescribe neurontin daily with  refills.  I did discuss vascular risk factors as they pertain to the progression of vascular disease including controlling his hypertension.  His blood pressure is currently stable and controlled.   Unfortunately he does smoke cigars on a daily basis and does not have a desire to quit smoking at this time.  The patient is to continue taking their medications as previously discussed.   This was all discussed in full with complete understanding.    Thanks you for allowing me to participate in the care of your patient.  Please do not hesitate to call with any questions or concerns.  We will keep you aware of any further encounters with Kingsley Lerma.      Sincerely Yours,        Luis Enrique Donaldson, DO

## 2022-08-16 NOTE — TELEPHONE ENCOUNTER
Pt call and asked if he was to continue to take the 81mg aspirin or not.  Per Dr Donaldson, pt is to take Plavix, Lipitor (starting today or tomorrow) and 81 mg aspirin until Dr Donaldson tells him to stop.  Pt voiced understanding.

## 2022-08-23 ENCOUNTER — PREP FOR SURGERY (OUTPATIENT)
Dept: OTHER | Facility: HOSPITAL | Age: 77
End: 2022-08-23

## 2022-08-23 ENCOUNTER — TELEPHONE (OUTPATIENT)
Dept: VASCULAR SURGERY | Facility: CLINIC | Age: 77
End: 2022-08-23

## 2022-08-23 DIAGNOSIS — Z79.02 ENCOUNTER FOR MONITORING ANTIPLATELET THERAPY: ICD-10-CM

## 2022-08-23 DIAGNOSIS — Z51.81 ENCOUNTER FOR MONITORING ANTIPLATELET THERAPY: ICD-10-CM

## 2022-08-23 DIAGNOSIS — Z01.818 PREOP TESTING: ICD-10-CM

## 2022-08-23 DIAGNOSIS — I65.22 LEFT CAROTID STENOSIS: Primary | ICD-10-CM

## 2022-08-23 NOTE — TELEPHONE ENCOUNTER
PT CALLED ABOUT SLIGHT BLEEDING WANTING TO KNOW IF PLAVIX COULD BE THE CAUSE. SPOKE WITH DR DIAZ PT IS TO CONTINUE TAKING MEDICATION AND TO RETURN CALL IF HE NOTICES BLOOD AGAIN.

## 2022-08-25 ENCOUNTER — TELEPHONE (OUTPATIENT)
Dept: PODIATRY | Facility: CLINIC | Age: 77
End: 2022-08-25

## 2022-08-25 NOTE — TELEPHONE ENCOUNTER
Patient states that he saw a little bit of blood this morning when he was taking a shower and after a BM.  He said that he didn't have to strain for this BM.  He said that it was very faint coloring.  He is taking a stool softener since he takes iron.  (He said that he has taken Xarelto in the past and he has issues with the lower colon that is thin).  I told him that I would discuss this with Helen and call him back.      Helen stated that if he has any more significant bleeding to contact his PCP and let him know.      I called him back and gave him this information.  He said that [facility] and they won't get anything done very quickly.  I told him that if he begins to have significant bleeding to call me back and let me know and we can advise further from there.  He said that he would do this.

## 2022-08-29 ENCOUNTER — TELEPHONE (OUTPATIENT)
Dept: VASCULAR SURGERY | Facility: CLINIC | Age: 77
End: 2022-08-29

## 2022-08-29 NOTE — TELEPHONE ENCOUNTER
Left a message for the patient to call back to give him the message Helen sent me about his medication.

## 2022-09-08 RX ORDER — CLOPIDOGREL BISULFATE 75 MG/1
TABLET ORAL
Qty: 30 TABLET | Refills: 1 | Status: SHIPPED | OUTPATIENT
Start: 2022-09-08 | End: 2022-11-03

## 2022-09-26 ENCOUNTER — PREP FOR SURGERY (OUTPATIENT)
Dept: OTHER | Facility: HOSPITAL | Age: 77
End: 2022-09-26

## 2022-09-26 DIAGNOSIS — Z79.02 ENCOUNTER FOR MONITORING ANTIPLATELET THERAPY: ICD-10-CM

## 2022-09-26 DIAGNOSIS — Z51.81 ENCOUNTER FOR MONITORING ANTIPLATELET THERAPY: ICD-10-CM

## 2022-09-26 DIAGNOSIS — I65.22 LEFT CAROTID STENOSIS: Primary | ICD-10-CM

## 2022-09-26 DIAGNOSIS — Z01.818 PREOP TESTING: ICD-10-CM

## 2022-10-07 RX ORDER — DILTIAZEM HYDROCHLORIDE 120 MG/1
120 CAPSULE, COATED, EXTENDED RELEASE ORAL DAILY
Qty: 90 CAPSULE | Refills: 3 | Status: SHIPPED | OUTPATIENT
Start: 2022-10-07

## 2022-10-07 NOTE — TELEPHONE ENCOUNTER
Caller: JOSE LUIS     Relationship: SELF    Best call back number: [phone]    Requested Prescriptions:   Requested Prescriptions     Pending Prescriptions Disp Refills   • dilTIAZem CD (CARDIZEM CD) 120 MG 24 hr capsule 90 capsule 3     Sig: Take 1 capsule by mouth Daily.        Pharmacy where request should be sent:   [facility]    Additional details provided by patient:  90 DAYS PLEASE    Does the patient have less than a 3 day supply:  [x] Yes  [] No    Carlos Ryder Rep   10/07/22 08:49 CDT

## 2022-11-02 RX ORDER — ATORVASTATIN CALCIUM 20 MG/1
TABLET, FILM COATED ORAL
Qty: 90 TABLET | Refills: 0 | OUTPATIENT
Start: 2022-11-02

## 2022-11-03 ENCOUNTER — OFFICE VISIT (OUTPATIENT)
Dept: GASTROENTEROLOGY | Facility: CLINIC | Age: 77
End: 2022-11-03

## 2022-11-03 VITALS
BODY MASS INDEX: 22.76 KG/M2 | WEIGHT: 145 LBS | HEART RATE: 59 BPM | OXYGEN SATURATION: 98 % | TEMPERATURE: 96.9 F | DIASTOLIC BLOOD PRESSURE: 68 MMHG | SYSTOLIC BLOOD PRESSURE: 132 MMHG | HEIGHT: 67 IN

## 2022-11-03 DIAGNOSIS — Z79.01 ANTICOAGULATED: ICD-10-CM

## 2022-11-03 DIAGNOSIS — K55.20 AVM (ARTERIOVENOUS MALFORMATION) OF COLON: Primary | ICD-10-CM

## 2022-11-03 DIAGNOSIS — R19.5 POSITIVE FIT (FECAL IMMUNOCHEMICAL TEST): ICD-10-CM

## 2022-11-03 PROBLEM — K62.5 RECTAL BLEEDING: Status: ACTIVE | Noted: 2022-11-03

## 2022-11-03 PROCEDURE — 99214 OFFICE O/P EST MOD 30 MIN: CPT | Performed by: NURSE PRACTITIONER

## 2022-11-03 NOTE — PROGRESS NOTES
Primary Physician: Yohana Flanagan APRN    Chief Complaint   Patient presents with   • Positive FIT test     Pt [facility]-came [facility] positive        Subjective     Kingsley Lerma is a 77 y.o. male.    HPI   FIT Test +  Patient had a stool fit test recently [facility] and this was noted to be positive.  No Obvious bright red blood or Melena stools.  No abdominal pain.  No weight loss.  Chronic constipation which is self treated with OTC laxatives about 1-2 times per week with great results.  No nausea, vomiting, reflux.  Appetite is great and he has NO dysphagia.    No family hx colon cancer.  Pt takes a baby asa daily.  Pt denies any NSAID USE.    Last endoscopy 6/15/2020 unremarkable (this was done for melena/hematochezia).    Colonoscopy 6/8/2020: multiple small angiodysplastic lesions without bleeding in the ascending colon and in the cecum.  Non bleeding internal hemorrhoids.    June 2022 H&H 13.5/42.6  Patient has had a more recent H&H and we are calling for those results for our review as well.    Past Medical History:   Diagnosis Date   • Alcohol abuse    • Arthritis    • Atrial fibrillation (HCC)    • Atrial flutter (HCC)    • BPH (benign prostatic hyperplasia)    • Circulation problem    • COPD (chronic obstructive pulmonary disease) (HCC)    • GERD (gastroesophageal reflux disease)    • History of tobacco abuse    • History of transfusion    • Hyperlipidemia    • Hypertension    • Pulmonary embolism (HCC)    • PVD (peripheral vascular disease) (HCC)    • Vitamin B deficiency    • Vitamin D deficiency        Past Surgical History:   Procedure Laterality Date   • ADENOIDECTOMY     • AORTIC VALVE REPAIR/REPLACEMENT     • APPENDECTOMY     • COLONOSCOPY N/A 6/8/2020    Multiple non-bleeding colonic angiodysplastic lesions; Non-bleeding internal hemorrhoids; The examination was otherwise normal; No specimens collected; Repeat 10 years   • ENDOSCOPY N/A 6/8/2020    Normal  esophagus; Normal stomach; Normal examined duodenum; No specimens collected   • ENDOSCOPY N/A 6/15/2020    Dr. Banuelos-Normal examined duodenum; Normal stomach; Normal esophagus; No specimens collected   • VEIN SURGERY     • VENA CAVA FILTER INSERTION N/A 1/31/2022    Procedure: VENA CAVA FILTER INSERTION;  Surgeon: Luis Enrique Donaldson DO;  Location:  [facility];  Service: Vascular;  Laterality: N/A;   • VENA CAVA FILTER REMOVAL Right 6/22/2022    Procedure: VENA CAVA FILTER REMOVAL;  Surgeon: Luis Enrique Donaldson DO;  Location:  [facility];  Service: Vascular;  Laterality: Right;        Current Outpatient Medications:   •  amiodarone (PACERONE) 200 MG tablet, Take 1 tablet by mouth Daily., Disp: 90 tablet, Rfl: 3  •  aspirin 81 MG EC tablet, Take 1 tablet by mouth Daily., Disp: 30 tablet, Rfl: 11  •  atorvastatin (Lipitor) 20 MG tablet, Take 1 tablet by mouth Daily., Disp: 90 tablet, Rfl: 0  •  Cholecalciferol (VITAMIN D) 50 MCG (2000 UT) tablet, Take 2,000 Units by mouth Daily., Disp: , Rfl:   •  dilTIAZem CD (CARDIZEM CD) 120 MG 24 hr capsule, Take 1 capsule by mouth Daily., Disp: 90 capsule, Rfl: 3  •  ferrous sulfate 325 (65 FE) MG tablet, Take 325 mg by mouth Daily With Breakfast., Disp: , Rfl:   •  finasteride (PROSCAR) 5 MG tablet, Take 5 mg by mouth Daily., Disp: , Rfl:   •  folic acid (FOLVITE) 1 MG tablet, Take 1 mg by mouth Daily., Disp: , Rfl:   •  gabapentin (Neurontin) 300 MG capsule, Take 1 capsule by mouth Daily., Disp: 90 capsule, Rfl: 10  •  LACTOBACILLUS PO, Take 1 tablet by mouth Daily., Disp: , Rfl:   •  lisinopril (PRINIVIL,ZESTRIL) 40 MG tablet, Take 1 tablet by mouth Daily., Disp: 90 tablet, Rfl: 3  •  pantoprazole (PROTONIX) 20 MG EC tablet, Take 20 mg by mouth Daily., Disp: , Rfl:     Allergies   Allergen Reactions   • Prednisone Other (See Comments)     Made him anxious and jittery     • Cortisone Nausea And Vomiting       Social History     Socioeconomic History   •  "Marital status: Single   Tobacco Use   • Smoking status: Some Days     Types: Cigars   • Smokeless tobacco: Never   • Tobacco comments:     Smokes small cigars daily   Vaping Use   • Vaping Use: Never used   Substance and Sexual Activity   • Alcohol use: Not Currently     Alcohol/week: 2.0 standard drinks     Types: 2 Glasses of wine per week     Comment: Daily    • Drug use: Never   • Sexual activity: Defer       Family History   Problem Relation Age of Onset   • Dementia Mother    • Cancer Father    • Pancreatic cancer Father    • Colon cancer Neg Hx    • Colon polyps Neg Hx    • Esophageal cancer Neg Hx    • Liver cancer Neg Hx    • Liver disease Neg Hx    • Rectal cancer Neg Hx    • Stomach cancer Neg Hx        Review of Systems    Objective     /68 (BP Location: Left arm, Patient Position: Sitting, Cuff Size: Adult)   Pulse 59   Temp 96.9 °F (36.1 °C) (Infrared)   Ht 170.2 cm (67\")   Wt 65.8 kg (145 lb)   SpO2 98%   BMI 22.71 kg/m²     Physical Exam  Abdominal:      General: Abdomen is flat. Bowel sounds are normal.      Palpations: Abdomen is soft.      Tenderness: There is no abdominal tenderness.         Lab Results - Last 18 Months   Lab Units 06/16/22  0944 02/28/22  0300 02/21/22  1530 02/14/22  0400 02/04/22  0728 02/03/22  0527 02/02/22  0358 02/01/22  0422 01/27/22  1921 01/27/22  1614 01/17/22  1842   GLUCOSE mg/dL 83 93 76 98 120* 107* 88 87   < > 89 73   BUN mg/dL 20 18 14 17 11 10 11 13   < > 38* 29*   CREATININE mg/dL 0.84 0.62* 0.56* 0.68* 0.65* 0.65* 0.68* 0.56*   < > 1.24 1.10   SODIUM mmol/L 141 143 140 141 139 139 141 141   < > 140 142   POTASSIUM mmol/L 4.5 4.1 3.8 4.8 4.6 4.3 4.3 3.0*   < > 2.5* 3.8   CHLORIDE mmol/L 107 107 104 103 104 106 109* 108*   < > 102 108*   CO2 mmol/L 27.0 30.0* 27.0 28.0 31.0* 29.0 28.0 27.0   < > 26.0 23.0   TOTAL PROTEIN g/dL  --   --   --   --   --  4.9* 4.8* 4.6*  --  6.1 6.4   ALBUMIN g/dL  --   --   --   --   --  2.20* 2.20* 2.10*  --  2.90* " 3.40*   ALT (SGPT) U/L  --   --   --   --   --  22 19 16  --  9 15   AST (SGOT) U/L  --   --   --   --   --  31 27 27  --  13 17   ALK PHOS U/L  --   --   --   --   --  120* 112 100  --  97 78   BILIRUBIN mg/dL  --   --   --   --   --  <0.2 0.2 0.2  --  0.4 0.4   GLOBULIN gm/dL  --   --   --   --   --  2.7 2.6 2.5  --  3.2 3.0   CRP mg/dL  --   --   --   --   --   --   --   --   --  10.85*  --     < > = values in this interval not displayed.       Lab Results - Last 18 Months   Lab Units 06/16/22  0944 02/21/22  1530 02/14/22  0400 02/04/22  0728 02/03/22  0527 02/02/22  0358   HEMOGLOBIN g/dL 13.5 10.4* 10.6* 10.3* 9.0* 9.3*   HEMATOCRIT % 42.6 32.4* 34.6* 33.0* 27.9* 28.7*   MCV fL 100.2* 104.5* 106.5* 99.7* 98.9* 98.6*   WBC 10*3/mm3 7.81 6.41 7.10 7.91 8.83 8.85   RDW % 13.4 16.4* 17.3* 15.8* 15.5* 15.7*   MPV fL 8.8 8.9 8.8 8.2 8.7 9.0   PLATELETS 10*3/mm3 231 318 422 379 323 296   INR  1.09  --   --   --   --   --        Lab Results - Last 18 Months   Lab Units 02/01/22  0422   TSH uIU/mL 2.680               IMPRESSION/PLAN:    Assessment & Plan      Problem List Items Addressed This Visit        Coag and Thromboembolic    Anticoagulated    Overview     ASA 81mg daily            Gastrointestinal Abdominal     Positive FIT (fecal immunochemical test)    Overview     Last endoscopy 6/15/2020 unremarkable (this was done for melena/hematochezia).    Colonoscopy 6/8/2020: multiple small angiodysplastic lesions without bleeding in the ascending colon and in the cecum.  Non bleeding internal hemorrhoids.         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  I told him with the positive fit test I am unable to guarantee that there are no malignancies in his GI tract.  I would recommend at least endoscopy at this time however patient would rather take a more conservative approach and follow along closely.    Follow Up 3  months   We will discuss at that time if he has had any signs of blood in his stool.  Can consider rechecking H&H to make sure it still normal.  Long Discussion with patient that there is no way to rule out any malignancy without an endoscopic evaluation.                  Jessica Dent, APRN  11/03/22  15:01 CDT    Much of this encounter note is an electronic transcription/translation of spoken language to printed text. The electronic translation of spoken language may permit erroneous, or at times, nonsensical words or phrases to be inadvertently transcribed; although I have reviewed the note for such errors, some may still exist.

## 2022-11-04 ENCOUNTER — OFFICE VISIT (OUTPATIENT)
Dept: CARDIOLOGY | Facility: CLINIC | Age: 77
End: 2022-11-04

## 2022-11-04 VITALS
DIASTOLIC BLOOD PRESSURE: 60 MMHG | BODY MASS INDEX: 22.63 KG/M2 | WEIGHT: 144.2 LBS | SYSTOLIC BLOOD PRESSURE: 142 MMHG | HEART RATE: 68 BPM | OXYGEN SATURATION: 96 % | HEIGHT: 67 IN

## 2022-11-04 DIAGNOSIS — Z86.711 HISTORY OF PULMONARY EMBOLISM: ICD-10-CM

## 2022-11-04 DIAGNOSIS — K55.20 AVM (ARTERIOVENOUS MALFORMATION) OF COLON: ICD-10-CM

## 2022-11-04 DIAGNOSIS — I48.91 ATRIAL FIBRILLATION AND FLUTTER: Primary | ICD-10-CM

## 2022-11-04 DIAGNOSIS — K92.1 GASTROINTESTINAL HEMORRHAGE WITH MELENA: ICD-10-CM

## 2022-11-04 DIAGNOSIS — Z72.0 TOBACCO USE: ICD-10-CM

## 2022-11-04 DIAGNOSIS — I10 ESSENTIAL HYPERTENSION: ICD-10-CM

## 2022-11-04 DIAGNOSIS — I73.9 PVD (PERIPHERAL VASCULAR DISEASE): ICD-10-CM

## 2022-11-04 DIAGNOSIS — I48.92 ATRIAL FIBRILLATION AND FLUTTER: Primary | ICD-10-CM

## 2022-11-04 PROCEDURE — 93000 ELECTROCARDIOGRAM COMPLETE: CPT | Performed by: NURSE PRACTITIONER

## 2022-11-04 PROCEDURE — 99214 OFFICE O/P EST MOD 30 MIN: CPT | Performed by: NURSE PRACTITIONER

## 2022-11-04 NOTE — PROGRESS NOTES
Subjective:     Encounter Date: 11/04/22      Patient ID: Kingsley Lerma is a 77 y.o. male.    Chief Complaint: Follow-up atrial fibrillation and flutter    HPI:     77-year-old male returns today for follow-up of atrial fibrillation and flutter.  Anticoagulation was initiated when he was first diagnosed with these in February 2020, then he returned a few months later in May 2020 with syncope (was in atrial flutter and otherwise asymptomatic with this). He was cardioverted that admission on 5/6/20, without a change in symptoms when NSR was restored. He was discharged home on 5/7 on amiodarone (rhythmol was stopped at that time). He returned to ER 5/9 because he found his heart rate to be 130s-140s and he experienced a brief dizzy spell. He was cardioverted for atrial flutter 2:1 with a HR in the 140s. NSR was restored only for a few seconds. His rhythm then proceeded to bounce around from atrial flutter to atrial fibrillation to NSR with various heart rates (70s-140s). He denied any symptoms regardless of what rhythm he was in. He was discharged home with the addition of diltiazem for better rate control when in AF/AFL, and amio and Xarelto were continued. Also of note, his Pletal dose was decreased by half due to interaction with diltiazem.   The patient also has a history of alcohol abuse.     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 EGDs were unremarkable.  He did not require blood transfusion that admission.  He was discharged home on his same medical therapy, including Xarelto for anticoagulation and Pletal for claudication.  He returned a week later with recurrent GI bleeding, and hemoglobin in the range of 6-7, and required 3 units of packed red blood cells that admission.  At that point, Xarelto was discontinued and replaced with aspirin 81 mg daily.  Pletal was continued.  He was discharged on June 18 and  returned again on June 21 with recurrent GI bleeding.  However, during that admission his hemoglobin and hematocrit remained stable and he did not require transfusion.      At follow-up visit in July 2020, he was in sinus rhythm we did discuss potential of watchman device for stroke protection but plan to wait until an upcoming vascular procedure before discussing this further.    On 10/16/2020 he presented to [facility] ER with complaints of bright red blood per rectum for 2 to 3 days.  Hemoglobin was stable.  He did not require blood transfusion.  Pletal was continued, Plavix was discontinued and he was told to take aspirin 325 mg daily that he later reduced to 81mg.    At subsequent follow-ups, since he continued to maintain normal sinus rhythm on amiodarone and continued to have intermittent GI bleeds despite being off of anticoagulation (although he was on Plavix and Pletal), it was felt best not to pursue watchman device placement at that time, given the fact that this would require 6 weeks of aspirin plus anticoagulation followed by dual antiplatelet therapy for a total of 6 months.      I saw him 10/2021 and he continued to maintain sinus rhythm, still on amiodarone.  No changes were made he returns today for routine follow-up.      He came back to see Dr. Farias in May 2022.  He continued to maintain normal sinus rhythm on amiodarone.  He was also taking aspirin.  He had been hospitalized in January/February 2022 with a pulmonary embolism but was not anticoagulated due to his history of GI bleeding.  An IVC filter was placed by Dr. Donaldson.  He denied any bleeding problems at the time of his visit with Dr. Farias.  He also denied any dyspnea, palpitations, chest pain.  He reported he was drinking approximately 1-2 beers per day at that point.  At that visit, Dr. Farias referred the patient to Dr. Donaldson for ongoing vascular care, as the patient wished to follow here rather than [facility] where he previously  followed.  Also, Dr. Farias noted if his IVC filter could be removed watchman device placement may be reconsidered as new data suggests anticoagulation may not be required after watchman device placement.    Today the patient presents for follow-up and reports he did have his IVC filter removed in June.  It also appears further evaluation by Dr. Donaldson led to recommendations for left TCAR.  Dr. Donaldson started the patient on Plavix in addition to aspirin and discontinued Pletal.  The patient tells me after taking Plavix for 1 week he developed dark bloody stools for approximately 4 to 5 days (this resolved after approximately 3 days off of the medicine).  Therefore, the patient states plans were changed and he is now going to have a carotid endarterectomy on 12/7.  At present he is taking aspirin only (no longer on Plavix or Pletal).  He remains on amiodarone for rhythm control.  He denies any palpitations, chest pain, shortness of breath.  Blood pressure is borderline elevated today but he believes it is well controlled at home.  He tells me he is no longer drinking alcohol as of earlier this year.  He continues to smoke cigars.  He saw gastroenterology yesterday and had a positive FIT test-endoscopy was recommended but the patient declined.  He denies having bleeding issues since stopping the Plavix.         History of Present Illness              The following portions of the patient's history were reviewed and updated as appropriate: allergies, current medications, past family history, past medical history, past social history, past surgical history and problem list.    Review of Systems   Constitutional: Negative for malaise/fatigue.   Cardiovascular: Negative for chest pain, claudication, dyspnea on exertion, leg swelling, near-syncope, orthopnea, palpitations, paroxysmal nocturnal dyspnea and syncope.   Respiratory: Negative for cough and shortness of breath.    Hematologic/Lymphatic: Does not bruise/bleed  "easily.   Musculoskeletal: Negative for falls.   Gastrointestinal: Positive for melena. Negative for bloating.   Neurological: Negative for dizziness, light-headedness and weakness.       Allergies   Allergen Reactions   • Prednisone Other (See Comments)     Made him anxious and jittery     • Cortisone Nausea And Vomiting       Current Outpatient Medications:   •  amiodarone (PACERONE) 200 MG tablet, Take 1 tablet by mouth Daily., Disp: 90 tablet, Rfl: 3  •  aspirin 81 MG EC tablet, Take 1 tablet by mouth Daily., Disp: 30 tablet, Rfl: 11  •  atorvastatin (Lipitor) 20 MG tablet, Take 1 tablet by mouth Daily., Disp: 90 tablet, Rfl: 0  •  Cholecalciferol (VITAMIN D) 50 MCG (2000 UT) tablet, Take 2,000 Units by mouth Daily., Disp: , Rfl:   •  dilTIAZem CD (CARDIZEM CD) 120 MG 24 hr capsule, Take 1 capsule by mouth Daily., Disp: 90 capsule, Rfl: 3  •  ferrous sulfate 325 (65 FE) MG tablet, Take 325 mg by mouth Daily With Breakfast., Disp: , Rfl:   •  finasteride (PROSCAR) 5 MG tablet, Take 5 mg by mouth Daily., Disp: , Rfl:   •  folic acid (FOLVITE) 1 MG tablet, Take 1 mg by mouth Daily., Disp: , Rfl:   •  gabapentin (Neurontin) 300 MG capsule, Take 1 capsule by mouth Daily., Disp: 90 capsule, Rfl: 10  •  LACTOBACILLUS PO, Take 1 tablet by mouth Daily., Disp: , Rfl:   •  lisinopril (PRINIVIL,ZESTRIL) 40 MG tablet, Take 1 tablet by mouth Daily., Disp: 90 tablet, Rfl: 3  •  pantoprazole (PROTONIX) 20 MG EC tablet, Take 20 mg by mouth Daily., Disp: , Rfl:          Objective:    /60   Pulse 68   Ht 170.2 cm (67\")   Wt 65.4 kg (144 lb 3.2 oz)   SpO2 96%   BMI 22.58 kg/m²        Vitals and nursing note reviewed.   Constitutional:       General: Not in acute distress.     Appearance: Well-developed. Not diaphoretic.   HENT:      Head: Normocephalic and atraumatic.   Neck:      Vascular: No JVD.   Pulmonary:      Effort: Pulmonary effort is normal. No respiratory distress.   Cardiovascular:      Normal rate. Regular " rhythm.      Murmurs: There is no murmur.   Edema:     Peripheral edema absent.   Skin:     General: Skin is warm and dry.   Neurological:      Mental Status: Alert and oriented to person, place, and time.      Cranial Nerves: No cranial nerve deficit.   Psychiatric:         Behavior: Behavior normal.         Lab Review:   Lab Results   Component Value Date    WBC 7.81 06/16/2022    HGB 13.5 06/16/2022    HCT 42.6 06/16/2022    .2 (H) 06/16/2022     06/16/2022     Lab Results   Component Value Date    GLUCOSE 83 06/16/2022    CALCIUM 9.7 06/16/2022     06/16/2022    K 4.5 06/16/2022    CO2 27.0 06/16/2022     06/16/2022    BUN 20 06/16/2022    CREATININE 0.84 06/16/2022    EGFRIFAFRI >59 09/02/2020    EGFRIFNONA 127 02/28/2022    BCR 23.8 06/16/2022    ANIONGAP 7.0 06/16/2022     Lab Results   Component Value Date    TSH 2.680 02/01/2022     Lab Results   Component Value Date    GLUCOSE 83 06/16/2022    BUN 20 06/16/2022    CREATININE 0.84 06/16/2022    EGFRIFNONA 127 02/28/2022    EGFRIFAFRI >59 09/02/2020    BCR 23.8 06/16/2022    K 4.5 06/16/2022    CO2 27.0 06/16/2022    CALCIUM 9.7 06/16/2022    ALBUMIN 2.20 (L) 02/03/2022    AST 31 02/03/2022    ALT 22 02/03/2022                  ECG 12 Lead    Date/Time: 11/4/2022 1:04 PM  Performed by: Belkys Carty APRN  Authorized by: Belkys Carty APRN   Comparison: compared with previous ECG from 5/4/2022  Similar to previous ECG  Rhythm: sinus rhythm  BPM: 67               6/2022 PFTs    Interpretation :  1.  Spirometry is consistent with a moderate obstructive ventilatory defect.  2.  There is some improvement in spirometry postbronchodilator but a moderate obstructive ventilatory defect is still present.  3.  Lung volumes reveal hyperinflation.  There is also a mild decrease in inspiratory capacity.  4.  Arterial blood gases on room air reveal a mild respiratory alkalosis and otherwise are within normal limits.  5.  When current studies are  compared to studies from June 23, 2021, the patient's current prebronchodilator FVC is essentially unchanged while the patient's FEV1 has decreased compared to previous prebronchodilator studies.  The patient's current postbronchodilator FVC does show a significant decline compared to previous postbronchodilator values.  There has also been a slight drop in the postbronchodilator FEV1 compared the previous postbronchodilator values.  The patient's total lung capacity has increased compared to previous and hyperinflation is now present.        Cuco Cruz MD       Results for orders placed during the hospital encounter of 01/27/22    Adult Transthoracic Echo Complete W/ Cont if Necessary Per Protocol    Interpretation Summary  · Left ventricular systolic function is normal. Left ventricular ejection fraction appears to be 56 - 60%.  · Normal right ventricular cavity size and systolic function noted.  · Mild aortic valve regurgitation is present.      Assessment:      Problem List Items Addressed This Visit        Cardiac and Vasculature    Essential hypertension    PVD (peripheral vascular disease) ([facility])    Atrial fibrillation and flutter ([facility]) - Primary       Coag and Thromboembolic    History of pulmonary embolism       Gastrointestinal Abdominal     GI bleed    AVM (arteriovenous malformation) of colon       Tobacco    Tobacco use       Plan:     1.  Paroxysmal atrial fibrillation and flutter: Established problem, stable.  Maintaining normal sinus rhythm on amiodarone.    -Continue diltiazem for rate control when in atrial fibrillation or flutter.    -Not anticoagulated due to recurrent GI bleeds secondary to colonic AVMs.  Continue aspirin 81 mg daily. CHADSVASC 3.    -As new data supports no anticoagulation after watchman device placement, and rather treatment with aspirin indefinitely and Plavix for 6 months, Dr. Farias did discuss watchman device placement again with the patient at his last visit.   His IVC filter has also since been removed by Dr. Donaldson.  However, the patient tells me today he had recurrent GI bleeding within 1 week of taking aspirin and Plavix, therefore he is now back on aspirin alone.  I discussed this with Dr. Farias, and given the recurrent bleeding on dual antiplatelet therapy, it is felt the risk outweighs the benefit of pursuing watchman device placement at this time as he is maintaining normal sinus rhythm on amiodarone.    2.  Screening for amiodarone toxicity: Most recent TSH and LFTs from earlier this year within normal limits.  -Needs repeat PFTs this June  -Encouraged to discuss getting his eyes checked with his PCP    Although his PFTs do not suggest abnormalities resultant from amiodarone use, they are diagnostic for COPD.  Therefore I have recommended referral to pulmonology, but the patient declines at this time.  He will call back if he develops shortness of breath or would like to reconsider.    3.  Peripheral vascular disease: Now followed by Dr. Donaldson.  He is no longer on Pletal.  He continues aspirin 81 mg daily as well as a moderate intensity statin.  Plans are for carotid endarterectomy on 12/7.  Dr. Donaldson did place him on Plavix in addition to aspirin, but the patient reports he started GI bleeding (melena) approximately 1 week after starting the Plavix, so he is no longer on this.    4.  Pulmonary embolism: January/February 2022.  Not anticoagulated due to GI bleeding issues.  He did have an IVC filter placed at that time, that was removed in June 2022.    5.  Essential hypertension: Borderline elevated today.  Typically well controlled.  Continue current doses of lisinopril and diltiazem    6.  Tobacco abuse: Counseled on the importance of cessation    7.  Recurrent GI bleeding secondary to colonic AVMs - not anticoagulated due to these.  He saw gastroenterology yesterday, who recommended repeat endoscopy given his positive FIT test, but the patient  declined.    8.  History of alcohol abuse: The patient tells me he quit drinking alcohol earlier this year.  He was congratulated on cessation.    F/u 6 months, sooner with new or worsening symptoms.

## 2022-11-07 ENCOUNTER — TELEPHONE (OUTPATIENT)
Dept: CARDIOLOGY | Facility: CLINIC | Age: 77
End: 2022-11-07

## 2022-11-07 RX ORDER — ATORVASTATIN CALCIUM 20 MG/1
TABLET, FILM COATED ORAL
Qty: 90 TABLET | Refills: 0 | OUTPATIENT
Start: 2022-11-07

## 2022-11-07 NOTE — TELEPHONE ENCOUNTER
----- Message from TAJ Rushing sent at 11/4/2022  1:45 PM CDT -----  Please let the patient know I discussed his case with Dr. Farias, and given his recurrent bleeding when he got back on Plavix, it is felt best not to pursue watchman device placement at this time.  Continue aspirin 81 mg daily only (remain off of Plavix unless otherwise instructed by vascular surgery).  Call prior to next appointment with palpitations or other cardiac symptoms or concerns.  Thanks.

## 2022-11-22 ENCOUNTER — PRE-ADMISSION TESTING (OUTPATIENT)
Dept: PREADMISSION TESTING | Facility: HOSPITAL | Age: 77
End: 2022-11-22

## 2022-11-22 ENCOUNTER — TELEPHONE (OUTPATIENT)
Dept: VASCULAR SURGERY | Facility: CLINIC | Age: 77
End: 2022-11-22

## 2022-11-22 ENCOUNTER — HOSPITAL ENCOUNTER (OUTPATIENT)
Dept: GENERAL RADIOLOGY | Facility: HOSPITAL | Age: 77
Discharge: HOME | End: 2022-11-22

## 2022-11-22 VITALS
OXYGEN SATURATION: 99 % | WEIGHT: 149.91 LBS | HEART RATE: 70 BPM | DIASTOLIC BLOOD PRESSURE: 65 MMHG | SYSTOLIC BLOOD PRESSURE: 151 MMHG | HEIGHT: 67 IN | BODY MASS INDEX: 23.53 KG/M2 | RESPIRATION RATE: 20 BRPM

## 2022-11-22 DIAGNOSIS — I65.22 LEFT CAROTID STENOSIS: ICD-10-CM

## 2022-11-22 DIAGNOSIS — Z01.818 PREOP TESTING: ICD-10-CM

## 2022-11-22 DIAGNOSIS — Z51.81 ENCOUNTER FOR MONITORING ANTIPLATELET THERAPY: ICD-10-CM

## 2022-11-22 DIAGNOSIS — Z79.02 ENCOUNTER FOR MONITORING ANTIPLATELET THERAPY: ICD-10-CM

## 2022-11-22 LAB
ANION GAP SERPL CALCULATED.3IONS-SCNC: 8 MMOL/L (ref 5–15)
APTT PPP: 66 SECONDS (ref 24.1–35)
BASOPHILS # BLD AUTO: 0.06 10*3/MM3 (ref 0–0.2)
BASOPHILS NFR BLD AUTO: 0.7 % (ref 0–1.5)
BUN SERPL-MCNC: 20 MG/DL (ref 8–23)
BUN/CREAT SERPL: 27 (ref 7–25)
CALCIUM SPEC-SCNC: 10 MG/DL (ref 8.6–10.5)
CHLORIDE SERPL-SCNC: 106 MMOL/L (ref 98–107)
CO2 SERPL-SCNC: 28 MMOL/L (ref 22–29)
CREAT SERPL-MCNC: 0.74 MG/DL (ref 0.76–1.27)
DEPRECATED RDW RBC AUTO: 53.2 FL (ref 37–54)
EGFRCR SERPLBLD CKD-EPI 2021: 94.5 ML/MIN/1.73
EOSINOPHIL # BLD AUTO: 0.12 10*3/MM3 (ref 0–0.4)
EOSINOPHIL NFR BLD AUTO: 1.4 % (ref 0.3–6.2)
ERYTHROCYTE [DISTWIDTH] IN BLOOD BY AUTOMATED COUNT: 14.1 % (ref 12.3–15.4)
GLUCOSE SERPL-MCNC: 76 MG/DL (ref 65–99)
HCT VFR BLD AUTO: 45.5 % (ref 37.5–51)
HGB BLD-MCNC: 14.2 G/DL (ref 13–17.7)
IMM GRANULOCYTES # BLD AUTO: 0.05 10*3/MM3 (ref 0–0.05)
IMM GRANULOCYTES NFR BLD AUTO: 0.6 % (ref 0–0.5)
INR PPP: 1.06 (ref 0.91–1.09)
LYMPHOCYTES # BLD AUTO: 1.46 10*3/MM3 (ref 0.7–3.1)
LYMPHOCYTES NFR BLD AUTO: 17.6 % (ref 19.6–45.3)
MCH RBC QN AUTO: 32 PG (ref 26.6–33)
MCHC RBC AUTO-ENTMCNC: 31.2 G/DL (ref 31.5–35.7)
MCV RBC AUTO: 102.5 FL (ref 79–97)
MONOCYTES # BLD AUTO: 0.65 10*3/MM3 (ref 0.1–0.9)
MONOCYTES NFR BLD AUTO: 7.9 % (ref 5–12)
NEUTROPHILS NFR BLD AUTO: 5.94 10*3/MM3 (ref 1.7–7)
NEUTROPHILS NFR BLD AUTO: 71.8 % (ref 42.7–76)
NRBC BLD AUTO-RTO: 0 /100 WBC (ref 0–0.2)
PLATELET # BLD AUTO: 242 10*3/MM3 (ref 140–450)
PMV BLD AUTO: 9 FL (ref 6–12)
POTASSIUM SERPL-SCNC: 4.9 MMOL/L (ref 3.5–5.2)
PROTHROMBIN TIME: 13.4 SECONDS (ref 11.9–14.6)
RBC # BLD AUTO: 4.44 10*6/MM3 (ref 4.14–5.8)
SODIUM SERPL-SCNC: 142 MMOL/L (ref 136–145)
WBC NRBC COR # BLD: 8.28 10*3/MM3 (ref 3.4–10.8)

## 2022-11-22 PROCEDURE — 85730 THROMBOPLASTIN TIME PARTIAL: CPT

## 2022-11-22 PROCEDURE — 36415 COLL VENOUS BLD VENIPUNCTURE: CPT

## 2022-11-22 PROCEDURE — 85025 COMPLETE CBC W/AUTO DIFF WBC: CPT

## 2022-11-22 PROCEDURE — 71046 X-RAY EXAM CHEST 2 VIEWS: CPT

## 2022-11-22 PROCEDURE — 85610 PROTHROMBIN TIME: CPT

## 2022-11-22 PROCEDURE — 93010 ELECTROCARDIOGRAM REPORT: CPT | Performed by: INTERNAL MEDICINE

## 2022-11-22 PROCEDURE — 80048 BASIC METABOLIC PNL TOTAL CA: CPT

## 2022-11-22 PROCEDURE — 93005 ELECTROCARDIOGRAM TRACING: CPT

## 2022-11-22 NOTE — TELEPHONE ENCOUNTER
Omar called and wanted to know if everything was okay with all of his pre-work.  I told him that everything looked roughly the same as before.  Nothing looked to be of concern.  He had a question regarding his medications and what he should take and not take.  He said that Peter told him that the time of his arrival and surgery could change before the date of the surgery.  I told him that he could check by the end of next week and they should be able to tell him his arrival time.  I told him that Peter would let him know for sure about his aspirin at that time.

## 2022-11-22 NOTE — DISCHARGE INSTRUCTIONS
Before you come to the hospital        Arrival time: AS DIRECTED BY OFFICE     YOU MAY TAKE THE FOLLOWING MEDICATION(S) THE MORNING OF SURGERY WITH A SIP OF WATER: ***  GABAPENTIN  HOLD LISINOPRIL FOR 24 HRS PRIOR TO SURGERY           ALL OTHER HOME MEDICATION CHECK WITH YOUR PHYSICIAN (especially if you are taking diabetes medicines or blood thinners)    Do not take any Erectile Dysfunction medications (EX: CIALIS, VIAGRA) 24 hours prior to surgery.      If you were given and instructed to use a germ- killing soap, use as directed the night before surgery and again the morning of surgery or as directed by your surgeon.    (See attached information for How to Use Chlorhexidine for Bathing if applicable.)            Eating and drinking restrictions prior to scheduled arrival time    2 Hours before arrival time STOP   Drinking Clear liquids (water, apple juice-no pulp)     6 Hours before arrival time STOP   Milk or drinks that contain milk, full liquids    6 Hours before arrival time STOP   Light meals or foods, such as toast or cereal    8 Hours before arrival time STOP   Heavy foods, such as meat, fried foods, or fatty foods    (It is extremely important that you follow these guidelines to prevent delay or cancelation of your procedure)     Clear Liquids  Water and flavored water                                                                      Clear Fruit juices, such as cranberry juice and apple juice.  Black coffee (NO cream of any kind, including powdered).  Plain tea  Clear bouillon or broth.  Flavored gelatin.  Soda.  Gatorade or Powerade.  Full liquid examples  Juices that have pulp.  Frozen ice pops that contain fruit pieces.  Coffee with creamer  Milk.  Yogurt.                MANAGING PAIN AFTER SURGERY    We know you are probably wondering what your pain will be like after surgery.  Following surgery it is unrealistic to expect you will not have pain.   Pain is how our bodies let us know that something  is wrong or cautions us to be careful.  That said, our goal is to make your pain tolerable.    Methods we may use to treat your pain include (oral or IV medications, PCAs, epidurals, nerve blocks, etc.)   While some procedures require IV pain medications for a short time after surgery, transitioning to pain medications by mouth allows for better management of pain.   Your nurse will encourage you to take oral pain medications whenever possible.  IV medications work almost immediately, but only last a short while.  Taking medications by mouth allows for a more constant level of medication in your blood stream for a longer period of time.      Once your pain is out of control it is harder to get back under control.  It is important you are aware when your next dose of pain medication is due.  If you are admitted, your nurse may write the time of your next dose on the white board in your room to help you remember.      We are interested in your pain and encourage you to inform us about aggravating factors during your visit.   Many times a simple repositioning every few hours can make a big difference.    If your physician says it is okay, do not let your pain prevent you from getting out of bed. Be sure to call your nurse for assistance prior to getting up so you do not fall.      Before surgery, please decide your tolerable pain goal.  These faces help describe the pain ratings we use on a 0-10 scale.   Be prepared to tell us your goal and whether or not you take pain or anxiety medications at home.          Preparing for Surgery  Preparing for surgery is an important part of your care. It can make things go more smoothly and help you avoid complications. The steps leading up to surgery may vary among hospitals. Follow all instructions given to you by your health care providers. Ask questions if you do not understand something. Talk about any concerns that you have.  Here are some questions to consider asking before  your surgery:  If my surgery is not an emergency (is elective), when would be the best time to have the surgery?  What arrangements do I need to make for work, home, or school?  What will my recovery be like? How long will it be before I can return to normal activities?  Will I need to prepare my home? Will I need to arrange care for me or my children?  Should I expect to have pain after surgery? What are my pain management options? Are there nonmedical options that I can try for pain?  Tell a health care provider about:  Any allergies you have.  All medicines you are taking, including vitamins, herbs, eye drops, creams, and over-the-counter medicines.  Any problems you or family members have had with anesthetic medicines.  Any blood disorders you have.  Any surgeries you have had.  Any medical conditions you have.  Whether you are pregnant or may be pregnant.  What are the risks?  The risks and complications of surgery depend on the specific procedure that you have. Discuss all the risks with your health care providers before your surgery. Ask about common surgical complications, which may include:  Infection.  Bleeding or a need for blood replacement (transfusion).  Allergic reactions to medicines.  Damage to surrounding nerves, tissues, or structures.  A blood clot.  Scarring.  Failure of the surgery to correct the problem.  Follow these instructions before the procedure:  Several days or weeks before your procedure  You may have a physical exam by your primary health care provider to make sure it is safe for you to have surgery.  You may have testing. This may include a chest X-ray, blood and urine tests, electrocardiogram (ECG), or other testing.  Ask your health care provider about:  Changing or stopping your regular medicines. This is especially important if you are taking diabetes medicines or blood thinners.  Taking medicines such as aspirin and ibuprofen. These medicines can thin your blood. Do not take  these medicines unless your health care provider tells you to take them.  Taking over-the-counter medicines, vitamins, herbs, and supplements.  Do not use any products that contain nicotine or tobacco, such as cigarettes and e-cigarettes. If you need help quitting, ask your health care provider.  Avoid alcohol.  Ask your health care provider if there are exercises you can do to prepare for surgery.  Eat a healthy diet.   Plan to have someone take you home from the hospital or clinic.  Plan to have a responsible adult care for you for at least 24 hours after you leave the hospital or clinic. This is important.  The day before your procedure  You may be given antibiotic medicine to take by mouth to help prevent infection. Take it as told by your health care provider.  You may be asked to shower with a germ-killing soap.  Follow instructions from your health care provider about eating and drinking restrictions. This includes gum, mints and hard candy.  Pack comfortable clothes according to your procedure.   The day of your procedure  You may need to take another shower with a germ-killing soap before you leave home in the morning.  With a small sip of water, take only the medicines that you are told to take.  Remove all jewelry including rings.   Leave anything you consider valuable at home except hearing aids if needed.  Do not wear any makeup, nail polish, powder, deodorant, lotion, hair accessories, or anything on your skin or body except your clothes.  If you will be staying in the hospital, bring a case to hold your glasses, contacts, or dentures. You may also want to bring your robe and non-skid footwear.  If you wear oxygen at home, bring it with you the day of surgery.  If instructed by your health care provider, bring your sleep apnea device with you on the day of your surgery (if this applies to you).  You may want to leave your suitcase and sleep apnea device in the car until after surgery.   Arrive at the  hospital as scheduled.  Bring a friend or family member with you who can help to answer questions and be present while you meet with your health care provider.  At the hospital  When you arrive at the hospital:  Go to registration located at the main entrance of the hospital. You will be registered and given a beeper and a sticker sheet. Take the stickers to the Outpatient nurses desk and place in the black tray. This is to notify staff that you have arrived. Then return to the lobby to wait.   When your beeper lights up and vibrates proceed through the double doors, under the stairs, and a member of the Outpatient Surgery staff will escort you to your preoperative room.  You may have to wear compression sleeves. These help to prevent blood clots and reduce swelling in your legs.  An IV may be inserted into one of your veins.              In the operating room, you may be given one or more of the following:        A medicine to help you relax (sedative).        A medicine to numb the area (local anesthetic).        A medicine to make you fall asleep (general anesthetic).        A medicine that is injected into an area of your body to numb everything below the                      injection site (regional anesthetic).  You may be given an antibiotic through your IV to help prevent infection.  Your surgical site will be marked or identified.    Contact a health care provider if you:  Develop a fever of more than 100.4°F (38°C) or other feelings of illness during the 48 hours before your surgery.  Have symptoms that get worse.  Have questions or concerns about your surgery.  Summary  Preparing for surgery can make the procedure go more smoothly and lower your risk of complications.  Before surgery, make a list of questions and concerns to discuss with your surgeon. Ask about the risks and possible complications.  In the days or weeks before your surgery, follow all instructions from your health care provider. You may  need to stop smoking, avoid alcohol, follow eating restrictions, and change or stop your regular medicines.  Contact your surgeon if you develop a fever or other signs of illness during the few days before your surgery.  This information is not intended to replace advice given to you by your health care provider. Make sure you discuss any questions you have with your health care provider.  Document Revised: 12/21/2018 Document Reviewed: 10/23/2018  [employer] Patient Education © 2021 [employer]

## 2022-11-23 LAB
QT INTERVAL: 446 MS
QTC INTERVAL: 441 MS

## 2022-12-02 RX ORDER — ATORVASTATIN CALCIUM 20 MG/1
TABLET, FILM COATED ORAL
Qty: 90 TABLET | Refills: 0 | Status: SHIPPED | OUTPATIENT
Start: 2022-12-02 | End: 2023-02-10 | Stop reason: HOSPADM

## 2022-12-05 ENCOUNTER — TELEPHONE (OUTPATIENT)
Dept: VASCULAR SURGERY | Facility: CLINIC | Age: 77
End: 2022-12-05

## 2022-12-05 NOTE — TELEPHONE ENCOUNTER
Kirstie asked for me to contact the patient because he wasn't sure of the medications that he should be taking prior to surgery and when to stop.  I checked with Helen to verify that the patient wasn't taking any blood thinners.  He is taking a low dose aspirin and Lipitor.  I told the patient that from our standpoint he was okay to take the Lipitor.  I asked if he had his pre-work already.  He said yes.  I asked if they requested that he hold any medication.  He said yes, they asked him to hold his Lipitor 24 hours prior.  I told him that if they needed him to hold this that it was okay to do so.  In addition, it would be okay to hold his aspirin for 24 hours.      The patient said that he was given chlorhexidine to clean the area prior to surgery, however, he wasn't going to use it after reading all of the possible side effects.  He said that he had Hibiclins from a prior surgery and was planning to use this since he had before.  I told him that I know that some people use the Hibiclins for surgical procedures and thought this would be okay.      I made Helen aware of all of this information.  I will send a message to Kirstie letting her know.

## 2022-12-06 ENCOUNTER — TELEPHONE (OUTPATIENT)
Dept: VASCULAR SURGERY | Facility: CLINIC | Age: 77
End: 2022-12-06

## 2022-12-06 NOTE — TELEPHONE ENCOUNTER
PT CALLED STATING HE IS HAVE A FAMILY ISSUE AND CANNOT MAKE SURGERY. SURGERY WAS CANCELED AND INFORMED PT WE WOULD CALL BACK TOMORROW ABOUT RESCHEDULING.

## 2022-12-09 ENCOUNTER — TELEPHONE (OUTPATIENT)
Dept: CARDIOLOGY | Facility: CLINIC | Age: 77
End: 2022-12-09

## 2022-12-09 NOTE — TELEPHONE ENCOUNTER
Caller: Kingsley Lerma    Relationship: Self    Best call back number: [phone]    What form or medical record are you requesting: REQUEST FOR SERVICES    Who [facility] this [facility] or [facility] you[facility] [place] [place]    How would you like to receive the form or medical records (pick-up, mail, fax): FAX    If fax, what is the fax number: UNABLE TO OBTAIN  PHONE NUMBER [phone]     Timeframe paperwork needed: ASAP    Additional notes: PT WAS SEEN BY MIRELLA FLETCHER ON 11.4.22 AND ALSO HAD AN ECG PERFORMED AS WELL. THE PT IS REQUIRED TO HAVE A [facility] AUTHORIZATION FOR APPOINTMENTS AS WELL AS TESTING, AND THE AUTH FOR BOTH THE APPOINTMENT AND TESTING WERE NOT OBTAINED BEFORE HAND. THE [facility] IS REQUIRING THE OFFICE TO SUBMIT A REQUEST FOR SERVICES AS SOON AS POSSIBLE FOR THE VISIT ON 11.4.22. PLEASE ADVISE, THANK YOU.

## 2023-01-17 ENCOUNTER — HOSPITAL ENCOUNTER (OUTPATIENT)
Dept: GENERAL RADIOLOGY | Facility: HOSPITAL | Age: 78
Discharge: HOME | End: 2023-01-17
Payer: OTHER GOVERNMENT

## 2023-01-17 ENCOUNTER — PRE-ADMISSION TESTING (OUTPATIENT)
Dept: PREADMISSION TESTING | Facility: HOSPITAL | Age: 78
End: 2023-01-17
Payer: OTHER GOVERNMENT

## 2023-01-17 VITALS
RESPIRATION RATE: 16 BRPM | BODY MASS INDEX: 22.59 KG/M2 | OXYGEN SATURATION: 100 % | HEART RATE: 68 BPM | SYSTOLIC BLOOD PRESSURE: 154 MMHG | WEIGHT: 149.03 LBS | DIASTOLIC BLOOD PRESSURE: 61 MMHG | HEIGHT: 68 IN

## 2023-01-17 DIAGNOSIS — Z01.818 PREOP TESTING: ICD-10-CM

## 2023-01-17 DIAGNOSIS — I65.22 LEFT CAROTID STENOSIS: ICD-10-CM

## 2023-01-17 DIAGNOSIS — Z79.02 ENCOUNTER FOR MONITORING ANTIPLATELET THERAPY: ICD-10-CM

## 2023-01-17 DIAGNOSIS — Z51.81 ENCOUNTER FOR MONITORING ANTIPLATELET THERAPY: ICD-10-CM

## 2023-01-17 LAB
ANION GAP SERPL CALCULATED.3IONS-SCNC: 8 MMOL/L (ref 5–15)
APTT PPP: 63.5 SECONDS (ref 24.1–35)
BASOPHILS # BLD AUTO: 0.05 10*3/MM3 (ref 0–0.2)
BASOPHILS NFR BLD AUTO: 0.6 % (ref 0–1.5)
BUN SERPL-MCNC: 16 MG/DL (ref 8–23)
BUN/CREAT SERPL: 19.8 (ref 7–25)
CALCIUM SPEC-SCNC: 9.6 MG/DL (ref 8.6–10.5)
CHLORIDE SERPL-SCNC: 107 MMOL/L (ref 98–107)
CO2 SERPL-SCNC: 28 MMOL/L (ref 22–29)
CREAT SERPL-MCNC: 0.81 MG/DL (ref 0.76–1.27)
DEPRECATED RDW RBC AUTO: 52.3 FL (ref 37–54)
EGFRCR SERPLBLD CKD-EPI 2021: 91.9 ML/MIN/1.73
EOSINOPHIL # BLD AUTO: 0.14 10*3/MM3 (ref 0–0.4)
EOSINOPHIL NFR BLD AUTO: 1.6 % (ref 0.3–6.2)
ERYTHROCYTE [DISTWIDTH] IN BLOOD BY AUTOMATED COUNT: 14.2 % (ref 12.3–15.4)
GLUCOSE SERPL-MCNC: 79 MG/DL (ref 65–99)
HCT VFR BLD AUTO: 45.9 % (ref 37.5–51)
HGB BLD-MCNC: 14.5 G/DL (ref 13–17.7)
IMM GRANULOCYTES # BLD AUTO: 0.03 10*3/MM3 (ref 0–0.05)
IMM GRANULOCYTES NFR BLD AUTO: 0.4 % (ref 0–0.5)
INR PPP: 0.99 (ref 0.91–1.09)
LYMPHOCYTES # BLD AUTO: 1.42 10*3/MM3 (ref 0.7–3.1)
LYMPHOCYTES NFR BLD AUTO: 16.7 % (ref 19.6–45.3)
MCH RBC QN AUTO: 31.8 PG (ref 26.6–33)
MCHC RBC AUTO-ENTMCNC: 31.6 G/DL (ref 31.5–35.7)
MCV RBC AUTO: 100.7 FL (ref 79–97)
MONOCYTES # BLD AUTO: 0.65 10*3/MM3 (ref 0.1–0.9)
MONOCYTES NFR BLD AUTO: 7.7 % (ref 5–12)
NEUTROPHILS NFR BLD AUTO: 6.2 10*3/MM3 (ref 1.7–7)
NEUTROPHILS NFR BLD AUTO: 73 % (ref 42.7–76)
NRBC BLD AUTO-RTO: 0 /100 WBC (ref 0–0.2)
PLATELET # BLD AUTO: 200 10*3/MM3 (ref 140–450)
PMV BLD AUTO: 8.7 FL (ref 6–12)
POTASSIUM SERPL-SCNC: 4.8 MMOL/L (ref 3.5–5.2)
PROTHROMBIN TIME: 13.2 SECONDS (ref 11.8–14.8)
RBC # BLD AUTO: 4.56 10*6/MM3 (ref 4.14–5.8)
SODIUM SERPL-SCNC: 143 MMOL/L (ref 136–145)
WBC NRBC COR # BLD: 8.49 10*3/MM3 (ref 3.4–10.8)

## 2023-01-17 PROCEDURE — 36415 COLL VENOUS BLD VENIPUNCTURE: CPT

## 2023-01-17 PROCEDURE — 85730 THROMBOPLASTIN TIME PARTIAL: CPT

## 2023-01-17 PROCEDURE — 85610 PROTHROMBIN TIME: CPT

## 2023-01-17 PROCEDURE — 80048 BASIC METABOLIC PNL TOTAL CA: CPT

## 2023-01-17 PROCEDURE — 71046 X-RAY EXAM CHEST 2 VIEWS: CPT

## 2023-01-17 PROCEDURE — 85025 COMPLETE CBC W/AUTO DIFF WBC: CPT

## 2023-01-17 RX ORDER — MULTIPLE VITAMINS W/ MINERALS TAB 9MG-400MCG
1 TAB ORAL NIGHTLY
COMMUNITY

## 2023-01-17 RX ORDER — ASCORBIC ACID 500 MG
1000 TABLET ORAL DAILY
COMMUNITY

## 2023-01-17 NOTE — DISCHARGE INSTRUCTIONS
Before you come to the hospital        Arrival time: AS DIRECTED BY OFFICE     YOU MAY TAKE THE FOLLOWING MEDICATION(S) THE MORNING OF SURGERY WITH A SIP OF WATER: Amioderone and Diltiazem    ***HOLD YOUR LISINOPRIL FOR 24 HOURS PRIOR TO SURGERY***           ALL OTHER HOME MEDICATION CHECK WITH YOUR PHYSICIAN (especially if   you are taking diabetes medicines or blood thinners)    Do not take any Erectile Dysfunction medications (EX: CIALIS, VIAGRA) 24 hours prior to surgery.      If you were given and instructed to use a germ- killing soap, use as directed the night before surgery and again the morning of surgery or as directed by your surgeon. (Use one-half of the bottle with each shower.)   See attached information for How to Use Chlorhexidine for Bathing if applicable.            Eating and drinking restrictions prior to scheduled arrival time    2 Hours before arrival time STOP   Drinking Clear liquids (water, apple juice-no pulp)     6 Hours before arrival time STOP   Milk or drinks that contain milk, full liquids    6 Hours before arrival time STOP   Light meals or foods, such as toast or cereal    8 Hours before arrival time STOP   Heavy foods, such as meat, fried foods, or fatty foods    (It is extremely important that you follow these guidelines to prevent delay or cancelation of your procedure)     Clear Liquids  Water and flavored water                                                                      Clear Fruit juices, such as cranberry juice and apple juice.  Black coffee (NO cream of any kind, including powdered).  Plain tea  Clear bouillon or broth.  Flavored gelatin.  Soda.  Gatorade or Powerade.  Full liquid examples  Juices that have pulp.  Frozen ice pops that contain fruit pieces.  Coffee with creamer  Milk.  Yogurt.                MANAGING PAIN AFTER SURGERY    We know you are probably wondering what your pain will be like after surgery.  Following surgery it is unrealistic to expect you  will not have pain.   Pain is how our bodies let us know that something is wrong or cautions us to be careful.  That said, our goal is to make your pain tolerable.    Methods we may use to treat your pain include (oral or IV medications, PCAs, epidurals, nerve blocks, etc.)   While some procedures require IV pain medications for a short time after surgery, transitioning to pain medications by mouth allows for better management of pain.   Your nurse will encourage you to take oral pain medications whenever possible.  IV medications work almost immediately, but only last a short while.  Taking medications by mouth allows for a more constant level of medication in your blood stream for a longer period of time.      Once your pain is out of control it is harder to get back under control.  It is important you are aware when your next dose of pain medication is due.  If you are admitted, your nurse may write the time of your next dose on the white board in your room to help you remember.      We are interested in your pain and encourage you to inform us about aggravating factors during your visit.   Many times a simple repositioning every few hours can make a big difference.    If your physician says it is okay, do not let your pain prevent you from getting out of bed. Be sure to call your nurse for assistance prior to getting up so you do not fall.      Before surgery, please decide your tolerable pain goal.  These faces help describe the pain ratings we use on a 0-10 scale.   Be prepared to tell us your goal and whether or not you take pain or anxiety medications at home.          Preparing for Surgery  Preparing for surgery is an important part of your care. It can make things go more smoothly and help you avoid complications. The steps leading up to surgery may vary among hospitals. Follow all instructions given to you by your health care providers. Ask questions if you do not understand something. Talk about any  concerns that you have.  Here are some questions to consider asking before your surgery:  If my surgery is not an emergency (is elective), when would be the best time to have the surgery?  What arrangements do I need to make for work, home, or school?  What will my recovery be like? How long will it be before I can return to normal activities?  Will I need to prepare my home? Will I need to arrange care for me or my children?  Should I expect to have pain after surgery? What are my pain management options? Are there nonmedical options that I can try for pain?  Tell a health care provider about:  Any allergies you have.  All medicines you are taking, including vitamins, herbs, eye drops, creams, and over-the-counter medicines.  Any problems you or family members have had with anesthetic medicines.  Any blood disorders you have.  Any surgeries you have had.  Any medical conditions you have.  Whether you are pregnant or may be pregnant.  What are the risks?  The risks and complications of surgery depend on the specific procedure that you have. Discuss all the risks with your health care providers before your surgery. Ask about common surgical complications, which may include:  Infection.  Bleeding or a need for blood replacement (transfusion).  Allergic reactions to medicines.  Damage to surrounding nerves, tissues, or structures.  A blood clot.  Scarring.  Failure of the surgery to correct the problem.  Follow these instructions before the procedure:  Several days or weeks before your procedure  You may have a physical exam by your primary health care provider to make sure it is safe for you to have surgery.  You may have testing. This may include a chest X-ray, blood and urine tests, electrocardiogram (ECG), or other testing.  Ask your health care provider about:  Changing or stopping your regular medicines. This is especially important if you are taking diabetes medicines or blood thinners.  Taking medicines such as  aspirin and ibuprofen. These medicines can thin your blood. Do not take these medicines unless your health care provider tells you to take them.  Taking over-the-counter medicines, vitamins, herbs, and supplements.  Do not use any products that contain nicotine or tobacco, such as cigarettes and e-cigarettes. If you need help quitting, ask your health care provider.  Avoid alcohol.  Ask your health care provider if there are exercises you can do to prepare for surgery.  Eat a healthy diet.   Plan to have someone take you home from the hospital or clinic.  Plan to have a responsible adult care for you for at least 24 hours after you leave the hospital or clinic. This is important.  The day before your procedure  You may be given antibiotic medicine to take by mouth to help prevent infection. Take it as told by your health care provider.  You may be asked to shower with a germ-killing soap.  Follow instructions from your health care provider about eating and drinking restrictions. This includes gum, mints and hard candy.  Pack comfortable clothes according to your procedure.   The day of your procedure  You may need to take another shower with a germ-killing soap before you leave home in the morning.  With a small sip of water, take only the medicines that you are told to take.  Remove all jewelry including rings.   Leave anything you consider valuable at home except hearing aids if needed.  You do not need to bring your home medications into the hospital.   Do not wear any makeup, nail polish, powder, deodorant, lotion, hair accessories, or anything on your skin or body except your clothes.  If you will be staying in the hospital, bring a case to hold your glasses, contacts, or dentures. You may also want to bring your robe and non-skid footwear.       (Do not use denture adhesives since you will be asked to remove them during  surgery).   If you wear oxygen at home, bring it with you the day of surgery.  If  instructed by your health care provider, bring your sleep apnea device with you on the day of your surgery (if this applies to you).  You may want to leave your suitcase and sleep apnea device in the car until after surgery.   Arrive at the hospital as scheduled.  Bring a friend or family member with you who can help to answer questions and be present while you meet with your health care provider.  At the hospital  When you arrive at the hospital:  Go to registration located at the main entrance of the hospital. You will be registered and given a beeper and a sticker sheet. Take the stickers to the Outpatient nurses desk and place in the black tray. This is to notify staff that you have arrived. Then return to the lobby to wait.   When your beeper lights up and vibrates proceed through the double doors, under the stairs, and a member of the Outpatient Surgery staff will escort you to your preoperative room.  You may have to wear compression sleeves. These help to prevent blood clots and reduce swelling in your legs.  An IV may be inserted into one of your veins.              In the operating room, you may be given one or more of the following:        A medicine to help you relax (sedative).        A medicine to numb the area (local anesthetic).        A medicine to make you fall asleep (general anesthetic).        A medicine that is injected into an area of your body to numb everything below the                      injection site (regional anesthetic).  You may be given an antibiotic through your IV to help prevent infection.  Your surgical site will be marked or identified.    Contact a health care provider if you:  Develop a fever of more than 100.4°F (38°C) or other feelings of illness during the 48 hours before your surgery.  Have symptoms that get worse.  Have questions or concerns about your surgery.  Summary  Preparing for surgery can make the procedure go more smoothly and lower your risk of  complications.  Before surgery, make a list of questions and concerns to discuss with your surgeon. Ask about the risks and possible complications.  In the days or weeks before your surgery, follow all instructions from your health care provider. You may need to stop smoking, avoid alcohol, follow eating restrictions, and change or stop your regular medicines.  Contact your surgeon if you develop a fever or other signs of illness during the few days before your surgery.  This information is not intended to replace advice given to you by your health care provider. Make sure you discuss any questions you have with your health care provider.  Document Revised: 12/21/2018 Document Reviewed: 10/23/2018  [employer] Patient Education © 2021 [employer]

## 2023-01-18 ENCOUNTER — APPOINTMENT (OUTPATIENT)
Dept: PREADMISSION TESTING | Facility: HOSPITAL | Age: 78
End: 2023-01-18

## 2023-01-19 DIAGNOSIS — G62.9 NEUROPATHY: ICD-10-CM

## 2023-01-19 RX ORDER — GABAPENTIN 300 MG/1
300 CAPSULE ORAL DAILY
Qty: 30 CAPSULE | Refills: 3 | Status: SHIPPED | OUTPATIENT
Start: 2023-01-19

## 2023-01-24 ENCOUNTER — TELEPHONE (OUTPATIENT)
Dept: VASCULAR SURGERY | Facility: CLINIC | Age: 78
End: 2023-01-24
Payer: OTHER GOVERNMENT

## 2023-01-24 NOTE — TELEPHONE ENCOUNTER
Pt expressed understanding of arrival time for surgery with Dr. Donaldson. Pt also asked if he could drive home due to maybe having transportation issues. I explained due to the type of surgery it is best not to drive. Pt expressed understanding and stated he would make sure he had someone to drive him home.

## 2023-01-25 ENCOUNTER — ANESTHESIA (OUTPATIENT)
Dept: PERIOP | Facility: HOSPITAL | Age: 78
DRG: 39 | End: 2023-01-25
Payer: OTHER GOVERNMENT

## 2023-01-25 ENCOUNTER — HOSPITAL ENCOUNTER (INPATIENT)
Facility: HOSPITAL | Age: 78
LOS: 2 days | Discharge: HOME | DRG: 39 | End: 2023-01-27
Attending: SURGERY | Admitting: SURGERY
Payer: OTHER GOVERNMENT

## 2023-01-25 ENCOUNTER — APPOINTMENT (OUTPATIENT)
Dept: ULTRASOUND IMAGING | Facility: HOSPITAL | Age: 78
DRG: 39 | End: 2023-01-25
Payer: OTHER GOVERNMENT

## 2023-01-25 ENCOUNTER — ANESTHESIA EVENT (OUTPATIENT)
Dept: PERIOP | Facility: HOSPITAL | Age: 78
DRG: 39 | End: 2023-01-25
Payer: OTHER GOVERNMENT

## 2023-01-25 DIAGNOSIS — Z01.818 PREOP TESTING: ICD-10-CM

## 2023-01-25 DIAGNOSIS — I65.22 LEFT CAROTID STENOSIS: ICD-10-CM

## 2023-01-25 LAB
ABO GROUP BLD: NORMAL
BLD GP AB SCN SERPL QL: NEGATIVE
RH BLD: POSITIVE
T&S EXPIRATION DATE: NORMAL

## 2023-01-25 PROCEDURE — 86901 BLOOD TYPING SEROLOGIC RH(D): CPT | Performed by: NURSE PRACTITIONER

## 2023-01-25 PROCEDURE — 03UL0KZ SUPPLEMENT LEFT INTERNAL CAROTID ARTERY WITH NONAUTOLOGOUS TISSUE SUBSTITUTE, OPEN APPROACH: ICD-10-PCS | Performed by: SURGERY

## 2023-01-25 PROCEDURE — 88304 TISSUE EXAM BY PATHOLOGIST: CPT | Performed by: SURGERY

## 2023-01-25 PROCEDURE — C1768 GRAFT, VASCULAR: HCPCS | Performed by: SURGERY

## 2023-01-25 PROCEDURE — 0 LIDOCAINE 1 % SOLUTION: Performed by: SURGERY

## 2023-01-25 PROCEDURE — 88311 DECALCIFY TISSUE: CPT | Performed by: SURGERY

## 2023-01-25 PROCEDURE — S0260 H&P FOR SURGERY: HCPCS | Performed by: SURGERY

## 2023-01-25 PROCEDURE — 35301 RECHANNELING OF ARTERY: CPT | Performed by: SURGERY

## 2023-01-25 PROCEDURE — 25010000002 HEPARIN (PORCINE) PER 1000 UNITS

## 2023-01-25 PROCEDURE — 86850 RBC ANTIBODY SCREEN: CPT | Performed by: NURSE PRACTITIONER

## 2023-01-25 PROCEDURE — 93882 EXTRACRANIAL UNI/LTD STUDY: CPT | Performed by: SURGERY

## 2023-01-25 PROCEDURE — 4A10X4G MONITORING OF CENTRAL NERVOUS ELECTRICAL ACTIVITY, INTRAOPERATIVE, EXTERNAL APPROACH: ICD-10-PCS | Performed by: SURGERY

## 2023-01-25 PROCEDURE — 25010000002 CEFAZOLIN PER 500 MG: Performed by: SURGERY

## 2023-01-25 PROCEDURE — 03CL0ZZ EXTIRPATION OF MATTER FROM LEFT INTERNAL CAROTID ARTERY, OPEN APPROACH: ICD-10-PCS | Performed by: SURGERY

## 2023-01-25 PROCEDURE — 25010000002 FENTANYL CITRATE (PF) 100 MCG/2ML SOLUTION: Performed by: NURSE ANESTHETIST, CERTIFIED REGISTERED

## 2023-01-25 PROCEDURE — 25010000002 PROPOFOL 10 MG/ML EMULSION: Performed by: NURSE ANESTHETIST, CERTIFIED REGISTERED

## 2023-01-25 PROCEDURE — 25010000002 PHENYLEPHRINE 10 MG/ML SOLUTION 5 ML VIAL

## 2023-01-25 PROCEDURE — 25010000002 HEPARIN (PORCINE) PER 1000 UNITS: Performed by: SURGERY

## 2023-01-25 PROCEDURE — 93882 EXTRACRANIAL UNI/LTD STUDY: CPT

## 2023-01-25 PROCEDURE — 25010000002 CEFAZOLIN PER 500 MG: Performed by: NURSE PRACTITIONER

## 2023-01-25 PROCEDURE — 86900 BLOOD TYPING SEROLOGIC ABO: CPT | Performed by: NURSE PRACTITIONER

## 2023-01-25 DEVICE — PTCH VASC XENOSURE BIO 0.8X8CM: Type: IMPLANTABLE DEVICE | Site: CAROTID | Status: FUNCTIONAL

## 2023-01-25 RX ORDER — ATORVASTATIN CALCIUM 10 MG/1
20 TABLET, FILM COATED ORAL NIGHTLY
Status: DISCONTINUED | OUTPATIENT
Start: 2023-01-25 | End: 2023-01-27 | Stop reason: HOSPADM

## 2023-01-25 RX ORDER — SODIUM CHLORIDE, SODIUM LACTATE, POTASSIUM CHLORIDE, CALCIUM CHLORIDE 600; 310; 30; 20 MG/100ML; MG/100ML; MG/100ML; MG/100ML
1000 INJECTION, SOLUTION INTRAVENOUS CONTINUOUS
Status: DISCONTINUED | OUTPATIENT
Start: 2023-01-25 | End: 2023-01-25

## 2023-01-25 RX ORDER — AMIODARONE HYDROCHLORIDE 200 MG/1
200 TABLET ORAL
Status: DISCONTINUED | OUTPATIENT
Start: 2023-01-26 | End: 2023-01-27 | Stop reason: HOSPADM

## 2023-01-25 RX ORDER — OXYCODONE AND ACETAMINOPHEN 10; 325 MG/1; MG/1
1 TABLET ORAL ONCE AS NEEDED
Status: DISCONTINUED | OUTPATIENT
Start: 2023-01-25 | End: 2023-01-25 | Stop reason: HOSPADM

## 2023-01-25 RX ORDER — LIDOCAINE HYDROCHLORIDE 20 MG/ML
INJECTION, SOLUTION EPIDURAL; INFILTRATION; INTRACAUDAL; PERINEURAL AS NEEDED
Status: DISCONTINUED | OUTPATIENT
Start: 2023-01-25 | End: 2023-01-25 | Stop reason: SURG

## 2023-01-25 RX ORDER — SODIUM CHLORIDE 0.9 % (FLUSH) 0.9 %
10 SYRINGE (ML) INJECTION EVERY 12 HOURS SCHEDULED
Status: DISCONTINUED | OUTPATIENT
Start: 2023-01-25 | End: 2023-01-27 | Stop reason: HOSPADM

## 2023-01-25 RX ORDER — DROPERIDOL 2.5 MG/ML
0.62 INJECTION, SOLUTION INTRAMUSCULAR; INTRAVENOUS ONCE AS NEEDED
Status: DISCONTINUED | OUTPATIENT
Start: 2023-01-25 | End: 2023-01-25 | Stop reason: HOSPADM

## 2023-01-25 RX ORDER — PROPOFOL 10 MG/ML
VIAL (ML) INTRAVENOUS AS NEEDED
Status: DISCONTINUED | OUTPATIENT
Start: 2023-01-25 | End: 2023-01-25 | Stop reason: SURG

## 2023-01-25 RX ORDER — BUPIVACAINE HCL/0.9 % NACL/PF 0.1 %
2 PLASTIC BAG, INJECTION (ML) EPIDURAL EVERY 8 HOURS
Status: COMPLETED | OUTPATIENT
Start: 2023-01-26 | End: 2023-01-26

## 2023-01-25 RX ORDER — ASCORBIC ACID 500 MG
1000 TABLET ORAL DAILY
Status: DISCONTINUED | OUTPATIENT
Start: 2023-01-26 | End: 2023-01-27 | Stop reason: HOSPADM

## 2023-01-25 RX ORDER — SODIUM CHLORIDE 0.9 % (FLUSH) 0.9 %
10 SYRINGE (ML) INJECTION AS NEEDED
Status: DISCONTINUED | OUTPATIENT
Start: 2023-01-25 | End: 2023-01-25 | Stop reason: HOSPADM

## 2023-01-25 RX ORDER — FOLIC ACID 1 MG/1
1 TABLET ORAL NIGHTLY
Status: DISCONTINUED | OUTPATIENT
Start: 2023-01-25 | End: 2023-01-27 | Stop reason: HOSPADM

## 2023-01-25 RX ORDER — CLOPIDOGREL BISULFATE 75 MG/1
75 TABLET ORAL DAILY
Status: DISCONTINUED | OUTPATIENT
Start: 2023-01-25 | End: 2023-01-27 | Stop reason: HOSPADM

## 2023-01-25 RX ORDER — NALOXONE HCL 0.4 MG/ML
0.4 VIAL (ML) INJECTION
Status: DISCONTINUED | OUTPATIENT
Start: 2023-01-25 | End: 2023-01-27 | Stop reason: HOSPADM

## 2023-01-25 RX ORDER — GLYCOPYRROLATE 0.2 MG/ML
INJECTION INTRAMUSCULAR; INTRAVENOUS AS NEEDED
Status: DISCONTINUED | OUTPATIENT
Start: 2023-01-25 | End: 2023-01-25 | Stop reason: SURG

## 2023-01-25 RX ORDER — DILTIAZEM HYDROCHLORIDE 120 MG/1
120 CAPSULE, COATED, EXTENDED RELEASE ORAL DAILY
Status: DISCONTINUED | OUTPATIENT
Start: 2023-01-26 | End: 2023-01-27 | Stop reason: HOSPADM

## 2023-01-25 RX ORDER — FENTANYL CITRATE 50 UG/ML
25 INJECTION, SOLUTION INTRAMUSCULAR; INTRAVENOUS
Status: DISCONTINUED | OUTPATIENT
Start: 2023-01-25 | End: 2023-01-25 | Stop reason: HOSPADM

## 2023-01-25 RX ORDER — L.ACID,PARA/B.BIFIDUM/S.THERM 8B CELL
1 CAPSULE ORAL NIGHTLY
Status: DISCONTINUED | OUTPATIENT
Start: 2023-01-25 | End: 2023-01-27 | Stop reason: HOSPADM

## 2023-01-25 RX ORDER — FENTANYL CITRATE 50 UG/ML
INJECTION, SOLUTION INTRAMUSCULAR; INTRAVENOUS AS NEEDED
Status: DISCONTINUED | OUTPATIENT
Start: 2023-01-25 | End: 2023-01-25 | Stop reason: SURG

## 2023-01-25 RX ORDER — EPHEDRINE SULFATE 50 MG/ML
INJECTION INTRAVENOUS AS NEEDED
Status: DISCONTINUED | OUTPATIENT
Start: 2023-01-25 | End: 2023-01-25 | Stop reason: SURG

## 2023-01-25 RX ORDER — SODIUM CHLORIDE 0.9 % (FLUSH) 0.9 %
3-10 SYRINGE (ML) INJECTION AS NEEDED
Status: DISCONTINUED | OUTPATIENT
Start: 2023-01-25 | End: 2023-01-25 | Stop reason: HOSPADM

## 2023-01-25 RX ORDER — LISINOPRIL 20 MG/1
40 TABLET ORAL DAILY
Status: DISCONTINUED | OUTPATIENT
Start: 2023-01-26 | End: 2023-01-27 | Stop reason: HOSPADM

## 2023-01-25 RX ORDER — FINASTERIDE 5 MG/1
5 TABLET, FILM COATED ORAL NIGHTLY
Status: DISCONTINUED | OUTPATIENT
Start: 2023-01-25 | End: 2023-01-27 | Stop reason: HOSPADM

## 2023-01-25 RX ORDER — NEOSTIGMINE METHYLSULFATE 5 MG/5 ML
SYRINGE (ML) INTRAVENOUS AS NEEDED
Status: DISCONTINUED | OUTPATIENT
Start: 2023-01-25 | End: 2023-01-25 | Stop reason: SURG

## 2023-01-25 RX ORDER — GABAPENTIN 300 MG/1
300 CAPSULE ORAL NIGHTLY
Status: DISCONTINUED | OUTPATIENT
Start: 2023-01-25 | End: 2023-01-27 | Stop reason: HOSPADM

## 2023-01-25 RX ORDER — NICARDIPINE HYDROCHLORIDE 2.5 MG/ML
INJECTION INTRAVENOUS AS NEEDED
Status: DISCONTINUED | OUTPATIENT
Start: 2023-01-25 | End: 2023-01-25 | Stop reason: SURG

## 2023-01-25 RX ORDER — BUPIVACAINE HYDROCHLORIDE 5 MG/ML
INJECTION, SOLUTION PERINEURAL AS NEEDED
Status: DISCONTINUED | OUTPATIENT
Start: 2023-01-25 | End: 2023-01-25 | Stop reason: HOSPADM

## 2023-01-25 RX ORDER — ROCURONIUM BROMIDE 10 MG/ML
INJECTION, SOLUTION INTRAVENOUS AS NEEDED
Status: DISCONTINUED | OUTPATIENT
Start: 2023-01-25 | End: 2023-01-25 | Stop reason: SURG

## 2023-01-25 RX ORDER — SODIUM CHLORIDE 9 MG/ML
40 INJECTION, SOLUTION INTRAVENOUS AS NEEDED
Status: DISCONTINUED | OUTPATIENT
Start: 2023-01-25 | End: 2023-01-25 | Stop reason: HOSPADM

## 2023-01-25 RX ORDER — SODIUM CHLORIDE 0.9 % (FLUSH) 0.9 %
10 SYRINGE (ML) INJECTION AS NEEDED
Status: DISCONTINUED | OUTPATIENT
Start: 2023-01-25 | End: 2023-01-27 | Stop reason: HOSPADM

## 2023-01-25 RX ORDER — SODIUM CHLORIDE, SODIUM LACTATE, POTASSIUM CHLORIDE, CALCIUM CHLORIDE 600; 310; 30; 20 MG/100ML; MG/100ML; MG/100ML; MG/100ML
100 INJECTION, SOLUTION INTRAVENOUS CONTINUOUS
Status: DISCONTINUED | OUTPATIENT
Start: 2023-01-25 | End: 2023-01-25

## 2023-01-25 RX ORDER — LIDOCAINE HYDROCHLORIDE 10 MG/ML
INJECTION, SOLUTION INFILTRATION; PERINEURAL AS NEEDED
Status: DISCONTINUED | OUTPATIENT
Start: 2023-01-25 | End: 2023-01-25 | Stop reason: HOSPADM

## 2023-01-25 RX ORDER — LABETALOL HYDROCHLORIDE 5 MG/ML
5 INJECTION, SOLUTION INTRAVENOUS
Status: DISCONTINUED | OUTPATIENT
Start: 2023-01-25 | End: 2023-01-25 | Stop reason: HOSPADM

## 2023-01-25 RX ORDER — FLUMAZENIL 0.1 MG/ML
0.2 INJECTION INTRAVENOUS AS NEEDED
Status: DISCONTINUED | OUTPATIENT
Start: 2023-01-25 | End: 2023-01-25 | Stop reason: HOSPADM

## 2023-01-25 RX ORDER — SODIUM CHLORIDE 0.9 % (FLUSH) 0.9 %
3 SYRINGE (ML) INJECTION AS NEEDED
Status: DISCONTINUED | OUTPATIENT
Start: 2023-01-25 | End: 2023-01-25 | Stop reason: HOSPADM

## 2023-01-25 RX ORDER — ONDANSETRON 4 MG/1
4 TABLET, FILM COATED ORAL EVERY 6 HOURS PRN
Status: DISCONTINUED | OUTPATIENT
Start: 2023-01-25 | End: 2023-01-27 | Stop reason: HOSPADM

## 2023-01-25 RX ORDER — LABETALOL HYDROCHLORIDE 5 MG/ML
20 INJECTION, SOLUTION INTRAVENOUS
Status: DISCONTINUED | OUTPATIENT
Start: 2023-01-25 | End: 2023-01-27 | Stop reason: HOSPADM

## 2023-01-25 RX ORDER — SODIUM CHLORIDE 0.9 % (FLUSH) 0.9 %
3 SYRINGE (ML) INJECTION EVERY 12 HOURS SCHEDULED
Status: DISCONTINUED | OUTPATIENT
Start: 2023-01-25 | End: 2023-01-25 | Stop reason: HOSPADM

## 2023-01-25 RX ORDER — ACETAMINOPHEN 325 MG/1
650 TABLET ORAL EVERY 4 HOURS PRN
Status: DISCONTINUED | OUTPATIENT
Start: 2023-01-25 | End: 2023-01-27 | Stop reason: HOSPADM

## 2023-01-25 RX ORDER — HYDROMORPHONE HYDROCHLORIDE 1 MG/ML
0.5 INJECTION, SOLUTION INTRAMUSCULAR; INTRAVENOUS; SUBCUTANEOUS
Status: DISCONTINUED | OUTPATIENT
Start: 2023-01-25 | End: 2023-01-25 | Stop reason: HOSPADM

## 2023-01-25 RX ORDER — ONDANSETRON 2 MG/ML
4 INJECTION INTRAMUSCULAR; INTRAVENOUS EVERY 6 HOURS PRN
Status: DISCONTINUED | OUTPATIENT
Start: 2023-01-25 | End: 2023-01-27 | Stop reason: HOSPADM

## 2023-01-25 RX ORDER — HEPARIN SODIUM 1000 [USP'U]/ML
INJECTION, SOLUTION INTRAVENOUS; SUBCUTANEOUS AS NEEDED
Status: DISCONTINUED | OUTPATIENT
Start: 2023-01-25 | End: 2023-01-25 | Stop reason: SURG

## 2023-01-25 RX ORDER — LIDOCAINE HYDROCHLORIDE 10 MG/ML
0.5 INJECTION, SOLUTION EPIDURAL; INFILTRATION; INTRACAUDAL; PERINEURAL ONCE AS NEEDED
Status: DISCONTINUED | OUTPATIENT
Start: 2023-01-25 | End: 2023-01-25 | Stop reason: HOSPADM

## 2023-01-25 RX ORDER — ONDANSETRON 2 MG/ML
4 INJECTION INTRAMUSCULAR; INTRAVENOUS
Status: DISCONTINUED | OUTPATIENT
Start: 2023-01-25 | End: 2023-01-25 | Stop reason: HOSPADM

## 2023-01-25 RX ORDER — HYDROCODONE BITARTRATE AND ACETAMINOPHEN 5; 325 MG/1; MG/1
1 TABLET ORAL EVERY 4 HOURS PRN
Status: DISCONTINUED | OUTPATIENT
Start: 2023-01-25 | End: 2023-01-27 | Stop reason: HOSPADM

## 2023-01-25 RX ORDER — FERROUS SULFATE 325(65) MG
325 TABLET ORAL
Status: DISCONTINUED | OUTPATIENT
Start: 2023-01-26 | End: 2023-01-27 | Stop reason: HOSPADM

## 2023-01-25 RX ORDER — ASPIRIN 81 MG/1
81 TABLET ORAL DAILY
Status: DISCONTINUED | OUTPATIENT
Start: 2023-01-26 | End: 2023-01-27 | Stop reason: HOSPADM

## 2023-01-25 RX ORDER — HYDRALAZINE HYDROCHLORIDE 20 MG/ML
10 INJECTION INTRAMUSCULAR; INTRAVENOUS EVERY 4 HOURS PRN
Status: DISCONTINUED | OUTPATIENT
Start: 2023-01-25 | End: 2023-01-27 | Stop reason: HOSPADM

## 2023-01-25 RX ORDER — PANTOPRAZOLE SODIUM 20 MG/1
20 TABLET, DELAYED RELEASE ORAL DAILY
Status: DISCONTINUED | OUTPATIENT
Start: 2023-01-26 | End: 2023-01-27 | Stop reason: HOSPADM

## 2023-01-25 RX ORDER — SODIUM CHLORIDE 9 MG/ML
40 INJECTION, SOLUTION INTRAVENOUS AS NEEDED
Status: DISCONTINUED | OUTPATIENT
Start: 2023-01-25 | End: 2023-01-27 | Stop reason: HOSPADM

## 2023-01-25 RX ORDER — NALOXONE HCL 0.4 MG/ML
0.04 VIAL (ML) INJECTION AS NEEDED
Status: DISCONTINUED | OUTPATIENT
Start: 2023-01-25 | End: 2023-01-25 | Stop reason: HOSPADM

## 2023-01-25 RX ORDER — MULTIPLE VITAMINS W/ MINERALS TAB 9MG-400MCG
1 TAB ORAL NIGHTLY
Status: DISCONTINUED | OUTPATIENT
Start: 2023-01-25 | End: 2023-01-27 | Stop reason: HOSPADM

## 2023-01-25 RX ADMIN — Medication 3 MG: at 17:42

## 2023-01-25 RX ADMIN — NICARDIPINE HYDROCHLORIDE 500 MCG: 2.5 INJECTION INTRAVENOUS at 17:33

## 2023-01-25 RX ADMIN — NICARDIPINE HYDROCHLORIDE 500 MCG: 2.5 INJECTION INTRAVENOUS at 17:29

## 2023-01-25 RX ADMIN — SODIUM CHLORIDE, POTASSIUM CHLORIDE, SODIUM LACTATE AND CALCIUM CHLORIDE 1000 ML: 600; 310; 30; 20 INJECTION, SOLUTION INTRAVENOUS at 08:36

## 2023-01-25 RX ADMIN — PROPOFOL 125 MG: 10 INJECTION, EMULSION INTRAVENOUS at 16:22

## 2023-01-25 RX ADMIN — PHENYLEPHRINE HYDROCHLORIDE 0.2 MCG/KG/MIN: 10 INJECTION INTRAVENOUS at 16:49

## 2023-01-25 RX ADMIN — SODIUM CHLORIDE, POTASSIUM CHLORIDE, SODIUM LACTATE AND CALCIUM CHLORIDE 1000 ML: 600; 310; 30; 20 INJECTION, SOLUTION INTRAVENOUS at 08:28

## 2023-01-25 RX ADMIN — ATORVASTATIN CALCIUM 20 MG: 10 TABLET, FILM COATED ORAL at 20:30

## 2023-01-25 RX ADMIN — GLYCOPYRROLATE 0.6 MCG: 0.2 INJECTION INTRAMUSCULAR; INTRAVENOUS at 17:41

## 2023-01-25 RX ADMIN — ROCURONIUM BROMIDE 50 MG: 10 INJECTION, SOLUTION INTRAVENOUS at 16:22

## 2023-01-25 RX ADMIN — HEPARIN SODIUM 8000 UNITS: 1000 INJECTION, SOLUTION INTRAVENOUS; SUBCUTANEOUS at 16:50

## 2023-01-25 RX ADMIN — EPHEDRINE SULFATE 10 MG: 50 INJECTION INTRAVENOUS at 16:32

## 2023-01-25 RX ADMIN — LIDOCAINE HYDROCHLORIDE 100 MG: 20 INJECTION, SOLUTION EPIDURAL; INFILTRATION; INTRACAUDAL; PERINEURAL at 16:22

## 2023-01-25 RX ADMIN — HYDROCODONE BITARTRATE AND ACETAMINOPHEN 1 TABLET: 5; 325 TABLET ORAL at 20:07

## 2023-01-25 RX ADMIN — GABAPENTIN 300 MG: 300 CAPSULE ORAL at 20:07

## 2023-01-25 RX ADMIN — Medication 10 ML: at 20:29

## 2023-01-25 RX ADMIN — FOLIC ACID 1 MG: 1 TABLET ORAL at 20:30

## 2023-01-25 RX ADMIN — Medication 1 CAPSULE: at 20:30

## 2023-01-25 RX ADMIN — FENTANYL CITRATE 100 MCG: 50 INJECTION INTRAMUSCULAR; INTRAVENOUS at 16:22

## 2023-01-25 RX ADMIN — Medication 1 TABLET: at 20:30

## 2023-01-25 RX ADMIN — FINASTERIDE 5 MG: 5 TABLET, FILM COATED ORAL at 20:30

## 2023-01-25 NOTE — ANESTHESIA PROCEDURE NOTES
Airway  Urgency: elective    Date/Time: 1/25/2023 4:24 PM  Airway not difficult    General Information and Staff    Patient location during procedure: OR  CRNA/CAA: Brenda Neri CRNA    Indications and Patient Condition  Indications for airway management: airway protection    Preoxygenated: yes  Mask difficulty assessment: 1 - vent by mask    Final Airway Details  Final airway type: endotracheal airway      Successful airway: ETT  Cuffed: yes   Successful intubation technique: direct laryngoscopy  Facilitating devices/methods: intubating stylet  Endotracheal tube insertion site: oral  Blade: Weller  Blade size: 2  ETT size (mm): 7.5  Cormack-Lehane Classification: grade I - full view of glottis  Placement verified by: chest auscultation and capnometry   Cuff volume (mL): 8  Measured from: lips  ETT/EBT  to lips (cm): 22  Number of attempts at approach: 1  Assessment: lips, teeth, and gum same as pre-op and atraumatic intubation    Additional Comments  atraumatic

## 2023-01-25 NOTE — ANESTHESIA PREPROCEDURE EVALUATION
Anesthesia Evaluation     Patient summary reviewed and Nursing notes reviewed   no history of anesthetic complications:  NPO Solid Status: > 8 hours             Airway   Mallampati: I  TM distance: >3 FB  Neck ROM: full  No difficulty expected  Dental          Pulmonary    (+) pulmonary embolism, a smoker Current,   (-) sleep apnea  Cardiovascular   Exercise tolerance: good (4-7 METS)    (+) hypertension, dysrhythmias Atrial Fib, PVD, hyperlipidemia,  carotid artery disease      Neuro/Psych  (-) seizures, TIA, CVA  GI/Hepatic/Renal/Endo    (+)  GERD,    (-) liver disease, no renal disease, diabetes    Musculoskeletal     Abdominal    Substance History   (+) alcohol use (quit feb 2020),      OB/GYN          Other   arthritis,                        Anesthesia Plan    ASA 3     general and Sanjana     intravenous induction     Anesthetic plan, risks, benefits, and alternatives have been provided, discussed and informed consent has been obtained with: patient.        CODE STATUS:

## 2023-01-25 NOTE — ANESTHESIA PROCEDURE NOTES
Arterial Line      Patient reassessed immediately prior to procedure    Patient location during procedure: pre-op   Line placed for hemodynamic monitoring, ABGs/Labs/ISTAT and MD/Surgeon request.  Performed By   Anesthesiologist: Magda Hernandez MD   Preanesthetic Checklist  Completed: patient identified, IV checked, site marked, risks and benefits discussed, surgical consent, monitors and equipment checked, pre-op evaluation and timeout performed  Arterial Line Prep    Sterile Tech: gloves and sterile barriers  Prep: ChloraPrep  Patient monitoring: blood pressure monitoring, continuous pulse oximetry and EKG  Arterial Line Procedure   Laterality:right  Location:  radial artery  Catheter size: 20 G   Guidance: ultrasound guided  PROCEDURE NOTE/ULTRASOUND INTERPRETATION.  Using ultrasound guidance the potential vascular sites for insertion of the catheter were visualized to determine the patency of the vessel to be used for vascular access.  After selecting the appropriate site for insertion, the needle was visualized under ultrasound being inserted into the radial artery, followed by ultrasound confirmation of wire and catheter placement. There were no abnormalities seen on ultrasound; an image was taken; and the patient tolerated the procedure with no complications.   Number of attempts: 1  Successful placement: yes Images: still images obtained, printed/placed on the chart  Post Assessment   Dressing Type: occlusive dressing applied, secured with tape and wrist guard applied.   Complications no  Circ/Move/Sens Assessment: normal and unchanged.   Patient Tolerance: patient tolerated the procedure well with no apparent complications           Message  W received telephone call from patients son, Sancho. He reported he had several missed calls from LSW and apologized for not returning calls sooner. He reported things were going well. He stated that they had not yet applied for Medicaid as the family was not sure where patient was going to be living. He stated that he father was having a hard time deciding where he wanted to stay. LSW provided some education to son again, informed him that it may be a a good idea for children to begin making decisions due to patients confusion and behaviors. Also reminded him again about the importance of a routine and encouraged him to speak with his siblings about keeping patient in the same residence could help decrease behaviors. Asked if patient had seen a psychiatrist yet. Sancho stated they are also working on that. LSW offered to make a visit again. Sancho asked LSW to call his sister, Rosa and speak with her.     LSW called Rosa and left a message for her about scheduling a visit.      Signatures   Electronically signed by : CORY WECLH, ; Apr 11 2017 10:20PM CST (Author)

## 2023-01-26 PROCEDURE — 99024 POSTOP FOLLOW-UP VISIT: CPT | Performed by: SURGERY

## 2023-01-26 PROCEDURE — 25010000002 CEFAZOLIN PER 500 MG: Performed by: SURGERY

## 2023-01-26 PROCEDURE — 25010000002 HYDRALAZINE PER 20 MG: Performed by: SURGERY

## 2023-01-26 RX ADMIN — ATORVASTATIN CALCIUM 20 MG: 10 TABLET, FILM COATED ORAL at 20:45

## 2023-01-26 RX ADMIN — CLOPIDOGREL 75 MG: 75 TABLET, FILM COATED ORAL at 08:54

## 2023-01-26 RX ADMIN — HYDROCODONE BITARTRATE AND ACETAMINOPHEN 1 TABLET: 5; 325 TABLET ORAL at 00:40

## 2023-01-26 RX ADMIN — HYDROCODONE BITARTRATE AND ACETAMINOPHEN 1 TABLET: 5; 325 TABLET ORAL at 18:26

## 2023-01-26 RX ADMIN — FINASTERIDE 5 MG: 5 TABLET, FILM COATED ORAL at 20:45

## 2023-01-26 RX ADMIN — Medication 1 CAPSULE: at 20:45

## 2023-01-26 RX ADMIN — LISINOPRIL 40 MG: 20 TABLET ORAL at 08:55

## 2023-01-26 RX ADMIN — HYDROCODONE BITARTRATE AND ACETAMINOPHEN 1 TABLET: 5; 325 TABLET ORAL at 08:59

## 2023-01-26 RX ADMIN — ASPIRIN 81 MG: 81 TABLET, COATED ORAL at 08:55

## 2023-01-26 RX ADMIN — FOLIC ACID 1 MG: 1 TABLET ORAL at 20:45

## 2023-01-26 RX ADMIN — DILTIAZEM HYDROCHLORIDE 120 MG: 120 CAPSULE, COATED, EXTENDED RELEASE ORAL at 08:53

## 2023-01-26 RX ADMIN — Medication 1 TABLET: at 20:45

## 2023-01-26 RX ADMIN — Medication 10 ML: at 08:56

## 2023-01-26 RX ADMIN — AMIODARONE HYDROCHLORIDE 200 MG: 200 TABLET ORAL at 08:54

## 2023-01-26 RX ADMIN — CEFAZOLIN 2 G: 2 INJECTION, POWDER, FOR SOLUTION INTRAMUSCULAR; INTRAVENOUS at 02:58

## 2023-01-26 RX ADMIN — Medication 10 ML: at 20:46

## 2023-01-26 RX ADMIN — CEFAZOLIN 2 G: 2 INJECTION, POWDER, FOR SOLUTION INTRAMUSCULAR; INTRAVENOUS at 08:54

## 2023-01-26 RX ADMIN — PANTOPRAZOLE SODIUM 20 MG: 20 TABLET, DELAYED RELEASE ORAL at 08:54

## 2023-01-26 RX ADMIN — FERROUS SULFATE TAB 325 MG (65 MG ELEMENTAL FE) 325 MG: 325 (65 FE) TAB at 08:54

## 2023-01-26 RX ADMIN — OXYCODONE HYDROCHLORIDE AND ACETAMINOPHEN 1000 MG: 500 TABLET ORAL at 08:54

## 2023-01-26 RX ADMIN — GABAPENTIN 300 MG: 300 CAPSULE ORAL at 20:46

## 2023-01-26 RX ADMIN — HYDRALAZINE HYDROCHLORIDE 10 MG: 20 INJECTION INTRAMUSCULAR; INTRAVENOUS at 08:05

## 2023-01-26 RX ADMIN — HYDROCODONE BITARTRATE AND ACETAMINOPHEN 1 TABLET: 5; 325 TABLET ORAL at 22:58

## 2023-01-26 NOTE — ANESTHESIA POSTPROCEDURE EVALUATION
"Patient: Kingsley Lerma    Procedure Summary     Date: 01/25/23 Room / Location: [facility] OR  /  PAD HYBRID OR 12    Anesthesia Start: 1620 Anesthesia Stop: 1801    Procedure: LEFT CAROTID ENDARTERECTOMY WITH EEG (Left: Neck) Diagnosis:       Left carotid stenosis      Preop testing      (Left carotid stenosis [I65.22])      (Preop testing [Z01.818])    Surgeons: Luis Enrique Donaldson DO Provider: Hugo Post CRNA    Anesthesia Type: general, Sanjana ASA Status: 3          Anesthesia Type: general, Ford    Vitals  Vitals Value Taken Time   /56 01/25/23 1840   Temp 97.5 °F (36.4 °C) 01/25/23 1840   Pulse 59 01/25/23 1840   Resp 12 01/25/23 1840   SpO2 98 % 01/25/23 1840           Post Anesthesia Care and Evaluation    Patient location during evaluation: PACU  Patient participation: complete - patient participated  Level of consciousness: awake and alert  Pain management: adequate    Airway patency: patent  Anesthetic complications: No anesthetic complications    Cardiovascular status: acceptable  Respiratory status: acceptable  Hydration status: acceptable    Comments: Blood pressure (!) 186/45, pulse 67, temperature 98.5 °F (36.9 °C), resp. rate 16, height 171.8 cm (67.64\"), weight 66.2 kg (145 lb 15.1 oz), SpO2 100 %.    Pt discharged from PACU based on drea score >8      "

## 2023-01-27 ENCOUNTER — NURSE TRIAGE (OUTPATIENT)
Dept: CALL CENTER | Facility: HOSPITAL | Age: 78
End: 2023-01-27
Payer: OTHER GOVERNMENT

## 2023-01-27 VITALS
BODY MASS INDEX: 22.12 KG/M2 | SYSTOLIC BLOOD PRESSURE: 141 MMHG | RESPIRATION RATE: 16 BRPM | TEMPERATURE: 98.3 F | HEART RATE: 59 BPM | HEIGHT: 68 IN | OXYGEN SATURATION: 100 % | DIASTOLIC BLOOD PRESSURE: 55 MMHG | WEIGHT: 145.94 LBS

## 2023-01-27 RX ORDER — TAMSULOSIN HYDROCHLORIDE 0.4 MG/1
0.8 CAPSULE ORAL ONCE
Status: COMPLETED | OUTPATIENT
Start: 2023-01-27 | End: 2023-01-27

## 2023-01-27 RX ADMIN — LISINOPRIL 40 MG: 20 TABLET ORAL at 08:33

## 2023-01-27 RX ADMIN — Medication 10 ML: at 08:36

## 2023-01-27 RX ADMIN — DILTIAZEM HYDROCHLORIDE 120 MG: 120 CAPSULE, COATED, EXTENDED RELEASE ORAL at 08:33

## 2023-01-27 RX ADMIN — OXYCODONE HYDROCHLORIDE AND ACETAMINOPHEN 1000 MG: 500 TABLET ORAL at 08:32

## 2023-01-27 RX ADMIN — AMIODARONE HYDROCHLORIDE 200 MG: 200 TABLET ORAL at 08:32

## 2023-01-27 RX ADMIN — ASPIRIN 81 MG: 81 TABLET, COATED ORAL at 08:32

## 2023-01-27 RX ADMIN — CLOPIDOGREL 75 MG: 75 TABLET, FILM COATED ORAL at 08:32

## 2023-01-27 RX ADMIN — FERROUS SULFATE TAB 325 MG (65 MG ELEMENTAL FE) 325 MG: 325 (65 FE) TAB at 08:33

## 2023-01-27 RX ADMIN — TAMSULOSIN HYDROCHLORIDE 0.8 MG: 0.4 CAPSULE ORAL at 09:34

## 2023-01-27 RX ADMIN — PANTOPRAZOLE SODIUM 20 MG: 20 TABLET, DELAYED RELEASE ORAL at 08:33

## 2023-01-27 NOTE — TELEPHONE ENCOUNTER
Patient expereincing Headache described as soreness midway in skull following Carotid Endarterectomy surgery left side BH PAD Surgery on 1/25/23. Triager provided suggestions per Adult Headache Protocol.     Reason for Disposition  • Other post-op symptom or question  • [1] MILD-MODERATE headache AND [2] present > 72 hours    Additional Information  • Negative: Sounds like a life-threatening emergency to the triager  • Negative: Chest pain  • Negative: Difficulty breathing  • Negative: Acting confused (e.g., disoriented, slurred speech) or excessively sleepy  • Negative: Surgical incision symptoms and questions  • Negative: [1] Discomfort (pain, burning or stinging) when passing urine AND [2] male  • Negative: [1] Discomfort (pain, burning or stinging) when passing urine AND [2] female  • Negative: Constipation  • Negative: New or worsening leg (calf, thigh) pain  • Negative: New or worsening leg swelling  • Negative: Dizziness is severe, or persists > 24 hours after surgery  • Negative: Pain, redness, swelling, or pus at IV Site  • Negative: Symptoms arising from use of a urinary catheter (Martinez or Coude)  • Negative: Cast problems or questions  • Negative: Medication question  • Negative: [1] Widespread rash AND [2] bright red, sunburn-like  • Negative: [1] SEVERE headache AND [2] after spinal (epidural) anesthesia  • Negative: [1] Vomiting AND [2] persists > 4 hours  • Negative: [1] Vomiting AND [2] abdomen looks much more swollen than usual  • Negative: [1] Drinking very little AND [2] dehydration suspected (e.g., no urine > 12 hours, very dry mouth, very lightheaded)  • Negative: Patient sounds very sick or weak to the triager  • Negative: Sounds like a serious complication to the triager  • Negative: Fever > 100.4 F (38.0 C)  • Negative: [1] SEVERE post-op pain (e.g., excruciating, pain scale 8-10) AND [2] not controlled with pain medications  • Negative: [1] Caller has URGENT question AND [2] triager unable  "to answer question  • Negative: [1] Headache AND [2] after spinal (epidural) anesthesia AND [3] not severe  • Negative: Fever present > 3 days (72 hours)  • Negative: [1] MILD-MODERATE post-op pain (e.g., pain scale 1-7) AND [2] not controlled with pain medications  • Negative: [1] Caller has NON-URGENT question AND [2] triager unable to answer question  • Negative: General activity, questions about  • Negative: Resuming driving, questions about  • Negative: Resuming sexual relations, questions about  • Negative: Getting the incision wet, questions about  • Negative: Throat pain after surgery, questions about  • Negative: [1] Vomiting AND [2] present < 4 hours  • Negative: Difficult to awaken or acting confused (e.g., disoriented, slurred speech)  • Negative: [1] Weakness of the face, arm or leg on one side of the body AND [2] new-onset  • Negative: [1] Numbness of the face, arm or leg on one side of the body AND [2] new-onset  • Negative: [1] Loss of speech or garbled speech AND [2] new-onset  • Negative: Passed out (i.e., lost consciousness, collapsed and was not responding)  • Negative: Sounds like a life-threatening emergency to the triager  • Negative: Followed a head injury  • Negative: Pregnant  • Negative: Postpartum (from 0 to 6 weeks after delivery)  • Negative: Traumatic Brain Injury (TBI) is suspected  • Negative: Unable to walk, or can only walk with assistance (e.g., requires support)  • Negative: Stiff neck (can't touch chin to chest)  • Negative: Severe pain in one eye  • Negative: [1] Other family members (or roommates) with headaches AND [2] possibility of carbon monoxide exposure  • Negative: [1] SEVERE headache (e.g., excruciating) AND [2] \"worst headache\" of life  • Negative: [1] SEVERE headache AND [2] sudden-onset (i.e., reaching maximum intensity within seconds to 1 hour)  • Negative: [1] SEVERE headache AND [2] fever  • Negative: Loss of vision or double vision (Exception: same as prior " "migraines)  • Negative: [1] Fever > 100.0 F (37.8 C) AND [2] diabetes mellitus or weak immune system (e.g., HIV positive, cancer chemo, splenectomy, organ transplant, chronic steroids)  • Negative: Patient sounds very sick or weak to the triager  • Negative: [1] SEVERE headache (e.g., excruciating) AND [2] not improved after 2 hours of pain medicine  • Negative: [1] Vomiting AND [2] 2 or more times (Exception: similar to previous migraines)  • Negative: Fever > 104 F (40 C)  • Negative: [1] MODERATE headache (e.g., interferes with normal activities) AND [2] present > 24 hours AND [3] unexplained  (Exceptions: analgesics not tried, typical migraine, or headache part of viral illness)  • Negative: [1] New headache AND [2] weak immune system (e.g., HIV positive, cancer chemo, splenectomy, organ transplant, chronic steroids)  • Negative: [1] New headache AND [2] age > 50  • Negative: [1] Sinus pain of forehead AND [2] yellow or green nasal discharge  • Negative: Fever present > 3 days (72 hours)    Answer Assessment - Initial Assessment Questions  1. SYMPTOM: \"What's the main symptom you're concerned about?\" (e.g., pain, fever, vomiting)      Headache  2. ONSET: \"When did headache start?\"  Started in recovery room   3. SURGERY: \"What surgery was performed?\"  Left Carotid Endarterectomy   4. DATE of SURGERY: \"When was surgery performed?\"   1/25/23  5. ANESTHESIA: \" What type of anesthesia did you have?\" (e.g., general, spinal, epidural, local)  General  6. PAIN: \"Is there any pain?\" If Yes, ask: \"How bad is it?\"  (Scale 1-10; or mild, moderate, severe)  7  7. FEVER: \"Do you have a fever?\" If Yes, ask: \"What is your temperature, how was it measured, and when did it start?\"  denies  8. VOMITING: \"Is there any vomiting?\" If yes, ask: \"How many times?\"  denies  9. BLEEDING: \"Is there any bleeding?\" If Yes, ask: \"How much?\" and \"Where?\"  denies  10. OTHER SYMPTOMS: \"Do you have any other symptoms?\" (e.g., drainage from wound, " "painful urination, constipation)  denies    Answer Assessment - Initial Assessment Questions  1. LOCATION: \"Where does it hurt?\"       *No Answer*  2. ONSET: \"When did the headache start?\" (Minutes, hours or days)       *No Answer*  3. PATTERN: \"Does the pain come and go, or has it been constant since it started?\"      *No Answer*  4. SEVERITY: \"How bad is the pain?\" and \"What does it keep you from doing?\"  (e.g., Scale 1-10; mild, moderate, or severe)    - MILD (1-3): doesn't interfere with normal activities     - MODERATE (4-7): interferes with normal activities or awakens from sleep     - SEVERE (8-10): excruciating pain, unable to do any normal activities         *No Answer*  5. RECURRENT SYMPTOM: \"Have you ever had headaches before?\" If Yes, ask: \"When was the last time?\" and \"What happened that time?\"       *No Answer*  6. CAUSE: \"What do you think is causing the headache?\"      *No Answer*  7. MIGRAINE: \"Have you been diagnosed with migraine headaches?\" If Yes, ask: \"Is this headache similar?\"       *No Answer*  8. HEAD INJURY: \"Has there been any recent injury to the head?\"       *No Answer*  9. OTHER SYMPTOMS: \"Do you have any other symptoms?\" (fever, stiff neck, eye pain, sore throat, cold symptoms)      *No Answer*  10. PREGNANCY: \"Is there any chance you are pregnant?\" \"When was your last menstrual period?\"        *No Answer*    Protocols used: POST-OP SYMPTOMS AND QUESTIONS-ADULT-AH, HEADACHE-ADULT-AH      "

## 2023-01-28 ENCOUNTER — READMISSION MANAGEMENT (OUTPATIENT)
Dept: CALL CENTER | Facility: HOSPITAL | Age: 78
End: 2023-01-28
Payer: OTHER GOVERNMENT

## 2023-01-28 NOTE — OUTREACH NOTE
Prep Survey    Flowsheet Row Responses   [facility] patient discharged from? [place]   Is LACE score < 7 ? No   Eligibility Readm Mgmt   Discharge diagnosis Left carotid stenosis s/p LEFT CAROTID ENDARTERECTOMY WITH EEG   Does the patient have one of the following disease processes/diagnoses(primary or secondary)? General Surgery   Does the patient have Home health ordered? No   Is there a DME ordered? No   Prep survey completed? Yes          PANTERA HERNÁNDEZ - Registered Nurse

## 2023-01-31 ENCOUNTER — READMISSION MANAGEMENT (OUTPATIENT)
Dept: CALL CENTER | Facility: HOSPITAL | Age: 78
End: 2023-01-31
Payer: OTHER GOVERNMENT

## 2023-01-31 LAB
CYTO UR: NORMAL
LAB AP CASE REPORT: NORMAL
Lab: NORMAL
PATH REPORT.FINAL DX SPEC: NORMAL
PATH REPORT.GROSS SPEC: NORMAL

## 2023-01-31 NOTE — OUTREACH NOTE
General Surgery Week 1 Survey    Flowsheet Row Responses   [facility] facility patient discharged from? [place]   Does the patient have one of the following disease processes/diagnoses(primary or secondary)? General Surgery   Week 1 attempt successful? No   Unsuccessful attempts Attempt 1          KEN Lozano Licensed Nurse

## 2023-02-02 ENCOUNTER — OFFICE VISIT (OUTPATIENT)
Dept: GASTROENTEROLOGY | Facility: CLINIC | Age: 78
End: 2023-02-02
Payer: OTHER GOVERNMENT

## 2023-02-02 VITALS
DIASTOLIC BLOOD PRESSURE: 78 MMHG | HEART RATE: 58 BPM | SYSTOLIC BLOOD PRESSURE: 128 MMHG | OXYGEN SATURATION: 100 % | TEMPERATURE: 97.5 F | BODY MASS INDEX: 22.76 KG/M2 | WEIGHT: 145 LBS | HEIGHT: 67 IN

## 2023-02-02 DIAGNOSIS — R19.5 POSITIVE FIT (FECAL IMMUNOCHEMICAL TEST): Primary | ICD-10-CM

## 2023-02-02 PROBLEM — K59.00 CONSTIPATION: Status: ACTIVE | Noted: 2023-02-02

## 2023-02-02 PROCEDURE — 99213 OFFICE O/P EST LOW 20 MIN: CPT | Performed by: NURSE PRACTITIONER

## 2023-02-02 NOTE — PROGRESS NOTES
"Primary Physician: Yohana Flanagan APRN    Chief Complaint   Patient presents with   • Follow-up     Pt presents today for positive FIT follow up-pt states he hasn't had any issues or seen any blood in his stool since his last OV        Subjective     Kingsley Lerma is a 77 y.o. male.    HPI   FIT Test +   Patient last seen November 3, 2022 at which time he wanted to take a conservative approach to a positive [facility] that [facility].  He did not want to proceed with colonoscopy evaluation.    Patient [facility] and this was noted to be positive.  No Obvious bright red blood or Melena stools.  No abdominal pain.  No weight loss.  Chronic constipation which is self treated with OTC laxatives about 1-2 times per week with great results.  No nausea, vomiting, reflux.  Appetite is great and he has NO dysphagia.     No family hx colon cancer.  Pt takes a baby asa daily.  Pt denies any NSAID USE.    Last endoscopy 6/15/2020 unremarkable (this was done for melena/hematochezia).    Colonoscopy 6/8/2020: multiple small angiodysplastic lesions without bleeding in the ascending colon and in the cecum.  Non bleeding internal hemorrhoids.    Pt is s/p carotid artery surgery last month.  Pt reports after anesthesia he has had headaches and bladder was \"asleep\" immediately that required catheter that has resolved.  Following that procedure he has had some constipation.  That also has gotten better. Usually having a BM daily.  No reported blood in his stools.  States that since starting oral iron therapy he has a hard time cleaning/wiping after a BM.          Past Medical History:   Diagnosis Date   • Alcohol abuse    • Arthritis    • Atrial fibrillation (HCC)    • Atrial flutter (HCC)    • BPH (benign prostatic hyperplasia)    • Circulation problem    • COPD (chronic obstructive pulmonary disease) (HCC)    • GERD (gastroesophageal reflux disease)    • GI bleed     DR PAINTER   • History " of tobacco abuse    • History of transfusion    • Hyperlipidemia    • Hypertension    • Neuropathy     left lower extremity   • Pulmonary embolism (HCC)    • PVD (peripheral vascular disease) (HCC)    • Vitamin B deficiency    • Vitamin D deficiency        Past Surgical History:   Procedure Laterality Date   • ADENOIDECTOMY     • APPENDECTOMY     • CAROTID ENDARTERECTOMY Left 1/25/2023    Procedure: LEFT CAROTID ENDARTERECTOMY WITH EEG;  Surgeon: Luis Enrique Donaldson DO;  Location: [facility] OR ;  Service: Vascular;  Laterality: Left;   • COLONOSCOPY N/A 06/08/2020    Multiple non-bleeding colonic angiodysplastic lesions; Non-bleeding internal hemorrhoids; The examination was otherwise normal; No specimens collected; Repeat 10 years   • ENDOSCOPY N/A 06/08/2020    Normal esophagus; Normal stomach; Normal examined duodenum; No specimens collected   • ENDOSCOPY N/A 06/15/2020    Dr. Banuelos-Normal examined duodenum; Normal stomach; Normal esophagus; No specimens collected   • ILIAC ARTERY STENT  2020    FEMORAL ENDARECTOMY SEE OP NOTE FROM [facility]   • VEIN SURGERY     • VENA CAVA FILTER INSERTION N/A 01/31/2022    Procedure: VENA CAVA FILTER INSERTION;  Surgeon: Luis Enrique Donaldson DO;  Location: [facility] OR 12;  Service: Vascular;  Laterality: N/A;   • VENA CAVA FILTER REMOVAL Right 06/22/2022    Procedure: VENA CAVA FILTER REMOVAL;  Surgeon: Luis Enrique Donaldson DO;  Location: [facility] OR ;  Service: Vascular;  Laterality: Right;        Current Outpatient Medications:   •  amiodarone (PACERONE) 200 MG tablet, Take 1 tablet by mouth Daily., Disp: 90 tablet, Rfl: 3  •  aspirin 81 MG EC tablet, Take 1 tablet by mouth Daily., Disp: 30 tablet, Rfl: 11  •  atorvastatin (LIPITOR) 20 MG tablet, TAKE 1 TABLET BY MOUTH EVERY DAY, Disp: 90 tablet, Rfl: 0  •  Cholecalciferol (VITAMIN D) 50 MCG (2000 UT) tablet, Take 2,000 Units by mouth 2 (Two) Times a Day., Disp: , Rfl:   •  dilTIAZem CD (CARDIZEM CD) 120  MG 24 hr capsule, Take 1 capsule by mouth Daily., Disp: 90 capsule, Rfl: 3  •  ferrous sulfate 325 (65 FE) MG tablet, Take 325 mg by mouth Daily With Breakfast., Disp: , Rfl:   •  finasteride (PROSCAR) 5 MG tablet, Take 5 mg by mouth Every Night., Disp: , Rfl:   •  folic acid (FOLVITE) 1 MG tablet, Take 1 mg by mouth Every Night., Disp: , Rfl:   •  gabapentin (Neurontin) 300 MG capsule, Take 1 capsule by mouth Daily., Disp: 30 capsule, Rfl: 3  •  LACTOBACILLUS PO, Take 1 tablet by mouth Every Night., Disp: , Rfl:   •  lisinopril (PRINIVIL,ZESTRIL) 40 MG tablet, Take 1 tablet by mouth Daily., Disp: 90 tablet, Rfl: 3  •  multivitamin with minerals tablet tablet, Take 1 tablet by mouth Every Night., Disp: , Rfl:   •  pantoprazole (PROTONIX) 20 MG EC tablet, Take 20 mg by mouth Daily., Disp: , Rfl:   •  vitamin C (ASCORBIC ACID) 500 MG tablet, Take 1,000 mg by mouth Daily., Disp: , Rfl:     Allergies   Allergen Reactions   • Prednisone Other (See Comments)     Made him anxious and jittery     • Cortisone Nausea And Vomiting       Social History     Socioeconomic History   • Marital status: Single   Tobacco Use   • Smoking status: Some Days     Types: Cigars   • Smokeless tobacco: Never   • Tobacco comments:     Smokes small cigars daily   Vaping Use   • Vaping Use: Never used   Substance and Sexual Activity   • Alcohol use: Not Currently     Alcohol/week: 2.0 standard drinks     Types: 2 Glasses of wine per week     Comment: Daily, QUIT DRINKING BEER IN 2020, QUIT DRINKING WINE IN 1/2022   • Drug use: Never   • Sexual activity: Defer       Family History   Problem Relation Age of Onset   • Dementia Mother    • Cancer Father    • Pancreatic cancer Father    • Colon cancer Neg Hx    • Colon polyps Neg Hx    • Esophageal cancer Neg Hx    • Liver cancer Neg Hx    • Liver disease Neg Hx    • Rectal cancer Neg Hx    • Stomach cancer Neg Hx        Review of Systems   Constitutional: Negative for unexpected weight change.  "      Objective     /78 (BP Location: Left arm, Patient Position: Sitting, Cuff Size: Adult)   Pulse 58   Temp 97.5 °F (36.4 °C) (Infrared)   Ht 170.2 cm (67\")   Wt 65.8 kg (145 lb)   SpO2 100%   BMI 22.71 kg/m²     Physical Exam    Lab Results - Last 18 Months   Lab Units 01/17/23  0738 11/22/22  0829 06/16/22  0944 02/28/22  0300 02/21/22  1530 02/14/22  0400 02/04/22  0728 02/03/22  0527 02/02/22  0358 02/01/22  0422 01/27/22  1921 01/27/22  1614 01/17/22  1842   GLUCOSE mg/dL 79 76 83 93 76 98   < > 107* 88 87   < > 89 73   BUN mg/dL 16 20 20 18 14 17   < > 10 11 13   < > 38* 29*   CREATININE mg/dL 0.81 0.74* 0.84 0.62* 0.56* 0.68*   < > 0.65* 0.68* 0.56*   < > 1.24 1.10   SODIUM mmol/L 143 142 141 143 140 141   < > 139 141 141   < > 140 142   POTASSIUM mmol/L 4.8 4.9 4.5 4.1 3.8 4.8   < > 4.3 4.3 3.0*   < > 2.5* 3.8   CHLORIDE mmol/L 107 106 107 107 104 103   < > 106 109* 108*   < > 102 108*   CO2 mmol/L 28.0 28.0 27.0 30.0* 27.0 28.0   < > 29.0 28.0 27.0   < > 26.0 23.0   TOTAL PROTEIN g/dL  --   --   --   --   --   --   --  4.9* 4.8* 4.6*  --  6.1 6.4   ALBUMIN g/dL  --   --   --   --   --   --   --  2.20* 2.20* 2.10*  --  2.90* 3.40*   ALT (SGPT) U/L  --   --   --   --   --   --   --  22 19 16  --  9 15   AST (SGOT) U/L  --   --   --   --   --   --   --  31 27 27  --  13 17   ALK PHOS U/L  --   --   --   --   --   --   --  120* 112 100  --  97 78   BILIRUBIN mg/dL  --   --   --   --   --   --   --  <0.2 0.2 0.2  --  0.4 0.4   GLOBULIN gm/dL  --   --   --   --   --   --   --  2.7 2.6 2.5  --  3.2 3.0   CRP mg/dL  --   --   --   --   --   --   --   --   --   --   --  10.85*  --     < > = values in this interval not displayed.       Lab Results - Last 18 Months   Lab Units 01/17/23  0738 11/22/22  0829 06/16/22  0944 02/21/22  1530 02/14/22  0400 02/04/22  0728   HEMOGLOBIN g/dL 14.5 14.2 13.5 10.4* 10.6* 10.3*   HEMATOCRIT % 45.9 45.5 42.6 32.4* 34.6* 33.0*   MCV fL 100.7* 102.5* 100.2* 104.5* " 106.5* 99.7*   WBC 10*3/mm3 8.49 8.28 7.81 6.41 7.10 7.91   RDW % 14.2 14.1 13.4 16.4* 17.3* 15.8*   MPV fL 8.7 9.0 8.8 8.9 8.8 8.2   PLATELETS 10*3/mm3 200 242 231 318 422 379   INR  0.99 1.06 1.09  --   --   --        Lab Results - Last 18 Months   Lab Units 02/01/22  0422   TSH uIU/mL 2.680            IMPRESSION/PLAN:    Assessment & Plan      Problem List Items Addressed This Visit    None    Pt to begin Metamucil gummies to help with constipation issues and issues getting cleaned up following a BM (since adding oral iron)  Pt instructed that without colonoscopy evaluation I cannot rule out a malignancy given the recent FIT test.  Pt is to call if he sees any rectal bleeding or changes his mind about having colonoscopy.  Follow Up prn              Jessica Dent, APRN  02/02/23  08:47 CST    Part of this note may be an electronic transcription/translation of spoken language to printed text.

## 2023-02-03 ENCOUNTER — TELEPHONE (OUTPATIENT)
Dept: VASCULAR SURGERY | Facility: CLINIC | Age: 78
End: 2023-02-03

## 2023-02-03 NOTE — TELEPHONE ENCOUNTER
Caller: Kingsley Lerma    Relationship to patient: Self    Best call back number: [phone]    Chief complaint: NEEDING Tuesday OR Thursday MORNING APPOINTMENT     Type of visit: POST OP    Requested date: 2/14/23    If rescheduling, when is the original appointment: 2/10/23

## 2023-02-06 ENCOUNTER — TELEPHONE (OUTPATIENT)
Dept: VASCULAR SURGERY | Facility: CLINIC | Age: 78
End: 2023-02-06
Payer: OTHER GOVERNMENT

## 2023-02-08 ENCOUNTER — READMISSION MANAGEMENT (OUTPATIENT)
Dept: CALL CENTER | Facility: HOSPITAL | Age: 78
End: 2023-02-08
Payer: OTHER GOVERNMENT

## 2023-02-08 NOTE — OUTREACH NOTE
General Surgery Week 2 Survey    Flowsheet Row Responses   [facility] patient discharged from? [place]   Does the patient have one of the following disease processes/diagnoses(primary or secondary)? General Surgery   Week 2 attempt successful? Yes   Call start time 1013   Call end time 1015   Discharge diagnosis Left carotid stenosis s/p LEFT CAROTID ENDARTERECTOMY WITH EEG   Is patient permission given to speak with other caregiver? Yes   List who call center can speak with Eliud nguyen    Person spoke with today (if not patient) and relationship Eliud nguyen    Meds reviewed with patient/caregiver? Yes   Is the patient having any side effects they believe may be caused by any medication additions or changes? No   Does the patient have all medications related to this admission filled (includes all antibiotics, pain medications, etc.) Yes   Is the patient taking all medications as directed (includes completed medication regime)? Yes   Does the patient have a follow up appointment scheduled with their surgeon? Yes   Has the patient kept scheduled appointments due by today? N/A   Comments 2/14/23 vascular jefry apt    Has home health visited the patient within 72 hours of discharge? N/A   Psychosocial issues? No   Did the patient receive a copy of their discharge instructions? Yes   Nursing interventions Reviewed instructions with patient   What is the patient's perception of their health status since discharge? Improving   Is the patient/caregiver able to teach back steps to recovery at home? Set small, achievable goals for return to baseline health, Rest and rebuild strength, gradually increase activity, Make a list of questions for surgeon's appointment   If the patient is a current smoker, are they able to teach back resources for cessation? [id number]   Is the patient/caregiver able to teach back the hierarchy of who to call/visit for symptoms/problems? PCP, Specialist, Home health nurse, Urgent Care, ED, 911 Yes    Week 2 call completed? Yes   Graduated/Revoked comments Son states pt is back to work and doing well - able to answer above questions to the best of his ability           Kavitha H - Registered Nurse

## 2023-02-09 ENCOUNTER — APPOINTMENT (OUTPATIENT)
Dept: ULTRASOUND IMAGING | Facility: HOSPITAL | Age: 78
End: 2023-02-09
Payer: OTHER GOVERNMENT

## 2023-02-09 ENCOUNTER — APPOINTMENT (OUTPATIENT)
Dept: GENERAL RADIOLOGY | Facility: HOSPITAL | Age: 78
End: 2023-02-09
Payer: OTHER GOVERNMENT

## 2023-02-09 ENCOUNTER — APPOINTMENT (OUTPATIENT)
Dept: CT IMAGING | Facility: HOSPITAL | Age: 78
End: 2023-02-09
Payer: OTHER GOVERNMENT

## 2023-02-09 ENCOUNTER — HOSPITAL ENCOUNTER (OUTPATIENT)
Facility: HOSPITAL | Age: 78
Setting detail: OBSERVATION
Discharge: HOME | End: 2023-02-10
Attending: EMERGENCY MEDICINE | Admitting: FAMILY MEDICINE
Payer: OTHER GOVERNMENT

## 2023-02-09 DIAGNOSIS — I70.90 ATHEROSCLEROSIS: ICD-10-CM

## 2023-02-09 DIAGNOSIS — Z98.890 H/O CAROTID ENDARTERECTOMY: ICD-10-CM

## 2023-02-09 DIAGNOSIS — I48.0 PAROXYSMAL A-FIB: ICD-10-CM

## 2023-02-09 DIAGNOSIS — Z91.89: ICD-10-CM

## 2023-02-09 DIAGNOSIS — Z74.09 IMPAIRED MOBILITY: ICD-10-CM

## 2023-02-09 DIAGNOSIS — G45.9 TIA (TRANSIENT ISCHEMIC ATTACK): Primary | ICD-10-CM

## 2023-02-09 PROBLEM — R29.898 WEAKNESS OF BOTH LOWER EXTREMITIES: Status: ACTIVE | Noted: 2023-02-09

## 2023-02-09 PROBLEM — G44.52 NEW DAILY PERSISTENT HEADACHE: Status: ACTIVE | Noted: 2023-02-09

## 2023-02-09 PROBLEM — R74.8 ELEVATED LIVER ENZYMES: Status: ACTIVE | Noted: 2023-02-09

## 2023-02-09 LAB
ABO GROUP BLD: NORMAL
ALBUMIN SERPL-MCNC: 4.3 G/DL (ref 3.5–5.2)
ALBUMIN/GLOB SERPL: 1.3 G/DL
ALP SERPL-CCNC: 107 U/L (ref 39–117)
ALT SERPL W P-5'-P-CCNC: 235 U/L (ref 1–41)
ANION GAP SERPL CALCULATED.3IONS-SCNC: 11 MMOL/L (ref 5–15)
AST SERPL-CCNC: 172 U/L (ref 1–40)
BASOPHILS # BLD AUTO: 0.02 10*3/MM3 (ref 0–0.2)
BASOPHILS NFR BLD AUTO: 0.3 % (ref 0–1.5)
BILIRUB SERPL-MCNC: 0.3 MG/DL (ref 0–1.2)
BLD GP AB SCN SERPL QL: NEGATIVE
BUN SERPL-MCNC: 26 MG/DL (ref 8–23)
BUN/CREAT SERPL: 23.9 (ref 7–25)
CALCIUM SPEC-SCNC: 8.9 MG/DL (ref 8.6–10.5)
CHLORIDE SERPL-SCNC: 101 MMOL/L (ref 98–107)
CK SERPL-CCNC: 62 U/L (ref 20–200)
CO2 SERPL-SCNC: 25 MMOL/L (ref 22–29)
CREAT SERPL-MCNC: 1.09 MG/DL (ref 0.76–1.27)
CRP SERPL-MCNC: 1.07 MG/DL (ref 0–0.5)
DEPRECATED RDW RBC AUTO: 53.9 FL (ref 37–54)
EGFRCR SERPLBLD CKD-EPI 2021: 70.8 ML/MIN/1.73
EOSINOPHIL # BLD AUTO: 0 10*3/MM3 (ref 0–0.4)
EOSINOPHIL NFR BLD AUTO: 0 % (ref 0.3–6.2)
ERYTHROCYTE [DISTWIDTH] IN BLOOD BY AUTOMATED COUNT: 14.8 % (ref 12.3–15.4)
GLOBULIN UR ELPH-MCNC: 3.3 GM/DL
GLUCOSE BLDC GLUCOMTR-MCNC: 91 MG/DL (ref 70–130)
GLUCOSE SERPL-MCNC: 141 MG/DL (ref 65–99)
HCT VFR BLD AUTO: 37.6 % (ref 37.5–51)
HGB BLD-MCNC: 12.2 G/DL (ref 13–17.7)
HOLD SPECIMEN: NORMAL
IMM GRANULOCYTES # BLD AUTO: 0.05 10*3/MM3 (ref 0–0.05)
IMM GRANULOCYTES NFR BLD AUTO: 0.7 % (ref 0–0.5)
INR PPP: 0.99 (ref 0.91–1.09)
LYMPHOCYTES # BLD AUTO: 0.77 10*3/MM3 (ref 0.7–3.1)
LYMPHOCYTES NFR BLD AUTO: 10.3 % (ref 19.6–45.3)
MCH RBC QN AUTO: 32.4 PG (ref 26.6–33)
MCHC RBC AUTO-ENTMCNC: 32.4 G/DL (ref 31.5–35.7)
MCV RBC AUTO: 99.7 FL (ref 79–97)
MONOCYTES # BLD AUTO: 0.61 10*3/MM3 (ref 0.1–0.9)
MONOCYTES NFR BLD AUTO: 8.2 % (ref 5–12)
NEUTROPHILS NFR BLD AUTO: 6.02 10*3/MM3 (ref 1.7–7)
NEUTROPHILS NFR BLD AUTO: 80.5 % (ref 42.7–76)
NRBC BLD AUTO-RTO: 0 /100 WBC (ref 0–0.2)
PLATELET # BLD AUTO: 232 10*3/MM3 (ref 140–450)
PMV BLD AUTO: 8.5 FL (ref 6–12)
POTASSIUM SERPL-SCNC: 4.7 MMOL/L (ref 3.5–5.2)
PROT SERPL-MCNC: 7.6 G/DL (ref 6–8.5)
PROTHROMBIN TIME: 13.2 SECONDS (ref 11.8–14.8)
RBC # BLD AUTO: 3.77 10*6/MM3 (ref 4.14–5.8)
RH BLD: POSITIVE
SODIUM SERPL-SCNC: 137 MMOL/L (ref 136–145)
T&S EXPIRATION DATE: NORMAL
WBC NRBC COR # BLD: 7.47 10*3/MM3 (ref 3.4–10.8)
WHOLE BLOOD HOLD COAG: NORMAL
WHOLE BLOOD HOLD SPECIMEN: NORMAL

## 2023-02-09 PROCEDURE — G0378 HOSPITAL OBSERVATION PER HR: HCPCS

## 2023-02-09 PROCEDURE — 70450 CT HEAD/BRAIN W/O DYE: CPT

## 2023-02-09 PROCEDURE — 86850 RBC ANTIBODY SCREEN: CPT | Performed by: EMERGENCY MEDICINE

## 2023-02-09 PROCEDURE — 80053 COMPREHEN METABOLIC PANEL: CPT | Performed by: EMERGENCY MEDICINE

## 2023-02-09 PROCEDURE — 86140 C-REACTIVE PROTEIN: CPT | Performed by: FAMILY MEDICINE

## 2023-02-09 PROCEDURE — 85025 COMPLETE CBC W/AUTO DIFF WBC: CPT | Performed by: EMERGENCY MEDICINE

## 2023-02-09 PROCEDURE — 70498 CT ANGIOGRAPHY NECK: CPT

## 2023-02-09 PROCEDURE — 93010 ELECTROCARDIOGRAM REPORT: CPT | Performed by: INTERNAL MEDICINE

## 2023-02-09 PROCEDURE — 99284 EMERGENCY DEPT VISIT MOD MDM: CPT

## 2023-02-09 PROCEDURE — 82962 GLUCOSE BLOOD TEST: CPT

## 2023-02-09 PROCEDURE — 70496 CT ANGIOGRAPHY HEAD: CPT

## 2023-02-09 PROCEDURE — 0 IOPAMIDOL PER 1 ML: Performed by: EMERGENCY MEDICINE

## 2023-02-09 PROCEDURE — 82550 ASSAY OF CK (CPK): CPT | Performed by: FAMILY MEDICINE

## 2023-02-09 PROCEDURE — 85610 PROTHROMBIN TIME: CPT | Performed by: EMERGENCY MEDICINE

## 2023-02-09 PROCEDURE — 86900 BLOOD TYPING SEROLOGIC ABO: CPT | Performed by: EMERGENCY MEDICINE

## 2023-02-09 PROCEDURE — 71045 X-RAY EXAM CHEST 1 VIEW: CPT

## 2023-02-09 PROCEDURE — 86901 BLOOD TYPING SEROLOGIC RH(D): CPT | Performed by: EMERGENCY MEDICINE

## 2023-02-09 PROCEDURE — 93005 ELECTROCARDIOGRAM TRACING: CPT | Performed by: EMERGENCY MEDICINE

## 2023-02-09 RX ORDER — SODIUM CHLORIDE 0.9 % (FLUSH) 0.9 %
10 SYRINGE (ML) INJECTION AS NEEDED
Status: DISCONTINUED | OUTPATIENT
Start: 2023-02-09 | End: 2023-02-10 | Stop reason: HOSPADM

## 2023-02-09 RX ORDER — SODIUM CHLORIDE 9 MG/ML
40 INJECTION, SOLUTION INTRAVENOUS AS NEEDED
Status: DISCONTINUED | OUTPATIENT
Start: 2023-02-09 | End: 2023-02-10 | Stop reason: HOSPADM

## 2023-02-09 RX ORDER — ASPIRIN 81 MG/1
81 TABLET, CHEWABLE ORAL DAILY
Status: DISCONTINUED | OUTPATIENT
Start: 2023-02-09 | End: 2023-02-10 | Stop reason: HOSPADM

## 2023-02-09 RX ORDER — SODIUM CHLORIDE 0.9 % (FLUSH) 0.9 %
10 SYRINGE (ML) INJECTION EVERY 12 HOURS SCHEDULED
Status: DISCONTINUED | OUTPATIENT
Start: 2023-02-09 | End: 2023-02-10 | Stop reason: HOSPADM

## 2023-02-09 RX ORDER — ASPIRIN 300 MG/1
300 SUPPOSITORY RECTAL DAILY
Status: DISCONTINUED | OUTPATIENT
Start: 2023-02-09 | End: 2023-02-10 | Stop reason: HOSPADM

## 2023-02-09 RX ADMIN — IOPAMIDOL 100 ML: 755 INJECTION, SOLUTION INTRAVENOUS at 11:42

## 2023-02-09 RX ADMIN — Medication 10 ML: at 20:48

## 2023-02-09 RX ADMIN — ASPIRIN 81 MG: 81 TABLET, CHEWABLE ORAL at 19:09

## 2023-02-09 NOTE — PLAN OF CARE
Problem: Adult Inpatient Plan of Care  Goal: Plan of Care Review  Outcome: Ongoing, Progressing  Flowsheets (Taken 2/9/2023 1540)  Progress: no change  Plan of Care Reviewed With: patient  Outcome Evaluation: Pt A&Ox4. Up stand-by ast. PPP. Denies n/t. No c/o of pain. Tele NS. Room air. NPO. Awaitng more orders from MD. VSS. Call light in reach. Safety maintained. Will cont to monitor.   Goal Outcome Evaluation:

## 2023-02-09 NOTE — H&P
[facility] Medicine Services  HISTORY AND PHYSICAL    Date of Admission: 2/9/2023  Primary Care Physician: Yohana Flanagan APRN    Subjective   Primary Historian: Patient    Chief Complaint: Low blood pressure    History of Present Illness  Patient presented emergency room with complaints of low blood pressure approximately 2 days.  He notes he was feeling weak and very tired.  Today he noted his lips were mostly on the left side.  Bilateral legs have been weak since his surgery.  He had a carotid endarterectomy on the left approximately 2 weeks ago here with Dr. Donaldson.  He has some slurring of his speech today but is totally cleared.  And he has had a headache on the top of his head since surgery.  He denies any nausea or vomiting.  He denies shortness of breath.  He denies any chest pain.        Review of Systems   Otherwise complete ROS reviewed and negative except as mentioned in the HPI.    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   • History of tobacco abuse    • History of transfusion    • Hyperlipidemia    • Hypertension    • Neuropathy     left lower extremity   • Pulmonary embolism (HCC)    • PVD (peripheral vascular disease) (HCC)    • Vitamin B deficiency    • Vitamin D deficiency      Past Surgical History:  Past Surgical History:   Procedure Laterality Date   • ADENOIDECTOMY     • APPENDECTOMY     • CAROTID ENDARTERECTOMY Left 1/25/2023    Procedure: LEFT CAROTID ENDARTERECTOMY WITH EEG;  Surgeon: Luis Enrique Donaldson DO;  Location: [place] [address];  Service: Vascular;  Laterality: Left;   • COLONOSCOPY N/A 06/08/2020    Multiple non-bleeding colonic  angiodysplastic lesions; Non-bleeding internal hemorrhoids; The examination was otherwise normal; No specimens collected; Repeat 10 years   • ENDOSCOPY N/A 06/08/2020    Normal esophagus; Normal stomach; Normal examined duodenum; No specimens collected   • ENDOSCOPY N/A 06/15/2020    Dr. Banuelos-Normal examined duodenum; Normal stomach; Normal esophagus; No specimens collected   • ILIAC ARTERY STENT  2020    FEMORAL ENDARECTOMY SEE OP NOTE FROM [facility]   • VEIN SURGERY     • VENA CAVA FILTER INSERTION N/A 01/31/2022    Procedure: VENA CAVA FILTER INSERTION;  Surgeon: Luis Enrique Donaldson DO;  Location:  [facility];  Service: Vascular;  Laterality: N/A;   • VENA CAVA FILTER REMOVAL Right 06/22/2022    Procedure: VENA CAVA FILTER REMOVAL;  Surgeon: Luis Enrique Donaldson DO;  Location:  [facility];  Service: Vascular;  Laterality: Right;     Social History:  reports that he has been smoking cigars. He has never used smokeless tobacco. He reports that he does not currently use alcohol after a past usage of about 2.0 standard drinks per week. He reports that he does not use drugs.  Patient is  and lives at home.  He has a daughter that is currently living with him.  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    Family History: family history includes Cancer in his father; Dementia in his mother; Pancreatic cancer in his father.       Allergies:  Allergies   Allergen Reactions   • Prednisone Other (See Comments)     Made him anxious and jittery     • Cortisone Nausea And Vomiting       Medications:  Prior to Admission medications    Medication Sig Start Date End Date Taking? Authorizing Provider   amiodarone (PACERONE) 200 MG tablet Take 1 tablet by mouth Daily. 5/4/22  Yes Nolan Farias MD   aspirin 81 MG EC tablet Take 1 tablet by mouth Daily. 6/18/20  Yes Marco A Katz MD   atorvastatin  "(LIPITOR) 20 MG tablet TAKE 1 TABLET BY MOUTH EVERY DAY 12/2/22  Yes Helen Williamson APRN   Cholecalciferol (VITAMIN D) 50 MCG (2000 UT) tablet Take 2,000 Units by mouth 2 (Two) Times a Day.   Yes Jose Walls MD   dilTIAZem CD (CARDIZEM CD) 120 MG 24 hr capsule Take 1 capsule by mouth Daily. 10/7/22  Yes Belksy Carty APRN   ferrous sulfate 325 (65 FE) MG tablet Take 325 mg by mouth Daily With Breakfast.   Yes Jose Walls MD   finasteride (PROSCAR) 5 MG tablet Take 5 mg by mouth Every Night.   Yes Jose Walls MD   folic acid (FOLVITE) 1 MG tablet Take 1 mg by mouth Every Night.   Yes Jose Walls MD   gabapentin (Neurontin) 300 MG capsule Take 1 capsule by mouth Daily. 1/19/23  Yes Jaiden Pierre MD   LACTOBACILLUS PO Take 1 tablet by mouth Every Night.   Yes Jose Walls MD   lisinopril (PRINIVIL,ZESTRIL) 40 MG tablet Take 1 tablet by mouth Daily. 5/4/22  Yes Nolan Farias MD   multivitamin with minerals tablet tablet Take 1 tablet by mouth Every Night.   Yes Jose Walls MD   pantoprazole (PROTONIX) 20 MG EC tablet Take 20 mg by mouth Every Morning.   Yes Jose Walls MD   vitamin C (ASCORBIC ACID) 500 MG tablet Take 1,000 mg by mouth Daily.   Yes ProviderJose MD     I have utilized all available immediate resources to obtain, update, or review the patient's current medications (including all prescriptions, over-the-counter products, herbals, cannabis/cannabidiol products, and vitamin/mineral/dietary (nutritional) supplements).    Objective     Vital Signs: /93 (BP Location: Left arm, Patient Position: Sitting)   Pulse 73 Comment: Checked w/ monitor room.  Temp 97.7 °F (36.5 °C) (Oral)   Resp 18   Ht 170.2 cm (67\")   Wt 64.4 kg (142 lb)   SpO2 98%   BMI 22.24 kg/m²   Physical Exam  Vitals and nursing note reviewed.   Constitutional:       General: He is not in acute distress.     Appearance: Normal appearance. He " is normal weight.   HENT:      Head: Normocephalic and atraumatic.      Right Ear: External ear normal.      Left Ear: External ear normal.      Nose: Nose normal.      Mouth/Throat:      Mouth: Mucous membranes are moist.   Eyes:      Extraocular Movements: Extraocular movements intact.      Conjunctiva/sclera: Conjunctivae normal.      Pupils: Pupils are equal, round, and reactive to light.   Cardiovascular:      Rate and Rhythm: Normal rate. Rhythm irregular.      Pulses: Normal pulses.      Heart sounds: No murmur heard.    No friction rub. No gallop.   Pulmonary:      Effort: Pulmonary effort is normal.      Breath sounds: Normal breath sounds.   Abdominal:      General: Bowel sounds are normal. There is no distension.      Palpations: Abdomen is soft. There is no mass.      Tenderness: There is no abdominal tenderness. There is no guarding or rebound.   Musculoskeletal:         General: Normal range of motion.      Cervical back: Normal range of motion and neck supple.   Skin:     General: Skin is warm and dry.      Capillary Refill: Capillary refill takes less than 2 seconds.   Neurological:      General: No focal deficit present.      Mental Status: He is alert and oriented to person, place, and time.      Cranial Nerves: No cranial nerve deficit.   Psychiatric:         Mood and Affect: Mood normal.         Behavior: Behavior normal.              Results Reviewed:  Lab Results (last 24 hours)     Procedure Component Value Units Date/Time    [place] Draw [[id number]] Collected: 02/09/23 1045    Specimen: Blood Updated: 02/09/23 1501    Narrative:      The following orders were created for panel order [place] Draw.  Procedure                               Abnormality         Status                     ---------                               -----------         ------                     Green Top (Gel)[[id number]]                                                             Lavender Top[[id number]]                                                                 Red Top[[id number]]                                                                     Gold Top - SST[[id number]]                                   Final result               Booker Top[[id number]]                                         Final result               Light Blue Top[[id number]]                                                                Please view results for these tests on the individual orders.    [place] Top [[id number]] Collected: 02/09/23 1045    Specimen: Blood Updated: 02/09/23 1501     Extra Tube Hold for add-ons.     Comment: Auto resulted.       [place] Draw [[id number]] Collected: 02/09/23 1045    Specimen: Blood Updated: 02/09/23 1200    Narrative:      The following orders were created for panel order [place] Draw.  Procedure                               Abnormality         Status                     ---------                               -----------         ------                     Green Top (Gel)[[id number]]                                  Final result               Lavender Top[[id number]]                                     Final result               Red Top[[id number]]                                          Final result               Light Blue Top[[id number]]                                   Final result                 Please view results for these tests on the individual orders.    Lavender Top [[id number]] Collected: 02/09/23 1045    Specimen: Blood Updated: 02/09/23 1200     Extra Tube hold for add-on     Comment: Auto resulted       Light Blue Top [[id number]] Collected: 02/09/23 1045    Specimen: Blood Updated: 02/09/23 1200     Extra Tube Hold for add-ons.     Comment: Auto resulted       Gold Top - SST [[id number]] Collected: 02/09/23 1045    Specimen: Blood Updated: 02/09/23 1200     Extra Tube Hold for add-ons.     Comment: Auto resulted.       Green Top (Gel) [[id number]] Collected: 02/09/23 1045    Specimen: Blood Updated: 02/09/23  1200     Extra Tube Hold for add-ons.     Comment: Auto resulted.       Red Top [[id number]] Collected: 02/09/23 1045    Specimen: Blood Updated: 02/09/23 1200     Extra Tube Hold for add-ons.     Comment: Auto resulted.       Comprehensive Metabolic Panel [[id number]]  (Abnormal) Collected: 02/09/23 1045    Specimen: Blood Updated: 02/09/23 1119     Glucose 141 mg/dL      BUN 26 mg/dL      Creatinine 1.09 mg/dL      Sodium 137 mmol/L      Potassium 4.7 mmol/L      Comment: Slight hemolysis detected by analyzer. Results may be affected.        Chloride 101 mmol/L      CO2 25.0 mmol/L      Calcium 8.9 mg/dL      Total Protein 7.6 g/dL      Albumin 4.3 g/dL      ALT (SGPT) 235 U/L      AST (SGOT) 172 U/L      Alkaline Phosphatase 107 U/L      Total Bilirubin 0.3 mg/dL      Globulin 3.3 gm/dL      A/G Ratio 1.3 g/dL      BUN/Creatinine Ratio 23.9     Anion Gap 11.0 mmol/L      eGFR 70.8 mL/min/1.73     Narrative:      GFR Normal >60  Chronic Kidney Disease <60  Kidney Failure <15    The GFR formula is only valid for adults with stable renal function between ages 18 and 70.    Protime-INR [[id number]]  (Normal) Collected: 02/09/23 1045    Specimen: Blood Updated: 02/09/23 1102     Protime 13.2 Seconds      INR 0.99    CBC & Differential [[id number]]  (Abnormal) Collected: 02/09/23 1045    Specimen: Blood Updated: 02/09/23 1053    Narrative:      The following orders were created for panel order CBC & Differential.  Procedure                               Abnormality         Status                     ---------                               -----------         ------                     CBC Auto Differential[[id number]]        Abnormal            Final result                 Please view results for these tests on the individual orders.    CBC Auto Differential [[id number]]  (Abnormal) Collected: 02/09/23 1045    Specimen: Blood Updated: 02/09/23 1053     WBC 7.47 10*3/mm3      RBC 3.77 10*6/mm3      Hemoglobin 12.2 g/dL       Hematocrit 37.6 %      MCV 99.7 fL      MCH 32.4 pg      MCHC 32.4 g/dL      RDW 14.8 %      RDW-SD 53.9 fl      MPV 8.5 fL      Platelets 232 10*3/mm3      Neutrophil % 80.5 %      Lymphocyte % 10.3 %      Monocyte % 8.2 %      Eosinophil % 0.0 %      Basophil % 0.3 %      Immature Grans % 0.7 %      Neutrophils, Absolute 6.02 10*3/mm3      Lymphocytes, Absolute 0.77 10*3/mm3      Monocytes, Absolute 0.61 10*3/mm3      Eosinophils, Absolute 0.00 10*3/mm3      Basophils, Absolute 0.02 10*3/mm3      Immature Grans, Absolute 0.05 10*3/mm3      nRBC 0.0 /100 WBC     POC Glucose Once [[id number]]  (Normal) Collected: 02/09/23 1029    Specimen: Blood Updated: 02/09/23 1050     Glucose 91 mg/dL      Comment: : [id number] Sally ColeMeter ID: [id number]           Imaging Results (Last 24 Hours)     Procedure Component Value Units Date/Time    CT Angiogram Head w AI Analysis of LVO [[id number]] Collected: 02/09/23 1215     Updated: 02/09/23 1225    Narrative:      EXAMINATION:  CT ANGIOGRAM HEAD W AI ANALYSIS OF LVO-  2/9/2023 11:31 AM  CST     HISTORY: Rule out stroke.     COMPARISON : No comparison study.     DLP: 359 mGy-cm. Automated dosage reduction technique was utilized.     TECHNIQUE: CT angio was performed of the brain with IV contrast.  Coronal, sagittal and 3-D images were reconstructed.     INDEPENDENT 3-D WORKSTATION UTILIZED FOR RECONSTRUCTION: YES. A  RADIOLOGIST WAS PRESENT IN THE DEPARTMENT.     FINDINGS: The vertebral and basilar arteries are patent. The internal  carotid arteries are patent. There is atheromatous plaque involving the  cavernous carotid arteries. The anterior, middle and posterior cerebral  arteries are patent. There is persistent fetal origin of the right  posterior cerebral artery. No aneurysms are appreciated. The visualized  venous sinuses are not well opacified.     AI ANALYSIS: AI analysis demonstrates relative decreased density of  right middle cerebral artery branches. However,  visually, no asymmetry  is seen.       Impression:      1. No large vessel occlusion is seen. No aneurysm is seen.  2. There is atheromatous plaque involving the cavernous carotid arteries  with no definite significant stenosis identified.  3. AI analysis demonstrates relative decreased density of right middle  cerebral artery branches. However, visually, no asymmetry is seen.  4. Persistent fetal origin of the right posterior cerebral artery.     The full report of this exam was immediately signed and available to the  emergency room. The patient is currently in the emergency room.  This report was finalized on 02/09/2023 12:22 by Dr. Michael Pond MD.    CT Angiogram Neck [[id number]] Collected: 02/09/23 1213     Updated: 02/09/23 1224    Narrative:      EXAMINATION: CT ANGIOGRAM NECK-      2/9/2023 11:31 AM CST     HISTORY: Stroke, follow up     In order to have a CT radiation dose as low as reasonably achievable  Automated Exposure Control was utilized for adjustment of the mA and/or  KV according to patient size.     DLP in mGycm= 359.     CT angiogram neck with and without IV contrast.  CT angiography protocol.   CT imaging with bolus IV contrast injection.   Under concurrent supervision axial, sagittal, coronal,  three-dimensional, and MIP data sets were constructed on an independent  work station.     Mild aortic arch calcification.  Mild calcification at the great vessel origins with approximately 50%  narrowing of the left common carotid artery origin.     Normally patent common carotid arteries.     Normally patent left ICA with external carotid artery occlusion.     There are changes of previous left carotid endarterectomy.     The mid and distal left ICA is patent.     Extensive dense calcified plaque through the proximal 22 mm of the right  ICA with 50% stenosis at the right ICA origin and again at a 0.15 mm  beyond the origin.     Normally patent mid and distal right ICA.     There is no arterial  thrombus or dissection.     The vertebral arteries show patchy calcification though are symmetric  and patent. Approximately 40-50% narrowing of both proximal vertebral  arteries is seen.     Summary:  1. Previous left carotid endarterectomy.  2. Dense calcified plaque within the proximal right ICA with 50%  stenosis.  3. Patchy atherosclerotic calcification within both vertebral arteries.                                   This report was finalized on 02/09/2023 12:21 by Dr. Alfred Hernández MD.    CT Head Without Contrast Stroke Protocol [[id number]] Collected: 02/09/23 1144     Updated: 02/09/23 1158    Narrative:      EXAMINATION: CT HEAD WO CONTRAST STROKE PROTOCOL-      2/9/2023 11:30 AM CST     HISTORY: Stroke, follow up     In order to have a CT radiation dose as low as reasonably achievable  Automated Exposure Control was utilized for adjustment of the mA and/or  KV according to patient size.     DLP in mGycm= 778     The CT scan of the head is performed without intravenous contrast  enhancement.     Images are acquired in axial plane and subsequent reconstruction in  coronal and sagittal planes.     Comparison is made with the previous study dated 05/05/2020.     There is no evidence of a mass. There is no midline shift.     There is no evidence of intracranial hemorrhage or hematoma.     Moderately dilated ventricles, basal cisterns and cortical sulci are  similar to the previous study suggesting a mild to moderate chronic  volume loss.     Scattered areas of chronic white matter ischemia are seen in the centrum  semiovale bilaterally. The gray-white matter differentiation is  maintained.     A partially empty sella turcica is seen.     Images are reviewed in bone window show show no acute bony abnormality.  Limited visualized paranasal sinuses and mastoid air cells are clear.  The frontal sinuses are hypoplastic.       Impression:      1. No acute intracranial abnormality.                             This  report was finalized on 02/09/2023 11:55 by Dr. Chelsey Colmenares MD.    XR Chest 1 View [[id number]] Collected: 02/09/23 1108     Updated: 02/09/23 1111    Narrative:      EXAMINATION: XR CHEST 1 VW-     2/9/2023 11:05 AM CST     HISTORY: Stroke Protocol (Onset > 12 hrs)     1 view chest x-ray.     Comparison is made with 01/17/2023.     Heart size is normal.  The mediastinum is within normal limits.      The lungs are normally expanded with no pneumonia or pneumothorax.     No congestive failure changes.                                                                       Impression:      1. No acute disease.        This report was finalized on 02/09/2023 11:08 by Dr. Alfred Hernández MD.        I have personally reviewed and interpreted the radiology studies and ECG obtained at time of admission.     Assessment / Plan   Assessment:   Active Hospital Problems    Diagnosis    • **TIA (transient ischemic attack)    • Weakness of both lower extremities    • New daily persistent headache    • Elevated liver enzymes        Treatment Plan  The patient will be admitted to my service here at [facility].   Hold Lipitor as I feel that some of the symptoms could be related to this.   US liver today  Monitor heart rate/rhythm  Lab  cmp cbc  In AM  Lab  Cpk, crp now  TIA orderset employed     Medical Decision Making  Number and Complexity of problems:   4 new problems high complexity     Differential Diagnosis: adverse reaction to lipitor    Conditions and Status        Condition is worsening.     [facility] Data  External documents reviewed: now new documents  Cardiac tracing (EKG, telemetry) interpretation: NSR  Radiology interpretation: testing reviewed  Labs reviewed: reviewed see above  Any tests that were considered but not ordered: none     Decision rules/scores evaluated (example [id number], Wells, etc): none     Discussed with: patient     Care Planning  Shared decision making: patient  Code status and discussions:  DNR DNI    Disposition  Social Determinants of Health that impact treatment or disposition none  Estimated length of stay is 24-48 h.     I confirmed that the patient's advanced care plan is present, code status is documented, and a surrogate decision maker is listed in the patient's medical record.     The patient's surrogate decision maker is Bhavin Lerma.     The patient was seen and examined by me on 2/9/23 at 1530.    Electronically signed by Christina Crisostomo, 02/09/23, 15:52 CST.

## 2023-02-09 NOTE — ED PROVIDER NOTES
Subjective   History of Present Illness  Patient is a 77 years old who had a carotid endarterectomy done approximate 2 weeks ago then today in the morning he was having some perioral numbness.  Questionable slurred speech last time known well is sometimes yesterday evening.  Currently he is speaking well does not any focal neurological deficits or other weakness associate with this.  Is complaining of some dizziness.  But the dizziness has been since surgery.    History provided by:  Relative  Stroke  Presenting symptoms: confusion, language symptoms, loss of balance and weakness    Presenting symptoms: no headaches    Onset quality:  Gradual  Last known well:  Yesterday at 9 PM maybe  Duration: Not sure.  Timing:  Constant  Progression:  Partially resolved  Similar to previous episodes: no    Associated symptoms: dizziness and paresthesias    Associated symptoms: no chest pain, no difficulty swallowing, no facial pain, no fall, no fever, no hearing loss, no bladder incontinence, no nausea, no neck pain, no tinnitus, no vertigo and no vomiting        Review of Systems   Constitutional: Negative for fever.   HENT: Negative.  Negative for hearing loss, tinnitus and trouble swallowing.    Cardiovascular: Negative for chest pain.   Gastrointestinal: Negative.  Negative for abdominal distention, abdominal pain, nausea and vomiting.   Endocrine: Negative.    Genitourinary: Negative.  Negative for bladder incontinence.   Musculoskeletal: Negative.  Negative for back pain and neck pain.   Skin: Negative for color change and pallor.   Neurological: Positive for dizziness, weakness, paresthesias and loss of balance. Negative for vertigo, syncope, light-headedness, numbness and headaches.   Hematological: Negative.  Does not bruise/bleed easily.   Psychiatric/Behavioral: Positive for confusion.   All other systems reviewed and are negative.      Past Medical History:   Diagnosis Date   • Alcohol abuse    • Arthritis    •  Atrial fibrillation (HCC)    • Atrial flutter (HCC)    • BPH (benign prostatic hyperplasia)    • Circulation problem    • COPD (chronic obstructive pulmonary disease) (HCC)    • GERD (gastroesophageal reflux disease)    • GI bleed     DR PAINTER   • History of tobacco abuse    • History of transfusion    • Hyperlipidemia    • Hypertension    • Neuropathy     left lower extremity   • Pulmonary embolism (HCC)    • PVD (peripheral vascular disease) (HCC)    • Vitamin B deficiency    • Vitamin D deficiency        Allergies   Allergen Reactions   • Prednisone Other (See Comments)     Made him anxious and jittery     • Cortisone Nausea And Vomiting       Past Surgical History:   Procedure Laterality Date   • ADENOIDECTOMY     • APPENDECTOMY     • CAROTID ENDARTERECTOMY Left 1/25/2023    Procedure: LEFT CAROTID ENDARTERECTOMY WITH EEG;  Surgeon: Luis Enrique Donaldson DO;  Location: [facility] OR ;  Service: Vascular;  Laterality: Left;   • COLONOSCOPY N/A 06/08/2020    Multiple non-bleeding colonic angiodysplastic lesions; Non-bleeding internal hemorrhoids; The examination was otherwise normal; No specimens collected; Repeat 10 years   • ENDOSCOPY N/A 06/08/2020    Normal esophagus; Normal stomach; Normal examined duodenum; No specimens collected   • ENDOSCOPY N/A 06/15/2020    Dr. Banuelos-Normal examined duodenum; Normal stomach; Normal esophagus; No specimens collected   • ILIAC ARTERY STENT  2020    FEMORAL ENDARECTOMY SEE OP NOTE FROM [facility]   • VEIN SURGERY     • VENA CAVA FILTER INSERTION N/A 01/31/2022    Procedure: VENA CAVA FILTER INSERTION;  Surgeon: Luis Enrique Donaldson DO;  Location: [facility] OR ;  Service: Vascular;  Laterality: N/A;   • VENA CAVA FILTER REMOVAL Right 06/22/2022    Procedure: VENA CAVA FILTER REMOVAL;  Surgeon: Luis Enrique Donaldson DO;  Location: [facility] OR ;  Service: Vascular;  Laterality: Right;       Family History   Problem Relation Age of Onset   • Dementia Mother    •  Cancer Father    • Pancreatic cancer Father    • Colon cancer Neg Hx    • Colon polyps Neg Hx    • Esophageal cancer Neg Hx    • Liver cancer Neg Hx    • Liver disease Neg Hx    • Rectal cancer Neg Hx    • Stomach cancer Neg Hx        Social History     Socioeconomic History   • Marital status: Single   Tobacco Use   • Smoking status: Some Days     Types: Cigars   • Smokeless tobacco: Never   • Tobacco comments:     Smokes small cigars daily   Vaping Use   • Vaping Use: Never used   Substance and Sexual Activity   • Alcohol use: Not Currently     Alcohol/week: 2.0 standard drinks     Types: 2 Glasses of wine per week     Comment: Daily, QUIT DRINKING BEER IN 2020, QUIT DRINKING WINE IN 1/2022   • Drug use: Never   • Sexual activity: Defer           Objective   Physical Exam  Vitals and nursing note reviewed. Exam conducted with a chaperone present.   Constitutional:       General: He is not in acute distress.     Appearance: Normal appearance. He is not ill-appearing or toxic-appearing.   HENT:      Head: Normocephalic and atraumatic.      Right Ear: Tympanic membrane normal.      Left Ear: Tympanic membrane normal.      Nose: Nose normal.      Mouth/Throat:      Mouth: Mucous membranes are moist.      Pharynx: Oropharynx is clear. No oropharyngeal exudate.   Eyes:      General: No scleral icterus.     Conjunctiva/sclera: Conjunctivae normal.      Pupils: Pupils are equal, round, and reactive to light.   Neck:      Vascular: No carotid bruit.   Cardiovascular:      Rate and Rhythm: Normal rate and regular rhythm.      Pulses: Normal pulses.      Heart sounds: Normal heart sounds. No murmur heard.    No friction rub. No gallop.   Pulmonary:      Effort: Pulmonary effort is normal.      Breath sounds: Normal breath sounds. No stridor. No wheezing, rhonchi or rales.   Chest:      Chest wall: No tenderness.   Abdominal:      General: Abdomen is flat. There is no distension.      Palpations: There is no mass.       Tenderness: There is no abdominal tenderness. There is no rebound.   Musculoskeletal:         General: No swelling or tenderness. Normal range of motion.      Cervical back: Normal range of motion and neck supple. No rigidity. No muscular tenderness.   Lymphadenopathy:      Cervical: No cervical adenopathy.   Skin:     General: Skin is warm.      Capillary Refill: Capillary refill takes less than 2 seconds.      Coloration: Skin is not ashen, cyanotic, jaundiced, mottled or pale.      Findings: No bruising, erythema, lesion or rash.   Neurological:      General: No focal deficit present.      Mental Status: He is alert and oriented to person, place, and time. Mental status is at baseline.      GCS: GCS eye subscore is 4. GCS verbal subscore is 5. GCS motor subscore is 6.      Cranial Nerves: Cranial nerves 2-12 are intact. No cranial nerve deficit or facial asymmetry.      Sensory: Sensation is intact. No sensory deficit.      Motor: Motor function is intact. No weakness, tremor, atrophy, abnormal muscle tone or seizure activity.      Coordination: Coordination is intact. Coordination normal.      Gait: Gait abnormal.      Deep Tendon Reflexes: Reflexes are normal and symmetric. Babinski sign absent on the right side. Babinski sign absent on the left side.      Reflex Scores:       Bicep reflexes are 2+ on the right side and 2+ on the left side.       Patellar reflexes are 2+ on the right side and 2+ on the left side.     Comments: Having difficulty walking no weakness   Psychiatric:         Mood and Affect: Mood normal.         Speech: Speech normal.         Behavior: Behavior normal.         Thought Content: Thought content normal.         Judgment: Judgment normal.         Procedures           ED Course  ED Course as of 02/09/23 1345   Thu Feb 09, 2023   1315 afib [TS]      ED Course User Index  [TS] Randy Preston MD                                           Medical Decision Making  Patient with some weakness  and paresthesias possible stroke versus metabolic abnormalities.    Atherosclerosis:     Details: Patient has got history of atherosclerosis.  H/O carotid endarterectomy:     Details: Recent history of carotid endarterectomy surgical site appears healthy  Paroxysmal A-fib (HCC):     Details: May have paroxysmal A-fib his initial EKG had baseline artifact the possible A-fib second EKG and repeat examination reveals normal sinus rhythm he does not take anticoagulants but is on rate control medications he cannot take anticoagulants because he got a history of GI bleeding per the patient.  TIA (transient ischemic attack): acute illness or injury     Details: Acute TIA with symptoms of the resolving initially in the ED and all complete resolved and admitted to the hospital overnight secondary to his risks.  Amount and/or Complexity of Data Reviewed  Labs: ordered.     Details: Lab work-up was negative  Radiology: ordered.     Details: CTs are negative  ECG/medicine tests: ordered.  Discussion of management or test interpretation with external provider(s): Discussed with the hospitalist    Risk  Prescription drug management.    Risk Details: Patient's Gopal Vas score and ABCD score are moderate therefore he has a risk of TIAs and strokes because of A-fib not being anticoagulated and is a TIA risk score is moderate also.  I have discussed this case at length with the patient and will admit him to the medicine service he cannot take anticoagulation because of risk of GI bleeding.        Final diagnoses:   TIA (transient ischemic attack)   Paroxysmal A-fib (HCC)   Atherosclerosis   H/O carotid endarterectomy       ED Disposition  ED Disposition     ED Disposition   Decision to Admit    Condition   --    Comment   Level of Care: Telemetry [5]   Diagnosis: TIA (transient ischemic attack) [333078]   Admitting Physician: ELO GATES [[id number]]   Attending Physician: ELO GATES [[id number]]               No  follow-up provider specified.       Medication List      No changes were made to your prescriptions during this visit.          Randy Preston MD  02/09/23 104       Randy Preston MD  02/09/23 8739

## 2023-02-10 ENCOUNTER — READMISSION MANAGEMENT (OUTPATIENT)
Dept: CALL CENTER | Facility: HOSPITAL | Age: 78
End: 2023-02-10
Payer: OTHER GOVERNMENT

## 2023-02-10 ENCOUNTER — APPOINTMENT (OUTPATIENT)
Dept: ULTRASOUND IMAGING | Facility: HOSPITAL | Age: 78
End: 2023-02-10
Payer: OTHER GOVERNMENT

## 2023-02-10 VITALS
HEART RATE: 75 BPM | TEMPERATURE: 98 F | DIASTOLIC BLOOD PRESSURE: 62 MMHG | WEIGHT: 142 LBS | SYSTOLIC BLOOD PRESSURE: 125 MMHG | OXYGEN SATURATION: 98 % | BODY MASS INDEX: 22.29 KG/M2 | RESPIRATION RATE: 17 BRPM | HEIGHT: 67 IN

## 2023-02-10 LAB
ALBUMIN SERPL-MCNC: 3.6 G/DL (ref 3.5–5.2)
ALBUMIN/GLOB SERPL: 1.3 G/DL
ALP SERPL-CCNC: 86 U/L (ref 39–117)
ALT SERPL W P-5'-P-CCNC: 181 U/L (ref 1–41)
ANION GAP SERPL CALCULATED.3IONS-SCNC: 8 MMOL/L (ref 5–15)
AST SERPL-CCNC: 118 U/L (ref 1–40)
BASOPHILS # BLD AUTO: 0.02 10*3/MM3 (ref 0–0.2)
BASOPHILS NFR BLD AUTO: 0.5 % (ref 0–1.5)
BILIRUB SERPL-MCNC: 0.2 MG/DL (ref 0–1.2)
BUN SERPL-MCNC: 22 MG/DL (ref 8–23)
BUN/CREAT SERPL: 25.9 (ref 7–25)
CALCIUM SPEC-SCNC: 8.7 MG/DL (ref 8.6–10.5)
CHLORIDE SERPL-SCNC: 107 MMOL/L (ref 98–107)
CHOLEST SERPL-MCNC: 102 MG/DL (ref 0–200)
CO2 SERPL-SCNC: 25 MMOL/L (ref 22–29)
CREAT SERPL-MCNC: 0.85 MG/DL (ref 0.76–1.27)
DEPRECATED RDW RBC AUTO: 54.7 FL (ref 37–54)
EGFRCR SERPLBLD CKD-EPI 2021: 90.6 ML/MIN/1.73
EOSINOPHIL # BLD AUTO: 0.04 10*3/MM3 (ref 0–0.4)
EOSINOPHIL NFR BLD AUTO: 1 % (ref 0.3–6.2)
ERYTHROCYTE [DISTWIDTH] IN BLOOD BY AUTOMATED COUNT: 14.7 % (ref 12.3–15.4)
GLOBULIN UR ELPH-MCNC: 2.7 GM/DL
GLUCOSE BLDC GLUCOMTR-MCNC: 115 MG/DL (ref 70–130)
GLUCOSE BLDC GLUCOMTR-MCNC: 81 MG/DL (ref 70–130)
GLUCOSE SERPL-MCNC: 87 MG/DL (ref 65–99)
HBA1C MFR BLD: 5.2 % (ref 4.8–5.6)
HCT VFR BLD AUTO: 35 % (ref 37.5–51)
HDLC SERPL-MCNC: 45 MG/DL (ref 40–60)
HGB BLD-MCNC: 11.2 G/DL (ref 13–17.7)
IMM GRANULOCYTES # BLD AUTO: 0.04 10*3/MM3 (ref 0–0.05)
IMM GRANULOCYTES NFR BLD AUTO: 1 % (ref 0–0.5)
LDLC SERPL CALC-MCNC: 41 MG/DL (ref 0–100)
LDLC/HDLC SERPL: 0.92 {RATIO}
LYMPHOCYTES # BLD AUTO: 0.79 10*3/MM3 (ref 0.7–3.1)
LYMPHOCYTES NFR BLD AUTO: 19.6 % (ref 19.6–45.3)
MCH RBC QN AUTO: 32.3 PG (ref 26.6–33)
MCHC RBC AUTO-ENTMCNC: 32 G/DL (ref 31.5–35.7)
MCV RBC AUTO: 100.9 FL (ref 79–97)
MONOCYTES # BLD AUTO: 0.38 10*3/MM3 (ref 0.1–0.9)
MONOCYTES NFR BLD AUTO: 9.4 % (ref 5–12)
NEUTROPHILS NFR BLD AUTO: 2.76 10*3/MM3 (ref 1.7–7)
NEUTROPHILS NFR BLD AUTO: 68.5 % (ref 42.7–76)
NRBC BLD AUTO-RTO: 0 /100 WBC (ref 0–0.2)
PLATELET # BLD AUTO: 197 10*3/MM3 (ref 140–450)
PMV BLD AUTO: 8.9 FL (ref 6–12)
POTASSIUM SERPL-SCNC: 4.7 MMOL/L (ref 3.5–5.2)
PROT SERPL-MCNC: 6.3 G/DL (ref 6–8.5)
RBC # BLD AUTO: 3.47 10*6/MM3 (ref 4.14–5.8)
SODIUM SERPL-SCNC: 140 MMOL/L (ref 136–145)
TRIGL SERPL-MCNC: 79 MG/DL (ref 0–150)
VLDLC SERPL-MCNC: 16 MG/DL (ref 5–40)
WBC NRBC COR # BLD: 4.03 10*3/MM3 (ref 3.4–10.8)

## 2023-02-10 PROCEDURE — 97165 OT EVAL LOW COMPLEX 30 MIN: CPT

## 2023-02-10 PROCEDURE — 80061 LIPID PANEL: CPT | Performed by: FAMILY MEDICINE

## 2023-02-10 PROCEDURE — G0378 HOSPITAL OBSERVATION PER HR: HCPCS

## 2023-02-10 PROCEDURE — 83036 HEMOGLOBIN GLYCOSYLATED A1C: CPT | Performed by: FAMILY MEDICINE

## 2023-02-10 PROCEDURE — 82962 GLUCOSE BLOOD TEST: CPT

## 2023-02-10 PROCEDURE — 85025 COMPLETE CBC W/AUTO DIFF WBC: CPT | Performed by: FAMILY MEDICINE

## 2023-02-10 PROCEDURE — 76705 ECHO EXAM OF ABDOMEN: CPT

## 2023-02-10 PROCEDURE — 97161 PT EVAL LOW COMPLEX 20 MIN: CPT | Performed by: PHYSICAL THERAPIST

## 2023-02-10 PROCEDURE — 80053 COMPREHEN METABOLIC PANEL: CPT | Performed by: FAMILY MEDICINE

## 2023-02-10 PROCEDURE — 92523 SPEECH SOUND LANG COMPREHEN: CPT | Performed by: SPEECH-LANGUAGE PATHOLOGIST

## 2023-02-10 PROCEDURE — 99204 OFFICE O/P NEW MOD 45 MIN: CPT | Performed by: STUDENT IN AN ORGANIZED HEALTH CARE EDUCATION/TRAINING PROGRAM

## 2023-02-10 RX ADMIN — ASPIRIN 81 MG: 81 TABLET, CHEWABLE ORAL at 09:42

## 2023-02-10 RX ADMIN — Medication 10 ML: at 08:54

## 2023-02-10 NOTE — CASE MANAGEMENT/SOCIAL WORK
Discharge Planning Assessment   [facility]     Patient Name: Kingsley Lerma  MRN: [id number]  Today's Date: 2/10/2023    Admit Date: 2/9/2023        Discharge Needs Assessment     Row Name 02/10/23 1022       Living Environment    People in Home child(velvet), adult    Current Living Arrangements home    Primary Care Provided by child(velvet)    Provides Primary Care For no one    Family Caregiver if Needed child(velvet), adult    Quality of Family Relationships helpful;involved;supportive       Transition Planning    Patient/Family Anticipates Transition to home    Patient/Family Anticipated Services at Transition none    Transportation Anticipated family or friend will provide       Discharge Needs Assessment    Equipment Currently Used at Home walker, rolling;wheelchair;shower chair    Concerns to be Addressed no discharge needs identified;denies needs/concerns at this time    Discharge Coordination/Progress Patient is discharged home today and denies dc planning needs. Patient says he has needed DME, PCP, and Rx coverage.                     Expected Discharge Date and Time     Expected Discharge Date Expected Discharge Time    Feb 10, 2023         JANEL Argueta

## 2023-02-10 NOTE — PLAN OF CARE
Goal Outcome Evaluation:  Plan of Care Reviewed With: patient        Progress: no change  Outcome Evaluation: PT evalution completed. Patient presents A&Ox4 lying in bed. Patient denies any pain this date. OH readings taken reading supine 121/71, sititng 126/51, and standing 125/62. The patient demos decreased B LE strength 4/5. The patient was able to perform supine to sit transfer independently. The patient was able to sit EOB without complaints of dizziness. The patient was able to perform sit to stand transfer with good anterior weight shift. Upon standing, patient required extra time to gain balance with use of SPC. The patient was able to ambulate 40ft with SPC demonstrating decreased cadance, decreased step length, decreased L foot clearance, decreased balance, and decreased endurance. Patient would benefit from skilled PT in order to address his limitations of decreased balance, decreased strength, decreased endurance, impaired gait mechanics, and stair training. PT recommends d/c to home with assist.

## 2023-02-10 NOTE — PLAN OF CARE
Goal Outcome Evaluation:  Plan of Care Reviewed With: patient        Progress: no change  Outcome Evaluation: OT eval complete. Pt A&Ox4, c/o n/t in L foot but reports is baseline. Supine /71. Supine<>sit SBA, /51 sitting EOB. BUE ROM WFL, strength 4+/5 grossly. Coordination and sensation intact. Pt SBA sit<>stand, /62 while standing. Pt amb to BR CGA using straight cane. Pt completed oral hygiene SBA, leaning on sink for balance. Pt demonstrated decreased balance during dynamic standing and fxnal mobility. Pt is near baseline. At this time, OT will defer to PT for higher level balance training and safety w/ fxnal mobility. Recommend d/c home w/ assist.

## 2023-02-10 NOTE — THERAPY EVALUATION
Acute Care - Speech Language Pathology Initial Evaluation  [facility]     Patient Name: Kingsley Lerma  : 1945  MRN: [id number]  Today's Date: 2/10/2023               Admit Date: 2023     Visit Dx:  Pt was alert and cooperative during the evaluation. He did not complain of any pain but did request pain medicine. Pt demonstrated no difficulty during the evaluation. Executive function, memory, and attention are all WNL. Pt is oriented and able to complete any requested task. Pt does not require skilled ST intervention at this time. Contact if any acute changes occur.     Completed by Cj Washington, SLP student       ICD-10-CM ICD-9-CM   1. TIA (transient ischemic attack)  G45.9 435.9   2. Paroxysmal A-fib (HCC)  I48.0 427.31   3. Atherosclerosis  I70.90 440.9   4. H/O carotid endarterectomy  Z98.890 V45.89   5. At risk for impaired communication  Z91.89 V15.89     Patient Active Problem List   Diagnosis    Essential hypertension    PVD (peripheral vascular disease) (HCC)    Atrial fibrillation and flutter (HCC)    Alcohol abuse    Non healing left heel wound    Atrial fibrillation status post cardioversion (HCC)    Tobacco use    Syncope    Anticoagulated    GI bleed    Positive FIT (fecal immunochemical test)    Heme + stool    Acute blood loss anemia    Gastrointestinal hemorrhage    Osteoarthritis of right hip    AVM (arteriovenous malformation) of colon    BPH without obstruction/lower urinary tract symptoms    Abnormal CT scan, bladder    Hypokalemia    Diarrhea    Acute pulmonary embolism (HCC)    Severe malnutrition (CMS/HCC)    Preop testing    Carotid stenosis, left    Rectal bleeding    History of pulmonary embolism    Asymptomatic stenosis of left carotid artery    Constipation    TIA (transient ischemic attack)    Weakness of both lower extremities    New daily persistent headache    Elevated liver enzymes     Past Medical History:   Diagnosis Date    Alcohol abuse     Arthritis     Atrial  fibrillation (HCC)     Atrial flutter (HCC)     BPH (benign prostatic hyperplasia)     Circulation problem     COPD (chronic obstructive pulmonary disease) (HCC)     GERD (gastroesophageal reflux disease)     GI bleed     DR PAINTER    History of tobacco abuse     History of transfusion     Hyperlipidemia     Hypertension     Neuropathy     left lower extremity    Pulmonary embolism (HCC)     PVD (peripheral vascular disease) (HCC)     Vitamin B deficiency     Vitamin D deficiency      Past Surgical History:   Procedure Laterality Date    ADENOIDECTOMY      APPENDECTOMY      CAROTID ENDARTERECTOMY Left 1/25/2023    Procedure: LEFT CAROTID ENDARTERECTOMY WITH EEG;  Surgeon: Luis Enrique Donaldson DO;  Location:  [facility];  Service: Vascular;  Laterality: Left;    COLONOSCOPY N/A 06/08/2020    Multiple non-bleeding colonic angiodysplastic lesions; Non-bleeding internal hemorrhoids; The examination was otherwise normal; No specimens collected; Repeat 10 years    ENDOSCOPY N/A 06/08/2020    Normal esophagus; Normal stomach; Normal examined duodenum; No specimens collected    ENDOSCOPY N/A 06/15/2020    Dr. Banuelos-Normal examined duodenum; Normal stomach; Normal esophagus; No specimens collected    ILIAC ARTERY STENT  2020    FEMORAL ENDARECTOMY SEE OP NOTE FROM [facility]    VEIN SURGERY      VENA CAVA FILTER INSERTION N/A 01/31/2022    Procedure: VENA CAVA FILTER INSERTION;  Surgeon: Luis Enrique Donaldson DO;  Location: [facility] OR 12;  Service: Vascular;  Laterality: N/A;    VENA CAVA FILTER REMOVAL Right 06/22/2022    Procedure: VENA CAVA FILTER REMOVAL;  Surgeon: Luis Enrique Donaldson DO;  Location: [facility] OR 12;  Service: Vascular;  Laterality: Right;       SLP Recommendation and Plan  SLP Diagnosis: (P) functional speech/language skills, functional cognitive-linguistic skills (02/10/23 0710)     Reason for Discharge: (P) all goals and outcomes met, no further needs identified (02/10/23 0710)     SLC  Criteria for Skilled Therapy Interventions Met: (P) no problems identified which require skilled intervention (02/10/23 0710)                                      Plan of Care Reviewed With: (P) patient (02/10/23 0743)  Progress: (P) improving (02/10/23 0743)      SLP EVALUATION (last 72 hours)       SLP SLC Evaluation       Row Name 02/10/23 0710                   Communication Assessment/Intervention    Document Type evaluation (P)   -        Subjective Information no complaints (P)   -        Patient Observations alert;cooperative (P)   -        Patient Effort good (P)   -        Symptoms Noted During/After Treatment none (P)   -           General Information    Patient Profile Reviewed yes (P)   -        Pertinent History Of Current Problem Loss of blood supply to brain, stenosis of vertebral arteries (P)   -        Precautions/Limitations, Vision WFL (P)   -        Precautions/Limitations, Hearing WFL (P)   -        Prior Level of Function-Communication L (P)   -        Plans/Goals Discussed with patient (P)   -        Barriers to Rehab none identified (P)   -        Patient's Goals for Discharge return to home (P)   -           Pain    Additional Documentation Pain Scale: Numbers Pre/Post-Treatment (Group) (P)   -           Pain Scale: Numbers Pre/Post-Treatment    Pretreatment Pain Rating 0/10 - no pain (P)   -        Posttreatment Pain Rating 0/10 - no pain (P)   -           Comprehension Assessment/Intervention    Comprehension Assessment/Intervention Auditory Comprehension (P)   -           Auditory Comprehension Assessment/Intervention    Auditory Comprehension (Communication) WNL (P)   -        Able to Identify Objects/Pictures (Communication) WNL (P)   -        Answers Questions (Communication) WFL (P)   -        Able to Follow Commands (Communication) WFL (P)   -        Narrative Discourse WFL (P)   -           Expression Assessment/Intervention     Expression Assessment/Intervention verbal expression (P)   -           Verbal Expression Assessment/Intervention    Verbal Expression WNL (P)   -        Automatic Speech (Communication) WNL (P)   -        Repetition WNL (P)   -        Phrase Completion WNL (P)   -        Responsive Naming WNL (P)   -        Confrontational Naming WNL (P)   -        Spontaneous/Functional Words WNL (P)   -        Sentence Formulation WNL (P)   -        Conversational Discourse/Fluency WNL (P)   -           Motor Speech Assessment/Intervention    Motor Speech Function WNL (P)   -           Cognitive Assessment Intervention- SLP    Cognitive Function (Cognition) WNL (P)   -        Orientation Status (Cognition) WNL (P)   -        Memory (Cognitive) WNL (P)   -        Attention (Cognitive) WNL (P)   -        Thought Organization (Cognitive) WNL (P)   -        Reasoning (Cognitive) WNL (P)   -        Problem Solving (Cognitive) WNL (P)   -        Executive Function (Cognition) WNL (P)   -        Pragmatics (Communication) WNL (P)   -        Right Hemisphere Function WNL (P)   -           SLP Evaluation Clinical Impressions    SLP Diagnosis functional speech/language skills;functional cognitive-linguistic skills (P)   -        Rehab Potential/Prognosis good (P)   -        SLC Criteria for Skilled Therapy Interventions Met no problems identified which require skilled intervention (P)   -        Functional Impact no impact on function (P)   -           Recommendations    Therapy Frequency (SLP SLC) evaluation only (P)   -           SLP Discharge Summary    Discharge Destination home (P)   -        Reason for Discharge all goals and outcomes met, no further needs identified (P)   -                  User Key  (r) = Recorded By, (t) = Taken By, (c) = Cosigned By      Initials Name Effective Dates     Cj Washington, Speech Therapy Student 12/12/22 -                        EDUCATION  The  patient has been educated in the following areas:     Cognitive Impairment Communication Impairment.                      Time Calculation:      Time Calculation- SLP       Row Name 02/10/23 0748             Time Calculation- [facility]    SLP Start Time 0710 (P)   -      SLP Stop Time 0748 (P)   -      SLP Time Calculation (min) 38 min (P)   -      SLP Received On 02/10/23 (P)   -                User Key  (r) = Recorded By, (t) = Taken By, (c) = Cosigned By      Initials Name Provider Type    Cj Dominguez, Speech Therapy Student SLP Student                                   Cj Washington, Speech Therapy Student  2/10/2023

## 2023-02-10 NOTE — DISCHARGE SUMMARY
[facility] Medicine Services  DISCHARGE SUMMARY       Date of Admission: 2/9/2023  Date of Discharge:  2/10/2023  Primary Care Physician: Yohana Flanagan APRN    Presenting Problem/History of Present Illness:  Low blood pressure, weakness and fatigue.     Final Discharge Diagnoses:  Elevated liver enzymes  Weakness   Fatigue       Consults: neurology    Procedures Performed: none    Pertinent Test Results:   Imaging Results (Last 7 Days)     Procedure Component Value Units Date/Time    US Liver [[id number]] Collected: 02/10/23 0931     Updated: 02/10/23 0935    Narrative:      EXAMINATION: US LIVER-     2/10/2023 9:26 AM CST     HISTORY: new elevation in liver enzymes; G45.9-Transient cerebral  ischemic attack, unspecified; I48.0-Paroxysmal atrial fibrillation;  I70.90-Unspecified atherosclerosis; Z98.890-Other specified  postprocedural states; Z91.89-Other specified personal risk factors, not  elsewhere classified.     Gray scale and color flow ultrasound evaluation of the liver.     No focal liver abnormality.  No biliary dilation. CBD = 4-5 mm.     No shadowing gallstones or gallbladder wall thickening.     No free fluid within the upper abdomen.     Normal right kidney measuring 99 x 56 x 52 mm.     The pancreas is obscured by bowel gas.     The IVC and upper abdominal aorta are partially obscured by bowel gas.     Summary:  1. No abnormality is seen.     This report was finalized on 02/10/2023 09:32 by Dr. Alfred Hernández MD.    CT Angiogram Head w AI Analysis of LVO [[id number]] Collected: 02/09/23 1215     Updated: 02/09/23 1225    Narrative:      EXAMINATION:  CT ANGIOGRAM HEAD W AI ANALYSIS OF LVO-  2/9/2023 11:31 AM  CST     HISTORY: Rule out stroke.     COMPARISON : No comparison study.     DLP: 359 mGy-cm. Automated dosage reduction technique was utilized.     TECHNIQUE: CT angio was performed of the brain with IV contrast.  Coronal, sagittal and 3-D images were  reconstructed.     INDEPENDENT 3-D WORKSTATION UTILIZED FOR RECONSTRUCTION: YES. A  RADIOLOGIST WAS PRESENT IN THE DEPARTMENT.     FINDINGS: The vertebral and basilar arteries are patent. The internal  carotid arteries are patent. There is atheromatous plaque involving the  cavernous carotid arteries. The anterior, middle and posterior cerebral  arteries are patent. There is persistent fetal origin of the right  posterior cerebral artery. No aneurysms are appreciated. The visualized  venous sinuses are not well opacified.     AI ANALYSIS: AI analysis demonstrates relative decreased density of  right middle cerebral artery branches. However, visually, no asymmetry  is seen.       Impression:      1. No large vessel occlusion is seen. No aneurysm is seen.  2. There is atheromatous plaque involving the cavernous carotid arteries  with no definite significant stenosis identified.  3. AI analysis demonstrates relative decreased density of right middle  cerebral artery branches. However, visually, no asymmetry is seen.  4. Persistent fetal origin of the right posterior cerebral artery.     The full report of this exam was immediately signed and available to the  emergency room. The patient is currently in the emergency room.  This report was finalized on 02/09/2023 12:22 by Dr. Michael Pond MD.    CT Angiogram Neck [[id number]] Collected: 02/09/23 1213     Updated: 02/09/23 1224    Narrative:      EXAMINATION: CT ANGIOGRAM NECK-      2/9/2023 11:31 AM CST     HISTORY: Stroke, follow up     In order to have a CT radiation dose as low as reasonably achievable  Automated Exposure Control was utilized for adjustment of the mA and/or  KV according to patient size.     DLP in mGycm= 359.     CT angiogram neck with and without IV contrast.  CT angiography protocol.   CT imaging with bolus IV contrast injection.   Under concurrent supervision axial, sagittal, coronal,  three-dimensional, and MIP data sets were constructed on an  independent  work station.     Mild aortic arch calcification.  Mild calcification at the great vessel origins with approximately 50%  narrowing of the left common carotid artery origin.     Normally patent common carotid arteries.     Normally patent left ICA with external carotid artery occlusion.     There are changes of previous left carotid endarterectomy.     The mid and distal left ICA is patent.     Extensive dense calcified plaque through the proximal 22 mm of the right  ICA with 50% stenosis at the right ICA origin and again at a 0.15 mm  beyond the origin.     Normally patent mid and distal right ICA.     There is no arterial thrombus or dissection.     The vertebral arteries show patchy calcification though are symmetric  and patent. Approximately 40-50% narrowing of both proximal vertebral  arteries is seen.     Summary:  1. Previous left carotid endarterectomy.  2. Dense calcified plaque within the proximal right ICA with 50%  stenosis.  3. Patchy atherosclerotic calcification within both vertebral arteries.                                   This report was finalized on 02/09/2023 12:21 by Dr. Alfred Hernández MD.    CT Head Without Contrast Stroke Protocol [[id number]] Collected: 02/09/23 1144     Updated: 02/09/23 1158    Narrative:      EXAMINATION: CT HEAD WO CONTRAST STROKE PROTOCOL-      2/9/2023 11:30 AM CST     HISTORY: Stroke, follow up     In order to have a CT radiation dose as low as reasonably achievable  Automated Exposure Control was utilized for adjustment of the mA and/or  KV according to patient size.     DLP in mGycm= 778     The CT scan of the head is performed without intravenous contrast  enhancement.     Images are acquired in axial plane and subsequent reconstruction in  coronal and sagittal planes.     Comparison is made with the previous study dated 05/05/2020.     There is no evidence of a mass. There is no midline shift.     There is no evidence of intracranial hemorrhage or  hematoma.     Moderately dilated ventricles, basal cisterns and cortical sulci are  similar to the previous study suggesting a mild to moderate chronic  volume loss.     Scattered areas of chronic white matter ischemia are seen in the centrum  semiovale bilaterally. The gray-white matter differentiation is  maintained.     A partially empty sella turcica is seen.     Images are reviewed in bone window show show no acute bony abnormality.  Limited visualized paranasal sinuses and mastoid air cells are clear.  The frontal sinuses are hypoplastic.       Impression:      1. No acute intracranial abnormality.                             This report was finalized on 02/09/2023 11:55 by Dr. Chelsey Colmenares MD.    XR Chest 1 View [[id number]] Collected: 02/09/23 1108     Updated: 02/09/23 1111    Narrative:      EXAMINATION: XR CHEST 1 VW-     2/9/2023 11:05 AM CST     HISTORY: Stroke Protocol (Onset > 12 hrs)     1 view chest x-ray.     Comparison is made with 01/17/2023.     Heart size is normal.  The mediastinum is within normal limits.      The lungs are normally expanded with no pneumonia or pneumothorax.     No congestive failure changes.                                                                       Impression:      1. No acute disease.        This report was finalized on 02/09/2023 11:08 by Dr. Alfred Hernández MD.        Lab Results (last 7 days)     Procedure Component Value Units Date/Time    Lipid Panel [[id number]] Collected: 02/10/23 0557    Specimen: Blood Updated: 02/10/23 0743     Total Cholesterol 102 mg/dL      Triglycerides 79 mg/dL      HDL Cholesterol 45 mg/dL      LDL Cholesterol  41 mg/dL      VLDL Cholesterol 16 mg/dL      LDL/HDL Ratio 0.92    Narrative:      Cholesterol Reference Ranges  (U.S. Department of Health and Human Services ATP III Classifications)    Desirable          <200 mg/dL  Borderline High    200-239 mg/dL  High Risk          >240 mg/dL      Triglyceride Reference Ranges  (U.S.  Department of Health and Human Services ATP III Classifications)    Normal           <150 mg/dL  Borderline High  150-199 mg/dL  High             200-499 mg/dL  Very High        >500 mg/dL    HDL Reference Ranges  (U.S. Department of Health and Human Services ATP III Classifications)    Low     <40 mg/dl (major risk factor for CHD)  High    >60 mg/dl ('negative' risk factor for CHD)        LDL Reference Ranges  (U.S. Department of Health and Human Services ATP III Classifications)    Optimal          <100 mg/dL  Near Optimal     100-129 mg/dL  Borderline High  130-159 mg/dL  High             160-189 mg/dL  Very High        >189 mg/dL    Comprehensive Metabolic Panel [[id number]]  (Abnormal) Collected: 02/10/23 0557    Specimen: Blood Updated: 02/10/23 0743     Glucose 87 mg/dL      BUN 22 mg/dL      Creatinine 0.85 mg/dL      Sodium 140 mmol/L      Potassium 4.7 mmol/L      Chloride 107 mmol/L      CO2 25.0 mmol/L      Calcium 8.7 mg/dL      Total Protein 6.3 g/dL      Albumin 3.6 g/dL      ALT (SGPT) 181 U/L      AST (SGOT) 118 U/L      Alkaline Phosphatase 86 U/L      Total Bilirubin 0.2 mg/dL      Globulin 2.7 gm/dL      A/G Ratio 1.3 g/dL      BUN/Creatinine Ratio 25.9     Anion Gap 8.0 mmol/L      eGFR 90.6 mL/min/1.73     Narrative:      GFR Normal >60  Chronic Kidney Disease <60  Kidney Failure <15    The GFR formula is only valid for adults with stable renal function between ages 18 and 70.    CBC & Differential [[id number]]  (Abnormal) Collected: 02/10/23 0713    Specimen: Blood Updated: 02/10/23 0723    Narrative:      The following orders were created for panel order CBC & Differential.  Procedure                               Abnormality         Status                     ---------                               -----------         ------                     CBC Auto Differential[[id number]]        Abnormal            Final result                 Please view results for these tests on the individual orders.     CBC Auto Differential [[id number]]  (Abnormal) Collected: 02/10/23 0713    Specimen: Blood Updated: 02/10/23 0723     WBC 4.03 10*3/mm3      RBC 3.47 10*6/mm3      Hemoglobin 11.2 g/dL      Hematocrit 35.0 %      .9 fL      MCH 32.3 pg      MCHC 32.0 g/dL      RDW 14.7 %      RDW-SD 54.7 fl      MPV 8.9 fL      Platelets 197 10*3/mm3      Neutrophil % 68.5 %      Lymphocyte % 19.6 %      Monocyte % 9.4 %      Eosinophil % 1.0 %      Basophil % 0.5 %      Immature Grans % 1.0 %      Neutrophils, Absolute 2.76 10*3/mm3      Lymphocytes, Absolute 0.79 10*3/mm3      Monocytes, Absolute 0.38 10*3/mm3      Eosinophils, Absolute 0.04 10*3/mm3      Basophils, Absolute 0.02 10*3/mm3      Immature Grans, Absolute 0.04 10*3/mm3      nRBC 0.0 /100 WBC     Hemoglobin A1c [[id number]] Collected: 02/10/23 0557    Specimen: Blood Updated: 02/10/23 0713    POC Glucose Once [[id number]]  (Normal) Collected: 02/10/23 0633    Specimen: Blood Updated: 02/10/23 0645     Glucose 81 mg/dL      Comment: : lily Mac Pet AirwaysaMeter ID: [id number]       POC Glucose Once [[id number]]  (Normal) Collected: 02/10/23 0003    Specimen: Blood Updated: 02/10/23 0014     Glucose 115 mg/dL      Comment: : lily Mac LisaMeter ID: [id number]       C-reactive Protein [[id number]]  (Abnormal) Collected: 02/09/23 1639    Specimen: Blood Updated: 02/09/23 1736     C-Reactive Protein 1.07 mg/dL     CK [[id number]]  (Normal) Collected: 02/09/23 1639    Specimen: Blood Updated: 02/09/23 1736     Creatine Kinase 62 U/L     [place] Draw [[id number]] Collected: 02/09/23 1045    Specimen: Blood Updated: 02/09/23 1501    Narrative:      The following orders were created for panel order [place] Draw.  Procedure                               Abnormality         Status                     ---------                               -----------         ------                     Green Top (Gel)[[id number]]                                                              Lavender Top[[id number]]                                                                Red Top[[id number]]                                                                     Gold Top - SST[[id number]]                                   Final result               [place] Top[[id number]]                                         Final result               Light Blue Top[[id number]]                                                                Please view results for these tests on the individual orders.    [place] Top [[id number]] Collected: 02/09/23 1045    Specimen: Blood Updated: 02/09/23 1501     Extra Tube Hold for add-ons.     Comment: Auto resulted.       [place] Draw [[id number]] Collected: 02/09/23 1045    Specimen: Blood Updated: 02/09/23 1200    Narrative:      The following orders were created for panel order [place] Draw.  Procedure                               Abnormality         Status                     ---------                               -----------         ------                     Green Top (Gel)[[id number]]                                  Final result               Lavender Top[[id number]]                                     Final result               Red Top[[id number]]                                          Final result               Light Blue Top[[id number]]                                   Final result                 Please view results for these tests on the individual orders.    Lavender Top [[id number]] Collected: 02/09/23 1045    Specimen: Blood Updated: 02/09/23 1200     Extra Tube hold for add-on     Comment: Auto resulted       Light Blue Top [[id number]] Collected: 02/09/23 1045    Specimen: Blood Updated: 02/09/23 1200     Extra Tube Hold for add-ons.     Comment: Auto resulted       Gold Top - SST [[id number]] Collected: 02/09/23 1045    Specimen: Blood Updated: 02/09/23 1200     Extra Tube Hold for add-ons.     Comment: Auto resulted.       Green Top (Gel) [[id number]] Collected:  02/09/23 1045    Specimen: Blood Updated: 02/09/23 1200     Extra Tube Hold for add-ons.     Comment: Auto resulted.       Red Top [[id number]] Collected: 02/09/23 1045    Specimen: Blood Updated: 02/09/23 1200     Extra Tube Hold for add-ons.     Comment: Auto resulted.       Comprehensive Metabolic Panel [[id number]]  (Abnormal) Collected: 02/09/23 1045    Specimen: Blood Updated: 02/09/23 1119     Glucose 141 mg/dL      BUN 26 mg/dL      Creatinine 1.09 mg/dL      Sodium 137 mmol/L      Potassium 4.7 mmol/L      Comment: Slight hemolysis detected by analyzer. Results may be affected.        Chloride 101 mmol/L      CO2 25.0 mmol/L      Calcium 8.9 mg/dL      Total Protein 7.6 g/dL      Albumin 4.3 g/dL      ALT (SGPT) 235 U/L      AST (SGOT) 172 U/L      Alkaline Phosphatase 107 U/L      Total Bilirubin 0.3 mg/dL      Globulin 3.3 gm/dL      A/G Ratio 1.3 g/dL      BUN/Creatinine Ratio 23.9     Anion Gap 11.0 mmol/L      eGFR 70.8 mL/min/1.73     Narrative:      GFR Normal >60  Chronic Kidney Disease <60  Kidney Failure <15    The GFR formula is only valid for adults with stable renal function between ages 18 and 70.    Protime-INR [[id number]]  (Normal) Collected: 02/09/23 1045    Specimen: Blood Updated: 02/09/23 1102     Protime 13.2 Seconds      INR 0.99    CBC & Differential [[id number]]  (Abnormal) Collected: 02/09/23 1045    Specimen: Blood Updated: 02/09/23 1053    Narrative:      The following orders were created for panel order CBC & Differential.  Procedure                               Abnormality         Status                     ---------                               -----------         ------                     CBC Auto Differential[[id number]]        Abnormal            Final result                 Please view results for these tests on the individual orders.    CBC Auto Differential [[id number]]  (Abnormal) Collected: 02/09/23 1045    Specimen: Blood Updated: 02/09/23 1053     WBC 7.47 10*3/mm3       RBC 3.77 10*6/mm3      Hemoglobin 12.2 g/dL      Hematocrit 37.6 %      MCV 99.7 fL      MCH 32.4 pg      MCHC 32.4 g/dL      RDW 14.8 %      RDW-SD 53.9 fl      MPV 8.5 fL      Platelets 232 10*3/mm3      Neutrophil % 80.5 %      Lymphocyte % 10.3 %      Monocyte % 8.2 %      Eosinophil % 0.0 %      Basophil % 0.3 %      Immature Grans % 0.7 %      Neutrophils, Absolute 6.02 10*3/mm3      Lymphocytes, Absolute 0.77 10*3/mm3      Monocytes, Absolute 0.61 10*3/mm3      Eosinophils, Absolute 0.00 10*3/mm3      Basophils, Absolute 0.02 10*3/mm3      Immature Grans, Absolute 0.05 10*3/mm3      nRBC 0.0 /100 WBC     POC Glucose Once [[id number]]  (Normal) Collected: 02/09/23 1029    Specimen: Blood Updated: 02/09/23 1050     Glucose 91 mg/dL      Comment: : [id number] Sally ColeMeter ID: [id number]           CT head   IMPRESSION:  1. No acute intracranial abnormality.    CTA Head     IMPRESSION:  1. No large vessel occlusion is seen. No aneurysm is seen.  2. There is atheromatous plaque involving the cavernous carotid arteries  with no definite significant stenosis identified.  3. AI analysis demonstrates relative decreased density of right middle  cerebral artery branches. However, visually, no asymmetry is seen.  4. Persistent fetal origin of the right posterior cerebral artery.     CTA neck    Summary:  1. Previous left carotid endarterectomy.  2. Dense calcified plaque within the proximal right ICA with 50%  stenosis.  3. Patchy atherosclerotic calcification within both vertebral arteries.          Hospital Course:  The patient is a 77 y.o. male who presented to [facility] with low blood pressure, weakness, perioral numbness, headache.   He had a carotid endarterectomy about two weeks ago and the headache has been persistent since then.  No unilateral weakness noted.   No slurring of speech.  On presentation his liver enzymes were elevated.  New since dc from hospital .  Patient notes he was recently  "placed on Lipitor.   Neurology was consulted.  Does not feel TIA  A US of liver obtained, normal.  Will dc home.  Stop lipitor.  Will need cmp in one week.  [facility]     Physical Exam on Discharge:  /75 (BP Location: Right arm, Patient Position: Lying)   Pulse 75   Temp 98 °F (36.7 °C) (Oral)   Resp 17   Ht 170.2 cm (67\")   Wt 64.4 kg (142 lb)   SpO2 98%   BMI 22.24 kg/m²   Physical Exam  Vitals and nursing note reviewed.   Constitutional:       Appearance: Normal appearance.   HENT:      Head: Normocephalic and atraumatic.      Right Ear: External ear normal.      Left Ear: External ear normal.      Nose: Nose normal.      Mouth/Throat:      Mouth: Mucous membranes are moist.   Eyes:      Extraocular Movements: Extraocular movements intact.      Conjunctiva/sclera: Conjunctivae normal.      Pupils: Pupils are equal, round, and reactive to light.   Cardiovascular:      Rate and Rhythm: Normal rate and regular rhythm.      Pulses: Normal pulses.      Heart sounds: No murmur heard.    No friction rub. No gallop.   Pulmonary:      Effort: Pulmonary effort is normal.      Breath sounds: Normal breath sounds.   Abdominal:      General: Bowel sounds are normal.      Palpations: Abdomen is soft.   Musculoskeletal:         General: Normal range of motion.      Cervical back: Normal range of motion and neck supple.   Skin:     General: Skin is warm and dry.      Capillary Refill: Capillary refill takes less than 2 seconds.   Neurological:      General: No focal deficit present.      Mental Status: He is alert and oriented to person, place, and time.      Cranial Nerves: No cranial nerve deficit.   Psychiatric:         Mood and Affect: Mood normal.         Behavior: Behavior normal.           Condition on Discharge: stable    Discharge Disposition:  Home or Self Care    Discharge Medications:     Discharge Medications      Continue These Medications      Instructions Start Date "   amiodarone 200 MG tablet  Commonly known as: PACERONE   200 mg, Oral, Every 24 Hours Scheduled      aspirin 81 MG EC tablet   81 mg, Oral, Daily      dilTIAZem  MG 24 hr capsule  Commonly known as: CARDIZEM CD   120 mg, Oral, Daily      ferrous sulfate 325 (65 FE) MG tablet   325 mg, Oral, Daily With Breakfast      finasteride 5 MG tablet  Commonly known as: PROSCAR   5 mg, Oral, Nightly      folic acid 1 MG tablet  Commonly known as: FOLVITE   1 mg, Oral, Nightly      gabapentin 300 MG capsule  Commonly known as: Neurontin   300 mg, Oral, Daily      LACTOBACILLUS PO   1 tablet, Oral, Nightly      lisinopril 40 MG tablet  Commonly known as: PRINIVIL,ZESTRIL   40 mg, Oral, Daily      multivitamin with minerals tablet tablet   1 tablet, Oral, Nightly      pantoprazole 20 MG EC tablet  Commonly known as: PROTONIX   20 mg, Oral, Every Morning      vitamin C 500 MG tablet  Commonly known as: ASCORBIC ACID   1,000 mg, Oral, Daily      Vitamin D 50 MCG (2000 UT) tablet   2,000 Units, Oral, 2 Times Daily         Stop These Medications    atorvastatin 20 MG tablet  Commonly known as: LIPITOR          Discharge Diet:   Diet Instructions     Diet: Regular; Thin      Discharge Diet: Regular    Fluid Consistency: Thin        Activity at Discharge:   Activity Instructions     Activity as Tolerated            Follow-up Appointments:   Future Appointments   Date Time Provider Department Center   2/14/2023  8:30 AM Luis Enrique Donaldson DO [facility]   6/1/2023  8:45 AM Nolan Farias MD [facility]       Test Results Pending at Discharge: none    Christina Crisostomo  02/10/23  10:03 CST    Time: 35 minutes.

## 2023-02-10 NOTE — THERAPY EVALUATION
Patient Name: Kingsley Lerma  : 1945    MRN: [id number]                              Today's Date: 2/10/2023       Admit Date: 2023    Visit Dx:     ICD-10-CM ICD-9-CM   1. TIA (transient ischemic attack)  G45.9 435.9   2. Paroxysmal A-fib (HCC)  I48.0 427.31   3. Atherosclerosis  I70.90 440.9   4. H/O carotid endarterectomy  Z98.890 V45.89   5. At risk for impaired communication  Z91.89 V15.89   6. Impaired mobility  Z74.09 799.89     Patient Active Problem List   Diagnosis   • Essential hypertension   • PVD (peripheral vascular disease) (HCC)   • Atrial fibrillation and flutter (HCC)   • Alcohol abuse   • Non healing left heel wound   • Atrial fibrillation status post cardioversion (HCC)   • Tobacco use   • Syncope   • Anticoagulated   • GI bleed   • Positive FIT (fecal immunochemical test)   • Heme + stool   • Acute blood loss anemia   • Gastrointestinal hemorrhage   • Osteoarthritis of right hip   • AVM (arteriovenous malformation) of colon   • BPH without obstruction/lower urinary tract symptoms   • Abnormal CT scan, bladder   • Hypokalemia   • Diarrhea   • Acute pulmonary embolism (HCC)   • Severe malnutrition (CMS/HCC)   • Preop testing   • Carotid stenosis, left   • Rectal bleeding   • History of pulmonary embolism   • Asymptomatic stenosis of left carotid artery   • Constipation   • TIA (transient ischemic attack)   • Weakness of both lower extremities   • New daily persistent headache   • Elevated liver enzymes     Past Medical History:   Diagnosis Date   • Alcohol abuse    • Arthritis    • Atrial fibrillation (HCC)    • Atrial flutter (HCC)    • BPH (benign prostatic hyperplasia)    • Circulation problem    • COPD (chronic obstructive pulmonary disease) (HCC)    • GERD (gastroesophageal reflux disease)    • GI bleed     DR PAINTER   • History of tobacco abuse    • History of transfusion    • Hyperlipidemia    • Hypertension    • Neuropathy     left lower extremity   • Pulmonary embolism (HCC)     • PVD (peripheral vascular disease) (HCC)    • Vitamin B deficiency    • Vitamin D deficiency      Past Surgical History:   Procedure Laterality Date   • ADENOIDECTOMY     • APPENDECTOMY     • CAROTID ENDARTERECTOMY Left 1/25/2023    Procedure: LEFT CAROTID ENDARTERECTOMY WITH EEG;  Surgeon: Luis Enrique Donaldson DO;  Location: [facility] HYBRID OR 12;  Service: Vascular;  Laterality: Left;   • COLONOSCOPY N/A 06/08/2020    Multiple non-bleeding colonic angiodysplastic lesions; Non-bleeding internal hemorrhoids; The examination was otherwise normal; No specimens collected; Repeat 10 years   • ENDOSCOPY N/A 06/08/2020    Normal esophagus; Normal stomach; Normal examined duodenum; No specimens collected   • ENDOSCOPY N/A 06/15/2020    Dr. Banuelos-Normal examined duodenum; Normal stomach; Normal esophagus; No specimens collected   • ILIAC ARTERY STENT  2020    FEMORAL ENDARECTOMY SEE OP NOTE FROM [facility]   • VEIN SURGERY     • VENA CAVA FILTER INSERTION N/A 01/31/2022    Procedure: VENA CAVA FILTER INSERTION;  Surgeon: Luis Enrique Donaldson DO;  Location:  [facility] HYBRID OR 12;  Service: Vascular;  Laterality: N/A;   • VENA CAVA FILTER REMOVAL Right 06/22/2022    Procedure: VENA CAVA FILTER REMOVAL;  Surgeon: Luis Enrique Donaldson DO;  Location: [facility] HYBRID OR 12;  Service: Vascular;  Laterality: Right;      General Information     Row Name 02/10/23 0804          Physical Therapy Time and Intention    Document Type evaluation  Dx: TIA, NDPH; Hx: HTN, Afib, OA R hip  -MS (r) MARY ALICE (t) MS ROB (aura)     Mode of Treatment physical therapy;co-treatment  -MS (r) MARY ALICE (t) MS ROB (c)     Row Name 02/10/23 0804          General Information    Patient Profile Reviewed yes  -MS (r) MJ (t) MS ROB (c)     Prior Level of Function independent:;all household mobility;community mobility;gait;transfer;bed mobility;ADL's;home management  -MS (r) MJ (t) MS ROB (c)     Existing Precautions/Restrictions no known precautions/restrictions  -MS (r) MJ (t)  AC,MS (c)     Barriers to Rehab previous functional deficit;physical barrier  -MS (r) MJ (t) AC,MS (c)     Row Name 02/10/23 0804          Living Environment    People in Home child(velvet), adult  -MS (r) MJ (t) AC,MS (c)     Row Name 02/10/23 04          Home Main Entrance    Number of Stairs, Main Entrance one  -MS (r) MJ (t) AC,MS (c)     Stair Railings, Main Entrance none  -MS (r) MJ (t) AC,MS (c)     Row Name 02/10/23 0804          Stairs Within Home, Primary    Number of Stairs, Within Home, Primary none  -MS (r) MJ (t) AC,MS (c)     Stair Railings, Within Home, Primary none  -MS (r) MJ (t) AC,MS (c)     Row Name 02/10/23 0804          Cognition    Orientation Status (Cognition) oriented x 4  -MS (r) MJ (t) AC,MS (c)     Row Name 02/10/23 [facility]          Safety Issues, Functional Mobility    Safety Issues Affecting Function (Mobility) ability to follow commands;insight into deficits/self-awareness;awareness of need for assistance;impulsivity;safety precaution awareness;safety precautions follow-through/compliance  -MS (r) MJ (t) AC,MS (c)     Impairments Affecting Function (Mobility) balance;endurance/activity tolerance;sensation/sensory awareness;strength  -MS (r) MJ (t) AC,MS (c)           User Key  (r) = Recorded By, (t) = Taken By, (c) = Cosigned By    Initials Name Provider Type     Lionel Traylor N, OTR/L, CNT Occupational Therapist    Susanne Vaughan, PT, DPT, NCS Physical Therapist    Ida Johnson, PT Student PT Student               Mobility     Row Name 02/10/23 0804          Bed Mobility    Bed Mobility supine-sit  -MS (r) MJ (t) AC,MS (c)     Supine-Sit [place] (Bed Mobility) standby assist  -MS (r) MJ (t) AC,MS (c)     Assistive Device (Bed Mobility) bed rails;head of bed elevated  -MS (r) MJ (t) AC,MS (c)     Row Name 02/10/23 0804          Sit-Stand Transfer    Sit-Stand [place] (Transfers) standby assist  -MS (r) MJ (t) AC,MS (c)     Assistive Device (Sit-Stand Transfers) cane,  straight  -MS (r) MJ (t) AC,MS (c)     Comment, (Sit-Stand Transfer) The patient demos good anterior weight shift with sit to stand transfer. Required some time upon standing to gain balance.  -MS (r) MJ (t) AC,MS (c)     Row Name 02/10/23 0804          Gait/Stairs (Locomotion)    [place] Level (Gait) contact guard  -MS (r) MJ (t) AC,MS (c)     Assistive Device (Gait) cane, straight  -MS (r) MJ (t) AC,MS (c)     Distance in Feet (Gait) 40ft with SPC; the patient demos decreased cadance, decreased step length, decreased L foot clearance, decreased balance, and decreased endurance.  -MS (r) MJ (t) AC,MS (c)           User Key  (r) = Recorded By, (t) = Taken By, (c) = Cosigned By    Initials Name Provider Type    AC Lionel Traylor, OTR/L, CNT Occupational Therapist    Susanne Vaughan, PT, DPT, NCS Physical Therapist    Ida Johnson, PREETI Student PT Student               Obj/Interventions     Row Name 02/10/23 0804          Range of Motion Comprehensive    General Range of Motion no range of motion deficits identified  -MS (r) MJ (t) AC,MS (c)     Comment, General Range of Motion B LE AROM WFL  -MS (r) MJ (t) AC,MS (c)     Row Name 02/10/23 0804          Strength Comprehensive (MMT)    General Manual Muscle Testing (MMT) Assessment lower extremity strength deficits identified  -MS (r) MJ (t) AC,MS (c)     Comment, General Manual Muscle Testing (MMT) Assessment B LEs 4/5; L dorsiflexion deferred this date due to pain with previous diagnosis of neuropathy  -MS (r) MJ (t) AC,MS (c)     Row Name 02/10/23 0804          Balance    Balance Assessment sitting static balance;sitting dynamic balance;sit to stand dynamic balance;standing static balance;standing dynamic balance  -MS (r) MJ (t) AC,MS (c)     Static Sitting Balance independent  -MS (r) MJ (t) AC,MS (c)     Dynamic Sitting Balance standby assist  -MS (r) MJ (t) AC,MS (c)     Position, Sitting Balance unsupported;sitting edge of bed  -MS (r) MJ (t) ROB,MS  "(c)     Sit to Stand Dynamic Balance standby assist  -MS (r) MJ (t) AC,MS (c)     Static Standing Balance contact guard  -MS (r) MJ (t) AC,MS (c)     Dynamic Standing Balance contact guard  -MS (r) MJ (t) AC,MS (c)     Position/Device Used, Standing Balance supported;cane, straight  -MS (r) MJ (t) AC,MS (c)     Balance Interventions sitting;standing;sit to stand;supported;static;dynamic  -MS (r) MJ (t) AC,MS (c)     Comment, Balance The patient demonstrates good sitting balance and fair standing balance. Patient required extra time upon standing to gain balance.  -MS (r) MJ (t) AC,MS (c)     Row Name 02/10/23 0804          Sensory Assessment (Somatosensory)    Sensory Assessment (Somatosensory) left LE  -MS (r) MJ (t) AC,MS (c)     Left LE Sensory Assessment light touch awareness  Per patient report, light touch feels \"lighter\" on L LE than R LE at the medial and lateral malleolus and into the L foot  -MS (r) MJ (t) AC,MS (c)           User Key  (r) = Recorded By, (t) = Taken By, (c) = Cosigned By    Initials Name Provider Type    Lionle Hidalgo, OTR/L, CNT Occupational Therapist    Susanne Vaughan, PT, DPT, NCS Physical Therapist    Ida Johnson, PT Student PT Student               Goals/Plan     Row Name 02/10/23 0804          Bed Mobility Goal 1 (PT)    Activity/Assistive Device (Bed Mobility Goal 1, PT) scooting;supine to sit;sit to supine  -MS (r) MJ (t) AC,MS (c)     [place] Level/Cues Needed (Bed Mobility Goal 1, PT) independent  -MS (r) MJ (t) AC,MS (c)     Time Frame (Bed Mobility Goal 1, PT) long term goal (LTG);by discharge  -MS (r) MJ (t) AC,MS (c)     Progress/Outcomes (Bed Mobility Goal 1, PT) new goal  -MS (r) MJ (t) AC,MS (c)     Row Name 02/10/23 0804          Transfer Goal 1 (PT)    Activity/Assistive Device (Transfer Goal 1, PT) sit-to-stand/stand-to-sit;bed-to-chair/chair-to-bed  -MS (r) MJ (t) AC,MS (c)     [place] Level/Cues Needed (Transfer Goal 1, PT) independent  -MS " (r) MJ (t) AC,MS (c)     Time Frame (Transfer Goal 1, PT) long term goal (LTG);by discharge  -MS (r) MJ (t) AC,MS (c)     Progress/Outcome (Transfer Goal 1, PT) new goal  -MS (r) MJ (t) AC,MS (c)     Row Name 02/10/23 08          Gait Training Goal 1 (PT)    Activity/Assistive Device (Gait Training Goal 1, PT) gait (walking locomotion);assistive device use;decrease fall risk;diminish gait deviation;improve balance and speed;increase endurance/gait distance;cane, straight  -MS (r) MJ (t) AC,MS (c)     [place] Level (Gait Training Goal 1, PT) independent  -MS (r) MJ (t) AC,MS (c)     Distance (Gait Training Goal 1, PT) 100ft with SPC  -MS (r) MJ (t) AC,MS (c)     Time Frame (Gait Training Goal 1, PT) long term goal (LTG);by discharge  -MS (r) MJ (t) AC,MS (c)     Progress/Outcome (Gait Training Goal 1, PT) new goal  -MS (r) MJ (t) AC,MS (c)     Row Name 02/10/23 0804          Stairs Goal 1 (PT)    Activity/Assistive Device (Stairs Goal 1, PT) ascending stairs;descending stairs;using handrail, left;using handrail, right;step-to-step;decrease fall risk;improve balance and speed;assistive device use;cane, straight  -MS (r) MJ (t) AC,MS (c)     [place] Level/Cues Needed (Stairs Goal 1, PT) independent  -MS (r) MJ (t) AC,MS (c)     Number of Stairs (Stairs Goal 1, PT) 4 steps  -MS (r) MJ (t) AC,MS (c)     Time Frame (Stairs Goal 1, PT) long term goal (LTG);by discharge  -MS (r) MJ (t) AC,MS (c)     Progress/Outcome (Stairs Goal 1, PT) new goal  -MS (r) MJ (t) AC,MS (c)     Row Name 02/10/23 0804          Therapy Assessment/Plan (PT)    Planned Therapy Interventions (PT) balance training;bed mobility training;gait training;patient/family education;stair training;strengthening;transfer training  -MS (r) MJ (t) AC,MS (c)           User Key  (r) = Recorded By, (t) = Taken By, (c) = Cosigned By    Initials Name Provider Type    Lionel Hidalgo, OTR/L, CNT Occupational Therapist    Susanne Vaughan, PT, DPT,  NCS Physical Therapist    Ida Johnson, PT Student PT Student               Clinical Impression     Row Name 02/10/23 0804          Pain    Pretreatment Pain Rating 0/10 - no pain  -MS (r) MJ (t) AC,MS (c)     Posttreatment Pain Rating 0/10 - no pain  -MS (r) MJ (t) AC,MS (c)     Pain Intervention(s) Ambulation/increased activity;Repositioned  -MS (r) MJ (t) AC,MS (c)     Row Name 02/10/23 0804          Plan of Care Review    Plan of Care Reviewed With patient  -MS (r) MJ (t) AC,MS (c)     Progress no change  -MS (r) MJ (t) AC,MS (c)     Outcome Evaluation PT evalution completed. Patient presents A&Ox4 lying in bed. Patient denies any pain this date. OH readings taken reading supine 121/71, sititng 126/51, and standing 125/62. The patient demos decreased B LE strength 4/5. The patient was able to perform supine to sit transfer independently. The patient was able to sit EOB without complaints of dizziness. The patient was able to perform sit to stand transfer with good anterior weight shift. Upon standing, patient required extra time to gain balance with use of SPC. The patient was able to ambulate 40ft with SPC demonstrating decreased cadance, decreased step length, decreased L foot clearance, decreased balance, and decreased endurance. Patient would benefit from skilled PT in order to address his limitations of decreased balance, decreased strength, decreased endurance, impaired gait mechanics, and stair training. PT recommends d/c to home with assist.  -MS (r) MJ (t) AC,MS (c)     Row Name 02/10/23 0804          Therapy Assessment/Plan (PT)    Patient/Family Therapy Goals Statement (PT) to go home  -MS (r) MJ (t) AC,MS (c)     Rehab Potential (PT) good, to achieve stated therapy goals  -MS (r) MJ (t) AC,MS (c)     Criteria for Skilled Interventions Met (PT) yes;meets criteria;skilled treatment is necessary  -MS (r) MJ (t) AC,MS (c)     Therapy Frequency (PT) 2 times/day  -MS (r) MJ (t) AC,MS (c)     Predicted  Duration of Therapy Intervention (PT) until discharge  -MS (r) MJ (t) AC,MS (c)     Row Name 02/10/23 0804          Vital Signs    Pre Systolic BP Rehab 121  Supine  -MS (r) MJ (t) AC,MS (c)     Pre Treatment Diastolic BP 71  -MS (r) MJ (t) AC,MS (c)     Intra Systolic BP Rehab 126  Sitting  -MS (r) MJ (t) AC,MS (c)     Intra Treatment Diastolic BP 51  -MS (r) MJ (t) AC,MS (c)     Post Systolic BP Rehab 125  Standing  -MS (r) MJ (t) AC,MS (c)     Post Treatment Diastolic BP 62  -MS (r) MJ (t) AC,MS (c)     O2 Delivery Pre Treatment room air  -MS (r) MJ (t) AC,MS (c)     O2 Delivery Intra Treatment room air  -MS (r) MJ (t) AC,MS (c)     O2 Delivery Post Treatment room air  -MS (r) MJ (t) AC,MS (c)     Pre Patient Position Supine  -MS (r) MJ (t) AC,MS (c)     Intra Patient Position Standing  -MS (r) MJ (t) AC,MS (c)     Post Patient Position Sitting  -MS (r) MJ (t) AC,MS (c)     Row Name 02/10/23 0804          Positioning and Restraints    Pre-Treatment Position in bed  -MS (r) MJ (t) AC,MS (c)     Post Treatment Position chair  -MS (r) MJ (t) AC,MS (c)     In Chair sitting;call light within reach;encouraged to call for assist  -MS (r) MJ (t) AC,MS (c)           User Key  (r) = Recorded By, (t) = Taken By, (c) = Cosigned By    Initials Name Provider Type    Lionel Hidalgo, OTR/L, CNT Occupational Therapist    Susanne Vaughan, PT, DPT, NCS Physical Therapist    Ida Johnson, PT Student PT Student               Outcome Measures     Row Name 02/10/23 0852 02/10/23 0804       How much help from another person do you currently need...    Turning from your back to your side while in flat bed without using bedrails? 4  -AD 4  -AC (r) MJ (t) AC (c)    Moving from lying on back to sitting on the side of a flat bed without bedrails? 4  -AD 4  -AC (r) MJ (t) AC (c)    Moving to and from a bed to a chair (including a wheelchair)? 4  -AD 4  -AC (r) MJ (t) AC (c)    Standing up from a chair using your arms (e.g.,  wheelchair, bedside chair)? 4  -AD 3  -AC (r) MJ (t) AC (c)    Climbing 3-5 steps with a railing? 4  -AD 2  -AC (r) MJ (t) AC (c)    To walk in hospital room? 4  -AD 3  -AC (r) MJ (t) AC (c)    AM-PAC 6 Clicks Score (PT) 24  -AD 20  -AC (r) MJ (t)    Highest level of mobility 8 --> Walked 250 feet or more  -AD 6 --> Walked 10 steps or more  -AC (r) MJ (t)    Row Name 02/10/23 1000 02/10/23 0804       Modified [place] Scale    Modified [place] Scale 4 - Moderately severe disability.  Unable to walk without assistance, and unable to attend to own bodily needs without assistance.  -AC (r) EC (t) AC (c) 4 - Moderately severe disability.  Unable to walk without assistance, and unable to attend to own bodily needs without assistance.  -AC (r) MJ (t) AC (c)    Row Name 02/10/23 1000 02/10/23 0900       Functional Assessment    Outcome Measure Options Modified [place]  -AC (r) EC (t) AC (c) AM-PAC 6 Clicks Daily Activity (OT)  -AC (r) EC (t) AC (c)    Row Name 02/10/23 0804          Functional Assessment    Outcome Measure Options AM-PAC 6 Clicks Basic Mobility (PT);Modified [place]  -AC (r) MJ (t) AC (c)           User Key  (r) = Recorded By, (t) = Taken By, (c) = Cosigned By    Initials Name Provider Type    Lionel Hidalgo, OTR/L, CNT Occupational Therapist    Alejandra Grewal, RN Registered Nurse    Ida Johnson, PT Student PT Student    Magda Mendez, OT Student OT Student                             Physical Therapy Education     Title: PT OT SLP Therapies (Resolved)     Topic: Physical Therapy (Resolved)     Point: Mobility training (Resolved)     Learning Progress Summary           Patient Acceptance, E, VU by MARY ALICE at 2/10/2023 0804    Comment: role of PT in his care, importance of mobility, use of SPC                   Point: Home exercise program (Resolved)     Learner Progress:  Not documented in this visit.          Point: Body mechanics (Resolved)     Learner Progress:  Not documented in this visit.           Point: Precautions (Resolved)     Learner Progress:  Not documented in this visit.                      User Key     Initials Effective Dates Name Provider Type Discipline     12/12/22 -  Ida Tapia, PT Student PT Student PT              PT Recommendation and Plan  Planned Therapy Interventions (PT): balance training, bed mobility training, gait training, patient/family education, stair training, strengthening, transfer training  Plan of Care Reviewed With: patient  Progress: no change  Outcome Evaluation: PT evalution completed. Patient presents A&Ox4 lying in bed. Patient denies any pain this date. OH readings taken reading supine 121/71, sititng 126/51, and standing 125/62. The patient demos decreased B LE strength 4/5. The patient was able to perform supine to sit transfer independently. The patient was able to sit EOB without complaints of dizziness. The patient was able to perform sit to stand transfer with good anterior weight shift. Upon standing, patient required extra time to gain balance with use of SPC. The patient was able to ambulate 40ft with SPC demonstrating decreased cadance, decreased step length, decreased L foot clearance, decreased balance, and decreased endurance. Patient would benefit from skilled PT in order to address his limitations of decreased balance, decreased strength, decreased endurance, impaired gait mechanics, and stair training. PT recommends d/c to home with assist.     Time Calculation:    PT Charges     Row Name 02/10/23 0804             Time Calculation    Start Time 0804  10 min chart review, Low complexity, 3  -AC (r) MJ (t) AC (c)      Stop Time 0841  -AC (r) MJ (t) AC (c)      Time Calculation (min) 37 min  -AC (r) MJ (t)      PT Received On 02/10/23  -AC (r) MJ (t) AC (c)      PT Goal Re-Cert Due Date 02/20/23  -AC (r) MJ (t) AC (c)         Untimed Charges    PT Eval/Re-eval Minutes 47  -AC (r) MJ (t) AC (c)         Total Minutes    Untimed Charges Total Minutes  47  -AC (r) MJ (t)       Total Minutes 47  -AC (r) MJ (t)            User Key  (r) = Recorded By, (t) = Taken By, (c) = Cosigned By    Initials Name Provider Type    Lionel Hidalgo, OTR/L, CNT Occupational Therapist    Ida Johnson, PT Student PT Student                  PT G-Codes  Outcome Measure Options: Modified Irena  AM-PAC 6 Clicks Score (PT): 24  AM-PAC 6 Clicks Score (OT): 22  Modified [place] Scale: 4 - Moderately severe disability.  Unable to walk without assistance, and unable to attend to own bodily needs without assistance.  PT Discharge Summary  Anticipated Discharge Disposition (PT): home with assist    Ida Tapia, PT Student  2/10/2023

## 2023-02-10 NOTE — PLAN OF CARE
Goal Outcome Evaluation:  Plan of Care Reviewed With: (P) patient        Progress: (P) improving     Pt was alert and cooperative during the evaluation. He did not complain of any pain but did request pain medicine. Pt demonstrated no difficulty during the evaluation. Executive function, memory, and attention are all WNL. Pt is oriented and able to complete any requested task. Pt does not required skilled ST intervention at this time. Contact if any acute changes occur.

## 2023-02-10 NOTE — PLAN OF CARE
Goal Outcome Evaluation:  Plan of Care Reviewed With: patient           Outcome Evaluation: Patient is alert and oriented times 4, up with stand by assist, no complaints of pain or discomfort, NPO since midnight, cenies any numbness or tingling, NIH 0-1, safety maintained.

## 2023-02-10 NOTE — THERAPY DISCHARGE NOTE
Acute Care - Occupational Therapy Initial Evaluation/Discharge  [facility]     Patient Name: Kingsley Lerma  : 1945  MRN: [id number]  Today's Date: 2/10/2023  Onset of Illness/Injury or Date of Surgery: 23  Date of Referral to OT: 23  Referring Physician: Dr. Crisostomo      Admit Date: 2023       ICD-10-CM ICD-9-CM   1. TIA (transient ischemic attack)  G45.9 435.9   2. Paroxysmal A-fib (HCC)  I48.0 427.31   3. Atherosclerosis  I70.90 440.9   4. H/O carotid endarterectomy  Z98.890 V45.89   5. At risk for impaired communication  Z91.89 V15.89     Patient Active Problem List   Diagnosis   • Essential hypertension   • PVD (peripheral vascular disease) (HCC)   • Atrial fibrillation and flutter (HCC)   • Alcohol abuse   • Non healing left heel wound   • Atrial fibrillation status post cardioversion (HCC)   • Tobacco use   • Syncope   • Anticoagulated   • GI bleed   • Positive FIT (fecal immunochemical test)   • Heme + stool   • Acute blood loss anemia   • Gastrointestinal hemorrhage   • Osteoarthritis of right hip   • AVM (arteriovenous malformation) of colon   • BPH without obstruction/lower urinary tract symptoms   • Abnormal CT scan, bladder   • Hypokalemia   • Diarrhea   • Acute pulmonary embolism (HCC)   • Severe malnutrition (CMS/HCC)   • Preop testing   • Carotid stenosis, left   • Rectal bleeding   • History of pulmonary embolism   • Asymptomatic stenosis of left carotid artery   • Constipation   • TIA (transient ischemic attack)   • Weakness of both lower extremities   • New daily persistent headache   • Elevated liver enzymes     Past Medical History:   Diagnosis Date   • Alcohol abuse    • Arthritis    • Atrial fibrillation (HCC)    • Atrial flutter (HCC)    • BPH (benign prostatic hyperplasia)    • Circulation problem    • COPD (chronic obstructive pulmonary disease) (HCC)    • GERD (gastroesophageal reflux disease)    • GI bleed     DR PAINTER   • History of tobacco abuse    • History of  transfusion    • Hyperlipidemia    • Hypertension    • Neuropathy     left lower extremity   • Pulmonary embolism (HCC)    • PVD (peripheral vascular disease) (HCC)    • Vitamin B deficiency    • Vitamin D deficiency      Past Surgical History:   Procedure Laterality Date   • ADENOIDECTOMY     • APPENDECTOMY     • CAROTID ENDARTERECTOMY Left 1/25/2023    Procedure: LEFT CAROTID ENDARTERECTOMY WITH EEG;  Surgeon: Luis Enrique Donaldson DO;  Location: [facility] OR ;  Service: Vascular;  Laterality: Left;   • COLONOSCOPY N/A 06/08/2020    Multiple non-bleeding colonic angiodysplastic lesions; Non-bleeding internal hemorrhoids; The examination was otherwise normal; No specimens collected; Repeat 10 years   • ENDOSCOPY N/A 06/08/2020    Normal esophagus; Normal stomach; Normal examined duodenum; No specimens collected   • ENDOSCOPY N/A 06/15/2020    Dr. Banuelos-Normal examined duodenum; Normal stomach; Normal esophagus; No specimens collected   • ILIAC ARTERY STENT  2020    FEMORAL ENDARECTOMY SEE OP NOTE FROM [facility]   • VEIN SURGERY     • VENA CAVA FILTER INSERTION N/A 01/31/2022    Procedure: VENA CAVA FILTER INSERTION;  Surgeon: Luis Enrique Donaldson DO;  Location:  [facility];  Service: Vascular;  Laterality: N/A;   • VENA CAVA FILTER REMOVAL Right 06/22/2022    Procedure: VENA CAVA FILTER REMOVAL;  Surgeon: Luis Enrique Donaldson DO;  Location: [facility] OR 12;  Service: Vascular;  Laterality: Right;       OT ASSESSMENT FLOWSHEET (last 12 hours)     OT Evaluation and Treatment     Row Name 02/10/23 0802                   OT Time and Intention    Subjective Information complains of;numbness  n/t in L foot  -AC (r) EC (t) AC (c)        Document Type evaluation  -AC (r) EC (t) AC (c)        Mode of Treatment occupational therapy  -AC (r) EC (t) AC (c)           General Information    Patient Profile Reviewed yes  -AC (r) EC (t) AC (c)        Onset of Illness/Injury or Date of Surgery 02/09/23  -AC (r)  EC (t) AC (c)        Referring Physician Dr. Crisostomo  -AC (r) EC (t) AC (c)        Prior Level of Function independent:;feeding;grooming;dressing;bathing;driving;community mobility;all household mobility  -AC (r) EC (t) AC (c)        Equipment Currently Used at Home walker, standard;cane, straight;wheelchair;shower chair  -AC (r) EC (t) AC (c)        Pertinent History of Current Functional Problem L sided weakness Dx: TIA PMH: paroxysmal atrial fibrillation, carotid endarterectomy on L approx 2 wks ago  -AC (r) EC (t) AC (c)        Existing Precautions/Restrictions fall  -AC (r) EC (t) AC (c)        Barriers to Rehab previous functional deficit;physical barrier  -AC (r) EC (t) AC (c)           Living Environment    Current Living Arrangements home  -AC (r) EC (t) AC (c)        Home Accessibility stairs to enter home  -AC (r) EC (t) AC (c)        People in Home child(velvet), adult  -AC (r) EC (t) AC (c)           Home Main Entrance    Number of Stairs, Main Entrance one  -AC (r) EC (t) AC (c)        Stair Railings, Main Entrance none  -AC (r) EC (t) AC (c)           Pain Assessment    Pretreatment Pain Rating 0/10 - no pain  -AC (r) EC (t) AC (c)        Posttreatment Pain Rating 0/10 - no pain  -AC (r) EC (t) AC (c)           Cognition    Orientation Status (Cognition) oriented x 4  -AC (r) EC (t) AC (c)           Range of Motion Comprehensive    Comment, General Range of Motion BUE WFL  -AC (r) EC (t) AC (c)           Strength Comprehensive (MMT)    Comment, General Manual Muscle Testing (MMT) Assessment BUE 4+/5 grossly  -AC (r) EC (t) AC (c)           Sensory    Additional Documentation Sensory Assessment (Somatosensory) (Group)  -AC (r) EC (t) AC (c)           Sensory Assessment (Somatosensory)    Sensory Assessment (Somatosensory) UE sensation intact  -AC (r) EC (t) AC (c)           Activities of Daily Living    BADL Assessment/Intervention grooming;lower body dressing  -AC (r) EC (t) AC (c)           Lower Body  Dressing Assessment/Training    [place] Level (Lower Body Dressing) doff;don;socks;modified independence  simulated, reports using sock aid for R foot  -AC (r) EC (t) AC (c)        Position (Lower Body Dressing) edge of bed sitting  -AC (r) EC (t) AC (c)           Grooming Assessment/Training    [place] Level (Grooming) oral care regimen;standby assist  -AC (r) EC (t) AC (c)        Position (Grooming) supported standing  -AC (r) EC (t) AC (c)           BADL Safety/Performance    Impairments, BADL Safety/Performance balance;sensory awareness  diminished sensation in L foot  -AC (r) EC (t) AC (c)           Bed Mobility    Bed Mobility supine-sit  -AC (r) EC (t) AC (c)        Supine-Sit [place] (Bed Mobility) standby assist  -AC (r) EC (t) AC (c)           Functional Mobility    Functional Mobility- Ind. Level contact guard assist  -AC (r) EC (t) AC (c)        Functional Mobility- Device cane, straight  -AC (r) EC (t) AC (c)        Functional Mobility- Safety Issues balance decreased during turns  -AC (r) EC (t) AC (c)        Functional Mobility- Comment amb to BR and chair CGA using straight cane  -AC (r) EC (t) AC (c)           Transfer Assessment/Treatment    Transfers sit-stand transfer;stand-sit transfer  -AC (r) EC (t) AC (c)           Sit-Stand Transfer    Sit-Stand [place] (Transfers) standby assist  -AC (r) EC (t) AC (c)        Assistive Device (Sit-Stand Transfers) cane, straight  -AC (r) EC (t) AC (c)           Stand-Sit Transfer    Stand-Sit [place] (Transfers) standby assist  -AC (r) EC (t) AC (c)        Assistive Device (Stand-Sit Transfers) cane, straight  -AC (r) EC (t) AC (c)           Safety Issues, Functional Mobility    Safety Issues Affecting Function (Mobility) ability to follow commands;safety precaution awareness;safety precautions follow-through/compliance;impulsivity;insight into deficits/self-awareness  -AC (r) EC (t) AC (c)        Impairments Affecting Function  (Mobility) balance;sensation/sensory awareness  -AC (r) EC (t) AC (c)           Motor Skills    Motor Skills coordination  -AC (r) EC (t) AC (c)        Coordination finger to nose;WNL  -AC (r) EC (t) AC (c)           Balance    Balance Assessment sitting static balance;sitting dynamic balance;standing static balance;standing dynamic balance  -AC (r) EC (t) AC (c)        Static Sitting Balance supervision  -AC (r) EC (t) AC (c)        Dynamic Sitting Balance contact guard  -AC (r) EC (t) AC (c)        Position, Sitting Balance sitting edge of bed;sitting in chair  -AC (r) EC (t) AC (c)        Static Standing Balance standby assist  -AC (r) EC (t) AC (c)        Dynamic Standing Balance contact guard  -AC (r) EC (t) AC (c)        Position/Device Used, Standing Balance cane, straight  -AC (r) EC (t) AC (c)           Plan of Care Review    Plan of Care Reviewed With patient  -AC (r) EC (t) AC (c)        Progress no change  -AC (r) EC (t) AC (c)        Outcome Evaluation OT eval complete. Pt A&Ox4, c/o n/t in L foot but reports is baseline. Supine /71. Supine<>sit SBA, /51 sitting EOB. BUE ROM WFL, strength 4+/5 grossly. Coordination and sensation intact. Pt SBA sit<>stand, /62 while standing. Pt amb to BR CGA using straight cane. Pt completed oral hygiene SBA, leaning on sink for balance. Pt demonstrated decreased balance during dynamic standing and fxnal mobility. Pt is near baseline. At this time, OT will defer to PT for higher level balance training and safety w/ fxnal mobility. Recommend d/c home w/ assist.  -AC (r) EC (t) AC (c)           Vital Signs    Pre Systolic BP Rehab 121  supine  -AC (r) EC (t) AC (c)        Pre Treatment Diastolic BP 71  -AC (r) EC (t) AC (c)        Intra Systolic BP Rehab 126  seated  -AC (r) EC (t) AC (c)        Intra Treatment Diastolic BP 51  -AC (r) EC (t) AC (c)        Post Systolic BP Rehab 125  standing  -AC (r) EC (t) AC (c)        Post Treatment Diastolic BP 62   -AC (r) EC (t) AC (c)           Positioning and Restraints    Pre-Treatment Position in bed  -AC (r) EC (t) AC (c)        Post Treatment Position chair  -AC (r) EC (t) AC (c)        In Chair sitting;call light within reach;encouraged to call for assist  -AC (r) EC (t) AC (c)           Therapy Assessment/Plan (OT)    Date of Referral to OT 02/09/23  -AC (r) EC (t) AC (c)        Therapy Frequency (OT) evaluation only  -AC (r) EC (t) AC (c)              User Key  (r) = Recorded By, (t) = Taken By, (c) = Cosigned By    Initials Name Effective Dates    AC Lionel Traylor, OTR/L, CNT 02/03/23 -     Magda Mendez, OT Student 12/12/22 -                 Occupational Therapy Education     Title: PT OT SLP Therapies (In Progress)     Topic: Occupational Therapy (Done)     Point: ADL training (Done)     Description:   Instruct learner(s) on proper safety adaptation and remediation techniques during self care or transfers.   Instruct in proper use of assistive devices.              Learning Progress Summary           Patient Acceptance, E, VU by  at 2/10/2023 0902    Comment: safety w/ ADLs, balance during ADLs and fxnal mobility, benefits of activity                   Point: Precautions (Done)     Description:   Instruct learner(s) on prescribed precautions during self-care and functional transfers.              Learning Progress Summary           Patient Acceptance, E, VU by  at 2/10/2023 0902    Comment: safety w/ ADLs, balance during ADLs and fxnal mobility, benefits of activity                   Point: Body mechanics (Done)     Description:   Instruct learner(s) on proper positioning and spine alignment during self-care, functional mobility activities and/or exercises.              Learning Progress Summary           Patient Acceptance, E, VU by  at 2/10/2023 0902    Comment: safety w/ ADLs, balance during ADLs and fxnal mobility, benefits of activity                               User Key     Initials Effective  Dates Name Provider Type Discipline     12/12/22 -  Magda Gamez, OT Student OT Student OT                OT Recommendation and Plan  Therapy Frequency (OT): evaluation only  Plan of Care Review  Plan of Care Reviewed With: patient  Progress: no change  Outcome Evaluation: OT eval complete. Pt A&Ox4, c/o n/t in L foot but reports is baseline. Supine /71. Supine<>sit SBA, /51 sitting EOB. BUE ROM WFL, strength 4+/5 grossly. Coordination and sensation intact. Pt SBA sit<>stand, /62 while standing. Pt amb to BR CGA using straight cane. Pt completed oral hygiene SBA, leaning on sink for balance. Pt demonstrated decreased balance during dynamic standing and fxnal mobility. Pt is near baseline. At this time, OT will defer to PT for higher level balance training and safety w/ fxnal mobility. Recommend d/c home w/ assist.  Plan of Care Reviewed With: patient  Outcome Evaluation: OT eval complete. Pt A&Ox4, c/o n/t in L foot but reports is baseline. Supine /71. Supine<>sit SBA, /51 sitting EOB. BUE ROM WFL, strength 4+/5 grossly. Coordination and sensation intact. Pt SBA sit<>stand, /62 while standing. Pt amb to BR CGA using straight cane. Pt completed oral hygiene SBA, leaning on sink for balance. Pt demonstrated decreased balance during dynamic standing and fxnal mobility. Pt is near baseline. At this time, OT will defer to PT for higher level balance training and safety w/ fxnal mobility. Recommend d/c home w/ assist.          Outcome Measures     Row Name 02/10/23 1000 02/10/23 0900          How much help from another is currently needed...    Putting on and taking off regular lower body clothing? -- 3  -AC (r) EC (t) AC (c)     Bathing (including washing, rinsing, and drying) -- 3  -AC (r) EC (t) AC (c)     Toileting (which includes using toilet bed pan or urinal) -- 4  -AC (r) EC (t) AC (c)     Putting on and taking off regular upper body clothing -- 4  -AC (r) EC (t) AC (c)      Taking care of personal grooming (such as brushing teeth) -- 4  -AC (r) EC (t) AC (c)     Eating meals -- 4  -AC (r) EC (t) AC (c)     AM-PAC 6 Clicks Score (OT) -- 22  -AC (r) EC (t)        Modified [place] Scale    Modified [place] Scale 4 - Moderately severe disability.  Unable to walk without assistance, and unable to attend to own bodily needs without assistance.  -AC (r) EC (t) AC (c) --        Functional Assessment    Outcome Measure Options Modified [place]  -AC (r) EC (t) AC (c) AM-PAC 6 Clicks Daily Activity (OT)  -AC (r) EC (t) AC (c)           User Key  (r) = Recorded By, (t) = Taken By, (c) = Cosigned By    Initials Name Provider Type    Lionel Hidalgo, OTR/L, YANET Occupational Therapist    Magda Mendez, OT Student OT Student                Time Calculation:    Time Calculation- OT     Row Name 02/10/23 0842             Time Calculation- OT    OT Start Time 0802  -AC (r) EC (t) AC (c)      OT Stop Time 0840  -AC (r) EC (t) AC (c)      OT Time Calculation (min) 38 min  -AC (r) EC (t)      OT Received On 02/10/23  -AC (r) EC (t) AC (c)            User Key  (r) = Recorded By, (t) = Taken By, (c) = Cosigned By    Initials Name Provider Type    Lionel Hidalgo, OTR/L, CNT Occupational Therapist    Magda Mendez, OT Student OT Student                         OT Discharge Summary  Anticipated Discharge Disposition (OT): home with assist  Reason for Discharge: other (comment) (near baseline function)  Outcomes Achieved: Other  Discharge Destination: Home with assist    Magda Gamez OT Student  2/10/2023

## 2023-02-10 NOTE — CONSULTS
Neurology Consultation Note    Referring Provider:   Landen Crisostomo DO    Reason for Consultation:    Left facial numbness  Generalized weakness    Subjective     History of Present Illness:  This is a very pleasant 77-year-old male patient routinely cared for by TAJ Luke, who underwent a left carotid endarterectomy by Dr. Donaldson roughly 2 weeks ago without complication or sequela.  The patient has a history of paroxysmal atrial fibrillation in the past.  He is on amiodarone.  EKG demonstrated normal sinus rhythm with LVH criteria and telemetry has demonstrated only normal sinus rhythm.    Since that time, the patient states he has had some transient headaches.  Yesterday, the patient experienced some mild left facial numbness he believes that it was around his lips and also affected the area around the surgical site of his neck.  He was at his workplace where they obtained a blood pressure that would not register on the automatic cuff, but did provide a systolic reading in the 60s.  That being said, the patient was not presyncopal.  He was not dizzy upon arising.  He simply felt generalized weakness and no focal or unilateral symptoms.    The patient presented to the emergency department for further evaluation where systolic pressure was in the 97/46.  His blood pressure then went up to 134/60.  This morning it is 113/49.    Unenhanced CT of the head was negative for acute intracranial process and CTA of the head and neck demonstrated evidence of recent left carotid endarterectomy with right distal ICA stenosis of 50% with dense plaque and diffuse atheromatous disease throughout.  There is noted to be a fetal right PCA.    The patient's facial numbness resolved and has not returned.  He feels as though he is at his baseline.  He denies any difficulty with speech, vision.      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     Current Outpatient Medications on File Prior to Encounter   Medication Sig   • amiodarone (PACERONE) 200 MG tablet Take 1 tablet by mouth Daily.   • aspirin 81 MG EC tablet Take 1 tablet by mouth Daily.   • atorvastatin (LIPITOR) 20 MG tablet TAKE 1 TABLET BY MOUTH EVERY DAY   • Cholecalciferol (VITAMIN D) 50 MCG (2000 UT) tablet Take 2,000 Units by mouth 2 (Two) Times a Day.   • dilTIAZem CD (CARDIZEM CD) 120 MG 24 hr capsule Take 1 capsule by mouth Daily.   • ferrous sulfate 325 (65 FE) MG tablet Take 325 mg by mouth Daily With Breakfast.   • finasteride (PROSCAR) 5 MG tablet Take 5 mg by mouth Every Night.   • folic acid (FOLVITE) 1 MG tablet Take 1 mg by mouth Every Night.   • gabapentin (Neurontin) 300 MG capsule Take 1 capsule by mouth Daily.   • LACTOBACILLUS PO Take 1 tablet by mouth Every Night.   • lisinopril (PRINIVIL,ZESTRIL) 40 MG tablet Take 1 tablet by mouth Daily.   • multivitamin with minerals tablet tablet Take 1 tablet by mouth Every Night.   • pantoprazole (PROTONIX) 20 MG EC tablet Take 20 mg by mouth Every Morning.   • vitamin C (ASCORBIC ACID) 500 MG tablet Take 1,000 mg by mouth Daily.       Social History     Socioeconomic History   • Marital status: Single   Tobacco Use   • Smoking status: Some Days     Types: Cigars   •  Smokeless tobacco: Never   • Tobacco comments:     Smokes small cigars daily   Vaping Use   • Vaping Use: Never used   Substance and Sexual Activity   • Alcohol use: Not Currently     Alcohol/week: 2.0 standard drinks     Types: 2 Glasses of wine per week     Comment: Daily, QUIT DRINKING BEER IN 2020, QUIT DRINKING WINE IN 1/2022   • Drug use: Never   • Sexual activity: Defer     Family History   Problem Relation Age of Onset   • Dementia Mother    • Cancer Father    • Pancreatic cancer Father    • Colon cancer Neg Hx    • Colon polyps Neg Hx    • Esophageal cancer Neg Hx    • Liver cancer Neg Hx    • Liver disease Neg Hx    • Rectal cancer Neg Hx    • Stomach cancer Neg Hx        Objective      Vital Signs  Temp:  [97.7 °F (36.5 °C)-98.8 °F (37.1 °C)] 98.6 °F (37 °C)  Heart Rate:  [52-86] 56  Resp:  [18] 18  BP: ()/(45-93) 113/49    General Exam:  Head:  Normocephalic, atraumatic  HEENT:  Neck supple.  Left neck surgical site is clear without any surrounding erythema or discharge.  Bandage still in place.  CVS:  Regular rate and rhythm.    Lungs:  Clear.  No audible wheeze.  Abdomen:  Non-tender, Non-distended  Extremities:  No signs of peripheral edema  Skin:  No rashes      Neurologic Exam:  Mental Status:    -Awake. Alert. Oriented to person, place & time.  -No word finding difficulties.  -No aphasia.  -No dysarthria.  -Follows simple commands.     CN II:  Full visual fields with confrontation.  Pupils equally reactive to light.  CN III, IV, VI:  Extraocular muscles function intact with no nystagmus.  CN V:  Facial sensory is symmetric.  CN VII:  Facial motor symmetric.  CN VIII:  Gross hearing intact bilaterally.  CN IX/X:  Palate elevates symmetrically.  CN XI:  Shoulder shrug symmetric.  CN XII:  Tongue is midline on protrusion.     Motor: (strength out of 5:  1= minimal movement, 2 = movement in plane of gravity, 3 = movement against gravity, 4 = movement against some resistance, 5 = full strength)      -Right Upper Ext: Proximal: 5 Distal: 5  -Left Upper Ext: Proximal: 5 Distal: 5    -Right Lower Ext: Proximal: 5 Distal: 5  -Left Lower Ext: Proximal: 5 Distal: 5       Deep Tendon Reflexes:  -Right              Biceps: 2+         Triceps: 2+      Brachioradialis: 2+              Patella: 2+       Ankle: 2+         Babinski:  negative  -Left              Biceps: 2+         Triceps: 2+      Brachioradialis: 2+              Patella: 2+       Ankle: 2+         Babinski:  negative    Tone (Modified Devin Scale):  No appreciable increase in tone or rigidity noted.     Sensory:  -Intact to light touch, pinprick BUE (C5-T1) and BLE (L2-S1).  -No facial numbness subjectively.     Coordination:  -Finger to nose intact BUEs  -Heel to shin intact BLEs  -No ataxia     Gait  -No signs of ataxia  -ambulates unassisted    Results Review:  Lab Results (last 24 hours)     Procedure Component Value Units Date/Time    Lipid Panel [[id number]] Collected: 02/10/23 0557    Specimen: Blood Updated: 02/10/23 0743     Total Cholesterol 102 mg/dL      Triglycerides 79 mg/dL      HDL Cholesterol 45 mg/dL      LDL Cholesterol  41 mg/dL      VLDL Cholesterol 16 mg/dL      LDL/HDL Ratio 0.92    Narrative:      Cholesterol Reference Ranges  (U.S. Department of Health and Human Services ATP III Classifications)    Desirable          <200 mg/dL  Borderline High    200-239 mg/dL  High Risk          >240 mg/dL      Triglyceride Reference Ranges  (U.S. Department of Health and Human Services ATP III Classifications)    Normal           <150 mg/dL  Borderline High  150-199 mg/dL  High             200-499 mg/dL  Very High        >500 mg/dL    HDL Reference Ranges  (U.S. Department of Health and Human Services ATP III Classifications)    Low     <40 mg/dl (major risk factor for CHD)  High    >60 mg/dl ('negative' risk factor for CHD)        LDL Reference Ranges  (U.S. Department of Health and Human Services ATP III Classifications)    Optimal           <100 mg/dL  Near Optimal     100-129 mg/dL  Borderline High  130-159 mg/dL  High             160-189 mg/dL  Very High        >189 mg/dL    Comprehensive Metabolic Panel [[id number]]  (Abnormal) Collected: 02/10/23 0557    Specimen: Blood Updated: 02/10/23 0743     Glucose 87 mg/dL      BUN 22 mg/dL      Creatinine 0.85 mg/dL      Sodium 140 mmol/L      Potassium 4.7 mmol/L      Chloride 107 mmol/L      CO2 25.0 mmol/L      Calcium 8.7 mg/dL      Total Protein 6.3 g/dL      Albumin 3.6 g/dL      ALT (SGPT) 181 U/L      AST (SGOT) 118 U/L      Alkaline Phosphatase 86 U/L      Total Bilirubin 0.2 mg/dL      Globulin 2.7 gm/dL      A/G Ratio 1.3 g/dL      BUN/Creatinine Ratio 25.9     Anion Gap 8.0 mmol/L      eGFR 90.6 mL/min/1.73     Narrative:      GFR Normal >60  Chronic Kidney Disease <60  Kidney Failure <15    The GFR formula is only valid for adults with stable renal function between ages 18 and 70.    CBC & Differential [[id number]]  (Abnormal) Collected: 02/10/23 0713    Specimen: Blood Updated: 02/10/23 0723    Narrative:      The following orders were created for panel order CBC & Differential.  Procedure                               Abnormality         Status                     ---------                               -----------         ------                     CBC Auto Differential[[id number]]        Abnormal            Final result                 Please view results for these tests on the individual orders.    CBC Auto Differential [[id number]]  (Abnormal) Collected: 02/10/23 0713    Specimen: Blood Updated: 02/10/23 0723     WBC 4.03 10*3/mm3      RBC 3.47 10*6/mm3      Hemoglobin 11.2 g/dL      Hematocrit 35.0 %      .9 fL      MCH 32.3 pg      MCHC 32.0 g/dL      RDW 14.7 %      RDW-SD 54.7 fl      MPV 8.9 fL      Platelets 197 10*3/mm3      Neutrophil % 68.5 %      Lymphocyte % 19.6 %      Monocyte % 9.4 %      Eosinophil % 1.0 %      Basophil % 0.5 %      Immature Grans % 1.0 %       Neutrophils, Absolute 2.76 10*3/mm3      Lymphocytes, Absolute 0.79 10*3/mm3      Monocytes, Absolute 0.38 10*3/mm3      Eosinophils, Absolute 0.04 10*3/mm3      Basophils, Absolute 0.02 10*3/mm3      Immature Grans, Absolute 0.04 10*3/mm3      nRBC 0.0 /100 WBC     Hemoglobin A1c [[id number]] Collected: 02/10/23 0557    Specimen: Blood Updated: 02/10/23 0713    POC Glucose Once [[id number]]  (Normal) Collected: 02/10/23 0633    Specimen: Blood Updated: 02/10/23 0645     Glucose 81 mg/dL      Comment: : lily Estefania LisaMeter ID: [id number]       POC Glucose Once [[id number]]  (Normal) Collected: 02/10/23 0003    Specimen: Blood Updated: 02/10/23 0014     Glucose 115 mg/dL      Comment: : lily Mac LisaMeter ID: [id number]       C-reactive Protein [[id number]]  (Abnormal) Collected: 02/09/23 1639    Specimen: Blood Updated: 02/09/23 1736     C-Reactive Protein 1.07 mg/dL     CK [[id number]]  (Normal) Collected: 02/09/23 1639    Specimen: Blood Updated: 02/09/23 1736     Creatine Kinase 62 U/L     [place] Draw [[id number]] Collected: 02/09/23 1045    Specimen: Blood Updated: 02/09/23 1501    Narrative:      The following orders were created for panel order [place] Draw.  Procedure                               Abnormality         Status                     ---------                               -----------         ------                     Green Top (Gel)[[id number]]                                                             Lavender Top[[id number]]                                                                Red Top[[id number]]                                                                     Gold Top - SST[[id number]]                                   Final result               [place] Top[[id number]]                                         Final result               Light Blue Top[[id number]]                                                                Please view results for these tests on the  individual orders.    [place] Top [[id number]] Collected: 02/09/23 1045    Specimen: Blood Updated: 02/09/23 1501     Extra Tube Hold for add-ons.     Comment: Auto resulted.       [place] Draw [[id number]] Collected: 02/09/23 1045    Specimen: Blood Updated: 02/09/23 1200    Narrative:      The following orders were created for panel order [place] Draw.  Procedure                               Abnormality         Status                     ---------                               -----------         ------                     Green Top (Gel)[[id number]]                                  Final result               Lavender Top[[id number]]                                     Final result               Red Top[[id number]]                                          Final result               Light Blue Top[[id number]]                                   Final result                 Please view results for these tests on the individual orders.    Lavender Top [[id number]] Collected: 02/09/23 1045    Specimen: Blood Updated: 02/09/23 1200     Extra Tube hold for add-on     Comment: Auto resulted       Light Blue Top [[id number]] Collected: 02/09/23 1045    Specimen: Blood Updated: 02/09/23 1200     Extra Tube Hold for add-ons.     Comment: Auto resulted       Gold Top - SST [[id number]] Collected: 02/09/23 1045    Specimen: Blood Updated: 02/09/23 1200     Extra Tube Hold for add-ons.     Comment: Auto resulted.       Green Top (Gel) [[id number]] Collected: 02/09/23 1045    Specimen: Blood Updated: 02/09/23 1200     Extra Tube Hold for add-ons.     Comment: Auto resulted.       Red Top [[id number]] Collected: 02/09/23 1045    Specimen: Blood Updated: 02/09/23 1200     Extra Tube Hold for add-ons.     Comment: Auto resulted.       Comprehensive Metabolic Panel [[id number]]  (Abnormal) Collected: 02/09/23 1045    Specimen: Blood Updated: 02/09/23 1119     Glucose 141 mg/dL      BUN 26 mg/dL      Creatinine 1.09 mg/dL      Sodium 137 mmol/L       Potassium 4.7 mmol/L      Comment: Slight hemolysis detected by analyzer. Results may be affected.        Chloride 101 mmol/L      CO2 25.0 mmol/L      Calcium 8.9 mg/dL      Total Protein 7.6 g/dL      Albumin 4.3 g/dL      ALT (SGPT) 235 U/L      AST (SGOT) 172 U/L      Alkaline Phosphatase 107 U/L      Total Bilirubin 0.3 mg/dL      Globulin 3.3 gm/dL      A/G Ratio 1.3 g/dL      BUN/Creatinine Ratio 23.9     Anion Gap 11.0 mmol/L      eGFR 70.8 mL/min/1.73     Narrative:      GFR Normal >60  Chronic Kidney Disease <60  Kidney Failure <15    The GFR formula is only valid for adults with stable renal function between ages 18 and 70.    Protime-INR [[id number]]  (Normal) Collected: 02/09/23 1045    Specimen: Blood Updated: 02/09/23 1102     Protime 13.2 Seconds      INR 0.99    CBC & Differential [[id number]]  (Abnormal) Collected: 02/09/23 1045    Specimen: Blood Updated: 02/09/23 1053    Narrative:      The following orders were created for panel order CBC & Differential.  Procedure                               Abnormality         Status                     ---------                               -----------         ------                     CBC Auto Differential[[id number]]        Abnormal            Final result                 Please view results for these tests on the individual orders.    CBC Auto Differential [[id number]]  (Abnormal) Collected: 02/09/23 1045    Specimen: Blood Updated: 02/09/23 1053     WBC 7.47 10*3/mm3      RBC 3.77 10*6/mm3      Hemoglobin 12.2 g/dL      Hematocrit 37.6 %      MCV 99.7 fL      MCH 32.4 pg      MCHC 32.4 g/dL      RDW 14.8 %      RDW-SD 53.9 fl      MPV 8.5 fL      Platelets 232 10*3/mm3      Neutrophil % 80.5 %      Lymphocyte % 10.3 %      Monocyte % 8.2 %      Eosinophil % 0.0 %      Basophil % 0.3 %      Immature Grans % 0.7 %      Neutrophils, Absolute 6.02 10*3/mm3      Lymphocytes, Absolute 0.77 10*3/mm3      Monocytes, Absolute 0.61 10*3/mm3      Eosinophils,  Absolute 0.00 10*3/mm3      Basophils, Absolute 0.02 10*3/mm3      Immature Grans, Absolute 0.05 10*3/mm3      nRBC 0.0 /100 WBC     POC Glucose Once [[id number]]  (Normal) Collected: 02/09/23 1029    Specimen: Blood Updated: 02/09/23 1050     Glucose 91 mg/dL      Comment: : [id number] Sally ColeMeter ID: [id number]             .  Imaging Results (Last 24 Hours)     Procedure Component Value Units Date/Time    CT Angiogram Head w AI Analysis of LVO [[id number]] Collected: 02/09/23 1215     Updated: 02/09/23 1225    Narrative:      EXAMINATION:  CT ANGIOGRAM HEAD W AI ANALYSIS OF LVO-  2/9/2023 11:31 AM  CST     HISTORY: Rule out stroke.     COMPARISON : No comparison study.     DLP: 359 mGy-cm. Automated dosage reduction technique was utilized.     TECHNIQUE: CT angio was performed of the brain with IV contrast.  Coronal, sagittal and 3-D images were reconstructed.     INDEPENDENT 3-D WORKSTATION UTILIZED FOR RECONSTRUCTION: YES. A  RADIOLOGIST WAS PRESENT IN THE DEPARTMENT.     FINDINGS: The vertebral and basilar arteries are patent. The internal  carotid arteries are patent. There is atheromatous plaque involving the  cavernous carotid arteries. The anterior, middle and posterior cerebral  arteries are patent. There is persistent fetal origin of the right  posterior cerebral artery. No aneurysms are appreciated. The visualized  venous sinuses are not well opacified.     AI ANALYSIS: AI analysis demonstrates relative decreased density of  right middle cerebral artery branches. However, visually, no asymmetry  is seen.       Impression:      1. No large vessel occlusion is seen. No aneurysm is seen.  2. There is atheromatous plaque involving the cavernous carotid arteries  with no definite significant stenosis identified.  3. AI analysis demonstrates relative decreased density of right middle  cerebral artery branches. However, visually, no asymmetry is seen.  4. Persistent fetal origin of the right posterior  cerebral artery.     The full report of this exam was immediately signed and available to the  emergency room. The patient is currently in the emergency room.  This report was finalized on 02/09/2023 12:22 by Dr. Michael Pond MD.    CT Angiogram Neck [[id number]] Collected: 02/09/23 1213     Updated: 02/09/23 1224    Narrative:      EXAMINATION: CT ANGIOGRAM NECK-      2/9/2023 11:31 AM CST     HISTORY: Stroke, follow up     In order to have a CT radiation dose as low as reasonably achievable  Automated Exposure Control was utilized for adjustment of the mA and/or  KV according to patient size.     DLP in mGycm= 359.     CT angiogram neck with and without IV contrast.  CT angiography protocol.   CT imaging with bolus IV contrast injection.   Under concurrent supervision axial, sagittal, coronal,  three-dimensional, and MIP data sets were constructed on an independent  work station.     Mild aortic arch calcification.  Mild calcification at the great vessel origins with approximately 50%  narrowing of the left common carotid artery origin.     Normally patent common carotid arteries.     Normally patent left ICA with external carotid artery occlusion.     There are changes of previous left carotid endarterectomy.     The mid and distal left ICA is patent.     Extensive dense calcified plaque through the proximal 22 mm of the right  ICA with 50% stenosis at the right ICA origin and again at a 0.15 mm  beyond the origin.     Normally patent mid and distal right ICA.     There is no arterial thrombus or dissection.     The vertebral arteries show patchy calcification though are symmetric  and patent. Approximately 40-50% narrowing of both proximal vertebral  arteries is seen.     Summary:  1. Previous left carotid endarterectomy.  2. Dense calcified plaque within the proximal right ICA with 50%  stenosis.  3. Patchy atherosclerotic calcification within both vertebral arteries.                                   This  report was finalized on 02/09/2023 12:21 by Dr. Alfred Hernández MD.    CT Head Without Contrast Stroke Protocol [[id number]] Collected: 02/09/23 1144     Updated: 02/09/23 1158    Narrative:      EXAMINATION: CT HEAD WO CONTRAST STROKE PROTOCOL-      2/9/2023 11:30 AM CST     HISTORY: Stroke, follow up     In order to have a CT radiation dose as low as reasonably achievable  Automated Exposure Control was utilized for adjustment of the mA and/or  KV according to patient size.     DLP in mGycm= 778     The CT scan of the head is performed without intravenous contrast  enhancement.     Images are acquired in axial plane and subsequent reconstruction in  coronal and sagittal planes.     Comparison is made with the previous study dated 05/05/2020.     There is no evidence of a mass. There is no midline shift.     There is no evidence of intracranial hemorrhage or hematoma.     Moderately dilated ventricles, basal cisterns and cortical sulci are  similar to the previous study suggesting a mild to moderate chronic  volume loss.     Scattered areas of chronic white matter ischemia are seen in the centrum  semiovale bilaterally. The gray-white matter differentiation is  maintained.     A partially empty sella turcica is seen.     Images are reviewed in bone window show show no acute bony abnormality.  Limited visualized paranasal sinuses and mastoid air cells are clear.  The frontal sinuses are hypoplastic.       Impression:      1. No acute intracranial abnormality.                             This report was finalized on 02/09/2023 11:55 by Dr. Chelsey Colmenares MD.    XR Chest 1 View [[id number]] Collected: 02/09/23 1108     Updated: 02/09/23 1111    Narrative:      EXAMINATION: XR CHEST 1 VW-     2/9/2023 11:05 AM CST     HISTORY: Stroke Protocol (Onset > 12 hrs)     1 view chest x-ray.     Comparison is made with 01/17/2023.     Heart size is normal.  The mediastinum is within normal limits.      The lungs are normally  expanded with no pneumonia or pneumothorax.     No congestive failure changes.                                                                       Impression:      1. No acute disease.        This report was finalized on 02/09/2023 11:08 by Dr. Alfred Hernández MD.            Assessment/Plan     Hospital Problem List      TIA (transient ischemic attack)    Weakness of both lower extremities    New daily persistent headache    Elevated liver enzymes      Impression    Left facial numbness, resolved  Hypotension  Possible orthostatic hypotension  S/p left carotid endarterectomy  Distal right ICA stenosis of 50%  History of paroxysmal atrial fibrillation, currently in sinus rhythm, on amiodarone  Dyslipidemia, mixed    Plan    I have reviewed the clinical and radiographic findings with the patient in detail.  The patient does not have any symptoms that sound to be consistent with TIA or stroke.  CT and CTA of the head and neck have been reviewed with the patient as well and these demonstrate no acute intracranial findings.  I would like to check the patient for autonomic orthostatic hypotension and have put the orders in for this for nursing to document and graphics.  At this time, the patient is at his baseline.  His symptoms do not sound consistent with TIA.  He is already on antiplatelet therapy consisting of aspirin 81 mg daily and statin with atorvastatin 20 mg daily.  No need for advanced neuroimaging.  Suspect his symptoms were likely due to orthostasis.  Neurology will sign off.  Please recall if needed further.  No need for outpatient follow-up.      Thank you, Dr. Gutierrez, for the consultation and the opportunity participate in the care of your patient.        Today, I personally examined the patient and obtained history along with the assistance of Dr. King Robles, neurologist, via telemedicine.            Rolando Betancur PA-C  02/10/23  08:10 CST      Medical Decision Making     Number/Complexity of  Problems  Moderate  1 undiagnosed new problem with uncertain prognosis -   1 acute illness with systemic symptoms -   High  1 acute or chronic illness that pose a threat to life/body function -   Left facial numbness that could have potentially represented TIA or stroke     MDM Data  Moderate - 1/3 categories  Extensive - 2/3 categories     Category 1: 3 of the following  Review of external notes  Review of results  Ordering of each unique test  Independent historian  Category 2:  Independent interpretation of test (ex: imaging)  Category 3:  Discussion of management with another provider  I have reviewed his surgical history and past cardiac history including atrial fibrillation.  I have personally reviewed CT and CTA of the head and neck with the patient.  This has been reviewed also with Dr. Robles.     Treatment Plan  Moderate - Prescription Drug management  High  Drug therapy requiring intensive monitoring for toxicity  Decision regarding hospitalization or escalation of care  De-escalate care/DNR decisions  Checking orthostatic blood pressure readings.  No change in medications.

## 2023-02-10 NOTE — PLAN OF CARE
Goal Outcome Evaluation:  Plan of Care Reviewed With: patient        Progress: no change  Outcome Evaluation: Pt A&Ox4. , room air. Denies pain. reports baseline n/t BLE. SCD. Tele - sinus susie. US of liver completed per order. NIH = 0. Diet restarted. voiding. up standby asst. Pt anxious to go home. No other complaints, pt to DC home today. Call light within reach. Safety maintained.

## 2023-02-11 LAB
QT INTERVAL: 404 MS
QT INTERVAL: 430 MS
QTC INTERVAL: 447 MS
QTC INTERVAL: 457 MS

## 2023-02-11 NOTE — OUTREACH NOTE
Prep Survey    Flowsheet Row Responses   Congregational facility patient discharged from? [facility]   Is LACE score < 7 ? Yes   Eligibility Readm Mgmt   Discharge diagnosis  Low blood pressure TIA   Does the patient have one of the following disease processes/diagnoses(primary or secondary)? Stroke   Does the patient have Home health ordered? No   Is there a DME ordered? No   Prep survey completed? Yes          AVA RUIZ - Registered Nurse

## 2023-02-13 ENCOUNTER — TELEPHONE (OUTPATIENT)
Dept: VASCULAR SURGERY | Facility: CLINIC | Age: 78
End: 2023-02-13
Payer: OTHER GOVERNMENT

## 2023-02-13 NOTE — PAYOR COMM NOTE
"DC 2-10-23      Kingsley Servin (77 y.o. Male)     Date of Birth   1945    Social Security Number       Address   [address] [place] [address]    Home Phone   [phone]    MRN   [id number]           Restorationism   Holiness    Marital Status   Single                            Admission Date   2/9/23    Admission Type   Emergency    Admitting Provider   Christina Crisostomo    Attending Provider       Department, Room/Bed   [facility] 3A, 355/1       Discharge Date   2/10/2023    Discharge Disposition   Home or Self Care    Discharge Destination                               Attending Provider: (none)   Allergies: Prednisone, Cortisone    Isolation: None   Infection: C.difficile (01/30/22)   Code Status: Prior    Ht: 170.2 cm (67\")   Wt: 64.4 kg (142 lb)    Admission Cmt: None   Principal Problem: TIA (transient ischemic attack) [G45.9]                 Active Insurance as of 2/9/2023     Primary Coverage     Payor Plan Insurance Group Employer/Plan Group    [facility] [employer]      Payor Plan Address Payor Plan Phone Number Payor Plan Fax Number Effective Dates    [address] [phone]  1/1/2021 - None Entered    [facility] [id number]       Subscriber Name Subscriber Birth Date Member ID       KINGSLEY SERVIN 1945 [id number]                 Emergency Contacts      (Rel.) Home Phone Work Phone Mobile Phone    LUIS DANIEL SERVIN (Son) -- -- [phone]    nattyboone bhakta (Daughter) -- -- [phone]    MARIANA TANG (Daughter) -- -- [phone]    FlorentinAnaya (Daughter) -- -- [phone]    Stacy servin (Daughter) -- -- [phone]               Discharge Summary      Christina Crisostomo at 02/10/23 1003                [facility] Medicine Services  DISCHARGE SUMMARY       Date of Admission: 2/9/2023  Date of Discharge:  2/10/2023  Primary Care Physician: Yohana Flanagan APRN    Presenting Problem/History of " Present Illness:  Low blood pressure, weakness and fatigue.     Final Discharge Diagnoses:  Elevated liver enzymes  Weakness   Fatigue       Consults: neurology    Procedures Performed: none    Pertinent Test Results:   Imaging Results (Last 7 Days)     Procedure Component Value Units Date/Time    US Liver [[id number]] Collected: 02/10/23 0931     Updated: 02/10/23 0935    Narrative:      EXAMINATION: US LIVER-     2/10/2023 9:26 AM CST     HISTORY: new elevation in liver enzymes; G45.9-Transient cerebral  ischemic attack, unspecified; I48.0-Paroxysmal atrial fibrillation;  I70.90-Unspecified atherosclerosis; Z98.890-Other specified  postprocedural states; Z91.89-Other specified personal risk factors, not  elsewhere classified.     Gray scale and color flow ultrasound evaluation of the liver.     No focal liver abnormality.  No biliary dilation. CBD = 4-5 mm.     No shadowing gallstones or gallbladder wall thickening.     No free fluid within the upper abdomen.     Normal right kidney measuring 99 x 56 x 52 mm.     The pancreas is obscured by bowel gas.     The IVC and upper abdominal aorta are partially obscured by bowel gas.     Summary:  1. No abnormality is seen.     This report was finalized on 02/10/2023 09:32 by Dr. Alfred Hernández MD.    CT Angiogram Head w AI Analysis of LVO [[id number]] Collected: 02/09/23 1215     Updated: 02/09/23 1225    Narrative:      EXAMINATION:  CT ANGIOGRAM HEAD W AI ANALYSIS OF LVO-  2/9/2023 11:31 AM  CST     HISTORY: Rule out stroke.     COMPARISON : No comparison study.     DLP: 359 mGy-cm. Automated dosage reduction technique was utilized.     TECHNIQUE: CT angio was performed of the brain with IV contrast.  Coronal, sagittal and 3-D images were reconstructed.     INDEPENDENT 3-D WORKSTATION UTILIZED FOR RECONSTRUCTION: YES. A  RADIOLOGIST WAS PRESENT IN THE DEPARTMENT.     FINDINGS: The vertebral and basilar arteries are patent. The internal  carotid arteries are patent. There  is atheromatous plaque involving the  cavernous carotid arteries. The anterior, middle and posterior cerebral  arteries are patent. There is persistent fetal origin of the right  posterior cerebral artery. No aneurysms are appreciated. The visualized  venous sinuses are not well opacified.     AI ANALYSIS: AI analysis demonstrates relative decreased density of  right middle cerebral artery branches. However, visually, no asymmetry  is seen.       Impression:      1. No large vessel occlusion is seen. No aneurysm is seen.  2. There is atheromatous plaque involving the cavernous carotid arteries  with no definite significant stenosis identified.  3. AI analysis demonstrates relative decreased density of right middle  cerebral artery branches. However, visually, no asymmetry is seen.  4. Persistent fetal origin of the right posterior cerebral artery.     The full report of this exam was immediately signed and available to the  emergency room. The patient is currently in the emergency room.  This report was finalized on 02/09/2023 12:22 by Dr. Michael Pond MD.    CT Angiogram Neck [[id number]] Collected: 02/09/23 1213     Updated: 02/09/23 1224    Narrative:      EXAMINATION: CT ANGIOGRAM NECK-      2/9/2023 11:31 AM CST     HISTORY: Stroke, follow up     In order to have a CT radiation dose as low as reasonably achievable  Automated Exposure Control was utilized for adjustment of the mA and/or  KV according to patient size.     DLP in mGycm= 359.     CT angiogram neck with and without IV contrast.  CT angiography protocol.   CT imaging with bolus IV contrast injection.   Under concurrent supervision axial, sagittal, coronal,  three-dimensional, and MIP data sets were constructed on an independent  work station.     Mild aortic arch calcification.  Mild calcification at the great vessel origins with approximately 50%  narrowing of the left common carotid artery origin.     Normally patent common carotid arteries.      Normally patent left ICA with external carotid artery occlusion.     There are changes of previous left carotid endarterectomy.     The mid and distal left ICA is patent.     Extensive dense calcified plaque through the proximal 22 mm of the right  ICA with 50% stenosis at the right ICA origin and again at a 0.15 mm  beyond the origin.     Normally patent mid and distal right ICA.     There is no arterial thrombus or dissection.     The vertebral arteries show patchy calcification though are symmetric  and patent. Approximately 40-50% narrowing of both proximal vertebral  arteries is seen.     Summary:  1. Previous left carotid endarterectomy.  2. Dense calcified plaque within the proximal right ICA with 50%  stenosis.  3. Patchy atherosclerotic calcification within both vertebral arteries.                                   This report was finalized on 02/09/2023 12:21 by Dr. Alfred Hernández MD.    CT Head Without Contrast Stroke Protocol [[id number]] Collected: 02/09/23 1144     Updated: 02/09/23 1158    Narrative:      EXAMINATION: CT HEAD WO CONTRAST STROKE PROTOCOL-      2/9/2023 11:30 AM CST     HISTORY: Stroke, follow up     In order to have a CT radiation dose as low as reasonably achievable  Automated Exposure Control was utilized for adjustment of the mA and/or  KV according to patient size.     DLP in mGycm= 778     The CT scan of the head is performed without intravenous contrast  enhancement.     Images are acquired in axial plane and subsequent reconstruction in  coronal and sagittal planes.     Comparison is made with the previous study dated 05/05/2020.     There is no evidence of a mass. There is no midline shift.     There is no evidence of intracranial hemorrhage or hematoma.     Moderately dilated ventricles, basal cisterns and cortical sulci are  similar to the previous study suggesting a mild to moderate chronic  volume loss.     Scattered areas of chronic white matter ischemia are seen in the  centrum  semiovale bilaterally. The gray-white matter differentiation is  maintained.     A partially empty sella turcica is seen.     Images are reviewed in bone window show show no acute bony abnormality.  Limited visualized paranasal sinuses and mastoid air cells are clear.  The frontal sinuses are hypoplastic.       Impression:      1. No acute intracranial abnormality.                             This report was finalized on 02/09/2023 11:55 by Dr. Chelsey Colmenares MD.    XR Chest 1 View [[id number]] Collected: 02/09/23 1108     Updated: 02/09/23 1111    Narrative:      EXAMINATION: XR CHEST 1 VW-     2/9/2023 11:05 AM CST     HISTORY: Stroke Protocol (Onset > 12 hrs)     1 view chest x-ray.     Comparison is made with 01/17/2023.     Heart size is normal.  The mediastinum is within normal limits.      The lungs are normally expanded with no pneumonia or pneumothorax.     No congestive failure changes.                                                                       Impression:      1. No acute disease.        This report was finalized on 02/09/2023 11:08 by Dr. Alfred Hernández MD.        Lab Results (last 7 days)     Procedure Component Value Units Date/Time    Lipid Panel [[id number]] Collected: 02/10/23 0557    Specimen: Blood Updated: 02/10/23 0743     Total Cholesterol 102 mg/dL      Triglycerides 79 mg/dL      HDL Cholesterol 45 mg/dL      LDL Cholesterol  41 mg/dL      VLDL Cholesterol 16 mg/dL      LDL/HDL Ratio 0.92    Narrative:      Cholesterol Reference Ranges  (U.S. Department of Health and Human Services ATP III Classifications)    Desirable          <200 mg/dL  Borderline High    200-239 mg/dL  High Risk          >240 mg/dL      Triglyceride Reference Ranges  (U.S. Department of Health and Human Services ATP III Classifications)    Normal           <150 mg/dL  Borderline High  150-199 mg/dL  High             200-499 mg/dL  Very High        >500 mg/dL    HDL Reference Ranges  (U.S. Department of  Health and Human Services ATP III Classifications)    Low     <40 mg/dl (major risk factor for CHD)  High    >60 mg/dl ('negative' risk factor for CHD)        LDL Reference Ranges  (U.S. Department of Health and Human Services ATP III Classifications)    Optimal          <100 mg/dL  Near Optimal     100-129 mg/dL  Borderline High  130-159 mg/dL  High             160-189 mg/dL  Very High        >189 mg/dL    Comprehensive Metabolic Panel [[id number]]  (Abnormal) Collected: 02/10/23 0557    Specimen: Blood Updated: 02/10/23 0743     Glucose 87 mg/dL      BUN 22 mg/dL      Creatinine 0.85 mg/dL      Sodium 140 mmol/L      Potassium 4.7 mmol/L      Chloride 107 mmol/L      CO2 25.0 mmol/L      Calcium 8.7 mg/dL      Total Protein 6.3 g/dL      Albumin 3.6 g/dL      ALT (SGPT) 181 U/L      AST (SGOT) 118 U/L      Alkaline Phosphatase 86 U/L      Total Bilirubin 0.2 mg/dL      Globulin 2.7 gm/dL      A/G Ratio 1.3 g/dL      BUN/Creatinine Ratio 25.9     Anion Gap 8.0 mmol/L      eGFR 90.6 mL/min/1.73     Narrative:      GFR Normal >60  Chronic Kidney Disease <60  Kidney Failure <15    The GFR formula is only valid for adults with stable renal function between ages 18 and 70.    CBC & Differential [[id number]]  (Abnormal) Collected: 02/10/23 0713    Specimen: Blood Updated: 02/10/23 0723    Narrative:      The following orders were created for panel order CBC & Differential.  Procedure                               Abnormality         Status                     ---------                               -----------         ------                     CBC Auto Differential[[id number]]        Abnormal            Final result                 Please view results for these tests on the individual orders.    CBC Auto Differential [[id number]]  (Abnormal) Collected: 02/10/23 0713    Specimen: Blood Updated: 02/10/23 0723     WBC 4.03 10*3/mm3      RBC 3.47 10*6/mm3      Hemoglobin 11.2 g/dL      Hematocrit 35.0 %      .9 fL       MCH 32.3 pg      MCHC 32.0 g/dL      RDW 14.7 %      RDW-SD 54.7 fl      MPV 8.9 fL      Platelets 197 10*3/mm3      Neutrophil % 68.5 %      Lymphocyte % 19.6 %      Monocyte % 9.4 %      Eosinophil % 1.0 %      Basophil % 0.5 %      Immature Grans % 1.0 %      Neutrophils, Absolute 2.76 10*3/mm3      Lymphocytes, Absolute 0.79 10*3/mm3      Monocytes, Absolute 0.38 10*3/mm3      Eosinophils, Absolute 0.04 10*3/mm3      Basophils, Absolute 0.02 10*3/mm3      Immature Grans, Absolute 0.04 10*3/mm3      nRBC 0.0 /100 WBC     Hemoglobin A1c [[id number]] Collected: 02/10/23 0557    Specimen: Blood Updated: 02/10/23 0713    POC Glucose Once [[id number]]  (Normal) Collected: 02/10/23 0633    Specimen: Blood Updated: 02/10/23 0645     Glucose 81 mg/dL      Comment: : lily Mac Burbio.comaMeter ID: [id number]       POC Glucose Once [[id number]]  (Normal) Collected: 02/10/23 0003    Specimen: Blood Updated: 02/10/23 0014     Glucose 115 mg/dL      Comment: : lily Mac Burbio.comaMeter ID: [id number]       C-reactive Protein [[id number]]  (Abnormal) Collected: 02/09/23 1639    Specimen: Blood Updated: 02/09/23 1736     C-Reactive Protein 1.07 mg/dL     CK [[id number]]  (Normal) Collected: 02/09/23 1639    Specimen: Blood Updated: 02/09/23 1736     Creatine Kinase 62 U/L     [place] Draw [[id number]] Collected: 02/09/23 1045    Specimen: Blood Updated: 02/09/23 1501    Narrative:      The following orders were created for panel order [place] Draw.  Procedure                               Abnormality         Status                     ---------                               -----------         ------                     Green Top (Gel)[[id number]]                                                             Lavender Top[[id number]]                                                                Red Top[[id number]]                                                                     Gold Top - SST[[id number]]                                    Final result               [place] Top[[id number]]                                         Final result               Light Blue Top[[id number]]                                                                Please view results for these tests on the individual orders.    [place] Top [[id number]] Collected: 02/09/23 1045    Specimen: Blood Updated: 02/09/23 1501     Extra Tube Hold for add-ons.     Comment: Auto resulted.       [place] Draw [[id number]] Collected: 02/09/23 1045    Specimen: Blood Updated: 02/09/23 1200    Narrative:      The following orders were created for panel order [place] Draw.  Procedure                               Abnormality         Status                     ---------                               -----------         ------                     Green Top (Gel)[[id number]]                                  Final result               Lavender Top[[id number]]                                     Final result               Red Top[[id number]]                                          Final result               Light Blue Top[[id number]]                                   Final result                 Please view results for these tests on the individual orders.    Lavender Top [[id number]] Collected: 02/09/23 1045    Specimen: Blood Updated: 02/09/23 1200     Extra Tube hold for add-on     Comment: Auto resulted       Light Blue Top [[id number]] Collected: 02/09/23 1045    Specimen: Blood Updated: 02/09/23 1200     Extra Tube Hold for add-ons.     Comment: Auto resulted       Gold Top - SST [[id number]] Collected: 02/09/23 1045    Specimen: Blood Updated: 02/09/23 1200     Extra Tube Hold for add-ons.     Comment: Auto resulted.       Green Top (Gel) [[id number]] Collected: 02/09/23 1045    Specimen: Blood Updated: 02/09/23 1200     Extra Tube Hold for add-ons.     Comment: Auto resulted.       Red Top [[id number]] Collected: 02/09/23 1045    Specimen: Blood Updated: 02/09/23 1200     Extra Tube Hold for  add-ons.     Comment: Auto resulted.       Comprehensive Metabolic Panel [[id number]]  (Abnormal) Collected: 02/09/23 1045    Specimen: Blood Updated: 02/09/23 1119     Glucose 141 mg/dL      BUN 26 mg/dL      Creatinine 1.09 mg/dL      Sodium 137 mmol/L      Potassium 4.7 mmol/L      Comment: Slight hemolysis detected by analyzer. Results may be affected.        Chloride 101 mmol/L      CO2 25.0 mmol/L      Calcium 8.9 mg/dL      Total Protein 7.6 g/dL      Albumin 4.3 g/dL      ALT (SGPT) 235 U/L      AST (SGOT) 172 U/L      Alkaline Phosphatase 107 U/L      Total Bilirubin 0.3 mg/dL      Globulin 3.3 gm/dL      A/G Ratio 1.3 g/dL      BUN/Creatinine Ratio 23.9     Anion Gap 11.0 mmol/L      eGFR 70.8 mL/min/1.73     Narrative:      GFR Normal >60  Chronic Kidney Disease <60  Kidney Failure <15    The GFR formula is only valid for adults with stable renal function between ages 18 and 70.    Protime-INR [[id number]]  (Normal) Collected: 02/09/23 1045    Specimen: Blood Updated: 02/09/23 1102     Protime 13.2 Seconds      INR 0.99    CBC & Differential [[id number]]  (Abnormal) Collected: 02/09/23 1045    Specimen: Blood Updated: 02/09/23 1053    Narrative:      The following orders were created for panel order CBC & Differential.  Procedure                               Abnormality         Status                     ---------                               -----------         ------                     CBC Auto Differential[[id number]]        Abnormal            Final result                 Please view results for these tests on the individual orders.    CBC Auto Differential [[id number]]  (Abnormal) Collected: 02/09/23 1045    Specimen: Blood Updated: 02/09/23 1053     WBC 7.47 10*3/mm3      RBC 3.77 10*6/mm3      Hemoglobin 12.2 g/dL      Hematocrit 37.6 %      MCV 99.7 fL      MCH 32.4 pg      MCHC 32.4 g/dL      RDW 14.8 %      RDW-SD 53.9 fl      MPV 8.5 fL      Platelets 232 10*3/mm3      Neutrophil % 80.5 %       Lymphocyte % 10.3 %      Monocyte % 8.2 %      Eosinophil % 0.0 %      Basophil % 0.3 %      Immature Grans % 0.7 %      Neutrophils, Absolute 6.02 10*3/mm3      Lymphocytes, Absolute 0.77 10*3/mm3      Monocytes, Absolute 0.61 10*3/mm3      Eosinophils, Absolute 0.00 10*3/mm3      Basophils, Absolute 0.02 10*3/mm3      Immature Grans, Absolute 0.05 10*3/mm3      nRBC 0.0 /100 WBC     POC Glucose Once [[id number]]  (Normal) Collected: 02/09/23 1029    Specimen: Blood Updated: 02/09/23 1050     Glucose 91 mg/dL      Comment: : [id number] Sally ColeMeter ID: [id number]           CT head   IMPRESSION:  1. No acute intracranial abnormality.    CTA Head     IMPRESSION:  1. No large vessel occlusion is seen. No aneurysm is seen.  2. There is atheromatous plaque involving the cavernous carotid arteries  with no definite significant stenosis identified.  3. AI analysis demonstrates relative decreased density of right middle  cerebral artery branches. However, visually, no asymmetry is seen.  4. Persistent fetal origin of the right posterior cerebral artery.     CTA neck    Summary:  1. Previous left carotid endarterectomy.  2. Dense calcified plaque within the proximal right ICA with 50%  stenosis.  3. Patchy atherosclerotic calcification within both vertebral arteries.          Hospital Course:  The patient is a 77 y.o. male who presented to [facility] with low blood pressure, weakness, perioral numbness, headache.   He had a carotid endarterectomy about two weeks ago and the headache has been persistent since then.  No unilateral weakness noted.   No slurring of speech.  On presentation his liver enzymes were elevated.  New since dc from hospital .  Patient notes he was recently placed on Lipitor.   Neurology was consulted.  Does not feel TIA  A US of liver obtained, normal.  Will dc home.  Stop lipitor.  Will need cmp in one week.  [facility]     Physical Exam on  "Discharge:  /75 (BP Location: Right arm, Patient Position: Lying)   Pulse 75   Temp 98 °F (36.7 °C) (Oral)   Resp 17   Ht 170.2 cm (67\")   Wt 64.4 kg (142 lb)   SpO2 98%   BMI 22.24 kg/m²   Physical Exam  Vitals and nursing note reviewed.   Constitutional:       Appearance: Normal appearance.   HENT:      Head: Normocephalic and atraumatic.      Right Ear: External ear normal.      Left Ear: External ear normal.      Nose: Nose normal.      Mouth/Throat:      Mouth: Mucous membranes are moist.   Eyes:      Extraocular Movements: Extraocular movements intact.      Conjunctiva/sclera: Conjunctivae normal.      Pupils: Pupils are equal, round, and reactive to light.   Cardiovascular:      Rate and Rhythm: Normal rate and regular rhythm.      Pulses: Normal pulses.      Heart sounds: No murmur heard.    No friction rub. No gallop.   Pulmonary:      Effort: Pulmonary effort is normal.      Breath sounds: Normal breath sounds.   Abdominal:      General: Bowel sounds are normal.      Palpations: Abdomen is soft.   Musculoskeletal:         General: Normal range of motion.      Cervical back: Normal range of motion and neck supple.   Skin:     General: Skin is warm and dry.      Capillary Refill: Capillary refill takes less than 2 seconds.   Neurological:      General: No focal deficit present.      Mental Status: He is alert and oriented to person, place, and time.      Cranial Nerves: No cranial nerve deficit.   Psychiatric:         Mood and Affect: Mood normal.         Behavior: Behavior normal.           Condition on Discharge: stable    Discharge Disposition:  Home or Self Care    Discharge Medications:     Discharge Medications      Continue These Medications      Instructions Start Date   amiodarone 200 MG tablet  Commonly known as: PACERONE   200 mg, Oral, Every 24 Hours Scheduled      aspirin 81 MG EC tablet   81 mg, Oral, Daily      dilTIAZem  MG 24 hr capsule  Commonly known as: CARDIZEM CD   " 120 mg, Oral, Daily      ferrous sulfate 325 (65 FE) MG tablet   325 mg, Oral, Daily With Breakfast      finasteride 5 MG tablet  Commonly known as: PROSCAR   5 mg, Oral, Nightly      folic acid 1 MG tablet  Commonly known as: FOLVITE   1 mg, Oral, Nightly      gabapentin 300 MG capsule  Commonly known as: Neurontin   300 mg, Oral, Daily      LACTOBACILLUS PO   1 tablet, Oral, Nightly      lisinopril 40 MG tablet  Commonly known as: PRINIVIL,ZESTRIL   40 mg, Oral, Daily      multivitamin with minerals tablet tablet   1 tablet, Oral, Nightly      pantoprazole 20 MG EC tablet  Commonly known as: PROTONIX   20 mg, Oral, Every Morning      vitamin C 500 MG tablet  Commonly known as: ASCORBIC ACID   1,000 mg, Oral, Daily      Vitamin D 50 MCG (2000 UT) tablet   2,000 Units, Oral, 2 Times Daily         Stop These Medications    atorvastatin 20 MG tablet  Commonly known as: LIPITOR          Discharge Diet:   Diet Instructions     Diet: Regular; Thin      Discharge Diet: Regular    Fluid Consistency: Thin        Activity at Discharge:   Activity Instructions     Activity as Tolerated            Follow-up Appointments:   Future Appointments   Date Time Provider Department Center   2/14/2023  8:30 AM Luis Enrique Donaldson DO [facility]   6/1/2023  8:45 AM Nolan Farias MD [facility]       Test Results Pending at Discharge: none    Christina Crisostomo  02/10/23  10:03 CST    Time: 35 minutes.      Electronically signed by Christina Crisostomo at 02/10/23 1013

## 2023-02-13 NOTE — TELEPHONE ENCOUNTER
UNABLE TO CONFIRM  APPOINTMENT WITH [facility] ON 2/14/23. LEFT VM WITH TIME, LOCATION AND CONTACT INFO.

## 2023-02-14 ENCOUNTER — OFFICE VISIT (OUTPATIENT)
Dept: VASCULAR SURGERY | Facility: CLINIC | Age: 78
End: 2023-02-14
Payer: OTHER GOVERNMENT

## 2023-02-14 VITALS
SYSTOLIC BLOOD PRESSURE: 124 MMHG | BODY MASS INDEX: 21.98 KG/M2 | DIASTOLIC BLOOD PRESSURE: 62 MMHG | HEART RATE: 72 BPM | WEIGHT: 145 LBS | HEIGHT: 68 IN

## 2023-02-14 DIAGNOSIS — I10 ESSENTIAL HYPERTENSION: ICD-10-CM

## 2023-02-14 DIAGNOSIS — I65.23 BILATERAL CAROTID ARTERY STENOSIS: Primary | ICD-10-CM

## 2023-02-14 PROCEDURE — 99024 POSTOP FOLLOW-UP VISIT: CPT | Performed by: SURGERY

## 2023-02-14 NOTE — PROGRESS NOTES
2/14/2023         Yohana Flanagan, APRN  [address]  [place] [address]        Kingsley Lerma  1945      Chief Complaint   Patient presents with   • POST-OP     2 wk po left carotid endarterectomy done 1/25/23.  Pt states that hes had lots of headaches, stiffness in left jaw and states that wend he stands up he is dizzy and weak in the legs. Says that its starting to work out, but still having issues mainly with stiffness in jaw. Was in ED for TIA 2/9/23.       Dear Yohana Flanagan, APRN:   I had the pleasure of seeing you patient in the office today for follow up.  As you recall, the patient is a 77 y.o. male who we initially saw while hospitalized and found to have left lower lobe pulmonary embolus with no evidence of right heart strain.  He was found to have deep vein thrombus to bilateral lower extremities.  Due to GI bleeding while previously anticoagulated he is unable to take anticoagulation. He did have an IVC filter placed 1/31/2022 and removed 6/22/2022. 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  He previously followed with [facility] vascular and in 2020 underwent bilateral femoral endarterectomies with placement vbx aortic stent of bilateral kissing iliac stents by Dr. Acuña. He underwent left carotid endarterectomy 1/25/23.  He was at the hospital due to dizziness but this has improved.  He stopped Lipitor due to elevated liver enzymes. His left neck incision has healed.     Review of Systems   Constitutional: Negative.    HENT: Negative.    Eyes: Negative.    Respiratory: Negative.    Cardiovascular: Negative.    Gastrointestinal: Negative.    Endocrine: Negative.    Genitourinary: Negative.    Musculoskeletal: Negative.    Skin: Negative.    Allergic/Immunologic: Negative.    Neurological: Positive for dizziness and numbness.   Hematological: Negative.    Psychiatric/Behavioral: Negative.    All  "other systems reviewed and are negative.        /62   Pulse 72   Ht 172.7 cm (68\")   Wt 65.8 kg (145 lb)   BMI 22.05 kg/m²    Physical Exam  Vitals and nursing note reviewed.   Constitutional:       Appearance: He is well-developed.   HENT:      Head: Normocephalic and atraumatic.   Eyes:      General: No scleral icterus.     Pupils: Pupils are equal, round, and reactive to light.   Neck:      Thyroid: No thyromegaly.      Vascular: No carotid bruit or JVD.      Comments: Left neck incision healed  Cardiovascular:      Rate and Rhythm: Normal rate and regular rhythm.      Pulses:           Carotid pulses are 2+ on the right side and 2+ on the left side.       Femoral pulses are 2+ on the right side and 2+ on the left side.       Dorsalis pedis pulses are detected w/ Doppler on the right side and detected w/ Doppler on the left side.        Posterior tibial pulses are detected w/ Doppler on the right side and detected w/ Doppler on the left side.      Heart sounds: Normal heart sounds.   Pulmonary:      Effort: Pulmonary effort is normal.      Breath sounds: Normal breath sounds.   Abdominal:      General: Bowel sounds are normal. There is no distension or abdominal bruit.      Palpations: Abdomen is soft. There is no mass.      Tenderness: There is no abdominal tenderness.   Musculoskeletal:         General: Normal range of motion.      Cervical back: Neck supple.   Lymphadenopathy:      Cervical: No cervical adenopathy.   Skin:     General: Skin is warm and dry.   Neurological:      Mental Status: He is alert and oriented to person, place, and time.      Cranial Nerves: No cranial nerve deficit.      Sensory: No sensory deficit.   Psychiatric:         Mood and Affect: Mood normal.         Behavior: Behavior normal.         Thought Content: Thought content normal.         Judgment: Judgment normal.            Patient Active Problem List   Diagnosis   • Essential hypertension   • PVD (peripheral vascular " disease) (HCC)   • Atrial fibrillation and flutter (HCC)   • Alcohol abuse   • Non healing left heel wound   • Atrial fibrillation status post cardioversion (HCC)   • Tobacco use   • Syncope   • Anticoagulated   • GI bleed   • Positive FIT (fecal immunochemical test)   • Heme + stool   • Acute blood loss anemia   • Gastrointestinal hemorrhage   • Osteoarthritis of right hip   • AVM (arteriovenous malformation) of colon   • BPH without obstruction/lower urinary tract symptoms   • Abnormal CT scan, bladder   • Hypokalemia   • Diarrhea   • Acute pulmonary embolism (HCC)   • Severe malnutrition (CMS/HCC)   • Preop testing   • Carotid stenosis, left   • Rectal bleeding   • History of pulmonary embolism   • Asymptomatic stenosis of left carotid artery   • Constipation   • TIA (transient ischemic attack)   • Weakness of both lower extremities   • New daily persistent headache   • Elevated liver enzymes       ICD-10-CM ICD-9-CM   1. Bilateral carotid artery stenosis  I65.23 433.10     433.30   2. Essential hypertension  I10 401.9         PLAN: After thoroughly evaluating Kingsley Lerma, I am pleased to report he is doing well status post left carotid endarterectomy.  He is free to resume normal activity.  I did review his testing noting about 60% right carotid stenosis. His ABIs show moderate arterial insufficiency to his lower extremities.  His femoral pulses are bounding and good Doppler signals noted bilaterally. Previously, I did prescribe neurontin daily with refills.  I did discuss vascular risk factors as they pertain to the progression of vascular disease including controlling his hypertension.  His blood pressure is currently stable and controlled.   Unfortunately he does smoke cigars on a daily basis and does not have a desire to quit smoking at this time.  I will see him back in 6 months time with a repeat carotid duplex for continued surveillance.  The patient is to continue taking their medications as  previously discussed.   This was all discussed in full with complete understanding.    Thanks you for allowing me to participate in the care of your patient.  Please do not hesitate to call with any questions or concerns.  We will keep you aware of any further encounters with Kingsley Lerma.      Sincerely Yours,        Luis Enrique Donaldson, DO

## 2023-02-15 ENCOUNTER — READMISSION MANAGEMENT (OUTPATIENT)
Dept: CALL CENTER | Facility: HOSPITAL | Age: 78
End: 2023-02-15
Payer: OTHER GOVERNMENT

## 2023-02-15 NOTE — OUTREACH NOTE
Stroke Week 1 Survey    Flowsheet Row Responses   [facility] patient discharged from? [facility]   Does the patient have one of the following disease processes/diagnoses(primary or secondary)? Stroke   Week 1 attempt successful? No   Unsuccessful attempts Attempt 1          Liz Luis Nurse

## 2023-02-16 RX ORDER — ATORVASTATIN CALCIUM 20 MG/1
TABLET, FILM COATED ORAL
Qty: 90 TABLET | Refills: 0 | OUTPATIENT
Start: 2023-02-16

## 2023-02-21 ENCOUNTER — READMISSION MANAGEMENT (OUTPATIENT)
Dept: CALL CENTER | Facility: HOSPITAL | Age: 78
End: 2023-02-21
Payer: OTHER GOVERNMENT

## 2023-02-21 NOTE — OUTREACH NOTE
Stroke Week 2 Survey    Flowsheet Row Responses   [facility] patient discharged from? [place]   Does the patient have one of the following disease processes/diagnoses(primary or secondary)? Stroke   Week 2 attempt successful? Yes   Call start time 1536   Call end time 1539   Discharge diagnosis  Low blood pressure TIA   Meds reviewed with patient/caregiver? Yes   Is the patient taking all medications as directed (includes completed medication regime)? Yes   Does the patient have a primary care provider?  Yes   Does the patient have an appointment with their PCP within 7 days of discharge? Yes   Comments regarding PCP 3/2/23   Has the patient kept scheduled appointments due by today? Yes   Has home health visited the patient within 72 hours of discharge? N/A   Does the patient require any assistance with activities of daily living such as eating, bathing, dressing, walking, etc.? No   Does the patient have any residual symptoms from stroke/TIA? No   Does the patient understand the diet ordered at discharge? Yes   Did the patient receive a copy of their discharge instructions? Yes   What is the patient's perception of their health status since discharge? Improving   Nursing interventions Nurse provided patient education   Is the patient able to teach back FAST for Stroke? B alance: Watch for sudden loss of balance, E yes: Check for vision loss, F ace: Look for an uneven smile, A rm: Check if one arm is weak, S peech: Listen for slurred speech, T jersey: Call 9-1-1 right away   Is the patient/caregiver able to teach back the risk factors for a stroke? High Cholesterol, Carotid or other artery disease, High blood pressure-goal below 120/80, History of TIAs   Is the patient/caregiver able to teach back signs and symptoms related to disease process for when to call PCP? Yes   Is the patient/caregiver able to teach back signs and symptoms related to disease process for when to call 911? Yes   Is the patient/caregiver  able to teach back the hierarchy of who to call/visit for symptoms/problems? PCP, Specialist, Home health nurse, Urgent Care, ED, 911 Yes   Week 2 call completed? Yes   Graduated/Revoked comments Pt reports he is doing ok at this time.           CARRINGTON BECKMAN - Registered Nurse

## 2023-03-03 ENCOUNTER — READMISSION MANAGEMENT (OUTPATIENT)
Dept: CALL CENTER | Facility: HOSPITAL | Age: 78
End: 2023-03-03
Payer: OTHER GOVERNMENT

## 2023-03-03 NOTE — OUTREACH NOTE
"Stroke Week 3 Survey    Flowsheet Row Responses   [facility] [place] patient discharged from? [place]   Does the patient have one of the following disease processes/diagnoses(primary or secondary)? Stroke   Week 3 attempt successful? Yes   Call start time 1424   Call end time 1434   Discharge diagnosis  Low blood pressure TIA   Meds reviewed with patient/caregiver? Yes   Is the patient taking all medications as directed (includes completed medication regime)? Yes   Has the patient kept scheduled appointments due by today? Yes   Psychosocial issues? No   Does the patient require any assistance with activities of daily living such as eating, bathing, dressing, walking, etc.? No   Does the patient have any residual symptoms from stroke/TIA? No   Comments left side of neck/face where he had surg. on carotid artery, he reports, he notes edema, when he chews on this side,\"it is like an electric shock.\"Pt is unable to chew on this side r/t pain on left side. Denies redness/heat/streaking at site.   What is the patient's perception of their health status since discharge? Worsening   Nursing interventions Advised patient to call provider, Nurse provided patient education   Week 3 call completed? Yes   Is the patient interested in additional calls from an ambulatory ?  NOTE:  applies to high risk patients requiring additional follow-up. No          Deepti SMALLS - Registered Nurse  "

## 2023-03-13 ENCOUNTER — READMISSION MANAGEMENT (OUTPATIENT)
Dept: CALL CENTER | Facility: HOSPITAL | Age: 78
End: 2023-03-13
Payer: OTHER GOVERNMENT

## 2023-03-13 NOTE — OUTREACH NOTE
Stroke Week 4 Survey    Flowsheet Row Responses   [facility] patient discharged from? [place]   Does the patient have one of the following disease processes/diagnoses(primary or secondary)? Stroke   Week 4 attempt successful? Yes   Call start time 1519   Call end time 1522   Discharge diagnosis  Low blood pressure TIA   Meds reviewed with patient/caregiver? Yes   Is the patient taking all medications as directed (includes completed medication regime)? Yes   Has the patient kept scheduled appointments due by today? Yes   Is the patient still receiving Home Health Services? N/A   Psychosocial issues? No   Does the patient require any assistance with activities of daily living such as eating, bathing, dressing, walking, etc.? No   Does the patient have any residual symptoms from stroke/TIA? No   Does the patient understand the diet ordered at discharge? Yes   What is the patient's perception of their health status since discharge? Improving   Nursing interventions Nurse provided patient education   Is the patient able to teach back FAST for Stroke? B alance: Watch for sudden loss of balance, E yes: Check for vision loss, F ace: Look for an uneven smile, S peech: Listen for slurred speech, A rm: Check if one arm is weak, T jersey: Call 9-1-1 right away   Is the patient/caregiver able to teach back signs and symptoms related to disease process for when to call PCP? Yes   Is the patient/caregiver able to teach back signs and symptoms related to disease process for when to call 911? Yes   Is the patient/caregiver able to teach back the hierarchy of who to call/visit for symptoms/problems? PCP, Specialist, Home health nurse, Urgent Care, ED, 911 Yes   Week 4 Call Completed? Yes   Would the patient like one additional call? No   Graduated Yes   Wrap up additional comments Pt doing well, however PCP added ABT for infection in jaw.           CARRINGTON BECKMAN - Registered Nurse

## 2023-04-25 RX ORDER — AMIODARONE HYDROCHLORIDE 200 MG/1
200 TABLET ORAL
Qty: 90 TABLET | Refills: 3 | Status: SHIPPED | OUTPATIENT
Start: 2023-04-25

## 2023-04-25 RX ORDER — LISINOPRIL 40 MG/1
40 TABLET ORAL DAILY
Qty: 90 TABLET | Refills: 3 | Status: SHIPPED | OUTPATIENT
Start: 2023-04-25

## 2023-04-26 ENCOUNTER — DOCUMENTATION (OUTPATIENT)
Dept: CARDIOLOGY | Facility: CLINIC | Age: 78
End: 2023-04-26
Payer: OTHER GOVERNMENT

## 2023-04-28 DIAGNOSIS — G62.9 NEUROPATHY: ICD-10-CM

## 2023-04-28 RX ORDER — GABAPENTIN 300 MG/1
300 CAPSULE ORAL DAILY
Qty: 30 CAPSULE | Refills: 3 | OUTPATIENT
Start: 2023-04-28

## 2023-04-28 NOTE — TELEPHONE ENCOUNTER
Caller: JOSE LUIS SERVIN     Relationship: SELF    Best call back number: [phone]    Requested Prescriptions:   Requested Prescriptions     Pending Prescriptions Disp Refills   • gabapentin (Neurontin) 300 MG capsule 30 capsule 3     Sig: Take 1 capsule by mouth Daily.        Pharmacy where request should be sent:      Last office visit with prescribing clinician: 2/14/2023   Last telemedicine visit with prescribing clinician: 8/15/2023   Next office visit with prescribing clinician: 8/15/2023     Additional details provided by patient: ONE MONTH SUPPLY LEFT    Does the patient have less than a 3 day supply:  [] Yes  [x] No    Would you like a call back once the refill request has been completed: [x] Yes [] No    If the office needs to give you a call back, can they leave a voicemail: [x] Yes [] No    Carlos Thayer Rep   04/28/23 09:10 CDT

## 2023-05-04 ENCOUNTER — TELEPHONE (OUTPATIENT)
Dept: PODIATRY | Facility: CLINIC | Age: 78
End: 2023-05-04
Payer: OTHER GOVERNMENT

## 2023-05-04 NOTE — TELEPHONE ENCOUNTER
Pt called, dr ramsey has prescribed gabapentin and he is about out. Dr middleton refilled for him around [place] while you both were on vacation but he is running out and needs refilled. Please advise

## 2023-05-08 DIAGNOSIS — G62.9 NEUROPATHY: ICD-10-CM

## 2023-05-08 RX ORDER — AMIODARONE HYDROCHLORIDE 200 MG/1
200 TABLET ORAL
Qty: 90 TABLET | Refills: 3 | Status: SHIPPED | OUTPATIENT
Start: 2023-05-08

## 2023-05-08 NOTE — TELEPHONE ENCOUNTER
Requested Prescriptions     Pending Prescriptions Disp Refills   • gabapentin (Neurontin) 300 MG capsule 30 capsule 3     Sig: Take 1 capsule by mouth Daily.       original Rx sent to wrong pharmacy would like the RX sent to [facility].

## 2023-05-09 RX ORDER — GABAPENTIN 300 MG/1
300 CAPSULE ORAL DAILY
Qty: 30 CAPSULE | Refills: 3 | Status: SHIPPED | OUTPATIENT
Start: 2023-05-09

## 2023-06-06 ENCOUNTER — OFFICE VISIT (OUTPATIENT)
Dept: CARDIOLOGY | Facility: CLINIC | Age: 78
End: 2023-06-06
Payer: OTHER GOVERNMENT

## 2023-06-06 VITALS
SYSTOLIC BLOOD PRESSURE: 114 MMHG | BODY MASS INDEX: 21.52 KG/M2 | OXYGEN SATURATION: 98 % | HEIGHT: 68 IN | HEART RATE: 53 BPM | WEIGHT: 142 LBS | DIASTOLIC BLOOD PRESSURE: 56 MMHG

## 2023-06-06 DIAGNOSIS — K92.1 GASTROINTESTINAL HEMORRHAGE WITH MELENA: ICD-10-CM

## 2023-06-06 DIAGNOSIS — Z79.899 LONG TERM CURRENT USE OF AMIODARONE: ICD-10-CM

## 2023-06-06 DIAGNOSIS — F10.10 ALCOHOL ABUSE: ICD-10-CM

## 2023-06-06 DIAGNOSIS — I10 ESSENTIAL HYPERTENSION: Primary | ICD-10-CM

## 2023-06-06 DIAGNOSIS — I48.92 ATRIAL FIBRILLATION AND FLUTTER: ICD-10-CM

## 2023-06-06 DIAGNOSIS — I48.91 ATRIAL FIBRILLATION AND FLUTTER: ICD-10-CM

## 2023-06-06 DIAGNOSIS — I73.9 PVD (PERIPHERAL VASCULAR DISEASE): ICD-10-CM

## 2023-06-06 DIAGNOSIS — Z72.0 TOBACCO USE: ICD-10-CM

## 2023-06-06 DIAGNOSIS — K55.20 AVM (ARTERIOVENOUS MALFORMATION) OF COLON: ICD-10-CM

## 2023-06-06 PROCEDURE — 93000 ELECTROCARDIOGRAM COMPLETE: CPT | Performed by: NURSE PRACTITIONER

## 2023-06-06 PROCEDURE — 99214 OFFICE O/P EST MOD 30 MIN: CPT | Performed by: NURSE PRACTITIONER

## 2023-06-06 RX ORDER — CHLORAL HYDRATE 500 MG
CAPSULE ORAL
COMMUNITY

## 2023-06-11 NOTE — PROGRESS NOTES
Subjective:     Encounter Date: 6/6/2023      Patient ID: Kingsley Lerma is a 77 y.o. male.    Chief Complaint: Follow-up atrial fibrillation and flutter    HPI:     77-year-old male returns today for follow-up of atrial fibrillation and flutter.  Anticoagulation was initiated when he was first diagnosed with these in February 2020, then he returned a few months later in May 2020 with syncope (was in atrial flutter and otherwise asymptomatic with this). He was cardioverted that admission on 5/6/20, without a change in symptoms when NSR was restored. He was discharged home on 5/7 on amiodarone (rhythmol was stopped at that time). He returned to ER 5/9 because he found his heart rate to be 130s-140s and he experienced a brief dizzy spell. He was cardioverted for atrial flutter 2:1 with a HR in the 140s. NSR was restored only for a few seconds. His rhythm then proceeded to bounce around from atrial flutter to atrial fibrillation to NSR with various heart rates (70s-140s). He denied any symptoms regardless of what rhythm he was in. He was discharged home with the addition of diltiazem for better rate control when in AF/AFL, and amio and Xarelto were continued. Also of note, his Pletal dose was decreased by half due to interaction with diltiazem.   The patient also has a history of alcohol abuse.     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 EGDs were unremarkable.  He did not require blood transfusion that admission.  He was discharged home on his same medical therapy, including Xarelto for anticoagulation and Pletal for claudication.  He returned a week later with recurrent GI bleeding, and hemoglobin in the range of 6-7, and required 3 units of packed red blood cells that admission.  At that point, Xarelto was discontinued and replaced with aspirin 81 mg daily.  Pletal was continued.  He was discharged on June 18 and  returned again on June 21 with recurrent GI bleeding.  However, during that admission his hemoglobin and hematocrit remained stable and he did not require transfusion.      At follow-up visit in July 2020, he was in sinus rhythm we did discuss potential of watchman device for stroke protection but plan to wait until an upcoming vascular procedure before discussing this further.    On 10/16/2020 he presented to [facility] ER with complaints of bright red blood per rectum for 2 to 3 days.  Hemoglobin was stable.  He did not require blood transfusion.  Pletal was continued, Plavix was discontinued and he was told to take aspirin 325 mg daily that he later reduced to 81mg.    At subsequent follow-ups, since he continued to maintain normal sinus rhythm on amiodarone and continued to have intermittent GI bleeds despite being off of anticoagulation (although he was on Plavix and Pletal), it was felt best not to pursue watchman device placement at that time, given the fact that this would require 6 weeks of aspirin plus anticoagulation followed by dual antiplatelet therapy for a total of 6 months.       He came back to see Dr. Farias in May 2022.  He continued to maintain normal sinus rhythm on amiodarone.  He was also taking aspirin.  He had been hospitalized in January/February 2022 with a pulmonary embolism but was not anticoagulated due to his history of GI bleeding.  An IVC filter was placed by Dr. Donaldson.  He denied any bleeding problems at the time of his visit with Dr. Farias.  He also denied any dyspnea, palpitations, chest pain.  He reported he was drinking approximately 1-2 beers per day at that point.  At that visit, Dr. Farias referred the patient to Dr. Donaldson for ongoing vascular care, as the patient wished to follow here rather than [facility] where he previously followed.  Also, Dr. Farias noted if his IVC filter could be removed watchman device placement may be reconsidered as new data suggests  anticoagulation may not be required after watchman device placement.    I followed up with the patient in November 2022 and IVC filter had been removed in June 2022.  Recommendations were made per Dr. Donaldson for left TCAR.  Dr. Donaldson put the patient on Plavix and aspirin and stopped Pletal.  The patient subsequently developed dark bloody stools 4 to 5 days after starting Plavix, so he discontinued this.  Plans were changed and he was scheduled for carotid endarterectomy and he continued aspirin only.  Denied any chest discomfort, palpitations, shortness of breath.  After discussion with Dr. Farias and the patient, it was felt the risks outweighed the benefits of pursuing Watchman device implantation given his bleeding on aspirin and Plavix (he would require this uninterrupted for 6 months if watchman were to be placed).    Today the patient presents for follow-up and reports he is doing well.  He underwent left carotid endarterectomy on 1/25.  He was hospitalized again shortly thereafter for 1 night around 2/10 for low blood pressure, weakness, headache, elevated LFTs.  Neurology felt he did not have a TIA.  Atorvastatin 20 mg was stopped.  Follow-up labs in March revealed LFTs normalized.  The patient continues to smoke.  He admits he is also drinking alcohol regularly.  He denies any chest discomfort, shortness of breath or palpitations.  He is doing well from a cardiovascular standpoint.  He denies bleeding issues now that he is taking aspirin only.         History of Present Illness            The following portions of the patient's history were reviewed and updated as appropriate: allergies, current medications, past family history, past medical history, past social history, past surgical history and problem list.    Review of Systems   Constitutional: Negative for malaise/fatigue.   Cardiovascular:  Negative for chest pain, claudication, dyspnea on exertion, leg swelling, near-syncope, orthopnea,  "palpitations, paroxysmal nocturnal dyspnea and syncope.   Respiratory:  Negative for cough and shortness of breath.    Hematologic/Lymphatic: Does not bruise/bleed easily.   Musculoskeletal:  Negative for falls.   Gastrointestinal:  Negative for bloating.   Neurological:  Negative for dizziness, light-headedness and weakness.     Allergies   Allergen Reactions    Prednisone Other (See Comments)     Made him anxious and jittery      Cortisone Nausea And Vomiting       Current Outpatient Medications:     amiodarone (PACERONE) 200 MG tablet, Take 1 tablet by mouth Daily., Disp: 90 tablet, Rfl: 3    aspirin 81 MG EC tablet, Take 1 tablet by mouth Daily., Disp: 30 tablet, Rfl: 11    Cholecalciferol (VITAMIN D) 50 MCG (2000 UT) tablet, Take 2,000 Units by mouth 2 (Two) Times a Day., Disp: , Rfl:     dilTIAZem CD (CARDIZEM CD) 120 MG 24 hr capsule, Take 1 capsule by mouth Daily., Disp: 90 capsule, Rfl: 3    ferrous sulfate 325 (65 FE) MG tablet, Take 1 tablet by mouth Daily With Breakfast., Disp: , Rfl:     finasteride (PROSCAR) 5 MG tablet, Take 1 tablet by mouth Every Night., Disp: , Rfl:     gabapentin (Neurontin) 300 MG capsule, Take 1 capsule by mouth Daily., Disp: 30 capsule, Rfl: 3    LACTOBACILLUS PO, Take 1 tablet by mouth Every Night., Disp: , Rfl:     lisinopril (PRINIVIL,ZESTRIL) 40 MG tablet, Take 1 tablet by mouth Daily., Disp: 90 tablet, Rfl: 3    multivitamin with minerals tablet tablet, Take 1 tablet by mouth Every Night., Disp: , Rfl:     Omega-3 Fatty Acids (fish oil) 1000 MG capsule capsule, Take  by mouth Daily With Breakfast., Disp: , Rfl:     pantoprazole (PROTONIX) 20 MG EC tablet, Take 1 tablet by mouth Every Morning., Disp: , Rfl:     vitamin C (ASCORBIC ACID) 500 MG tablet, Take 2 tablets by mouth Daily., Disp: , Rfl:          Objective:    /56   Pulse 53   Ht 172.7 cm (68\")   Wt 64.4 kg (142 lb)   SpO2 98%   BMI 21.59 kg/m²        Vitals and nursing note reviewed.   Constitutional:  "      General: Not in acute distress.     Appearance: Well-developed. Not diaphoretic.   HENT:      Head: Normocephalic and atraumatic.   Neck:      Vascular: No JVD.   Pulmonary:      Effort: Pulmonary effort is normal. No respiratory distress.   Cardiovascular:      Normal rate. Regular rhythm.      Murmurs: There is no murmur.   Edema:     Peripheral edema absent.   Skin:     General: Skin is warm and dry.   Neurological:      Mental Status: Alert and oriented to person, place, and time.      Cranial Nerves: No cranial nerve deficit.   Psychiatric:         Behavior: Behavior normal.       Lab Review:   Lab Results   Component Value Date    WBC 4.03 02/10/2023    HGB 11.2 (L) 02/10/2023    HCT 35.0 (L) 02/10/2023    .9 (H) 02/10/2023     02/10/2023     Lab Results   Component Value Date    GLUCOSE 87 02/10/2023    CALCIUM 8.7 02/10/2023     02/10/2023    K 4.7 02/10/2023    CO2 25.0 02/10/2023     02/10/2023    BUN 22 02/10/2023    CREATININE 0.85 02/10/2023    EGFRIFAFRI >59 09/02/2020    EGFRIFNONA 127 02/28/2022    BCR 25.9 (H) 02/10/2023    ANIONGAP 8.0 02/10/2023     Lab Results   Component Value Date    TSH 2.680 02/01/2022     Lab Results   Component Value Date    GLUCOSE 87 02/10/2023    BUN 22 02/10/2023    CREATININE 0.85 02/10/2023    EGFRIFNONA 127 02/28/2022    EGFRIFAFRI >59 09/02/2020    BCR 25.9 (H) 02/10/2023    K 4.7 02/10/2023    CO2 25.0 02/10/2023    CALCIUM 8.7 02/10/2023    ALBUMIN 3.6 02/10/2023     (H) 02/10/2023     (H) 02/10/2023                  ECG 12 Lead    Date/Time: 6/6/2023 11:53 AM  Performed by: Belkys Carty APRN  Authorized by: Belkys Carty APRN   Comparison: compared with previous ECG from 2/9/2023  Similar to previous ECG  Rhythm: sinus bradycardia  BPM: 55         6/2022 PFTs    Interpretation :  1.  Spirometry is consistent with a moderate obstructive ventilatory defect.  2.  There is some improvement in spirometry postbronchodilator  but a moderate obstructive ventilatory defect is still present.  3.  Lung volumes reveal hyperinflation.  There is also a mild decrease in inspiratory capacity.  4.  Arterial blood gases on room air reveal a mild respiratory alkalosis and otherwise are within normal limits.  5.  When current studies are compared to studies from June 23, 2021, the patient's current prebronchodilator FVC is essentially unchanged while the patient's FEV1 has decreased compared to previous prebronchodilator studies.  The patient's current postbronchodilator FVC does show a significant decline compared to previous postbronchodilator values.  There has also been a slight drop in the postbronchodilator FEV1 compared the previous postbronchodilator values.  The patient's total lung capacity has increased compared to previous and hyperinflation is now present.        Cuco Cruz MD       Results for orders placed during the hospital encounter of 01/27/22    Adult Transthoracic Echo Complete W/ Cont if Necessary Per Protocol    Interpretation Summary  · Left ventricular systolic function is normal. Left ventricular ejection fraction appears to be 56 - 60%.  · Normal right ventricular cavity size and systolic function noted.  · Mild aortic valve regurgitation is present.      Assessment:      Problem List Items Addressed This Visit          Cardiac and Vasculature    Essential hypertension - Primary    PVD (peripheral vascular disease)    Atrial fibrillation and flutter       Gastrointestinal Abdominal     GI bleed    AVM (arteriovenous malformation) of colon       [facility]    Alcohol abuse       Tobacco    Tobacco use     Plan:     1.  Paroxysmal atrial fibrillation and flutter: Established problem, stable.  Maintaining normal sinus rhythm on amiodarone.      -Continue diltiazem for rate control when in atrial fibrillation or flutter.    -Not anticoagulated due to recurrent GI bleeds secondary to colonic AVMs.  Continue aspirin  81 mg daily. CHADSVASC 3.    -As new data supports no anticoagulation after watchman device placement, and rather treatment with aspirin indefinitely and Plavix for 6 months, Dr. Farias did discuss watchman device placement again with the patient at a previous visit.  His IVC filter has also since been removed by Dr. Donaldson.  However, the patient reported at his November 2022 visit he had recurrent GI bleeding within 1 week of taking aspirin and Plavix, therefore he is now back on aspirin alone.  I discussed this with Dr. Farias previously, and given the recurrent bleeding on dual antiplatelet therapy, it is felt the risk outweighs the benefit of pursuing watchman device placement at this time as he is maintaining normal sinus rhythm on amiodarone.    2.  Screening for amiodarone toxicity: Recent LFTs March 2023 within normal limits.  Orders placed for TSH and PFTs.     Although his previous PFTs do not suggest abnormalities resultant from amiodarone use, they are diagnostic for COPD.  Therefore I have recommended referral to pulmonology, but the patient declines at this time.  He will call back if he develops shortness of breath or would like to reconsider.    3.  Peripheral vascular disease: Now followed by Dr. Donaldson.  He is no longer on Pletal or Plavix due to GI bleeding.  He continues aspirin 81 mg daily.  He underwent left carotid endarterectomy 1/25    4.  Pulmonary embolism: January/February 2022.  Not anticoagulated due to GI bleeding issues.  He did have an IVC filter placed at that time, that was removed in June 2022.    5.  Essential hypertension: Well-controlled.  Continue current doses of lisinopril and diltiazem    6.  Tobacco abuse: Counseled on the importance of cessation    7.  Recurrent GI bleeding secondary to colonic AVMs - not anticoagulated due to these.  Followed by gastroenterology.    8. alcohol abuse: Counseled on the importance of cessation.    F/u 6 months, sooner with new or worsening  symptoms.    I spent 35 minutes caring for Kingsley on this date of service. This time includes time spent by me in the following activities: preparing for the visit, reviewing tests, obtaining and/or reviewing a separately obtained history, performing a medically appropriate examination and/or evaluation, counseling and educating the patient/family/caregiver, and documenting information in the medical record

## 2023-08-03 ENCOUNTER — TELEPHONE (OUTPATIENT)
Dept: VASCULAR SURGERY | Facility: CLINIC | Age: 78
End: 2023-08-03

## 2023-08-03 NOTE — TELEPHONE ENCOUNTER
Caller: Kingsley Lerma    Relationship to patient: Self    Best call back number: [phone]     Chief complaint: PATIENT WOULD LIKE OFFICE TO NOTIFY [facility]     Type of visit: FOLLOW UP     Additional notes:PATIENT RESCHEDULED TO 9/12/23

## 2023-08-22 DIAGNOSIS — G62.9 NEUROPATHY: ICD-10-CM

## 2023-08-22 RX ORDER — GABAPENTIN 300 MG/1
CAPSULE ORAL
Qty: 30 CAPSULE | Refills: 3 | Status: SHIPPED | OUTPATIENT
Start: 2023-08-22

## 2023-09-06 ENCOUNTER — HOSPITAL ENCOUNTER (OUTPATIENT)
Dept: PULMONOLOGY | Facility: HOSPITAL | Age: 78
Discharge: HOME | End: 2023-09-06
Admitting: NURSE PRACTITIONER
Payer: OTHER GOVERNMENT

## 2023-09-06 DIAGNOSIS — Z79.899 LONG TERM CURRENT USE OF AMIODARONE: ICD-10-CM

## 2023-09-06 PROCEDURE — 94729 DIFFUSING CAPACITY: CPT

## 2023-09-06 PROCEDURE — 94060 EVALUATION OF WHEEZING: CPT

## 2023-09-06 RX ORDER — ALBUTEROL SULFATE 2.5 MG/3ML
2.5 SOLUTION RESPIRATORY (INHALATION) ONCE
Status: COMPLETED | OUTPATIENT
Start: 2023-09-06 | End: 2023-09-06

## 2023-09-06 RX ADMIN — ALBUTEROL SULFATE 2.5 MG: 2.5 SOLUTION RESPIRATORY (INHALATION) at 09:19

## 2023-09-11 DIAGNOSIS — R94.2 ABNORMAL PFTS: Primary | ICD-10-CM

## 2023-09-14 ENCOUNTER — TELEPHONE (OUTPATIENT)
Dept: CARDIOLOGY | Facility: CLINIC | Age: 78
End: 2023-09-14
Payer: OTHER GOVERNMENT

## 2023-09-14 NOTE — TELEPHONE ENCOUNTER
Pt called requesting I fax RFS for continued [facility] Consult to [facility] since his approval .  I [facility]ed this to [facility] on 23.  At his request I also faxed PFT results to [facility], TAJ Luke.

## 2023-09-29 RX ORDER — DILTIAZEM HYDROCHLORIDE 120 MG/1
120 CAPSULE, COATED, EXTENDED RELEASE ORAL DAILY
Qty: 90 CAPSULE | Refills: 3 | Status: SHIPPED | OUTPATIENT
Start: 2023-09-29

## 2023-10-11 RX ORDER — DILTIAZEM HYDROCHLORIDE 120 MG/1
120 CAPSULE, COATED, EXTENDED RELEASE ORAL DAILY
Qty: 90 CAPSULE | Refills: 3 | Status: SHIPPED | OUTPATIENT
Start: 2023-10-11 | End: 2023-10-12 | Stop reason: SDUPTHER

## 2023-10-12 ENCOUNTER — TELEPHONE (OUTPATIENT)
Dept: CARDIOLOGY | Facility: CLINIC | Age: 78
End: 2023-10-12
Payer: OTHER GOVERNMENT

## 2023-10-12 RX ORDER — DILTIAZEM HYDROCHLORIDE 120 MG/1
120 CAPSULE, COATED, EXTENDED RELEASE ORAL DAILY
Qty: 90 CAPSULE | Refills: 3 | Status: SHIPPED | OUTPATIENT
Start: 2023-10-12

## 2023-10-12 RX ORDER — DILTIAZEM HYDROCHLORIDE 120 MG/1
120 CAPSULE, COATED, EXTENDED RELEASE ORAL DAILY
Qty: 90 CAPSULE | Refills: 3 | Status: SHIPPED | OUTPATIENT
Start: 2023-10-12 | End: 2023-10-12 | Stop reason: SDUPTHER

## 2023-10-12 NOTE — TELEPHONE ENCOUNTER
This pt called and left a vm states that the [facility] is not [facility] dilt[facility]em. He received refills but it went to [facility] instead.    Today I re-did his script. It has been faxed to [facility] [phone] along with  TAJ Rizvi last office notes.    Pt was notified

## 2023-10-12 NOTE — TELEPHONE ENCOUNTER
Caller: Kingsley Lerma    Relationship: Self    Best call back number: [phone] (home)      Requested Prescriptions:   Requested Prescriptions     Pending Prescriptions Disp Refills    dilTIAZem CD (CARDIZEM CD) 120 MG 24 hr capsule 90 capsule 3     Sig: Take 1 capsule by mouth Daily.        Pharmacy where request should be sent: [facility] - [place], [place] - [address] [phone] [facility] [phone] FX     Last office visit with prescribing clinician: 5/4/2022   Last telemedicine visit with prescribing clinician: Visit date not found   Next office visit with prescribing clinician: Visit date not found     Additional details provided by patient: PATIENT HAS ABOUT A WEEK LEFT OF MEDICATION. PATIENT STATED HE HAS CALLED AND LEFT SEVERAL VOICEMAILS REGARDING HIS MEDICATION. [facility] NOT [facility] AND IS NEEDING HIS REFILL [facility]ED [facility] TO THE [facility] ASAP. THANK YOU    Does the patient have less than a 3 day supply:  [] Yes  [x] No    Would you like a call back once the refill request has been completed: [] Yes [x] No    If the office needs to give you a call back, can they leave a voicemail: [] Yes [x] No    Carlos Strong Rep   10/12/23 08:38 CDT

## 2023-11-08 ENCOUNTER — HOSPITAL ENCOUNTER (OUTPATIENT)
Dept: ULTRASOUND IMAGING | Facility: HOSPITAL | Age: 78
Discharge: HOME | End: 2023-11-08
Admitting: NURSE PRACTITIONER
Payer: OTHER GOVERNMENT

## 2023-11-08 DIAGNOSIS — I65.23 BILATERAL CAROTID ARTERY STENOSIS: ICD-10-CM

## 2023-11-08 PROCEDURE — 93880 EXTRACRANIAL BILAT STUDY: CPT

## 2023-12-11 ENCOUNTER — TELEPHONE (OUTPATIENT)
Dept: VASCULAR SURGERY | Facility: CLINIC | Age: 78
End: 2023-12-11
Payer: OTHER GOVERNMENT

## 2023-12-11 NOTE — TELEPHONE ENCOUNTER
Called patient to confirmed appointment for 12/12 @ 0800 with Dr Donaldson. Patient will be here for appointment.

## 2023-12-12 ENCOUNTER — OFFICE VISIT (OUTPATIENT)
Dept: VASCULAR SURGERY | Facility: CLINIC | Age: 78
End: 2023-12-12
Payer: OTHER GOVERNMENT

## 2023-12-12 VITALS
OXYGEN SATURATION: 99 % | SYSTOLIC BLOOD PRESSURE: 122 MMHG | BODY MASS INDEX: 21.52 KG/M2 | HEART RATE: 62 BPM | DIASTOLIC BLOOD PRESSURE: 72 MMHG | HEIGHT: 68 IN | WEIGHT: 142 LBS

## 2023-12-12 DIAGNOSIS — Z72.0 TOBACCO USE: ICD-10-CM

## 2023-12-12 DIAGNOSIS — I74.09 AORTOILIAC OCCLUSIVE DISEASE: Primary | ICD-10-CM

## 2023-12-12 DIAGNOSIS — I65.22 CAROTID STENOSIS, LEFT: ICD-10-CM

## 2023-12-12 DIAGNOSIS — I10 ESSENTIAL HYPERTENSION: ICD-10-CM

## 2023-12-12 PROCEDURE — 99214 OFFICE O/P EST MOD 30 MIN: CPT | Performed by: NURSE PRACTITIONER

## 2023-12-12 RX ORDER — LINEZOLID 600 MG/1
600 TABLET, FILM COATED ORAL
COMMUNITY
Start: 2023-11-28

## 2023-12-12 NOTE — LETTER
December 12, 2023     TAJ Stanton  [address]  [place] [address]    Patient: Kingsley Lerma   YOB: 1945   Date of Visit: 12/12/2023     Dear TAJ Stanton:       Thank you for referring Kingsley Lerma to me for evaluation. Below are the relevant portions of my assessment and plan of care.    If you have questions, please do not hesitate to call me. I look forward to following Kingsley along with you.         Sincerely,        Luis Enrique Donaldson DO        CC: No Recipients    Luis Enrique Donaldson DO  12/12/23 1245  Sign when Signing Visit  12/12/2023         Yohana Flanagan, TAJ  [address]  [place] [address]        Kingsley Lerma  1945      Chief Complaint   Patient presents with   • Follow-up     Bilateral carotid artery stenosis-pt denies any concerns or issues no pain no stroke like symptoms       Dear Yohana Flanagan APRN:   I had the pleasure of seeing you patient in the office today for follow up.  As you recall, the patient is a 78 y.o. male who we initially saw while hospitalized and found to have left lower lobe pulmonary embolus with no evidence of right heart strain.  He was found to have deep vein thrombus to bilateral lower extremities.  Due to GI bleeding while previously anticoagulated he is unable to take anticoagulation. He did have an IVC filter placed 1/31/2022 and removed 6/22/2022. 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  He previously followed with [facility] vascular and in 2020 underwent bilateral femoral endarterectomies with placement vbx aortic stent of bilateral kissing iliac stents by Dr. Acuña. He underwent left carotid endarterectomy 1/25/23. He does have osteomyelitis in the left third toe and is being treated by Dr. Laughlin.  His previous testing showed moderate arterial insufficiency to his lower extremities.        Review of Systems  "  Constitutional: Negative.    HENT: Negative.     Eyes: Negative.    Respiratory: Negative.     Cardiovascular: Negative.    Gastrointestinal: Negative.    Endocrine: Negative.    Genitourinary: Negative.    Musculoskeletal: Negative.    Skin: Negative.    Allergic/Immunologic: Negative.    Neurological:  Positive for dizziness and numbness.   Hematological: Negative.    Psychiatric/Behavioral: Negative.     All other systems reviewed and are negative.        /72   Pulse 62   Ht 172.7 cm (68\")   Wt 64.4 kg (142 lb)   SpO2 99%   BMI 21.59 kg/m²    Physical Exam  Vitals and nursing note reviewed.   Constitutional:       Appearance: He is well-developed.   HENT:      Head: Normocephalic and atraumatic.   Eyes:      General: No scleral icterus.     Pupils: Pupils are equal, round, and reactive to light.   Neck:      Thyroid: No thyromegaly.      Vascular: No carotid bruit or JVD.   Cardiovascular:      Rate and Rhythm: Normal rate and regular rhythm.      Pulses:           Carotid pulses are 2+ on the right side and 2+ on the left side.       Femoral pulses are 2+ on the right side and 2+ on the left side.       Dorsalis pedis pulses are detected w/ Doppler on the right side and detected w/ Doppler on the left side.        Posterior tibial pulses are detected w/ Doppler on the right side and detected w/ Doppler on the left side.      Heart sounds: Normal heart sounds.      Comments: Swelling to left third toe  Pulmonary:      Effort: Pulmonary effort is normal.      Breath sounds: Normal breath sounds.   Abdominal:      General: Bowel sounds are normal. There is no distension or abdominal bruit.      Palpations: Abdomen is soft. There is no mass.      Tenderness: There is no abdominal tenderness.   Musculoskeletal:         General: Normal range of motion.      Cervical back: Neck supple.   Lymphadenopathy:      Cervical: No cervical adenopathy.   Skin:     General: Skin is warm and dry.   Neurological:      " Mental Status: He is alert and oriented to person, place, and time.      Cranial Nerves: No cranial nerve deficit.      Sensory: No sensory deficit.   Psychiatric:         Mood and Affect: Mood normal.         Behavior: Behavior normal.         Thought Content: Thought content normal.         Judgment: Judgment normal.       Diagnostic Data:   Narrative & Impression   History: Carotid occlusive disease     IMPRESSION:  Impression:  1. There is less than 50% stenosis of the right internal carotid artery.  2. There is less than 50% stenosis of the left internal carotid artery.  3. Antegrade flow is demonstrated in bilateral vertebral arteries.     Comments: Bilateral carotid vertebral arterial duplex scan was  performed.     Grayscale imaging shows intimal thickening and calcified elements at the  carotid bifurcation. The right internal carotid artery peak systolic  velocity is 95.2 cm/sec. The end-diastolic velocity is 16.4 cm/sec. The  right ICA/CCA ratio is approximately 1.1. These findings correlate with  less than 50% stenosis of the right internal carotid artery.     Grayscale imaging shows intimal thickening and calcified elements at the  carotid bifurcation. The left internal carotid artery peak systolic  velocity is 100 cm/sec. The end-diastolic velocity is 17 cm/sec. The  left ICA/CCA ratio is approximately 2.0. These findings correlate with  less than 50% stenosis of the left internal carotid artery.     Antegrade flow is demonstrated in bilateral vertebral arteries.        This report was signed and finalized on 11/8/2023 3:26 PM CST by Dr. Luis Enrique Donaldson MD.       Patient Active Problem List   Diagnosis   • Essential hypertension   • PVD (peripheral vascular disease)   • Atrial fibrillation and flutter   • Alcohol abuse   • Non healing left heel wound   • Atrial fibrillation status post cardioversion   • Tobacco use   • Syncope   • Anticoagulated   • GI bleed   • Positive FIT (fecal immunochemical  test)   • Heme + stool   • Acute blood loss anemia   • Gastrointestinal hemorrhage   • Osteoarthritis of right hip   • AVM (arteriovenous malformation) of colon   • BPH without obstruction/lower urinary tract symptoms   • Abnormal CT scan, bladder   • Hypokalemia   • Diarrhea   • Acute pulmonary embolism   • Severe malnutrition   • Preop testing   • Carotid stenosis, left   • Rectal bleeding   • History of pulmonary embolism   • Asymptomatic stenosis of left carotid artery   • Constipation   • TIA (transient ischemic attack)   • Weakness of both lower extremities   • New daily persistent headache   • Elevated liver enzymes       ICD-10-CM ICD-9-CM   1. Aortoiliac occlusive disease  I74.09 444.09   2. Essential hypertension  I10 401.9   3. Tobacco use  Z72.0 305.1   4. Carotid stenosis, left  I65.22 433.10           PLAN: After thoroughly evaluating Kingsley Lerma, I believe the best course of action is to proceed with further imaging including a CTA of the abdomen and pelvis with bilateral lower extremity runoff given his previous interventions.  We will see him back in next 1-2 weeks to review his testing.  He is following for a wound to his left third toe.  I did discuss vascular risk factors as they pertain to the progression of vascular disease including controlling his hypertension.  His blood pressure is currently stable and controlled.   Unfortunately he does smoke cigars on a daily basis and does not have a desire to quit smoking at this time.  The patient is to continue taking their medications as previously discussed.   This was all discussed in full with complete understanding.    Thanks you for allowing me to participate in the care of your patient.  Please do not hesitate to call with any questions or concerns.  We will keep you aware of any further encounters with Kingsley Lerma.      Sincerely Yours,        TAJ Patricia

## 2023-12-12 NOTE — PROGRESS NOTES
"12/12/2023         Yohana Flanagan, APRN  [address]  [place] [address]        Kingsley Lerma  1945      Chief Complaint   Patient presents with    Follow-up     Bilateral carotid artery stenosis-pt denies any concerns or issues no pain no stroke like symptoms       Dear Yohana Flanagan, APRN:   I had the pleasure of seeing you patient in the office today for follow up.  As you recall, the patient is a 78 y.o. male who we initially saw while hospitalized and found to have left lower lobe pulmonary embolus with no evidence of right heart strain.  He was found to have deep vein thrombus to bilateral lower extremities.  Due to GI bleeding while previously anticoagulated he is unable to take anticoagulation. He did have an IVC filter placed 1/31/2022 and removed 6/22/2022. 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  He previously followed with [facility] vascular and in 2020 underwent bilateral femoral endarterectomies with placement vbx aortic stent of bilateral kissing iliac stents by Dr. Acuña. He underwent left carotid endarterectomy 1/25/23. He does have osteomyelitis in the left third toe and is being treated by Dr. Laughlin.  His previous testing showed moderate arterial insufficiency to his lower extremities.        Review of Systems   Constitutional: Negative.    HENT: Negative.     Eyes: Negative.    Respiratory: Negative.     Cardiovascular: Negative.    Gastrointestinal: Negative.    Endocrine: Negative.    Genitourinary: Negative.    Musculoskeletal: Negative.    Skin: Negative.    Allergic/Immunologic: Negative.    Neurological:  Positive for dizziness and numbness.   Hematological: Negative.    Psychiatric/Behavioral: Negative.     All other systems reviewed and are negative.        /72   Pulse 62   Ht 172.7 cm (68\")   Wt 64.4 kg (142 lb)   SpO2 99%   BMI 21.59 kg/m²    Physical Exam  Vitals " and nursing note reviewed.   Constitutional:       Appearance: He is well-developed.   HENT:      Head: Normocephalic and atraumatic.   Eyes:      General: No scleral icterus.     Pupils: Pupils are equal, round, and reactive to light.   Neck:      Thyroid: No thyromegaly.      Vascular: No carotid bruit or JVD.   Cardiovascular:      Rate and Rhythm: Normal rate and regular rhythm.      Pulses:           Carotid pulses are 2+ on the right side and 2+ on the left side.       Femoral pulses are 2+ on the right side and 2+ on the left side.       Dorsalis pedis pulses are detected w/ Doppler on the right side and detected w/ Doppler on the left side.        Posterior tibial pulses are detected w/ Doppler on the right side and detected w/ Doppler on the left side.      Heart sounds: Normal heart sounds.      Comments: Swelling to left third toe  Pulmonary:      Effort: Pulmonary effort is normal.      Breath sounds: Normal breath sounds.   Abdominal:      General: Bowel sounds are normal. There is no distension or abdominal bruit.      Palpations: Abdomen is soft. There is no mass.      Tenderness: There is no abdominal tenderness.   Musculoskeletal:         General: Normal range of motion.      Cervical back: Neck supple.   Lymphadenopathy:      Cervical: No cervical adenopathy.   Skin:     General: Skin is warm and dry.   Neurological:      Mental Status: He is alert and oriented to person, place, and time.      Cranial Nerves: No cranial nerve deficit.      Sensory: No sensory deficit.   Psychiatric:         Mood and Affect: Mood normal.         Behavior: Behavior normal.         Thought Content: Thought content normal.         Judgment: Judgment normal.       Diagnostic Data:   Narrative & Impression   History: Carotid occlusive disease     IMPRESSION:  Impression:  1. There is less than 50% stenosis of the right internal carotid artery.  2. There is less than 50% stenosis of the left internal carotid artery.  3.  Antegrade flow is demonstrated in bilateral vertebral arteries.     Comments: Bilateral carotid vertebral arterial duplex scan was  performed.     Grayscale imaging shows intimal thickening and calcified elements at the  carotid bifurcation. The right internal carotid artery peak systolic  velocity is 95.2 cm/sec. The end-diastolic velocity is 16.4 cm/sec. The  right ICA/CCA ratio is approximately 1.1. These findings correlate with  less than 50% stenosis of the right internal carotid artery.     Grayscale imaging shows intimal thickening and calcified elements at the  carotid bifurcation. The left internal carotid artery peak systolic  velocity is 100 cm/sec. The end-diastolic velocity is 17 cm/sec. The  left ICA/CCA ratio is approximately 2.0. These findings correlate with  less than 50% stenosis of the left internal carotid artery.     Antegrade flow is demonstrated in bilateral vertebral arteries.        This report was signed and finalized on 11/8/2023 3:26 PM CST by Dr. Luis Enrique Donaldson MD.       Patient Active Problem List   Diagnosis    Essential hypertension    PVD (peripheral vascular disease)    Atrial fibrillation and flutter    Alcohol abuse    Non healing left heel wound    Atrial fibrillation status post cardioversion    Tobacco use    Syncope    Anticoagulated    GI bleed    Positive FIT (fecal immunochemical test)    Heme + stool    Acute blood loss anemia    Gastrointestinal hemorrhage    Osteoarthritis of right hip    AVM (arteriovenous malformation) of colon    BPH without obstruction/lower urinary tract symptoms    Abnormal CT scan, bladder    Hypokalemia    Diarrhea    Acute pulmonary embolism    Severe malnutrition    Preop testing    Carotid stenosis, left    Rectal bleeding    History of pulmonary embolism    Asymptomatic stenosis of left carotid artery    Constipation    TIA (transient ischemic attack)    Weakness of both lower extremities    New daily persistent headache    Elevated liver  enzymes       ICD-10-CM ICD-9-CM   1. Aortoiliac occlusive disease  I74.09 444.09   2. Essential hypertension  I10 401.9   3. Tobacco use  Z72.0 305.1   4. Carotid stenosis, left  I65.22 433.10           PLAN: After thoroughly evaluating Kingsley Lerma, I believe the best course of action is to proceed with further imaging including a CTA of the abdomen and pelvis with bilateral lower extremity runoff given his previous interventions.  We will see him back in next 1-2 weeks to review his testing.  He is following for a wound to his left third toe.  I did discuss vascular risk factors as they pertain to the progression of vascular disease including controlling his hypertension.  His blood pressure is currently stable and controlled.   Unfortunately he does smoke cigars on a daily basis and does not have a desire to quit smoking at this time.  The patient is to continue taking their medications as previously discussed.   This was all discussed in full with complete understanding.    Thanks you for allowing me to participate in the care of your patient.  Please do not hesitate to call with any questions or concerns.  We will keep you aware of any further encounters with Kingsley Lerma.      Sincerely Yours,        TAJ Patricia

## 2024-01-03 DIAGNOSIS — G62.9 NEUROPATHY: ICD-10-CM

## 2024-01-15 ENCOUNTER — TELEPHONE (OUTPATIENT)
Dept: VASCULAR SURGERY | Facility: CLINIC | Age: 79
End: 2024-01-15
Payer: OTHER GOVERNMENT

## 2024-01-16 ENCOUNTER — OFFICE VISIT (OUTPATIENT)
Dept: VASCULAR SURGERY | Facility: CLINIC | Age: 79
End: 2024-01-16
Payer: OTHER GOVERNMENT

## 2024-01-16 ENCOUNTER — OFFICE VISIT (OUTPATIENT)
Dept: CARDIOLOGY | Facility: CLINIC | Age: 79
End: 2024-01-16
Payer: OTHER GOVERNMENT

## 2024-01-16 ENCOUNTER — HOSPITAL ENCOUNTER (OUTPATIENT)
Dept: CT IMAGING | Facility: HOSPITAL | Age: 79
Discharge: HOME | End: 2024-01-16
Admitting: NURSE PRACTITIONER
Payer: OTHER GOVERNMENT

## 2024-01-16 VITALS
HEIGHT: 69 IN | OXYGEN SATURATION: 95 % | WEIGHT: 145 LBS | DIASTOLIC BLOOD PRESSURE: 72 MMHG | BODY MASS INDEX: 21.48 KG/M2 | SYSTOLIC BLOOD PRESSURE: 140 MMHG | HEART RATE: 66 BPM

## 2024-01-16 VITALS
HEIGHT: 68 IN | DIASTOLIC BLOOD PRESSURE: 64 MMHG | SYSTOLIC BLOOD PRESSURE: 138 MMHG | OXYGEN SATURATION: 97 % | WEIGHT: 148.2 LBS | HEART RATE: 69 BPM | BODY MASS INDEX: 22.46 KG/M2

## 2024-01-16 DIAGNOSIS — I48.91 ATRIAL FIBRILLATION AND FLUTTER: Primary | ICD-10-CM

## 2024-01-16 DIAGNOSIS — I10 ESSENTIAL HYPERTENSION: ICD-10-CM

## 2024-01-16 DIAGNOSIS — K92.1 GASTROINTESTINAL HEMORRHAGE WITH MELENA: ICD-10-CM

## 2024-01-16 DIAGNOSIS — Z72.0 TOBACCO USE: ICD-10-CM

## 2024-01-16 DIAGNOSIS — I73.9 PVD (PERIPHERAL VASCULAR DISEASE): ICD-10-CM

## 2024-01-16 DIAGNOSIS — I73.9 PAD (PERIPHERAL ARTERY DISEASE): Primary | ICD-10-CM

## 2024-01-16 DIAGNOSIS — I48.92 ATRIAL FIBRILLATION AND FLUTTER: Primary | ICD-10-CM

## 2024-01-16 DIAGNOSIS — I74.09 AORTOILIAC OCCLUSIVE DISEASE: ICD-10-CM

## 2024-01-16 DIAGNOSIS — I65.23 BILATERAL CAROTID ARTERY STENOSIS: ICD-10-CM

## 2024-01-16 DIAGNOSIS — F10.10 ALCOHOL ABUSE: ICD-10-CM

## 2024-01-16 PROBLEM — Z01.818 PREOP TESTING: Status: RESOLVED | Noted: 2022-06-01 | Resolved: 2024-01-16

## 2024-01-16 PROCEDURE — 99214 OFFICE O/P EST MOD 30 MIN: CPT | Performed by: NURSE PRACTITIONER

## 2024-01-16 PROCEDURE — 93000 ELECTROCARDIOGRAM COMPLETE: CPT | Performed by: NURSE PRACTITIONER

## 2024-01-16 PROCEDURE — 82565 ASSAY OF CREATININE: CPT

## 2024-01-16 PROCEDURE — 75635 CT ANGIO ABDOMINAL ARTERIES: CPT

## 2024-01-16 PROCEDURE — 25510000001 IOPAMIDOL PER 1 ML: Performed by: NURSE PRACTITIONER

## 2024-01-16 RX ORDER — GABAPENTIN 300 MG/1
CAPSULE ORAL
Qty: 30 CAPSULE | Refills: 3 | Status: SHIPPED | OUTPATIENT
Start: 2024-01-16

## 2024-01-16 RX ADMIN — IOPAMIDOL 100 ML: 755 INJECTION, SOLUTION INTRAVENOUS at 08:00

## 2024-01-16 NOTE — PROGRESS NOTES
Subjective:     Encounter Date: 1/16/2024      Patient ID: Kingsley Lerma is a 78 y.o. male.    Chief Complaint: Follow-up atrial fibrillation and flutter    HPI:     78-year-old male returns today for follow-up of atrial fibrillation and flutter.  Anticoagulation was initiated when he was first diagnosed with these in February 2020, then he returned a few months later in May 2020 with syncope (was in atrial flutter and otherwise asymptomatic with this). He was cardioverted that admission on 5/6/20, without a change in symptoms when NSR was restored. He was discharged home on 5/7 on amiodarone (rhythmol was stopped at that time). He returned to ER 5/9 because he found his heart rate to be 130s-140s and he experienced a brief dizzy spell. He was cardioverted for atrial flutter 2:1 with a HR in the 140s. NSR was restored only for a few seconds. His rhythm then proceeded to bounce around from atrial flutter to atrial fibrillation to NSR with various heart rates (70s-140s). He denied any symptoms regardless of what rhythm he was in. He was discharged home with the addition of diltiazem for better rate control when in AF/AFL, and amio and Xarelto were continued. Also of note, his Pletal dose was decreased by half due to interaction with diltiazem.   The patient also has a history of alcohol abuse.     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 EGDs were unremarkable.  He did not require blood transfusion that admission.  He was discharged home on his same medical therapy, including Xarelto for anticoagulation and Pletal for claudication.  He returned a week later with recurrent GI bleeding, and hemoglobin in the range of 6-7, and required 3 units of packed red blood cells that admission.  At that point, Xarelto was discontinued and replaced with aspirin 81 mg daily.  Pletal was continued.  He was discharged on June 18 and  returned again on June 21 with recurrent GI bleeding.  However, during that admission his hemoglobin and hematocrit remained stable and he did not require transfusion.      At follow-up visit in July 2020, he was in sinus rhythm we did discuss potential of watchman device for stroke protection but plan to wait until an upcoming vascular procedure before discussing this further.    On 10/16/2020 he presented to [facility] ER with complaints of bright red blood per rectum for 2 to 3 days.  Hemoglobin was stable.  He did not require blood transfusion.  Pletal was continued, Plavix was discontinued and he was told to take aspirin 325 mg daily that he later reduced to 81mg.    At subsequent follow-ups, since he continued to maintain normal sinus rhythm on amiodarone and continued to have intermittent GI bleeds despite being off of anticoagulation (although he was on Plavix and Pletal), it was felt best not to pursue watchman device placement at that time, given the fact that this would require 6 weeks of aspirin plus anticoagulation followed by dual antiplatelet therapy for a total of 6 months.       He came back to see Dr. Farias in May 2022.  He continued to maintain normal sinus rhythm on amiodarone.  He was also taking aspirin.  He had been hospitalized in January/February 2022 with a pulmonary embolism but was not anticoagulated due to his history of GI bleeding.  An IVC filter was placed by Dr. Donaldson.  He denied any bleeding problems at the time of his visit with Dr. Farias.  He also denied any dyspnea, palpitations, chest pain.  He reported he was drinking approximately 1-2 beers per day at that point.  At that visit, Dr. Farias referred the patient to Dr. Donaldson for ongoing vascular care, as the patient wished to follow here rather than [facility] where he previously followed.  Also, Dr. Farias noted if his IVC filter could be removed watchman device placement may be reconsidered as new data suggests  anticoagulation may not be required after watchman device placement.    I followed up with the patient in November 2022 and IVC filter had been removed in June 2022.  Recommendations were made per Dr. Donaldson for left TCAR.  Dr. Donaldson put the patient on Plavix and aspirin and stopped Pletal.  The patient subsequently developed dark bloody stools 4 to 5 days after starting Plavix, so he discontinued this.  Plans were changed and he was scheduled for carotid endarterectomy and he continued aspirin only.  Denied any chest discomfort, palpitations, shortness of breath.  After discussion with Dr. Farias and the patient, it was felt the risks outweighed the benefits of pursuing Watchman device implantation given his bleeding on aspirin and Plavix (he would require this uninterrupted for 6 months if watchman were to be placed).    He followed up June 2023 and was doing well.  He had left carotid endarterectomy January 2023 and was hospitalized for 1 night around 2/10 for low blood pressure, weakness, headache and elevated LFTs.  Neurology felt he did not have a TIA.  Atorvastatin 20 mg was stopped.  Follow-up LFTs in March 2023 had normalized.  He was continuing to smoke cigarettes and drink alcohol regularly.  He was not having any symptoms or bleeding issues while taking aspirin only.  No changes were made.  He was maintaining normal sinus rhythm.    Today the patient presents for follow-up and states he continues to feel well from a cardiac standpoint.  He denies any chest discomfort, shortness of breath, palpitations, syncope or presyncope.  He denies any signs of bleeding.  He reports compliance with his medications.  He states as far as he knows his blood pressure is well-controlled.  He continues to smoke cigarettes and drink alcohol on a regular basis.         History of Present Illness              The following portions of the patient's history were reviewed and updated as appropriate: allergies, current  medications, past family history, past medical history, past social history, past surgical history and problem list.    Review of Systems   Constitutional: Negative for malaise/fatigue.   Cardiovascular:  Negative for chest pain, claudication, dyspnea on exertion, leg swelling, near-syncope, orthopnea, palpitations, paroxysmal nocturnal dyspnea and syncope.   Respiratory:  Negative for cough and shortness of breath.    Hematologic/Lymphatic: Does not bruise/bleed easily.   Musculoskeletal:  Negative for falls.   Gastrointestinal:  Negative for bloating.   Neurological:  Negative for dizziness, light-headedness and weakness.       Allergies   Allergen Reactions    Prednisone Other (See Comments)     Made him anxious and jittery      Cortisone Nausea And Vomiting       Current Outpatient Medications:     amiodarone (PACERONE) 200 MG tablet, Take 1 tablet by mouth Daily., Disp: 90 tablet, Rfl: 3    aspirin 81 MG EC tablet, Take 1 tablet by mouth Daily., Disp: 30 tablet, Rfl: 11    Cholecalciferol (VITAMIN D) 50 MCG (2000 UT) tablet, Take 1 tablet by mouth 2 (Two) Times a Day., Disp: , Rfl:     dilTIAZem CD (CARDIZEM CD) 120 MG 24 hr capsule, Take 1 capsule by mouth Daily., Disp: 90 capsule, Rfl: 3    finasteride (PROSCAR) 5 MG tablet, Take 1 tablet by mouth Every Night., Disp: , Rfl:     gabapentin (NEURONTIN) 300 MG capsule, TAKE 1 CAPSULE BY MOUTH EVERY DAY, Disp: 30 capsule, Rfl: 3    LACTOBACILLUS PO, Take 1 tablet by mouth Every Night., Disp: , Rfl:     linezolid (ZYVOX) 600 MG tablet, 1 tablet., Disp: , Rfl:     lisinopril (PRINIVIL,ZESTRIL) 40 MG tablet, Take 1 tablet by mouth Daily., Disp: 90 tablet, Rfl: 3    multivitamin with minerals tablet tablet, Take 1 tablet by mouth Every Night., Disp: , Rfl:     Omega-3 Fatty Acids (fish oil) 1000 MG capsule capsule, Take  by mouth Daily With Breakfast., Disp: , Rfl:     pantoprazole (PROTONIX) 20 MG EC tablet, Take 1 tablet by mouth Every Morning., Disp: , Rfl:      "vitamin C (ASCORBIC ACID) 500 MG tablet, Take 2 tablets by mouth Daily., Disp: , Rfl:   No current facility-administered medications for this visit.         Objective:    /64   Pulse 69   Ht 172.7 cm (68\")   Wt 67.2 kg (148 lb 3.2 oz)   SpO2 97%   BMI 22.53 kg/m²        Vitals and nursing note reviewed.   Constitutional:       General: Not in acute distress.     Appearance: Well-developed and not in distress. Not diaphoretic.   HENT:      Head: Normocephalic and atraumatic.   Neck:      Vascular: No JVD.   Pulmonary:      Effort: Pulmonary effort is normal. No respiratory distress.      Breath sounds: Normal breath sounds.   Cardiovascular:      Normal rate. Regular rhythm.      Murmurs: There is no murmur.   Edema:     Peripheral edema absent.   Abdominal:      Tenderness: There is no abdominal tenderness.   Skin:     General: Skin is warm and dry.   Neurological:      Mental Status: Alert and oriented to person, place, and time.      Cranial Nerves: No cranial nerve deficit.   Psychiatric:         Behavior: Behavior normal.         Lab Review:   Lab Results   Component Value Date    WBC 4.03 02/10/2023    HGB 11.2 (L) 02/10/2023    HCT 35.0 (L) 02/10/2023    .9 (H) 02/10/2023     02/10/2023     Lab Results   Component Value Date    GLUCOSE 87 02/10/2023    CALCIUM 8.7 02/10/2023     02/10/2023    K 4.7 02/10/2023    CO2 25.0 02/10/2023     02/10/2023    BUN 22 02/10/2023    CREATININE 0.85 02/10/2023    EGFRIFAFRI >59 09/02/2020    EGFRIFNONA 127 02/28/2022    BCR 25.9 (H) 02/10/2023    ANIONGAP 8.0 02/10/2023     Lab Results   Component Value Date    TSH 2.680 02/01/2022     Lab Results   Component Value Date    GLUCOSE 87 02/10/2023    BUN 22 02/10/2023    CREATININE 0.85 02/10/2023    EGFRIFNONA 127 02/28/2022    EGFRIFAFRI >59 09/02/2020    BCR 25.9 (H) 02/10/2023    K 4.7 02/10/2023    CO2 25.0 02/10/2023    CALCIUM 8.7 02/10/2023    ALBUMIN 3.6 02/10/2023     (H) " 02/10/2023     (H) 02/10/2023                  ECG 12 Lead    Date/Time: 1/16/2024 9:04 AM  Performed by: Belkys Pond APRN    Authorized by: Belkys Pond APRN  Comparison: compared with previous ECG from 6/6/2023  Similar to previous ECG  Rhythm: sinus rhythm  BPM: 64    Clinical impression: normal ECG           6/2022 PFTs    Interpretation :  1.  Spirometry is consistent with a moderate obstructive ventilatory defect.  2.  There is some improvement in spirometry postbronchodilator but a moderate obstructive ventilatory defect is still present.  3.  Lung volumes reveal hyperinflation.  There is also a mild decrease in inspiratory capacity.  4.  Arterial blood gases on room air reveal a mild respiratory alkalosis and otherwise are within normal limits.  5.  When current studies are compared to studies from June 23, 2021, the patient's current prebronchodilator FVC is essentially unchanged while the patient's FEV1 has decreased compared to previous prebronchodilator studies.  The patient's current postbronchodilator FVC does show a significant decline compared to previous postbronchodilator values.  There has also been a slight drop in the postbronchodilator FEV1 compared the previous postbronchodilator values.  The patient's total lung capacity has increased compared to previous and hyperinflation is now present.        Cuco Cruz MD       Results for orders placed during the hospital encounter of 01/27/22    Adult Transthoracic Echo Complete W/ Cont if Necessary Per Protocol    Interpretation Summary  · Left ventricular systolic function is normal. Left ventricular ejection fraction appears to be 56 - 60%.  · Normal right ventricular cavity size and systolic function noted.  · Mild aortic valve regurgitation is present.      Assessment:      Problem List Items Addressed This Visit          Cardiac and Vasculature    Essential hypertension    PVD (peripheral vascular disease)    Atrial  fibrillation and flutter - Primary       Gastrointestinal Abdominal     GI bleed       [facility]    Alcohol abuse       Tobacco    Tobacco use     Plan:     1.  Paroxysmal atrial fibrillation and flutter: Established problem, stable.  Maintaining normal sinus rhythm on amiodarone.      -Continue diltiazem for rate control when in atrial fibrillation or flutter.    -Not anticoagulated due to recurrent GI bleeds secondary to colonic AVMs.  Continue aspirin 81 mg daily. CHADSVASC 3.    -As newer data supports no anticoagulation after watchman device placement, and rather treatment with aspirin indefinitely and Plavix for 6 months, Dr. Farias did discuss watchman device placement again with the patient at a previous visit.  His IVC filter has also since been removed by Dr. Donaldson.  However, the patient reported at his November 2022 visit he had recurrent GI bleeding within 1 week of taking aspirin and Plavix, therefore he is now back on aspirin alone.  I discussed this with Dr. Farias previously, and given the recurrent bleeding on dual antiplatelet therapy, it is felt the risk outweighs the benefit of pursuing watchman device placement at this time as he is maintaining normal sinus rhythm on amiodarone.    2.  Screening for amiodarone toxicity: LFTs March 2023 within normal limits.  States he is going to follow-up with his PCP for repeat LFTs and TSH March 2024    Although his previous PFTs do not suggest abnormalities resultant from amiodarone use, they are diagnostic for COPD.  Therefore I have recommended referral to pulmonology, but the patient continues to decline at this time.  He will call back if he develops shortness of breath or would like to reconsider.    3.  Peripheral vascular disease:followed by Dr. Donaldson.  He is no longer on Pletal or Plavix due to GI bleeding.  He continues aspirin 81 mg daily.  He underwent left carotid endarterectomy 1/25/2023    4.  Pulmonary embolism: January/February 2022.   Not anticoagulated due to GI bleeding issues.  He did have an IVC filter placed at that time, that was removed in June 2022.    5.  Essential hypertension: Well-controlled.  Continue current doses of lisinopril and diltiazem    6.  Tobacco abuse: Counseled on the importance of cessation    7.  Recurrent GI bleeding secondary to colonic AVMs - not anticoagulated due to these.  Followed by gastroenterology.    8. alcohol abuse: Counseled on the importance of cessation.    F/u 6 months with Dr. Farias, sooner with new or worsening symptoms.

## 2024-01-16 NOTE — LETTER
January 16, 2024     TAJ Stanton  [address]  [place] [address]    Patient: Kingsley Lerma   YOB: 1945   Date of Visit: 1/16/2024     Dear TAJ Stanton:       Thank you for referring Kingsley Lerma to me for evaluation. Below are the relevant portions of my assessment and plan of care.    If you have questions, please do not hesitate to call me. I look forward to following Kingsley along with you.         Sincerely,        TAJ Patricia        CC: No Recipients    Helen Williamson APRN  01/16/24 1316  Sign when Signing Visit  1/16/2024         Yohana Flanagan, TAJ  [address]  [place] [address]        Kingsley Lerma  1945      Chief Complaint   Patient presents with   • Follow-up     2 week follow up w/ testing. Last seen 12/12/23. Patient denies any changes since last visit. Patient is requesting refill of gabapentin.        Dear Yohana Flanagan APRN:   I had the pleasure of seeing you patient in the office today for follow up.  As you recall, the patient is a 78 y.o. male who we initially saw while hospitalized and found to have left lower lobe pulmonary embolus with no evidence of right heart strain.  He was found to have deep vein thrombus to bilateral lower extremities.  Due to GI bleeding while previously anticoagulated he is unable to take anticoagulation. He did have an IVC filter placed 1/31/2022 and removed 6/22/2022. 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  [facility] and in 2020 underwent bilateral femoral endarterectomies with placement vbx aortic stent of bilateral kissing iliac stents by Dr. Acuña. He underwent left carotid endarterectomy 1/25/23. He does have osteomyelitis in the left third toe was being treated by Dr. Laughlin.  He reports no open wounds.  His previous testing showed moderate arterial  "insufficiency to his lower extremities.  He did have further testing performed which I did review in office.      Review of Systems   Constitutional: Negative.    HENT: Negative.     Eyes: Negative.    Respiratory: Negative.     Cardiovascular: Negative.    Gastrointestinal: Negative.    Endocrine: Negative.    Genitourinary: Negative.    Musculoskeletal: Negative.    Skin: Negative.    Allergic/Immunologic: Negative.    Neurological:  Positive for dizziness and numbness.   Hematological: Negative.    Psychiatric/Behavioral: Negative.     All other systems reviewed and are negative.        /72   Pulse 66   Ht 175.3 cm (69\")   Wt 65.8 kg (145 lb)   SpO2 95%   BMI 21.41 kg/m²    Physical Exam  Vitals and nursing note reviewed.   Constitutional:       Appearance: Normal appearance. He is well-developed and normal weight.   HENT:      Head: Normocephalic and atraumatic.   Eyes:      General: No scleral icterus.     Pupils: Pupils are equal, round, and reactive to light.   Neck:      Thyroid: No thyromegaly.      Vascular: No carotid bruit or JVD.   Cardiovascular:      Rate and Rhythm: Normal rate and regular rhythm.      Pulses:           Carotid pulses are 2+ on the right side and 2+ on the left side.       Femoral pulses are 2+ on the right side and 2+ on the left side.       Dorsalis pedis pulses are detected w/ Doppler on the right side and detected w/ Doppler on the left side.        Posterior tibial pulses are detected w/ Doppler on the right side and detected w/ Doppler on the left side.      Heart sounds: Normal heart sounds.   Pulmonary:      Effort: Pulmonary effort is normal.      Breath sounds: Normal breath sounds.   Abdominal:      General: Bowel sounds are normal. There is no distension or abdominal bruit.      Palpations: Abdomen is soft. There is no mass.      Tenderness: There is no abdominal tenderness.   Musculoskeletal:         General: Normal range of motion.      Cervical back: Neck " supple.   Lymphadenopathy:      Cervical: No cervical adenopathy.   Skin:     General: Skin is warm and dry.   Neurological:      Mental Status: He is alert and oriented to person, place, and time.      Cranial Nerves: No cranial nerve deficit.      Sensory: No sensory deficit.   Psychiatric:         Mood and Affect: Mood normal.         Behavior: Behavior normal.         Thought Content: Thought content normal.         Judgment: Judgment normal.       Diagnostic Data:   CT Angio Abdominal Aorta Bilateral Iliofem Runoff    Result Date: 1/16/2024  Narrative: EXAMINATION: CT ANGIO ABDOMINAL AORTA BILAT ILIOFEM RUNOFF-  1/16/2024 8:13 AM  HISTORY: Iliac stents. Aortoiliac disease. Claudication or leg ischemia.  DOSE: 372 mGycm. All CT scans are performed using dose optimization techniques as appropriate to the performed exam and including at least one of the following: Automated exposure control, adjustment of the mA and/or kV according to size, and the use of the iterative reconstruction technique..  FINDINGS: Multiple contiguous axial images were obtained through the abdomen and pelvis as well as both lower extremities following intravenous contrast infusion with reformatted images obtained in the sagittal and coronal projections from the original data set as well as MIPS.  Bilateral gynecomastia is present. There are changes of centrilobular emphysema in the lung bases. No suspicious nodularity or consolidative pneumonia. The descending thoracic aorta demonstrates atheromatous calcification and ectasia without dissection or aneurysm. The heart is mildly enlarged. Heavy coronary artery calcifications are present. There is no pericardial effusion.  There is steatosis of the liver. The liver is otherwise homogeneous in density without evidence of mass. There is normal enhancement of the portal vein and branches.  The gallbladder is unremarkable. There is no biliary dilatation.  The pancreas is normal in appearance.  There is no splenomegaly or splenic mass.  The adrenals are unremarkable. There is mild scarring within both kidneys with focal areas of cortical loss. There are cortical cysts of both kidneys. No perinephric fluid collection. No evidence of nephrolithiasis. Both ureters are decompressed and normal in appearance.  There is no retroperitoneal hematoma or adenopathy.  Moderate constipation is present with a large volume of stool throughout the colon. The small bowel is normal in distribution and appearance. There is no gastric wall thickening or gastric outlet obstruction. The appendix is surgically absent.  The small bowel mesentery is unremarkable with no adenopathy or mass.  The prostate gland is enlarged. Mild prominence of the urinary bladder wall may represent a mild element of bladder outlet obstruction. No discrete bladder mass. No free fluid in the abdomen or pelvis.  Degenerative disc disease is noted throughout the lumbar spine. There is severe osteoarthrosis of the right hip with associated deformity and spurring of the femoral head. This could even represent sequela of a remote subcapital fracture of the hip. Mild arthrosis is noted of the left hip.  Examination of the abdominal aorta and branch vessels demonstrate calcific plaquing and mural thrombus within the abdominal aorta, particularly within the infrarenal component. There is no evidence of aneurysm.  There is calcific plaquing near the origin of the celiac artery with mild stenosis at the origin. There is calcific plaquing at the origin and proximal segment of the SMA with mild to moderate stenosis. The OLIVIA is not clearly visualized. A single right-sided and 2 left-sided renal arteries are present. There is calcific plaquing involving the origin and proximal segment of all of these vessels with moderate stenosis of the lower left accessory renal artery. Moderate stenosis is also noted at the origin of both main renal arteries.  There is an  endovascular stent graft noted within the distal abdominal aorta demonstrating patency. Bilateral common iliac and external iliac stents are present demonstrated extending to just proximal to the common femoral arteries. There is associated extensive calcific plaquing with the stents. Mild proximal right common iliac and moderate left proximal common iliac artery luminal stenosis is present. Mild left-sided and moderate to severe right-sided luminal narrowing is noted of the more distal aspect of the external iliac stents with marked narrowing of the lumen of the right external iliac on image 160 of series 4.  Postoperative clips are noted adjacent to the right common femoral artery which is widely patent. The right superficial femoral artery is occluded at its origin. The profunda is widely patent. There is extensive calcific plaquing along the entire course of the right superficial femoral artery. The proximal and mid segment of the right SFA are occluded. At the level of the distal femur there is reconstitution of the distal SFA via collaterals but just below this level of reconstitution there is extensive calcific plaquing and either occlusion or high-grade luminal stenosis of the most distal SFA. There is extensive plaquing involving the very distal aspect of the SFA and popliteal artery with faint reconstitution. Calcific plaquing and mild luminal stenosis is noted of the right popliteal artery. There is moderate luminal stenosis of the popliteal artery just proximal to the origin of the anterior tibial artery. Calcific plaquing involving the proximal segment of the anterior tibial artery as well as the tibial peroneal trunk demonstrates moderate luminal stenosis. The posterior tibial and peroneal arteries are also diseased in their proximal segment with high-grade stenosis in the proximal posterior tibial artery. There is multifocal disease within the more proximal trifurcation vessels with several high-grade  stenoses in the proximal posterior tibial artery. There is also significant disease within the more distal posterior tibial artery on the right. The anterior and posterior tibial artery are patent at the foot. The peroneal artery is also patent to just above the level of the ankle.  The left common femoral artery is widely patent. The proximal SFA is widely patent. The profunda artery demonstrates mild plaquing without evidence of high-grade stenosis. Multifocal plaquing and stenosis is noted within the left SFA including a focus of moderate stenosis in the proximal thigh. More extensive plaquing results in high-grade stenosis within the mid left SFA. This results in marked luminal narrowing. There is  occlusion within the more distal left SFA with no definite intraluminal enhancement appreciated at the level of the more distal thigh. There is faint reconstitution of the very distal aspect of the left SFA and popliteal artery just above the level of the knee. The popliteal artery is attenuated in caliber with extensive calcific plaquing and multiple areas of high-grade luminal stenosis. Calcific plaquing and luminal stenosis is noted of the proximal tibial peroneal trunk and anterior tibial artery from just below the level of the bifurcation. Calcific plaquing and luminal stenosis is noted of the proximal peroneal and posterior tibial artery on the left. The posterior tibial artery is highly stenotic or occluded in its proximal segment. It is subsequently reconstituted in the mid calf. Multifocal disease is noted within the left posterior tibial artery with multiple areas of calcific plaquing and high-grade luminal stenosis. The peroneal and anterior tibial artery are patent to the foot but with multifocal more proximal disease.. There is faint reconstitution of the distal left posterior tibial artery.      Impression: 1. The patient has undergone stenting of the distal abdominal aorta and iliac arteries. The  abdominal component of the stent graft is widely patent. There is calcific plaquing and luminal stenosis of the external iliac and common iliac component of both stents with moderate to severe luminal stenosis of the more distal right external iliac stent. There is milder stenosis of the left external iliac component of the stent with moderate stenosis of the more proximal left common iliac component of the stent. Postoperative changes within both common femoral artery suggest previous endarterectomy. 2. Examination of the right lower extremity runoff demonstrates the common femoral artery to be patent. The right superficial femoral artery is occluded at its origin with extensive calcific plaquing extending the course of the right SFA. There is faint intermittent reconstitution of the more distal right SFA and popliteal artery. Multifocal disease is noted within the proximal trifurcation vessels including foci of high-grade luminal stenosis within the proximal aspect of both the anterior and posterior tibial artery. The peroneal artery is patent to just above the level of the ankle. The anterior and posterior tibial artery are patent to the level of the foot but again demonstrate more proximal multifocal disease. 3. The left common femoral artery is patent. There is mild calcific plaquing in the proximal SFA and profunda artery with mild luminal stenosis. There is multifocal disease within the left SFA with multiple areas of moderate to severe luminal stenosis. There is occlusion of the SFA in its distal aspect with intermittent reconstitution of the distal SFA as well as the popliteal artery. Multiple areas of luminal stenosis of the popliteal artery are present. There is calcific plaquing and luminal stenosis of the proximal anterior tibial artery as well as the tibial peroneal trunk. Multifocal disease of the proximal trifurcation vessels are noted on the left. The posterior tibial artery is occluded proximally  and is only intermittently visualized within the calf related to reconstitution. The peroneal and anterior tibial artery demonstrate disease in the mid and distal segment but are patent to the foot. There is distal faint reconstitution of the posterior tibial artery. 4. There are 2 left-sided renal arteries. There is calcific plaquing and stenosis which is moderate in nature at the origin of both main renal arteries. There is moderate stenosis of the accessory left renal artery. 5. Mild calcific plaquing at the origin of the celiac and SMA with mild to moderate luminal stenosis of the proximal SMA. The OLIVIA is not visualized and is likely occluded. 6. Moderate constipation. 7. Extensive coronary artery calcifications are present. No cardiac enlargement. 7. Bilateral gynecomastia. 9. 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 This is most advanced in the right hip where there is a bone-on-bone appearance. 10. Prostatic enlargement.  This report was signed and finalized on 1/16/2024 8:57 AM by Dr. Bhaskar Charles MD.        Patient Active Problem List   Diagnosis   • Essential hypertension   • PVD (peripheral vascular disease)   • Atrial fibrillation and flutter   • Alcohol abuse   • Non healing left heel wound   • Atrial fibrillation status post cardioversion   • Tobacco use   • Syncope   • Anticoagulated   • GI bleed   • Positive FIT (fecal immunochemical test)   • Heme + stool   • Acute blood loss anemia   • Gastrointestinal hemorrhage   • Osteoarthritis of right hip   • AVM (arteriovenous malformation) of colon   • BPH without obstruction/lower urinary tract symptoms   • Abnormal CT scan, bladder   • Hypokalemia   • Diarrhea   • Acute pulmonary embolism   • Severe malnutrition   • Carotid stenosis, left   • Rectal bleeding   • History of pulmonary embolism   • Asymptomatic stenosis of left carotid artery   • Constipation   • TIA (transient ischemic attack)   • Weakness of both lower extremities   • New daily  persistent headache   • Elevated liver enzymes       ICD-10-CM ICD-9-CM   1. PAD (peripheral artery disease)  I73.9 443.9   2. Bilateral carotid artery stenosis  I65.23 433.10     433.30   3. Tobacco use  Z72.0 305.1   4. Essential hypertension  I10 401.9             PLAN: After thoroughly evaluating Kingsley Lerma, I believe the best course of action is to remain conservative from vascular surgery standpoint.  His swelling to his toe has resolved and he reports no wounds.  I did review his testing which noted multilevel vascular disease however appears to be asymptomatic at this time.  We will see him back in 6 months with repeat noninvasive testing including ABIs and a carotid duplex.  Should he have any problems or wounds develop prior to then he can notify our office sooner.  I did discuss vascular risk factors as they pertain to the progression of vascular disease including controlling his hypertension and smoking cessation.  His blood pressure stable on his current medications.  Unfortunately, he does smoke cigars on a daily basis and does not have a desire to quit smoking at this time.  This was all discussed in full with complete understanding.    Thanks you for allowing me to participate in the care of your patient.  Please do not hesitate to call with any questions or concerns.  We will keep you aware of any further encounters with Kingsley Lerma.      Sincerely Yours,        TAJ Patricia

## 2024-01-16 NOTE — PROGRESS NOTES
1/16/2024         Yohana Flanagan, APRN  [address]  [place] [address]        Kingsley Lerma  1945      Chief Complaint   Patient presents with    Follow-up     2 week follow up w/ testing. Last seen 12/12/23. Patient denies any changes since last visit. Patient is requesting refill of gabapentin.        Dear Yohana Flanagan, APRSENDY:   I had the pleasure of seeing you patient in the office today for follow up.  As you recall, the patient is a 78 y.o. male who we initially saw while hospitalized and found to have left lower lobe pulmonary embolus with no evidence of right heart strain.  He was found to have deep vein thrombus to bilateral lower extremities.  Due to GI bleeding while previously anticoagulated he is unable to take anticoagulation. He did have an IVC filter placed 1/31/2022 and removed 6/22/2022. 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  He previously followed with [facility] vascular and in 2020 underwent bilateral femoral endarterectomies with placement vbx aortic stent of bilateral kissing iliac stents by Dr. Acuña. He underwent left carotid endarterectomy 1/25/23. He does have osteomyelitis in the left third toe was being treated by Dr. Laughlin.  He reports no open wounds.  His previous testing showed moderate arterial insufficiency to his lower extremities.  He did have further testing performed which I did review in office.      Review of Systems   Constitutional: Negative.    HENT: Negative.     Eyes: Negative.    Respiratory: Negative.     Cardiovascular: Negative.    Gastrointestinal: Negative.    Endocrine: Negative.    Genitourinary: Negative.    Musculoskeletal: Negative.    Skin: Negative.    Allergic/Immunologic: Negative.    Neurological:  Positive for dizziness and numbness.   Hematological: Negative.    Psychiatric/Behavioral: Negative.     All other systems reviewed and are  "negative.        /72   Pulse 66   Ht 175.3 cm (69\")   Wt 65.8 kg (145 lb)   SpO2 95%   BMI 21.41 kg/m²    Physical Exam  Vitals and nursing note reviewed.   Constitutional:       Appearance: Normal appearance. He is well-developed and normal weight.   HENT:      Head: Normocephalic and atraumatic.   Eyes:      General: No scleral icterus.     Pupils: Pupils are equal, round, and reactive to light.   Neck:      Thyroid: No thyromegaly.      Vascular: No carotid bruit or JVD.   Cardiovascular:      Rate and Rhythm: Normal rate and regular rhythm.      Pulses:           Carotid pulses are 2+ on the right side and 2+ on the left side.       Femoral pulses are 2+ on the right side and 2+ on the left side.       Dorsalis pedis pulses are detected w/ Doppler on the right side and detected w/ Doppler on the left side.        Posterior tibial pulses are detected w/ Doppler on the right side and detected w/ Doppler on the left side.      Heart sounds: Normal heart sounds.   Pulmonary:      Effort: Pulmonary effort is normal.      Breath sounds: Normal breath sounds.   Abdominal:      General: Bowel sounds are normal. There is no distension or abdominal bruit.      Palpations: Abdomen is soft. There is no mass.      Tenderness: There is no abdominal tenderness.   Musculoskeletal:         General: Normal range of motion.      Cervical back: Neck supple.   Lymphadenopathy:      Cervical: No cervical adenopathy.   Skin:     General: Skin is warm and dry.   Neurological:      Mental Status: He is alert and oriented to person, place, and time.      Cranial Nerves: No cranial nerve deficit.      Sensory: No sensory deficit.   Psychiatric:         Mood and Affect: Mood normal.         Behavior: Behavior normal.         Thought Content: Thought content normal.         Judgment: Judgment normal.       Diagnostic Data:   CT Angio Abdominal Aorta Bilateral Iliofem Runoff    Result Date: 1/16/2024  Narrative: EXAMINATION: CT " ANGIO ABDOMINAL AORTA BILAT ILIOFEM RUNOFF-  1/16/2024 8:13 AM  HISTORY: Iliac stents. Aortoiliac disease. Claudication or leg ischemia.  DOSE: 372 mGycm. All CT scans are performed using dose optimization techniques as appropriate to the performed exam and including at least one of the following: Automated exposure control, adjustment of the mA and/or kV according to size, and the use of the iterative reconstruction technique..  FINDINGS: Multiple contiguous axial images were obtained through the abdomen and pelvis as well as both lower extremities following intravenous contrast infusion with reformatted images obtained in the sagittal and coronal projections from the original data set as well as MIPS.  Bilateral gynecomastia is present. There are changes of centrilobular emphysema in the lung bases. No suspicious nodularity or consolidative pneumonia. The descending thoracic aorta demonstrates atheromatous calcification and ectasia without dissection or aneurysm. The heart is mildly enlarged. Heavy coronary artery calcifications are present. There is no pericardial effusion.  There is steatosis of the liver. The liver is otherwise homogeneous in density without evidence of mass. There is normal enhancement of the portal vein and branches.  The gallbladder is unremarkable. There is no biliary dilatation.  The pancreas is normal in appearance. There is no splenomegaly or splenic mass.  The adrenals are unremarkable. There is mild scarring within both kidneys with focal areas of cortical loss. There are cortical cysts of both kidneys. No perinephric fluid collection. No evidence of nephrolithiasis. Both ureters are decompressed and normal in appearance.  There is no retroperitoneal hematoma or adenopathy.  Moderate constipation is present with a large volume of stool throughout the colon. The small bowel is normal in distribution and appearance. There is no gastric wall thickening or gastric outlet obstruction. The  appendix is surgically absent.  The small bowel mesentery is unremarkable with no adenopathy or mass.  The prostate gland is enlarged. Mild prominence of the urinary bladder wall may represent a mild element of bladder outlet obstruction. No discrete bladder mass. No free fluid in the abdomen or pelvis.  Degenerative disc disease is noted throughout the lumbar spine. There is severe osteoarthrosis of the right hip with associated deformity and spurring of the femoral head. This could even represent sequela of a remote subcapital fracture of the hip. Mild arthrosis is noted of the left hip.  Examination of the abdominal aorta and branch vessels demonstrate calcific plaquing and mural thrombus within the abdominal aorta, particularly within the infrarenal component. There is no evidence of aneurysm.  There is calcific plaquing near the origin of the celiac artery with mild stenosis at the origin. There is calcific plaquing at the origin and proximal segment of the SMA with mild to moderate stenosis. The OLIVIA is not clearly visualized. A single right-sided and 2 left-sided renal arteries are present. There is calcific plaquing involving the origin and proximal segment of all of these vessels with moderate stenosis of the lower left accessory renal artery. Moderate stenosis is also noted at the origin of both main renal arteries.  There is an endovascular stent graft noted within the distal abdominal aorta demonstrating patency. Bilateral common iliac and external iliac stents are present demonstrated extending to just proximal to the common femoral arteries. There is associated extensive calcific plaquing with the stents. Mild proximal right common iliac and moderate left proximal common iliac artery luminal stenosis is present. Mild left-sided and moderate to severe right-sided luminal narrowing is noted of the more distal aspect of the external iliac stents with marked narrowing of the lumen of the right external  iliac on image 160 of series 4.  Postoperative clips are noted adjacent to the right common femoral artery which is widely patent. The right superficial femoral artery is occluded at its origin. The profunda is widely patent. There is extensive calcific plaquing along the entire course of the right superficial femoral artery. The proximal and mid segment of the right SFA are occluded. At the level of the distal femur there is reconstitution of the distal SFA via collaterals but just below this level of reconstitution there is extensive calcific plaquing and either occlusion or high-grade luminal stenosis of the most distal SFA. There is extensive plaquing involving the very distal aspect of the SFA and popliteal artery with faint reconstitution. Calcific plaquing and mild luminal stenosis is noted of the right popliteal artery. There is moderate luminal stenosis of the popliteal artery just proximal to the origin of the anterior tibial artery. Calcific plaquing involving the proximal segment of the anterior tibial artery as well as the tibial peroneal trunk demonstrates moderate luminal stenosis. The posterior tibial and peroneal arteries are also diseased in their proximal segment with high-grade stenosis in the proximal posterior tibial artery. There is multifocal disease within the more proximal trifurcation vessels with several high-grade stenoses in the proximal posterior tibial artery. There is also significant disease within the more distal posterior tibial artery on the right. The anterior and posterior tibial artery are patent at the foot. The peroneal artery is also patent to just above the level of the ankle.  The left common femoral artery is widely patent. The proximal SFA is widely patent. The profunda artery demonstrates mild plaquing without evidence of high-grade stenosis. Multifocal plaquing and stenosis is noted within the left SFA including a focus of moderate stenosis in the proximal thigh. More  extensive plaquing results in high-grade stenosis within the mid left SFA. This results in marked luminal narrowing. There is  occlusion within the more distal left SFA with no definite intraluminal enhancement appreciated at the level of the more distal thigh. There is faint reconstitution of the very distal aspect of the left SFA and popliteal artery just above the level of the knee. The popliteal artery is attenuated in caliber with extensive calcific plaquing and multiple areas of high-grade luminal stenosis. Calcific plaquing and luminal stenosis is noted of the proximal tibial peroneal trunk and anterior tibial artery from just below the level of the bifurcation. Calcific plaquing and luminal stenosis is noted of the proximal peroneal and posterior tibial artery on the left. The posterior tibial artery is highly stenotic or occluded in its proximal segment. It is subsequently reconstituted in the mid calf. Multifocal disease is noted within the left posterior tibial artery with multiple areas of calcific plaquing and high-grade luminal stenosis. The peroneal and anterior tibial artery are patent to the foot but with multifocal more proximal disease.. There is faint reconstitution of the distal left posterior tibial artery.      Impression: 1. The patient has undergone stenting of the distal abdominal aorta and iliac arteries. The abdominal component of the stent graft is widely patent. There is calcific plaquing and luminal stenosis of the external iliac and common iliac component of both stents with moderate to severe luminal stenosis of the more distal right external iliac stent. There is milder stenosis of the left external iliac component of the stent with moderate stenosis of the more proximal left common iliac component of the stent. Postoperative changes within both common femoral artery suggest previous endarterectomy. 2. Examination of the right lower extremity runoff demonstrates the common femoral  artery to be patent. The right superficial femoral artery is occluded at its origin with extensive calcific plaquing extending the course of the right SFA. There is faint intermittent reconstitution of the more distal right SFA and popliteal artery. Multifocal disease is noted within the proximal trifurcation vessels including foci of high-grade luminal stenosis within the proximal aspect of both the anterior and posterior tibial artery. The peroneal artery is patent to just above the level of the ankle. The anterior and posterior tibial artery are patent to the level of the foot but again demonstrate more proximal multifocal disease. 3. The left common femoral artery is patent. There is mild calcific plaquing in the proximal SFA and profunda artery with mild luminal stenosis. There is multifocal disease within the left SFA with multiple areas of moderate to severe luminal stenosis. There is occlusion of the SFA in its distal aspect with intermittent reconstitution of the distal SFA as well as the popliteal artery. Multiple areas of luminal stenosis of the popliteal artery are present. There is calcific plaquing and luminal stenosis of the proximal anterior tibial artery as well as the tibial peroneal trunk. Multifocal disease of the proximal trifurcation vessels are noted on the left. The posterior tibial artery is occluded proximally and is only intermittently visualized within the calf related to reconstitution. The peroneal and anterior tibial artery demonstrate disease in the mid and distal segment but are patent to the foot. There is distal faint reconstitution of the posterior tibial artery. 4. There are 2 left-sided renal arteries. There is calcific plaquing and stenosis which is moderate in nature at the origin of both main renal arteries. There is moderate stenosis of the accessory left renal artery. 5. Mild calcific plaquing at the origin of the celiac and SMA with mild to moderate luminal stenosis of the  proximal SMA. The OLIVIA is not visualized and is likely occluded. 6. Moderate constipation. 7. Extensive coronary artery calcifications are present. No cardiac enlargement. 7. Bilateral gynecomastia. 9. 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 This is most advanced in the right hip where there is a bone-on-bone appearance. 10. Prostatic enlargement.  This report was signed and finalized on 1/16/2024 8:57 AM by Dr. Bhaskar Charles MD.        Patient Active Problem List   Diagnosis    Essential hypertension    PVD (peripheral vascular disease)    Atrial fibrillation and flutter    Alcohol abuse    Non healing left heel wound    Atrial fibrillation status post cardioversion    Tobacco use    Syncope    Anticoagulated    GI bleed    Positive FIT (fecal immunochemical test)    Heme + stool    Acute blood loss anemia    Gastrointestinal hemorrhage    Osteoarthritis of right hip    AVM (arteriovenous malformation) of colon    BPH without obstruction/lower urinary tract symptoms    Abnormal CT scan, bladder    Hypokalemia    Diarrhea    Acute pulmonary embolism    Severe malnutrition    Carotid stenosis, left    Rectal bleeding    History of pulmonary embolism    Asymptomatic stenosis of left carotid artery    Constipation    TIA (transient ischemic attack)    Weakness of both lower extremities    New daily persistent headache    Elevated liver enzymes       ICD-10-CM ICD-9-CM   1. PAD (peripheral artery disease)  I73.9 443.9   2. Bilateral carotid artery stenosis  I65.23 433.10     433.30   3. Tobacco use  Z72.0 305.1   4. Essential hypertension  I10 401.9             PLAN: After thoroughly evaluating Kingsley Lerma, I believe the best course of action is to remain conservative from vascular surgery standpoint.  His swelling to his toe has resolved and he reports no wounds.  I did review his testing which noted multilevel vascular disease however appears to be asymptomatic at this time.  We will see him back in 6 months  with repeat noninvasive testing including ABIs and a carotid duplex.  Should he have any problems or wounds develop prior to then he can notify our office sooner.  I did discuss vascular risk factors as they pertain to the progression of vascular disease including controlling his hypertension and smoking cessation.  His blood pressure stable on his current medications.  Unfortunately, he does smoke cigars on a daily basis and does not have a desire to quit smoking at this time.  This was all discussed in full with complete understanding.    Thanks you for allowing me to participate in the care of your patient.  Please do not hesitate to call with any questions or concerns.  We will keep you aware of any further encounters with Kingsley Lerma.      Sincerely Yours,        TAJ Patricia

## 2024-01-17 LAB — CREAT BLDA-MCNC: 1 MG/DL (ref 0.6–1.3)

## 2024-05-02 DIAGNOSIS — G62.9 NEUROPATHY: ICD-10-CM

## 2024-05-03 RX ORDER — GABAPENTIN 300 MG/1
300 CAPSULE ORAL DAILY
Qty: 30 CAPSULE | Refills: 2 | Status: SHIPPED | OUTPATIENT
Start: 2024-05-03

## 2024-06-10 DIAGNOSIS — G62.9 NEUROPATHY: ICD-10-CM

## 2024-07-31 ENCOUNTER — OFFICE VISIT (OUTPATIENT)
Dept: CARDIOLOGY | Facility: CLINIC | Age: 79
End: 2024-07-31
Payer: OTHER GOVERNMENT

## 2024-07-31 VITALS
WEIGHT: 148.8 LBS | HEIGHT: 69 IN | SYSTOLIC BLOOD PRESSURE: 142 MMHG | HEART RATE: 54 BPM | BODY MASS INDEX: 22.04 KG/M2 | OXYGEN SATURATION: 98 % | DIASTOLIC BLOOD PRESSURE: 62 MMHG

## 2024-07-31 DIAGNOSIS — F10.10 ALCOHOL ABUSE: ICD-10-CM

## 2024-07-31 DIAGNOSIS — I10 ESSENTIAL HYPERTENSION: ICD-10-CM

## 2024-07-31 DIAGNOSIS — K92.1 GASTROINTESTINAL HEMORRHAGE WITH MELENA: ICD-10-CM

## 2024-07-31 DIAGNOSIS — I48.91 ATRIAL FIBRILLATION AND FLUTTER: Primary | ICD-10-CM

## 2024-07-31 DIAGNOSIS — Z72.0 TOBACCO USE: ICD-10-CM

## 2024-07-31 DIAGNOSIS — I48.92 ATRIAL FIBRILLATION AND FLUTTER: Primary | ICD-10-CM

## 2024-07-31 DIAGNOSIS — I73.9 PVD (PERIPHERAL VASCULAR DISEASE): ICD-10-CM

## 2024-07-31 PROCEDURE — 99214 OFFICE O/P EST MOD 30 MIN: CPT | Performed by: NURSE PRACTITIONER

## 2024-07-31 PROCEDURE — 93000 ELECTROCARDIOGRAM COMPLETE: CPT | Performed by: NURSE PRACTITIONER

## 2024-07-31 NOTE — PROGRESS NOTES
Subjective:     Encounter Date: 7/31/2024      Patient ID: Kingsley Lerma is a 78 y.o. male.    Chief Complaint: Follow-up atrial fibrillation and flutter    HPI:     78-year-old male returns today for follow-up of atrial fibrillation and flutter.  Anticoagulation was initiated when he was first diagnosed with these in February 2020, then he returned a few months later in May 2020 with syncope (was in atrial flutter and otherwise asymptomatic with this). He was cardioverted that admission on 5/6/20, without a change in symptoms when NSR was restored. He was discharged home on 5/7 on amiodarone (rhythmol was stopped at that time). He returned to ER 5/9 because he found his heart rate to be 130s-140s and he experienced a brief dizzy spell. He was cardioverted for atrial flutter 2:1 with a HR in the 140s. NSR was restored only for a few seconds. His rhythm then proceeded to bounce around from atrial flutter to atrial fibrillation to NSR with various heart rates (70s-140s). He denied any symptoms regardless of what rhythm he was in. He was discharged home with the addition of diltiazem for better rate control when in AF/AFL, and amio and Xarelto were continued. Also of note, his Pletal dose was decreased by half due to interaction with diltiazem.   The patient also has a history of alcohol abuse.     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 EGDs were unremarkable.  He did not require blood transfusion that admission.  He was discharged home on his same medical therapy, including Xarelto for anticoagulation and Pletal for claudication.  He returned a week later with recurrent GI bleeding, and hemoglobin in the range of 6-7, and required 3 units of packed red blood cells that admission.  At that point, Xarelto was discontinued and replaced with aspirin 81 mg daily.  Pletal was continued.  He was discharged on June 18 and  returned again on June 21 with recurrent GI bleeding.  However, during that admission his hemoglobin and hematocrit remained stable and he did not require transfusion.      He had another GI bleed October 2020.  At subsequent follow-ups he has continued to maintain normal sinus rhythm on amiodarone and continue to have intermittent GI bleeds.  Ultimately, it was felt the risk of Watchman device implantation outweigh the benefit, as he had proven he cannot take aspirin and Plavix for longer than a few days to weeks without significant GI bleeding.      He had been hospitalized in January/February 2022 with a pulmonary embolism but was not anticoagulated due to his history of GI bleeding.  An IVC filter was placed by Dr. Donaldson.  IVC filter was removed June 2022.  He had carotid endarterectomy January 2023.    He does drink alcohol regularly and is a smoker.    At his visit in January 2024 he was doing well from a cardiac standpoint and maintaining normal sinus rhythm without any bleeding on aspirin monotherapy.    Today he reports he continues to feel well from a cardiac standpoint.  States his blood pressure is typically well-controlled.  He denies any chest pain, palpitations, shortness of breath, syncope or presyncope.       History of Present Illness              The following portions of the patient's history were reviewed and updated as appropriate: allergies, current medications, past family history, past medical history, past social history, past surgical history and problem list.    Review of Systems   Constitutional: Negative for malaise/fatigue.   Cardiovascular:  Negative for chest pain, claudication, dyspnea on exertion, leg swelling, near-syncope, orthopnea, palpitations, paroxysmal nocturnal dyspnea and syncope.   Respiratory:  Negative for cough and shortness of breath.    Hematologic/Lymphatic: Does not bruise/bleed easily.   Musculoskeletal:  Negative for falls.   Gastrointestinal:  Negative for  "bloating.   Neurological:  Negative for dizziness, light-headedness and weakness.       Allergies   Allergen Reactions    Prednisone Other (See Comments)     Made him anxious and jittery      Cortisone Nausea And Vomiting       Current Outpatient Medications:     amiodarone (PACERONE) 200 MG tablet, Take 1 tablet by mouth Daily., Disp: 90 tablet, Rfl: 3    aspirin 81 MG EC tablet, Take 1 tablet by mouth Daily., Disp: 30 tablet, Rfl: 11    Cholecalciferol (VITAMIN D) 50 MCG (2000 UT) tablet, Take 1 tablet by mouth 2 (Two) Times a Day., Disp: , Rfl:     dilTIAZem CD (CARDIZEM CD) 120 MG 24 hr capsule, Take 1 capsule by mouth Daily., Disp: 90 capsule, Rfl: 3    finasteride (PROSCAR) 5 MG tablet, Take 1 tablet by mouth Every Night., Disp: , Rfl:     gabapentin (NEURONTIN) 300 MG capsule, TAKE 1 CAPSULE BY MOUTH EVERY DAY, Disp: 30 capsule, Rfl: 2    LACTOBACILLUS PO, Take 1 tablet by mouth Every Night., Disp: , Rfl:     linezolid (ZYVOX) 600 MG tablet, 1 tablet., Disp: , Rfl:     lisinopril (PRINIVIL,ZESTRIL) 40 MG tablet, Take 1 tablet by mouth Daily., Disp: 90 tablet, Rfl: 3    multivitamin with minerals tablet tablet, Take 1 tablet by mouth Every Night., Disp: , Rfl:     Omega-3 Fatty Acids (fish oil) 1000 MG capsule capsule, Take  by mouth Daily With Breakfast., Disp: , Rfl:     pantoprazole (PROTONIX) 20 MG EC tablet, Take 1 tablet by mouth Every Morning., Disp: , Rfl:     vitamin C (ASCORBIC ACID) 500 MG tablet, Take 2 tablets by mouth Daily., Disp: , Rfl:          Objective:    /62   Pulse 54   Ht 175.3 cm (69\")   Wt 67.5 kg (148 lb 12.8 oz)   SpO2 98%   BMI 21.97 kg/m²        Vitals and nursing note reviewed.   Constitutional:       General: Not in acute distress.     Appearance: Well-developed and not in distress. Not diaphoretic.   HENT:      Head: Normocephalic and atraumatic.   Neck:      Vascular: No JVD.   Pulmonary:      Effort: Pulmonary effort is normal. No respiratory distress.      Breath " sounds: Normal breath sounds.   Cardiovascular:      Normal rate. Regular rhythm.      Murmurs: There is no murmur.   Edema:     Peripheral edema absent.   Abdominal:      Tenderness: There is no abdominal tenderness.   Skin:     General: Skin is warm and dry.   Neurological:      Mental Status: Alert and oriented to person, place, and time.      Cranial Nerves: No cranial nerve deficit.   Psychiatric:         Behavior: Behavior normal.         Lab Review:   Lab Results   Component Value Date    WBC 4.03 02/10/2023    HGB 11.2 (L) 02/10/2023    HCT 35.0 (L) 02/10/2023    .9 (H) 02/10/2023     02/10/2023     Lab Results   Component Value Date    GLUCOSE 87 02/10/2023    CALCIUM 8.7 02/10/2023     02/10/2023    K 4.7 02/10/2023    CO2 25.0 02/10/2023     02/10/2023    BUN 22 02/10/2023    CREATININE 1.00 01/16/2024    EGFRIFAFRI >59 09/02/2020    EGFRIFNONA 127 02/28/2022    BCR 25.9 (H) 02/10/2023    ANIONGAP 8.0 02/10/2023     Lab Results   Component Value Date    TSH 2.680 02/01/2022     Lab Results   Component Value Date    GLUCOSE 87 02/10/2023    BUN 22 02/10/2023    CREATININE 1.00 01/16/2024    EGFRIFNONA 127 02/28/2022    EGFRIFAFRI >59 09/02/2020    BCR 25.9 (H) 02/10/2023    K 4.7 02/10/2023    CO2 25.0 02/10/2023    CALCIUM 8.7 02/10/2023    ALBUMIN 3.6 02/10/2023     (H) 02/10/2023     (H) 02/10/2023                  ECG 12 Lead    Date/Time: 7/31/2024 10:18 AM  Performed by: Belkys Pond APRN    Authorized by: eBlkys Pond APRN  Comparison: compared with previous ECG from 1/16/2024  Similar to previous ECG  Rhythm: sinus bradycardia  BPM: 54    Clinical impression: non-specific ECG           6/2022 PFTs    Interpretation :  1.  Spirometry is consistent with a moderate obstructive ventilatory defect.  2.  There is some improvement in spirometry postbronchodilator but a moderate obstructive ventilatory defect is still present.  3.  Lung volumes reveal  hyperinflation.  There is also a mild decrease in inspiratory capacity.  4.  Arterial blood gases on room air reveal a mild respiratory alkalosis and otherwise are within normal limits.  5.  When current studies are compared to studies from June 23, 2021, the patient's current prebronchodilator FVC is essentially unchanged while the patient's FEV1 has decreased compared to previous prebronchodilator studies.  The patient's current postbronchodilator FVC does show a significant decline compared to previous postbronchodilator values.  There has also been a slight drop in the postbronchodilator FEV1 compared the previous postbronchodilator values.  The patient's total lung capacity has increased compared to previous and hyperinflation is now present.        Cuco Cruz MD       Results for orders placed during the hospital encounter of 01/27/22    Adult Transthoracic Echo Complete W/ Cont if Necessary Per Protocol    Interpretation Summary  · Left ventricular systolic function is normal. Left ventricular ejection fraction appears to be 56 - 60%.  · Normal right ventricular cavity size and systolic function noted.  · Mild aortic valve regurgitation is present.      Assessment:      Problem List Items Addressed This Visit          Cardiac and Vasculature    Essential hypertension    PVD (peripheral vascular disease)    Atrial fibrillation and flutter - Primary       Gastrointestinal Abdominal     GI bleed       [facility]    Alcohol abuse       Tobacco    Tobacco use     Plan:     1.  Paroxysmal atrial fibrillation and flutter: Established problem, stable.  Maintaining normal sinus rhythm on amiodarone.      -Continue diltiazem for rate control when in atrial fibrillation or flutter.    -Not anticoagulated due to recurrent GI bleeds secondary to colonic AVMs.  Continue aspirin 81 mg daily. CHADSVASC 3.    -Continue rhythm control with amiodarone  -No plans for Watchman device implantation at this time as the  patient has proven he has significant GI bleeding after a very short course of DAPT with aspirin and Plavix.    2.  Screening for amiodarone toxicity: LFTs March 2023 within normal limits.  I will request more recent LFTs and TSH from PCP.    Although his previous PFTs do not suggest abnormalities resultant from amiodarone use, they are diagnostic for COPD.  Therefore I have recommended referral to pulmonology, but the patient continues to decline at this time.  He will call back if he develops shortness of breath or would like to reconsider.    3.  Peripheral vascular disease:followed by Dr. Donaldson.  He is no longer on Pletal or Plavix due to GI bleeding.  He continues aspirin 81 mg daily.  He underwent left carotid endarterectomy 1/25/2023    4.  Pulmonary embolism: January/February 2022.  Not anticoagulated due to GI bleeding issues.  He did have an IVC filter placed at that time, that was removed in June 2022.    5.  Essential hypertension: Well-controlled per home readings.  Continue current doses of lisinopril and diltiazem    6.  Tobacco abuse: Counseled on the importance of cessation    7.  Recurrent GI bleeding secondary to colonic AVMs - not anticoagulated due to these.  Followed by gastroenterology.    8. alcohol abuse: Counseled on the importance of cessation.    F/u 6 months with Dr. Farias, sooner with new or worsening symptoms.

## 2024-08-09 DIAGNOSIS — G62.9 NEUROPATHY: ICD-10-CM

## 2024-08-09 RX ORDER — GABAPENTIN 300 MG/1
300 CAPSULE ORAL DAILY
Qty: 30 CAPSULE | Refills: 2 | Status: SHIPPED | OUTPATIENT
Start: 2024-08-09

## 2024-08-28 ENCOUNTER — TELEPHONE (OUTPATIENT)
Dept: VASCULAR SURGERY | Facility: CLINIC | Age: 79
End: 2024-08-28
Payer: OTHER GOVERNMENT

## 2024-08-29 ENCOUNTER — HOSPITAL ENCOUNTER (OUTPATIENT)
Dept: ULTRASOUND IMAGING | Facility: HOSPITAL | Age: 79
Discharge: HOME | End: 2024-08-29
Payer: MEDICARE

## 2024-08-29 ENCOUNTER — OFFICE VISIT (OUTPATIENT)
Dept: VASCULAR SURGERY | Facility: CLINIC | Age: 79
End: 2024-08-29
Payer: OTHER GOVERNMENT

## 2024-08-29 VITALS
DIASTOLIC BLOOD PRESSURE: 70 MMHG | HEIGHT: 69 IN | BODY MASS INDEX: 21.62 KG/M2 | HEART RATE: 52 BPM | OXYGEN SATURATION: 98 % | WEIGHT: 146 LBS | SYSTOLIC BLOOD PRESSURE: 156 MMHG

## 2024-08-29 DIAGNOSIS — Z72.0 TOBACCO USE: ICD-10-CM

## 2024-08-29 DIAGNOSIS — I74.09 AORTOILIAC OCCLUSIVE DISEASE: ICD-10-CM

## 2024-08-29 DIAGNOSIS — I73.9 PAD (PERIPHERAL ARTERY DISEASE): Primary | ICD-10-CM

## 2024-08-29 DIAGNOSIS — I65.23 BILATERAL CAROTID ARTERY STENOSIS: ICD-10-CM

## 2024-08-29 DIAGNOSIS — I10 ESSENTIAL HYPERTENSION: ICD-10-CM

## 2024-08-29 DIAGNOSIS — G62.9 NEUROPATHY: ICD-10-CM

## 2024-08-29 DIAGNOSIS — I73.9 PAD (PERIPHERAL ARTERY DISEASE): ICD-10-CM

## 2024-08-29 PROCEDURE — 93923 UPR/LXTR ART STDY 3+ LVLS: CPT

## 2024-08-29 PROCEDURE — 99214 OFFICE O/P EST MOD 30 MIN: CPT | Performed by: NURSE PRACTITIONER

## 2024-08-29 PROCEDURE — 93880 EXTRACRANIAL BILAT STUDY: CPT

## 2024-08-29 NOTE — PROGRESS NOTES
"08/29/2024         Yohana Flanagan, APRN  [address]  [place] [address]        Kingsley Lerma  1945      Chief Complaint   Patient presents with    Follow-up     6 month follow up w/ testing. Last seen 1/16/24. Patient denies any stroke type symptoms or issues with legs.       Dear Yohana Flanagan, APRSENDY:   I had the pleasure of seeing you patient in the office today for follow up.  As you recall, the patient is a 79 y.o. male who we initially saw while hospitalized and found to have left lower lobe pulmonary embolus with no evidence of right heart strain.  He was found to have deep vein thrombus to bilateral lower extremities.  Due to GI bleeding while previously anticoagulated he is unable to take anticoagulation. He did have an IVC filter placed 1/31/2022 and removed 6/22/2022. 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  He previously followed with [facility] vascular and in 2020 underwent bilateral femoral endarterectomies with placement vbx aortic stent of bilateral kissing iliac stents by Dr. Acuña. He underwent left carotid endarterectomy 1/25/23.  He denies any changes since his last visit.  He is maintained on aspirin.  He is a daily cigar smoker.  He did have noninvasive testing performed today, which I did review in office.    Review of Systems   Constitutional: Negative.    HENT: Negative.     Eyes: Negative.    Respiratory: Negative.     Cardiovascular: Negative.    Gastrointestinal: Negative.    Endocrine: Negative.    Genitourinary: Negative.    Musculoskeletal: Negative.    Skin: Negative.    Allergic/Immunologic: Negative.    Neurological:  Positive for dizziness and numbness.   Hematological: Negative.    Psychiatric/Behavioral: Negative.     All other systems reviewed and are negative.        /70   Pulse 52   Ht 175.3 cm (69\")   Wt 66.2 kg (146 lb)   SpO2 98%   BMI 21.56 kg/m²  "   Physical Exam  Vitals and nursing note reviewed.   Constitutional:       Appearance: Normal appearance. He is well-developed and normal weight.   HENT:      Head: Normocephalic and atraumatic.   Eyes:      General: No scleral icterus.     Pupils: Pupils are equal, round, and reactive to light.   Neck:      Thyroid: No thyromegaly.      Vascular: No carotid bruit or JVD.   Cardiovascular:      Rate and Rhythm: Normal rate and regular rhythm.      Pulses:           Carotid pulses are 2+ on the right side and 2+ on the left side.       Femoral pulses are 2+ on the right side and 2+ on the left side.       Dorsalis pedis pulses are detected w/ Doppler on the right side and detected w/ Doppler on the left side.        Posterior tibial pulses are detected w/ Doppler on the right side and detected w/ Doppler on the left side.      Heart sounds: Normal heart sounds.   Pulmonary:      Effort: Pulmonary effort is normal.      Breath sounds: Normal breath sounds.   Abdominal:      General: Bowel sounds are normal. There is no distension or abdominal bruit.      Palpations: Abdomen is soft. There is no mass.      Tenderness: There is no abdominal tenderness.   Musculoskeletal:         General: Normal range of motion.      Cervical back: Neck supple.   Lymphadenopathy:      Cervical: No cervical adenopathy.   Skin:     General: Skin is warm and dry.   Neurological:      Mental Status: He is alert and oriented to person, place, and time.      Cranial Nerves: No cranial nerve deficit.      Sensory: No sensory deficit.   Psychiatric:         Mood and Affect: Mood normal.         Behavior: Behavior normal.         Thought Content: Thought content normal.         Judgment: Judgment normal.       Diagnostic Data:   Narrative & Impression      History: PAD     Comments: Bilateral lower extremity arterial with multi-level pulse  volume recordings and segmental pressures were performed at rest and  stress.     The right ankle/brachial  index is 0.65. The waveforms are biphasic with  dampening at the ankle. These findings are consistent with moderate  arterial insufficiency of the right lower extremity at rest.     The left ankle/brachial index is 0.65. The waveforms are biphasic with  dampening at the ankle. These findings are consistent with moderate  arterial insufficiency of the left lower extremity at rest.     IMPRESSION:  Impression:  1. Moderate arterial insufficiency of the right lower extremity at rest.  2. Moderate arterial insufficiency of the left lower extremity at rest.           This report was signed and finalized on 8/29/2024 11:30 AM by Dr. Luis Enrique Donaldson MD.       History: Carotid occlusive disease     IMPRESSION:  Impression:  1. There is less than 50% stenosis of the right internal carotid artery.  2. There is less than 50% stenosis of the left internal carotid artery.  3. Antegrade flow is demonstrated in bilateral vertebral arteries.     Comments: Bilateral carotid vertebral arterial duplex scan was  performed.     Grayscale imaging shows intimal thickening and calcified elements at the  carotid bifurcation. The right internal carotid artery peak systolic  velocity is 102.8 cm/sec. The end-diastolic velocity is 17.1 cm/sec. The  right ICA/CCA ratio is approximately 1.2. These findings correlate with  less than 50% stenosis of the right internal carotid artery.     Grayscale imaging shows intimal thickening and calcified elements at the  carotid bifurcation. The left internal carotid artery peak systolic  velocity is 114.2 cm/sec. The end-diastolic velocity is 18.8 cm/sec. The  left ICA/CCA ratio is approximately 3.0. These findings correlate with  less than 50% stenosis of the left internal carotid artery.     Antegrade flow is demonstrated in bilateral vertebral arteries.  There is greater than 50% stenosis in the right external carotid artery.     This report was signed and finalized on 8/29/2024 11:49 AM by  Dr. Luis Enrique Donaldson MD.    Patient Active Problem List   Diagnosis    Essential hypertension    PVD (peripheral vascular disease)    Atrial fibrillation and flutter    Alcohol abuse    Non healing left heel wound    Atrial fibrillation status post cardioversion    Tobacco use    Syncope    Anticoagulated    GI bleed    Positive FIT (fecal immunochemical test)    Heme + stool    Acute blood loss anemia    Gastrointestinal hemorrhage    Osteoarthritis of right hip    AVM (arteriovenous malformation) of colon    BPH without obstruction/lower urinary tract symptoms    Abnormal CT scan, bladder    Hypokalemia    Diarrhea    Acute pulmonary embolism    Severe malnutrition    Carotid stenosis, left    Rectal bleeding    History of pulmonary embolism    Asymptomatic stenosis of left carotid artery    Constipation    TIA (transient ischemic attack)    Weakness of both lower extremities    New daily persistent headache    Elevated liver enzymes       ICD-10-CM ICD-9-CM   1. PAD (peripheral artery disease)  I73.9 443.9   2. Neuropathy  G62.9 355.9   3. Bilateral carotid artery stenosis  I65.23 433.10     433.30   4. Tobacco use  Z72.0 305.1   5. Aortoiliac occlusive disease  I74.09 444.09   6. Essential hypertension  I10 401.9   7. BMI 21.0-21.9, adult  Z68.21 V85.1               PLAN: After thoroughly evaluating Kingsley Lerma, I believe the best course of action is to remain conservative from vascular surgery standpoint.  Overall he is doing well and denies any changes to his lower extremities.  He also denies any strokelike symptoms.  I did review his testing which shows moderate arterial insufficiency to bilateral lower extremities.  His carotid duplex shows less than 50% carotid stenosis bilaterally.  Again, he reports no wounds or complaints of claudication.  Previous testing did show multilevel vascular disease.  We will see him back in 6 months with repeat noninvasive testing for continued surveillance, including  ABIs and a carotid duplex.  I did discuss vascular risk factors as they pertain to the progression of vascular disease including controlling his hypertension and smoking cessation.  His blood pressure is elevated in office at 156/70.  Unfortunately he is a current daily cigar smoker and does not wish to quit smoking at this time.  Body mass index is 21.56 kg/m².  This was all discussed in full with complete understanding.    Thanks you for allowing me to participate in the care of your patient.  Please do not hesitate to call with any questions or concerns.  We will keep you aware of any further encounters with Kingsley Lerma.      Sincerely Yours,        TAJ Patricia

## 2024-08-29 NOTE — LETTER
September 30, 2024       No Recipients    Patient: Kingsley Lerma   YOB: 1945   Date of Visit: 8/29/2024     Dear TAJ Stanton:       Thank you for referring Kingsley Lerma to me for evaluation. Below are the relevant portions of my assessment and plan of care.    If you have questions, please do not hesitate to call me. I look forward to following Kingsley along with you.         Sincerely,        TAJ Patricia        CC:   No Recipients    Helen Williamson APRN  09/30/24 1912  Sign when Signing Visit  08/29/2024         Yohana Flanagan APRN  [address]  [place] [address]        Kingsley Lerma  1945      Chief Complaint   Patient presents with   • Follow-up     6 month follow up w/ testing. Last seen 1/16/24. Patient denies any stroke type symptoms or issues with legs.       Dear Yohana Flanagan APRN:   I had the pleasure of seeing you patient in the office today for follow up.  As you recall, the patient is a 79 y.o. male who we initially saw while hospitalized and found to have left lower lobe pulmonary embolus with no evidence of right heart strain.  He was found to have deep vein thrombus to bilateral lower extremities.  Due to GI bleeding while previously anticoagulated he is unable to take anticoagulation. He did have an IVC filter placed 1/31/2022 and removed 6/22/2022. 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  He previously followed with [facility] vascular and in 2020 underwent bilateral femoral endarterectomies with placement vbx aortic stent of bilateral kissing iliac stents by Dr. Acuña. He underwent left carotid endarterectomy 1/25/23.  He denies any changes since his last visit.  He is maintained on aspirin.  He is a daily cigar smoker.  He did have noninvasive testing performed today, which I did review in office.    Review of Systems   Constitutional:  "Negative.    HENT: Negative.     Eyes: Negative.    Respiratory: Negative.     Cardiovascular: Negative.    Gastrointestinal: Negative.    Endocrine: Negative.    Genitourinary: Negative.    Musculoskeletal: Negative.    Skin: Negative.    Allergic/Immunologic: Negative.    Neurological:  Positive for dizziness and numbness.   Hematological: Negative.    Psychiatric/Behavioral: Negative.     All other systems reviewed and are negative.        /70   Pulse 52   Ht 175.3 cm (69\")   Wt 66.2 kg (146 lb)   SpO2 98%   BMI 21.56 kg/m²    Physical Exam  Vitals and nursing note reviewed.   Constitutional:       Appearance: Normal appearance. He is well-developed and normal weight.   HENT:      Head: Normocephalic and atraumatic.   Eyes:      General: No scleral icterus.     Pupils: Pupils are equal, round, and reactive to light.   Neck:      Thyroid: No thyromegaly.      Vascular: No carotid bruit or JVD.   Cardiovascular:      Rate and Rhythm: Normal rate and regular rhythm.      Pulses:           Carotid pulses are 2+ on the right side and 2+ on the left side.       Femoral pulses are 2+ on the right side and 2+ on the left side.       Dorsalis pedis pulses are detected w/ Doppler on the right side and detected w/ Doppler on the left side.        Posterior tibial pulses are detected w/ Doppler on the right side and detected w/ Doppler on the left side.      Heart sounds: Normal heart sounds.   Pulmonary:      Effort: Pulmonary effort is normal.      Breath sounds: Normal breath sounds.   Abdominal:      General: Bowel sounds are normal. There is no distension or abdominal bruit.      Palpations: Abdomen is soft. There is no mass.      Tenderness: There is no abdominal tenderness.   Musculoskeletal:         General: Normal range of motion.      Cervical back: Neck supple.   Lymphadenopathy:      Cervical: No cervical adenopathy.   Skin:     General: Skin is warm and dry.   Neurological:      Mental Status: He is " alert and oriented to person, place, and time.      Cranial Nerves: No cranial nerve deficit.      Sensory: No sensory deficit.   Psychiatric:         Mood and Affect: Mood normal.         Behavior: Behavior normal.         Thought Content: Thought content normal.         Judgment: Judgment normal.       Diagnostic Data:   Narrative & Impression      History: PAD     Comments: Bilateral lower extremity arterial with multi-level pulse  volume recordings and segmental pressures were performed at rest and  stress.     The right ankle/brachial index is 0.65. The waveforms are biphasic with  dampening at the ankle. These findings are consistent with moderate  arterial insufficiency of the right lower extremity at rest.     The left ankle/brachial index is 0.65. The waveforms are biphasic with  dampening at the ankle. These findings are consistent with moderate  arterial insufficiency of the left lower extremity at rest.     IMPRESSION:  Impression:  1. Moderate arterial insufficiency of the right lower extremity at rest.  2. Moderate arterial insufficiency of the left lower extremity at rest.           This report was signed and finalized on 8/29/2024 11:30 AM by Dr. Luis Enrique Donaldson MD.       History: Carotid occlusive disease     IMPRESSION:  Impression:  1. There is less than 50% stenosis of the right internal carotid artery.  2. There is less than 50% stenosis of the left internal carotid artery.  3. Antegrade flow is demonstrated in bilateral vertebral arteries.     Comments: Bilateral carotid vertebral arterial duplex scan was  performed.     Grayscale imaging shows intimal thickening and calcified elements at the  carotid bifurcation. The right internal carotid artery peak systolic  velocity is 102.8 cm/sec. The end-diastolic velocity is 17.1 cm/sec. The  right ICA/CCA ratio is approximately 1.2. These findings correlate with  less than 50% stenosis of the right internal carotid artery.     Grayscale imaging  shows intimal thickening and calcified elements at the  carotid bifurcation. The left internal carotid artery peak systolic  velocity is 114.2 cm/sec. The end-diastolic velocity is 18.8 cm/sec. The  left ICA/CCA ratio is approximately 3.0. These findings correlate with  less than 50% stenosis of the left internal carotid artery.     Antegrade flow is demonstrated in bilateral vertebral arteries.  There is greater than 50% stenosis in the right external carotid artery.     This report was signed and finalized on 8/29/2024 11:49 AM by Dr. Luis Enrique Donaldson MD.    Patient Active Problem List   Diagnosis   • Essential hypertension   • PVD (peripheral vascular disease)   • Atrial fibrillation and flutter   • Alcohol abuse   • Non healing left heel wound   • Atrial fibrillation status post cardioversion   • Tobacco use   • Syncope   • Anticoagulated   • GI bleed   • Positive FIT (fecal immunochemical test)   • Heme + stool   • Acute blood loss anemia   • Gastrointestinal hemorrhage   • Osteoarthritis of right hip   • AVM (arteriovenous malformation) of colon   • BPH without obstruction/lower urinary tract symptoms   • Abnormal CT scan, bladder   • Hypokalemia   • Diarrhea   • Acute pulmonary embolism   • Severe malnutrition   • Carotid stenosis, left   • Rectal bleeding   • History of pulmonary embolism   • Asymptomatic stenosis of left carotid artery   • Constipation   • TIA (transient ischemic attack)   • Weakness of both lower extremities   • New daily persistent headache   • Elevated liver enzymes       ICD-10-CM ICD-9-CM   1. PAD (peripheral artery disease)  I73.9 443.9   2. Neuropathy  G62.9 355.9   3. Bilateral carotid artery stenosis  I65.23 433.10     433.30   4. Tobacco use  Z72.0 305.1   5. Aortoiliac occlusive disease  I74.09 444.09   6. Essential hypertension  I10 401.9   7. BMI 21.0-21.9, adult  Z68.21 V85.1               PLAN: After thoroughly evaluating Kingsley Lerma, I believe the best course of action  is to remain conservative from vascular surgery standpoint.  Overall he is doing well and denies any changes to his lower extremities.  He also denies any strokelike symptoms.  I did review his testing which shows moderate arterial insufficiency to bilateral lower extremities.  His carotid duplex shows less than 50% carotid stenosis bilaterally.  Again, he reports no wounds or complaints of claudication.  Previous testing did show multilevel vascular disease.  We will see him back in 6 months with repeat noninvasive testing for continued surveillance, including ABIs and a carotid duplex.  I did discuss vascular risk factors as they pertain to the progression of vascular disease including controlling his hypertension and smoking cessation.  His blood pressure is elevated in office at 156/70.  Unfortunately he is a current daily cigar smoker and does not wish to quit smoking at this time.  Body mass index is 21.56 kg/m².  This was all discussed in full with complete understanding.    Thanks you for allowing me to participate in the care of your patient.  Please do not hesitate to call with any questions or concerns.  We will keep you aware of any further encounters with Kingsley Lerma.      Sincerely Yours,        TAJ Patricia

## 2024-08-30 RX ORDER — DILTIAZEM HYDROCHLORIDE 120 MG/1
120 CAPSULE, COATED, EXTENDED RELEASE ORAL DAILY
Qty: 90 CAPSULE | Refills: 3 | Status: SHIPPED | OUTPATIENT
Start: 2024-08-30

## 2024-12-07 DIAGNOSIS — G62.9 NEUROPATHY: Primary | ICD-10-CM

## 2024-12-07 RX ORDER — GABAPENTIN 300 MG/1
300 CAPSULE ORAL DAILY
Qty: 30 CAPSULE | Refills: 2 | Status: SHIPPED | OUTPATIENT
Start: 2024-12-07

## 2025-01-01 ENCOUNTER — TRANSCRIBE ORDERS (OUTPATIENT)
Dept: ADMINISTRATIVE | Facility: HOSPITAL | Age: 80
End: 2025-01-01
Payer: OTHER GOVERNMENT

## 2025-01-01 DIAGNOSIS — M25.551 PAIN IN RIGHT HIP: Primary | ICD-10-CM

## 2025-01-16 ENCOUNTER — HOSPITAL ENCOUNTER (OUTPATIENT)
Dept: GENERAL RADIOLOGY | Facility: HOSPITAL | Age: 80
Discharge: HOME | End: 2025-01-16
Admitting: NURSE PRACTITIONER
Payer: OTHER GOVERNMENT

## 2025-01-16 DIAGNOSIS — M25.551 PAIN IN RIGHT HIP: ICD-10-CM

## 2025-01-16 PROCEDURE — 73502 X-RAY EXAM HIP UNI 2-3 VIEWS: CPT

## 2025-02-06 ENCOUNTER — OFFICE VISIT (OUTPATIENT)
Dept: CARDIOLOGY | Facility: CLINIC | Age: 80
End: 2025-02-06
Payer: OTHER GOVERNMENT

## 2025-02-06 VITALS
OXYGEN SATURATION: 95 % | DIASTOLIC BLOOD PRESSURE: 55 MMHG | HEART RATE: 59 BPM | BODY MASS INDEX: 22.07 KG/M2 | HEIGHT: 69 IN | SYSTOLIC BLOOD PRESSURE: 140 MMHG | WEIGHT: 149 LBS

## 2025-02-06 DIAGNOSIS — I48.92 ATRIAL FIBRILLATION AND FLUTTER: Primary | ICD-10-CM

## 2025-02-06 DIAGNOSIS — Z86.711 HISTORY OF PULMONARY EMBOLISM: ICD-10-CM

## 2025-02-06 DIAGNOSIS — I10 ESSENTIAL HYPERTENSION: ICD-10-CM

## 2025-02-06 DIAGNOSIS — Z72.0 TOBACCO USE: ICD-10-CM

## 2025-02-06 DIAGNOSIS — K55.20 AVM (ARTERIOVENOUS MALFORMATION) OF COLON: ICD-10-CM

## 2025-02-06 DIAGNOSIS — I48.91 ATRIAL FIBRILLATION AND FLUTTER: Primary | ICD-10-CM

## 2025-02-06 PROBLEM — Z79.01 ANTICOAGULATED: Status: RESOLVED | Noted: 2020-05-07 | Resolved: 2025-02-06

## 2025-02-06 PROCEDURE — 93000 ELECTROCARDIOGRAM COMPLETE: CPT | Performed by: INTERNAL MEDICINE

## 2025-02-06 PROCEDURE — 99214 OFFICE O/P EST MOD 30 MIN: CPT | Performed by: INTERNAL MEDICINE

## 2025-02-06 NOTE — PROGRESS NOTES
Subjective:     Encounter Date:02/06/2025      Patient ID: Kingsley Lerma is a 79 y.o. male.    Chief Complaint:f/u atrial fibrillation/flutter  History of Present Illness  The patient is a 79-year-old male who presents for a follow-up visit concerning atrial fibrillation and atrial flutter.    He was last seen by [facility] on 07/31/2024, at which time he was maintaining sinus rhythm and doing well. He was not anticoagulated due to recurrent GI bleeding secondary to colonic AVMs but was tolerating aspirin 81 mg daily. He is on amiodarone for rhythm control. The Watchman device was considered and discussed but deemed unsuitable due to his significant GI bleeds, even on short courses of dual antiplatelet therapy with aspirin and Plavix, which would be required for 6 months after the procedure. As of the last visit, [facility] recommended a pulmonology consultation for COPD, but he declined. He follows with vascular surgery for peripheral vascular disease, including a left carotid endarterectomy in 01/2023. He experienced a pulmonary embolism in early 2022 and had an IVC filter placed due to the inability to anticoagulate with the GI bleeding. The IVC filter was appropriately removed in 06/2022. He reports no episodes of tachycardia, palpitations, or arrhythmias. He recalls a single episode of transient, brief chest discomfort, which has not recurred since. He does not remember what he felt like previously when his heart was out of rhythm. He is not experiencing any symptoms of blood loss or bleeding while on aspirin. He also reports no chest pain or new respiratory difficulties. [facility] and he undergoes annual ophthalmological examinations.    MEDICATIONS  Aspirin, amiodarone, Plavix.      The following portions of the patient's history were reviewed and updated as appropriate: allergies, current medications, past family history, past medical history, past social history, past surgical  history, and problem list.    Review of Systems   Constitutional: Negative for malaise/fatigue.   Cardiovascular:  Negative for chest pain, claudication, dyspnea on exertion, leg swelling, near-syncope, orthopnea, palpitations, paroxysmal nocturnal dyspnea and syncope.   Respiratory:  Negative for shortness of breath.    Hematologic/Lymphatic: Does not bruise/bleed easily.           Current Outpatient Medications:     amiodarone (PACERONE) 200 MG tablet, Take 1 tablet by mouth Daily., Disp: 90 tablet, Rfl: 3    aspirin 81 MG EC tablet, Take 1 tablet by mouth Daily., Disp: 30 tablet, Rfl: 11    Cholecalciferol (VITAMIN D) 50 MCG (2000 UT) tablet, Take 1 tablet by mouth 2 (Two) Times a Day., Disp: , Rfl:     dilTIAZem CD (CARDIZEM CD) 120 MG 24 hr capsule, Take 1 capsule by mouth Daily., Disp: 90 capsule, Rfl: 3    finasteride (PROSCAR) 5 MG tablet, Take 1 tablet by mouth Every Night., Disp: , Rfl:     gabapentin (Neurontin) 300 MG capsule, Take 1 capsule by mouth Daily., Disp: 30 capsule, Rfl: 2    LACTOBACILLUS PO, Take 1 tablet by mouth Every Night., Disp: , Rfl:     linezolid (ZYVOX) 600 MG tablet, 1 tablet., Disp: , Rfl:     lisinopril (PRINIVIL,ZESTRIL) 40 MG tablet, Take 1 tablet by mouth Daily., Disp: 90 tablet, Rfl: 3    multivitamin with minerals tablet tablet, Take 1 tablet by mouth Every Night., Disp: , Rfl:     Omega-3 Fatty Acids (fish oil) 1000 MG capsule capsule, Take  by mouth Daily With Breakfast., Disp: , Rfl:     pantoprazole (PROTONIX) 20 MG EC tablet, Take 1 tablet by mouth Every Morning., Disp: , Rfl:     vitamin C (ASCORBIC ACID) 500 MG tablet, Take 2 tablets by mouth Daily., Disp: , Rfl:     gabapentin (NEURONTIN) 300 MG capsule, TAKE ONE CAPSULE BY MOUTH ONCE DAILY, Disp: 30 capsule, Rfl: 2       Objective:      Vitals:    02/06/25 1354   BP: 140/55   Pulse: 59   SpO2: 95%     Vitals and nursing note reviewed.   Constitutional:       General: Not in acute distress.     Appearance: Not in  distress.   Neck:      Vascular: No JVD or JVR. JVD normal.   Pulmonary:      Effort: Pulmonary effort is normal.      Breath sounds: Normal breath sounds.   Cardiovascular:      Normal rate. Regular rhythm.      Murmurs: There is no murmur.      No gallop.  No rub.   Pulses:     Intact distal pulses.   Edema:     Peripheral edema absent.   Skin:     General: Skin is warm and dry.   Neurological:      Mental Status: Alert, oriented to person, place, and time and oriented to person, place and time.       Physical Exam      Lab Review:         ECG 12 Lead    Date/Time: 2/6/2025 2:24 PM  Performed by: Nolan Farias MD    Authorized by: Nolan Farias MD  Comparison: compared with previous ECG from 7/31/2024  Rhythm: sinus bradycardia  BPM: 59  Conduction: 1st degree AV block  Clinical impression comment: otherwise normal ecg        Results        Results for orders placed during the hospital encounter of 01/27/22    Adult Transthoracic Echo Complete W/ Cont if Necessary Per Protocol    Interpretation Summary  · Left ventricular systolic function is normal. Left ventricular ejection fraction appears to be 56 - 60%.  · Normal right ventricular cavity size and systolic function noted.  · Mild aortic valve regurgitation is present.        Assessment/Plan:     Problem List Items Addressed This Visit          Cardiac and Vasculature    Essential hypertension    Relevant Orders    ECG 12 Lead    Atrial fibrillation and flutter - Primary    Relevant Orders    ECG 12 Lead       Coag and Thromboembolic    History of pulmonary embolism       Gastrointestinal Abdominal     AVM (arteriovenous malformation) of colon       Tobacco    Tobacco use         Recommendations/plans:    Assessment & Plan  1.  Paroxysmal atrial fibrillation and flutter: Established problem, stable.  Maintaining normal sinus rhythm on amiodarone.    -Continue diltiazem for rate control when in atrial fibrillation or flutter.    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  Continue aspirin 81 mg daily. CHADSVASC 3.  At present, the risk of bleeding on the requisite 6 months of dual anti-platelet therapy after watchman still seems to outweigh the potential benefit.  --Continue amiodarone to maintain sinus rhythm, with toxicity screening as noted below     2.  Screening for amiodarone toxicity:   -He mentioned [facility], so efforts will be made to obtain those results to ensure thyroid and liver function tests are within normal limits.  -recommend CXR and PFTs to be done annually, to be ordered by PCP and [facility]  -Patient states he seems optometry annually    3. Peripheral Vascular Disease.  He follows with vascular surgery for peripheral vascular disease, including a left carotid endarterectomy in January 2023. No new symptoms or issues were reported.    4. History of Gastrointestinal Bleeds.  He has had significant GI bleeds in the past, which contraindicate the use of anticoagulation and dual antiplatelet therapy. He will continue on aspirin 81 mg daily indefinitely.    5.  History of pulmonary embolism: Had IVC filter placed at that time given contraindication for coagulation; this was subsequent removed by vascular in 2022.    6.  Essential hypertension: Well-controlled.  Continue current regimen which consists of lisinopril and diltiazem.    7.  Tobacco abuse: Encouraged cessation.    Follow-up  The patient will follow up in 1 year, or sooner if any new or worsening symptoms arise.      Nolan Farias MD  02/06/2025  16:57 CST    Transcribed from ambient dictation for Nolan Farias MD by Nolan Farias MD.  02/06/25   14:17 CST    Patient or patient representative verbalized consent to the visit recording.

## 2025-02-26 ENCOUNTER — TELEPHONE (OUTPATIENT)
Dept: VASCULAR SURGERY | Facility: CLINIC | Age: 80
End: 2025-02-26
Payer: OTHER GOVERNMENT

## 2025-02-26 NOTE — TELEPHONE ENCOUNTER
SPOKE WITH PT AS A REMINDER OF 0630 ARRIVAL FOR 0[facility] THEN TO SEE KRISTEN AT 0945.

## 2025-02-27 ENCOUNTER — OFFICE VISIT (OUTPATIENT)
Dept: VASCULAR SURGERY | Facility: CLINIC | Age: 80
End: 2025-02-27
Payer: OTHER GOVERNMENT

## 2025-02-27 ENCOUNTER — HOSPITAL ENCOUNTER (OUTPATIENT)
Dept: ULTRASOUND IMAGING | Facility: HOSPITAL | Age: 80
Discharge: HOME | End: 2025-02-27
Payer: OTHER GOVERNMENT

## 2025-02-27 ENCOUNTER — PATIENT ROUNDING (BHMG ONLY) (OUTPATIENT)
Dept: VASCULAR SURGERY | Facility: CLINIC | Age: 80
End: 2025-02-27
Payer: OTHER GOVERNMENT

## 2025-02-27 VITALS
BODY MASS INDEX: 21.92 KG/M2 | DIASTOLIC BLOOD PRESSURE: 72 MMHG | WEIGHT: 148 LBS | HEIGHT: 69 IN | SYSTOLIC BLOOD PRESSURE: 138 MMHG | OXYGEN SATURATION: 98 % | HEART RATE: 72 BPM

## 2025-02-27 DIAGNOSIS — I65.23 BILATERAL CAROTID ARTERY STENOSIS: ICD-10-CM

## 2025-02-27 DIAGNOSIS — I73.9 PAD (PERIPHERAL ARTERY DISEASE): ICD-10-CM

## 2025-02-27 DIAGNOSIS — Z72.0 TOBACCO USE: ICD-10-CM

## 2025-02-27 DIAGNOSIS — G62.9 NEUROPATHY: ICD-10-CM

## 2025-02-27 DIAGNOSIS — I10 ESSENTIAL HYPERTENSION: ICD-10-CM

## 2025-02-27 DIAGNOSIS — I74.09 AORTOILIAC OCCLUSIVE DISEASE: ICD-10-CM

## 2025-02-27 DIAGNOSIS — I73.9 PAD (PERIPHERAL ARTERY DISEASE): Primary | ICD-10-CM

## 2025-02-27 PROCEDURE — 93880 EXTRACRANIAL BILAT STUDY: CPT

## 2025-02-27 PROCEDURE — 93923 UPR/LXTR ART STDY 3+ LVLS: CPT

## 2025-02-27 PROCEDURE — 99214 OFFICE O/P EST MOD 30 MIN: CPT | Performed by: NURSE PRACTITIONER

## 2025-02-27 NOTE — LETTER
March 7, 2025     Yohana Flanagan, TAJ  [address]  [place] [address]    Patient: Kingsley Lerma   YOB: 1945   Date of Visit: 2/27/2025     Dear TAJ Stanton:       Thank you for referring Kingsley Lerma to me for evaluation. Below are the relevant portions of my assessment and plan of care.    If you have questions, please do not hesitate to call me. I look forward to following Kingsley along with you.         Sincerely,        TAJ Patricia        CC: No Recipients    Helen Williamson APRN  03/07/25 1311  Sign when Signing Visit  02/27/2025         Yohana Flanagan, APRN  [address]  [place] [address]        Kingsley Lerma  1945      Chief Complaint   Patient presents with   • SET PAD   • Bilateral carotid artery stenosis     Testing this am here. Can't walk far at all,no changes and no stroke symptoms .        Dear Yohana Flanagan APRN:   I had the pleasure of seeing you patient in the office today for follow up.  As you recall, the patient is a 79 y.o. male who we initially saw while hospitalized and found to have left lower lobe pulmonary embolus with no evidence of right heart strain.  He was found to have deep vein thrombus to bilateral lower extremities.  Due to GI bleeding while previously anticoagulated he is unable to take anticoagulation. He did have an IVC filter placed 1/31/2022 and removed 6/22/2022. 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  He previously followed with [facility] vascular and in 2020 underwent bilateral femoral endarterectomies with placement vbx aortic stent of bilateral kissing iliac stents by Dr. Acuña.  He states none of his symptoms were relieved to his legs postoperatively.  He underwent left carotid endarterectomy 1/25/23.  Overall he is doing well and denies any strokelike symptoms.  His legs are unchanged.  He is a current  "daily cigar smoker.  He is maintained on aspirin and fish oil.  He did have noninvasive testing performed today, which I did review in office.     Review of Systems   Constitutional: Negative.    HENT: Negative.     Eyes: Negative.    Respiratory: Negative.     Cardiovascular: Negative.    Gastrointestinal: Negative.    Endocrine: Negative.    Genitourinary: Negative.    Musculoskeletal: Negative.    Skin: Negative.    Allergic/Immunologic: Negative.    Neurological:  Positive for numbness.   Hematological: Negative.    Psychiatric/Behavioral: Negative.     All other systems reviewed and are negative.        /72   Pulse 72   Ht 175.3 cm (69\")   Wt 67.1 kg (148 lb)   SpO2 98%   BMI 21.86 kg/m²    Physical Exam  Vitals and nursing note reviewed.   Constitutional:       Appearance: Normal appearance. He is well-developed and normal weight.   HENT:      Head: Normocephalic and atraumatic.   Eyes:      General: No scleral icterus.     Pupils: Pupils are equal, round, and reactive to light.   Neck:      Thyroid: No thyromegaly.      Vascular: No carotid bruit or JVD.   Cardiovascular:      Rate and Rhythm: Normal rate and regular rhythm.      Pulses:           Carotid pulses are 2+ on the right side and 2+ on the left side.       Femoral pulses are 2+ on the right side and 2+ on the left side.       Dorsalis pedis pulses are detected w/ Doppler on the right side and detected w/ Doppler on the left side.        Posterior tibial pulses are detected w/ Doppler on the right side and detected w/ Doppler on the left side.      Heart sounds: Normal heart sounds.   Pulmonary:      Effort: Pulmonary effort is normal.      Breath sounds: Normal breath sounds.   Abdominal:      General: Bowel sounds are normal. There is no distension or abdominal bruit.      Palpations: Abdomen is soft. There is no mass.      Tenderness: There is no abdominal tenderness.   Musculoskeletal:         General: Normal range of motion.      " Cervical back: Neck supple.   Lymphadenopathy:      Cervical: No cervical adenopathy.   Skin:     General: Skin is warm and dry.   Neurological:      Mental Status: He is alert and oriented to person, place, and time.      Cranial Nerves: No cranial nerve deficit.      Sensory: No sensory deficit.   Psychiatric:         Mood and Affect: Mood normal.         Behavior: Behavior normal.         Thought Content: Thought content normal.         Judgment: Judgment normal.       Diagnostic Data:   US Ankle / Brachial Indices Extremity Complete    Result Date: 2/27/2025  Narrative:  History: PAD  Comments: Bilateral lower extremity arterial with multi-level pulse volume recordings and segmental pressures were performed at rest and stress.  The right ankle/brachial index is 0.54. Waveforms are biphasic at the ankle with dampening. These findings are consistent with moderate to severe arterial insufficiency of the right lower extremity at rest.  The left ankle/brachial index is 0.55. Waveforms at the ankle are biphasic with mild dampening these findings are consistent with moderate to severe arterial insufficiency of the left lower extremity at rest.      Impression: Impression: 1. Moderate to severe arterial insufficiency of the right lower extremity at rest. 2. Moderate to severe arterial insufficiency of the left lower extremity at rest.    This report was signed and finalized on 2/27/2025 3:49 PM by Mike Marcos.      US Carotid Bilateral    Result Date: 2/27/2025  Narrative: History: Carotid occlusive disease      Impression: Impression: 1. There is less than 50% stenosis of the right internal carotid artery. 2. There is less than 50% stenosis of the left internal carotid artery. 3. Antegrade flow is demonstrated in bilateral vertebral arteries.  Comments: Bilateral carotid vertebral arterial duplex scan was performed.  Grayscale imaging shows intimal thickening and calcified elements at the carotid bifurcation. The  right internal carotid artery peak systolic velocity is 111.4 cm/sec. The end-diastolic velocity is 22.9 cm/sec. The right ICA/CCA ratio is approximately 1.4. These findings correlate with less than 50 stenosis of the right internal carotid artery.  Grayscale imaging shows intimal thickening and calcified elements at the carotid bifurcation. The left internal carotid artery peak systolic velocity is 111.4 cm/sec. The end-diastolic velocity is 15.2 cm/sec. The left ICA/CCA ratio is approximately 2.8. These findings correlate with less than 50% stenosis of the left internal carotid artery.  Antegrade flow is demonstrated in bilateral vertebral arteries.   This report was signed and finalized on 2/27/2025 3:46 PM by Mike Marcos.      Patient Active Problem List   Diagnosis   • Essential hypertension   • PVD (peripheral vascular disease)   • Atrial fibrillation and flutter   • Alcohol abuse   • Non healing left heel wound   • Atrial fibrillation status post cardioversion   • Tobacco use   • Syncope   • GI bleed   • Positive FIT (fecal immunochemical test)   • Heme + stool   • Acute blood loss anemia   • Gastrointestinal hemorrhage   • Osteoarthritis of right hip   • AVM (arteriovenous malformation) of colon   • BPH without obstruction/lower urinary tract symptoms   • Abnormal CT scan, bladder   • Hypokalemia   • Diarrhea   • Acute pulmonary embolism   • Severe malnutrition   • Carotid stenosis, left   • Rectal bleeding   • History of pulmonary embolism   • Asymptomatic stenosis of left carotid artery   • Constipation   • TIA (transient ischemic attack)   • Weakness of both lower extremities   • New daily persistent headache   • Elevated liver enzymes       ICD-10-CM ICD-9-CM   1. PAD (peripheral artery disease)  I73.9 443.9   2. Bilateral carotid artery stenosis  I65.23 433.10     433.30   3. Aortoiliac occlusive disease  I74.09 444.09   4. Neuropathy  G62.9 355.9   5. Essential hypertension  I10 401.9   6. Tobacco  use  Z72.0 305.1         PLAN: After thoroughly evaluating Kingsley Lerma, I believe the best course of action is to remain conservative from vascular surgery standpoint.  Overall he is doing about the same and denies any significant changes to his lower extremities or strokelike symptoms.  I did review his testing which shows moderate arterial insufficiency to his lower extremities and less than 50% carotid stenosis bilaterally.  Given he did not have significant relief immediately following surgery his discomfort was likely related to his degenerative disc disease throughout the lumbar spine.  We will plan to see him back in 1 year with repeat noninvasive testing including ABIs and a carotid duplex.  I did discuss vascular risk factors as they pertain to the progression of vascular disease including controlling His blood pressure is stable on his current medications.  Unfortunately he is a current daily cigar smoker and does not wish to quit smoking at this time.  Body mass index is 21.86 kg/m².  This was all discussed in full with complete understanding.    Thanks you for allowing me to participate in the care of your patient.  Please do not hesitate to call with any questions or concerns.  We will keep you aware of any further encounters with Kingsley Lerma.      Sincerely Yours,        TAJ Patricia

## 2025-02-27 NOTE — PROGRESS NOTES
February 27, 2025    Hello, may I speak with Kingsley Lerma?    My name is RON       I am  with [facility]  [address], [address]  [place] [address] [phone].    Before we get started may I verify your date of birth? 1945    I am calling to  ask about your recent visit. Is this a good time to talk? yes    Tell me about your visit with us. What things went well?  MY VISIT WITH [facility] WAS FANTASTIC COULDN'T HAVE WENT ANY BETTER. I WISH THE HOSPITAL TREATED PATIENTS LIKE [facility] DOES.        We're always looking for ways to make our patients' experiences even better. Do you have recommendations on ways we may improve?  no    Overall were you satisfied with your visit to our practice? yes       I appreciate you taking the time to speak with me today. Is there anything else I can do for you? yes      Thank you, and have a great day.

## 2025-02-27 NOTE — PROGRESS NOTES
02/27/2025         Yohana Flanagan, APRN  [address]  [place] [address]        Kingsley Lerma  1945      Chief Complaint   Patient presents with    SET PAD    Bilateral carotid artery stenosis     Testing this am here. Can't walk far at all,no changes and no stroke symptoms .        Dear Yohana Flanagan, APRN:   I had the pleasure of seeing you patient in the office today for follow up.  As you recall, the patient is a 79 y.o. male who we initially saw while hospitalized and found to have left lower lobe pulmonary embolus with no evidence of right heart strain.  He was found to have deep vein thrombus to bilateral lower extremities.  Due to GI bleeding while previously anticoagulated he is unable to take anticoagulation. He did have an IVC filter placed 1/31/2022 and removed 6/22/2022. 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  He previously followed with [facility] vascular and in 2020 underwent bilateral femoral endarterectomies with placement vbx aortic stent of bilateral kissing iliac stents by Dr. Acuña.  He states none of his symptoms were relieved to his legs postoperatively.  He underwent left carotid endarterectomy 1/25/23.  Overall he is doing well and denies any strokelike symptoms.  His legs are unchanged.  He is a current daily cigar smoker.  He is maintained on aspirin and fish oil.  He did have noninvasive testing performed today, which I did review in office.     Review of Systems   Constitutional: Negative.    HENT: Negative.     Eyes: Negative.    Respiratory: Negative.     Cardiovascular: Negative.    Gastrointestinal: Negative.    Endocrine: Negative.    Genitourinary: Negative.    Musculoskeletal: Negative.    Skin: Negative.    Allergic/Immunologic: Negative.    Neurological:  Positive for numbness.   Hematological: Negative.    Psychiatric/Behavioral: Negative.     All other systems  "reviewed and are negative.        /72   Pulse 72   Ht 175.3 cm (69\")   Wt 67.1 kg (148 lb)   SpO2 98%   BMI 21.86 kg/m²    Physical Exam  Vitals and nursing note reviewed.   Constitutional:       Appearance: Normal appearance. He is well-developed and normal weight.   HENT:      Head: Normocephalic and atraumatic.   Eyes:      General: No scleral icterus.     Pupils: Pupils are equal, round, and reactive to light.   Neck:      Thyroid: No thyromegaly.      Vascular: No carotid bruit or JVD.   Cardiovascular:      Rate and Rhythm: Normal rate and regular rhythm.      Pulses:           Carotid pulses are 2+ on the right side and 2+ on the left side.       Femoral pulses are 2+ on the right side and 2+ on the left side.       Dorsalis pedis pulses are detected w/ Doppler on the right side and detected w/ Doppler on the left side.        Posterior tibial pulses are detected w/ Doppler on the right side and detected w/ Doppler on the left side.      Heart sounds: Normal heart sounds.   Pulmonary:      Effort: Pulmonary effort is normal.      Breath sounds: Normal breath sounds.   Abdominal:      General: Bowel sounds are normal. There is no distension or abdominal bruit.      Palpations: Abdomen is soft. There is no mass.      Tenderness: There is no abdominal tenderness.   Musculoskeletal:         General: Normal range of motion.      Cervical back: Neck supple.   Lymphadenopathy:      Cervical: No cervical adenopathy.   Skin:     General: Skin is warm and dry.   Neurological:      Mental Status: He is alert and oriented to person, place, and time.      Cranial Nerves: No cranial nerve deficit.      Sensory: No sensory deficit.   Psychiatric:         Mood and Affect: Mood normal.         Behavior: Behavior normal.         Thought Content: Thought content normal.         Judgment: Judgment normal.       Diagnostic Data:   US Ankle / Brachial Indices Extremity Complete    Result Date: 2/27/2025  Narrative:  " History: PAD  Comments: Bilateral lower extremity arterial with multi-level pulse volume recordings and segmental pressures were performed at rest and stress.  The right ankle/brachial index is 0.54. Waveforms are biphasic at the ankle with dampening. These findings are consistent with moderate to severe arterial insufficiency of the right lower extremity at rest.  The left ankle/brachial index is 0.55. Waveforms at the ankle are biphasic with mild dampening these findings are consistent with moderate to severe arterial insufficiency of the left lower extremity at rest.      Impression: Impression: 1. Moderate to severe arterial insufficiency of the right lower extremity at rest. 2. Moderate to severe arterial insufficiency of the left lower extremity at rest.    This report was signed and finalized on 2/27/2025 3:49 PM by Mike Marcos.      US Carotid Bilateral    Result Date: 2/27/2025  Narrative: History: Carotid occlusive disease      Impression: Impression: 1. There is less than 50% stenosis of the right internal carotid artery. 2. There is less than 50% stenosis of the left internal carotid artery. 3. Antegrade flow is demonstrated in bilateral vertebral arteries.  Comments: Bilateral carotid vertebral arterial duplex scan was performed.  Grayscale imaging shows intimal thickening and calcified elements at the carotid bifurcation. The right internal carotid artery peak systolic velocity is 111.4 cm/sec. The end-diastolic velocity is 22.9 cm/sec. The right ICA/CCA ratio is approximately 1.4. These findings correlate with less than 50 stenosis of the right internal carotid artery.  Grayscale imaging shows intimal thickening and calcified elements at the carotid bifurcation. The left internal carotid artery peak systolic velocity is 111.4 cm/sec. The end-diastolic velocity is 15.2 cm/sec. The left ICA/CCA ratio is approximately 2.8. These findings correlate with less than 50% stenosis of the left internal  carotid artery.  Antegrade flow is demonstrated in bilateral vertebral arteries.   This report was signed and finalized on 2/27/2025 3:46 PM by Mike Marcos.      Patient Active Problem List   Diagnosis    Essential hypertension    PVD (peripheral vascular disease)    Atrial fibrillation and flutter    Alcohol abuse    Non healing left heel wound    Atrial fibrillation status post cardioversion    Tobacco use    Syncope    GI bleed    Positive FIT (fecal immunochemical test)    Heme + stool    Acute blood loss anemia    Gastrointestinal hemorrhage    Osteoarthritis of right hip    AVM (arteriovenous malformation) of colon    BPH without obstruction/lower urinary tract symptoms    Abnormal CT scan, bladder    Hypokalemia    Diarrhea    Acute pulmonary embolism    Severe malnutrition    Carotid stenosis, left    Rectal bleeding    History of pulmonary embolism    Asymptomatic stenosis of left carotid artery    Constipation    TIA (transient ischemic attack)    Weakness of both lower extremities    New daily persistent headache    Elevated liver enzymes       ICD-10-CM ICD-9-CM   1. PAD (peripheral artery disease)  I73.9 443.9   2. Bilateral carotid artery stenosis  I65.23 433.10     433.30   3. Aortoiliac occlusive disease  I74.09 444.09   4. Neuropathy  G62.9 355.9   5. Essential hypertension  I10 401.9   6. Tobacco use  Z72.0 305.1         PLAN: After thoroughly evaluating Kingsley Lerma, I believe the best course of action is to remain conservative from vascular surgery standpoint.  Overall he is doing about the same and denies any significant changes to his lower extremities or strokelike symptoms.  I did review his testing which shows moderate arterial insufficiency to his lower extremities and less than 50% carotid stenosis bilaterally.  Given he did not have significant relief immediately following surgery his discomfort was likely related to his degenerative disc disease throughout the lumbar spine.  We  will plan to see him back in 1 year with repeat noninvasive testing including ABIs and a carotid duplex.  I did discuss vascular risk factors as they pertain to the progression of vascular disease including controlling His blood pressure is stable on his current medications.  Unfortunately he is a current daily cigar smoker and does not wish to quit smoking at this time.  Body mass index is 21.86 kg/m².  This was all discussed in full with complete understanding.    Thanks you for allowing me to participate in the care of your patient.  Please do not hesitate to call with any questions or concerns.  We will keep you aware of any further encounters with Kingsley Lerma.      Sincerely Yours,        TAJ Patricia       Star Wedge Flap Text: The defect edges were debeveled with a #15 scalpel blade.  Given the location of the defect, shape of the defect and the proximity to free margins a star wedge flap was deemed most appropriate.  Using a sterile surgical marker, an appropriate rotation flap was drawn incorporating the defect and placing the expected incisions within the relaxed skin tension lines where possible. The area thus outlined was incised deep to adipose tissue with a #15 scalpel blade.  The skin margins were undermined to an appropriate distance in all directions utilizing iris scissors.

## 2025-03-04 DIAGNOSIS — G62.9 NEUROPATHY: ICD-10-CM

## 2025-03-04 RX ORDER — GABAPENTIN 300 MG/1
300 CAPSULE ORAL DAILY
Qty: 30 CAPSULE | Refills: 2 | Status: SHIPPED | OUTPATIENT
Start: 2025-03-04

## 2025-03-13 ENCOUNTER — OFFICE VISIT (OUTPATIENT)
Age: 80
End: 2025-03-13
Payer: OTHER GOVERNMENT

## 2025-03-13 VITALS — HEIGHT: 70 IN | WEIGHT: 147 LBS | BODY MASS INDEX: 21.05 KG/M2

## 2025-03-13 DIAGNOSIS — M25.551 RIGHT HIP PAIN: Primary | ICD-10-CM

## 2025-03-13 DIAGNOSIS — M16.0 PRIMARY OSTEOARTHRITIS OF BOTH HIPS: ICD-10-CM

## 2025-03-13 PROCEDURE — 99203 OFFICE O/P NEW LOW 30 MIN: CPT | Performed by: PHYSICIAN ASSISTANT

## 2025-03-13 PROCEDURE — 1123F ACP DISCUSS/DSCN MKR DOCD: CPT | Performed by: PHYSICIAN ASSISTANT

## 2025-03-13 RX ORDER — MULTIPLE VITAMINS W/ MINERALS TAB 9MG-400MCG
1 TAB ORAL NIGHTLY
COMMUNITY

## 2025-03-13 RX ORDER — VITAMIN B COMPLEX
1 CAPSULE ORAL DAILY
COMMUNITY

## 2025-03-13 RX ORDER — IPRATROPIUM BROMIDE AND ALBUTEROL SULFATE 2.5; .5 MG/3ML; MG/3ML
SOLUTION RESPIRATORY (INHALATION)
COMMUNITY

## 2025-03-13 RX ORDER — CHLORAL HYDRATE 500 MG
CAPSULE ORAL
COMMUNITY

## 2025-03-13 RX ORDER — ASCORBIC ACID 500 MG
1000 TABLET ORAL DAILY
COMMUNITY

## 2025-03-13 NOTE — PROGRESS NOTES
MD Sheeba   cilostazol (PLETAL) 50 MG tablet Take 50 mg by mouth 2 times daily 5/14/20   Sheeba Mei MD   lidocaine (LMX) 4 % cream Apply topically as needed  Patient not taking: Reported on 3/13/2025    Sheeba Mei MD   ferrous sulfate (IRON 325) 325 (65 Fe) MG tablet Take 325 mg by mouth daily (with breakfast)  Patient not taking: Reported on 3/13/2025 6/8/20   Sheeba Mei MD   famotidine (PEPCID) 20 MG tablet Take 20 mg by mouth daily as needed   Patient not taking: Reported on 3/13/2025    Sheeba Mei MD       Allergies:  Simvastatin, Cortisone, and Prednisone    Social History:   Social History     Socioeconomic History    Marital status:      Spouse name: Not on file    Number of children: Not on file    Years of education: Not on file    Highest education level: Not on file   Occupational History    Not on file   Tobacco Use    Smoking status: Every Day     Types: Cigars, Cigarettes    Smokeless tobacco: Never    Tobacco comments:     smokes small filter cigars (10 to 12) a day    Vaping Use    Vaping status: Never Used   Substance and Sexual Activity    Alcohol use: Not on file     Comment: \"couple of glasses of wine daily\"    Drug use: Never    Sexual activity: Not on file   Other Topics Concern    Not on file   Social History Narrative    Not on file     Social Drivers of Health     Financial Resource Strain: Not on file   Food Insecurity: Not on file   Transportation Needs: Not on file   Physical Activity: Not on file   Stress: Not on file   Social Connections: Not on file   Intimate Partner Violence: Not At Risk (2/9/2023)    Received from [facility]    Abuse Screen     Feels Unsafe at Home or Work/School: no     Feels Threatened by Someone: no     Does Anyone Try to Keep You From Having Contact with Others or Doing Things Outside Your Home?: no     Physical Signs of Abuse Present: no   Housing Stability: Unknown (2/10/2023)    Received

## 2025-05-15 DIAGNOSIS — G62.9 NEUROPATHY: ICD-10-CM

## 2025-05-15 RX ORDER — GABAPENTIN 300 MG/1
300 CAPSULE ORAL DAILY
Qty: 30 CAPSULE | Refills: 2 | Status: SHIPPED | OUTPATIENT
Start: 2025-05-15

## 2025-06-02 DIAGNOSIS — G62.9 NEUROPATHY: ICD-10-CM

## 2025-08-18 DIAGNOSIS — G62.9 NEUROPATHY: ICD-10-CM

## 2025-08-21 DIAGNOSIS — G62.9 NEUROPATHY: ICD-10-CM

## 2025-08-21 RX ORDER — GABAPENTIN 300 MG/1
300 CAPSULE ORAL DAILY
Qty: 30 CAPSULE | Refills: 5 | Status: SHIPPED | OUTPATIENT
Start: 2025-08-21

## (undated) DEVICE — 3M™ IOBAN™ 2 ANTIMICROBIAL INCISE DRAPE 6650EZ: Brand: IOBAN™ 2

## (undated) DEVICE — PROXIMATE RH ROTATING HEAD SKIN STAPLERS (35 WIDE) CONTAINS 35 STAINLESS STEEL STAPLES: Brand: PROXIMATE

## (undated) DEVICE — GLIDESHEATH BASIC HYDROPHILIC COATED INTRODUCER SHEATH: Brand: GLIDESHEATH

## (undated) DEVICE — SUT MNCRYL 4/0 PS2 27IN UD MCP426H

## (undated) DEVICE — Device

## (undated) DEVICE — LOOP VES W1.3MM THK0.9MM MINI WHT SIL FLD REPELLENT

## (undated) DEVICE — COVER LT HNDL PLAS RIG 2 PER PK

## (undated) DEVICE — DRSNG SURESITE WNDW 4X4.5

## (undated) DEVICE — PK TURNOVER RM ADV

## (undated) DEVICE — SYRINGE MED 10ML LUERLOCK TIP W/O SFTY DISP

## (undated) DEVICE — VESSEL LOOPS,MAXI, RED: Brand: DEVON

## (undated) DEVICE — RADIFOCUS GLIDEWIRE ADVANTAGE GUIDEWIRE: Brand: GLIDEWIRE ADVANTAGE

## (undated) DEVICE — Z INACTIVE USE 2535480 CLIP LIG M BLU TI HRT SHP WIRE HORZ 180 PER BX

## (undated) DEVICE — PINNACLE INTRODUCER SHEATH: Brand: PINNACLE

## (undated) DEVICE — PACK,UNIVERSAL,NO GOWNS: Brand: MEDLINE

## (undated) DEVICE — SPNG GZ STRL 2S 4X4 12PLY

## (undated) DEVICE — NDL HYPO PRECISIONGLIDE REG 22G 1 1/2

## (undated) DEVICE — SOLUTION IV 1000ML 0.9% SOD CHL PH 5 INJ USP VIAFLX PLAS

## (undated) DEVICE — ST MIC/INTRO ACC SHRP/NDL TUNG/TP NITNL 5F 45CM 7CM

## (undated) DEVICE — SHEET,DRAPE,53X77,STERILE: Brand: MEDLINE

## (undated) DEVICE — MEDIA CONTRAST INJ VISIPAQUE 150ML 320MG

## (undated) DEVICE — CANN VESL ACRN TP 4MM

## (undated) DEVICE — SENSR O2 OXIMAX FNGR A/ 18IN NONSTR

## (undated) DEVICE — SYR SLP TP 10ML DISP

## (undated) DEVICE — THE CHANNEL CLEANING BRUSH IS A NYLON FLEXI BRUSH ATTACHED TO A FLEXIBLE PLASTIC SHEATH DESIGNED TO SAFELY REMOVE DEBRIS FROM FLEXIBLE ENDOSCOPES.

## (undated) DEVICE — CATHETER ANGIO 5FR L70CM GWIRE 0.035IN 1CM SPC OMNI FLSH 21

## (undated) DEVICE — SUT PROLN 7/0 BV1 24IN 4PK M8702

## (undated) DEVICE — APPL CHLORAPREP HI/LITE 26ML ORNG

## (undated) DEVICE — RESERVOIR,SUCTION,100CC,SILICONE: Brand: MEDLINE

## (undated) DEVICE — GUIDEWIRE WITH ICE™ HYDROPHILIC COATING: Brand: V-18™ CONTROL WIRE™

## (undated) DEVICE — GLV SURG SENSICARE W/ALOE PF LF 7.5 STRL

## (undated) DEVICE — SUTURE VCRL SZ 2-0 L36IN ABSRB UD L36MM CT-1 1/2 CIR J945H

## (undated) DEVICE — SYR TB PRECISIONGLIDE 1CC 26G 3/8IN LF

## (undated) DEVICE — COVER US PRB W15XL120CM W/ GEL RUBBERBAND TAPE STRP FLD GEN

## (undated) DEVICE — GUIDEWIRE VASC L180CM DIA0.035IN TIP L7CM PTFE S STL STR

## (undated) DEVICE — STRIP,CLOSURE,WOUND,MEDI-STRIP,1/2X4: Brand: MEDLINE

## (undated) DEVICE — CONMED SCOPE SAVER BITE BLOCK, 20X27 MM: Brand: SCOPE SAVER

## (undated) DEVICE — ZIMMER® STERILE DISPOSABLE TOURNIQUET CUFF WITH PLC, DUAL PORT, SINGLE BLADDER, 34 IN. (86 CM)

## (undated) DEVICE — TBG SMPL FLTR LINE NASL 02/C02 A/ BX/100

## (undated) DEVICE — TUBING ANGIO L72IN 900PSI CLR PVC M TO FEM AIRLESS ROT ADPT

## (undated) DEVICE — ATLAS®  PTA BALLOON DILATATION CATHETER 14 MM X 40 MM, 75 CM CATHETER: Brand: ATLAS®

## (undated) DEVICE — 150 ML FASTURN SYRINGE WITH QUICK FILL TUBE(SET,PKG,150ML FT,W/QFT,RAD,JAPAN,MC)(60729679): Brand: MEDRAD® MARK V PROVIS STERILE DISPOSABLE SYRINGE 150ML & QFT

## (undated) DEVICE — GLV SURG BIOGEL LTX PF 7 1/2

## (undated) DEVICE — CANNULA PERF L1.3IN TIP L2MM S STL POLYUR TB ARTOTMY BLB

## (undated) DEVICE — INFLATION DEVICE: Brand: ENCORE™ 26

## (undated) DEVICE — SUTURE VCRL SZ 3-0 L27IN ABSRB UD L26MM SH 1/2 CIR J416H

## (undated) DEVICE — SUTURE PROL SZ 7-0 L24IN NONABSORBABLE BLU L9.3MM BV-1 3/8 M8702

## (undated) DEVICE — GUIDEWIRE VASC L180CM DIA0.035IN L15CM STR TIP PTFE S STL

## (undated) DEVICE — SMART SLEEVE SURGICAL GOWN, XL, POLYREINFORCED FRONT: Brand: CONVERTORS

## (undated) DEVICE — KT SNAR RETRV 2SHEATH 90D 6F 20MM

## (undated) DEVICE — SUT PROLN 6/0 4/24IN BV1 MON BL M8805

## (undated) DEVICE — TUBE ET 7.5MM NSL ORAL BASIC CUF INTMED MURPHY EYE RADPQ

## (undated) DEVICE — ADAPTER HEMSTAS VLV HVA

## (undated) DEVICE — TRAP FLD MINIVAC MEGADYNE 100ML

## (undated) DEVICE — WIPE INST 3X3IN 2MM BX/20

## (undated) DEVICE — GAUZE,SPONGE,4"X4",16PLY,XRAY,STRL,LF: Brand: MEDLINE

## (undated) DEVICE — SUT ETHLN 3/0 FS1 30IN 669H

## (undated) DEVICE — BAPTIST TURNOVER KIT: Brand: MEDLINE INDUSTRIES, INC.

## (undated) DEVICE — CUFF,BP,DISP,1 TUBE,ADULT,HP: Brand: MEDLINE

## (undated) DEVICE — ATRIEVE™ VASCULAR SNARE KIT 7F: Brand: ATRIEVE™ VASCULAR SNARE KIT

## (undated) DEVICE — Device: Brand: DEFENDO AIR/WATER/SUCTION AND BIOPSY VALVE

## (undated) DEVICE — FLEXOR, CHECK-FLO, INTRODUCER RAABE MODIFICATION: Brand: FLEXOR

## (undated) DEVICE — GAUZE,SPONGE,FLUFF,6"X6.75",STRL,10/TRAY: Brand: MEDLINE

## (undated) DEVICE — HI-TORQUE COMMAND ES GUIDE WIRE .014" 300 CM: Brand: HI-TORQUE COMMAND

## (undated) DEVICE — YANKAUER,BULB TIP WITH VENT: Brand: ARGYLE

## (undated) DEVICE — SOLUTION IV 100ML 0.9% SOD CHL PLAS CONT USP VIAFLX 1 PER

## (undated) DEVICE — PTA BALLOON DILATATION CATHETER: Brand: CHARGER™

## (undated) DEVICE — SUTURE PERMAHAND SZ 2-0 L30IN NONABSORBABLE BLK SILK W/O A305H

## (undated) DEVICE — DRESSING TRNSPAR W5XL4.5IN FLM SHT SEMIPERMEABLE WIND

## (undated) DEVICE — SURGICAL PROCEDURE PACK VASC LOURDES HOSP

## (undated) DEVICE — ANTIBACTERIAL UNDYED BRAIDED (POLYGLACTIN 910), SYNTHETIC ABSORBABLE SUTURE: Brand: COATED VICRYL

## (undated) DEVICE — RADIFOCUS GLIDEWIRE: Brand: GLIDEWIRE

## (undated) DEVICE — DRAPE KEYBOARD W26XL36IN ADH

## (undated) DEVICE — CLIP INT SM WIDE RED TI TRNSVRS GRV CHEVRON SHP W/ PRECIS

## (undated) DEVICE — C-ARM: Brand: UNBRANDED

## (undated) DEVICE — PAD ENDOVASCULAR: Brand: MEDLINE INDUSTRIES, INC.

## (undated) DEVICE — SUTURE PERMAHAND SZ 4-0 L12X30IN NONABSORBABLE BLK SILK A303H

## (undated) DEVICE — NAVICROSS SUPPORT CATHETER: Brand: NAVICROSS

## (undated) DEVICE — GLOVE SURG SZ 7 CRM LTX FREE POLYISOPRENE POLYMER BEAD ANTI

## (undated) DEVICE — TOWEL,OR,DSP,ST,BLUE,DLX,4/PK,20PK/CS: Brand: MEDLINE

## (undated) DEVICE — SUTURE NONABSORBABLE MONOFILAMENT 6-0 BV-1 1X30 IN PROLENE 8709H

## (undated) DEVICE — LARYNGOSCOPE BLDE MAC HNDL M SZ 35 ST CURAPLEX CURAVIEW LED

## (undated) DEVICE — CONVERTORS STOCKINETTE: Brand: CONVERTORS

## (undated) DEVICE — SPONGE LAP W18XL18IN WHT COT 4 PLY FLD STRUNG RADPQ DISP ST

## (undated) DEVICE — SUTURE PROL SZ 6-0 L30IN NONABSORBABLE BLU L9.3MM BV-1 3/8 M8709

## (undated) DEVICE — SUT PROLN 5/0 C1 DA 24IN 8725H

## (undated) DEVICE — SUTURE PERMAHAND SZ 3-0 L30IN NONABSORBABLE BLK SILK BRAID A304H

## (undated) DEVICE — AGENT HEMSTAT W4XL4IN OXIDIZED REGENERATED CELOS ABSRB SFT

## (undated) DEVICE — PAD MAJOR VASCULAR: Brand: MEDLINE INDUSTRIES, INC.

## (undated) DEVICE — ANGIOGRAPHY PK

## (undated) DEVICE — CVR PROB GEN PURP W ISOSILK 6X48

## (undated) DEVICE — CATHETER KIT 5 FR 21 GAX7 CM MICROINTRODUCER GUIDEWIRE STIFF

## (undated) DEVICE — MASK,OXYGEN,MED CONC,ADLT,7' TUB, UC: Brand: PENDING

## (undated) DEVICE — RADIFOCUS GLIDECATH: Brand: GLIDECATH

## (undated) DEVICE — GUIDEWIRE VASC L260CM DIA0.035IN COIL L15CM FLX TIP L4CM